# Patient Record
Sex: MALE | Race: WHITE | NOT HISPANIC OR LATINO | Employment: OTHER | URBAN - METROPOLITAN AREA
[De-identification: names, ages, dates, MRNs, and addresses within clinical notes are randomized per-mention and may not be internally consistent; named-entity substitution may affect disease eponyms.]

---

## 2017-01-02 DIAGNOSIS — B02.29 POST HERPETIC NEURALGIA: ICD-10-CM

## 2017-01-02 DIAGNOSIS — M35.3 POLYMYALGIA RHEUMATICA (H): Primary | ICD-10-CM

## 2017-01-03 PROBLEM — B02.29 POST HERPETIC NEURALGIA: Status: ACTIVE | Noted: 2017-01-03

## 2017-01-03 RX ORDER — TRAMADOL HYDROCHLORIDE 50 MG/1
TABLET ORAL
Qty: 50 TABLET | Refills: 0 | Status: SHIPPED | OUTPATIENT
Start: 2017-01-03 | End: 2017-02-21

## 2017-01-03 NOTE — TELEPHONE ENCOUNTER
TRAMADOL 50MG   Last Written Prescription Date:  11/16/16  Last Fill Quantity: 50,   # refills: 0  Last Office Visit with [facility] provider: 12/8/16  Future Office visit:       Routing refill request to provider for review/approval because:  Drug not on the [facility] refill protocol or controlled substance

## 2017-01-03 NOTE — TELEPHONE ENCOUNTER
Controlled Substance Refill Request for traMADol (ULTRAM) 50 MG tablet  Problem List Complete:  No     PROVIDER TO CONSIDER COMPLETION OF PROBLEM LIST AND OVERVIEW/CONTROLLED SUBSTANCE AGREEMENT    Last Written Prescription Date:  11/15/16  Last Fill Quantity: 50,   # refills: 0    [place] [facility] [place]: 12/8/16    Future Office visit:     Controlled substance agreement on file: No.     Processing:  Fax Rx to [facility]  pharmacy   checked in past 6 months?  No, route to RN     [place] reviewed:  reviewed- no concerns    [place]:  [email or web address]

## 2017-01-09 ENCOUNTER — OFFICE VISIT (OUTPATIENT)
Dept: FAMILY MEDICINE | Facility: CLINIC | Age: 82
End: 2017-01-09
Payer: COMMERCIAL

## 2017-01-09 VITALS
RESPIRATION RATE: 14 BRPM | TEMPERATURE: 97.2 F | BODY MASS INDEX: 27.9 KG/M2 | WEIGHT: 189 LBS | OXYGEN SATURATION: 99 % | SYSTOLIC BLOOD PRESSURE: 122 MMHG | HEART RATE: 72 BPM | DIASTOLIC BLOOD PRESSURE: 80 MMHG

## 2017-01-09 DIAGNOSIS — I10 ESSENTIAL HYPERTENSION, BENIGN: ICD-10-CM

## 2017-01-09 DIAGNOSIS — E78.2 MIXED HYPERLIPIDEMIA: ICD-10-CM

## 2017-01-09 DIAGNOSIS — N18.30 TYPE 2 DIABETES MELLITUS WITH STAGE 3 CHRONIC KIDNEY DISEASE, WITHOUT LONG-TERM CURRENT USE OF INSULIN (H): Chronic | ICD-10-CM

## 2017-01-09 DIAGNOSIS — N18.30 CKD (CHRONIC KIDNEY DISEASE) STAGE 3, GFR 30-59 ML/MIN (H): ICD-10-CM

## 2017-01-09 DIAGNOSIS — R60.0 LOCALIZED EDEMA: Primary | ICD-10-CM

## 2017-01-09 DIAGNOSIS — E11.22 TYPE 2 DIABETES MELLITUS WITH STAGE 3 CHRONIC KIDNEY DISEASE, WITHOUT LONG-TERM CURRENT USE OF INSULIN (H): Chronic | ICD-10-CM

## 2017-01-09 DIAGNOSIS — Z13.89 SCREENING FOR DIABETIC PERIPHERAL NEUROPATHY: ICD-10-CM

## 2017-01-09 PROCEDURE — 99214 OFFICE O/P EST MOD 30 MIN: CPT | Performed by: FAMILY MEDICINE

## 2017-01-09 NOTE — NURSING NOTE
"Chief Complaint   Patient presents with     Swelling       Initial /80 mmHg  Pulse 72  Temp(Src) 97.2  F (36.2  C) (Tympanic)  Resp 14  Wt 189 lb (85.73 kg)  SpO2 99% Estimated body mass index is 27.9 kg/(m^2) as calculated from the following:    Height as of 12/19/16: 5' 9\" (1.753 m).    Weight as of this encounter: 189 lb (85.73 kg).  BP completed using cuff size: regular    "

## 2017-01-09 NOTE — MR AVS SNAPSHOT
"              After Visit Summary   1/9/2017    Abbe Lambert    MRN: [id number]           Patient Information     Date Of Birth          2/3/1934        Visit Information        Provider Department      1/9/2017 2:00 PM Eliezer Shah MD [facility]        Today's Diagnoses     Screening for diabetic peripheral neuropathy    -  1        Follow-ups after your visit        Your next 10 appointments already scheduled     Mar 06, 2017  1:00 PM   Remote PPM Check with HARMAN TECH1   [facility] [place] [facility]    [address] [place] [address] [id number] [phone]           This appointment is for a remote check of your pacemaker.  This is not an appointment at the office.              Who to contact     If you have questions or need follow up information about today's clinic visit or your schedule please contact [facility] directly at [phone].  Normal or non-critical lab and imaging results will be communicated to you by Dyynohart, letter or phone within 4 business days after the clinic has received the results. If you do not hear from us within 7 days, please contact the clinic through Dyynohart or phone. If you have a critical or abnormal lab result, we will notify you by phone as soon as possible.  Submit refill requests through Agenda or call your pharmacy and they will forward the refill request to us. Please allow 3 business days for your refill to be completed.          Additional Information About Your Visit        Dyynohart Information     Agenda lets you send messages to your doctor, view your test results, renew your prescriptions, schedule appointments and more. To sign up, go to [email or web address] . Click on \"Log in\" on the left side of the screen, which will take you to the Welcome page. Then click on \"Sign up Now\" on the right side of the page.     You will be asked to " enter the access code listed below, as well as some personal information. Please follow the directions to create your username and password.     Your access code is: [email or web address]  Expires: 2017  2:47 PM     Your access code will  in 90 days. If you need help or a new code, please call your [facility] clinic or [phone].        Care EveryWhere ID     This is your Care EveryWhere ID. This could be used by other organizations to access your [facility] medical records  [id number]        Your Vitals Were     Pulse Temperature Respirations Pulse Oximetry          72 97.2  F (36.2  C) (Tympanic) 14 99%         Blood Pressure from Last 3 Encounters:   17 122/80   16 110/73   16 122/70    Weight from Last 3 Encounters:   17 189 lb (85.73 kg)   16 182 lb (82.555 kg)   16 181 lb (82.101 kg)              Today, you had the following     No orders found for display       Primary Care Provider Office Phone # Fax #    Eliezer Shah [phone]       [facility] [address] [facility] [address]        Thank you!     Thank you for choosing [facility]  for your care. Our goal is always to provide you with excellent care. Hearing back from our patients is one way we can continue to improve our services. Please take a few minutes to complete the written survey that you may receive in the mail after your visit with us. Thank you!             Your Updated Medication List - Protect others around you: Learn how to safely use, store and throw away your medicines at www.disposemymeds.org.          This list is accurate as of: 17  2:47 PM.  Always use your most recent med list.                   Brand Name Dispense Instructions for use    ACE/ARB NOT PRESCRIBED (INTENTIONAL)      ACE & ARB not prescribed due to Disease of aortic or mitral valves       albuterol 108 (90 BASE) MCG/ACT Inhaler    PROAIR HFA/PROVENTIL  HFA/VENTOLIN HFA     Inhale 2 puffs into the lungs as needed for shortness of breath / dyspnea or wheezing       aspirin 81 MG tablet     30 tablet    Take 81 mg by mouth daily       ASTELIN 0.1 % spray   Generic drug:  azelastine      Spray 1 spray into both nostrils 2 times daily.       atorvastatin 40 MG tablet    LIPITOR    90 tablet    TAKE 1 TABLET BY MOUTH DAILY       beclomethasone 80 MCG/ACT Inhaler    QVAR    2 Inhaler    Inhale 2 puffs into the lungs 2 times daily       cetirizine 10 MG tablet    zyrTEC     Take 10 mg by mouth every morning       clopidogrel 75 MG tablet    PLAVIX    90 tablet    Take 1 tablet (75 mg) by mouth daily       gabapentin 100 MG capsule    NEURONTIN    120 capsule    Take 1 capsule (100 mg) by mouth 4 times daily       isosorbide mononitrate 30 MG 24 hr tablet    IMDUR     Take 30 mg by mouth daily       NASONEX 50 MCG/ACT spray   Generic drug:  mometasone      Spray 2 sprays into both nostrils daily as needed.       nitroglycerin 0.4 MG sublingual tablet    NITROSTAT    25 tablet    Place 1 tablet (0.4 mg) under the tongue every 5 minutes as needed for chest pain       order for DME     2 Device    Equipment being ordered: JOB[employer] compression stockings 20-30 mg knee high  Large size pair       polyethylene glycol Packet    MIRALAX/GLYCOLAX     Take 17 g by mouth daily       potassium chloride SA 20 MEQ CR tablet    K-DUR/KLOR-CON M    30 tablet    Take 2 pills today, one pill tomorrow then one pill daily ONLY when you take the Lasix       torsemide 20 MG tablet    DEMADEX    30 tablet    Take 1 tablet (20 mg) by mouth daily       traMADol 50 MG tablet    ULTRAM    50 tablet    TAKE 1 TABLET BY MOUTH EVERY 8 HOURS AS NEEDED FOR MODERATE PAIN

## 2017-01-09 NOTE — PROGRESS NOTES
SUBJECTIVE:                                                    Abbe Lambert is a 82 year old male who presents to clinic today for the following health issues:      Hyperlipidemia Follow-Up      Rate your low fat/cholesterol diet?: good    Taking statin?  Yes, no muscle aches from statin    Other lipid medications/supplements?:  none       Amount of exercise or physical activity: 2-3 days/week for an average of 15-30 minutes    Problems taking medications regularly: No    Medication side effects: none    Diet: regular (no restrictions)    Concern - swelling     Onset: October 2016    Description:   Patient states that he has had swelling in both legs, lower. Painful, redness, swelling    Intensity: moderate    Progression of Symptoms:  worsening    Accompanying Signs & Symptoms:  See above       Previous history of similar problem:   n/a    Precipitating factors:   Worsened by: n/a    Alleviating factors:  Improved by: n/a       Therapies Tried and outcome: elevating his legs, water pill      Problem list and histories reviewed & adjusted, as indicated.  Additional history: The patient has been to [employer] to get compression stockings for his lower legs that are knee-high.  He's not sure what the compression number is in terms of millimeters.  They've been pretty comfortable so I'm guessing they're pretty low in the compression area.  He's also been lying down on his couch with his feet up on the end of the couch for 20-30 minutes every day.  Was switched from furosemide to torsemide by cardiology.  He is not intending nor was he asked to follow-up with cardiology except for 1 year.    He has improved remarkably with his left knee which was done at the beginning of October 2016.  With that, his ability to move around has improved dramatically.    Problem list, Medication list, Allergies, and Medical/Social/Surgical histories reviewed in [facility] and updated as appropriate.    ROS:  Constitutional, HEENT,  cardiovascular, pulmonary, gi and gu systems are negative, except as otherwise noted.    OBJECTIVE:                                                    /80 mmHg  Pulse 72  Temp(Src) 97.2  F (36.2  C) (Tympanic)  Resp 14  Wt 189 lb (85.73 kg)  SpO2 99%  Body mass index is 27.9 kg/(m^2).  GENERAL APPEARANCE: healthy, alert and no distress  RESP: lungs clear to auscultation - no rales, rhonchi or wheezes  CV: regular rates and rhythm, normal S1 S2, no S3 or S4 and no murmur, click or rub  MS: pitting 1+ lower extremity edema bilaterally going up to mid calf.         ASSESSMENT/PLAN:                                                        ICD-10-CM    1. Localized edema R60.0    2. Screening for diabetic peripheral neuropathy Z13.89    3. Type 2 diabetes mellitus with stage 3 chronic kidney disease, without long-term current use of insulin (H) E11.22     N18.3    4. Mixed hyperlipidemia E78.2    5. CKD (chronic kidney disease) stage 3, GFR 30-59 ml/min N18.3    6. Essential hypertension, benign I10        Patient Instructions   The patient will continue with his current regimen as noted above.  He will follow-up with us in March and will need labs at that time.  He will follow-up with cardiology in one year.  He will check back to the store where he got his stockings to see which compression he got.  He will see if he can increase that to help with the swelling in his legs.  We again went through the etiology of the swelling from his venous system.  He clearly does not have any breathing issues and his heart and lung exam today are normal.  We'll continue with salt restriction.  May see back sooner as needed.  Will need lipids, diabetes tests and his kidney function done at next visit.        Eliezer Shah MD  [facility]

## 2017-01-10 NOTE — PATIENT INSTRUCTIONS
The patient will continue with his current regimen as noted above.  He will follow-up with us in March and will need labs at that time.  He will follow-up with cardiology in one year.  He will check back to the store where he got his stockings to see which compression he got.  He will see if he can increase that to help with the swelling in his legs.  We again went through the etiology of the swelling from his venous system.  He clearly does not have any breathing issues and his heart and lung exam today are normal.  We'll continue with salt restriction.  May see back sooner as needed.  Will need lipids, diabetes tests and his kidney function done at next visit.

## 2017-01-29 ENCOUNTER — HOSPITAL ENCOUNTER (EMERGENCY)
Facility: CLINIC | Age: 82
Discharge: HOME | End: 2017-01-29
Attending: EMERGENCY MEDICINE | Admitting: EMERGENCY MEDICINE
Payer: COMMERCIAL

## 2017-01-29 ENCOUNTER — APPOINTMENT (OUTPATIENT)
Dept: GENERAL RADIOLOGY | Facility: CLINIC | Age: 82
End: 2017-01-29
Attending: EMERGENCY MEDICINE
Payer: COMMERCIAL

## 2017-01-29 VITALS
DIASTOLIC BLOOD PRESSURE: 74 MMHG | WEIGHT: 180 LBS | BODY MASS INDEX: 26.66 KG/M2 | TEMPERATURE: 97.4 F | OXYGEN SATURATION: 93 % | HEIGHT: 69 IN | SYSTOLIC BLOOD PRESSURE: 134 MMHG

## 2017-01-29 DIAGNOSIS — M54.42 ACUTE LEFT-SIDED LOW BACK PAIN WITH LEFT-SIDED SCIATICA: ICD-10-CM

## 2017-01-29 PROCEDURE — 25000132 ZZH RX MED GY IP 250 OP 250 PS 637: Performed by: EMERGENCY MEDICINE

## 2017-01-29 PROCEDURE — 99284 EMERGENCY DEPT VISIT MOD MDM: CPT

## 2017-01-29 PROCEDURE — 72100 X-RAY EXAM L-S SPINE 2/3 VWS: CPT

## 2017-01-29 RX ORDER — OXYCODONE AND ACETAMINOPHEN 5; 325 MG/1; MG/1
1 TABLET ORAL ONCE
Status: COMPLETED | OUTPATIENT
Start: 2017-01-29 | End: 2017-01-29

## 2017-01-29 RX ORDER — METHYLPREDNISOLONE 4 MG
TABLET, DOSE PACK ORAL
Qty: 21 TABLET | Refills: 0 | Status: SHIPPED | OUTPATIENT
Start: 2017-01-29 | End: 2017-02-07

## 2017-01-29 RX ORDER — IBUPROFEN 600 MG/1
600 TABLET, FILM COATED ORAL ONCE
Status: COMPLETED | OUTPATIENT
Start: 2017-01-29 | End: 2017-01-29

## 2017-01-29 RX ORDER — OXYCODONE AND ACETAMINOPHEN 5; 325 MG/1; MG/1
1-2 TABLET ORAL EVERY 4 HOURS PRN
Qty: 20 TABLET | Refills: 0 | Status: SHIPPED | OUTPATIENT
Start: 2017-01-29 | End: 2017-02-21

## 2017-01-29 RX ADMIN — IBUPROFEN 600 MG: 600 TABLET ORAL at 12:49

## 2017-01-29 RX ADMIN — OXYCODONE HYDROCHLORIDE AND ACETAMINOPHEN 1 TABLET: 5; 325 TABLET ORAL at 12:49

## 2017-01-29 ASSESSMENT — ENCOUNTER SYMPTOMS: BACK PAIN: 1

## 2017-01-29 NOTE — ED AVS SNAPSHOT
Emergency Department    [address] [place] [address]    Phone:  [phone]    Fax:  [phone]                                       Abbe Lambert   MRN: [id number]    Department:   Emergency Department   Date of Visit:  1/29/2017           After Visit Summary Signature Page     I have received my discharge instructions, and my questions have been answered. I have discussed any challenges I see with this plan with the nurse or doctor.    ..........................................................................................................................................  Patient/Patient Representative Signature      ..........................................................................................................................................  Patient Representative Print Name and Relationship to Patient    ..................................................               ................................................  Date                                            Time    ..........................................................................................................................................  Reviewed by Signature/Title    ...................................................              ..............................................  Date                                                            Time

## 2017-01-29 NOTE — ED PROVIDER NOTES
"  History     Chief Complaint:  Leg Pain    HPI   Abbe Lambert is a 82 year old male who presents with left leg pain. The patient reports that a couple of days ago he experienced sudden onset of pain in the base of his spine, that radiates down his left leg past his knee. It is worse on the outside of his leg. The pain is constant, and worsened by movement and standing. It is alleviated by sitting. He has not had any awkward movements or direct trauma to the painful areas. He has taken ibuprofen for pain with no symptomatic relief.     Allergies:  Advair diskus  Morphine HCl  Vicodin     Medications:    Demadex  Plavix    Imdur  Potassium chloride  Lipitor  Gabapentin  Albuterol  Qvar  Aspirin  Nitrostat  Astelin  Zyrtec  Nasonex  Ultram    Past Medical History:    MI  Hypertension  GERD  Chronic sinusitis  Asthma  Polymyalgia rheumatica  Hyperlipidemia     Coronary artery disease  Atrial fibrillation  Cardiomyopathy    History of angina  Shortness of breath  Tachy-alix syndrome  Chronic kidney disease  AAA  Cardiac arrest  Type II diabetes  Prostate cancer    Past Surgical History:    Prostate surgery  Cholecystectomy  Cardiac surgery  Cataract extraction  Sinus surgery  Knee arthroplasty    Family History:    Cerebrovascular disease  Heart disease  Depression    Social History:  Relationship status:   Tobacco use: Negative  Alcohol use: Negative  The patient presents with his wife.     Review of Systems   Musculoskeletal: Positive for back pain.        Positive for leg pain.   All other systems reviewed and are negative.      Physical Exam   First Vitals:  BP: (!) 133/111 mmHg  Heart Rate: 53  Temp: 97.4  F (36.3  C)  Height: 175.3 cm (5' 9\")  Weight: 81.647 kg (180 lb)  SpO2: 97 %      Physical Exam  Musculoskeletal:  No midline tenderness to the spine.  There is focal tenderness over the left lower lumbar paraspinal musculature into the sciatic notch..    Skin:  Warm and dry without rashes.  Feet are " equally warm with strong DP pulses.    Neuro:  Normal sensation throughout the legs and perineum.  5/5 strength through the hips/knees/ankles.  2+ patellar reflexes.  Normal gait.    Psych:  Normal affect with appropriate interaction with examiner.      Emergency Department Course     Imaging:  Radiographic findings were communicated with the patient who voiced understanding of the findings.    Lumbar Spine XR, per radiology:   Prominent degenerative disc disease at the L2 and L5 levels. No other findings.    Interventions:  1249: Ibuprofen, 600 mg, PO  1249: Percocet, 1 tablet, PO    Emergency Department Course:  Nursing notes and vitals reviewed.  I performed an exam of the patient as documented above.  The above workup was undertaken.  1328: I rechecked the patient and discussed results.    Findings and plan explained to the Patient. Patient discharged home with instructions regarding supportive care, medications, and reasons to return. The importance of close follow-up was reviewed. The patient was prescribed Percocet and Medrol Dosepak.    Impression & Plan      Medical Decision Making:  Abbe Lambert is a 82 year old male who presents with left back pain that radiates down to his left buttock, and down the lateral aspect of his left leg. This had been present the last two days, and is not associated with trauma. His physical exam is completely unremarkable, with appropriate sensation, strength, and reflexes. He has no midline tenderness. However, based on his age, he did undergo an x-ray of his spine. This shows significant degenerative changes, but no evidence of fracture or malalignment. He felt much improved after a dose of percocet, and he will be discharged with the same. I gave him restrictions with taking this potentially dangerous medicine. He was otherwise advised to follow up with PCP to see if our interventions are improving. Return to us with worsening conditions, or other emergent  concerns.    Diagnosis:    ICD-10-CM   1. Acute left-sided low back pain with left-sided sciatica M54.42     Disposition:  Discharge to home with primary care follow up.    Discharge Medications:  methylprednisolone (MEDROL DOSEPAK) 4 MG tablet Follow package instructions, Disp-21 tablet, R-0, Local Print   oxycodone-acetaminophen (PERCOCET) 5-325 MG per tablet Take 1-2 tablets by mouth every 4 hours as needed for moderate to severe pain, Disp-20 tablet, R-0, Local Print     Tre PATINO, am serving as a scribe on 1/29/2017 at 12:37 PM to personally document services performed by Trierweiler, Chad A, MD, based on my observations and the provider's statements to me.     EMERGENCY DEPARTMENT        Trierweiler, Chad A, MD  01/29/17 2154

## 2017-01-29 NOTE — ED AVS SNAPSHOT
Emergency Department    [address] [facility] [address]    Phone:  [phone]    Fax:  [phone]                                       Abbe Lambert   MRN: [id number]    Department:   Emergency Department   Date of Visit:  1/29/2017           Patient Information     Date Of Birth          2/3/1934        Your diagnoses for this visit were:     Acute left-sided low back pain with left-sided sciatica        You were seen by Trierweiler, Chad A, MD.      Follow-up Information     Follow up with Eliezer Shah MD In 1 week.    Specialty:  Family Practice    Contact information:    [facility]  [address]  [facility] [id number] [phone]          Follow up with  Emergency Department.    Specialty:  EMERGENCY MEDICINE    Why:  If symptoms worsen    Contact information:    [address] [facility] [address] [phone]        Discharge Instructions         Discharge Instructions  Back Pain  You were seen today for back pain. Back pain can have many causes, but most will get better without surgery or other specific treatment. Sometimes there is a herniated ( slipped ) disc. We don t usually do MRI scans to look for these right away, since most herniated discs will get better on their own with time.  Today, we did not find any evidence that your back pain was caused by a serious condition, such as an infection, fracture, or tumor. However, sometimes symptoms develop over time and cannot be found during an emergency visit, so it is very important that you follow up with your primary doctor.  Return to the Emergency Department if:    You develop a fever with your back pain.     You have weakness or change in sensation in one or both legs.    You lose control of your bowels or bladder, or can t empty your bladder.    Your pain gets much worse.     Follow-up with your doctor:    Unless your pain has completely gone away, please make an  appointment with your doctor within one week.  You may need further management of your back pain, such as more pain medication, imaging such as an X-ray or MRI, or physical therapy.    What can I do to help myself?    Remain Active -- People are often afraid that they will hurt their back further or delay recovery by remaining active, but this is one of the best things you can do for your back. In fact, prolonged bed rest is not recommended. Studies have shown that people with low back pain recover faster when they remain active. Movement helps to bring blood flow to the muscles and relieve muscle spasms as well as preventing loss of muscle strength.    Heat -- Using a heating pad can help with low back pain during the first few weeks. Do not sleep with a heating pad, as you can be burned.     Pain medications - You may take a pain medication such as Tylenol  (acetaminophen), Advil , Nuprin  (ibuprofen) or Aleve  (naproxen).  If you have been given a narcotic such as Vicodin  (hydrocodone with acetaminophen), Percocet  (oxycodone with acetaminophen), codeine, or a muscle relaxant such as Flexeril  (cyclobenzaprine) or Soma  (carisoprodol), do not drive for four hours after you have taken it. If the narcotic contains Tylenol  (acetaminophen), do not take Tylenol  with it. All narcotics will cause constipation, so eat a high fiber diet.   If you were given a prescription for medicine here today, be sure to read all of the information (including the package insert) that comes with your prescription.  This will include important information about the medicine, its side effects, and any warnings that you need to know about.  The pharmacist who fills the prescription can provide more information and answer questions you may have about the medicine.  If you have questions or concerns that the pharmacist cannot address, please call or return to the Emergency Department.   Opioid Medication Information    Pain medications  are among the most commonly prescribed medicines, so we are including this information for all our patients. If you did not receive pain medication or get a prescription for pain medicine, you can ignore it.     You may have been given a prescription for an opioid (narcotic) pain medicine and/or have received a pain medicine while here in the Emergency Department. These medicines can make you drowsy or impaired. You must not drive, operate dangerous equipment, or engage in any other dangerous activities while taking these medications. If you drive while taking these medications, you could be arrested for DUI, or driving under the influence. Do not drink any alcohol while you are taking these medications.     Opioid pain medications can cause addiction. If you have a history of chemical dependency of any type, you are at a higher risk of becoming addicted to pain medications.  Only take these prescribed medications to treat your pain when all other options have been tried. Take it for as short a time and as few doses as possible. Store your pain pills in a secure place, as they are frequently stolen and provide a dangerous opportunity for children or visitors in your house to start abusing these powerful medications. We will not replace any lost or stolen medicine.  As soon as your pain is better, you should flush all your remaining medication.     Many prescription pain medications contain Tylenol  (acetaminophen), including Vicodin , Tylenol #3 , Norco , Lortab , and Percocet .  You should not take any extra pills of Tylenol  if you are using these prescription medications or you can get very sick.  Do not ever take more than 3000 mg of acetaminophen in any 24 hour period.    All opioids tend to cause constipation. Drink plenty of water and eat foods that have a lot of fiber, such as fruits, vegetables, prune juice, apple juice and high fiber cereal.  Take a laxative if you don t move your bowels at least every  other day. Miralax , Milk of Magnesia, Colace , or Senna  can be used to keep you regular.      Remember that you can always come back to the Emergency Department if you are not able to see your regular doctor in the amount of time listed above, if you get any new symptoms, or if there is anything that worries you.        Future Appointments        Provider Department Dept Phone Center    3/6/2017 1:00 PM [facility] [place] [phone] [facility]    3/13/2017 2:00 PM Eliezer Shah MD [facility] [phone] B[facility]X      24 Hour Appointment Hotline       To make an appointment at any [facility], call [phone] ([phone]). If you don't have a family doctor or clinic, we will help you find one. [facility] are conveniently located to serve the needs of you and your family.             Review of your medicines      START taking        Dose / Directions Last dose taken    methylPREDNISolone 4 MG tablet   Commonly known as:  MEDROL DOSEPAK   Quantity:  21 tablet        Follow package instructions   Refills:  0        oxyCODONE-acetaminophen 5-325 MG per tablet   Commonly known as:  PERCOCET   Dose:  1-2 tablet   Quantity:  20 tablet        Take 1-2 tablets by mouth every 4 hours as needed for moderate to severe pain   Refills:  0          Our records show that you are taking the medicines listed below. If these are incorrect, please call your family doctor or clinic.        Dose / Directions Last dose taken    ACE/ARB NOT PRESCRIBED (INTENTIONAL)        ACE & ARB not prescribed due to Disease of aortic or mitral valves   Refills:  0        albuterol 108 (90 BASE) MCG/ACT Inhaler   Commonly known as:  PROAIR HFA/PROVENTIL HFA/VENTOLIN HFA   Dose:  2 puff        Inhale 2 puffs into the lungs as needed for shortness of breath / dyspnea or wheezing   Refills:  0        aspirin 81 MG tablet   Dose:  81 mg   Quantity:   30 tablet        Take 81 mg by mouth daily   Refills:  0        ASTELIN 0.1 % spray   Dose:  1 spray   Generic drug:  azelastine        Spray 1 spray into both nostrils 2 times daily.   Refills:  0        atorvastatin 40 MG tablet   Commonly known as:  LIPITOR   Quantity:  90 tablet        TAKE 1 TABLET BY MOUTH DAILY   Refills:  3        beclomethasone 80 MCG/ACT Inhaler   Commonly known as:  QVAR   Dose:  2 puff   Quantity:  2 Inhaler        Inhale 2 puffs into the lungs 2 times daily   Refills:  11        cetirizine 10 MG tablet   Commonly known as:  zyrTEC   Dose:  10 mg        Take 10 mg by mouth every morning   Refills:  0        clopidogrel 75 MG tablet   Commonly known as:  PLAVIX   Dose:  75 mg   Quantity:  90 tablet        Take 1 tablet (75 mg) by mouth daily   Refills:  2        gabapentin 100 MG capsule   Commonly known as:  NEURONTIN   Dose:  100 mg   Quantity:  120 capsule        Take 1 capsule (100 mg) by mouth 4 times daily   Refills:  3        isosorbide mononitrate 30 MG 24 hr tablet   Commonly known as:  IMDUR   Dose:  30 mg        Take 30 mg by mouth daily   Refills:  0        NASONEX 50 MCG/ACT spray   Dose:  2 spray   Generic drug:  mometasone        Spray 2 sprays into both nostrils daily as needed.   Refills:  0        nitroglycerin 0.4 MG sublingual tablet   Commonly known as:  NITROSTAT   Dose:  0.4 mg   Quantity:  25 tablet        Place 1 tablet (0.4 mg) under the tongue every 5 minutes as needed for chest pain   Refills:  3        order for DME   Quantity:  2 Device        Equipment being ordered: JOBST compression stockings 20-30 mg knee high  Large size pair   Refills:  1        polyethylene glycol Packet   Commonly known as:  MIRALAX/GLYCOLAX   Dose:  17 g        Take 17 g by mouth daily   Refills:  0        potassium chloride SA 20 MEQ CR tablet   Commonly known as:  K-DUR/KLOR-CON M   Quantity:  30 tablet        Take 2 pills today, one pill tomorrow then one pill daily ONLY when you  take the Lasix   Refills:  3        torsemide 20 MG tablet   Commonly known as:  DEMADEX   Dose:  20 mg   Quantity:  30 tablet        Take 1 tablet (20 mg) by mouth daily   Refills:  1        traMADol 50 MG tablet   Commonly known as:  ULTRAM   Quantity:  50 tablet        TAKE 1 TABLET BY MOUTH EVERY 8 HOURS AS NEEDED FOR MODERATE PAIN   Refills:  0                Prescriptions were sent or printed at these locations (2 Prescriptions)                   Other Prescriptions                Printed at Department/Unit printer (2 of 2)         methylPREDNISolone (MEDROL DOSEPAK) 4 MG tablet               oxyCODONE-acetaminophen (PERCOCET) 5-325 MG per tablet                Procedures and tests performed during your visit     Lumbar spine XR, 2-3 views      Orders Needing Specimen Collection     None      Pending Results     No orders found from 1/28/2017 to 1/30/2017.            Pending Culture Results     No orders found from 1/28/2017 to 1/30/2017.       Test Results from your hospital stay           1/29/2017  1:10 PM - Interface, Radiant Ib      Narrative     XR LUMBAR SPINE 2-3 VIEWS 1/29/2017 1:09 PM    HISTORY: left back pain, radiculopathy        Impression     IMPRESSION: Prominent degenerative disc disease at the L2 and L5  levels. No other findings.    LILLIANA ANTHONY MD                Clinical Quality Measure: Blood Pressure Screening     Your blood pressure was checked while you were in the emergency department today. The last reading we obtained was  BP: 134/74 mmHg . Please read the guidelines below about what these numbers mean and what you should do about them.  If your systolic blood pressure (the top number) is less than 120 and your diastolic blood pressure (the bottom number) is less than 80, then your blood pressure is normal. There is nothing more that you need to do about it.  If your systolic blood pressure (the top number) is 120-139 or your diastolic blood pressure (the bottom number) is 80-89,  "your blood pressure may be higher than it should be. You should have your blood pressure rechecked within a year by a primary care provider.  If your systolic blood pressure (the top number) is 140 or greater or your diastolic blood pressure (the bottom number) is 90 or greater, you may have high blood pressure. High blood pressure is treatable, but if left untreated over time it can put you at risk for heart attack, stroke, or kidney failure. You should have your blood pressure rechecked by a primary care provider within the next 4 weeks.  If your provider in the emergency department today gave you specific instructions to follow-up with your doctor or provider even sooner than that, you should follow that instruction and not wait for up to 4 weeks for your follow-up visit.        Thank you for choosing [facility]       Thank you for choosing [facility] for your care. Our goal is always to provide you with excellent care. Hearing back from our patients is one way we can continue to improve our services. Please take a few minutes to complete the written survey that you may receive in the mail after you visit with us. Thank you!        [employer] Information     [employer] lets you send messages to your doctor, view your test results, renew your prescriptions, schedule appointments and more. To sign up, go to [email or web address] . Click on \"Log in\" on the left side of the screen, which will take you to the Welcome page. Then click on \"Sign up Now\" on the right side of the page.     You will be asked to enter the access code listed below, as well as some personal information. Please follow the directions to create your username and password.     Your access code is: [email or web address]  Expires: 2017  2:47 PM     Your access code will  in 90 days. If you need help or a new code, please call your [facility] clinic or [phone].        Care EveryWhere ID     This is your Care EveryWhere ID. This could be used by other " organizations to access your [facility] medical records  [id number]        After Visit Summary       This is your record. Keep this with you and show to your community pharmacist(s) and doctor(s) at your next visit.

## 2017-02-07 ENCOUNTER — OFFICE VISIT (OUTPATIENT)
Dept: FAMILY MEDICINE | Facility: CLINIC | Age: 82
End: 2017-02-07
Payer: COMMERCIAL

## 2017-02-07 VITALS
OXYGEN SATURATION: 100 % | DIASTOLIC BLOOD PRESSURE: 66 MMHG | WEIGHT: 188 LBS | RESPIRATION RATE: 14 BRPM | HEART RATE: 68 BPM | SYSTOLIC BLOOD PRESSURE: 124 MMHG | TEMPERATURE: 96.2 F | BODY MASS INDEX: 27.75 KG/M2

## 2017-02-07 DIAGNOSIS — J45.31 MILD PERSISTENT ASTHMA WITH ACUTE EXACERBATION: ICD-10-CM

## 2017-02-07 DIAGNOSIS — M54.42 LEFT-SIDED LOW BACK PAIN WITH LEFT-SIDED SCIATICA, UNSPECIFIED CHRONICITY: Primary | ICD-10-CM

## 2017-02-07 DIAGNOSIS — Z13.89 SCREENING FOR DIABETIC PERIPHERAL NEUROPATHY: ICD-10-CM

## 2017-02-07 PROCEDURE — 99214 OFFICE O/P EST MOD 30 MIN: CPT | Performed by: FAMILY MEDICINE

## 2017-02-07 RX ORDER — METHYLPREDNISOLONE 4 MG
TABLET, DOSE PACK ORAL
Qty: 21 TABLET | Refills: 0 | Status: SHIPPED | OUTPATIENT
Start: 2017-02-07 | End: 2017-02-21

## 2017-02-07 NOTE — PROGRESS NOTES
SUBJECTIVE:                                                    Abbe Lambert is a 83 year old male who presents to clinic today for the following health issues:      Asthma Follow-Up    Was ACT completed today?    Yes    ACT Total Scores 2/7/2017   ACT TOTAL SCORE (Goal Greater than or Equal to 20) 25   In the past 12 months, how many times did you visit the emergency room for your asthma without being admitted to the hospital? 0   In the past 12 months, how many times were you hospitalized overnight because of your asthma? 0         Amount of exercise or physical activity: 1 day/week for an average of 15-30 minutes    Problems taking medications regularly: No    Medication side effects: none  Diet: regular (no restrictions)  Back Pain      Duration: 1.5 weeks        Specific cause: none    Description:   Location of pain: low back left  Character of pain: sharp and dull ache  Pain radiation:radiates into the left leg and radiates into the left foot  New numbness or weakness in legs, not attributed to pain:  no     Intensity: moderate    History:   Pain interferes with job: Not applicable  History of back problems: no prior back problems  Any previous MRI or X-rays: None  Sees a specialist for back pain:  No  Therapies tried without relief:     Alleviating factors:   Improved by: pain medication      Precipitating factors:  Worsened by: Standing and Walking    Functional and Psychosocial Screen (Hetal STarT Back):      Not performed today       Accompanying Signs & Symptoms:  Risk of Fracture:  None  Risk of Cauda Equina:  None  Risk of Infection:  None  Risk of Cancer:  None  Risk of Ankylosing Spondylitis:  Onset at age <35, male, AND morning back stiffness. no                          Problem list and histories reviewed & adjusted, as indicated.  Additional history: the patient maybe had a little improvement with medrol  Problem list, Medication list, Allergies, and Medical/Social/Surgical histories reviewed  in EPIC and updated as appropriate.    ROS:  Constitutional, HEENT, cardiovascular, pulmonary, gi and gu systems are negative, except as otherwise noted.    OBJECTIVE:                                                    /66  Pulse 68  Temp 96.2  F (35.7  C) (Tympanic)  Resp 14  Wt 188 lb (85.3 kg)  SpO2 100%  BMI 27.76 kg/m2  Body mass index is 27.76 kg/(m^2).  GENERAL APPEARANCE: healthy, alert and no distress         ASSESSMENT/PLAN:                                                        ICD-10-CM    1. Left-sided low back pain with left-sided sciatica, unspecified chronicity M54.42 DISCONTINUED: methylPREDNISolone (MEDROL DOSEPAK) 4 MG tablet   2. Mild persistent asthma with acute exacerbation J45.31    3. Screening for diabetic peripheral neuropathy Z13.89 FOOT EXAM  NO CHARGE [48060.114]       Patient Instructions   Will place him back on a medrol dosepak. If not improving may need imaging for definitive diagnosis.Will treat symptomatically and see back as needed if not improving. He will let us know how his back responds to the steroids.    Patient's breathing is back to normal on his inhalers. I will not change any medications.      Eliezer Shah MD  [facility]

## 2017-02-07 NOTE — PATIENT INSTRUCTIONS
Will place him back on a medrol dosepak. If not improving may need imaging for definitive diagnosis.Will treat symptomatically and see back as needed if not improving. He will let us know how his back responds to the steroids.    Patient's breathing is back to normal on his inhalers. I will not change any medications.

## 2017-02-07 NOTE — MR AVS SNAPSHOT
After Visit Summary   2/7/2017    Abbe Lambert    MRN: [id number]           Patient Information     Date Of Birth          2/3/1934        Visit Information        Provider Department      2/7/2017 1:15 PM Eliezer Shah MD [facility]        Today's Diagnoses     Left-sided low back pain with left-sided sciatica, unspecified chronicity    -  1     Mild persistent asthma with acute exacerbation         Screening for diabetic peripheral neuropathy           Care Instructions    Will place him back on a medrol dosepak. If not improving may need imaging for definitive diagnosis.Will treat symptomatically and see back as needed if not improving. He will let us know how his back responds to the steroids.    Patient's breathing is back to normal on his inhalers. I will not change any medications.        Follow-ups after your visit        Your next 10 appointments already scheduled     Mar 06, 2017  1:00 PM   Remote PPM Check with HARMAN TECH1   [facility] AT [place] ([facility])    [address] [place] [address] [address]  [facility] [id number] [phone]           This appointment is for a remote check of your pacemaker.  This is not an appointment at the office.            Mar 13, 2017  2:00 PM   Office Visit with Eliezer Shah MD   [facility] ([facility]    [address]  [facility] [id number] [phone]           Bring a current list of meds and any records pertaining to this visit.  For Physicals, please bring immunization records and any forms needing to be filled out.  Please arrive 10 minutes early to complete paperwork.              Who to contact     If you have questions or need follow up information about today's clinic visit or your schedule please contact [facility] directly at  "[phone].  Normal or non-critical lab and imaging results will be communicated to you by MyChart, letter or phone within 4 business days after the clinic has received the results. If you do not hear from us within 7 days, please contact the clinic through [employer] or phone. If you have a critical or abnormal lab result, we will notify you by phone as soon as possible.  Submit refill requests through [employer] or call your pharmacy and they will forward the refill request to us. Please allow 3 business days for your refill to be completed.          Additional Information About Your Visit        [employer] Information     [employer] lets you send messages to your doctor, view your test results, renew your prescriptions, schedule appointments and more. To sign up, go to [email or web address] . Click on \"Log in\" on the left side of the screen, which will take you to the Welcome page. Then click on \"Sign up Now\" on the right side of the page.     You will be asked to enter the access code listed below, as well as some personal information. Please follow the directions to create your username and password.     Your access code is: [email or web address]  Expires: 2017  2:47 PM     Your access code will  in 90 days. If you need help or a new code, please call your [place] clinic or [phone].        Care EveryWhere ID     This is your Care EveryWhere ID. This could be used by other organizations to access your [place] medical records  [id number]        Your Vitals Were     Pulse Temperature Respirations Pulse Oximetry          68 96.2  F (35.7  C) (Tympanic) 14 100%         Blood Pressure from Last 3 Encounters:   17 124/66   17 134/74   17 122/80    Weight from Last 3 Encounters:   17 188 lb (85.276 kg)   17 180 lb (81.647 kg)   17 189 lb (85.73 kg)              We Performed the Following     FOOT EXAM  NO CHARGE [38960.114]          Where to get your medicines      These " medications were sent to [facility] - [place], [place] - [address] AT [facility] [address]  [address], [facility] [address]    Hours:  24-hours Phone:  [phone]    - methylPREDNISolone 4 MG tablet       Primary Care Provider Office Phone # Fax #    Eliezer Shah [phone]       [facility] [address]  [facility] [address]        Thank you!     [facility]. Our goal is always to provide you with excellent care. Hearing back from our patients is one way we can continue to improve our services. Please take a few minutes to complete the written survey that you may receive in the mail after your visit with us. Thank you!             Your Updated Medication List - Protect others around you: Learn how to safely use, store and throw away your medicines at www.disposemymeds.org.          This list is accurate as of: 2/7/17  5:35 PM.  Always use your most recent med list.                   Brand Name Dispense Instructions for use    ACE/ARB NOT PRESCRIBED (INTENTIONAL)      ACE & ARB not prescribed due to Disease of aortic or mitral valves       albuterol 108 (90 BASE) MCG/ACT Inhaler    PROAIR HFA/PROVENTIL HFA/VENTOLIN HFA     Inhale 2 puffs into the lungs as needed for shortness of breath / dyspnea or wheezing       aspirin 81 MG tablet     30 tablet    Take 81 mg by mouth daily       ASTELIN 0.1 % spray   Generic drug:  azelastine      Spray 1 spray into both nostrils 2 times daily.       atorvastatin 40 MG tablet    LIPITOR    90 tablet    TAKE 1 TABLET BY MOUTH DAILY       beclomethasone 80 MCG/ACT Inhaler    QVAR    2 Inhaler    Inhale 2 puffs into the lungs 2 times daily       cetirizine 10 MG tablet    zyrTEC     Take 10 mg by mouth every morning       clopidogrel 75 MG tablet    PLAVIX    90 tablet    Take 1 tablet (75 mg) by mouth daily       gabapentin 100 MG  capsule    NEURONTIN    120 capsule    Take 1 capsule (100 mg) by mouth 4 times daily       isosorbide mononitrate 30 MG 24 hr tablet    IMDUR     Take 30 mg by mouth daily       methylPREDNISolone 4 MG tablet    MEDROL DOSEPAK    21 tablet    Follow package instructions       NASONEX 50 MCG/ACT spray   Generic drug:  mometasone      Spray 2 sprays into both nostrils daily as needed.       nitroglycerin 0.4 MG sublingual tablet    NITROSTAT    25 tablet    Place 1 tablet (0.4 mg) under the tongue every 5 minutes as needed for chest pain       order for [facility]     2 Device    Equipment being ordered: JOBST compression stockings 20-30 mg knee high  Large size pair       oxyCODONE-acetaminophen 5-325 MG per tablet    PERCOCET    20 tablet    Take 1-2 tablets by mouth every 4 hours as needed for moderate to severe pain       polyethylene glycol Packet    MIRALAX/GLYCOLAX     Take 17 g by mouth daily       potassium chloride SA 20 MEQ CR tablet    K-DUR/KLOR-CON M    30 tablet    Take 2 pills today, one pill tomorrow then one pill daily ONLY when you take the Lasix       torsemide 20 MG tablet    DEMADEX    30 tablet    Take 1 tablet (20 mg) by mouth daily       traMADol 50 MG tablet    ULTRAM    50 tablet    TAKE 1 TABLET BY MOUTH EVERY 8 HOURS AS NEEDED FOR MODERATE PAIN

## 2017-02-07 NOTE — NURSING NOTE
"Chief Complaint   Patient presents with     Musculoskeletal Problem       Initial /66 mmHg  Pulse 68  Temp(Src) 96.2  F (35.7  C) (Tympanic)  Resp 14  Wt 188 lb (85.276 kg)  SpO2 100% Estimated body mass index is 27.75 kg/(m^2) as calculated from the following:    Height as of 1/29/17: 5' 9\" (1.753 m).    Weight as of this encounter: 188 lb (85.276 kg).  Medication Reconciliation: complete    "

## 2017-02-08 ASSESSMENT — ASTHMA QUESTIONNAIRES: ACT_TOTALSCORE: 25

## 2017-02-13 ENCOUNTER — HOSPITAL ENCOUNTER (EMERGENCY)
Facility: CLINIC | Age: 82
Discharge: HOME | End: 2017-02-14
Attending: EMERGENCY MEDICINE | Admitting: EMERGENCY MEDICINE
Payer: COMMERCIAL

## 2017-02-13 DIAGNOSIS — R20.2 PARESTHESIA: ICD-10-CM

## 2017-02-13 DIAGNOSIS — R06.02 SOB (SHORTNESS OF BREATH): ICD-10-CM

## 2017-02-13 PROCEDURE — 93005 ELECTROCARDIOGRAM TRACING: CPT

## 2017-02-13 PROCEDURE — 99284 EMERGENCY DEPT VISIT MOD MDM: CPT

## 2017-02-13 NOTE — ED AVS SNAPSHOT
Emergency Department    [address] [facility] [address]    Phone:  [phone]    Fax:  [phone]                                       Abbe Lambert   MRN: [id number]    Department:   Emergency Department   Date of Visit:  2/13/2017           Patient Information     Date Of Birth          2/3/1934        Your diagnoses for this visit were:     SOB (shortness of breath)     Paresthesia        You were seen by Supriya Latham MD.      Follow-up Information     Follow up with  Emergency Department.    Specialty:  EMERGENCY MEDICINE    Why:  immediately , If symptoms worsen    Contact information:    [address] [facility] [address] [phone]        Follow up with Eliezer Shah MD In 2 days.    Specialty:  Family Practice    Why:  for a recheck of your symptoms    Contact information:    [facility] [place] [facility] [address]  [address]  [facility] [id number] [phone]          Discharge Instructions         Shortness of Breath (Dyspnea)  Shortness of breath is the feeling that you can't catch your breath or get enough air. It is also known as dyspnea.  Dyspnea can be caused by many different conditions. They include:    Acute asthma attack.    Worsening of chronic lung diseases such as chronic bronchitis and emphysema.     Heart failure. This is when weak heart muscle allows extra fluid to collect in the lungs.    Panic attacks or anxiety. Fear can cause rapid breathing (hyperventilation).    Pneumonia, or an infection in the lung tissue.    Exposure to toxic substances, fumes, smoke, or certain medicines.    Blood clot in the lung (pulmonary embolism). This is often from a piece of blood clot in a deep vein of the leg (deep vein thrombosis) that breaks off and travels to the lungs.     Heart attack or heart-related chest pain (angina).    Anemia.    Collapsed lung (pneumothorax).    Dehydration.    Pregnancy.  Based on your visit today, the exact cause  of your shortness of breath is not certain. Your tests don t show any of the serious causes of dyspnea. You may need other tests to find out if you have a serious problem. It s important to watch for any new symptoms or symptoms that get worse. Follow up with your healthcare provider as directed.  Home care  Follow these tips to take care of yourself at home:    When your symptoms are better, go back to your usual activities.    If you smoke, you should stop. Join a quit-smoking program or ask your healthcare provider for help.    Eat a healthy diet and get plenty of sleep.    Get regular exercise. Talk with your healthcare provider before starting to exercise, especially if you have other medical problems.    Cut down on the amount of caffeine and stimulants you consume.  Follow-up care  Follow up with your healthcare provider, or as advised.  If tests were done, you will be told if your treatment needs to be changed. You can call as directed for the results.  (Note: If an X-ray was taken, a specialist will review it. You will be notified of any new findings that may affect your care.)  Call 911 or get immediate medical care  Shortness of breath may be a sign of a serious medical problem. For example, it may be a problem with your heart or lungs. Call 911 if you have worsening shortness of breath or trouble breathing, especially with any of the symptoms below:    You are confused or it s difficult to wake you.    You faint or lose consciousness.    You have a fast heartbeat, or your heartbeat is irregular.     You are coughing up blood.    You have pain in your chest, arm, shoulder, neck, or upper back.    You break out in a sweat.  When to seek medical advice  Call your healthcare provider right away if any of these occur:    Slight shortness of breath or wheezing     Redness, pain or swelling in your leg, arm, or other body area    Swelling in both legs or ankles    Fast weight  gain    Dizziness or weakness    Fever of 100.4 F (38 C) or higher, or as directed by your healthcare provider    8334-7726 [employer]. [address] [place] [address], [place] [address]. All rights reserved. This information is not intended as a substitute for professional medical care. Always follow your healthcare professional's instructions.      Stop  Your gabapentin slowly: Take the medicine 3 times daily for the next 3 days, then 2 times daily for the next 3 days, then 1 time daily for the next 3 days, then discontinue the medication entirely.    Future Appointments        Provider Department Dept Phone Center    3/6/2017 1:00 PM [facility] AT [place] [phone] [facility] PSA CLIN    3/13/2017 2:00 PM Eliezer Shah MD [facility] [phone] [facility]      24 Hour Appointment Hotline       To make an appointment at any [facility], call [phone] ([phone]). If you don't have a family doctor or clinic, we will help you find one. [facility] are conveniently located to serve the needs of you and your family.             Review of your medicines      Our records show that you are taking the medicines listed below. If these are incorrect, please call your family doctor or clinic.        Dose / Directions Last dose taken    ACE/ARB NOT PRESCRIBED (INTENTIONAL)        ACE & ARB not prescribed due to Disease of aortic or mitral valves   Refills:  0        albuterol 108 (90 BASE) MCG/ACT Inhaler   Commonly known as:  PROAIR HFA/PROVENTIL HFA/VENTOLIN HFA   Dose:  2 puff        Inhale 2 puffs into the lungs as needed for shortness of breath / dyspnea or wheezing   Refills:  0        aspirin 81 MG tablet   Dose:  81 mg   Quantity:  30 tablet        Take 81 mg by mouth daily   Refills:  0        ASTELIN 0.1 % spray   Dose:  1 spray   Generic drug:  azelastine        Spray 1 spray into both nostrils 2 times daily.    Refills:  0        atorvastatin 40 MG tablet   Commonly known as:  LIPITOR   Quantity:  90 tablet        TAKE 1 TABLET BY MOUTH DAILY   Refills:  3        beclomethasone 80 MCG/ACT Inhaler   Commonly known as:  QVAR   Dose:  2 puff   Quantity:  2 Inhaler        Inhale 2 puffs into the lungs 2 times daily   Refills:  11        cetirizine 10 MG tablet   Commonly known as:  zyrTEC   Dose:  10 mg        Take 10 mg by mouth every morning   Refills:  0        clopidogrel 75 MG tablet   Commonly known as:  PLAVIX   Dose:  75 mg   Quantity:  90 tablet        Take 1 tablet (75 mg) by mouth daily   Refills:  2        gabapentin 100 MG capsule   Commonly known as:  NEURONTIN   Dose:  100 mg   Quantity:  120 capsule        Take 1 capsule (100 mg) by mouth 4 times daily   Refills:  3        isosorbide mononitrate 30 MG 24 hr tablet   Commonly known as:  IMDUR   Dose:  30 mg        Take 30 mg by mouth daily   Refills:  0        methylPREDNISolone 4 MG tablet   Commonly known as:  MEDROL DOSEPAK   Quantity:  21 tablet        Follow package instructions   Refills:  0        NASONEX 50 MCG/ACT spray   Dose:  2 spray   Generic drug:  mometasone        Spray 2 sprays into both nostrils daily as needed.   Refills:  0        nitroglycerin 0.4 MG sublingual tablet   Commonly known as:  NITROSTAT   Dose:  0.4 mg   Quantity:  25 tablet        Place 1 tablet (0.4 mg) under the tongue every 5 minutes as needed for chest pain   Refills:  3        order for DME   Quantity:  2 Device        Equipment being ordered: JOBST compression stockings 20-30 mg knee high  Large size pair   Refills:  1        oxyCODONE-acetaminophen 5-325 MG per tablet   Commonly known as:  PERCOCET   Dose:  1-2 tablet   Quantity:  20 tablet        Take 1-2 tablets by mouth every 4 hours as needed for moderate to severe pain   Refills:  0        polyethylene glycol Packet   Commonly known as:  MIRALAX/GLYCOLAX   Dose:  17 g        Take 17 g by mouth daily   Refills:  0         potassium chloride SA 20 MEQ CR tablet   Commonly known as:  K-DUR/KLOR-CON M   Quantity:  30 tablet        Take 2 pills today, one pill tomorrow then one pill daily ONLY when you take the Lasix   Refills:  3        torsemide 20 MG tablet   Commonly known as:  DEMADEX   Dose:  20 mg   Quantity:  30 tablet        Take 1 tablet (20 mg) by mouth daily   Refills:  1        traMADol 50 MG tablet   Commonly known as:  ULTRAM   Quantity:  50 tablet        TAKE 1 TABLET BY MOUTH EVERY 8 HOURS AS NEEDED FOR MODERATE PAIN   Refills:  0                Procedures and tests performed during your visit     Basic metabolic panel    EKG 12 lead    Nt probnp inpatient      Orders Needing Specimen Collection     None      Pending Results     No orders found for last 3 day(s).            Pending Culture Results     No orders found for last 3 day(s).             Test Results from your hospital stay     2/14/2017  2:27 AM - Interface, Signaturit Results      Component Results     Component Value Ref Range & Units Status    Sodium 142 133 - 144 mmol/L Final    Potassium 4.5 3.4 - 5.3 mmol/L Final    Chloride 106 94 - 109 mmol/L Final    Carbon Dioxide 28 20 - 32 mmol/L Final    Anion Gap 8 3 - 14 mmol/L Final    Glucose 96 70 - 99 mg/dL Final    Urea Nitrogen 35 (H) 7 - 30 mg/dL Final    Creatinine 1.51 (H) 0.66 - 1.25 mg/dL Final    GFR Estimate 44 (L) >60 mL/min/1.7m2 Final    Non  GFR Calc    GFR Estimate If Black 54 (L) >60 mL/min/1.7m2 Final    African American GFR Calc    Calcium 8.2 (L) 8.5 - 10.1 mg/dL Final         2/14/2017  2:31 AM - Interface, Signaturit Results      Component Results     Component Value Ref Range & Units Status    N-Terminal Pro BNP Inpatient 595 0 - 1800 pg/mL Final    Reference range shown and results flagged as abnormal are suggested inpatient   cut points for confirming diagnosis if CHF in an acute setting. Establishing   a   baseline value for each individual patient is useful for  follow-up. An   inpatient or emergency department NT-proPBNP <300 pg/mL effectively rules out   acute CHF, with 99% negative predictive value.  The outpatient non-acute reference range for ruling out CHF is:   0-125 pg/mL (age 18 to less than 75)   0-450 pg/mL (age 75 yrs and older)                  Clinical Quality Measure: Blood Pressure Screening     Your blood pressure was checked while you were in the emergency department today. The last reading we obtained was  BP: 148/83 . Please read the guidelines below about what these numbers mean and what you should do about them.  If your systolic blood pressure (the top number) is less than 120 and your diastolic blood pressure (the bottom number) is less than 80, then your blood pressure is normal. There is nothing more that you need to do about it.  If your systolic blood pressure (the top number) is 120-139 or your diastolic blood pressure (the bottom number) is 80-89, your blood pressure may be higher than it should be. You should have your blood pressure rechecked within a year by a primary care provider.  If your systolic blood pressure (the top number) is 140 or greater or your diastolic blood pressure (the bottom number) is 90 or greater, you may have high blood pressure. High blood pressure is treatable, but if left untreated over time it can put you at risk for heart attack, stroke, or kidney failure. You should have your blood pressure rechecked by a primary care provider within the next 4 weeks.  If your provider in the emergency department today gave you specific instructions to follow-up with your doctor or provider even sooner than that, you should follow that instruction and not wait for up to 4 weeks for your follow-up visit.        Thank you for choosing [facility]       Thank you for choosing [facility] for your care. Our goal is always to provide you with excellent care. Hearing back from our patients is one way we can continue to improve our services.  "Please take a few minutes to complete the written survey that you may receive in the mail after you visit with us. Thank you!        [employer] Information     [employer] lets you send messages to your doctor, view your test results, renew your prescriptions, schedule appointments and more. To sign up, go to [email or web address] . Click on \"Log in\" on the left side of the screen, which will take you to the Welcome page. Then click on \"Sign up Now\" on the right side of the page.     You will be asked to enter the access code listed below, as well as some personal information. Please follow the directions to create your username and password.     Your access code is: [email or web address]  Expires: 2017  2:47 PM     Your access code will  in 90 days. If you need help or a new code, please call your [place] clinic or [phone].        Care EveryWhere ID     This is your Care EveryWhere ID. This could be used by other organizations to access your [place] medical records  [id number]        After Visit Summary       This is your record. Keep this with you and show to your community pharmacist(s) and doctor(s) at your next visit.                  "

## 2017-02-13 NOTE — ED AVS SNAPSHOT
Emergency Department    [address] [place] [address]    Phone:  [phone]    Fax:  [phone]                                       Abbe Lambert   MRN: [id number]    Department:   Emergency Department   Date of Visit:  2/13/2017           After Visit Summary Signature Page     I have received my discharge instructions, and my questions have been answered. I have discussed any challenges I see with this plan with the nurse or doctor.    ..........................................................................................................................................  Patient/Patient Representative Signature      ..........................................................................................................................................  Patient Representative Print Name and Relationship to Patient    ..................................................               ................................................  Date                                            Time    ..........................................................................................................................................  Reviewed by Signature/Title    ...................................................              ..............................................  Date                                                            Time

## 2017-02-14 ENCOUNTER — TRANSFERRED RECORDS (OUTPATIENT)
Dept: HEALTH INFORMATION MANAGEMENT | Facility: CLINIC | Age: 82
End: 2017-02-14

## 2017-02-14 VITALS
DIASTOLIC BLOOD PRESSURE: 83 MMHG | TEMPERATURE: 97.7 F | RESPIRATION RATE: 18 BRPM | HEIGHT: 70 IN | BODY MASS INDEX: 25.77 KG/M2 | OXYGEN SATURATION: 98 % | SYSTOLIC BLOOD PRESSURE: 148 MMHG | WEIGHT: 180 LBS

## 2017-02-14 LAB
ANION GAP SERPL CALCULATED.3IONS-SCNC: 8 MMOL/L (ref 3–14)
BUN SERPL-MCNC: 35 MG/DL (ref 7–30)
CALCIUM SERPL-MCNC: 8.2 MG/DL (ref 8.5–10.1)
CHLORIDE SERPL-SCNC: 106 MMOL/L (ref 94–109)
CO2 SERPL-SCNC: 28 MMOL/L (ref 20–32)
CREAT SERPL-MCNC: 1.51 MG/DL (ref 0.66–1.25)
GFR SERPL CREATININE-BSD FRML MDRD: 44 ML/MIN/1.7M2
GLUCOSE SERPL-MCNC: 96 MG/DL (ref 70–99)
NT-PROBNP SERPL-MCNC: 595 PG/ML (ref 0–1800)
POTASSIUM SERPL-SCNC: 4.5 MMOL/L (ref 3.4–5.3)
SODIUM SERPL-SCNC: 142 MMOL/L (ref 133–144)

## 2017-02-14 PROCEDURE — 80048 BASIC METABOLIC PNL TOTAL CA: CPT | Performed by: EMERGENCY MEDICINE

## 2017-02-14 PROCEDURE — 83880 ASSAY OF NATRIURETIC PEPTIDE: CPT | Performed by: EMERGENCY MEDICINE

## 2017-02-14 ASSESSMENT — ENCOUNTER SYMPTOMS
NUMBNESS: 1
WEAKNESS: 0
COUGH: 0
FEVER: 0
CHILLS: 0
BACK PAIN: 0
SHORTNESS OF BREATH: 1

## 2017-02-14 NOTE — DISCHARGE INSTRUCTIONS
Shortness of Breath (Dyspnea)  Shortness of breath is the feeling that you can't catch your breath or get enough air. It is also known as dyspnea.  Dyspnea can be caused by many different conditions. They include:    Acute asthma attack.    Worsening of chronic lung diseases such as chronic bronchitis and emphysema.     Heart failure. This is when weak heart muscle allows extra fluid to collect in the lungs.    Panic attacks or anxiety. Fear can cause rapid breathing (hyperventilation).    Pneumonia, or an infection in the lung tissue.    Exposure to toxic substances, fumes, smoke, or certain medicines.    Blood clot in the lung (pulmonary embolism). This is often from a piece of blood clot in a deep vein of the leg (deep vein thrombosis) that breaks off and travels to the lungs.     Heart attack or heart-related chest pain (angina).    Anemia.    Collapsed lung (pneumothorax).    Dehydration.    Pregnancy.  Based on your visit today, the exact cause of your shortness of breath is not certain. Your tests don t show any of the serious causes of dyspnea. You may need other tests to find out if you have a serious problem. It s important to watch for any new symptoms or symptoms that get worse. Follow up with your healthcare provider as directed.  Home care  Follow these tips to take care of yourself at home:    When your symptoms are better, go back to your usual activities.    If you smoke, you should stop. Join a quit-smoking program or ask your healthcare provider for help.    Eat a healthy diet and get plenty of sleep.    Get regular exercise. Talk with your healthcare provider before starting to exercise, especially if you have other medical problems.    Cut down on the amount of caffeine and stimulants you consume.  Follow-up care  Follow up with your healthcare provider, or as advised.  If tests were done, you will be told if your treatment needs to be changed. You can call as directed for the  results.  (Note: If an X-ray was taken, a specialist will review it. You will be notified of any new findings that may affect your care.)  Call 911 or get immediate medical care  Shortness of breath may be a sign of a serious medical problem. For example, it may be a problem with your heart or lungs. Call 911 if you have worsening shortness of breath or trouble breathing, especially with any of the symptoms below:    You are confused or it s difficult to wake you.    You faint or lose consciousness.    You have a fast heartbeat, or your heartbeat is irregular.     You are coughing up blood.    You have pain in your chest, arm, shoulder, neck, or upper back.    You break out in a sweat.  When to seek medical advice  Call your healthcare provider right away if any of these occur:    Slight shortness of breath or wheezing     Redness, pain or swelling in your leg, arm, or other body area    Swelling in both legs or ankles    Fast weight gain    Dizziness or weakness    Fever of 100.4 F (38 C) or higher, or as directed by your healthcare provider    0137-4072 [employer]. [address] [place] [address]35. All rights reserved. This information is not intended as a substitute for professional medical care. Always follow your healthcare professional's instructions.      Stop  Your gabapentin slowly: Take the medicine 3 times daily for the next 3 days, then 2 times daily for the next 3 days, then 1 time daily for the next 3 days, then discontinue the medication entirely.

## 2017-02-14 NOTE — ED PROVIDER NOTES
History     Chief Complaint:  Shortness of Breath    HPI:     Abbe Lambert is a 83 year old male with a history of asthma, atrial fibrillation, CAD, cardiac arrest, cardiomyopathy, myocardial infarction, type 2 diabetes mellitus, chronic kidney disease, and GERD who presents with shortness of breath. The patient reports that he has been experiencing shortness of breath for the past few days. Today however, his shortness of breath worsened, primarily upon exertion. Concurrently, he has been complaining of bilateral lower extremity numbness. Given his symptoms, and advanced cardiac history, he was prompted to visit the ED. Of note, he takes two daily doses of Gabapentin who has known side affects of lower extremity numbness in addition to shortness of breath. He denies cough, fever, chills, chest pain, back pain, or weakness.     Allergies:  Advair Diskus  Morphine  Vicodin     Medications:    Gabapentin  Percocet  Ultram  Plavix  Demadex  Imdur  Potassium chloride  Lipitor  Qvar  Aspirin  Nitroglycerin  Zyrtec  Astelin  Nasonex     Past Medical History:    Asthma  Shingles  Anemia  Degenerative arthritis  Type 2 diabetes mellitus  CHF  Hypertension  Hyperlipidemia  Tachy-Haim Syndrome  AAA  Myocardial infarction  Chronic kidney disease  GERD  CAD  Cardiomyopathy  Atrial fibrillation  Chronic sinusitis  Polymyalgia rheumatica  Postherpetic neuralgia    Past Surgical History:    Prostate surgery  Cholecystectomy  Cataract surgery  Pacemaker implant  Prostatectomy  Sinus surgery  Arthroplasty, left knee    Family History:    Cerebrovascular Disease- Father  Heart Disease- Father, Brother  Depression- Daughter    Social History:  Smoking status: Never Smoker  Alcohol use: No   Marital Status:     Present with wife at bedside.     Review of Systems   Constitutional: Negative for chills and fever.   Respiratory: Positive for shortness of breath. Negative for cough.    Cardiovascular: Negative for chest pain.  "  Musculoskeletal: Negative for back pain.   Neurological: Positive for numbness. Negative for weakness.   All other systems reviewed and are negative.      Physical Exam     Patient Vitals for the past 24 hrs:   BP Temp Temp src Heart Rate Resp SpO2 Height Weight   02/14/17 0245 - - - - - 98 % - -   02/14/17 0200 148/83 - - - - 99 % - -   02/14/17 0100 135/84 - - - - 96 % - -   02/14/17 0030 136/75 - - - - 98 % - -   02/13/17 2347 151/85 97.7  F (36.5  C) Oral 73 18 99 % 1.778 m (5' 10\") 81.6 kg (180 lb)      Physical Exam:    Gen: Pleasant, appears stated age.    Eye:   Pupils are equal, round, and reactive.     Sclera non-injected.    ENT:   Moist mucus membranes.     Normal tongue.    Oropharynx without lesions.    Cardiac:     Normal rate and regular rhythm.    No murmurs, gallops, or rubs.    Pulmonary:     Clear to auscultation bilaterally.    No wheezes, rales, or rhonchi.   Speaking in full sentences.   No increased work of breathing.    Abdomen:     Normal active bowel sounds.     Abdomen is soft and non-distended, without focal tenderness.    Musculoskeletal:     Normal movement of all extremities without evidence for deficit.   Calves are non-tender.    Extremities:    No edema.   2 + radial pulses.   2+ femoral, DP and PT pulses.    Skin:   Warm and dry.    Neurologic:    Speech is fluent, cognition is normal.   Long and short term memory intact     CN's II-XII intact; EOM intact, symmetric grimace.    UE strength: 5/5 triceps, biceps, ; symmetric bilaterally   LE strength:   5/5 DF, PF, HF, KE; symmetric bilaterally.     Normal muscle tone.   Numbness to pin prick in L4 distribution below the knee, bilaterally.   Reflexes are 2+ on right patella, unable to elicit on the right secondary to knee replacement.   Finger to Nose and heel to shin testing is intact bilaterally.     Gait is normal w/o ataxia.        Psychiatric:     Normal affect with appropriate interaction with examiner.     Emergency " Department Course     ECG (23:50:48):  Rate 70 bpm. IA interval 240. QRS duration 120. QT/QTc 400/432. P-R-T axes *- -.Atrial-placed rhythm with prolonged AV conduction with premature supraventricular complexes. Left anterior fascicular block. Anterolateral infarct, age undetermined. Abnormal ECG. Interpreted at 0151 by Supriya Latham MD.     No significant change compared to ECG dated 12/19/16.     Laboratory:  Basal Metabolic Panel: BUN 35 (H), Creatinine 1.51 (H), GFR Estimate 44 (L), Calcium 8.2 (L), o/w WNL  BNP: 595     Emergency Department Course:  Past medical records, nursing notes, and vitals reviewed.  0130: I performed an exam of the patient and obtained history, as documented above.    An ECG was obtained.    IV inserted and blood drawn.     0252: I rechecked the patient. Explained findings to the patient and his wife.     0315: I rechecked the patient. Laboratory and ECG findings and plan explained to the Patient and spouse. Patient discharged home with instructions regarding supportive care, medications, and reasons to return. The importance of close follow-up was reviewed.       Impression & Plan      Medical Decision Making:  Abbe Lambert is an 83 year old male with a history of CAD and postherpetic neuralgia who takes Gabapentin. He presents today with bilateral, lower extremity numbness and dyspnea on exertion. On exam, he was well-appearing and had normal oxygen saturations. His lungs were clear to ascultation. He did not have signs of volume overload based on exam or labs. At this point, I do not think his shortness of breath is due to acute coronary syndrome. His ECG was unchanged. He did not have any chest pain, symptoms to suggest pulmonary embolism, or infectious ideology such as pneumonia or pneumonitis. In regards to his bilateral, lower extremity numbness, he did have some demonstrable pin prick loss on exam. Otherwise, he had good strength and good peripheral reflexes. He  did not have any symptoms to suggest cauda equina or other acute compressive processes. His symptoms were also not consistent with DVT. He had good pulses in bilateral lower extremities. The patient was suspicious that this could have been related to Gabapentin. It looks like this does occasionally cause some numbness, and so I have recommended slowly reducing this as he is asymptomatic of his postherpetic neuralgia. Otherwise, I have recommended follow-up with his PCP. There were no electrolyte abnormalities that would explain his symptoms either.    Diagnosis:    ICD-10-CM   1. SOB (shortness of breath) R06.02   2. Paresthesia R20.2     Amy Love  2/13/2017    EMERGENCY DEPARTMENT    I, Amy Love, am serving as a scribe at 1:30 AM on 2/14/2017 to document services personally performed by Supriya Latham MD based on my observations and the provider's statements to me.       Supriya Latham MD  02/14/17 0622

## 2017-02-21 ENCOUNTER — OFFICE VISIT (OUTPATIENT)
Dept: FAMILY MEDICINE | Facility: CLINIC | Age: 82
End: 2017-02-21
Payer: COMMERCIAL

## 2017-02-21 VITALS
HEART RATE: 70 BPM | DIASTOLIC BLOOD PRESSURE: 60 MMHG | HEIGHT: 70 IN | WEIGHT: 189 LBS | RESPIRATION RATE: 14 BRPM | TEMPERATURE: 97 F | OXYGEN SATURATION: 98 % | BODY MASS INDEX: 27.06 KG/M2 | SYSTOLIC BLOOD PRESSURE: 110 MMHG

## 2017-02-21 DIAGNOSIS — I50.42 CHRONIC COMBINED SYSTOLIC AND DIASTOLIC CONGESTIVE HEART FAILURE (H): ICD-10-CM

## 2017-02-21 DIAGNOSIS — I87.2 STASIS DERMATITIS OF BOTH LEGS: Primary | ICD-10-CM

## 2017-02-21 PROCEDURE — 99213 OFFICE O/P EST LOW 20 MIN: CPT | Performed by: FAMILY MEDICINE

## 2017-02-21 RX ORDER — TRIAMCINOLONE ACETONIDE 1 MG/G
CREAM TOPICAL
Qty: 45 G | Refills: 3 | Status: SHIPPED | OUTPATIENT
Start: 2017-02-21 | End: 2017-04-12

## 2017-02-21 RX ORDER — TORSEMIDE 20 MG/1
20 TABLET ORAL DAILY PRN
Qty: 30 TABLET | Refills: 1
Start: 2017-02-21 | End: 2018-05-15

## 2017-02-21 NOTE — PROGRESS NOTES
"  SUBJECTIVE:                                                    Abbe Lambert is a 83 year old male who presents to clinic today for the following health issues:      Musculoskeletal problem/pain      Duration: 5 days    Description  Location: bilateral lower legs    Intensity:  moderate    Accompanying signs and symptoms: swelling and redness with mostly redness    History  Previous similar problem: CHF  Previous evaluation:  none    Precipitating or alleviating factors:  Trauma or overuse: no   Aggravating factors include: none    Therapies tried and outcome: on diuretic for [id number]/60       Medication Followup of medrol for his back problem    Taking Medication as prescribed: yes    Side Effects:  None    Medication Helping Symptoms:  Sciatica is gone       Problem list and histories reviewed & adjusted, as indicated.  Additional history: as documented    Problem list, Medication list, Allergies, and Medical/Social/Surgical histories reviewed in EPIC and updated as appropriate.    ROS:  Constitutional, HEENT, cardiovascular, pulmonary, gi and gu systems are negative, except as otherwise noted.    OBJECTIVE:                                                    /60  Pulse 70  Temp 97  F (36.1  C) (Tympanic)  Resp 14  Ht 5' 10\" (1.778 m)  Wt 189 lb (85.7 kg)  SpO2 98%  BMI 27.12 kg/m2  Body mass index is 27.12 kg/(m^2).  GENERAL APPEARANCE: healthy, alert and no distress  SKIN: erythema - lower legs         ASSESSMENT/PLAN:                                                        ICD-10-CM    1. Stasis dermatitis of both legs I83.11 triamcinolone (KENALOG) 0.1 % cream    I83.12    2. Chronic combined systolic and diastolic congestive heart failure (H) I50.42 torsemide (DEMADEX) 20 MG tablet       Patient Instructions   Will place on triamcinolone cream twice daily as needed.    Patient is using torsemide about once/week. Will see back in one month.      Eliezer Shah MD  [facility] " [facility]

## 2017-02-21 NOTE — MR AVS SNAPSHOT
After Visit Summary   2/21/2017    Abbe Lambert    MRN: [id number]           Patient Information     Date Of Birth          2/3/1934        Visit Information        Provider Department      2/21/2017 2:00 PM Eliezer Shah MD [facility]        Today's Diagnoses     Stasis dermatitis of both legs    -  1    Chronic combined systolic and diastolic congestive heart failure (H)          Care Instructions    Will place on triamcinolone cream twice daily as needed.    Patient is using torsemide about once/week. Will see back in one month.        Follow-ups after your visit        Follow-up notes from your care team     Return in about 4 weeks (around 3/21/2017), or if symptoms worsen or fail to improve.      Your next 10 appointments already scheduled     Mar 06, 2017  1:00 PM CST   Remote PPM Check with HARMAN TECH1   [facility] [place] [facility]    [address]  [facility] [id number] [phone]           This appointment is for a remote check of your pacemaker.  This is not an appointment at the office.            Mar 13, 2017  2:00 PM CDT   [facility] MD   [facility]    [address]  [facility] [address] [phone]           Bring a current list of meds and any records pertaining to this visit.  For Physicals, please bring immunization records and any forms needing to be filled out.  Please arrive 10 minutes early to complete paperwork.              Who to contact     If you have questions or need follow up information about today's clinic visit or your schedule please contact [facility] directly at [phone].  Normal or non-critical lab and imaging results will be communicated to you by MyChart, letter or phone within 4 business days  "after the clinic has received the results. If you do not hear from us within 7 days, please contact the clinic through [email or web address] or phone. If you have a critical or abnormal lab result, we will notify you by phone as soon as possible.  Submit refill requests through [email or web address] or call your pharmacy and they will forward the refill request to us. Please allow 3 business days for your refill to be completed.          Additional Information About Your Visit        [email or web address] Information     [email or web address] lets you send messages to your doctor, view your test results, renew your prescriptions, schedule appointments and more. To sign up, go to [email or web address] . Click on \"Log in\" on the left side of the screen, which will take you to the Welcome page. Then click on \"Sign up Now\" on the right side of the page.     You will be asked to enter the access code listed below, as well as some personal information. Please follow the directions to create your username and password.     Your access code is: [email or web address]  Expires: 2017  2:47 PM     Your access code will  in 90 days. If you need help or a new code, please call your [facility] clinic or [phone].        Care EveryWhere ID     This is your Care EveryWhere ID. This could be used by other organizations to access your [facility] medical records  [id number]        Your Vitals Were     Pulse Temperature Respirations Height Pulse Oximetry BMI (Body Mass Index)    70 97  F (36.1  C) (Tympanic) 14 5' 10\" (1.778 m) 98% 27.12 kg/m2       Blood Pressure from Last 3 Encounters:   17 110/60   17 148/83   17 124/66    Weight from Last 3 Encounters:   17 189 lb (85.7 kg)   17 180 lb (81.6 kg)   17 188 lb (85.3 kg)              Today, you had the following     No orders found for display         Today's Medication Changes          These changes are accurate as of: 17  2:37 PM.  If you have any questions, ask your nurse or doctor.          "      Start taking these medicines.        Dose/Directions    triamcinolone 0.1 % cream   Commonly known as:  KENALOG   Used for:  Stasis dermatitis of both legs   Started by:  Eliezer Shah MD        Apply sparingly to affected area two times daily as needed   Quantity:  45 g   Refills:  3         These medicines have changed or have updated prescriptions.        Dose/Directions    torsemide 20 MG tablet   Commonly known as:  DEMADEX   This may have changed:    - when to take this  - reasons to take this   Used for:  Chronic combined systolic and diastolic congestive heart failure (H)   Changed by:  Eliezer Shah MD        Dose:  20 mg   Take 1 tablet (20 mg) by mouth daily as needed   Quantity:  30 tablet   Refills:  1         Stop taking these medicines if you haven't already. Please contact your care team if you have questions.     gabapentin 100 MG capsule   Commonly known as:  NEURONTIN   Stopped by:  Eliezer Shah MD           order for DME   Stopped by:  Eliezer Shah MD           oxyCODONE-acetaminophen 5-325 MG per tablet   Commonly known as:  PERCOCET   Stopped by:  Eliezer Shah MD           traMADol 50 MG tablet   Commonly known as:  ULTRAM   Stopped by:  Eliezer Shah MD                Where to get your medicines      These medications were sent to [id number] - [facility] [address] [facility] [address]  [address] [facility] [address]    Hours:  24-hours Phone:  [phone]     triamcinolone 0.1 % cream         Some of these will need a paper prescription and others can be bought over the counter.  Ask your nurse if you have questions.     You don't need a prescription for these medications     torsemide 20 MG tablet                Primary Care Provider Office Phone # Fax #    Eliezer Shah [phone]       [facility] [address]  [facility]  [address]        Thank you!     Thank you for choosing [facility]  for your care. Our goal is always to provide you with excellent care. Hearing back from our patients is one way we can continue to improve our services. Please take a few minutes to complete the written survey that you may receive in the mail after your visit with us. Thank you!             Your Updated Medication List - Protect others around you: Learn how to safely use, store and throw away your medicines at www.disposemymeds.org.          This list is accurate as of: 2/21/17  2:37 PM.  Always use your most recent med list.                   Brand Name Dispense Instructions for use    ACE/ARB NOT PRESCRIBED (INTENTIONAL)      ACE & ARB not prescribed due to Disease of aortic or mitral valves       albuterol 108 (90 BASE) MCG/ACT Inhaler    PROAIR HFA/PROVENTIL HFA/VENTOLIN HFA     Inhale 2 puffs into the lungs as needed for shortness of breath / dyspnea or wheezing       aspirin 81 MG tablet     30 tablet    Take 81 mg by mouth daily       ASTELIN 0.1 % spray   Generic drug:  azelastine      Spray 1 spray into both nostrils 2 times daily.       atorvastatin 40 MG tablet    LIPITOR    90 tablet    TAKE 1 TABLET BY MOUTH DAILY       beclomethasone 80 MCG/ACT Inhaler    QVAR    2 Inhaler    Inhale 2 puffs into the lungs 2 times daily       cetirizine 10 MG tablet    zyrTEC     Take 10 mg by mouth every morning       clopidogrel 75 MG tablet    PLAVIX    90 tablet    Take 1 tablet (75 mg) by mouth daily       isosorbide mononitrate 30 MG 24 hr tablet    IMDUR     Take 30 mg by mouth daily       NASONEX 50 MCG/ACT spray   Generic drug:  mometasone      Spray 2 sprays into both nostrils daily as needed.       nitroglycerin 0.4 MG sublingual tablet    NITROSTAT    25 tablet    Place 1 tablet (0.4 mg) under the tongue every 5 minutes as needed for chest pain       polyethylene glycol Packet    MIRALAX/GLYCOLAX     Take 17 g by  mouth daily       potassium chloride SA 20 MEQ CR tablet    K-DUR/KLOR-CON M    30 tablet    Take 2 pills today, one pill tomorrow then one pill daily ONLY when you take the Lasix       torsemide 20 MG tablet    DEMADEX    30 tablet    Take 1 tablet (20 mg) by mouth daily as needed       triamcinolone 0.1 % cream    KENALOG    45 g    Apply sparingly to affected area two times daily as needed

## 2017-02-21 NOTE — PATIENT INSTRUCTIONS
Will place on triamcinolone cream twice daily as needed.    Patient is using torsemide about once/week. Will see back in one month.

## 2017-03-06 ENCOUNTER — ALLIED HEALTH/NURSE VISIT (OUTPATIENT)
Dept: CARDIOLOGY | Facility: CLINIC | Age: 82
End: 2017-03-06
Payer: COMMERCIAL

## 2017-03-06 DIAGNOSIS — Z95.0 CARDIAC PACEMAKER IN SITU: Primary | ICD-10-CM

## 2017-03-06 PROCEDURE — 93296 REM INTERROG EVL PM/IDS: CPT | Performed by: INTERNAL MEDICINE

## 2017-03-06 PROCEDURE — 93294 REM INTERROG EVL PM/LDLS PM: CPT | Performed by: INTERNAL MEDICINE

## 2017-03-06 NOTE — PROGRESS NOTES
[facility] [employer]  2110 (D) Remote PPM Device Check  AP: 74% : 42%  Mode: DDDR        Presenting Rhythm: AP/ and AP/VS  Heart Rate: adequate heart rates per histogram  Sensing: stable    Pacing Threshold: stable    Impedance: stable  Battery Status: 7 - 7.9 years remaining  Atrial Arrhythmia: 71 mode switch episodes comprising <1% of the time. EGMs available show PAF with longest episode lasting 2 min. Taking ASA only  Ventricular Arrhythmia: none     Care Plan: F/U Merlin q 3 months. Gave results and next transmission date over the phone to shana MACARIO

## 2017-03-06 NOTE — MR AVS SNAPSHOT
"              After Visit Summary   3/6/2017    Abbe Lambert    MRN: [id number]           Patient Information     Date Of Birth          2/3/1934        Visit Information        Provider Department      3/6/2017 1:00 PM HARMAN TECH1 [facility] HEART AT [place]        Today's Diagnoses     Cardiac pacemaker in situ    -  1       Follow-ups after your visit        Your next 10 appointments already scheduled     Mar 13, 2017  2:00 PM CDT   Office Visit with Eliezer Shah MD   [facility] ([facility])    [address] [place] [address] [phone]           Bring a current list of meds and any records pertaining to this visit.  For Physicals, please bring immunization records and any forms needing to be filled out.  Please arrive 10 minutes early to complete paperwork.              Who to contact     If you have questions or need follow up information about today's clinic visit or your schedule please contact [facility] HEART [facility] directly at [phone].  Normal or non-critical lab and imaging results will be communicated to you by MyChart, letter or phone within 4 business days after the clinic has received the results. If you do not hear from us within 7 days, please contact the clinic through [employer] or phone. If you have a critical or abnormal lab result, we will notify you by phone as soon as possible.  Submit refill requests through FlowJob or call your pharmacy and they will forward the refill request to us. Please allow 3 business days for your refill to be completed.          Additional Information About Your Visit        [employer] Information     FlowJob lets you send messages to your doctor, view your test results, renew your prescriptions, schedule appointments and more. To sign up, go to [email or web address] . Click on \"Log in\" on the left side of " "the screen, which will take you to the Welcome page. Then click on \"Sign up Now\" on the right side of the page.     You will be asked to enter the access code listed below, as well as some personal information. Please follow the directions to create your username and password.     Your access code is: [email or web address]  Expires: 2017  2:47 PM     Your access code will  in 90 days. If you need help or a new code, please call your [place] clinic or [phone].        Care EveryWhere ID     This is your Care EveryWhere ID. This could be used by other organizations to access your [place] medical records  [id number]         Blood Pressure from Last 3 Encounters:   17 110/60   17 148/83   17 124/66    Weight from Last 3 Encounters:   17 85.7 kg (189 lb)   17 81.6 kg (180 lb)   17 85.3 kg (188 lb)              We Performed the Following     INTERROGATION DEVICE EVAL REMOTE, PACER/ICD (72059)     PM DEVICE INTERROGATE REMOTE (37037)        Primary Care Provider Office Phone # Fax #    Eliezer Shah [phone]       [facility] [address] [facility] [address]        Thank you!     Thank you for choosing [facility] PHYSICIANS HEART AT [place]  for your care. Our goal is always to provide you with excellent care. Hearing back from our patients is one way we can continue to improve our services. Please take a few minutes to complete the written survey that you may receive in the mail after your visit with us. Thank you!             Your Updated Medication List - Protect others around you: Learn how to safely use, store and throw away your medicines at www.disposemymeds.org.          This list is accurate as of: 3/6/17  2:37 PM.  Always use your most recent med list.                   Brand Name Dispense Instructions for use    ACE/ARB NOT PRESCRIBED (INTENTIONAL)      ACE & ARB not prescribed due to Disease of aortic or " mitral valves       albuterol 108 (90 BASE) MCG/ACT Inhaler    PROAIR HFA/PROVENTIL HFA/VENTOLIN HFA     Inhale 2 puffs into the lungs as needed for shortness of breath / dyspnea or wheezing       aspirin 81 MG tablet     30 tablet    Take 81 mg by mouth daily       ASTELIN 0.1 % spray   Generic drug:  azelastine      Spray 1 spray into both nostrils 2 times daily.       atorvastatin 40 MG tablet    LIPITOR    90 tablet    TAKE 1 TABLET BY MOUTH DAILY       beclomethasone 80 MCG/ACT Inhaler    QVAR    2 Inhaler    Inhale 2 puffs into the lungs 2 times daily       cetirizine 10 MG tablet    zyrTEC     Take 10 mg by mouth every morning       clopidogrel 75 MG tablet    PLAVIX    90 tablet    Take 1 tablet (75 mg) by mouth daily       isosorbide mononitrate 30 MG 24 hr tablet    IMDUR     Take 30 mg by mouth daily       NASONEX 50 MCG/ACT spray   Generic drug:  mometasone      Spray 2 sprays into both nostrils daily as needed.       nitroglycerin 0.4 MG sublingual tablet    NITROSTAT    25 tablet    Place 1 tablet (0.4 mg) under the tongue every 5 minutes as needed for chest pain       polyethylene glycol Packet    MIRALAX/GLYCOLAX     Take 17 g by mouth daily       potassium chloride SA 20 MEQ CR tablet    K-DUR/KLOR-CON M    30 tablet    Take 2 pills today, one pill tomorrow then one pill daily ONLY when you take the Lasix       torsemide 20 MG tablet    DEMADEX    30 tablet    Take 1 tablet (20 mg) by mouth daily as needed       triamcinolone 0.1 % cream    KENALOG    45 g    Apply sparingly to affected area two times daily as needed

## 2017-03-13 ENCOUNTER — OFFICE VISIT (OUTPATIENT)
Dept: FAMILY MEDICINE | Facility: CLINIC | Age: 82
End: 2017-03-13
Payer: COMMERCIAL

## 2017-03-13 VITALS
BODY MASS INDEX: 27.49 KG/M2 | HEART RATE: 92 BPM | WEIGHT: 192 LBS | TEMPERATURE: 97 F | SYSTOLIC BLOOD PRESSURE: 122 MMHG | DIASTOLIC BLOOD PRESSURE: 70 MMHG | HEIGHT: 70 IN

## 2017-03-13 DIAGNOSIS — B02.29 POST HERPETIC NEURALGIA: ICD-10-CM

## 2017-03-13 DIAGNOSIS — J45.30 MILD PERSISTENT ASTHMA WITHOUT COMPLICATION: ICD-10-CM

## 2017-03-13 DIAGNOSIS — R60.0 LOCALIZED EDEMA: Primary | ICD-10-CM

## 2017-03-13 LAB
BUN SERPL-MCNC: 26 MG/DL (ref 7–30)
CREAT SERPL-MCNC: 1.83 MG/DL (ref 0.66–1.25)
GFR SERPL CREATININE-BSD FRML MDRD: 36 ML/MIN/1.7M2

## 2017-03-13 PROCEDURE — 99214 OFFICE O/P EST MOD 30 MIN: CPT | Performed by: FAMILY MEDICINE

## 2017-03-13 PROCEDURE — 36415 COLL VENOUS BLD VENIPUNCTURE: CPT | Performed by: FAMILY MEDICINE

## 2017-03-13 PROCEDURE — 84520 ASSAY OF UREA NITROGEN: CPT | Performed by: FAMILY MEDICINE

## 2017-03-13 PROCEDURE — 82565 ASSAY OF CREATININE: CPT | Performed by: FAMILY MEDICINE

## 2017-03-13 RX ORDER — TRAMADOL HYDROCHLORIDE 50 MG/1
TABLET ORAL
Qty: 60 TABLET | Refills: 3 | Status: SHIPPED | OUTPATIENT
Start: 2017-03-13 | End: 2017-04-12

## 2017-03-13 RX ORDER — ALBUTEROL SULFATE 90 UG/1
2 AEROSOL, METERED RESPIRATORY (INHALATION) PRN
Qty: 3 INHALER | Refills: 11 | Status: SHIPPED | OUTPATIENT
Start: 2017-03-13 | End: 2019-09-24

## 2017-03-13 NOTE — MR AVS SNAPSHOT
"              After Visit Summary   3/13/2017    Abbe Lambert    MRN: [id number]           Patient Information     Date Of Birth          2/3/1934        Visit Information        Provider Department      3/13/2017 2:00 PM Eliezer Shah MD [facility]        Today's Diagnoses     Localized edema    -  1    Mild persistent asthma without complication        Post herpetic neuralgia           Follow-ups after your visit        Your next 10 appointments already scheduled     Jun 12, 2017  9:00 AM CDT   Remote PPM Check with HARMAN TECH1   [facility] HEART AT [place] ([facility])    [address] [place], [place] [id number]  [facility] [address] [phone]           This appointment is for a remote check of your pacemaker.  This is not an appointment at the office.              Who to contact     If you have questions or need follow up information about today's clinic visit or your schedule please contact [facility] directly at [phone].  Normal or non-critical lab and imaging results will be communicated to you by VetDChart, letter or phone within 4 business days after the clinic has received the results. If you do not hear from us within 7 days, please contact the clinic through [employer] or phone. If you have a critical or abnormal lab result, we will notify you by phone as soon as possible.  Submit refill requests through [employer] or call your pharmacy and they will forward the refill request to us. Please allow 3 business days for your refill to be completed.          Additional Information About Your Visit        VetDChart Information     [employer] lets you send messages to your doctor, view your test results, renew your prescriptions, schedule appointments and more. To sign up, go to [email or web address] . Click on \"Log in\" on the left side of the screen, which will take you to the Welcome page. Then click on \"Sign up " "Now\" on the right side of the page.     You will be asked to enter the access code listed below, as well as some personal information. Please follow the directions to create your username and password.     Your access code is: [email or web address]  Expires: 2017  3:47 PM     Your access code will  in 90 days. If you need help or a new code, please call your [place] clinic or [phone].        Care EveryWhere ID     This is your Care EveryWhere ID. This could be used by other organizations to access your [place] medical records  [id number]        Your Vitals Were     Pulse Temperature Height BMI (Body Mass Index)          92 97  F (36.1  C) (Tympanic) 5' 10\" (1.778 m) 27.55 kg/m2         Blood Pressure from Last 3 Encounters:   17 122/70   17 110/60   17 148/83    Weight from Last 3 Encounters:   17 192 lb (87.1 kg)   17 189 lb (85.7 kg)   17 180 lb (81.6 kg)              We Performed the Following     Creatinine     Urea nitrogen          Today's Medication Changes          These changes are accurate as of: 3/13/17 11:59 PM.  If you have any questions, ask your nurse or doctor.               Start taking these medicines.        Dose/Directions    traMADol 50 MG tablet   Commonly known as:  ULTRAM   Used for:  Post herpetic neuralgia   Started by:  Eliezer Shah MD        One tablet orally at 4pm and one orally at bedtime   Quantity:  60 tablet   Refills:  3            Where to get your medicines      Some of these will need a paper prescription and others can be bought over the counter.  Ask your nurse if you have questions.     Bring a paper prescription for each of these medications     albuterol 108 (90 BASE) MCG/ACT Inhaler    traMADol 50 MG tablet                Primary Care Provider Office Phone # Fax #    Eliezer Shah [phone]       [facility] [address]  [facility] [address]        Thank you!     Thank " you for choosing [facility]  for your care. Our goal is always to provide you with excellent care. Hearing back from our patients is one way we can continue to improve our services. Please take a few minutes to complete the written survey that you may receive in the mail after your visit with us. Thank you!             Your Updated Medication List - Protect others around you: Learn how to safely use, store and throw away your medicines at www.disposemymeds.org.          This list is accurate as of: 3/13/17 11:59 PM.  Always use your most recent med list.                   Brand Name Dispense Instructions for use    ACE/ARB NOT PRESCRIBED (INTENTIONAL)      ACE & ARB not prescribed due to Disease of aortic or mitral valves       albuterol 108 (90 BASE) MCG/ACT Inhaler    PROAIR HFA/PROVENTIL HFA/VENTOLIN HFA    3 Inhaler    Inhale 2 puffs into the lungs as needed for shortness of breath / dyspnea or wheezing       aspirin 81 MG tablet     30 tablet    Take 81 mg by mouth daily       ASTELIN 0.1 % spray   Generic drug:  azelastine      Spray 1 spray into both nostrils 2 times daily.       atorvastatin 40 MG tablet    LIPITOR    90 tablet    TAKE 1 TABLET BY MOUTH DAILY       beclomethasone 80 MCG/ACT Inhaler    QVAR    2 Inhaler    Inhale 2 puffs into the lungs 2 times daily       cetirizine 10 MG tablet    zyrTEC     Take 10 mg by mouth every morning       clopidogrel 75 MG tablet    PLAVIX    90 tablet    Take 1 tablet (75 mg) by mouth daily       isosorbide mononitrate 30 MG 24 hr tablet    IMDUR     Take 30 mg by mouth daily       NASONEX 50 MCG/ACT spray   Generic drug:  mometasone      Spray 2 sprays into both nostrils daily as needed.       nitroglycerin 0.4 MG sublingual tablet    NITROSTAT    25 tablet    Place 1 tablet (0.4 mg) under the tongue every 5 minutes as needed for chest pain       polyethylene glycol Packet    MIRALAX/GLYCOLAX     Take 17 g by mouth daily        potassium chloride SA 20 MEQ CR tablet    K-DUR/KLOR-CON M    30 tablet    Take 2 pills today, one pill tomorrow then one pill daily ONLY when you take the Lasix       torsemide 20 MG tablet    DEMADEX    30 tablet    Take 1 tablet (20 mg) by mouth daily as needed       traMADol 50 MG tablet    ULTRAM    60 tablet    One tablet orally at 4pm and one orally at bedtime       triamcinolone 0.1 % cream    KENALOG    45 g    Apply sparingly to affected area two times daily as needed

## 2017-03-13 NOTE — NURSING NOTE
"Chief Complaint   Patient presents with     Leg Swelling     recheck dermatitis on legs       Initial /70  Pulse 92  Temp 97  F (36.1  C) (Tympanic)  Ht 5' 10\" (1.778 m)  Wt 192 lb (87.1 kg)  BMI 27.55 kg/m2 Estimated body mass index is 27.55 kg/(m^2) as calculated from the following:    Height as of this encounter: 5' 10\" (1.778 m).    Weight as of this encounter: 192 lb (87.1 kg).  Medication Reconciliation: complete     Ellen Mckeon CMA      "

## 2017-03-15 NOTE — PATIENT INSTRUCTIONS
We will change his furosemide to a when necessary basis for his swelling in his ankles.  He has been taking his furosemide 2 times daily and will change that to daily as needed.  His kidney function was up just a little bit today.  I did discuss that with him over the phone.  We will see him back in approximately 1 month.    With respect to his post herpetic neuralgia will add tramadol at 4 PM and again at bedtime.  That seems to be the time when he's bothered, right around dinner and again after going to bed.  He is not bothered the rest of the day.    His asthma has been good of late.  I did refill his albuterol inhaler.  He will follow-up with us as needed.

## 2017-03-27 DIAGNOSIS — I48.91 ATRIAL FIBRILLATION, UNSPECIFIED TYPE (H): ICD-10-CM

## 2017-03-27 RX ORDER — CLOPIDOGREL BISULFATE 75 MG/1
75 TABLET ORAL DAILY
Qty: 90 TABLET | Refills: 3 | Status: SHIPPED | OUTPATIENT
Start: 2017-03-27 | End: 2017-09-21

## 2017-03-27 NOTE — TELEPHONE ENCOUNTER
REFILL REQUEST    Medication requested: Plavix     Requested by: Jasvir     Last office visit: 12-05-16    Next follow up: September 2017 with Dr. Eli     Prescription sent to: Jasvir, 90 qty, 3 refills.       TGalul RONDON  [facility]

## 2017-04-12 ENCOUNTER — OFFICE VISIT (OUTPATIENT)
Dept: FAMILY MEDICINE | Facility: CLINIC | Age: 82
End: 2017-04-12
Payer: COMMERCIAL

## 2017-04-12 VITALS
SYSTOLIC BLOOD PRESSURE: 100 MMHG | DIASTOLIC BLOOD PRESSURE: 70 MMHG | BODY MASS INDEX: 26.69 KG/M2 | WEIGHT: 186 LBS | TEMPERATURE: 97 F | HEART RATE: 82 BPM | RESPIRATION RATE: 16 BRPM | OXYGEN SATURATION: 98 %

## 2017-04-12 DIAGNOSIS — I87.2 STASIS DERMATITIS OF BOTH LEGS: ICD-10-CM

## 2017-04-12 DIAGNOSIS — N18.30 TYPE 2 DIABETES MELLITUS WITH STAGE 3 CHRONIC KIDNEY DISEASE, WITHOUT LONG-TERM CURRENT USE OF INSULIN (H): Primary | Chronic | ICD-10-CM

## 2017-04-12 DIAGNOSIS — I50.42 CHRONIC COMBINED SYSTOLIC AND DIASTOLIC CONGESTIVE HEART FAILURE (H): ICD-10-CM

## 2017-04-12 DIAGNOSIS — E11.22 TYPE 2 DIABETES MELLITUS WITH STAGE 3 CHRONIC KIDNEY DISEASE, WITHOUT LONG-TERM CURRENT USE OF INSULIN (H): Primary | Chronic | ICD-10-CM

## 2017-04-12 LAB — HBA1C MFR BLD: 6 % (ref 4.3–6)

## 2017-04-12 PROCEDURE — 99213 OFFICE O/P EST LOW 20 MIN: CPT | Performed by: FAMILY MEDICINE

## 2017-04-12 PROCEDURE — 83036 HEMOGLOBIN GLYCOSYLATED A1C: CPT | Performed by: FAMILY MEDICINE

## 2017-04-12 PROCEDURE — 84520 ASSAY OF UREA NITROGEN: CPT | Performed by: FAMILY MEDICINE

## 2017-04-12 PROCEDURE — 36415 COLL VENOUS BLD VENIPUNCTURE: CPT | Performed by: FAMILY MEDICINE

## 2017-04-12 PROCEDURE — 82565 ASSAY OF CREATININE: CPT | Performed by: FAMILY MEDICINE

## 2017-04-12 RX ORDER — POTASSIUM CHLORIDE 1500 MG/1
TABLET, EXTENDED RELEASE ORAL
Qty: 30 TABLET | Refills: 5 | Status: SHIPPED | OUTPATIENT
Start: 2017-04-12 | End: 2018-11-20

## 2017-04-12 RX ORDER — TRIAMCINOLONE ACETONIDE 1 MG/G
CREAM TOPICAL
Qty: 45 G | Refills: 3 | Status: SHIPPED | OUTPATIENT
Start: 2017-04-12 | End: 2017-07-07

## 2017-04-12 NOTE — NURSING NOTE
"Chief Complaint   Patient presents with     Kidney Problem       Initial /70  Pulse 82  Temp 97  F (36.1  C) (Tympanic)  Resp 16  Wt 186 lb (84.4 kg)  SpO2 98%  BMI 26.69 kg/m2 Estimated body mass index is 26.69 kg/(m^2) as calculated from the following:    Height as of 3/13/17: 5' 10\" (1.778 m).    Weight as of this encounter: 186 lb (84.4 kg).  Medication Reconciliation: complete     Ellen Mckeon CMA      "

## 2017-04-12 NOTE — MR AVS SNAPSHOT
After Visit Summary   4/12/2017    Abbe Lambert    MRN: [id number]           Patient Information     Date Of Birth          2/3/1934        Visit Information        Provider Department      4/12/2017 2:15 PM Eliezer Shah MD [facility]        Today's Diagnoses     Type 2 diabetes mellitus with stage 3 chronic kidney disease, without long-term current use of insulin (H)    -  1    Chronic combined systolic and diastolic congestive heart failure (H)        Stasis dermatitis of both legs          Care Instructions    Will see back pending review of tests.        Follow-ups after your visit        Your next 10 appointments already scheduled     Jun 12, 2017  9:00 AM CDT   Remote PPM Check with HARMAN TECH1   [facility] PHYSICIANS HEART AT [place] ([facility])    [facility] [id number]  [facility] [address] [phone]           This appointment is for a remote check of your pacemaker.  This is not an appointment at the office.              Who to contact     If you have questions or need follow up information about today's clinic visit or your schedule please contact [facility] directly at [phone].  Normal or non-critical lab and imaging results will be communicated to you by MyChart, letter or phone within 4 business days after the clinic has received the results. If you do not hear from us within 7 days, please contact the clinic through [employer] or phone. If you have a critical or abnormal lab result, we will notify you by phone as soon as possible.  Submit refill requests through Appear or call your pharmacy and they will forward the refill request to us. Please allow 3 business days for your refill to be completed.          Additional Information About Your Visit        MyChart Information     Appear lets you send messages to your doctor, view your test results, renew your prescriptions,  "schedule appointments and more. To sign up, go to [email or web address] . Click on \"Log in\" on the left side of the screen, which will take you to the Welcome page. Then click on \"Sign up Now\" on the right side of the page.     You will be asked to enter the access code listed below, as well as some personal information. Please follow the directions to create your username and password.     Your access code is: [email or web address]  Expires: 2017  2:48 PM     Your access code will  in 90 days. If you need help or a new code, please call your [place] clinic or [phone].        Care EveryWhere ID     This is your Care EveryWhere ID. This could be used by other organizations to access your [facility] medical records  [id number]        Your Vitals Were     Pulse Temperature Respirations Pulse Oximetry BMI (Body Mass Index)       82 97  F (36.1  C) (Tympanic) 16 98% 26.69 kg/m2        Blood Pressure from Last 3 Encounters:   17 100/70   17 122/70   17 110/60    Weight from Last 3 Encounters:   17 186 lb (84.4 kg)   17 192 lb (87.1 kg)   17 189 lb (85.7 kg)              We Performed the Following     Albumin Random Urine Quantitative     Creatinine     Hemoglobin A1c     Urea nitrogen          Today's Medication Changes          These changes are accurate as of: 17  2:51 PM.  If you have any questions, ask your nurse or doctor.               These medicines have changed or have updated prescriptions.        Dose/Directions    potassium chloride SA 20 MEQ CR tablet   Commonly known as:  K-DUR/KLOR-CON M   This may have changed:  additional instructions   Used for:  Chronic combined systolic and diastolic congestive heart failure (H)   Changed by:  Eliezer Shah MD        Take 2 pills today, one pill tomorrow then one pill daily ONLY when you take the Demadex   Quantity:  30 tablet   Refills:  5         Stop taking these medicines if you haven't already. Please " contact your care team if you have questions.     traMADol 50 MG tablet   Commonly known as:  ULTRAM   Stopped by:  Eliezer Shha MD                Where to get your medicines      These medications were sent to [facility] - [place], [place] - [address] AT [facility] [address], [facility] [address]    Hours:  24-hours Phone:  [phone]     potassium chloride SA 20 MEQ CR tablet    triamcinolone 0.1 % cream                Primary Care Provider Office Phone # Fax #    Eliezer Shah [phone]       [facility] [address]  [facility] [address]        Thank you!     [facility] you [facility]  for your care. Our goal is always to provide you with excellent care. Hearing back from our patients is one way we can continue to improve our services. Please take a few minutes to complete the written survey that you may receive in the mail after your visit with us. Thank you!             Your Updated Medication List - Protect others around you: Learn how to safely use, store and throw away your medicines at www.disposemymeds.org.          This list is accurate as of: 4/12/17  2:51 PM.  Always use your most recent med list.                   Brand Name Dispense Instructions for use    ACE/ARB NOT PRESCRIBED (INTENTIONAL)      ACE & ARB not prescribed due to Disease of aortic or mitral valves       albuterol 108 (90 BASE) MCG/ACT Inhaler    PROAIR HFA/PROVENTIL HFA/VENTOLIN HFA    3 Inhaler    Inhale 2 puffs into the lungs as needed for shortness of breath / dyspnea or wheezing       aspirin 81 MG tablet     30 tablet    Take 81 mg by mouth daily       ASTELIN 0.1 % spray   Generic drug:  azelastine      Spray 1 spray into both nostrils 2 times daily.       atorvastatin 40 MG tablet    LIPITOR    90 tablet    TAKE 1 TABLET BY MOUTH DAILY       beclomethasone 80 MCG/ACT Inhaler    QVAR     2 Inhaler    Inhale 2 puffs into the lungs 2 times daily       cetirizine 10 MG tablet    zyrTEC     Take 10 mg by mouth every morning       clopidogrel 75 MG tablet    PLAVIX    90 tablet    Take 1 tablet (75 mg) by mouth daily       isosorbide mononitrate 30 MG 24 hr tablet    IMDUR     Take 30 mg by mouth daily       NASONEX 50 MCG/ACT spray   Generic drug:  mometasone      Spray 2 sprays into both nostrils daily as needed.       nitroglycerin 0.4 MG sublingual tablet    NITROSTAT    25 tablet    Place 1 tablet (0.4 mg) under the tongue every 5 minutes as needed for chest pain       polyethylene glycol Packet    MIRALAX/GLYCOLAX     Take 17 g by mouth daily       potassium chloride SA 20 MEQ CR tablet    K-DUR/KLOR-CON M    30 tablet    Take 2 pills today, one pill tomorrow then one pill daily ONLY when you take the Demadex       torsemide 20 MG tablet    DEMADEX    30 tablet    Take 1 tablet (20 mg) by mouth daily as needed       triamcinolone 0.1 % cream    KENALOG    45 g    Apply sparingly to affected area two times daily as needed

## 2017-04-12 NOTE — PROGRESS NOTES
SUBJECTIVE:                                                    Abbe Lambert is a 83 year old male who presents to clinic today for the following health issues:      Chronic Kidney Disease Follow-up      Current NSAID use?  Yes:   ibuprofen  Frequency: occasionally once every two weeks      Amount of exercise or physical activity: 2-3 days/week for an average of 15-30 minutes    Problems taking medications regularly: No    Medication side effects: none    Diet: regular (no restrictions)      Diabetes Follow-up      Patient is checking blood sugars: not at all    Diabetic concerns: None     Symptoms of hypoglycemia (low blood sugar): none     Paresthesias (numbness or burning in feet) or sores: No     Date of last diabetic eye exam: 2016       Problem list and histories reviewed & adjusted, as indicated.  Additional history: as documented    Patient Active Problem List   Diagnosis     Health Care Home     Allergic rhinitis     Edema     Polymyalgia rheumatica (H)     Advanced directives, counseling/discussion     Cardiomyopathy (H)     Atrial fibrillation (H)     Coronary artery disease     Intermittent lightheadedness     CKD (chronic kidney disease) stage 3, GFR 30-59 ml/min     GERD (gastroesophageal reflux disease)     Tachy-alix syndrome (H)     AAA (abdominal aortic aneurysm) (H)     Old myocardial infarction     Chronic combined systolic and diastolic congestive heart failure (H)     Essential hypertension, benign     Type 2 diabetes mellitus with stage 3 chronic kidney disease, without long-term current use of insulin (H)                                                                                                                                                                                                                  Moderate persistent asthma without complication     Prostate cancer (H)     Mixed hyperlipidemia     Overweight (BMI 25.0-29.9)     Degenerative arthritis of knee     Aftercare  following knee joint replacement surgery, unspecified laterality     Anemia due to blood loss, acute     Shingles     Acute kidney injury (H)     Physical deconditioning     Post herpetic neuralgia     Mild persistent asthma with acute exacerbation     Past Surgical History:   Procedure Laterality Date     ARTHROPLASTY KNEE Left 10/5/2016    Procedure: ARTHROPLASTY KNEE;  Surgeon: Keshav Zamudio MD;  Location: [facility] OR     CARDIAC SURGERY  12/03/2012    pacemaker ; CABG 1998     CHOLECYSTECTOMY       COLONOSCOPY       ENT SURGERY  5-    sinus     EXTRACAPSULAR CATARACT EXTRATION WITH INTRAOCULAR LENS IMPLANT       GENITOURINARY SURGERY      prostatectomy     IMPLANT PACEMAKER  12-3-12    [facility]     PROSTATE SURGERY         Social History   Substance Use Topics     Smoking status: Never Smoker     Smokeless tobacco: Never Used     Alcohol use No     Family History   Problem Relation Age of Onset     CEREBROVASCULAR DISEASE Father      HEART DISEASE Father      HEART DISEASE Brother      Depression Daughter      raped in high school     Unknown/Adopted Daughter      Unknown/Adopted Daughter      Unknown/Adopted Maternal Grandmother      Unknown/Adopted Maternal Grandfather      Unknown/Adopted Paternal Grandmother      Unknown/Adopted Paternal Grandfather      DIABETES No family hx of      Coronary Artery Disease No family hx of      Hypertension No family hx of      Hyperlipidemia No family hx of      Breast Cancer No family hx of      Colon Cancer No family hx of      Prostate Cancer No family hx of      Other Cancer No family hx of      Anxiety Disorder No family hx of      MENTAL ILLNESS No family hx of      Substance Abuse No family hx of      Anesthesia Reaction No family hx of      Asthma No family hx of      OSTEOPOROSIS No family hx of      Genetic Disorder No family hx of      Thyroid Disease No family hx of      Obesity No family hx of            Reviewed and updated as needed this visit by  clinical staff  Tobacco  Allergies  Meds  Med Hx  Surg Hx  Fam Hx  Soc Hx      Reviewed and updated as needed this visit by Provider         ROS:  Constitutional, HEENT, cardiovascular, pulmonary, gi and gu systems are negative, except as otherwise noted.    OBJECTIVE:                                                    /70  Pulse 82  Temp 97  F (36.1  C) (Tympanic)  Resp 16  Wt 186 lb (84.4 kg)  SpO2 98%  BMI 26.69 kg/m2  Body mass index is 26.69 kg/(m^2).  GENERAL APPEARANCE: healthy, alert and no distress         ASSESSMENT/PLAN:                                                        ICD-10-CM    1. Type 2 diabetes mellitus with stage 3 chronic kidney disease, without long-term current use of insulin (H) E11.22 Hemoglobin A1c    N18.3 Albumin Random Urine Quantitative     Creatinine     Urea nitrogen   2. Chronic combined systolic and diastolic congestive heart failure (H) I50.42 potassium chloride SA (K-DUR/KLOR-CON M) 20 MEQ CR tablet   3. Stasis dermatitis of both legs I83.11 triamcinolone (KENALOG) 0.1 % cream    I83.12        Patient Instructions   Will see back pending review of tests.      Eliezer Shah MD  [facility]

## 2017-04-13 LAB
BUN SERPL-MCNC: 28 MG/DL (ref 7–30)
CREAT SERPL-MCNC: 1.75 MG/DL (ref 0.66–1.25)
GFR SERPL CREATININE-BSD FRML MDRD: 37 ML/MIN/1.7M2

## 2017-04-26 NOTE — PROGRESS NOTES
SUBJECTIVE:                                                    Abbe Lambert is a 83 year old male who presents to clinic today for the following health issues:      Medication Followup of Kenalog Cream    Taking Medication as prescribed: yes    Side Effects:  None    Medication Helping Symptoms:  yes       edema      Duration: months    Description (location/character/radiation): bilateral dependent edema    Intensity:  Mild of late    Accompanying signs and symptoms: none    History (similar episodes/previous evaluation): None    Precipitating or alleviating factors: furosemide has helped    Therapies tried and outcome: see above       Problem list and histories reviewed & adjusted, as indicated.  Additional history: as documented    Patient Active Problem List   Diagnosis     Health Care Home     Allergic rhinitis     Edema     Polymyalgia rheumatica (H)     Advanced directives, counseling/discussion     Cardiomyopathy (H)     Atrial fibrillation (H)     Coronary artery disease     Intermittent lightheadedness     CKD (chronic kidney disease) stage 3, GFR 30-59 ml/min     GERD (gastroesophageal reflux disease)     Tachy-alix syndrome (H)     AAA (abdominal aortic aneurysm) (H)     Old myocardial infarction     Chronic combined systolic and diastolic congestive heart failure (H)     Essential hypertension, benign     Type 2 diabetes mellitus with diabetic chronic kidney disease                                                                                                                                                                                                                  Moderate persistent asthma without complication     Prostate cancer (H)     Mixed hyperlipidemia     Overweight (BMI 25.0-29.9)     Degenerative arthritis of knee     Aftercare following knee joint replacement surgery, unspecified laterality     Anemia due to blood loss, acute     Shingles     Acute kidney injury (H)     Physical  deconditioning     Post herpetic neuralgia     Mild persistent asthma with acute exacerbation     Past Surgical History   Procedure Laterality Date     Prostate surgery       Cholecystectomy       Extracapsular cataract extration with intraocular lens implant       Cardiac surgery  12/03/2012     pacemaker ; CABG 1998     Implant pacemaker  12-3-12     [facility]     Genitourinary surgery       prostatectomy     Ent surgery  5-     sinus     Colonoscopy       Arthroplasty knee Left 10/5/2016     Procedure: ARTHROPLASTY KNEE;  Surgeon: Keshav Zamudio MD;  Location:  OR       Social History   Substance Use Topics     Smoking status: Never Smoker     Smokeless tobacco: Never Used     Alcohol use No     Family History   Problem Relation Age of Onset     CEREBROVASCULAR DISEASE Father      HEART DISEASE Father      HEART DISEASE Brother      Depression Daughter      raped in high school     Unknown/Adopted Daughter      Unknown/Adopted Daughter      Unknown/Adopted Maternal Grandmother      Unknown/Adopted Maternal Grandfather      Unknown/Adopted Paternal Grandmother      Unknown/Adopted Paternal Grandfather      DIABETES No family hx of      Coronary Artery Disease No family hx of      Hypertension No family hx of      Hyperlipidemia No family hx of      Breast Cancer No family hx of      Colon Cancer No family hx of      Prostate Cancer No family hx of      Other Cancer No family hx of      Anxiety Disorder No family hx of      MENTAL ILLNESS No family hx of      Substance Abuse No family hx of      Anesthesia Reaction No family hx of      Asthma No family hx of      OSTEOPOROSIS No family hx of      Genetic Disorder No family hx of      Thyroid Disease No family hx of      Obesity No family hx of            Reviewed and updated as needed this visit by clinical staff  Tobacco  Allergies  Meds  Med Hx  Surg Hx  Fam Hx  Soc Hx      Reviewed and updated as needed this visit by Provider      "    ROS:  Constitutional, HEENT, cardiovascular, pulmonary, gi and gu systems are negative, except as otherwise noted.    OBJECTIVE:                                                    /70  Pulse 92  Temp 97  F (36.1  C) (Tympanic)  Ht 5' 10\" (1.778 m)  Wt 192 lb (87.1 kg)  BMI 27.55 kg/m2  Body mass index is 27.55 kg/(m^2).  GENERAL APPEARANCE: healthy, alert and no distress  RESP: lungs clear to auscultation - no rales, rhonchi or wheezes  MS: extremities normal- no gross deformities noted    Diagnostic test results:  Results for orders placed or performed in visit on 03/13/17   Creatinine   Result Value Ref Range    Creatinine 1.83 (H) 0.66 - 1.25 mg/dL    GFR Estimate 36 (L) >60 mL/min/1.7m2    GFR Estimate If Black 43 (L) >60 mL/min/1.7m2   Urea nitrogen   Result Value Ref Range    Urea Nitrogen 26 7 - 30 mg/dL        ASSESSMENT/PLAN:                                                        ICD-10-CM    1. Localized edema R60.0 Creatinine   2. Mild persistent asthma without complication J45.30 albuterol (PROAIR HFA/PROVENTIL HFA/VENTOLIN HFA) 108 (90 BASE) MCG/ACT Inhaler   3. Post herpetic neuralgia B02.29 traMADol (ULTRAM) 50 MG tablet     Urea nitrogen       Patient Instructions   We will change his furosemide to a when necessary basis for his swelling in his ankles.  He has been taking his furosemide 2 times daily and will change that to daily as needed.  His kidney function was up just a little bit today.  I did discuss that with him over the phone.  We will see him back in approximately 1 month.    With respect to his post herpetic neuralgia will add tramadol at 4 PM and again at bedtime.  That seems to be the time when he's bothered, right around dinner and again after going to bed.  He is not bothered the rest of the day.    His asthma has been good of late.  I did refill his albuterol inhaler.  He will follow-up with us as needed.      Eliezer Shah MD  [facility] " [facility]     Yes

## 2017-05-30 ENCOUNTER — OFFICE VISIT (OUTPATIENT)
Dept: FAMILY MEDICINE | Facility: CLINIC | Age: 82
End: 2017-05-30
Payer: COMMERCIAL

## 2017-05-30 VITALS
WEIGHT: 182 LBS | RESPIRATION RATE: 16 BRPM | TEMPERATURE: 97.4 F | DIASTOLIC BLOOD PRESSURE: 70 MMHG | BODY MASS INDEX: 26.11 KG/M2 | HEART RATE: 67 BPM | OXYGEN SATURATION: 98 % | SYSTOLIC BLOOD PRESSURE: 128 MMHG

## 2017-05-30 DIAGNOSIS — J01.90 ACUTE SINUSITIS WITH SYMPTOMS > 10 DAYS: Primary | ICD-10-CM

## 2017-05-30 PROCEDURE — 99213 OFFICE O/P EST LOW 20 MIN: CPT | Performed by: NURSE PRACTITIONER

## 2017-05-30 NOTE — NURSING NOTE
"Chief Complaint   Patient presents with     URI       Initial /70 (BP Location: Right arm, Patient Position: Chair, Cuff Size: Adult Regular)  Pulse 67  Temp 97.4  F (36.3  C) (Tympanic)  Resp 16  Wt 182 lb (82.6 kg)  SpO2 98%  BMI 26.11 kg/m2 Estimated body mass index is 26.11 kg/(m^2) as calculated from the following:    Height as of 3/13/17: 5' 10\" (1.778 m).    Weight as of this encounter: 182 lb (82.6 kg).  Medication Reconciliation: complete    "

## 2017-05-30 NOTE — PROGRESS NOTES
"  SUBJECTIVE:                                                    Abbe Lambert is a 83 year old male who presents to clinic today for the following health issues:      ENT Symptoms             Symptoms: cc Present Absent Comment   Fever/Chills   x    Fatigue   x    Muscle Aches   x    Eye Irritation   x    Sneezing   x    Nasal Nomi/Drg  x  No headache or sinus pressure. Feels congested   Sinus Pressure/Pain   x    Loss of smell   x    Dental pain   x    Sore Throat  x  Not currently   Swollen Glands   x    Ear Pain/Fullness  x  Decreased hearing    Cough  x  From drainage - post nasal drip   Wheeze   x    Chest Pain   x    Shortness of breath   x    Rash   x    Other   x Upper tooth pain bilaterally and headache     Symptom duration:  1-2 weeks, patient states that he always has sinus infections   Symptom severity:  Moderate   Treatments tried:  OTC sudafed - reduces sinus drainage/post nasal drip   Contacts:  None     Abbe reports the cough keeps him awake at night.  He has a PMH of sinusitis for 11 years and has \"Always had some kind of sinus issue\".      Problem list and histories reviewed & adjusted, as indicated.  Additional history: as documented    Patient Active Problem List   Diagnosis     Health Care Home     Allergic rhinitis     Edema     Polymyalgia rheumatica (H)     Advanced directives, counseling/discussion     Cardiomyopathy (H)     Atrial fibrillation (H)     Coronary artery disease     Intermittent lightheadedness     CKD (chronic kidney disease) stage 3, GFR 30-59 ml/min     GERD (gastroesophageal reflux disease)     Tachy-alix syndrome (H)     AAA (abdominal aortic aneurysm) (H)     Old myocardial infarction     Chronic combined systolic and diastolic congestive heart failure (H)     Essential hypertension, benign     Type 2 diabetes mellitus with stage 3 chronic kidney disease, without long-term current use of insulin (H)                                                                    "                                                                                                                                               Moderate persistent asthma without complication     Prostate cancer (H)     Mixed hyperlipidemia     Overweight (BMI 25.0-29.9)     Degenerative arthritis of knee     Aftercare following knee joint replacement surgery, unspecified laterality     Anemia due to blood loss, acute     Shingles     Acute kidney injury (H)     Physical deconditioning     Post herpetic neuralgia     Mild persistent asthma with acute exacerbation     Past Surgical History:   Procedure Laterality Date     ARTHROPLASTY KNEE Left 10/5/2016    Procedure: ARTHROPLASTY KNEE;  Surgeon: Keshav Zamudio MD;  Location: [facility] OR     CARDIAC SURGERY  12/03/2012    pacemaker ; CABG 1998     CHOLECYSTECTOMY       COLONOSCOPY       ENT SURGERY  5-    sinus     EXTRACAPSULAR CATARACT EXTRATION WITH INTRAOCULAR LENS IMPLANT       GENITOURINARY SURGERY      prostatectomy     IMPLANT PACEMAKER  12-3-12    [facility]     PROSTATE SURGERY         Social History   Substance Use Topics     Smoking status: Never Smoker     Smokeless tobacco: Never Used     Alcohol use No     Family History   Problem Relation Age of Onset     CEREBROVASCULAR DISEASE Father      HEART DISEASE Father      HEART DISEASE Brother      Depression Daughter      raped in high school     Unknown/Adopted Daughter      Unknown/Adopted Daughter      Unknown/Adopted Maternal Grandmother      Unknown/Adopted Maternal Grandfather      Unknown/Adopted Paternal Grandmother      Unknown/Adopted Paternal Grandfather      DIABETES No family hx of      Coronary Artery Disease No family hx of      Hypertension No family hx of      Hyperlipidemia No family hx of      Breast Cancer No family hx of      Colon Cancer No family hx of      Prostate Cancer No family hx of      Other Cancer No family hx of      Anxiety Disorder No family hx of      MENTAL ILLNESS No  family hx of      Substance Abuse No family hx of      Anesthesia Reaction No family hx of      Asthma No family hx of      OSTEOPOROSIS No family hx of      Genetic Disorder No family hx of      Thyroid Disease No family hx of      Obesity No family hx of          Current Outpatient Prescriptions   Medication Sig Dispense Refill     isosorbide mononitrate (IMDUR) 30 MG 24 hr tablet TAKE 1 TABLET BY MOUTH EVERY DAY 90 tablet 3     potassium chloride SA (K-DUR/KLOR-CON M) 20 MEQ CR tablet Take 2 pills today, one pill tomorrow then one pill daily ONLY when you take the Demadex 30 tablet 5     triamcinolone (KENALOG) 0.1 % cream Apply sparingly to affected area two times daily as needed 45 g 3     clopidogrel (PLAVIX) 75 MG tablet Take 1 tablet (75 mg) by mouth daily 90 tablet 3     albuterol (PROAIR HFA/PROVENTIL HFA/VENTOLIN HFA) 108 (90 BASE) MCG/ACT Inhaler Inhale 2 puffs into the lungs as needed for shortness of breath / dyspnea or wheezing 3 Inhaler 11     torsemide (DEMADEX) 20 MG tablet Take 1 tablet (20 mg) by mouth daily as needed 30 tablet 1     isosorbide mononitrate (IMDUR) 30 MG 24 hr tablet Take 30 mg by mouth daily       atorvastatin (LIPITOR) 40 MG tablet TAKE 1 TABLET BY MOUTH DAILY 90 tablet 3     beclomethasone (QVAR) 80 MCG/ACT Inhaler Inhale 2 puffs into the lungs 2 times daily 2 Inhaler 11     ACE/ARB NOT PRESCRIBED, INTENTIONAL, ACE & ARB not prescribed due to Disease of aortic or mitral valves       aspirin 81 MG tablet Take 81 mg by mouth daily  30 tablet      polyethylene glycol (MIRALAX/GLYCOLAX) packet Take 17 g by mouth daily        nitroglycerin (NITROSTAT) 0.4 MG SL tablet Place 1 tablet (0.4 mg) under the tongue every 5 minutes as needed for chest pain 25 tablet 3     azelastine (ASTELIN) 137 MCG/SPRAY nasal spray Adell 1 spray into both nostrils 2 times daily.       cetirizine (ZYRTEC) 10 MG tablet Take 10 mg by mouth every morning        mometasone (NASONEX) 50 MCG/ACT nasal spray  Spray 2 sprays into both nostrils daily as needed.       Allergies   Allergen Reactions     Advair Diskus      DENIED     Morphine Hcl      Decrease  Blood Pressure Lower Than Normal, Per Pt.     Vicodin [Hydrocodone-Acetaminophen] Visual Disturbance       Reviewed and updated as needed this visit by clinical staff  Reviewed and updated as needed this visit by Provider      ROS:  Constitutional, HEENT, cardiovascular, pulmonary, GI and  systems are negative, except as otherwise noted.    OBJECTIVE:                                                    /70 (BP Location: Right arm, Patient Position: Chair, Cuff Size: Adult Regular)  Pulse 67  Temp 97.4  F (36.3  C) (Tympanic)  Resp 16  Wt 182 lb (82.6 kg)  SpO2 98%  BMI 26.11 kg/m2  Body mass index is 26.11 kg/(m^2).     GENERAL: Healthy, alert and no distress  EYES: Eyes grossly normal to inspection, PERRL and conjunctivae and sclerae normal  HENT: Ear canals and TM's normal, nose and mouth without ulcers or lesions. Posterior orophayrnx erythematous without exudate.  NECK: No adenopathy, asymmetry or masses  RESP: Lungs clear to auscultation - no rales, rhonchi or wheezes  CV: Regular rate and rhythm, normal S1 S2, no S3 or S4, no murmur, click or rub  Diagnostic Test Results:  None      ASSESSMENT:                                                      Acute sinusitis with symptoms > 10 days    PLAN:                                                      Augmentin 875-125 mg tablets, take one tablet by mouth twice a day for 10 days.   Follow up as needed.    CAROLYN Renee, CNP  [facility]

## 2017-06-20 ENCOUNTER — DOCUMENTATION ONLY (OUTPATIENT)
Dept: CARDIOLOGY | Facility: CLINIC | Age: 82
End: 2017-06-20

## 2017-06-20 NOTE — PROGRESS NOTES
Patient missed Merlin transmission on 6/12/17. Sent letter 6/13, no response. Sent 1 week letter today

## 2017-07-06 ENCOUNTER — TELEPHONE (OUTPATIENT)
Dept: NURSING | Facility: CLINIC | Age: 82
End: 2017-07-06

## 2017-07-06 NOTE — TELEPHONE ENCOUNTER
Patient calling to say that he fell on a stair over a week ago and now he has a bruise with redness in the area.Denies pain only tenderness to the touch.Denies swelling or warm to touch and no pain with walking.Pt is on plavix and a baby asa daily. Reassured that the bruising is more than likely from that.Advised to wrap leg with ace,sit with leg elevated could apply warm compress to area.If sxs increase,or change prior to appointment tomorrow could go to urgent care.

## 2017-07-07 ENCOUNTER — OFFICE VISIT (OUTPATIENT)
Dept: FAMILY MEDICINE | Facility: CLINIC | Age: 82
End: 2017-07-07
Payer: COMMERCIAL

## 2017-07-07 VITALS
OXYGEN SATURATION: 98 % | HEIGHT: 70 IN | HEART RATE: 75 BPM | BODY MASS INDEX: 26.34 KG/M2 | DIASTOLIC BLOOD PRESSURE: 58 MMHG | WEIGHT: 184 LBS | TEMPERATURE: 97.5 F | SYSTOLIC BLOOD PRESSURE: 100 MMHG | RESPIRATION RATE: 16 BRPM

## 2017-07-07 DIAGNOSIS — E11.22 TYPE 2 DIABETES MELLITUS WITH STAGE 3 CHRONIC KIDNEY DISEASE, WITHOUT LONG-TERM CURRENT USE OF INSULIN (H): ICD-10-CM

## 2017-07-07 DIAGNOSIS — R60.0 LOCALIZED EDEMA: ICD-10-CM

## 2017-07-07 DIAGNOSIS — I87.2 STASIS DERMATITIS OF BOTH LEGS: ICD-10-CM

## 2017-07-07 DIAGNOSIS — N18.30 CKD (CHRONIC KIDNEY DISEASE) STAGE 3, GFR 30-59 ML/MIN (H): ICD-10-CM

## 2017-07-07 DIAGNOSIS — N18.30 TYPE 2 DIABETES MELLITUS WITH STAGE 3 CHRONIC KIDNEY DISEASE, WITHOUT LONG-TERM CURRENT USE OF INSULIN (H): ICD-10-CM

## 2017-07-07 DIAGNOSIS — L03.115 CELLULITIS OF RIGHT LOWER EXTREMITY: Primary | ICD-10-CM

## 2017-07-07 PROCEDURE — 99214 OFFICE O/P EST MOD 30 MIN: CPT | Performed by: PHYSICIAN ASSISTANT

## 2017-07-07 RX ORDER — TRIAMCINOLONE ACETONIDE 1 MG/G
CREAM TOPICAL
Qty: 45 G | Refills: 3 | Status: SHIPPED | OUTPATIENT
Start: 2017-07-07 | End: 2017-09-06

## 2017-07-07 NOTE — MR AVS SNAPSHOT
After Visit Summary   7/7/2017    Abbe Lambert    MRN: [id number]           Patient Information     Date Of Birth          2/3/1934        Visit Information        Provider Department      7/7/2017 10:50 AM Siegler, Nicole Joy, PA-C [facility]        Today's Diagnoses     Cellulitis of right lower extremity    -  1    Localized edema        CKD (chronic kidney disease) stage 3, GFR 30-59 ml/min        Type 2 diabetes mellitus with stage 3 chronic kidney disease, without long-term current use of insulin (H)        Stasis dermatitis of both legs          Care Instructions      Discharge Instructions for Cellulitis  You have been diagnosed with cellulitis. This is an infection in the deepest layer of the skin. In some cases, the infection also affects the muscle. Cellulitis is caused by bacteria. The bacteria can enter the body through broken skin. This can happen with a cut, scratch, animal bite, or an insect bite that has been scratched. You may have been treated in the hospital with antibiotics and fluids. You will likely be given a prescription for antibiotics to take at home. This sheet will help you take care of yourself at home.  Home care  When you are home:    Take the prescribed antibiotic medicine you are given as directed until it is gone. Take it even if you feel better. It treats the infection and stops it from returning. Not taking all the medicine can make future infections hard to treat.    Keep the infected area clean.    When possible, raise the infected area above the level of your heart. This helps keep swelling down.    Talk with your healthcare provider if you are in pain. Ask what kind of over-the-counter medicine you can take for pain.    Apply clean bandages as advised.    Take your temperature once a day for a week.    Wash your hands often to prevent spreading the infection.  In the future, wash your hands before and after you touch cuts,  "scratches, or bandages. This will help prevent infection.   When to call your healthcare provider  Call your healthcare provider immediately if you have any of the following:    Difficulty or pain when moving the joints above or below the infected area    Discharge or pus draining from the area    Fever of 100.4 F (38 C) or higher, or as directed by your healthcare provider    Pain that gets worse in or around the infected     Redness that gets worse in or around the infected area, particularly if the area of redness expands to a wider area    Shaking chills    Swelling of the infected area    Vomiting   Date Last Reviewed: 8/1/2016 2000-2017 [employer]. [address] [place] [address]. All rights reserved. This information is not intended as a substitute for professional medical care. Always follow your healthcare professional's instructions.                Follow-ups after your visit        Who to contact     If you have questions or need follow up information about today's clinic visit or your schedule please contact [facility] directly at [phone].  Normal or non-critical lab and imaging results will be communicated to you by MyChart, letter or phone within 4 business days after the clinic has received the results. If you do not hear from us within 7 days, please contact the clinic through [employer] or phone. If you have a critical or abnormal lab result, we will notify you by phone as soon as possible.  Submit refill requests through [email or web address] or call your pharmacy and they will forward the refill request to us. Please allow 3 business days for your refill to be completed.          Additional Information About Your Visit        [email or web address] Information     [email or web address] lets you send messages to your doctor, view your test results, renew your prescriptions, schedule appointments and more. To sign up, go to [email or web address] . Click on \"Log in\" on the left " "side of the screen, which will take you to the Welcome page. Then click on \"Sign up Now\" on the right side of the page.     You will be asked to enter the access code listed below, as well as some personal information. Please follow the directions to create your username and password.     Your access code is: [email or web address]  Expires: 2017  2:48 PM     Your access code will  in 90 days. If you need help or a new code, please call your [facility] [place] or [phone].        Care EveryWhere ID     This is your Care EveryWhere ID. This could be used by other organizations to access your [facility] medical records  [id number]        Your Vitals Were     Pulse Temperature Respirations Height Pulse Oximetry BMI (Body Mass Index)    75 97.5  F (36.4  C) 16 5' 10\" (1.778 m) 98% 26.4 kg/m2       Blood Pressure from Last 3 Encounters:   17 100/58   17 128/70   17 100/70    Weight from Last 3 Encounters:   17 184 lb (83.5 kg)   17 182 lb (82.6 kg)   17 186 lb (84.4 kg)              Today, you had the following     No orders found for display         Today's Medication Changes          These changes are accurate as of: 17 11:15 AM.  If you have any questions, ask your nurse or doctor.               Start taking these medicines.        Dose/Directions    cephalexin 250 MG capsule   Commonly known as:  KEFLEX   Used for:  Cellulitis of right lower extremity, CKD (chronic kidney disease) stage 3, GFR 30-59 ml/min   Started by:  Siegler, Nicole Joy, PA-C        Dose:  250 mg   Take 1 capsule (250 mg) by mouth 2 times daily for 10 days   Quantity:  20 capsule   Refills:  0            Where to get your medicines      These medications were sent to [facility] [address] [place], [place] - [address] AT Count [facility] & [address], [facility] [address]    Hours:  24-hours Phone:  [phone]     cephalexin 250 MG capsule    triamcinolone 0.1 % cream          "       Primary Care Provider Office Phone # Fax #    Eliezer Shah [phone]       [facility] [address] [facility] [address]        Equal Access to Services     JIM AHN : Hadii aad ku hadasho Soomaali, waaxda luqadaha, qaybta kaalmada adeegyada, waxaman idiin hayrodolfon adesoo [place] lachris goodson. So [facility] [phone].    ATENCIÓN: Si habla español, tiene a monge disposición servicios gratuitos de asistencia lingüística. Llame al [phone].    We comply with applicable federal civil rights laws and [place] laws. We do not discriminate on the basis of race, color, national origin, age, disability sex, sexual orientation or gender identity.            Thank you!     Thank you for choosing [facility]  for your care. Our goal is always to provide you with excellent care. Hearing back from our patients is one way we can continue to improve our services. Please take a few minutes to complete the written survey that you may receive in the mail after your visit with us. Thank you!             Your Updated Medication List - Protect others around you: Learn how to safely use, store and throw away your medicines at www.disposemymeds.org.          This list is accurate as of: 7/7/17 11:15 AM.  Always use your most recent med list.                   Brand Name Dispense Instructions for use Diagnosis    ACE/ARB NOT PRESCRIBED (INTENTIONAL)      ACE & ARB not prescribed due to Disease of aortic or mitral valves    Aortic valve disorders, Cardiomyopathy (H)       albuterol 108 (90 BASE) MCG/ACT Inhaler    PROAIR HFA/PROVENTIL HFA/VENTOLIN HFA    3 Inhaler    Inhale 2 puffs into the lungs as needed for shortness of breath / dyspnea or wheezing    Mild persistent asthma without complication       aspirin 81 MG tablet     30 tablet    Take 81 mg by mouth daily    CAD (coronary artery disease)       ASTELIN 0.1 % spray   Generic drug:  azelastine      Spray 1  spray into both nostrils 2 times daily.        atorvastatin 40 MG tablet    LIPITOR    90 tablet    TAKE 1 TABLET BY MOUTH DAILY    Mixed hyperlipidemia       beclomethasone 80 MCG/ACT Inhaler    QVAR    2 Inhaler    Inhale 2 puffs into the lungs 2 times daily    Moderate persistent asthma without complication       cephalexin 250 MG capsule    KEFLEX    20 capsule    Take 1 capsule (250 mg) by mouth 2 times daily for 10 days    Cellulitis of right lower extremity, CKD (chronic kidney disease) stage 3, GFR 30-59 ml/min       cetirizine 10 MG tablet    zyrTEC     Take 10 mg by mouth every morning        clopidogrel 75 MG tablet    PLAVIX    90 tablet    Take 1 tablet (75 mg) by mouth daily    Atrial fibrillation, unspecified type (H)       isosorbide mononitrate 30 MG 24 hr tablet    IMDUR    90 tablet    TAKE 1 TABLET BY MOUTH EVERY DAY    Coronary artery disease involving native coronary artery of native heart without angina pectoris       NASONEX 50 MCG/ACT spray   Generic drug:  mometasone      Spray 2 sprays into both nostrils daily as needed.        nitroGLYcerin 0.4 MG sublingual tablet    NITROSTAT    25 tablet    Place 1 tablet (0.4 mg) under the tongue every 5 minutes as needed for chest pain    Chest pain on exertion       polyethylene glycol Packet    MIRALAX/GLYCOLAX     Take 17 g by mouth daily        potassium chloride SA 20 MEQ CR tablet    K-DUR/KLOR-CON M    30 tablet    Take 2 pills today, one pill tomorrow then one pill daily ONLY when you take the Demadex    Chronic combined systolic and diastolic congestive heart failure (H)       torsemide 20 MG tablet    DEMADEX    30 tablet    Take 1 tablet (20 mg) by mouth daily as needed    Chronic combined systolic and diastolic congestive heart failure (H)       triamcinolone 0.1 % cream    KENALOG    45 g    Apply sparingly to affected area two times daily as needed    Stasis dermatitis of both legs

## 2017-07-07 NOTE — PROGRESS NOTES
SUBJECTIVE:                                                    Abbe Lambert is a 83 year old male who presents to clinic today for the following health issues:        F/U on edema of lower extremeties      Duration: Ongoing since April    Description (location/character/radiation): Red, edema painful at times legs    Intensity:  moderate    Accompanying signs and symptoms: See above    History (similar episodes/previous evaluation): Yes has been seen several times for same problems    Precipitating or alleviating factors: None    Therapies tried and outcome: Kenalog     Intermittent pitting edema of both legs with right leg anterior lower leg with area of redness and increased swelling that is slowly getting larger over the past few days to week or so. It is painful to touch.      Problem list and histories reviewed & adjusted, as indicated.  Additional history: as documented    Patient Active Problem List   Diagnosis     Health Care Home     Allergic rhinitis     Edema     Polymyalgia rheumatica (H)     Advanced directives, counseling/discussion     Cardiomyopathy (H)     Atrial fibrillation (H)     Coronary artery disease     Intermittent lightheadedness     CKD (chronic kidney disease) stage 3, GFR 30-59 ml/min     GERD (gastroesophageal reflux disease)     Tachy-alix syndrome (H)     AAA (abdominal aortic aneurysm) (H)     Old myocardial infarction     Chronic combined systolic and diastolic congestive heart failure (H)     Essential hypertension, benign     Type 2 diabetes mellitus with stage 3 chronic kidney disease, without long-term current use of insulin (H)                                                                                                                                                                                                                  Moderate persistent asthma without complication     Prostate cancer (H)     Mixed hyperlipidemia     Overweight (BMI 25.0-29.9)      Degenerative arthritis of knee     Aftercare following knee joint replacement surgery, unspecified laterality     Anemia due to blood loss, acute     Shingles     Acute kidney injury (H)     Physical deconditioning     Post herpetic neuralgia     Mild persistent asthma with acute exacerbation     Past Surgical History:   Procedure Laterality Date     ARTHROPLASTY KNEE Left 10/5/2016    Procedure: ARTHROPLASTY KNEE;  Surgeon: Keshav Zamudio MD;  Location: [facility] OR     CARDIAC SURGERY  12/03/2012    pacemaker ; CABG 1998     CHOLECYSTECTOMY       COLONOSCOPY       ENT SURGERY  5-    sinus     EXTRACAPSULAR CATARACT EXTRATION WITH INTRAOCULAR LENS IMPLANT       GENITOURINARY SURGERY      prostatectomy     IMPLANT PACEMAKER  12-3-12    [facility]     PROSTATE SURGERY         Social History   Substance Use Topics     Smoking status: Never Smoker     Smokeless tobacco: Never Used     Alcohol use No     Family History   Problem Relation Age of Onset     CEREBROVASCULAR DISEASE Father      HEART DISEASE Father      HEART DISEASE Brother      Depression Daughter      raped in high school     Unknown/Adopted Daughter      Unknown/Adopted Daughter      Unknown/Adopted Maternal Grandmother      Unknown/Adopted Maternal Grandfather      Unknown/Adopted Paternal Grandmother      Unknown/Adopted Paternal Grandfather      DIABETES No family hx of      Coronary Artery Disease No family hx of      Hypertension No family hx of      Hyperlipidemia No family hx of      Breast Cancer No family hx of      Colon Cancer No family hx of      Prostate Cancer No family hx of      Other Cancer No family hx of      Anxiety Disorder No family hx of      MENTAL ILLNESS No family hx of      Substance Abuse No family hx of      Anesthesia Reaction No family hx of      Asthma No family hx of      OSTEOPOROSIS No family hx of      Genetic Disorder No family hx of      Thyroid Disease No family hx of      Obesity No family hx of          Current  Outpatient Prescriptions   Medication Sig Dispense Refill     cephalexin (KEFLEX) 250 MG capsule Take 1 capsule (250 mg) by mouth 2 times daily for 10 days 20 capsule 0     triamcinolone (KENALOG) 0.1 % cream Apply sparingly to affected area two times daily as needed 45 g 3     isosorbide mononitrate (IMDUR) 30 MG 24 hr tablet TAKE 1 TABLET BY MOUTH EVERY DAY 90 tablet 3     potassium chloride SA (K-DUR/KLOR-CON M) 20 MEQ CR tablet Take 2 pills today, one pill tomorrow then one pill daily ONLY when you take the Demadex 30 tablet 5     clopidogrel (PLAVIX) 75 MG tablet Take 1 tablet (75 mg) by mouth daily 90 tablet 3     albuterol (PROAIR HFA/PROVENTIL HFA/VENTOLIN HFA) 108 (90 BASE) MCG/ACT Inhaler Inhale 2 puffs into the lungs as needed for shortness of breath / dyspnea or wheezing 3 Inhaler 11     torsemide (DEMADEX) 20 MG tablet Take 1 tablet (20 mg) by mouth daily as needed 30 tablet 1     [DISCONTINUED] isosorbide mononitrate (IMDUR) 30 MG 24 hr tablet Take 30 mg by mouth daily       atorvastatin (LIPITOR) 40 MG tablet TAKE 1 TABLET BY MOUTH DAILY 90 tablet 3     beclomethasone (QVAR) 80 MCG/ACT Inhaler Inhale 2 puffs into the lungs 2 times daily 2 Inhaler 11     ACE/ARB NOT PRESCRIBED, INTENTIONAL, ACE & ARB not prescribed due to Disease of aortic or mitral valves       aspirin 81 MG tablet Take 81 mg by mouth daily  30 tablet      polyethylene glycol (MIRALAX/GLYCOLAX) packet Take 17 g by mouth daily        nitroglycerin (NITROSTAT) 0.4 MG SL tablet Place 1 tablet (0.4 mg) under the tongue every 5 minutes as needed for chest pain 25 tablet 3     azelastine (ASTELIN) 137 MCG/SPRAY nasal spray [place] 1 spray into both nostrils 2 times daily.       cetirizine (ZYRTEC) 10 MG tablet Take 10 mg by mouth every morning        mometasone (NASONEX) 50 MCG/ACT nasal spray Spray 2 sprays into both nostrils daily as needed.         Reviewed and updated as needed this visit by clinical staff  Tobacco  Allergies  Meds   "Med Hx  Surg Hx  Fam Hx  Soc Hx      Reviewed and updated as needed this visit by Provider         ROS:  Patient denies fever, chills, nausea, vomiting, diarrhea, abdominal pain, chest pain, shortness of breath, headache, dizziness, lightheadedness.    OBJECTIVE:     /58 (BP Location: Left arm, Patient Position: Sitting, Cuff Size: Adult Regular)  Pulse 75  Temp 97.5  F (36.4  C)  Resp 16  Ht 5' 10\" (1.778 m)  Wt 184 lb (83.5 kg)  SpO2 98%  BMI 26.4 kg/m2  Body mass index is 26.4 kg/(m^2).  GENERAL: healthy, alert and no distress  RESP: non labored breathing  MS: no gross musculoskeletal defects noted, 1+ pitting edema bilateral lower extremities. Bilateral lower legs are grossly equal in size. ttp over the red area as below; no ttp over bilateral calves or popliteal areas.   SKIN: right anterior mid to superior aspect of pretibial area with 4cm diameter slightly raised erythematous area without discharge, open wound or rash. It is warm to touch  NEURO: Sensation grossly intact over bilateral lower extremities.    Diagnostic Test Results:  none     ASSESSMENT/PLAN:         ICD-10-CM    1. Cellulitis of right lower extremity L03.115 cephalexin (KEFLEX) 250 MG capsule   2. Localized edema R60.0    3. CKD (chronic kidney disease) stage 3, GFR 30-59 ml/min N18.3 cephalexin (KEFLEX) 250 MG capsule   4. Type 2 diabetes mellitus with stage 3 chronic kidney disease, without long-term current use of insulin (H) E11.22     N18.3      Discussed with patient the care instructions below and the antibiotic potential side effects as well as anticipated improvement. Return if not improving within 3-4 days; sooner if worsening.   Medication and dose chosen due to kidney disease (requires reduced dosage) and diabetes hx with greater potential for worsening if left untreated.     Patient Instructions       Discharge Instructions for Cellulitis  You have been diagnosed with cellulitis. This is an infection in the " deepest layer of the skin. In some cases, the infection also affects the muscle. Cellulitis is caused by bacteria. The bacteria can enter the body through broken skin. This can happen with a cut, scratch, animal bite, or an insect bite that has been scratched. You may have been treated in the hospital with antibiotics and fluids. You will likely be given a prescription for antibiotics to take at home. This sheet will help you take care of yourself at home.  Home care  When you are home:    Take the prescribed antibiotic medicine you are given as directed until it is gone. Take it even if you feel better. It treats the infection and stops it from returning. Not taking all the medicine can make future infections hard to treat.    Keep the infected area clean.    When possible, raise the infected area above the level of your heart. This helps keep swelling down.    Talk with your healthcare provider if you are in pain. Ask what kind of over-the-counter medicine you can take for pain.    Apply clean bandages as advised.    Take your temperature once a day for a week.    Wash your hands often to prevent spreading the infection.  In the future, wash your hands before and after you touch cuts, scratches, or bandages. This will help prevent infection.   When to call your healthcare provider  Call your healthcare provider immediately if you have any of the following:    Difficulty or pain when moving the joints above or below the infected area    Discharge or pus draining from the area    Fever of 100.4 F (38 C) or higher, or as directed by your healthcare provider    Pain that gets worse in or around the infected     Redness that gets worse in or around the infected area, particularly if the area of redness expands to a wider area    Shaking chills    Swelling of the infected area    Vomiting   Date Last Reviewed: 8/1/2016 2000-2017 [employer]. [address] [place] [address], [place] [address]. All rights  reserved. This information is not intended as a substitute for professional medical care. Always follow your healthcare professional's instructions.            Nicole Joy Siegler, PA-C  [facility]

## 2017-07-07 NOTE — LETTER
My Asthma Action Plan  Name: Abbe Lambert   YOB: 1934  Date: 7/7/2017   My doctor: Nicole Joy Siegler, PA-C   My clinic: [facility]        My Control Medicine: { :718926}  My Rescue Medicine: { :946247}  {AAP include Oral Steroid:218241} My Asthma Severity: { :808601}  Avoid your asthma triggers: { :952780}        {Is patient a child or adult?:722320}       GREEN ZONE   Good Control    I feel good    No cough or wheeze    Can work, sleep and play without asthma symptoms       Take your asthma control medicine every day.     1. If exercise triggers your asthma, take your rescue medication    15 minutes before exercise or sports, and    During exercise if you have asthma symptoms  2. Spacer to use with inhaler: If you have a spacer, make sure to use it with your inhaler             YELLOW ZONE Getting Worse  I have ANY of these:    I do not feel good    Cough or wheeze    Chest feels tight    Wake up at night   1. Keep taking your Green Zone medications  2. Start taking your rescue medicine:    every 20 minutes for up to 1 hour. Then every 4 hours for 24-48 hours.  3. If you stay in the Yellow Zone for more than 12-24 hours, contact your doctor.  4. If you do not return to the Green Zone in 12-24 hours or you get worse, start taking your oral steroid medicine if prescribed by your provider.           RED ZONE Medical Alert - Get Help  I have ANY of these:    I feel awful    Medicine is not helping    Breathing getting harder    Trouble walking or talking    Nose opens wide to breathe       1. Take your rescue medicine NOW  2. If your provider has prescribed an oral steroid medicine, start taking it NOW  3. Call your doctor NOW  4. If you are still in the Red Zone after 20 minutes and you have not reached your doctor:    Take your rescue medicine again and    Call 911 or go to the emergency room right away    See your regular doctor within 2 weeks of an Emergency Room or  Urgent Care visit for follow-up treatment.        Electronically signed by: Francie Contreras, July 7, 2017    Annual Reminders:  Meet with Asthma Educator,  Flu Shot in the Fall, consider Pneumonia Vaccination for patients with asthma (aged 19 and older).    Pharmacy:    [facility] [address] [place] [address] [address] AT [facility] [address]  WRITTEN PRESCRIPTION REQUESTED                    Asthma Triggers  How To Control Things That Make Your Asthma Worse    Triggers are things that make your asthma worse.  Look at the list below to help you find your triggers and what you can do about them.  You can help prevent asthma flare-ups by staying away from your triggers.      Trigger                                                          What you can do   Cigarette Smoke  Tobacco smoke can make asthma worse. Do not allow smoking in your home, car or around you.  Be sure no one smokes at a child s day care or school.  If you smoke, ask your health care provider for ways to help you quit.  Ask family members to quit too.  Ask your health care provider for a referral to Quit Plan to help you quit smoking, or call 3-302-911-PLAN.     Colds, Flu, Bronchitis  These are common triggers of asthma. Wash your hands often.  Don t touch your eyes, nose or mouth.  Get a flu shot every year.     Dust Mites  These are tiny bugs that live in cloth or carpet. They are too small to see. Wash sheets and blankets in hot water every week.   Encase pillows and mattress in dust mite proof covers.  Avoid having carpet if you can. If you have carpet, vacuum weekly.   Use a dust mask and HEPA vacuum.   Pollen and Outdoor Mold  Some people are allergic to trees, grass, or weed pollen, or molds. Try to keep your windows closed.  Limit time out doors when pollen count is high.   Ask you health care provider about taking medicine during allergy season.     Animal Dander  Some people are allergic to skin flakes, urine or saliva from  pets with fur or feathers. Keep pets with fur or feathers out of your home.    If you can t keep the pet outdoors, then keep the pet out of your bedroom.  Keep the bedroom door closed.  Keep pets off cloth furniture and away from stuffed toys.     Mice, Rats, and Cockroaches  Some people are allergic to the waste from these pests.   Cover food and garbage.  Clean up spills and food crumbs.  Store grease in the refrigerator.   Keep food out of the bedroom.   Indoor Mold  This can be a trigger if your home has high moisture. Fix leaking faucets, pipes, or other sources of water.   Clean moldy surfaces.  Dehumidify basement if it is damp and smelly.   Smoke, Strong Odors, and Sprays  These can reduce air quality. Stay away from strong odors and sprays, such as perfume, powder, hair spray, paints, smoke incense, paint, cleaning products, candles and new carpet.   Exercise or Sports  Some people with asthma have this trigger. Be active!  Ask your doctor about taking medicine before sports or exercise to prevent symptoms.    Warm up for 5-10 minutes before and after sports or exercise.     Other Triggers of Asthma  Cold air:  Cover your nose and mouth with a scarf.  Sometimes laughing or crying can be a trigger.  Some medicines and food can trigger asthma.

## 2017-07-07 NOTE — NURSING NOTE
"Chief Complaint   Patient presents with     RECHECK     Bilateral leg edema, redness of lower extremeties and intermittent pain. Symptoms ongoing since April       Initial /58 (BP Location: Left arm, Patient Position: Sitting, Cuff Size: Adult Regular)  Pulse 75  Temp 97.5  F (36.4  C)  Resp 16  Ht 5' 10\" (1.778 m)  Wt 184 lb (83.5 kg)  SpO2 98%  BMI 26.4 kg/m2 Estimated body mass index is 26.4 kg/(m^2) as calculated from the following:    Height as of this encounter: 5' 10\" (1.778 m).    Weight as of this encounter: 184 lb (83.5 kg).  Medication Reconciliation: complete     Francie Gauthier LPN  "

## 2017-07-07 NOTE — PATIENT INSTRUCTIONS
Discharge Instructions for Cellulitis  You have been diagnosed with cellulitis. This is an infection in the deepest layer of the skin. In some cases, the infection also affects the muscle. Cellulitis is caused by bacteria. The bacteria can enter the body through broken skin. This can happen with a cut, scratch, animal bite, or an insect bite that has been scratched. You may have been treated in the hospital with antibiotics and fluids. You will likely be given a prescription for antibiotics to take at home. This sheet will help you take care of yourself at home.  Home care  When you are home:    Take the prescribed antibiotic medicine you are given as directed until it is gone. Take it even if you feel better. It treats the infection and stops it from returning. Not taking all the medicine can make future infections hard to treat.    Keep the infected area clean.    When possible, raise the infected area above the level of your heart. This helps keep swelling down.    Talk with your healthcare provider if you are in pain. Ask what kind of over-the-counter medicine you can take for pain.    Apply clean bandages as advised.    Take your temperature once a day for a week.    Wash your hands often to prevent spreading the infection.  In the future, wash your hands before and after you touch cuts, scratches, or bandages. This will help prevent infection.   When to call your healthcare provider  Call your healthcare provider immediately if you have any of the following:    Difficulty or pain when moving the joints above or below the infected area    Discharge or pus draining from the area    Fever of 100.4 F (38 C) or higher, or as directed by your healthcare provider    Pain that gets worse in or around the infected     Redness that gets worse in or around the infected area, particularly if the area of redness expands to a wider area    Shaking chills    Swelling of the infected area    Vomiting   Date Last Reviewed:  8/1/2016 2000-2017 [employer]. [address] [place] [address], [place] [address]. All rights reserved. This information is not intended as a substitute for professional medical care. Always follow your healthcare professional's instructions.

## 2017-07-08 ASSESSMENT — ASTHMA QUESTIONNAIRES
ACT_TOTALSCORE: 20
ACT_TOTALSCORE: 24

## 2017-07-17 ENCOUNTER — DOCUMENTATION ONLY (OUTPATIENT)
Dept: CARDIOLOGY | Facility: CLINIC | Age: 82
End: 2017-07-17

## 2017-07-17 NOTE — PROGRESS NOTES
7/17/17- Patient missed Merlin transmission on 7/6/17, sent letter 7/7, no response. Sent 1 week letter today

## 2017-07-18 DIAGNOSIS — I42.9 CARDIOMYOPATHY (H): Chronic | ICD-10-CM

## 2017-07-18 DIAGNOSIS — I10 ESSENTIAL HYPERTENSION: Primary | ICD-10-CM

## 2017-07-19 RX ORDER — CARVEDILOL 3.12 MG/1
TABLET ORAL
Qty: 180 TABLET | Refills: 3 | Status: SHIPPED | OUTPATIENT
Start: 2017-07-19 | End: 2018-08-17

## 2017-08-01 ENCOUNTER — ALLIED HEALTH/NURSE VISIT (OUTPATIENT)
Dept: CARDIOLOGY | Facility: CLINIC | Age: 82
End: 2017-08-01
Payer: COMMERCIAL

## 2017-08-01 DIAGNOSIS — Z95.0 CARDIAC PACEMAKER IN SITU: Primary | ICD-10-CM

## 2017-08-01 PROCEDURE — 93294 REM INTERROG EVL PM/LDLS PM: CPT | Performed by: INTERNAL MEDICINE

## 2017-08-01 PROCEDURE — 93296 REM INTERROG EVL PM/IDS: CPT | Performed by: INTERNAL MEDICINE

## 2017-08-01 NOTE — MR AVS SNAPSHOT
"              After Visit Summary   8/1/2017    Abbe Lambert    MRN: [id number]           Patient Information     Date Of Birth          2/3/1934        Visit Information        Provider Department      8/1/2017 10:45 AM HARMAN TECH1 [facility]        Today's Diagnoses     Cardiac pacemaker in situ    -  1       Follow-ups after your visit        Your next 10 appointments already scheduled     Nov 07, 2017  2:30 PM CST   Remote PPM Check with HARMAN TECH1   [facility] ([facility])    [address] [place] [id number] [phone]           This appointment is for a remote check of your pacemaker.  This is not an appointment at the office.              Who to contact     If you have questions or need follow up information about today's clinic visit or your schedule please contact [facility] directly at [phone].  Normal or non-critical lab and imaging results will be communicated to you by QuanDxhart, letter or phone within 4 business days after the clinic has received the results. If you do not hear from us within 7 days, please contact the clinic through QuanDxhart or phone. If you have a critical or abnormal lab result, we will notify you by phone as soon as possible.  Submit refill requests through [employer]erMinds or call your pharmacy and they will forward the refill request to us. Please allow 3 business days for your refill to be completed.          Additional Information About Your Visit        MyChart Information     [employer] lets you send messages to your doctor, view your test results, renew your prescriptions, schedule appointments and more. To sign up, go to [email or web address] . Click on \"Log in\" on the left side of the screen, which will take you to the Welcome page. Then click on \"Sign up Now\" on the right side of the page.     You will be asked to enter the " access code listed below, as well as some personal information. Please follow the directions to create your username and password.     Your access code is: [email or web address]  Expires: 10/30/2017  4:47 PM     Your access code will  in 90 days. If you need help or a new code, please call your [place] clinic or [phone].        Care EveryWhere ID     This is your Care EveryWhere ID. This could be used by other organizations to access your [place] medical records  [id number]         Blood Pressure from Last 3 Encounters:   17 100/58   17 128/70   17 100/70    Weight from Last 3 Encounters:   17 83.5 kg (184 lb)   17 82.6 kg (182 lb)   17 84.4 kg (186 lb)              We Performed the Following     INTERROGATION DEVICE EVAL REMOTE, PACER/ICD (15807)     PM DEVICE INTERROGATE REMOTE (40273)        Primary Care Provider Office Phone # Fax #    Eliezer Clement Shah [phone]       [facility] [address]  [facility] [address]        Equal Access to Services     JIM AHN AH: Hadii aad ku hadasho Soomaali, waaxda luqadaha, qaybta kaalmada adeegyada, waxay idiin hayaan jhonatan goodson. So [facility] [phone].    ATENCIÓN: Si habla español, tiene a monge disposición servicios gratuitos de asistencia lingüística. Llame al [phone].    We comply with applicable federal civil rights laws and [place] laws. We do not discriminate on the basis of race, color, national origin, age, disability sex, sexual orientation or gender identity.            Thank you!     Thank you for choosing [facility] [place]  for your care. Our goal is always to provide you with excellent care. Hearing back from our patients is one way we can continue to improve our services. Please take a few minutes to complete the written survey that you may receive in the mail after your visit with us. Thank you!             Your Updated  Medication List - Protect others around you: Learn how to safely use, store and throw away your medicines at www.disposemymeds.org.          This list is accurate as of: 8/1/17  4:47 PM.  Always use your most recent med list.                   Brand Name Dispense Instructions for use Diagnosis    ACE/ARB NOT PRESCRIBED (INTENTIONAL)      ACE & ARB not prescribed due to Disease of aortic or mitral valves    Aortic valve disorders, Cardiomyopathy (H)       albuterol 108 (90 BASE) MCG/ACT Inhaler    PROAIR HFA/PROVENTIL HFA/VENTOLIN HFA    3 Inhaler    Inhale 2 puffs into the lungs as needed for shortness of breath / dyspnea or wheezing    Mild persistent asthma without complication       aspirin 81 MG tablet     30 tablet    Take 81 mg by mouth daily    CAD (coronary artery disease)       ASTELIN 0.1 % spray   Generic drug:  azelastine      Spray 1 spray into both nostrils 2 times daily.        atorvastatin 40 MG tablet    LIPITOR    90 tablet    TAKE 1 TABLET BY MOUTH DAILY    Mixed hyperlipidemia       beclomethasone 80 MCG/ACT Inhaler    QVAR    2 Inhaler    Inhale 2 puffs into the lungs 2 times daily    Moderate persistent asthma without complication       carvedilol 3.125 MG tablet    COREG    180 tablet    TAKE 1 TABLET(3.125 MG) BY MOUTH TWICE DAILY WITH MEALS    Essential hypertension       cetirizine 10 MG tablet    zyrTEC     Take 10 mg by mouth every morning        clopidogrel 75 MG tablet    PLAVIX    90 tablet    Take 1 tablet (75 mg) by mouth daily    Atrial fibrillation, unspecified type (H)       isosorbide mononitrate 30 MG 24 hr tablet    IMDUR    90 tablet    TAKE 1 TABLET BY MOUTH EVERY DAY    Coronary artery disease involving native coronary artery of native heart without angina pectoris       NASONEX 50 MCG/ACT spray   Generic drug:  mometasone      Spray 2 sprays into both nostrils daily as needed.        nitroGLYcerin 0.4 MG sublingual tablet    NITROSTAT    25 tablet    Place 1 tablet (0.4 mg)  under the tongue every 5 minutes as needed for chest pain    Chest pain on exertion       polyethylene glycol Packet    MIRALAX/GLYCOLAX     Take 17 g by mouth daily        potassium chloride SA 20 MEQ CR tablet    K-DUR/KLOR-CON M    30 tablet    Take 2 pills today, one pill tomorrow then one pill daily ONLY when you take the Demadex    Chronic combined systolic and diastolic congestive heart failure (H)       torsemide 20 MG tablet    DEMADEX    30 tablet    Take 1 tablet (20 mg) by mouth daily as needed    Chronic combined systolic and diastolic congestive heart failure (H)       triamcinolone 0.1 % cream    KENALOG    45 g    Apply sparingly to affected area two times daily as needed    Stasis dermatitis of both legs

## 2017-08-01 NOTE — PROGRESS NOTES
[facility] [address] (D) Remote PPM Device Check  AP: 92% : 87%  Mode: DDDR        Presenting Rhythm: AP/  Heart Rate: adequate heart rates per histogram  Sensing: stable    Pacing Threshold: stable    Impedance: stable  Battery Status: 5.8 - 6.8 years remaining  Atrial Arrhythmia: 30 mode switch episodes comprising <1% of the time. EGMs available show PAF with longest episode lasting 1min 42sec. Taking ASA.   Ventricular Arrhythmia: none     Care Plan: F/U Merlin q 3 months. Gave results and next transmission date over the phone to shana MACARIO

## 2017-08-07 ENCOUNTER — TELEPHONE (OUTPATIENT)
Dept: FAMILY MEDICINE | Facility: CLINIC | Age: 82
End: 2017-08-07

## 2017-08-07 DIAGNOSIS — Z47.1 AFTERCARE FOLLOWING KNEE JOINT REPLACEMENT SURGERY, UNSPECIFIED LATERALITY: ICD-10-CM

## 2017-08-07 DIAGNOSIS — Z96.659 AFTERCARE FOLLOWING KNEE JOINT REPLACEMENT SURGERY, UNSPECIFIED LATERALITY: ICD-10-CM

## 2017-08-07 DIAGNOSIS — Z29.89 NEED FOR SBE (SUBACUTE BACTERIAL ENDOCARDITIS) PROPHYLAXIS: Primary | ICD-10-CM

## 2017-08-07 NOTE — TELEPHONE ENCOUNTER
(Patient needs antibiotics called in to his pharmacy for an upcoming dental appointment.)    Attempted to call patient back, busy ring tone, unable to leave a VM.

## 2017-08-09 RX ORDER — AMOXICILLIN 500 MG/1
2000 CAPSULE ORAL ONCE
Qty: 4 CAPSULE | Refills: 1 | Status: SHIPPED | OUTPATIENT
Start: 2017-08-09 | End: 2017-08-09

## 2017-08-09 NOTE — TELEPHONE ENCOUNTER
Rx for Amoxicillin 500 mg to take 4 tabs as single dose sent to his pharmacy. I did put a refill also

## 2017-08-16 ENCOUNTER — TELEPHONE (OUTPATIENT)
Dept: FAMILY MEDICINE | Facility: CLINIC | Age: 82
End: 2017-08-16

## 2017-08-16 NOTE — TELEPHONE ENCOUNTER
Reason for Call:  Other prescription    Detailed comments:    Drug interaction   Patient is on a cholesterol medication    He has been put on clarithromycin 500 mg 2 times a day for 3 weeks    The pharmacist says there is a drug interaction with these medications and he should stop taking the cholesterol medication   Should he stop it or call his ENT to switch to another antibiotic?   Please look into this and call the patient    Phone Number Patient can be reached at: Home number on file [phone] (home)    Best Time: anytime    Can we leave a detailed message on this number? YES    Call taken on 8/16/2017 at 11:12 AM by KRYSTYNA MCDANIEL

## 2017-08-21 ENCOUNTER — OFFICE VISIT (OUTPATIENT)
Dept: FAMILY MEDICINE | Facility: CLINIC | Age: 82
End: 2017-08-21
Payer: COMMERCIAL

## 2017-08-21 VITALS
DIASTOLIC BLOOD PRESSURE: 70 MMHG | WEIGHT: 189 LBS | RESPIRATION RATE: 16 BRPM | HEIGHT: 70 IN | SYSTOLIC BLOOD PRESSURE: 120 MMHG | BODY MASS INDEX: 27.06 KG/M2 | HEART RATE: 82 BPM | TEMPERATURE: 98 F | OXYGEN SATURATION: 98 %

## 2017-08-21 DIAGNOSIS — I10 ESSENTIAL HYPERTENSION, BENIGN: ICD-10-CM

## 2017-08-21 DIAGNOSIS — N18.30 CKD (CHRONIC KIDNEY DISEASE) STAGE 3, GFR 30-59 ML/MIN (H): ICD-10-CM

## 2017-08-21 DIAGNOSIS — I87.2 STASIS DERMATITIS OF BOTH LEGS: Primary | ICD-10-CM

## 2017-08-21 DIAGNOSIS — R60.0 LOCALIZED EDEMA: ICD-10-CM

## 2017-08-21 PROCEDURE — 99214 OFFICE O/P EST MOD 30 MIN: CPT | Performed by: FAMILY MEDICINE

## 2017-08-21 NOTE — NURSING NOTE
"Chief Complaint   Patient presents with     Cellulitis     both legs       Initial /70  Pulse 82  Temp 98  F (36.7  C) (Tympanic)  Resp 16  Ht 5' 10\" (1.778 m)  Wt 189 lb (85.7 kg)  SpO2 98%  BMI 27.12 kg/m2 Estimated body mass index is 27.12 kg/(m^2) as calculated from the following:    Height as of this encounter: 5' 10\" (1.778 m).    Weight as of this encounter: 189 lb (85.7 kg).  Medication Reconciliation: complete     Ellen Mckeon CMA      "

## 2017-08-21 NOTE — PROGRESS NOTES
SUBJECTIVE:   Abbe Lambert is a 83 year old male who presents to clinic today for the following health issues:      Cellulitis      Duration:     Description (location/character/radiation): both legs    Intensity:  moderate    Accompanying signs and symptoms: improving    History (similar episodes/previous evaluation): None    Precipitating or alleviating factors: None    Therapies tried and outcome: Kenalog         Rash      Duration: months    Description  Location: legs  Itching: mild    Intensity:  moderate    Accompanying signs and symptoms: leg swelling    History (similar episodes/previous evaluation): None    Precipitating or alleviating factors:  New exposures:  None  Recent travel: no      Therapies tried and outcome: topical steroid - has worked well        Problem list and histories reviewed & adjusted, as indicated.  Additional history: as documented    Patient Active Problem List   Diagnosis     Health Care Home     Allergic rhinitis     Edema     Polymyalgia rheumatica (H)     Advanced directives, counseling/discussion     Cardiomyopathy (H)     Atrial fibrillation (H)     Coronary artery disease     Intermittent lightheadedness     CKD (chronic kidney disease) stage 3, GFR 30-59 ml/min     GERD (gastroesophageal reflux disease)     Tachy-alix syndrome (H)     AAA (abdominal aortic aneurysm) (H)     Old myocardial infarction     Chronic combined systolic and diastolic congestive heart failure (H)     Essential hypertension, benign     Type 2 diabetes mellitus with stage 3 chronic kidney disease, without long-term current use of insulin (H)                                                                                                                                                                                                                  Moderate persistent asthma without complication     Prostate cancer (H)     Mixed hyperlipidemia     Overweight (BMI 25.0-29.9)     Degenerative  arthritis of knee     Aftercare following knee joint replacement surgery, unspecified laterality     Anemia due to blood loss, acute     Shingles     Acute kidney injury (H)     Physical deconditioning     Post herpetic neuralgia     Mild persistent asthma with acute exacerbation     Past Surgical History:   Procedure Laterality Date     ARTHROPLASTY KNEE Left 10/5/2016    Procedure: ARTHROPLASTY KNEE;  Surgeon: Keshav Zamudio MD;  Location: [facility] OR     CARDIAC SURGERY  12/03/2012    pacemaker ; CABG 1998     CHOLECYSTECTOMY       COLONOSCOPY       ENT SURGERY  5-    sinus     EXTRACAPSULAR CATARACT EXTRATION WITH INTRAOCULAR LENS IMPLANT       GENITOURINARY SURGERY      prostatectomy     IMPLANT PACEMAKER  12-3-12    [facility]     PROSTATE SURGERY         Social History   Substance Use Topics     Smoking status: Never Smoker     Smokeless tobacco: Never Used     Alcohol use No     Family History   Problem Relation Age of Onset     CEREBROVASCULAR DISEASE Father      HEART DISEASE Father      HEART DISEASE Brother      Depression Daughter      raped in high school     Unknown/Adopted Daughter      Unknown/Adopted Daughter      Unknown/Adopted Maternal Grandmother      Unknown/Adopted Maternal Grandfather      Unknown/Adopted Paternal Grandmother      Unknown/Adopted Paternal Grandfather      DIABETES No family hx of      Coronary Artery Disease No family hx of      Hypertension No family hx of      Hyperlipidemia No family hx of      Breast Cancer No family hx of      Colon Cancer No family hx of      Prostate Cancer No family hx of      Other Cancer No family hx of      Anxiety Disorder No family hx of      MENTAL ILLNESS No family hx of      Substance Abuse No family hx of      Anesthesia Reaction No family hx of      Asthma No family hx of      OSTEOPOROSIS No family hx of      Genetic Disorder No family hx of      Thyroid Disease No family hx of      Obesity No family hx of              Reviewed and  "updated as needed this visit by clinical staffTobacco  Allergies  Meds  Med Hx  Surg Hx  Fam Hx  Soc Hx      Reviewed and updated as needed this visit by Provider         ROS:  Constitutional, HEENT, cardiovascular, pulmonary, gi and gu systems are negative, except as otherwise noted.  CONSTITUTIONAL:NEGATIVE for fever, chills, change in weight  INTEGUMENTARY/SKIN: POSITIVE for rash ankles bilateral and lower legs bilateral  MUSCULOSKELETAL: NEGATIVE for significant arthralgias or myalgia      OBJECTIVE:                                                    /70  Pulse 82  Temp 98  F (36.7  C) (Tympanic)  Resp 16  Ht 5' 10\" (1.778 m)  Wt 189 lb (85.7 kg)  SpO2 98%  BMI 27.12 kg/m2  Body mass index is 27.12 kg/(m^2).  GENERAL APPEARANCE: healthy, alert and no distress  SKIN: erythema - lower legs very mild         ASSESSMENT/PLAN:                                                        ICD-10-CM    1. Stasis dermatitis of both legs I87.2    2. Localized edema R60.0    3. CKD (chronic kidney disease) stage 3, GFR 30-59 ml/min N18.3    4. Essential hypertension, benign I10        Follow up with Provider - for a physical   Patient Instructions   Will check labs and then set up follow up. The patient needs to schedule a full physical at his convenience.      Eliezer Shah MD  [facility]    "

## 2017-08-22 NOTE — PATIENT INSTRUCTIONS
Will check labs and then set up follow up. The patient needs to schedule a full physical at his convenience.

## 2017-08-31 ENCOUNTER — PRE VISIT (OUTPATIENT)
Dept: CARDIOLOGY | Facility: CLINIC | Age: 82
End: 2017-08-31

## 2017-09-06 ENCOUNTER — OFFICE VISIT (OUTPATIENT)
Dept: FAMILY MEDICINE | Facility: CLINIC | Age: 82
End: 2017-09-06
Payer: COMMERCIAL

## 2017-09-06 VITALS
DIASTOLIC BLOOD PRESSURE: 70 MMHG | WEIGHT: 186 LBS | TEMPERATURE: 97.8 F | HEART RATE: 75 BPM | OXYGEN SATURATION: 95 % | SYSTOLIC BLOOD PRESSURE: 110 MMHG | BODY MASS INDEX: 26.69 KG/M2 | RESPIRATION RATE: 16 BRPM

## 2017-09-06 DIAGNOSIS — I87.2 STASIS DERMATITIS OF BOTH LEGS: ICD-10-CM

## 2017-09-06 DIAGNOSIS — R43.2 ABNORMAL TASTE IN MOUTH: Primary | ICD-10-CM

## 2017-09-06 DIAGNOSIS — H91.92 DECREASED HEARING OF LEFT EAR: ICD-10-CM

## 2017-09-06 PROCEDURE — 99214 OFFICE O/P EST MOD 30 MIN: CPT | Performed by: FAMILY MEDICINE

## 2017-09-06 RX ORDER — TRIAMCINOLONE ACETONIDE 1 MG/G
CREAM TOPICAL
Qty: 45 G | Refills: 3 | Status: SHIPPED | OUTPATIENT
Start: 2017-09-06 | End: 2018-05-15

## 2017-09-06 NOTE — MR AVS SNAPSHOT
After Visit Summary   9/6/2017    Abbe Lambert    MRN: [id number]           Patient Information     Date Of Birth          2/3/1934        Visit Information        Provider Department      9/6/2017 7:30 AM Eliezer Shah MD [facility]        Today's Diagnoses     Abnormal taste in mouth    -  1    Stasis dermatitis of both legs        Decreased hearing of left ear           Follow-ups after your visit        Additional Services     OTOLARYNGOLOGY REFERRAL       Your provider has referred you to: N: [facility] [place] [facility] [place] [phone]   [email or web address]    Please be aware that coverage of these services is subject to the terms and limitations of your health insurance plan.  Call member services at your health plan with any benefit or coverage questions.      Please bring the following with you to your appointment:    (1) Any X-Rays, CTs or MRIs which have been performed.  Contact the facility where they were done to arrange for  prior to your scheduled appointment.   (2) List of current medications  (3) This referral request   (4) Any documents/labs given to you for this referral                  Your next 10 appointments already scheduled     Sep 12, 2017  8:30 AM CDT   [facility]   [facility]    [address]  [facility] [address] [phone]            Sep 21, 2017  8:30 AM CDT   LAB with [facility]   [facility] [place] ([facility])    [address]  [facility] [address] [phone]           Patient must bring picture ID. Patient should be prepared to give a urine specimen  Please do not eat 10-12 hours before your appointment if you are coming in fasting for labs on lipids, cholesterol, or  "glucose (sugar). Pregnant women should follow their Care Team instructions. Water with medications is okay. Do not drink coffee or other fluids. If you have concerns about taking  your medications, please ask at office or if scheduling via [employer], send a message by clicking on Secure Messaging, Message Your Care Team.            Sep 21, 2017  9:45 AM CDT   Return Visit with Brandon Eli MD   [facility] [place] ([facility])    [address] [place] [address] [address]  [facility] [address] [phone]            Nov 07, 2017  2:30 PM CST   Remote PPM Check with HARMAN TECH1   [facility] ([facility])    [address] [place] [address] [address]  [facility] [address] [phone]           This appointment is for a remote check of your pacemaker.  This is not an appointment at the office.              Who to contact     If you have questions or need follow up information about today's clinic visit or your schedule please contact [facility] directly at [phone].  Normal or non-critical lab and imaging results will be communicated to you by Chirplyhart, letter or phone within 4 business days after the clinic has received the results. If you do not hear from us within 7 days, please contact the clinic through [employer] or phone. If you have a critical or abnormal lab result, we will notify you by phone as soon as possible.  Submit refill requests through [employer] or call your pharmacy and they will forward the refill request to us. Please allow 3 business days for your refill to be completed.          Additional Information About Your Visit        [employer] Information     [employer] lets you send messages to your doctor, view your test results, renew your prescriptions, schedule appointments and more. To sign up, go to [email or web address] . Click on \"Log in\" on the left side of the screen, which will take you to " "the Welcome page. Then click on \"Sign up Now\" on the right side of the page.     You will be asked to enter the access code listed below, as well as some personal information. Please follow the directions to create your username and password.     Your access code is: [email or web address]  Expires: 10/30/2017  4:47 PM     Your access code will  in 90 days. If you need help or a new code, please call your [place] clinic or [phone].        Care EveryWhere ID     This is your Care EveryWhere ID. This could be used by other organizations to access your [place] medical records  [id number]        Your Vitals Were     Pulse Temperature Respirations Pulse Oximetry BMI (Body Mass Index)       75 97.8  F (36.6  C) (Tympanic) 16 95% 26.69 kg/m2        Blood Pressure from Last 3 Encounters:   17 110/70   17 120/70   17 100/58    Weight from Last 3 Encounters:   17 186 lb (84.4 kg)   17 189 lb (85.7 kg)   17 184 lb (83.5 kg)              We Performed the Following     OTOLARYNGOLOGY REFERRAL          Where to get your medicines      These medications were sent to [facility] [address] [facility] [address] AT [facility] [address]  [address], [facility] [address]    Hours:  24-hours Phone:  [phone]     triamcinolone 0.1 % cream          Primary Care Provider Office Phone # Fax #    Eliezer Shah [phone] [address]  [facility] [address]        Equal Access to Services     JIM AHN AH: Shanika Hernandez, waamarilisda jennifer, qaybta kaalmada najma, skylar goodson. [facility] [phone].    ATENCIÓN: Si habla español, tiene a monge disposición servicios gratuitos de asistencia lingüística. Devin al [phone].    We comply with applicable federal civil rights laws and [place] laws. We do not discriminate on the basis of race, color, national origin, age, disability sex, " sexual orientation or gender identity.            Thank you!     Thank you for choosing [facility]  for your care. Our goal is always to provide you with excellent care. Hearing back from our patients is one way we can continue to improve our services. Please take a few minutes to complete the written survey that you may receive in the mail after your visit with us. Thank you!             Your Updated Medication List - Protect others around you: Learn how to safely use, store and throw away your medicines at www.disposemymeds.org.          This list is accurate as of: 9/6/17  8:03 AM.  Always use your most recent med list.                   Brand Name Dispense Instructions for use Diagnosis    ACE/ARB NOT PRESCRIBED (INTENTIONAL)      ACE & ARB not prescribed due to Disease of aortic or mitral valves    Aortic valve disorders, Cardiomyopathy (H)       albuterol 108 (90 BASE) MCG/ACT Inhaler    PROAIR HFA/PROVENTIL HFA/VENTOLIN HFA    3 Inhaler    Inhale 2 puffs into the lungs as needed for shortness of breath / dyspnea or wheezing    Mild persistent asthma without complication       aspirin 81 MG tablet     30 tablet    Take 81 mg by mouth daily    CAD (coronary artery disease)       ASTELIN 0.1 % spray   Generic drug:  azelastine      Spray 1 spray into both nostrils 2 times daily.        atorvastatin 40 MG tablet    LIPITOR    90 tablet    TAKE 1 TABLET BY MOUTH DAILY    Mixed hyperlipidemia       beclomethasone 80 MCG/ACT Inhaler    QVAR    2 Inhaler    Inhale 2 puffs into the lungs 2 times daily    Moderate persistent asthma without complication       carvedilol 3.125 MG tablet    COREG    180 tablet    TAKE 1 TABLET(3.125 MG) BY MOUTH TWICE DAILY WITH MEALS    Essential hypertension       cetirizine 10 MG tablet    zyrTEC     Take 10 mg by mouth every morning        clopidogrel 75 MG tablet    PLAVIX    90 tablet    Take 1 tablet (75 mg) by mouth daily    Atrial fibrillation,  unspecified type (H)       isosorbide mononitrate 30 MG 24 hr tablet    IMDUR    90 tablet    TAKE 1 TABLET BY MOUTH EVERY DAY    Coronary artery disease involving native coronary artery of native heart without angina pectoris       NASONEX 50 MCG/ACT spray   Generic drug:  mometasone      Spray 2 sprays into both nostrils daily as needed.        nitroGLYcerin 0.4 MG sublingual tablet    NITROSTAT    25 tablet    Place 1 tablet (0.4 mg) under the tongue every 5 minutes as needed for chest pain    Chest pain on exertion       polyethylene glycol Packet    MIRALAX/GLYCOLAX     Take 17 g by mouth daily        potassium chloride SA 20 MEQ CR tablet    K-DUR/KLOR-CON M    30 tablet    Take 2 pills today, one pill tomorrow then one pill daily ONLY when you take the Demadex    Chronic combined systolic and diastolic congestive heart failure (H)       torsemide 20 MG tablet    DEMADEX    30 tablet    Take 1 tablet (20 mg) by mouth daily as needed    Chronic combined systolic and diastolic congestive heart failure (H)       triamcinolone 0.1 % cream    KENALOG    45 g    Apply sparingly to affected area two times daily as needed    Stasis dermatitis of both legs

## 2017-09-06 NOTE — NURSING NOTE
"Chief Complaint   Patient presents with     Medication Problem     from inhalers      Hearing Problem     loss       Initial /70  Pulse 75  Temp 97.8  F (36.6  C) (Tympanic)  Resp 16  Wt 186 lb (84.4 kg)  SpO2 95%  BMI 26.69 kg/m2 Estimated body mass index is 26.69 kg/(m^2) as calculated from the following:    Height as of 8/21/17: 5' 10\" (1.778 m).    Weight as of this encounter: 186 lb (84.4 kg).  Medication Reconciliation: complete     Ellen Mckeon CMA      "

## 2017-09-06 NOTE — PATIENT INSTRUCTIONS
I will refer the patient to ear nose and throat.  He's been a little unhappy with his current ENT so we set our sending [facility] [place].  He's going to get a list of the antibiotics he has been on the last 3 years from the current ENT so he can take it for a second opinion to the next ENT.  I refilled his Kenalog cream which is working very well for his stasis dermatitis in his lower extremities.  He will try to make sure that he stays very well hydrated and also adding sugar-free lemon drops to suck on during the day to see if the abnormal taste in his mouth goes away.  I did not find anything physically today that would be causing that.  He has a complete physical scheduled for next week.  We will follow-up at that time.

## 2017-09-06 NOTE — PROGRESS NOTES
SUBJECTIVE:   Abbe Lambert is a 83 year old male who presents to clinic today for the following health issues:      Hearing Loss/Medication Problem      Duration: bad taste in mouth 3 weeks ago- taste won't go away    Description (location/character/radiation): came on suddenly 3 weeks ago    Intensity:  moderate    Accompanying signs and symptoms: also has chronic sinusitis- has been going to ENT clinic for these problems but hasn't gotten much help    History (similar episodes/previous evaluation): None    Precipitating or alleviating factors: None    Therapies tried and outcome: None         Rash      Duration: months    Description  Location: Bilateral lower extremities  Itching: mild    Intensity:  mild    Accompanying signs and symptoms: None    History (similar episodes/previous evaluation): None    Precipitating or alleviating factors:  New exposures:  None  Recent travel: no      Therapies tried and outcome: topical steroid - is slowly improving the rash.    Decreased hearing on the left coinciding with the start of his bad taste in his mouth.      Problem list and histories reviewed & adjusted, as indicated.  Additional history: as documented    Patient Active Problem List   Diagnosis     Health Care Home     Allergic rhinitis     Edema     Polymyalgia rheumatica (H)     Advanced directives, counseling/discussion     Cardiomyopathy (H)     Atrial fibrillation (H)     Coronary artery disease     Intermittent lightheadedness     CKD (chronic kidney disease) stage 3, GFR 30-59 ml/min     GERD (gastroesophageal reflux disease)     Tachy-alix syndrome (H)     AAA (abdominal aortic aneurysm) (H)     Old myocardial infarction     Chronic combined systolic and diastolic congestive heart failure (H)     Essential hypertension, benign     Type 2 diabetes mellitus with stage 3 chronic kidney disease, without long-term current use of insulin (H)                                                                                                                                                                                                                   Moderate persistent asthma without complication     Prostate cancer (H)     Mixed hyperlipidemia     Overweight (BMI 25.0-29.9)     Degenerative arthritis of knee     Aftercare following knee joint replacement surgery, unspecified laterality     Anemia due to blood loss, acute     Shingles     Acute kidney injury (H)     Physical deconditioning     Post herpetic neuralgia     Mild persistent asthma with acute exacerbation     Past Surgical History:   Procedure Laterality Date     ARTHROPLASTY KNEE Left 10/5/2016    Procedure: ARTHROPLASTY KNEE;  Surgeon: Keshav Zamudio MD;  Location: [facility] OR     CARDIAC SURGERY  12/03/2012    pacemaker ; CABG 1998     CHOLECYSTECTOMY       COLONOSCOPY       ENT SURGERY  5-    sinus     EXTRACAPSULAR CATARACT EXTRATION WITH INTRAOCULAR LENS IMPLANT       GENITOURINARY SURGERY      prostatectomy     IMPLANT PACEMAKER  12-3-12    [facility]     PROSTATE SURGERY         Social History   Substance Use Topics     Smoking status: Never Smoker     Smokeless tobacco: Never Used     Alcohol use No     Family History   Problem Relation Age of Onset     CEREBROVASCULAR DISEASE Father      HEART DISEASE Father      HEART DISEASE Brother      Depression Daughter      raped in high school     Unknown/Adopted Daughter      Unknown/Adopted Daughter      Unknown/Adopted Maternal Grandmother      Unknown/Adopted Maternal Grandfather      Unknown/Adopted Paternal Grandmother      Unknown/Adopted Paternal Grandfather      DIABETES No family hx of      Coronary Artery Disease No family hx of      Hypertension No family hx of      Hyperlipidemia No family hx of      Breast Cancer No family hx of      Colon Cancer No family hx of      Prostate Cancer No family hx of      Other Cancer No family hx of      Anxiety Disorder No family hx of      MENTAL ILLNESS No  family hx of      Substance Abuse No family hx of      Anesthesia Reaction No family hx of      Asthma No family hx of      OSTEOPOROSIS No family hx of      Genetic Disorder No family hx of      Thyroid Disease No family hx of      Obesity No family hx of              Reviewed and updated as needed this visit by clinical staffTobacco  Allergies  Meds  Med Hx  Surg Hx  Fam Hx  Soc Hx      Reviewed and updated as needed this visit by Provider         ROS:  Constitutional, neuro, ENT, endocrine, pulmonary, cardiac, gastrointestinal, genitourinary, musculoskeletal, integument and psychiatric systems are negative, except as otherwise noted.  INTEGUMENTARY/SKIN: NEGATIVE for worrisome rashes, moles or lesions  ENT/MOUTH: POSITIVE for hearing loss, Hx sinus infections and abnormal taste in the mouth  RESP:NEGATIVE for significant cough or SOB  CV: NEGATIVE for chest pain, palpitations or peripheral edema      OBJECTIVE:                                                    /70 (BP Location: Right arm, Cuff Size: Adult Large)  Pulse 75  Temp 97.8  F (36.6  C) (Tympanic)  Resp 16  Wt 186 lb (84.4 kg)  SpO2 95%  BMI 26.69 kg/m2  Body mass index is 26.69 kg/(m^2).  GENERAL APPEARANCE: healthy, alert and no distress  HENT: ear canals and TM's normal, nose and mouth without ulcers or lesions and there is minimal cerumen in both ears which to my reading would not affect his hearing.  TMs EACs are otherwise normal.  SKIN: no suspicious lesions or rashes and there is marked improvement of his stasis dermatitis in his lower extremities.  He does need refill on his Kenalog cream.       ASSESSMENT/PLAN:                                                        ICD-10-CM    1. Abnormal taste in mouth R43.2 OTOLARYNGOLOGY REFERRAL   2. Stasis dermatitis of both legs I87.2 triamcinolone (KENALOG) 0.1 % cream   3. Decreased hearing of left ear H91.92 OTOLARYNGOLOGY REFERRAL       Patient Instructions   I will refer the  patient to ear nose and throat.  He's been a little unhappy with his current ENT so we set our sending him to [facility] [place].  He's going to get a list of the antibiotics is benign from the last 3 years from the current ENT so he can take it for a second opinion to the next ENT.  I refilled his Kenalog cream which is working very well for his stasis dermatitis in his lower extremities.  He will try to make sure that he stays very well hydrated and also adding sugar-free lemon drops to suck on during the day to see if the abnormal taste in his mouth goes away.  I did not find anything physically today that would be causing that.  He has a complete physical scheduled for next week.  We will follow-up at that time.      Eliezer Shah MD  [facility]

## 2017-09-12 ENCOUNTER — OFFICE VISIT (OUTPATIENT)
Dept: FAMILY MEDICINE | Facility: CLINIC | Age: 82
End: 2017-09-12
Payer: COMMERCIAL

## 2017-09-12 VITALS
HEIGHT: 70 IN | RESPIRATION RATE: 16 BRPM | TEMPERATURE: 98 F | SYSTOLIC BLOOD PRESSURE: 130 MMHG | DIASTOLIC BLOOD PRESSURE: 78 MMHG | WEIGHT: 180 LBS | OXYGEN SATURATION: 98 % | BODY MASS INDEX: 25.77 KG/M2 | HEART RATE: 78 BPM

## 2017-09-12 DIAGNOSIS — E78.2 MIXED HYPERLIPIDEMIA: ICD-10-CM

## 2017-09-12 DIAGNOSIS — N18.30 TYPE 2 DIABETES MELLITUS WITH STAGE 3 CHRONIC KIDNEY DISEASE, WITHOUT LONG-TERM CURRENT USE OF INSULIN (H): ICD-10-CM

## 2017-09-12 DIAGNOSIS — J45.40 MODERATE PERSISTENT ASTHMA WITHOUT COMPLICATION: ICD-10-CM

## 2017-09-12 DIAGNOSIS — Z00.00 ROUTINE MEDICAL EXAM: Primary | ICD-10-CM

## 2017-09-12 DIAGNOSIS — I71.40 ABDOMINAL AORTIC ANEURYSM (AAA) WITHOUT RUPTURE (H): ICD-10-CM

## 2017-09-12 DIAGNOSIS — B02.29 POST HERPETIC NEURALGIA: ICD-10-CM

## 2017-09-12 DIAGNOSIS — I10 ESSENTIAL HYPERTENSION, BENIGN: ICD-10-CM

## 2017-09-12 DIAGNOSIS — E11.22 TYPE 2 DIABETES MELLITUS WITH STAGE 3 CHRONIC KIDNEY DISEASE, WITHOUT LONG-TERM CURRENT USE OF INSULIN (H): ICD-10-CM

## 2017-09-12 DIAGNOSIS — K21.9 GASTROESOPHAGEAL REFLUX DISEASE WITHOUT ESOPHAGITIS: ICD-10-CM

## 2017-09-12 DIAGNOSIS — I42.9 CARDIOMYOPATHY, UNSPECIFIED TYPE (H): ICD-10-CM

## 2017-09-12 DIAGNOSIS — H91.13 PRESBYCUSIS OF BOTH EARS: ICD-10-CM

## 2017-09-12 LAB
ALBUMIN SERPL-MCNC: 3.5 G/DL (ref 3.4–5)
ALBUMIN UR-MCNC: NEGATIVE MG/DL
ALP SERPL-CCNC: 115 U/L (ref 40–150)
ALT SERPL W P-5'-P-CCNC: 70 U/L (ref 0–70)
ANION GAP SERPL CALCULATED.3IONS-SCNC: 8 MMOL/L (ref 3–14)
APPEARANCE UR: CLEAR
AST SERPL W P-5'-P-CCNC: 38 U/L (ref 0–45)
BASOPHILS # BLD AUTO: 0 10E9/L (ref 0–0.2)
BASOPHILS NFR BLD AUTO: 0.1 %
BILIRUB SERPL-MCNC: 1 MG/DL (ref 0.2–1.3)
BILIRUB UR QL STRIP: NEGATIVE
BUN SERPL-MCNC: 45 MG/DL (ref 7–30)
CALCIUM SERPL-MCNC: 9 MG/DL (ref 8.5–10.1)
CHLORIDE SERPL-SCNC: 102 MMOL/L (ref 94–109)
CHOLEST SERPL-MCNC: 153 MG/DL
CO2 SERPL-SCNC: 27 MMOL/L (ref 20–32)
COLOR UR AUTO: YELLOW
CREAT SERPL-MCNC: 1.6 MG/DL (ref 0.66–1.25)
DIFFERENTIAL METHOD BLD: ABNORMAL
EOSINOPHIL # BLD AUTO: 0 10E9/L (ref 0–0.7)
EOSINOPHIL NFR BLD AUTO: 0.1 %
ERYTHROCYTE [DISTWIDTH] IN BLOOD BY AUTOMATED COUNT: 14.9 % (ref 10–15)
GFR SERPL CREATININE-BSD FRML MDRD: 41 ML/MIN/1.7M2
GLUCOSE SERPL-MCNC: 92 MG/DL (ref 70–99)
GLUCOSE UR STRIP-MCNC: NEGATIVE MG/DL
HCT VFR BLD AUTO: 47.8 % (ref 40–53)
HDLC SERPL-MCNC: 42 MG/DL
HGB BLD-MCNC: 16.2 G/DL (ref 13.3–17.7)
HGB UR QL STRIP: NEGATIVE
KETONES UR STRIP-MCNC: NEGATIVE MG/DL
LDLC SERPL CALC-MCNC: 84 MG/DL
LEUKOCYTE ESTERASE UR QL STRIP: NEGATIVE
LYMPHOCYTES # BLD AUTO: 1.8 10E9/L (ref 0.8–5.3)
LYMPHOCYTES NFR BLD AUTO: 12.6 %
MCH RBC QN AUTO: 32.1 PG (ref 26.5–33)
MCHC RBC AUTO-ENTMCNC: 33.9 G/DL (ref 31.5–36.5)
MCV RBC AUTO: 95 FL (ref 78–100)
MONOCYTES # BLD AUTO: 1 10E9/L (ref 0–1.3)
MONOCYTES NFR BLD AUTO: 7 %
NEUTROPHILS # BLD AUTO: 11.4 10E9/L (ref 1.6–8.3)
NEUTROPHILS NFR BLD AUTO: 80.2 %
NITRATE UR QL: NEGATIVE
NONHDLC SERPL-MCNC: 111 MG/DL
PH UR STRIP: 5.5 PH (ref 5–7)
PLATELET # BLD AUTO: 220 10E9/L (ref 150–450)
POTASSIUM SERPL-SCNC: 4.3 MMOL/L (ref 3.4–5.3)
PROT SERPL-MCNC: 7.9 G/DL (ref 6.8–8.8)
RBC # BLD AUTO: 5.04 10E12/L (ref 4.4–5.9)
RBC #/AREA URNS AUTO: NORMAL /HPF
SODIUM SERPL-SCNC: 137 MMOL/L (ref 133–144)
SOURCE: NORMAL
SP GR UR STRIP: 1.02 (ref 1–1.03)
TRIGL SERPL-MCNC: 134 MG/DL
UROBILINOGEN UR STRIP-ACNC: 0.2 EU/DL (ref 0.2–1)
WBC # BLD AUTO: 14.2 10E9/L (ref 4–11)
WBC #/AREA URNS AUTO: NORMAL /HPF

## 2017-09-12 PROCEDURE — 36415 COLL VENOUS BLD VENIPUNCTURE: CPT | Performed by: FAMILY MEDICINE

## 2017-09-12 PROCEDURE — 80061 LIPID PANEL: CPT | Performed by: FAMILY MEDICINE

## 2017-09-12 PROCEDURE — 81001 URINALYSIS AUTO W/SCOPE: CPT | Performed by: FAMILY MEDICINE

## 2017-09-12 PROCEDURE — 80053 COMPREHEN METABOLIC PANEL: CPT | Performed by: FAMILY MEDICINE

## 2017-09-12 PROCEDURE — 85025 COMPLETE CBC W/AUTO DIFF WBC: CPT | Performed by: FAMILY MEDICINE

## 2017-09-12 PROCEDURE — 99397 PER PM REEVAL EST PAT 65+ YR: CPT | Performed by: FAMILY MEDICINE

## 2017-09-12 NOTE — NURSING NOTE
"Chief Complaint   Patient presents with     Physical       Initial /78  Pulse 78  Temp 98  F (36.7  C) (Tympanic)  Resp 16  Ht 5' 10\" (1.778 m)  Wt 180 lb (81.6 kg)  SpO2 98%  BMI 25.83 kg/m2 Estimated body mass index is 25.83 kg/(m^2) as calculated from the following:    Height as of this encounter: 5' 10\" (1.778 m).    Weight as of this encounter: 180 lb (81.6 kg).  Medication Reconciliation: complete     Ellen Mckeon CMA      "

## 2017-09-12 NOTE — LETTER
September 16, 2017      Abbe Lambert  [address] [facility] [address]        Dear ,    We are writing to inform you of your test results.    Your test results fall within the expected range(s) or remain unchanged from previous results.  Please continue with current treatment plan.    Resulted Orders   UA with Microscopic   Result Value Ref Range    Color Urine Yellow     Appearance Urine Clear     Glucose Urine Negative NEG^Negative mg/dL    Bilirubin Urine Negative NEG^Negative    Ketones Urine Negative NEG^Negative mg/dL    Specific Gravity Urine 1.020 1.003 - 1.035    pH Urine 5.5 5.0 - 7.0 pH    Protein Albumin Urine Negative NEG^Negative mg/dL    Urobilinogen Urine 0.2 0.2 - 1.0 EU/dL    Nitrite Urine Negative NEG^Negative    Blood Urine Negative NEG^Negative    Leukocyte Esterase Urine Negative NEG^Negative    Source Midstream Urine     WBC Urine O - 2 OTO2^O - 2 /HPF    RBC Urine O - 2 OTO2^O - 2 /HPF   CBC with platelets differential   Result Value Ref Range    WBC 14.2 (H) 4.0 - 11.0 10e9/L    RBC Count 5.04 4.4 - 5.9 10e12/L    Hemoglobin 16.2 13.3 - 17.7 g/dL    Hematocrit 47.8 40.0 - 53.0 %    MCV 95 78 - 100 fl    MCH 32.1 26.5 - 33.0 pg    MCHC 33.9 31.5 - 36.5 g/dL    RDW 14.9 10.0 - 15.0 %    Platelet Count 220 150 - 450 10e9/L    Diff Method Automated Method     % Neutrophils 80.2 %    % Lymphocytes 12.6 %    % Monocytes 7.0 %    % Eosinophils 0.1 %    % Basophils 0.1 %    Absolute Neutrophil 11.4 (H) 1.6 - 8.3 10e9/L    Absolute Lymphocytes 1.8 0.8 - 5.3 10e9/L    Absolute Monocytes 1.0 0.0 - 1.3 10e9/L    Absolute Eosinophils 0.0 0.0 - 0.7 10e9/L    Absolute Basophils 0.0 0.0 - 0.2 10e9/L   Comprehensive metabolic panel   Result Value Ref Range    Sodium 137 133 - 144 mmol/L    Potassium 4.3 3.4 - 5.3 mmol/L    Chloride 102 94 - 109 mmol/L    Carbon Dioxide 27 20 - 32 mmol/L    Anion Gap 8 3 - 14 mmol/L    Glucose 92 70 - 99 mg/dL      Comment:      Fasting specimen     Urea Nitrogen 45 (H) 7 - 30 mg/dL    Creatinine 1.60 (H) 0.66 - 1.25 mg/dL    GFR Estimate 41 (L) >60 mL/min/1.7m2      Comment:      Non  GFR Calc    GFR Estimate If Black 50 (L) >60 mL/min/1.7m2      Comment:       GFR Calc    Calcium 9.0 8.5 - 10.1 mg/dL    Bilirubin Total 1.0 0.2 - 1.3 mg/dL    Albumin 3.5 3.4 - 5.0 g/dL    Protein Total 7.9 6.8 - 8.8 g/dL    Alkaline Phosphatase 115 40 - 150 U/L    ALT 70 0 - 70 U/L    AST 38 0 - 45 U/L   Lipid Profile   Result Value Ref Range    Cholesterol 153 <200 mg/dL    Triglycerides 134 <150 mg/dL      Comment:      Fasting specimen    HDL Cholesterol 42 >39 mg/dL    LDL Cholesterol Calculated 84 <100 mg/dL      Comment:      Desirable:       <100 mg/dl    Non HDL Cholesterol 111 <130 mg/dL       If you have any questions or concerns, please call the clinic at the number listed above.       Sincerely,        Eliezer Shah MD

## 2017-09-12 NOTE — PROGRESS NOTES
SUBJECTIVE:   Abbe Lambert is a 83 year old male who presents for Preventive Visit.  click delete button to remove this line now  click delete button to remove this line now  Are you in the first 12 months of your Medicare coverage?  No    Physical   Annual:     Getting at least 3 servings of Calcium per day::  Yes    Bi-annual eye exam::  Yes    Dental care twice a year::  Yes    Sleep apnea or symptoms of sleep apnea::  None    Diet::  Regular (no restrictions)    Frequency of exercise::  None    Taking medications regularly::  Yes    Medication side effects::  None    Additional concerns today::  YES      COGNITIVE SCREEN  1) Repeat 3 items (Banana, Sunrise, Chair)   2) Clock draw: NORMAL  3) 3 item recall: Recalls 1 object   Results: NORMAL clock, 1-2 items recalled: COGNITIVE IMPAIRMENT LESS LIKELY    Mini-CogTM Copyright S Milton. Licensed by the author for use in [facility]; reprinted with permission ([email or web address]). All rights reserved.          Reviewed and updated as needed this visit by clinical staffTobacco  Allergies  Meds  Med Hx  Surg Hx  Fam Hx  Soc Hx        Reviewed and updated as needed this visit by Provider        Social History   Substance Use Topics     Smoking status: Never Smoker     Smokeless tobacco: Never Used     Alcohol use No       The patient does not drink >3 drinks per day nor >7 drinks per week.        Today's PHQ-2 Score:   PHQ-2 ( 1999 [employer]) 9/12/2017   Q1: Little interest or pleasure in doing things 0   Q2: Feeling down, depressed or hopeless 0   PHQ-2 Score 0   Q1: Little interest or pleasure in doing things Not at all   Q2: Feeling down, depressed or hopeless Not at all   PHQ-2 Score 0       Do you feel safe in your environment - Yes    Do you have a Health Care Directive?: Yes: Advance Directive has been received and scanned.    Current providers sharing in care for this patient include:   Patient Care Team:  Eliezer Shah MD as PCP - General  (Family Practice)  Anne Nova, RN as       Hearing impairment: Yes, working on getting hearing aides    Ability to successfully perform activities of daily living: Yes, no assistance needed     Fall risk:  Fallen 2 or more times in the past year?: No  Any fall with injury in the past year?: No      Home safety:  none identified  click delete button to remove this line now    The following health maintenance items are reviewed in Epic and correct as of today:  Health Maintenance   Topic Date Due     EYE EXAM Q1 YEAR  10/22/2014     ASTHMA ACTION PLAN Q1 YR  07/12/2017     BMP Q6 MOS  08/14/2017     MICROALBUMIN Q1 YEAR  09/08/2017     INFLUENZA VACCINE (SYSTEM ASSIGNED)  09/01/2017     LIPID MONITORING Q1 YEAR  09/20/2017     ASTHMA CONTROL TEST Q3 MOS  10/07/2017     A1C Q6 MO  10/12/2017     ALT Q1 YR  11/03/2017     HEMOGLOBIN Q1 YR  12/19/2017     CBC Q1 YR  12/19/2017     FOOT EXAM Q1 YEAR  02/07/2018     BMP Q1 YR  02/14/2018     CREATININE Q1 YEAR  04/12/2018     FALL RISK ASSESSMENT  08/21/2018     ADVANCE DIRECTIVE PLANNING Q5 YRS  10/18/2018     TSH W/ FREE T4 REFLEX Q2 YEAR  12/05/2018     HF ACTION PLAN Q3 YR  07/12/2019     TETANUS IMMUNIZATION (SYSTEM ASSIGNED)  05/17/2023     PNEUMOCOCCAL  Completed     Patient Active Problem List   Diagnosis     Health Care Home     Allergic rhinitis     Edema     Polymyalgia rheumatica (H)     Advanced directives, counseling/discussion     Cardiomyopathy (H)     Atrial fibrillation (H)     Coronary artery disease     Intermittent lightheadedness     CKD (chronic kidney disease) stage 3, GFR 30-59 ml/min     GERD (gastroesophageal reflux disease)     Tachy-alix syndrome (H)     AAA (abdominal aortic aneurysm) (H)     Old myocardial infarction     Chronic combined systolic and diastolic congestive heart failure (H)     Essential hypertension, benign     Type 2 diabetes mellitus with stage 3 chronic kidney disease, without long-term current use of  insulin (H)                                                                                                                                                                                                                  Moderate persistent asthma without complication     Prostate cancer (H)     Mixed hyperlipidemia     Overweight (BMI 25.0-29.9)     Degenerative arthritis of knee     Aftercare following knee joint replacement surgery, unspecified laterality     Anemia due to blood loss, acute     Shingles     Acute kidney injury (H)     Physical deconditioning     Post herpetic neuralgia     Mild persistent asthma with acute exacerbation     Presbycusis of both ears     Past Surgical History:   Procedure Laterality Date     ARTHROPLASTY KNEE Left 10/5/2016    Procedure: ARTHROPLASTY KNEE;  Surgeon: Keshav Zamudio MD;  Location: [facility] OR     CARDIAC SURGERY  12/03/2012    pacemaker ; CABG 1998     CHOLECYSTECTOMY       COLONOSCOPY       ENT SURGERY  5-    sinus     EXTRACAPSULAR CATARACT EXTRATION WITH INTRAOCULAR LENS IMPLANT       GENITOURINARY SURGERY      prostatectomy     IMPLANT PACEMAKER  12-3-12    [facility]     PROSTATE SURGERY         Social History   Substance Use Topics     Smoking status: Never Smoker     Smokeless tobacco: Never Used     Alcohol use No     Family History   Problem Relation Age of Onset     CEREBROVASCULAR DISEASE Father      HEART DISEASE Father      HEART DISEASE Brother      Coronary Artery Disease Brother      MI age 50     Depression Daughter      raped in high school     Unknown/Adopted Maternal Grandmother      Unknown/Adopted Maternal Grandfather      Unknown/Adopted Paternal Grandmother      Unknown/Adopted Paternal Grandfather      DIABETES No family hx of      Hypertension No family hx of      Hyperlipidemia No family hx of      Breast Cancer No family hx of      Colon Cancer No family hx of      Prostate Cancer No family hx of      Other Cancer No family hx of       "Anxiety Disorder No family hx of      MENTAL ILLNESS No family hx of      Substance Abuse No family hx of      Anesthesia Reaction No family hx of      Asthma No family hx of      OSTEOPOROSIS No family hx of      Genetic Disorder No family hx of      Thyroid Disease No family hx of      Obesity No family hx of                  ROS:  C: NEGATIVE for fever, chills, change in weight  I: NEGATIVE for worrisome rashes, moles or lesions  E: NEGATIVE for vision changes or irritation  E/M: NEGATIVE for ear, mouth and throat problems  R: NEGATIVE for significant cough or SOB  B: NEGATIVE for masses, tenderness or discharge  CV: NEGATIVE for chest pain, palpitations or peripheral edema  GI: NEGATIVE for nausea, abdominal pain, heartburn, or change in bowel habits  : NEGATIVE for frequency, dysuria, or hematuria  M: NEGATIVE for significant arthralgias or myalgia  N: NEGATIVE for weakness, dizziness or paresthesias  E: NEGATIVE for temperature intolerance, skin/hair changes  H: NEGATIVE for bleeding problems  P: NEGATIVE for changes in mood or affect    OBJECTIVE:   /78  Pulse 78  Temp 98  F (36.7  C) (Tympanic)  Resp 16  Ht 5' 10\" (1.778 m)  Wt 180 lb (81.6 kg)  SpO2 98%  BMI 25.83 kg/m2 Estimated body mass index is 25.83 kg/(m^2) as calculated from the following:    Height as of this encounter: 5' 10\" (1.778 m).    Weight as of this encounter: 180 lb (81.6 kg).  EXAM:   GENERAL: healthy, alert and no distress  EYES: Eyes grossly normal to inspection, PERRL and conjunctivae and sclerae normal  HENT: ear canals and TM's normal, nose and mouth without ulcers or lesions  NECK: no adenopathy, no asymmetry, masses, or scars and thyroid normal to palpation  RESP: lungs clear to auscultation - no rales, rhonchi or wheezes  CV: regular rate and rhythm, normal S1 S2, no S3 or S4, no murmur, click or rub, no peripheral edema and peripheral pulses strong  ABDOMEN: soft, nontender, no hepatosplenomegaly, no masses and " "bowel sounds normal   (male): normal male genitalia without lesions or urethral discharge, no hernia  MS: no gross musculoskeletal defects noted, no edema  SKIN: no suspicious lesions or rashes  NEURO: Normal strength and tone, mentation intact and speech normal  PSYCH: mentation appears normal, affect normal/bright  LYMPH: no cervical, supraclavicular, axillary, or inguinal adenopathy    ASSESSMENT / PLAN:       ICD-10-CM    1. Routine medical exam Z00.00 UA with Microscopic     Comprehensive metabolic panel   2. Moderate persistent asthma without complication J45.40    3. Post herpetic neuralgia B02.29    4. Gastroesophageal reflux disease without esophagitis K21.9 CBC with platelets differential   5. Mixed hyperlipidemia E78.2 Lipid Profile   6. Presbycusis of both ears H91.13    7. Type 2 diabetes mellitus with stage 3 chronic kidney disease, without long-term current use of insulin (H) E11.22     N18.3    8. Essential hypertension, benign I10    9. Abdominal aortic aneurysm (AAA) without rupture (H) I71.4    10. Cardiomyopathy, unspecified type (H) I42.9        End of Life Planning:  Patient currently has an advanced directive: Yes.  Practitioner is supportive of decision.    COUNSELING:  Reviewed preventive health counseling, as reflected in patient instructions       Regular exercise        Estimated body mass index is 25.83 kg/(m^2) as calculated from the following:    Height as of this encounter: 5' 10\" (1.778 m).    Weight as of this encounter: 180 lb (81.6 kg).     reports that he has never smoked. He has never used smokeless tobacco.        Appropriate preventive services were discussed with this patient, including applicable screening as appropriate for cardiovascular disease, diabetes, osteopenia/osteoporosis, and glaucoma.  As appropriate for age/gender, discussed screening for colorectal cancer, prostate cancer, breast cancer, and cervical cancer. Checklist reviewing preventive services available " has been given to the patient.    Reviewed patients plan of care and provided an AVS. The Basic Care Plan (routine screening as documented in Health Maintenance) for Abbe meets the Care Plan requirement. This Care Plan has been established and reviewed with the Patient.    Counseling Resources:  ATP IV Guidelines  Pooled Cohorts Equation Calculator  Breast Cancer Risk Calculator  FRAX Risk Assessment  ICSI Preventive Guidelines  Dietary Guidelines for Americans, 2010  [employer]'s MyPlate  ASA Prophylaxis  Lung CA Screening    Eliezer Shah MD  [facility] XERXESAnswers for HPI/ROS submitted by the patient on 9/12/2017   PHQ-2 Score: 0

## 2017-09-12 NOTE — MR AVS SNAPSHOT
After Visit Summary   9/12/2017    Abbe Lambert    MRN: [id number]           Patient Information     Date Of Birth          2/3/1934        Visit Information        Provider Department      9/12/2017 8:30 AM Eliezer Shah MD [facility]        Today's Diagnoses     Routine medical exam    -  1    Moderate persistent asthma without complication        Post herpetic neuralgia        Gastroesophageal reflux disease without esophagitis        Mixed hyperlipidemia        Presbycusis of both ears        Type 2 diabetes mellitus with stage 3 chronic kidney disease, without long-term current use of insulin (H)        Essential hypertension, benign        Abdominal aortic aneurysm (AAA) without rupture (H)        Cardiomyopathy, unspecified type (H)          Care Instructions      Preventive Health Recommendations:   Male Ages 65 and over    Yearly exam:             See your health care provider every year in order to  o   Review health changes.   o   Discuss preventive care.    o   Review your medicines if your doctor has prescribed any.    Talk with your health care provider about whether you should have a test to screen for prostate cancer (PSA).    Every 3 years, have a diabetes test (fasting glucose). If you are at risk for diabetes, you should have this test more often.    Every 5 years, have a cholesterol test. Have this test more often if you are at risk for high cholesterol or heart disease.     Every 10 years, have a colonoscopy. Or, have a yearly FIT test (stool test). These exams will check for colon cancer.    Talk to with your health care provider about screening for Abdominal Aortic Aneurysm if you have a family history of AAA or have a history of smoking.    Shots:     Get a flu shot each year.     Get a tetanus shot every 10 years.     Talk to your doctor about your pneumonia vaccines. There are now two you should receive - Pneumovax (PPSV 23) and  Prevnar (PCV 13).     Talk to your doctor about a shingles vaccine.     Talk to your doctor about the hepatitis B vaccine.  Nutrition:     Eat at least 5 servings of fruits and vegetables each day.     Eat whole-grain bread, whole-wheat pasta and brown rice instead of white grains and rice.     Talk to your provider about Calcium and Vitamin D.   Lifestyle    Exercise for at least 150 minutes a week (30 minutes a day, 5 days a week). This will help you control your weight and prevent disease.     Limit alcohol to one drink per day.     No smoking.     Wear sunscreen to prevent skin cancer.     See your dentist every six months for an exam and cleaning.     See your eye doctor every 1 to 2 years to screen for conditions such as glaucoma, macular degeneration, cataracts, etc           Follow-ups after your visit        Your next 10 appointments already scheduled     Sep 21, 2017  8:30 AM CDT   LAB with [facility]   [facility]ART [facility]    [address] [place] [id number]   [phone]           Patient must bring picture ID. Patient should be prepared to give a urine specimen  Please do not eat 10-12 hours before your appointment if you are coming in fasting for labs on lipids, cholesterol, or glucose (sugar). Pregnant women should follow their Care Team instructions. Water with medications is okay. Do not drink coffee or other fluids. If you have concerns about taking  your medications, please ask at office or if scheduling via [employer], send a message by clicking on Secure Messaging, Message Your Care Team.            Sep 21, 2017  9:45 AM CDT   Return Visit with Brandon Eli MD   [facility] HEART [facility]    [address] [place] [id number]   [phone]            Nov 07, 2017  2:30 PM CST   Remote PPM Check with [facility] TECH1   [facility] HEART   [place] [facility]    [address] [place], [place] [address]  [place] [address] [phone]           This appointment is for a remote check of your pacemaker.  This is not an appointment at the office.              Who to contact     If you have questions or need follow up information about today's clinic visit or your schedule please contact [facility] directly at [phone].  Normal or non-critical lab and imaging results will be communicated to you by [employer], letter or phone within 4 business days after the clinic has received the results. If you do not hear from us within 7 days, please contact the clinic through [employer] or phone. If you have a critical or abnormal lab result, we will notify you by phone as soon as possible.  Submit refill requests through [employer] or call your pharmacy and they will forward the refill request to us. Please allow 3 business days for your refill to be completed.          Additional Information About Your Visit        [employer] Information     [employer] lets you send messages to your doctor, view your test results, renew your prescriptions, schedule appointments and more. To sign up, go to [email or web address] . Click on \"Log in\" on the left side of the screen, which will take you to the Welcome page. Then click on \"Sign up Now\" on the right side of the page.     You will be asked to enter the access code listed below, as well as some personal information. Please follow the directions to create your username and password.     Your access code is: [email or web address]  Expires: 10/30/2017  4:47 PM     Your access code will  in 90 days. If you need help or a new code, please call your [facility] clinic or [phone].        Care EveryWhere ID     This is your Care EveryWhere ID. This could be used by other organizations to access your [facility] medical records  [id number]        Your Vitals Were     Pulse Temperature Respirations Height Pulse " "Oximetry BMI (Body Mass Index)    78 98  F (36.7  C) (Tympanic) 16 5' 10\" (1.778 m) 98% 25.83 kg/m2       Blood Pressure from Last 3 Encounters:   09/12/17 130/78   09/06/17 110/70   08/21/17 120/70    Weight from Last 3 Encounters:   09/12/17 180 lb (81.6 kg)   09/06/17 186 lb (84.4 kg)   08/21/17 189 lb (85.7 kg)              We Performed the Following     CBC with platelets differential     Comprehensive metabolic panel     Lipid Profile     UA with Microscopic        Primary Care Provider Office Phone # Fax #    Eliezer Shah [phone]       [address] [facility] [address]        Equal Access to Services     JIM AHN : Hadii qamar hawkins hadasho Soomaali, waaxda luqadaha, qaybta kaalmada adeegyada, skylar cottonin hayaime franco . So [facility] [phone].    ATENCIÓN: Si habla español, tiene a monge disposición servicios gratuitos de asistencia lingüística. Llame al [phone].    We comply with applicable federal civil rights laws and [place] laws. We do not discriminate on the basis of race, color, national origin, age, disability sex, sexual orientation or gender identity.            Thank you!     Thank you for choosing [facility]  for your care. Our goal is always to provide you with excellent care. Hearing back from our patients is one way we can continue to improve our services. Please take a few minutes to complete the written survey that you may receive in the mail after your visit with us. Thank you!             Your Updated Medication List - Protect others around you: Learn how to safely use, store and throw away your medicines at www.disposemymeds.org.          This list is accurate as of: 9/12/17  4:14 PM.  Always use your most recent med list.                   Brand Name Dispense Instructions for use Diagnosis    ACE/ARB NOT PRESCRIBED (INTENTIONAL)      ACE & ARB not prescribed due to Disease of aortic or mitral valves    " Aortic valve disorders, Cardiomyopathy (H)       albuterol 108 (90 BASE) MCG/ACT Inhaler    PROAIR HFA/PROVENTIL HFA/VENTOLIN HFA    3 Inhaler    Inhale 2 puffs into the lungs as needed for shortness of breath / dyspnea or wheezing    Mild persistent asthma without complication       aspirin 81 MG tablet     30 tablet    Take 81 mg by mouth daily    CAD (coronary artery disease)       ASTELIN 0.1 % spray   Generic drug:  azelastine      Spray 1 spray into both nostrils 2 times daily.        atorvastatin 40 MG tablet    LIPITOR    90 tablet    TAKE 1 TABLET BY MOUTH DAILY    Mixed hyperlipidemia       beclomethasone 80 MCG/ACT Inhaler    QVAR    2 Inhaler    Inhale 2 puffs into the lungs 2 times daily    Moderate persistent asthma without complication       carvedilol 3.125 MG tablet    COREG    180 tablet    TAKE 1 TABLET(3.125 MG) BY MOUTH TWICE DAILY WITH MEALS    Essential hypertension       cetirizine 10 MG tablet    zyrTEC     Take 10 mg by mouth every morning        clopidogrel 75 MG tablet    PLAVIX    90 tablet    Take 1 tablet (75 mg) by mouth daily    Atrial fibrillation, unspecified type (H)       isosorbide mononitrate 30 MG 24 hr tablet    IMDUR    90 tablet    TAKE 1 TABLET BY MOUTH EVERY DAY    Coronary artery disease involving native coronary artery of native heart without angina pectoris       NASONEX 50 MCG/ACT spray   Generic drug:  mometasone      Spray 2 sprays into both nostrils daily as needed.        nitroGLYcerin 0.4 MG sublingual tablet    NITROSTAT    25 tablet    Place 1 tablet (0.4 mg) under the tongue every 5 minutes as needed for chest pain    Chest pain on exertion       polyethylene glycol Packet    MIRALAX/GLYCOLAX     Take 17 g by mouth daily        potassium chloride SA 20 MEQ CR tablet    K-DUR/KLOR-CON M    30 tablet    Take 2 pills today, one pill tomorrow then one pill daily ONLY when you take the Demadex    Chronic combined systolic and diastolic congestive heart failure (H)        torsemide 20 MG tablet    DEMADEX    30 tablet    Take 1 tablet (20 mg) by mouth daily as needed    Chronic combined systolic and diastolic congestive heart failure (H)       triamcinolone 0.1 % cream    KENALOG    45 g    Apply sparingly to affected area two times daily as needed    Stasis dermatitis of both legs

## 2017-09-13 ASSESSMENT — ASTHMA QUESTIONNAIRES: ACT_TOTALSCORE: 21

## 2017-09-19 ENCOUNTER — TRANSFERRED RECORDS (OUTPATIENT)
Dept: HEALTH INFORMATION MANAGEMENT | Facility: CLINIC | Age: 82
End: 2017-09-19

## 2017-09-21 ENCOUNTER — OFFICE VISIT (OUTPATIENT)
Dept: CARDIOLOGY | Facility: CLINIC | Age: 82
End: 2017-09-21
Attending: NURSE PRACTITIONER
Payer: COMMERCIAL

## 2017-09-21 VITALS
SYSTOLIC BLOOD PRESSURE: 134 MMHG | WEIGHT: 181.8 LBS | BODY MASS INDEX: 26.03 KG/M2 | HEIGHT: 70 IN | DIASTOLIC BLOOD PRESSURE: 79 MMHG | HEART RATE: 69 BPM

## 2017-09-21 DIAGNOSIS — I10 ESSENTIAL HYPERTENSION, BENIGN: ICD-10-CM

## 2017-09-21 DIAGNOSIS — I71.40 ABDOMINAL AORTIC ANEURYSM (AAA) WITHOUT RUPTURE (H): Primary | ICD-10-CM

## 2017-09-21 DIAGNOSIS — E66.3 OVERWEIGHT (BMI 25.0-29.9): ICD-10-CM

## 2017-09-21 DIAGNOSIS — I25.10 CORONARY ARTERY DISEASE INVOLVING NATIVE CORONARY ARTERY OF NATIVE HEART WITHOUT ANGINA PECTORIS: ICD-10-CM

## 2017-09-21 DIAGNOSIS — I48.0 PAROXYSMAL ATRIAL FIBRILLATION (H): Chronic | ICD-10-CM

## 2017-09-21 DIAGNOSIS — E78.2 MIXED HYPERLIPIDEMIA: ICD-10-CM

## 2017-09-21 DIAGNOSIS — I50.42 CHRONIC COMBINED SYSTOLIC AND DIASTOLIC CONGESTIVE HEART FAILURE (H): ICD-10-CM

## 2017-09-21 DIAGNOSIS — I49.5 TACHY-BRADY SYNDROME (H): ICD-10-CM

## 2017-09-21 DIAGNOSIS — I25.5 ISCHEMIC CARDIOMYOPATHY: Chronic | ICD-10-CM

## 2017-09-21 DIAGNOSIS — R60.0 PERIPHERAL EDEMA: Chronic | ICD-10-CM

## 2017-09-21 PROCEDURE — 99215 OFFICE O/P EST HI 40 MIN: CPT | Performed by: INTERNAL MEDICINE

## 2017-09-21 RX ORDER — ROSUVASTATIN CALCIUM 40 MG/1
40 TABLET, COATED ORAL DAILY
Qty: 90 TABLET | Refills: 3 | Status: SHIPPED | OUTPATIENT
Start: 2017-09-21 | End: 2018-11-20

## 2017-09-21 NOTE — PROGRESS NOTES
HPI and Plan:   See dictation    Orders Placed This Encounter   Procedures     US Aorta/Ivc/Iliac Duplex Complete     Lipid Profile     ALT     Basic metabolic panel     Lipid Profile     Follow-Up with Cardiac Advanced Practice Provider     Follow-Up with Cardiologist     Echocardiogram       Orders Placed This Encounter   Medications     rosuvastatin (CRESTOR) 40 MG tablet     Sig: Take 1 tablet (40 mg) by mouth daily     Dispense:  90 tablet     Refill:  3       Medications Discontinued During This Encounter   Medication Reason     clopidogrel (PLAVIX) 75 MG tablet      atorvastatin (LIPITOR) 40 MG tablet          Encounter Diagnoses   Name Primary?     Coronary artery disease involving native coronary artery of native heart without angina pectoris      Abdominal aortic aneurysm (AAA) without rupture (H) Yes     Ischemic cardiomyopathy      Paroxysmal atrial fibrillation (H)      Tachy-alix syndrome (H)      Chronic combined systolic and diastolic congestive heart failure (H)      Essential hypertension, benign      Mixed hyperlipidemia      Peripheral edema      Overweight (BMI 25.0-29.9)        CURRENT MEDICATIONS:  Current Outpatient Prescriptions   Medication Sig Dispense Refill     rosuvastatin (CRESTOR) 40 MG tablet Take 1 tablet (40 mg) by mouth daily 90 tablet 3     triamcinolone (KENALOG) 0.1 % cream Apply sparingly to affected area two times daily as needed 45 g 3     carvedilol (COREG) 3.125 MG tablet TAKE 1 TABLET(3.125 MG) BY MOUTH TWICE DAILY WITH MEALS 180 tablet 3     isosorbide mononitrate (IMDUR) 30 MG 24 hr tablet TAKE 1 TABLET BY MOUTH EVERY DAY 90 tablet 3     potassium chloride SA (K-DUR/KLOR-CON M) 20 MEQ CR tablet Take 2 pills today, one pill tomorrow then one pill daily ONLY when you take the Demadex 30 tablet 5     albuterol (PROAIR HFA/PROVENTIL HFA/VENTOLIN HFA) 108 (90 BASE) MCG/ACT Inhaler Inhale 2 puffs into the lungs as needed for shortness of breath / dyspnea or wheezing 3  Inhaler 11     torsemide (DEMADEX) 20 MG tablet Take 1 tablet (20 mg) by mouth daily as needed 30 tablet 1     beclomethasone (QVAR) 80 MCG/ACT Inhaler Inhale 2 puffs into the lungs 2 times daily 2 Inhaler 11     aspirin 81 MG tablet Take 81 mg by mouth daily  30 tablet      polyethylene glycol (MIRALAX/GLYCOLAX) packet Take 17 g by mouth daily        nitroglycerin (NITROSTAT) 0.4 MG SL tablet Place 1 tablet (0.4 mg) under the tongue every 5 minutes as needed for chest pain 25 tablet 3     azelastine (ASTELIN) 137 MCG/SPRAY nasal spray [place] 1 spray into both nostrils 2 times daily.       cetirizine (ZYRTEC) 10 MG tablet Take 10 mg by mouth every morning        mometasone (NASONEX) 50 MCG/ACT nasal spray Spray 2 sprays into both nostrils daily as needed.       ACE/ARB NOT PRESCRIBED, INTENTIONAL, ACE & ARB not prescribed due to Disease of aortic or mitral valves         ALLERGIES     Allergies   Allergen Reactions     Advair Diskus      DENIED     Morphine Hcl      Decrease  Blood Pressure Lower Than Normal, Per Pt.     Vicodin [Hydrocodone-Acetaminophen] Visual Disturbance       PAST MEDICAL HISTORY:  Past Medical History:   Diagnosis Date     AAA (abdominal aortic aneurysm) (H)      Asthma      Atrial fibrillation (H)      Cardiac arrest (H)      Cardiomyopathy (H)      Chronic kidney disease      Chronic sinusitis      Coronary artery disease     cardiac cath 2014: medical management; cath 2013: PTCA to diagonal; cath 2009: medical management; CABG 1998: LIMA to LAD, LISA to RCA, SVG to circumflex     GERD (gastroesophageal reflux disease)      History of angina      Hyperlipidaemia      Hypertension, goal below 140/90      Myocardial infarction (H)     MI with Vfib arrest 1998     Polymyalgia rheumatica (H)      Shortness of breath      Tachy-alix syndrome (H)     s/p dual chamber pacemaker 2012       PAST SURGICAL HISTORY:  Past Surgical History:   Procedure Laterality Date     ARTHROPLASTY KNEE Left 10/5/2016     Procedure: ARTHROPLASTY KNEE;  Surgeon: Keshav Zamudio MD;  Location: [facility] OR     CARDIAC SURGERY  12/03/2012    pacemaker ; CABG 1998     CHOLECYSTECTOMY       COLONOSCOPY       ENT SURGERY  5-    sinus     EXTRACAPSULAR CATARACT EXTRATION WITH INTRAOCULAR LENS IMPLANT       GENITOURINARY SURGERY      prostatectomy     IMPLANT PACEMAKER  12-3-12    [facility]     PROSTATE SURGERY         FAMILY HISTORY:  Family History   Problem Relation Age of Onset     CEREBROVASCULAR DISEASE Father      HEART DISEASE Father      HEART DISEASE Brother      Coronary Artery Disease Brother      MI age 50     Depression Daughter      raped in high school     Unknown/Adopted Maternal Grandmother      Unknown/Adopted Maternal Grandfather      Unknown/Adopted Paternal Grandmother      Unknown/Adopted Paternal Grandfather      DIABETES No family hx of      Hypertension No family hx of      Hyperlipidemia No family hx of      Breast Cancer No family hx of      Colon Cancer No family hx of      Prostate Cancer No family hx of      Other Cancer No family hx of      Anxiety Disorder No family hx of      MENTAL ILLNESS No family hx of      Substance Abuse No family hx of      Anesthesia Reaction No family hx of      Asthma No family hx of      OSTEOPOROSIS No family hx of      Genetic Disorder No family hx of      Thyroid Disease No family hx of      Obesity No family hx of        SOCIAL HISTORY:  Social History     Social History     Marital status:      Spouse name: N/A     Number of children: N/A     Years of education: N/A     Social History Main Topics     Smoking status: Never Smoker     Smokeless tobacco: Never Used     Alcohol use No     Drug use: No     Sexual activity: No     Other Topics Concern     Parent/Sibling W/ Cabg, Mi Or Angioplasty Before 65f 55m? Yes     brother and father had MI @ 40's     Caffeine Concern Yes     one diet [place] daily     Sleep Concern No     Stress Concern No     Special Diet  "No     Exercise Yes     golfing, does the stair at the office a lot     Seat Belt Yes     Social History Narrative       Review of Systems:  Skin:  Positive for bruising     Eyes:  Positive for glasses;cataracts implant in both eyes.  ENT:  Positive for sinus trouble;postnasal drainage pt reports being seen by MD for sinus issues.    Respiratory:  Positive for shortness of breath pt reports symptoms on occasion.   Cardiovascular:    Positive for;edema pt reports occasional swelling in lower legs.    Gastroenterology: Positive for reflux    Genitourinary:  Negative      Musculoskeletal:  Positive for joint pain pt reports occasion knee pain; Seeing MD in October.  Neurologic:  Negative      Psychiatric:  Negative      Heme/Lymph/Imm:  Positive for   easy bruising.  Endocrine:  Negative        Physical Exam:  Vitals: /79  Pulse 69  Ht 1.778 m (5' 10\")  Wt 82.5 kg (181 lb 12.8 oz)  BMI 26.09 kg/m2    Constitutional:  cooperative, alert and oriented, well developed, well nourished, in no acute distress        Skin:  warm and dry to the touch, no apparent skin lesions or masses noted        Head:  normocephalic, no masses or lesions        Eyes:  pupils equal and round, conjunctivae and lids unremarkable, sclera white, no xanthalasma, EOMS intact, no nystagmus        ENT:  no pallor or cyanosis, dentition good        Neck:  carotid pulses are full and equal bilaterally;no carotid bruit        Chest:  normal breath sounds, clear to auscultation, normal A-P diameter, normal symmetry, normal respiratory excursion, no use of accessory muscles          Cardiac: regular rhythm;normal S1 and S2;no murmurs, gallops or rubs detected                  Abdomen:           Vascular: pulses full and equal                                        Extremities and Back:  no spinal abnormalities noted;normal muscle strength and tone   bilateral LE edema;trace          Neurological:  affect appropriate, oriented to time, person and " place              CC  Abida Valencia, APRN CNP  [address]  [place] [address]

## 2017-09-21 NOTE — LETTER
9/21/2017    Eliezer Shah MD  [address]  [facility] [address]    RE: Abbe Lambert       Dear Colleague,    I had the pleasure of seeing Abbe Lambert in the [facility].    Mr. Lambert is a very nice 83-year-old gentleman with past medical history significant for a myocardial infarction and cardiac arrest at [facility] in 1998.  This led to coronary artery bypass grafting x3 with a free LISA pedicle graft to his right coronary artery, a LIMA graft to his left anterior descending artery and a saphenous vein graft tied to a small diffusely diseased ramus intermedius branch.  Angiography in 2009 demonstrated the vein graft to be closed, but the arterial conduits were wide open.  His diagonal filled by collaterals.      In 08/2013, he returned to the Cath Lab for anginal symptoms and angioplasty only was performed of an 85% stenosis in a small first diagonal that was felt to be too small for stenting.  His symptoms did improve.      Mr. Lambert has chronic exertional shortness of breath, paroxysmal atrial fibrillation and sick sinus syndrome for which he ultimately received a pacemaker.  At the time of surgery, he had a left atrial appendage ligation and a Maze procedure.  He has not been on anticoagulation as he has had minimal atrial fibrillation and the appendage ligation.  Mr. Lambert also has a very small abdominal aortic aneurysm.      In 2014, he again returned to the Cath Lab, with what appeared to be fairly classical anginal symptoms.  Catheterization again demonstrated the LIMA and LISA to be widely patent.  There was some restenosis in the first diagonal with an FFR of 0.9.  He had a 50% mid RCA stenosis with an FFR of 0.92.  His ejection fraction was 25%.  It appeared that he had developed a nonischemic cardiomyopathy.  Over time, his ejection fraction has improved to the 45%-50%, although when we checked last year it was estimated to be 41%.       Mr. Lambert returns to clinic stating he thinks he is doing quite well from a cardiac standpoint.  He has no chest, arm, neck, jaw or shoulder discomfort.  He states his dyspnea on exertion is doing well.  He has no orthopnea or PND.  He has no lightheadedness, dizziness, syncope or near-syncope.  He does complain of increased bruisability.  He has other noncardiac complaints, but overall thinks he is doing quite well from a cardiac standpoint.   Outpatient Encounter Prescriptions as of 9/21/2017   Medication Sig Dispense Refill     rosuvastatin (CRESTOR) 40 MG tablet Take 1 tablet (40 mg) by mouth daily 90 tablet 3     triamcinolone (KENALOG) 0.1 % cream Apply sparingly to affected area two times daily as needed 45 g 3     carvedilol (COREG) 3.125 MG tablet TAKE 1 TABLET(3.125 MG) BY MOUTH TWICE DAILY WITH MEALS 180 tablet 3     isosorbide mononitrate (IMDUR) 30 MG 24 hr tablet TAKE 1 TABLET BY MOUTH EVERY DAY 90 tablet 3     potassium chloride SA (K-DUR/KLOR-CON M) 20 MEQ CR tablet Take 2 pills today, one pill tomorrow then one pill daily ONLY when you take the Demadex 30 tablet 5     albuterol (PROAIR HFA/PROVENTIL HFA/VENTOLIN HFA) 108 (90 BASE) MCG/ACT Inhaler Inhale 2 puffs into the lungs as needed for shortness of breath / dyspnea or wheezing 3 Inhaler 11     torsemide (DEMADEX) 20 MG tablet Take 1 tablet (20 mg) by mouth daily as needed 30 tablet 1     beclomethasone (QVAR) 80 MCG/ACT Inhaler Inhale 2 puffs into the lungs 2 times daily 2 Inhaler 11     aspirin 81 MG tablet Take 81 mg by mouth daily  30 tablet      polyethylene glycol (MIRALAX/GLYCOLAX) packet Take 17 g by mouth daily        nitroglycerin (NITROSTAT) 0.4 MG SL tablet Place 1 tablet (0.4 mg) under the tongue every 5 minutes as needed for chest pain 25 tablet 3     azelastine (ASTELIN) 137 MCG/SPRAY nasal spray [place] 1 spray into both nostrils 2 times daily.       cetirizine (ZYRTEC) 10 MG tablet Take 10 mg by mouth every morning         mometasone (NASONEX) 50 MCG/ACT nasal spray Spray 2 sprays into both nostrils daily as needed.       [DISCONTINUED] clopidogrel (PLAVIX) 75 MG tablet Take 1 tablet (75 mg) by mouth daily 90 tablet 3     [DISCONTINUED] atorvastatin (LIPITOR) 40 MG tablet TAKE 1 TABLET BY MOUTH DAILY 90 tablet 3     ACE/ARB NOT PRESCRIBED, INTENTIONAL, ACE & ARB not prescribed due to Disease of aortic or mitral valves       No facility-administered encounter medications on file as of 9/21/2017.       ASSESSMENT AND PLAN:  Sánchez appears to be doing well from a cardiac standpoint without clinical evidence of ischemia.      Heart failure appears to be very well compensated despite his ischemic cardiomyopathy.  I will recheck his echocardiogram when he returns next year.      Regarding his increased bruisability, he is still on Plavix.  I do not think this is necessary has not had any recent stent or recent myocardial infarction.  I will just keep him on his aspirin only.      Given his multivessel coronary artery disease and ischemic cardiomyopathy, I want to get more aggressive with his cholesterol.  Total cholesterol is 153, HDL is 42, LDL is 84, triglycerides are 134.  I will switch him from atorvastatin to rosuvastatin.  He has had several weeks of his atorvastatin to use up first and I will switch over to which rosuvastatin 40 mg daily and we will recheck a fasting lipid profile in 4-6 weeks after he makes the change.      Basic metabolic profile shows he still has his renal insufficiency with a creatinine of 1.6, although this is improved from previous 1.75.  His GFR is 141, potassium is okay at 4.3.      Blood pressure is very well controlled at 134/79 with a pulse of 69.      Interrogation of his pacemaker again shows short bursts of atrial fibrillation.  He paces about 87% of the time in the ventricle.  His total fibrillation time is less than 1%.  We again talked about anticoagulation has no symptoms to suggest a TIA.  Given the  fact that he has a left atrial appendage ligation, we will stay with just aspirin only.      Peripheral edema is doing well.  We again talked about the importance of a low-salt diet, elevating his legs if it were to get worse.  He has only trace today.  He is on torsemide 20 mg once a day and electrolytes as noted above.      Weight is 181 pounds which is down from 189.  He states he is just watching portions I have congratulated him on this and encouraged him to continue to do so.  His body mass index is 26.1.  He is actually approaching the ideal body weight.      He will follow in our [facility] quarterly.  I will see him back in 1 year.  If he should have any problems, I would be glad to see him sooner.      Thank you for allowing me to participate in his care.     Sincerely,    Brandon Eli MD     [facility]

## 2017-09-21 NOTE — MR AVS SNAPSHOT
After Visit Summary   9/21/2017    Abbe Lambert    MRN: [id number]           Patient Information     Date Of Birth          2/3/1934        Visit Information        Provider Department      9/21/2017 9:45 AM Brandon Eil MD [facility]YS[facility] HE[facility] AT [place]        Today's Diagnoses     Abdominal aortic aneurysm (AAA) without rupture (H)    -  1    Coronary artery disease involving native coronary artery of native heart without angina pectoris        Ischemic cardiomyopathy        Paroxysmal atrial fibrillation (H)        Tachy-alix syndrome (H)        Chronic combined systolic and diastolic congestive heart failure (H)        Essential hypertension, benign        Mixed hyperlipidemia        Peripheral edema        Overweight (BMI 25.0-29.9)           Follow-ups after your visit        Additional Services     Follow-Up with Cardiac Advanced Practice Provider           Follow-Up with Cardiologist       With BMP and nonfastingLP                  Your next 10 appointments already scheduled     Oct 09, 2017  8:00 AM CDT   US AORTA/IVC/ILIAC DUPLEX COMPLETE with SHVUS4   [facility] ([facility])    [address]  [place] [address]   [phone]           Please bring a list of your medicines (including vitamins, minerals and over-the-counter drugs). Also, tell your doctor about any allergies you may have. Wear comfortable clothes and leave your valuables at home.  Adults: No eating or drinking for 8 hours before the exam. You may take medicine with a small sip of water.  Children: - Children 6+ years: No food or drink for 6 hours before exam. - Children 1-5 years: No food or drink for 4 hours before exam. - Infants, breast-fed: may have breast milk up to 2 hours before exam. - Infants, formula: may have bottle until 4 hours before exam.  Please call the Imaging Department at your exam site with any  questions.            Nov 07, 2017  2:30 PM CST   Remote PPM Check with HARMAN TECH1   [facility] PHYSICIANS HEART AT [place] ([facility])    [address]  [place] [address] [phone]           This appointment is for a remote check of your pacemaker.  This is not an appointment at the office.              Future tests that were ordered for you today     Open Future Orders        Priority Expected Expires Ordered    Echocardiogram Routine 9/21/2018 9/22/2018 9/21/2017    Basic metabolic panel Routine 9/21/2018 9/22/2018 9/21/2017    Lipid Profile Routine 9/21/2018 9/22/2018 9/21/2017    Follow-Up with Cardiac Advanced Practice Provider Routine 9/21/2018 9/22/2018 9/21/2017    Follow-Up with Cardiologist Routine 9/21/2019 10/11/2019 9/21/2017    Lipid Profile Routine 12/20/2017 9/21/2018 9/21/2017    ALT Routine 12/20/2017 9/21/2018 9/21/2017    US Aorta/Ivc/Iliac Duplex Complete Routine 9/28/2017 9/21/2018 9/21/2017            Who to contact     If you have questions or need follow up information about today's clinic visit or your schedule please contact [facility] PHYSIC[facility] HE[facility] AT [place] directly at [phone].  Normal or non-critical lab and imaging results will be communicated to you by iProc[employer]hart, letter or phone within 4 business days after the clinic has received the results. If you do not hear from us within 7 days, please contact the clinic through MyChart or phone. If you have a critical or abnormal lab result, we will notify you by phone as soon as possible.  Submit refill requests through [employer] or call your pharmacy and they will forward the refill request to us. Please allow 3 business days for your refill to be completed.          Additional Information About Your Visit        [employer] Information     [employer] lets you send messages to your doctor, view your test results, renew your prescriptions, schedule appointments and more. To sign up,  "go to [email or web address] . Click on \"Log in\" on the left side of the screen, which will take you to the Welcome page. Then click on \"Sign up Now\" on the right side of the page.     You will be asked to enter the access code listed below, as well as some personal information. Please follow the directions to create your username and password.     Your access code is: [email or web address]  Expires: 10/30/2017  4:47 PM     Your access code will  in 90 days. If you need help or a new code, please call your [place] clinic or [phone].        Care EveryWhere ID     This is your Care EveryWhere ID. This could be used by other organizations to access your [place] medical records  [id number]        Your Vitals Were     Pulse Height BMI (Body Mass Index)             69 1.778 m (5' 10\") 26.09 kg/m2          Blood Pressure from Last 3 Encounters:   17 134/79   17 130/78   17 110/70    Weight from Last 3 Encounters:   17 82.5 kg (181 lb 12.8 oz)   17 81.6 kg (180 lb)   17 84.4 kg (186 lb)              We Performed the Following     Follow-Up with Cardiologist     Follow-Up with Cardiologist          Today's Medication Changes          These changes are accurate as of: 17 10:40 AM.  If you have any questions, ask your nurse or doctor.               Start taking these medicines.        Dose/Directions    rosuvastatin 40 MG tablet   Commonly known as:  CRESTOR   Used for:  Coronary artery disease involving native coronary artery of native heart without angina pectoris   Started by:  Brandon Eli MD        Dose:  40 mg   Take 1 tablet (40 mg) by mouth daily   Quantity:  90 tablet   Refills:  3         Stop taking these medicines if you haven't already. Please contact your care team if you have questions.     atorvastatin 40 MG tablet   Commonly known as:  LIPITOR   Stopped by:  Brandon Eli MD           clopidogrel 75 MG tablet   Commonly known as:  PLAVIX   Stopped " by:  Brandon Eli MD                Where to get your medicines      Some of these will need a paper prescription and others can be bought over the counter.  Ask your nurse if you have questions.     Bring a paper prescription for each of these medications     rosuvastatin 40 MG tablet                Primary Care Provider Office Phone # Fax #    Eliezer Clement Shah [phone]       [address] [facility] [address]        Equal Access to Services     [facility]: Hadii aad ku hadasho Soomaali, waaxda luqadaha, qaybta kaalmada adeegyada, waxay idiin hayaan adeeg kharash la'aan . So [facility] [phone].    ATENCIÓN: Si habla español, tiene a monge disposición servicios gratuitos de asistencia lingüística. [facility] [phone].    We comply with applicable federal civil rights laws and [place] laws. We do not discriminate on the basis of race, color, national origin, age, disability sex, sexual orientation or gender identity.            Thank you!     Thank you for choosing [facility] PHYSIC[facility] HEART AT [place]  for your care. Our goal is always to provide you with excellent care. Hearing back from our patients is one way we can continue to improve our services. Please take a few minutes to complete the written survey that you may receive in the mail after your visit with us. Thank you!             Your Updated Medication List - Protect others around you: Learn how to safely use, store and throw away your medicines at www.disposemymeds.org.          This list is accurate as of: 9/21/17 10:40 AM.  Always use your most recent med list.                   Brand Name Dispense Instructions for use Diagnosis    ACE/ARB NOT PRESCRIBED (INTENTIONAL)      ACE & ARB not prescribed due to Disease of aortic or mitral valves    Aortic valve disorders, Cardiomyopathy (H)       albuterol 108 (90 BASE) MCG/ACT Inhaler    PROAIR HFA/PROVENTIL HFA/VENTOLIN HFA    3 Inhaler    Inhale 2  puffs into the lungs as needed for shortness of breath / dyspnea or wheezing    Mild persistent asthma without complication       aspirin 81 MG tablet     30 tablet    Take 81 mg by mouth daily    CAD (coronary artery disease)       ASTELIN 0.1 % spray   Generic drug:  azelastine      Spray 1 spray into both nostrils 2 times daily.        beclomethasone 80 MCG/ACT Inhaler    QVAR    2 Inhaler    Inhale 2 puffs into the lungs 2 times daily    Moderate persistent asthma without complication       carvedilol 3.125 MG tablet    COREG    180 tablet    TAKE 1 TABLET(3.125 MG) BY MOUTH TWICE DAILY WITH MEALS    Essential hypertension       cetirizine 10 MG tablet    zyrTEC     Take 10 mg by mouth every morning        isosorbide mononitrate 30 MG 24 hr tablet    IMDUR    90 tablet    TAKE 1 TABLET BY MOUTH EVERY DAY    Coronary artery disease involving native coronary artery of native heart without angina pectoris       NASONEX 50 MCG/ACT spray   Generic drug:  mometasone      Spray 2 sprays into both nostrils daily as needed.        nitroGLYcerin 0.4 MG sublingual tablet    NITROSTAT    25 tablet    Place 1 tablet (0.4 mg) under the tongue every 5 minutes as needed for chest pain    Chest pain on exertion       polyethylene glycol Packet    MIRALAX/GLYCOLAX     Take 17 g by mouth daily        potassium chloride SA 20 MEQ CR tablet    K-DUR/KLOR-CON M    30 tablet    Take 2 pills today, one pill tomorrow then one pill daily ONLY when you take the Demadex    Chronic combined systolic and diastolic congestive heart failure (H)       rosuvastatin 40 MG tablet    CRESTOR    90 tablet    Take 1 tablet (40 mg) by mouth daily    Coronary artery disease involving native coronary artery of native heart without angina pectoris       torsemide 20 MG tablet    DEMADEX    30 tablet    Take 1 tablet (20 mg) by mouth daily as needed    Chronic combined systolic and diastolic congestive heart failure (H)       triamcinolone 0.1 % cream     KENALOG    45 g    Apply sparingly to affected area two times daily as needed    Stasis dermatitis of both legs

## 2017-09-21 NOTE — PROGRESS NOTES
HISTORY OF PRESENT ILLNESS:  Mr. Lambert is a very nice 83-year-old gentleman with past medical history significant for a myocardial infarction and cardiac arrest at [facility] in 1998.  This led to coronary artery bypass grafting x3 with a free LISA pedicle graft to his right coronary artery, a LIMA graft to his left anterior descending artery and a saphenous vein graft tied to a small diffusely diseased ramus intermedius branch.  Angiography in 2009 demonstrated the vein graft to be closed, but the arterial conduits were wide open.  His diagonal filled by collaterals.      In 08/2013, he returned to the Cath Lab for anginal symptoms and angioplasty only was performed of an 85% stenosis in a small first diagonal that was felt to be too small for stenting.  His symptoms did improve.      Mr. Lambert has chronic exertional shortness of breath, paroxysmal atrial fibrillation and sick sinus syndrome for which he ultimately received a pacemaker.  At the time of surgery, he had a left atrial appendage ligation and a Maze procedure.  He has not been on anticoagulation as he has had minimal atrial fibrillation and the appendage ligation.  Mr. Lambert also has a very small abdominal aortic aneurysm.      In 2014, he again returned to the Cath Lab, with what appeared to be fairly classical anginal symptoms.  Catheterization again demonstrated the LIMA and LISA to be widely patent.  There was some restenosis in the first diagonal with an FFR of 0.9.  He had a 50% mid RCA stenosis with an FFR of 0.92.  His ejection fraction was 25%.  It appeared that he had developed a nonischemic cardiomyopathy.  Over time, his ejection fraction has improved to the 45%-50%, although when we checked last year it was estimated to be 41%.      Mr. Lambert returns to clinic stating he thinks he is doing quite well from a cardiac standpoint.  He has no chest, arm, neck, jaw or shoulder discomfort.  He states his dyspnea on exertion is  doing well.  He has no orthopnea or PND.  He has no lightheadedness, dizziness, syncope or near-syncope.  He does complain of increased bruisability.  He has other noncardiac complaints, but overall thinks he is doing quite well from a cardiac standpoint.      ASSESSMENT AND PLAN:  Sánchez appears to be doing well from a cardiac standpoint without clinical evidence of ischemia.      Heart failure appears to be very well compensated despite his ischemic cardiomyopathy.  I will recheck his echocardiogram when he returns next year.      Regarding his increased bruisability, he is still on Plavix.  I do not think this is necessary has not had any recent stent or recent myocardial infarction.  I will just keep him on his aspirin only.      Given his multivessel coronary artery disease and ischemic cardiomyopathy, I want to get more aggressive with his cholesterol.  Total cholesterol is 153, HDL is 42, LDL is 84, triglycerides are 134.  I will switch him from atorvastatin to rosuvastatin.  He has had several weeks of his atorvastatin to use up first and I will switch over to which rosuvastatin 40 mg daily and we will recheck a fasting lipid profile in 4-6 weeks after he makes the change.      Basic metabolic profile shows he still has his renal insufficiency with a creatinine of 1.6, although this is improved from previous 1.75.  His GFR is 141, potassium is okay at 4.3.      Blood pressure is very well controlled at 134/79 with a pulse of 69.      Interrogation of his pacemaker again shows short bursts of atrial fibrillation.  He paces about 87% of the time in the ventricle.  His total fibrillation time is less than 1%.  We again talked about anticoagulation has no symptoms to suggest a TIA.  Given the fact that he has a left atrial appendage ligation, we will stay with just aspirin only.      Peripheral edema is doing well.  We again talked about the importance of a low-salt diet, elevating his legs if it were to get worse.   He has only trace today.  He is on torsemide 20 mg once a day and electrolytes as noted above.      Weight is 181 pounds which is down from 189.  He states he is just watching portions I have congratulated him on this and encouraged him to continue to do so.  His body mass index is 26.1.  He is actually approaching the ideal body weight.      He will follow in our Device Clinic quarterly.  I will see him back in 1 year.  If he should have any problems, I would be glad to see him sooner.      Thank you for allowing me to participate in his care.         EFRA PRINGLE MD, [facility]             D: 2017 10:40   T: 2017 12:17   MT: [id number]      Name:     DARBY SHEETS   MRN:      [id number]        Account:      [id number]   :      1934           Service Date: 2017      Document: [id number]

## 2017-10-03 ENCOUNTER — DOCUMENTATION ONLY (OUTPATIENT)
Dept: CARDIOLOGY | Facility: CLINIC | Age: 82
End: 2017-10-03

## 2017-10-03 NOTE — PROGRESS NOTES
Received form from [employer] requesting update on creatinine levels. Patient was started on crestor 40mg daily at age over 75. Worksheet completed and faxed to [phone]. Copy sent to scan

## 2017-10-09 ENCOUNTER — TELEPHONE (OUTPATIENT)
Dept: FAMILY MEDICINE | Facility: CLINIC | Age: 82
End: 2017-10-09

## 2017-10-09 NOTE — TELEPHONE ENCOUNTER
"Patient called requesting providers advise about what to do for leg feet/ edema.\" Should I go to emergency room?\" Pt complains feet and legs are swolen and has hx of CHF. He took torsemide 2 days ago but this \"has not taken care of it\". States he takes torsemide PRN. Denies breathing problems, chest pain or dizziness. Advised he take torsemide today and follow up with PCP tomorrow unless worsening symptoms. Please advise if any further directives or agree with advice.  "

## 2017-10-09 NOTE — TELEPHONE ENCOUNTER
Patient was called and informed PCP agreed he take torsemide today and keep appt at clinic tomorrow. See ED is symptoms changes- increased breathing problems, chest pain or dizziness. Patient verbalized agreement with plan.

## 2017-10-10 ENCOUNTER — OFFICE VISIT (OUTPATIENT)
Dept: FAMILY MEDICINE | Facility: CLINIC | Age: 82
End: 2017-10-10
Payer: COMMERCIAL

## 2017-10-10 VITALS
HEART RATE: 72 BPM | WEIGHT: 188 LBS | TEMPERATURE: 97 F | SYSTOLIC BLOOD PRESSURE: 126 MMHG | RESPIRATION RATE: 16 BRPM | BODY MASS INDEX: 26.98 KG/M2 | DIASTOLIC BLOOD PRESSURE: 80 MMHG | OXYGEN SATURATION: 97 %

## 2017-10-10 DIAGNOSIS — N18.30 CKD (CHRONIC KIDNEY DISEASE) STAGE 3, GFR 30-59 ML/MIN (H): Primary | ICD-10-CM

## 2017-10-10 DIAGNOSIS — J30.89 CHRONIC NON-SEASONAL ALLERGIC RHINITIS, UNSPECIFIED TRIGGER: ICD-10-CM

## 2017-10-10 DIAGNOSIS — R60.0 PERIPHERAL EDEMA: Chronic | ICD-10-CM

## 2017-10-10 LAB
BUN SERPL-MCNC: 22 MG/DL (ref 7–30)
CREAT SERPL-MCNC: 1.6 MG/DL (ref 0.66–1.25)
GFR SERPL CREATININE-BSD FRML MDRD: 41 ML/MIN/1.7M2

## 2017-10-10 PROCEDURE — 82565 ASSAY OF CREATININE: CPT | Performed by: FAMILY MEDICINE

## 2017-10-10 PROCEDURE — 84520 ASSAY OF UREA NITROGEN: CPT | Performed by: FAMILY MEDICINE

## 2017-10-10 PROCEDURE — 36415 COLL VENOUS BLD VENIPUNCTURE: CPT | Performed by: FAMILY MEDICINE

## 2017-10-10 PROCEDURE — 99214 OFFICE O/P EST MOD 30 MIN: CPT | Performed by: FAMILY MEDICINE

## 2017-10-10 NOTE — PROGRESS NOTES
SUBJECTIVE:   Abbe Lambert is a 83 year old male who presents to clinic today for the following health issues:      Swelling      Duration: ongoing 1 year since lt knee surgery 10/2016    Description (location/character/radiation): lower lt leg and foot    Intensity:  moderate    Accompanying signs and symptoms: swelling    History (similar episodes/previous evaluation): None    Precipitating or alleviating factors: None    Therapies tried and outcome: Torsemide has helped             Problem list and histories reviewed & adjusted, as indicated.  Additional history: as documented    Patient Active Problem List   Diagnosis     Health Care Home     Allergic rhinitis     Peripheral edema     Polymyalgia rheumatica (H)     Advanced directives, counseling/discussion     Ischemic cardiomyopathy     Paroxysmal atrial fibrillation (H)     Coronary artery disease involving native coronary artery of native heart without angina pectoris     Intermittent lightheadedness     CKD (chronic kidney disease) stage 3, GFR 30-59 ml/min     GERD (gastroesophageal reflux disease)     Tachy-alix syndrome (H)     AAA (abdominal aortic aneurysm) (H)     Old myocardial infarction     Chronic combined systolic and diastolic congestive heart failure (H)     Essential hypertension, benign     Type 2 diabetes mellitus with stage 3 chronic kidney disease, without long-term current use of insulin (H)                                                                                                                                                                                                                  Moderate persistent asthma without complication     Prostate cancer (H)     Mixed hyperlipidemia     Overweight (BMI 25.0-29.9)     Degenerative arthritis of knee     Aftercare following knee joint replacement surgery, unspecified laterality     Anemia due to blood loss, acute     Shingles     Acute kidney injury (H)     Physical  deconditioning     Post herpetic neuralgia     Mild persistent asthma with acute exacerbation     Presbycusis of both ears     Chronic non-seasonal allergic rhinitis, unspecified trigger     Past Surgical History:   Procedure Laterality Date     ARTHROPLASTY KNEE Left 10/5/2016    Procedure: ARTHROPLASTY KNEE;  Surgeon: Keshav Zamudio MD;  Location: [facility] OR     CARDIAC SURGERY  12/03/2012    pacemaker ; CABG 1998     CHOLECYSTECTOMY       COLONOSCOPY       ENT SURGERY  5-    sinus     EXTRACAPSULAR CATARACT EXTRATION WITH INTRAOCULAR LENS IMPLANT       GENITOURINARY SURGERY      prostatectomy     IMPLANT PACEMAKER  12-3-12    [facility]     PROSTATE SURGERY         Social History   Substance Use Topics     Smoking status: Never Smoker     Smokeless tobacco: Never Used     Alcohol use No     Family History   Problem Relation Age of Onset     CEREBROVASCULAR DISEASE Father      HEART DISEASE Father      HEART DISEASE Brother      Coronary Artery Disease Brother      MI age 50     Depression Daughter      raped in high school     Unknown/Adopted Maternal Grandmother      Unknown/Adopted Maternal Grandfather      Unknown/Adopted Paternal Grandmother      Unknown/Adopted Paternal Grandfather      DIABETES No family hx of      Hypertension No family hx of      Hyperlipidemia No family hx of      Breast Cancer No family hx of      Colon Cancer No family hx of      Prostate Cancer No family hx of      Other Cancer No family hx of      Anxiety Disorder No family hx of      MENTAL ILLNESS No family hx of      Substance Abuse No family hx of      Anesthesia Reaction No family hx of      Asthma No family hx of      OSTEOPOROSIS No family hx of      Genetic Disorder No family hx of      Thyroid Disease No family hx of      Obesity No family hx of              Reviewed and updated as needed this visit by clinical staffTobacco  Allergies  Meds  Med Hx  Surg Hx  Fam Hx  Soc Hx      Reviewed and updated as needed this  visit by Provider         ROS:  Constitutional, HEENT, cardiovascular, pulmonary, gi and gu systems are negative, except as otherwise noted.  CONSTITUTIONAL:NEGATIVE for fever, chills, change in weight  RESP:NEGATIVE for significant cough or SOB  CV: NEGATIVE for chest pain, palpitations and positive for swelling in the LLE  ENT: nasal inflammation    OBJECTIVE:                                                    /80  Pulse 72  Temp 97  F (36.1  C) (Tympanic)  Resp 16  Wt 188 lb (85.3 kg)  SpO2 97%  BMI 26.98 kg/m2  Body mass index is 26.98 kg/(m^2).  GENERAL APPEARANCE: healthy, alert and no distress  RESP: lungs clear to auscultation - no rales, rhonchi or wheezes  CV: regular rates and rhythm, normal S1 S2, no S3 or S4, no murmur, click or rub and trace edema in the LLE  MS: extremities normal- no gross deformities noted and pitting 1+ lower extremity edema left         ASSESSMENT/PLAN:                                                        ICD-10-CM    1. CKD (chronic kidney disease) stage 3, GFR 30-59 ml/min N18.3 Creatinine     Urea nitrogen   2. Peripheral edema R60.9 Creatinine     Urea nitrogen   3. Chronic non-seasonal allergic rhinitis, unspecified trigger J30.89        Patient Instructions   The patient has not been weighing himself every day.  Consequently he is also not been taking his torsemide very frequently.  He took torsemide yesterday and that has improved the swelling in his left lower extremity.  We had a discussion about weighing himself every day.  He thinks his dry weight is around 179.  We had the discussion that if his weight was ever up more than 2 pounds above that number that he would take his torsemide.  We will check his kidney function today.  That has been relatively stable.  Follow-up will be pending review of his tests.    The patient has been to ENT who has labeled his chronic rhinitis issue and sinus congestion issue, as inflammation and not an infection.  He is on a  regimen of Mucinex, nasal steroids, Astelin nasal spray and saline irrigations.  He has had marked improvement.      Eliezer Shah MD  [facility]

## 2017-10-10 NOTE — MR AVS SNAPSHOT
After Visit Summary   10/10/2017    Abbe Lambert    MRN: [id number]           Patient Information     Date Of Birth          2/3/1934        Visit Information        Provider Department      10/10/2017 8:30 AM Eliezer Shah MD [facility]        Today's Diagnoses     CKD (chronic kidney disease) stage 3, GFR 30-59 ml/min    -  1    Peripheral edema        Chronic non-seasonal allergic rhinitis, unspecified trigger          Care Instructions    The patient has not been weighing himself every day.  Consequently he is also not been taking his torsemide very frequently.  He took torsemide yesterday and that has improved the swelling in his left lower extremity.  We had a discussion about weighing himself every day.  He thinks his dry weight is around 179.  We had the discussion that if his weight was ever up more than 2 pounds above that number that he would take his torsemide.  We will check his kidney function today.  That has been relatively stable.  Follow-up will be pending review of his tests.    The patient has been to ENT who has labeled his chronic rhinitis issue and sinus congestion issue, as inflammation and not an infection.  He is on a regimen of Mucinex, nasal steroids, Astelin nasal spray and saline irrigations.  He has had marked improvement.          Follow-ups after your visit        Your next 10 appointments already scheduled     Nov 07, 2017  2:30 PM CST   Remote PPM Check with HARMAN TECH1   [facility] [place] ([facility])    [address] [place] [address] [id number] [phone]           This appointment is for a remote check of your pacemaker.  This is not an appointment at the office.              Who to contact     If you have questions or need follow up information about today's clinic visit or your schedule please contact [facility] directly at  "[phone].  Normal or non-critical lab and imaging results will be communicated to you by MyChart, letter or phone within 4 business days after the clinic has received the results. If you do not hear from us within 7 days, please contact the clinic through [employer] BI[employer] or phone. If you have a critical or abnormal lab result, we will notify you by phone as soon as possible.  Submit refill requests through [employer] or call your pharmacy and they will forward the refill request to us. Please allow 3 business days for your refill to be completed.          Additional Information About Your Visit        [employer] Information     [employer] lets you send messages to your doctor, view your test results, renew your prescriptions, schedule appointments and more. To sign up, go to [email or web address] . Click on \"Log in\" on the left side of the screen, which will take you to the Welcome page. Then click on \"Sign up Now\" on the right side of the page.     You will be asked to enter the access code listed below, as well as some personal information. Please follow the directions to create your username and password.     Your access code is: [email or web address]  Expires: 10/30/2017  4:47 PM     Your access code will  in 90 days. If you need help or a new code, please call your [place] clinic or [phone].        Care EveryWhere ID     This is your Care EveryWhere ID. This could be used by other organizations to access your [place] medical records  [id number]        Your Vitals Were     Pulse Temperature Respirations Pulse Oximetry BMI (Body Mass Index)       72 97  F (36.1  C) (Tympanic) 16 97% 26.98 kg/m2        Blood Pressure from Last 3 Encounters:   10/10/17 126/80   17 134/79   17 130/78    Weight from Last 3 Encounters:   10/10/17 188 lb (85.3 kg)   17 181 lb 12.8 oz (82.5 kg)   17 180 lb (81.6 kg)              We Performed the Following     Creatinine     Urea nitrogen        Primary Care Provider " Office Phone # Fax #    Eliezer Shah [phone] [address] [facility] [address]        Equal Access to Services     JIM AHN : Hadii aad ku hadpilaro Soomaali, waaxda luqadaha, qaybta kaalmada adeegyada, skylar copeland laEkaterinaaime goodson. So [facility] [phone].    ATENCIÓN: Si habla español, tiene a monge disposición servicios gratuitos de asistencia lingüística. Llame al [phone].    We comply with applicable federal civil rights laws and [place] laws. We do not discriminate on the basis of race, color, national origin, age, disability, sex, sexual orientation, or gender identity.            Thank you!     Thank you for choosing [facility]  for your care. Our goal is always to provide you with excellent care. Hearing back from our patients is one way we can continue to improve our services. Please take a few minutes to complete the written survey that you may receive in the mail after your visit with us. Thank you!             Your Updated Medication List - Protect others around you: Learn how to safely use, store and throw away your medicines at www.disposemymeds.org.          This list is accurate as of: 10/10/17 12:53 PM.  Always use your most recent med list.                   Brand Name Dispense Instructions for use Diagnosis    ACE/ARB NOT PRESCRIBED (INTENTIONAL)      ACE & ARB not prescribed due to Disease of aortic or mitral valves    Aortic valve disorders, Cardiomyopathy (H)       albuterol 108 (90 BASE) MCG/ACT Inhaler    PROAIR HFA/PROVENTIL HFA/VENTOLIN HFA    3 Inhaler    Inhale 2 puffs into the lungs as needed for shortness of breath / dyspnea or wheezing    Mild persistent asthma without complication       aspirin 81 MG tablet     30 tablet    Take 81 mg by mouth daily    CAD (coronary artery disease)       ASTELIN 0.1 % spray   Generic drug:  azelastine      Spray 1 spray into both nostrils 2 times daily.         beclomethasone 80 MCG/ACT Inhaler    QVAR    2 Inhaler    Inhale 2 puffs into the lungs 2 times daily    Moderate persistent asthma without complication       carvedilol 3.125 MG tablet    COREG    180 tablet    TAKE 1 TABLET(3.125 MG) BY MOUTH TWICE DAILY WITH MEALS    Essential hypertension       cetirizine 10 MG tablet    zyrTEC     Take 10 mg by mouth every morning        isosorbide mononitrate 30 MG 24 hr tablet    IMDUR    90 tablet    TAKE 1 TABLET BY MOUTH EVERY DAY    Coronary artery disease involving native coronary artery of native heart without angina pectoris       NASONEX 50 MCG/ACT spray   Generic drug:  mometasone      Spray 2 sprays into both nostrils daily as needed.        nitroGLYcerin 0.4 MG sublingual tablet    NITROSTAT    25 tablet    Place 1 tablet (0.4 mg) under the tongue every 5 minutes as needed for chest pain    Chest pain on exertion       polyethylene glycol Packet    MIRALAX/GLYCOLAX     Take 17 g by mouth daily        potassium chloride SA 20 MEQ CR tablet    K-DUR/KLOR-CON M    30 tablet    Take 2 pills today, one pill tomorrow then one pill daily ONLY when you take the Demadex    Chronic combined systolic and diastolic congestive heart failure (H)       rosuvastatin 40 MG tablet    CRESTOR    90 tablet    Take 1 tablet (40 mg) by mouth daily    Coronary artery disease involving native coronary artery of native heart without angina pectoris       torsemide 20 MG tablet    DEMADEX    30 tablet    Take 1 tablet (20 mg) by mouth daily as needed    Chronic combined systolic and diastolic congestive heart failure (H)       triamcinolone 0.1 % cream    KENALOG    45 g    Apply sparingly to affected area two times daily as needed    Stasis dermatitis of both legs

## 2017-10-10 NOTE — NURSING NOTE
"Chief Complaint   Patient presents with     Swelling       Initial /80  Pulse 72  Temp 97  F (36.1  C) (Tympanic)  Resp 16  Wt 188 lb (85.3 kg)  SpO2 97%  BMI 26.98 kg/m2 Estimated body mass index is 26.98 kg/(m^2) as calculated from the following:    Height as of 9/21/17: 5' 10\" (1.778 m).    Weight as of this encounter: 188 lb (85.3 kg).  Medication Reconciliation: complete     Ellen Mckeon CMA      "

## 2017-10-10 NOTE — PATIENT INSTRUCTIONS
The patient has not been weighing himself every day.  Consequently he is also not been taking his torsemide very frequently.  He took torsemide yesterday and that has improved the swelling in his left lower extremity.  We had a discussion about weighing himself every day.  He thinks his dry weight is around 179.  We had the discussion that if his weight was ever up more than 2 pounds above that number that he would take his torsemide.  We will check his kidney function today.  That has been relatively stable.  Follow-up will be pending review of his tests.    The patient has been to ENT who has labeled his chronic rhinitis issue and sinus congestion issue, as inflammation and not an infection.  He is on a regimen of Mucinex, nasal steroids, Astelin nasal spray and saline irrigations.  He has had marked improvement.

## 2017-10-10 NOTE — LETTER
October 18, 2017      Abbe Lambert  [address] [facility] [address]        Dear ,    We are writing to inform you of your test results.    Your test results fall within the expected range(s) or remain unchanged from previous results.  Please continue with current treatment plan. I should see you in 2 months.    Resulted Orders   Creatinine   Result Value Ref Range    Creatinine 1.60 (H) 0.66 - 1.25 mg/dL    GFR Estimate 41 (L) >60 mL/min/1.7m2      Comment:      Non  GFR Calc    GFR Estimate If Black 50 (L) >60 mL/min/1.7m2      Comment:       GFR Calc   Urea nitrogen   Result Value Ref Range    Urea Nitrogen 22 7 - 30 mg/dL       If you have any questions or concerns, please call the clinic at the number listed above.       Sincerely,        Eliezer Shah MD

## 2017-11-07 ENCOUNTER — ALLIED HEALTH/NURSE VISIT (OUTPATIENT)
Dept: CARDIOLOGY | Facility: CLINIC | Age: 82
End: 2017-11-07
Payer: COMMERCIAL

## 2017-11-07 DIAGNOSIS — Z95.0 CARDIAC PACEMAKER IN SITU: Primary | ICD-10-CM

## 2017-11-07 PROCEDURE — 93294 REM INTERROG EVL PM/LDLS PM: CPT | Performed by: INTERNAL MEDICINE

## 2017-11-07 PROCEDURE — 93296 REM INTERROG EVL PM/IDS: CPT | Performed by: INTERNAL MEDICINE

## 2017-11-07 NOTE — MR AVS SNAPSHOT
After Visit Summary   11/7/2017    Abbe Lambert    MRN: [id number]           Patient Information     Date Of Birth          2/3/1934        Visit Information        Provider Department      11/7/2017 2:30 PM GHAZAL GUADARRAMA [facility]        Today's Diagnoses     Cardiac pacemaker in situ    -  1       Follow-ups after your visit        Additional Services     Follow-Up with Device Clinic                 Your next 10 appointments already scheduled     Nov 07, 2017  2:30 PM CST   Remote PPM Check with GHAZAL GUADARRAMA   [facility]   [place] ([facility])    [facility] [id number]  [facility] [id number] [phone]           This appointment is for a remote check of your pacemaker.  This is not an appointment at the office.              Future tests that were ordered for you today     Open Future Orders        Priority Expected Expires Ordered    Follow-Up with Device Clinic Routine 2/7/2018 11/7/2018 11/7/2017            Who to contact     If you have questions or need follow up information about today's clinic visit or your schedule please contact [facility] directly at [phone].  Normal or non-critical lab and imaging results will be communicated to you by Wakie[employer], letter or phone within 4 business days after the clinic has received the results. If you do not hear from us within 7 days, please contact the clinic through El[employer] or phone. If you have a critical or abnormal lab result, we will notify you by phone as soon as possible.  Submit refill requests through [employer] or call your pharmacy and they will forward the refill request to us. Please allow 3 business days for your refill to be completed.          Additional Information About Your Visit        WakiehariPosi Information     Noosh lets you send messages to your doctor, view your test results, renew your prescriptions, schedule  "appointments and more. To sign up, go to [email or web address] . Click on \"Log in\" on the left side of the screen, which will take you to the Welcome page. Then click on \"Sign up Now\" on the right side of the page.     You will be asked to enter the access code listed below, as well as some personal information. Please follow the directions to create your username and password.     Your access code is: [email or web address]  Expires: 2018  1:30 PM     Your access code will  in 90 days. If you need help or a new code, please call your [place] clinic or [phone].        Care EveryWhere ID     This is your Care EveryWhere ID. This could be used by other organizations to access your [place] medical records  [id number]         Blood Pressure from Last 3 Encounters:   10/10/17 126/80   17 134/79   17 130/78    Weight from Last 3 Encounters:   10/10/17 85.3 kg (188 lb)   17 82.5 kg (181 lb 12.8 oz)   17 81.6 kg (180 lb)              We Performed the Following     INTERROGATION DEVICE EVAL REMOTE, PACER/ICD (27006)     PM DEVICE INTERROGATE REMOTE (10642)        Primary Care Provider Office Phone # Fax #    Eliezer Clement Shah [phone]       [address] [facility] [address]        Equal Access to Services     [facility] JIMY : Hadii aad ku hadasho Soomaali, waaxda luqadaha, qaybta kaalmada adeegyada, waxay idiin hayaiem franco . So [facility] [phone].    ATENCIÓN: Si habla español, tiene a monge disposición servicios gratuitos de asistencia lingüística. Llame al [phone].    We comply with applicable federal civil rights laws and [place] laws. We do not discriminate on the basis of race, color, national origin, age, disability, sex, sexual orientation, or gender identity.            Thank you!     Thank you for choosing [facility]  for your care. Our goal is always to provide you with excellent care. Hearing " back from our patients is one way we can continue to improve our services. Please take a few minutes to complete the written survey that you may receive in the mail after your visit with us. Thank you!             Your Updated Medication List - Protect others around you: Learn how to safely use, store and throw away your medicines at www.disposemymeds.org.          This list is accurate as of: 11/7/17  1:30 PM.  Always use your most recent med list.                   Brand Name Dispense Instructions for use Diagnosis    ACE/ARB/ARNI NOT PRESCRIBED (INTENTIONAL)      ACE & ARB not prescribed due to Disease of aortic or mitral valves    Aortic valve disorders, Cardiomyopathy (H)       albuterol 108 (90 BASE) MCG/ACT Inhaler    PROAIR HFA/PROVENTIL HFA/VENTOLIN HFA    3 Inhaler    Inhale 2 puffs into the lungs as needed for shortness of breath / dyspnea or wheezing    Mild persistent asthma without complication       aspirin 81 MG tablet     30 tablet    Take 81 mg by mouth daily    CAD (coronary artery disease)       ASTELIN 0.1 % spray   Generic drug:  azelastine      Spray 1 spray into both nostrils 2 times daily.        beclomethasone 80 MCG/ACT Inhaler    QVAR    2 Inhaler    Inhale 2 puffs into the lungs 2 times daily    Moderate persistent asthma without complication       carvedilol 3.125 MG tablet    COREG    180 tablet    TAKE 1 TABLET(3.125 MG) BY MOUTH TWICE DAILY WITH MEALS    Essential hypertension       cetirizine 10 MG tablet    zyrTEC     Take 10 mg by mouth every morning        isosorbide mononitrate 30 MG 24 hr tablet    IMDUR    90 tablet    TAKE 1 TABLET BY MOUTH EVERY DAY    Coronary artery disease involving native coronary artery of native heart without angina pectoris       NASONEX 50 MCG/ACT spray   Generic drug:  mometasone      Spray 2 sprays into both nostrils daily as needed.        nitroGLYcerin 0.4 MG sublingual tablet    NITROSTAT    25 tablet    Place 1 tablet (0.4 mg) under the tongue  every 5 minutes as needed for chest pain    Chest pain on exertion       polyethylene glycol Packet    MIRALAX/GLYCOLAX     Take 17 g by mouth daily        potassium chloride SA 20 MEQ CR tablet    K-DUR/KLOR-CON M    30 tablet    Take 2 pills today, one pill tomorrow then one pill daily ONLY when you take the Demadex    Chronic combined systolic and diastolic congestive heart failure (H)       rosuvastatin 40 MG tablet    CRESTOR    90 tablet    Take 1 tablet (40 mg) by mouth daily    Coronary artery disease involving native coronary artery of native heart without angina pectoris       torsemide 20 MG tablet    DEMADEX    30 tablet    Take 1 tablet (20 mg) by mouth daily as needed    Chronic combined systolic and diastolic congestive heart failure (H)       triamcinolone 0.1 % cream    KENALOG    45 g    Apply sparingly to affected area two times daily as needed    Stasis dermatitis of both legs

## 2017-11-07 NOTE — PROGRESS NOTES
[facility] (D) Remote PPM Device Check  AP: 96 % : 59 %  Mode: DDDR        Presenting Rhythm: AP/VS  Heart Rate: Adequate rates per histogram  Sensing: Stable    Pacing Threshold: Stable    Impedance: Stable  Battery Status: 5.5-6.1 years  Atrial Arrhythmia: None  Ventricular Arrhythmia: None     Care Plan: F/u Annual threshold in 3 months. LM with results. Anahi,CVT

## 2017-12-22 ENCOUNTER — DOCUMENTATION ONLY (OUTPATIENT)
Dept: CARDIOLOGY | Facility: CLINIC | Age: 82
End: 2017-12-22

## 2017-12-22 DIAGNOSIS — I25.10 CORONARY ARTERY DISEASE INVOLVING NATIVE CORONARY ARTERY OF NATIVE HEART WITHOUT ANGINA PECTORIS: ICD-10-CM

## 2017-12-22 LAB
ALT SERPL W P-5'-P-CCNC: 23 U/L (ref 0–70)
CHOLEST SERPL-MCNC: 102 MG/DL
HDLC SERPL-MCNC: 40 MG/DL
LDLC SERPL CALC-MCNC: 44 MG/DL
NONHDLC SERPL-MCNC: 62 MG/DL
TRIGL SERPL-MCNC: 91 MG/DL

## 2017-12-22 PROCEDURE — 80061 LIPID PANEL: CPT | Performed by: INTERNAL MEDICINE

## 2017-12-22 PROCEDURE — 84460 ALANINE AMINO (ALT) (SGPT): CPT | Performed by: INTERNAL MEDICINE

## 2017-12-22 PROCEDURE — 36415 COLL VENOUS BLD VENIPUNCTURE: CPT | Performed by: INTERNAL MEDICINE

## 2017-12-22 NOTE — PROGRESS NOTES
FLP and ALT results from 12/22 noted. Done after switching from atorvastatin to rosuvastatin.   Chol HDL LDL bina Chol/HDL TG   12/22/17 0805 102 40 44 -- 91   09/12/17 0956 153 42 84 -- 134      ALT AST   12/22/17 0805 23 --   09/12/17 0956 70 38     Will message Dr. Eli for review.

## 2017-12-26 ENCOUNTER — OFFICE VISIT (OUTPATIENT)
Dept: FAMILY MEDICINE | Facility: CLINIC | Age: 82
End: 2017-12-26
Payer: COMMERCIAL

## 2017-12-26 VITALS
OXYGEN SATURATION: 98 % | BODY MASS INDEX: 27.63 KG/M2 | RESPIRATION RATE: 12 BRPM | TEMPERATURE: 96.5 F | WEIGHT: 193 LBS | HEIGHT: 70 IN | DIASTOLIC BLOOD PRESSURE: 68 MMHG | HEART RATE: 103 BPM | SYSTOLIC BLOOD PRESSURE: 116 MMHG

## 2017-12-26 DIAGNOSIS — I50.42 CHRONIC COMBINED SYSTOLIC AND DIASTOLIC CONGESTIVE HEART FAILURE (H): ICD-10-CM

## 2017-12-26 DIAGNOSIS — N18.30 TYPE 2 DIABETES MELLITUS WITH STAGE 3 CHRONIC KIDNEY DISEASE, WITHOUT LONG-TERM CURRENT USE OF INSULIN (H): Primary | ICD-10-CM

## 2017-12-26 DIAGNOSIS — E11.22 TYPE 2 DIABETES MELLITUS WITH STAGE 3 CHRONIC KIDNEY DISEASE, WITHOUT LONG-TERM CURRENT USE OF INSULIN (H): Primary | ICD-10-CM

## 2017-12-26 DIAGNOSIS — R60.0 PERIPHERAL EDEMA: Chronic | ICD-10-CM

## 2017-12-26 DIAGNOSIS — I10 ESSENTIAL HYPERTENSION, BENIGN: ICD-10-CM

## 2017-12-26 DIAGNOSIS — N18.30 CKD (CHRONIC KIDNEY DISEASE) STAGE 3, GFR 30-59 ML/MIN (H): ICD-10-CM

## 2017-12-26 DIAGNOSIS — J45.40 MODERATE PERSISTENT ASTHMA WITHOUT COMPLICATION: ICD-10-CM

## 2017-12-26 LAB — HBA1C MFR BLD: 5.9 % (ref 4.3–6)

## 2017-12-26 PROCEDURE — 36415 COLL VENOUS BLD VENIPUNCTURE: CPT | Performed by: FAMILY MEDICINE

## 2017-12-26 PROCEDURE — 83036 HEMOGLOBIN GLYCOSYLATED A1C: CPT | Performed by: FAMILY MEDICINE

## 2017-12-26 PROCEDURE — 84520 ASSAY OF UREA NITROGEN: CPT | Performed by: FAMILY MEDICINE

## 2017-12-26 PROCEDURE — 99214 OFFICE O/P EST MOD 30 MIN: CPT | Performed by: FAMILY MEDICINE

## 2017-12-26 PROCEDURE — 82565 ASSAY OF CREATININE: CPT | Performed by: FAMILY MEDICINE

## 2017-12-26 PROCEDURE — 80051 ELECTROLYTE PANEL: CPT | Performed by: FAMILY MEDICINE

## 2017-12-26 PROCEDURE — 82043 UR ALBUMIN QUANTITATIVE: CPT | Performed by: FAMILY MEDICINE

## 2017-12-26 NOTE — PATIENT INSTRUCTIONS
Will see back pending review of tests. The patient has been taking his torsemide QOD and will switch to daily.

## 2017-12-26 NOTE — PROGRESS NOTES
"Per Dr. Eli's review of lipid panel \"much better, continue current dose.  Contacted patient to review lipid results and Dr. Eli's recommendation to continue crestor 40mg daily. Patient verbalized understanding and agreed with plan.  "

## 2017-12-26 NOTE — PROGRESS NOTES
SUBJECTIVE:   Abbe Lambert is a 83 year old male who presents to clinic today for the following health issues:    Diabetes Follow-up      Patient is checking blood sugars: not at all    Diabetic concerns: None     Symptoms of hypoglycemia (low blood sugar): none     Paresthesias (numbness or burning in feet) or sores: No     Date of last diabetic eye exam: 2017    Hyperlipidemia Follow-Up      Rate your low fat/cholesterol diet?: good    Taking statin?  No    Other lipid medications/supplements?:  none    Hypertension Follow-up      Outpatient blood pressures are not being checked.    Low Salt Diet: low salt    BP Readings from Last 2 Encounters:   12/26/17 116/68   10/10/17 126/80     Hemoglobin A1C (%)   Date Value   12/26/2017 5.9   04/12/2017 6.0     LDL Cholesterol Calculated (mg/dL)   Date Value   12/22/2017 44   09/12/2017 84     Heart Failure Follow-up    Symptoms:    Shortness of breath: none    Lower extremity edema: none    Chest pain: No    Using more pillows than normal: No    Cough at night: No    Weight:    Checking weight daily: Yes    Weight change: none    Cardiology visits, ER/UC, or hospital admissions since last visit: None    Medication side effects: swelling    Asthma Follow-Up    Was ACT completed today?    Yes    ACT Total Scores 9/12/2017   ACT TOTAL SCORE (Goal Greater than or Equal to 20) 21   In the past 12 months, how many times did you visit the emergency room for your asthma without being admitted to the hospital? 0   In the past 12 months, how many times were you hospitalized overnight because of your asthma? 0       Recent asthma triggers that patient is dealing with: None      Amount of exercise or physical activity: None    Problems taking medications regularly: No    Medication side effects: none    Diet: regular (no restrictions)        Problem list and histories reviewed & adjusted, as indicated.  Additional history: as documented    Patient Active Problem List    Diagnosis     Health Care Home     Allergic rhinitis     Peripheral edema     Polymyalgia rheumatica (H)     Advanced directives, counseling/discussion     Ischemic cardiomyopathy     Paroxysmal atrial fibrillation (H)     Coronary artery disease involving native coronary artery of native heart without angina pectoris     Intermittent lightheadedness     CKD (chronic kidney disease) stage 3, GFR 30-59 ml/min     GERD (gastroesophageal reflux disease)     Tachy-alix syndrome (H)     AAA (abdominal aortic aneurysm) (H)     Old myocardial infarction     Chronic combined systolic and diastolic congestive heart failure (H)     Essential hypertension, benign     Type 2 diabetes mellitus with stage 3 chronic kidney disease, without long-term current use of insulin (H)                                                                                                                                                                                                                  Moderate persistent asthma without complication     Prostate cancer (H)     Mixed hyperlipidemia     Overweight (BMI 25.0-29.9)     Degenerative arthritis of knee     Aftercare following knee joint replacement surgery, unspecified laterality     Anemia due to blood loss, acute     Shingles     Acute kidney injury (H)     Physical deconditioning     Post herpetic neuralgia     Mild persistent asthma with acute exacerbation     Presbycusis of both ears     Chronic non-seasonal allergic rhinitis, unspecified trigger     Past Surgical History:   Procedure Laterality Date     ARTHROPLASTY KNEE Left 10/5/2016    Procedure: ARTHROPLASTY KNEE;  Surgeon: Keshav Zamudio MD;  Location:  OR     CARDIAC SURGERY  12/03/2012    pacemaker ; CABG 1998     CHOLECYSTECTOMY       COLONOSCOPY       ENT SURGERY  5-    sinus     EXTRACAPSULAR CATARACT EXTRATION WITH INTRAOCULAR LENS IMPLANT       GENITOURINARY SURGERY      prostatectomy     IMPLANT PACEMAKER   "12-3-12    [facility]     PROSTATE SURGERY         Social History   Substance Use Topics     Smoking status: Never Smoker     Smokeless tobacco: Never Used     Alcohol use No     Family History   Problem Relation Age of Onset     CEREBROVASCULAR DISEASE Father      HEART DISEASE Father      HEART DISEASE Brother      Coronary Artery Disease Brother      MI age 50     Depression Daughter      raped in high school     Unknown/Adopted Maternal Grandmother      Unknown/Adopted Maternal Grandfather      Unknown/Adopted Paternal Grandmother      Unknown/Adopted Paternal Grandfather      DIABETES No family hx of      Hypertension No family hx of      Hyperlipidemia No family hx of      Breast Cancer No family hx of      Colon Cancer No family hx of      Prostate Cancer No family hx of      Other Cancer No family hx of      Anxiety Disorder No family hx of      MENTAL ILLNESS No family hx of      Substance Abuse No family hx of      Anesthesia Reaction No family hx of      Asthma No family hx of      OSTEOPOROSIS No family hx of      Genetic Disorder No family hx of      Thyroid Disease No family hx of      Obesity No family hx of              Reviewed and updated as needed this visit by clinical staffTobacco  Allergies  Meds  Problems       Reviewed and updated as needed this visit by Provider         ROS:  Constitutional, HEENT, cardiovascular, pulmonary, gi and gu systems are negative, except as otherwise noted.      OBJECTIVE:                                                    /68  Pulse 103  Temp 96.5  F (35.8  C) (Tympanic)  Resp 12  Ht 5' 10\" (1.778 m)  Wt 193 lb (87.5 kg)  SpO2 98%  BMI 27.69 kg/m2  Body mass index is 27.69 kg/(m^2).  GENERAL APPEARANCE: healthy, alert and no distress  RESP: lungs clear to auscultation - no rales, rhonchi or wheezes  CV: regular rates and rhythm, normal S1 S2, no S3 or S4 and no murmur, click or rub         ASSESSMENT/PLAN:                                               "          ICD-10-CM    1. Type 2 diabetes mellitus with stage 3 chronic kidney disease, without long-term current use of insulin (H) E11.22 HEMOGLOBIN A1C    N18.3 Albumin Random Urine Quantitative with Creat Ratio   2. CKD (chronic kidney disease) stage 3, GFR 30-59 ml/min N18.3 Electrolyte panel (Na, K, Cl, CO2, Anion gap)     Creatinine     Urea nitrogen   3. Peripheral edema R60.9    4. Chronic combined systolic and diastolic congestive heart failure (H) I50.42    5. Essential hypertension, benign I10    6. Moderate persistent asthma without complication J45.40        Patient Instructions   Will see back pending review of tests. The patient has been taking his torsemide QOD and will switch to daily.      Eliezer Shah MD  [facility]

## 2017-12-26 NOTE — NURSING NOTE
"Chief Complaint   Patient presents with     Recheck Medication     /68  Pulse 103  Temp 96.5  F (35.8  C) (Tympanic)  Resp 12  Ht 5' 10\" (1.778 m)  Wt 193 lb (87.5 kg)  SpO2 98%  BMI 27.69 kg/m2 Estimated body mass index is 27.69 kg/(m^2) as calculated from the following:    Height as of this encounter: 5' 10\" (1.778 m).    Weight as of this encounter: 193 lb (87.5 kg).  BP completed using cuff size: regular   Eliza Robertson CMA    Health Maintenance Due   Topic Date Due     EYE EXAM Q1 YEAR  10/22/2014     MICROALBUMIN Q1 YEAR  09/08/2017     A1C Q6 MO  10/12/2017     Health Maintenance reviewed at today's visit patient asked to schedule/complete:   None, Health Maintenance up to date.  Diabetes:  Patient agrees to schedule    "

## 2017-12-26 NOTE — LETTER
December 30, 2017      Abbe Lambert  [address] [facility] [address]        Dear ,    We are writing to inform you of your test results.    Your test results fall within the expected range(s) or remain unchanged from previous results.  Please continue with current treatment plan. We can repeat these in 3 months(March).    Resulted Orders   HEMOGLOBIN A1C   Result Value Ref Range    Hemoglobin A1C 5.9 4.3 - 6.0 %   Albumin Random Urine Quantitative with Creat Ratio   Result Value Ref Range    Creatinine Urine 167 mg/dL    Albumin Urine mg/L 34 mg/L    Albumin Urine mg/g Cr 20.66 (H) 0 - 17 mg/g Cr   Electrolyte panel (Na, K, Cl, CO2, Anion gap)   Result Value Ref Range    Sodium 141 133 - 144 mmol/L    Potassium 4.1 3.4 - 5.3 mmol/L    Chloride 107 94 - 109 mmol/L    Carbon Dioxide 32 20 - 32 mmol/L    Anion Gap 2 (L) 3 - 14 mmol/L   Creatinine   Result Value Ref Range    Creatinine 1.56 (H) 0.66 - 1.25 mg/dL    GFR Estimate 43 (L) >60 mL/min/1.7m2      Comment:      Non  GFR Calc    GFR Estimate If Black 52 (L) >60 mL/min/1.7m2      Comment:       GFR Calc   Urea nitrogen   Result Value Ref Range    Urea Nitrogen 19 7 - 30 mg/dL       If you have any questions or concerns, please call the clinic at the number listed above.       Sincerely,        Eliezer Shah MD

## 2017-12-27 LAB
ANION GAP SERPL CALCULATED.3IONS-SCNC: 2 MMOL/L (ref 3–14)
BUN SERPL-MCNC: 19 MG/DL (ref 7–30)
CHLORIDE SERPL-SCNC: 107 MMOL/L (ref 94–109)
CO2 SERPL-SCNC: 32 MMOL/L (ref 20–32)
CREAT SERPL-MCNC: 1.56 MG/DL (ref 0.66–1.25)
CREAT UR-MCNC: 167 MG/DL
GFR SERPL CREATININE-BSD FRML MDRD: 43 ML/MIN/1.7M2
MICROALBUMIN UR-MCNC: 34 MG/L
MICROALBUMIN/CREAT UR: 20.66 MG/G CR (ref 0–17)
POTASSIUM SERPL-SCNC: 4.1 MMOL/L (ref 3.4–5.3)
SODIUM SERPL-SCNC: 141 MMOL/L (ref 133–144)

## 2018-01-01 ENCOUNTER — TRANSFERRED RECORDS (OUTPATIENT)
Dept: HEALTH INFORMATION MANAGEMENT | Facility: CLINIC | Age: 83
End: 2018-01-01

## 2018-01-17 ENCOUNTER — OFFICE VISIT (OUTPATIENT)
Dept: FAMILY MEDICINE | Facility: CLINIC | Age: 83
End: 2018-01-17
Payer: COMMERCIAL

## 2018-01-17 VITALS
SYSTOLIC BLOOD PRESSURE: 122 MMHG | WEIGHT: 193 LBS | TEMPERATURE: 97.9 F | RESPIRATION RATE: 20 BRPM | HEART RATE: 108 BPM | BODY MASS INDEX: 27.63 KG/M2 | DIASTOLIC BLOOD PRESSURE: 60 MMHG | OXYGEN SATURATION: 98 % | HEIGHT: 70 IN

## 2018-01-17 DIAGNOSIS — L98.9 SKIN LESION: Primary | ICD-10-CM

## 2018-01-17 PROCEDURE — 99212 OFFICE O/P EST SF 10 MIN: CPT | Performed by: INTERNAL MEDICINE

## 2018-01-17 NOTE — PROGRESS NOTES
"  SUBJECTIVE:   Abbe Lambert is a 83 year old male who presents to clinic today for the following health issues:      Check bump\" on scalp-top of, noticed about 9 months ago.    He has a non-healing lesion on his occiput. A scab forms and peels off, bleeds, and a new scab forms.     Problem list and histories reviewed & adjusted, as indicated.      Current Outpatient Prescriptions   Medication Sig Dispense Refill     rosuvastatin (CRESTOR) 40 MG tablet Take 1 tablet (40 mg) by mouth daily 90 tablet 3     triamcinolone (KENALOG) 0.1 % cream Apply sparingly to affected area two times daily as needed 45 g 3     carvedilol (COREG) 3.125 MG tablet TAKE 1 TABLET(3.125 MG) BY MOUTH TWICE DAILY WITH MEALS 180 tablet 3     isosorbide mononitrate (IMDUR) 30 MG 24 hr tablet TAKE 1 TABLET BY MOUTH EVERY DAY 90 tablet 3     potassium chloride SA (K-DUR/KLOR-CON M) 20 MEQ CR tablet Take 2 pills today, one pill tomorrow then one pill daily ONLY when you take the Demadex 30 tablet 5     albuterol (PROAIR HFA/PROVENTIL HFA/VENTOLIN HFA) 108 (90 BASE) MCG/ACT Inhaler Inhale 2 puffs into the lungs as needed for shortness of breath / dyspnea or wheezing 3 Inhaler 11     torsemide (DEMADEX) 20 MG tablet Take 1 tablet (20 mg) by mouth daily as needed 30 tablet 1     beclomethasone (QVAR) 80 MCG/ACT Inhaler Inhale 2 puffs into the lungs 2 times daily 2 Inhaler 11     aspirin 81 MG tablet Take 81 mg by mouth daily  30 tablet      polyethylene glycol (MIRALAX/GLYCOLAX) packet Take 17 g by mouth daily        nitroglycerin (NITROSTAT) 0.4 MG SL tablet Place 1 tablet (0.4 mg) under the tongue every 5 minutes as needed for chest pain 25 tablet 3     azelastine (ASTELIN) 137 MCG/SPRAY nasal spray [place] 1 spray into both nostrils 2 times daily.       cetirizine (ZYRTEC) 10 MG tablet Take 10 mg by mouth every morning        mometasone (NASONEX) 50 MCG/ACT nasal spray Spray 2 sprays into both nostrils daily as needed.       Allergies   Allergen " "Reactions     Advair Diskus      DENIED     Morphine Hcl      Decrease  Blood Pressure Lower Than Normal, Per Pt.     Vicodin [Hydrocodone-Acetaminophen] Visual Disturbance     BP Readings from Last 3 Encounters:   01/17/18 122/60   12/26/17 116/68   10/10/17 126/80    Wt Readings from Last 3 Encounters:   01/17/18 193 lb (87.5 kg)   12/26/17 193 lb (87.5 kg)   10/10/17 188 lb (85.3 kg)                      Reviewed and updated as needed this visit by clinical staff       Reviewed and updated as needed this visit by Provider         ROS:NA      OBJECTIVE:                                                    /60 (BP Location: Left arm, Patient Position: Chair, Cuff Size: Adult Large)  Pulse 108  Temp 97.9  F (36.6  C)  Resp 20  Ht 5' 10\" (1.778 m)  Wt 193 lb (87.5 kg)  SpO2 98%  BMI 27.69 kg/m2  Body mass index is 27.69 kg/(m^2).  GENERAL APPEARANCE: alert and no distress  SKIN: small eschar,dark color,with minimal surrounding erythema,occiput    Diagnostic test results:  none        ASSESSMENT/PLAN:                                                      ICD-10-CM    1. Skin lesion L98.9 DERMATOLOGY REFERRAL    occiput       Non-healing scalp lesion.      Possible skin cancer. D/w pt.       He agrees.   Patient Instructions   Call for a dermatology appointment.       Lucas Nguyen MD  [facility]  "

## 2018-01-17 NOTE — NURSING NOTE
"Chief Complaint   Patient presents with     Derm Problem       Initial /60 (BP Location: Left arm, Patient Position: Chair, Cuff Size: Adult Large)  Pulse 108  Temp 97.9  F (36.6  C)  Resp 20  Ht 5' 10\" (1.778 m)  Wt 193 lb (87.5 kg)  SpO2 98%  BMI 27.69 kg/m2 Estimated body mass index is 27.69 kg/(m^2) as calculated from the following:    Height as of this encounter: 5' 10\" (1.778 m).    Weight as of this encounter: 193 lb (87.5 kg).  Medication Reconciliation: complete   Sara Magallon LPN  "

## 2018-01-17 NOTE — MR AVS SNAPSHOT
After Visit Summary   1/17/2018    Abbe Lambert    MRN: [id number]           Patient Information     Date Of Birth          2/3/1934        Visit Information        Provider Department      1/17/2018 10:45 AM Lucas Nguyen MD [facility]        Today's Diagnoses     Skin lesion    -  1      Care Instructions    Call for a dermatology appointment.           Follow-ups after your visit        Additional Services     DERMATOLOGY REFERRAL       Your provider has referred you to: [facility]: [facility] [phone]   [email or web address]                 HE HAS A NON-HEALING LESION ON HIS SCALP; ;DURATION 9 MONTHS  Please be aware that coverage of these services is subject to the terms and limitations of your health insurance plan.  Call member services at your health plan with any benefit or coverage questions.      Please bring the following with you to your appointment:    (1) Any X-Rays, CTs or MRIs which have been performed.  Contact the facility where they were done to arrange for  prior to your scheduled appointment.    (2) List of current medications  (3) This referral request   (4) Any documents/labs given to you for this referral                  Your next 10 appointments already scheduled     Feb 07, 2018  9:00 AM CST   Pacemaker Check with GHAZAL SQUIRES   [facility]    [address] [place] [address] [phone]              Who to contact     If you have questions or need follow up information about today's clinic visit or your schedule please contact [facility] directly at [phone].  Normal or non-critical lab and imaging results will be communicated to you by MyChart, letter or phone within 4 business days after the clinic has received the results. If you do not hear from us within 7  "days, please contact the clinic through yeppt or phone. If you have a critical or abnormal lab result, we will notify you by phone as soon as possible.  Submit refill requests through yeppt or call your pharmacy and they will forward the refill request to us. Please allow 3 business days for your refill to be completed.          Additional Information About Your Visit        [facility] Information     yeppt lets you send messages to your doctor, view your test results, renew your prescriptions, schedule appointments and more. To sign up, go to [email or web address] . Click on \"Log in\" on the left side of the screen, which will take you to the Welcome page. Then click on \"Sign up Now\" on the right side of the page.     You will be asked to enter the access code listed below, as well as some personal information. Please follow the directions to create your username and password.     Your access code is: [email or web address]  Expires: 2018  1:30 PM     Your access code will  in 90 days. If you need help or a new code, please call your [facility] clinic or [phone].        Care EveryWhere ID     This is your Care EveryWhere ID. This could be used by other organizations to access your [facility] medical records  [id number]        Your Vitals Were     Pulse Temperature Respirations Height Pulse Oximetry BMI (Body Mass Index)    108 97.9  F (36.6  C) 20 5' 10\" (1.778 m) 98% 27.69 kg/m2       Blood Pressure from Last 3 Encounters:   18 122/60   17 116/68   10/10/17 126/80    Weight from Last 3 Encounters:   18 193 lb (87.5 kg)   17 193 lb (87.5 kg)   10/10/17 188 lb (85.3 kg)              We Performed the Following     DERMATOLOGY REFERRAL        Primary Care Provider Office Phone # Fax #    Eliezer Shah [phone] [address] [facility] [address]        Equal Access to Services     JIM AHN AH: Shanika Hernandez, stan rodriguez, qaybta " skylar tiwari rosendonirav jhonatan franco ah. [facility] [phone].    ATENCIÓN: Si prosper jean-baptiste, tiene a monge disposición servicios gratuitos de asistencia lingüística. Devin al [phone].    We comply with applicable federal civil rights laws and [place] laws. We do not discriminate on the basis of race, color, national origin, age, disability, sex, sexual orientation, or gender identity.            Thank you!     Thank you for choosing [facility]  for your care. Our goal is always to provide you with excellent care. Hearing back from our patients is one way we can continue to improve our services. Please take a few minutes to complete the written survey that you may receive in the mail after your visit with us. Thank you!             Your Updated Medication List - Protect others around you: Learn how to safely use, store and throw away your medicines at www.disposemymeds.org.          This list is accurate as of: 1/17/18 11:10 AM.  Always use your most recent med list.                   Brand Name Dispense Instructions for use Diagnosis    albuterol 108 (90 BASE) MCG/ACT Inhaler    PROAIR HFA/PROVENTIL HFA/VENTOLIN HFA    3 Inhaler    Inhale 2 puffs into the lungs as needed for shortness of breath / dyspnea or wheezing    Mild persistent asthma without complication       aspirin 81 MG tablet     30 tablet    Take 81 mg by mouth daily    CAD (coronary artery disease)       ASTELIN 0.1 % spray   Generic drug:  azelastine      Spray 1 spray into both nostrils 2 times daily.        beclomethasone 80 MCG/ACT Inhaler    QVAR    2 Inhaler    Inhale 2 puffs into the lungs 2 times daily    Moderate persistent asthma without complication       carvedilol 3.125 MG tablet    COREG    180 tablet    TAKE 1 TABLET(3.125 MG) BY MOUTH TWICE DAILY WITH MEALS    Essential hypertension       cetirizine 10 MG tablet    zyrTEC     Take 10 mg by mouth every morning        isosorbide  mononitrate 30 MG 24 hr tablet    IMDUR    90 tablet    TAKE 1 TABLET BY MOUTH EVERY DAY    Coronary artery disease involving native coronary artery of native heart without angina pectoris       NASONEX 50 MCG/ACT spray   Generic drug:  mometasone      Spray 2 sprays into both nostrils daily as needed.        nitroGLYcerin 0.4 MG sublingual tablet    NITROSTAT    25 tablet    Place 1 tablet (0.4 mg) under the tongue every 5 minutes as needed for chest pain    Chest pain on exertion       polyethylene glycol Packet    MIRALAX/GLYCOLAX     Take 17 g by mouth daily        potassium chloride SA 20 MEQ CR tablet    K-DUR/KLOR-CON M    30 tablet    Take 2 pills today, one pill tomorrow then one pill daily ONLY when you take the Demadex    Chronic combined systolic and diastolic congestive heart failure (H)       rosuvastatin 40 MG tablet    CRESTOR    90 tablet    Take 1 tablet (40 mg) by mouth daily    Coronary artery disease involving native coronary artery of native heart without angina pectoris       torsemide 20 MG tablet    DEMADEX    30 tablet    Take 1 tablet (20 mg) by mouth daily as needed    Chronic combined systolic and diastolic congestive heart failure (H)       triamcinolone 0.1 % cream    KENALOG    45 g    Apply sparingly to affected area two times daily as needed    Stasis dermatitis of both legs

## 2018-01-30 ENCOUNTER — TRANSFERRED RECORDS (OUTPATIENT)
Dept: HEALTH INFORMATION MANAGEMENT | Facility: CLINIC | Age: 83
End: 2018-01-30

## 2018-02-07 ENCOUNTER — ALLIED HEALTH/NURSE VISIT (OUTPATIENT)
Dept: CARDIOLOGY | Facility: CLINIC | Age: 83
End: 2018-02-07
Payer: COMMERCIAL

## 2018-02-07 DIAGNOSIS — I25.5 ISCHEMIC CARDIOMYOPATHY: Primary | ICD-10-CM

## 2018-02-07 DIAGNOSIS — Z95.0 CARDIAC PACEMAKER IN SITU: ICD-10-CM

## 2018-02-07 PROCEDURE — 93280 PM DEVICE PROGR EVAL DUAL: CPT | Performed by: INTERNAL MEDICINE

## 2018-02-07 NOTE — MR AVS SNAPSHOT
After Visit Summary   2/7/2018    Abbe Lambert    MRN: [id number]           Patient Information     Date Of Birth          2/3/1934        Visit Information        Provider Department      2/7/2018 9:00 AM [facility]        Today's Diagnoses     Ischemic cardiomyopathy    -  1    Cardiac pacemaker in situ           Follow-ups after your visit        Your next 10 appointments already scheduled     Feb 08, 2018 11:00 AM CST   New Visit with Keshav Hinton MD   [facility] ([facility])    [address] [id number] [phone]            May 16, 2018  2:30 PM CDT   Remote PPM Check with HARMAN TECH1   [facility] ([facility])    [id number]  [facility] [address] [phone]           This appointment is for a remote check of your pacemaker.  This is not an appointment at the office.              Who to contact     If you have questions or need follow up information about today's clinic visit or your schedule please contact [facility] directly at [phone].  Normal or non-critical lab and imaging results will be communicated to you by [employer], letter or phone within 4 business days after the clinic has received the results. If you do not hear from us within 7 days, please contact the clinic through [employer] or phone. If you have a critical or abnormal lab result, we will notify you by phone as soon as possible.  Submit refill requests through [facility] or call your pharmacy and they will forward the refill request to us. Please allow 3 business days for your refill to be completed.          Additional Information About Your Visit        MyChart Information     [facility] lets you send messages to your doctor, view your test results, renew your prescriptions, schedule  "appointments and more. To sign up, go to [email or web address] . Click on \"Log in\" on the left side of the screen, which will take you to the Welcome page. Then click on \"Sign up Now\" on the right side of the page.     You will be asked to enter the access code listed below, as well as some personal information. Please follow the directions to create your username and password.     Your access code is: [email or web address]  Expires: 2018  9:11 AM     Your access code will  in 90 days. If you need help or a new code, please call your [place] clinic or [phone].        Care EveryWhere ID     This is your Care EveryWhere ID. This could be used by other organizations to access your [place] medical records  [id number]         Blood Pressure from Last 3 Encounters:   18 122/60   17 116/68   10/10/17 126/80    Weight from Last 3 Encounters:   18 87.5 kg (193 lb)   17 87.5 kg (193 lb)   10/10/17 85.3 kg (188 lb)              We Performed the Following     Follow-Up with Device Clinic     PM DEVICE PROGRAMMING EVAL, DUAL LEAD PACER ([id number])        Primary Care Provider Office Phone # Fax #    Eliezer Shah [phone] [address] [facility] [address]        Equal Access to Services     JIM AHN AH: Hadii aad ku hadasho Soomaali, waaxda luqadaha, qaybta kaalmada adeegyachuck, skylar blanchard [facility] goodson. So [facility] [phone].    ATENCIÓN: Si habla español, tiene a monge disposición servicios gratuitos de asistencia lingüística. Devin al [phone].    We comply with applicable federal civil rights laws and [place] laws. We do not discriminate on the basis of race, color, national origin, age, disability, sex, sexual orientation, or gender identity.            Thank you!     Thank you for choosing [facility]  for your care. Our goal is always to provide you with excellent care. Hearing back from our " patients is one way we can continue to improve our services. Please take a few minutes to complete the written survey that you may receive in the mail after your visit with us. Thank you!             Your Updated Medication List - Protect others around you: Learn how to safely use, store and throw away your medicines at www.disposemymeds.org.          This list is accurate as of 2/7/18  9:11 AM.  Always use your most recent med list.                   Brand Name Dispense Instructions for use Diagnosis    albuterol 108 (90 BASE) MCG/ACT Inhaler    PROAIR HFA/PROVENTIL HFA/VENTOLIN HFA    3 Inhaler    Inhale 2 puffs into the lungs as needed for shortness of breath / dyspnea or wheezing    Mild persistent asthma without complication       aspirin 81 MG tablet     30 tablet    Take 81 mg by mouth daily    CAD (coronary artery disease)       ASTELIN 0.1 % spray   Generic drug:  azelastine      Spray 1 spray into both nostrils 2 times daily.        beclomethasone 80 MCG/ACT Inhaler    QVAR    2 Inhaler    Inhale 2 puffs into the lungs 2 times daily    Moderate persistent asthma without complication       carvedilol 3.125 MG tablet    COREG    180 tablet    TAKE 1 TABLET(3.125 MG) BY MOUTH TWICE DAILY WITH MEALS    Essential hypertension       cetirizine 10 MG tablet    zyrTEC     Take 10 mg by mouth every morning        isosorbide mononitrate 30 MG 24 hr tablet    IMDUR    90 tablet    TAKE 1 TABLET BY MOUTH EVERY DAY    Coronary artery disease involving native coronary artery of native heart without angina pectoris       NASONEX 50 MCG/ACT spray   Generic drug:  mometasone      Spray 2 sprays into both nostrils daily as needed.        nitroGLYcerin 0.4 MG sublingual tablet    NITROSTAT    25 tablet    Place 1 tablet (0.4 mg) under the tongue every 5 minutes as needed for chest pain    Chest pain on exertion       polyethylene glycol Packet    MIRALAX/GLYCOLAX     Take 17 g by mouth daily        potassium chloride SA 20  MEQ CR tablet    K-DUR/KLOR-CON M    30 tablet    Take 2 pills today, one pill tomorrow then one pill daily ONLY when you take the Demadex    Chronic combined systolic and diastolic congestive heart failure (H)       rosuvastatin 40 MG tablet    CRESTOR    90 tablet    Take 1 tablet (40 mg) by mouth daily    Coronary artery disease involving native coronary artery of native heart without angina pectoris       torsemide 20 MG tablet    DEMADEX    30 tablet    Take 1 tablet (20 mg) by mouth daily as needed    Chronic combined systolic and diastolic congestive heart failure (H)       triamcinolone 0.1 % cream    KENALOG    45 g    Apply sparingly to affected area two times daily as needed    Stasis dermatitis of both legs

## 2018-02-07 NOTE — PROGRESS NOTES
[facility] Accent (D) Pacemaker Device Check    AP: 88% : 70%  Mode: DDDR         Underlying Rhythm: SB 50s  Heart Rate: stable with good variability, patient denies exercise intolerance stating that very rarely he'll get short of breath when climbing stairs   Sensing: WNL    Pacing Threshold: WNL   Impedance: WNL  Battery Status: estimated 4.8-5.4 years longevity remaining   Device Site: remains well healed   Atrial Arrhythmia: 6 episodes logged as AMS for total burden <1%. Longest duration 1 minute 38 seconds.   Ventricular Arrhythmia: none  Setting Change: no changes made today.     Care Plan: Return to Q3 month remote checks on 5/16/2018. Patient has orders to see cardiology in 9/2018. [place]

## 2018-02-08 ENCOUNTER — OFFICE VISIT (OUTPATIENT)
Dept: DERMATOLOGY | Facility: CLINIC | Age: 83
End: 2018-02-08
Payer: COMMERCIAL

## 2018-02-08 VITALS — HEART RATE: 76 BPM | OXYGEN SATURATION: 96 % | DIASTOLIC BLOOD PRESSURE: 69 MMHG | SYSTOLIC BLOOD PRESSURE: 112 MMHG

## 2018-02-08 DIAGNOSIS — D48.19 ATYPICAL FIBROXANTHOMA: ICD-10-CM

## 2018-02-08 DIAGNOSIS — L82.1 SK (SEBORRHEIC KERATOSIS): ICD-10-CM

## 2018-02-08 DIAGNOSIS — C44.42 SQUAMOUS CELL CARCINOMA, SCALP/NECK: ICD-10-CM

## 2018-02-08 DIAGNOSIS — L81.4 LENTIGO: Primary | ICD-10-CM

## 2018-02-08 DIAGNOSIS — D18.00 ANGIOMA: ICD-10-CM

## 2018-02-08 PROCEDURE — 99203 OFFICE O/P NEW LOW 30 MIN: CPT | Mod: 25 | Performed by: DERMATOLOGY

## 2018-02-08 PROCEDURE — 11100 ZZHC BIOPSY SKIN/SUBQ/MUC MEM, SINGLE LESION: CPT | Mod: 59 | Performed by: DERMATOLOGY

## 2018-02-08 PROCEDURE — 13121 CMPLX RPR S/A/L 2.6-7.5 CM: CPT | Mod: 51 | Performed by: DERMATOLOGY

## 2018-02-08 PROCEDURE — 17311 MOHS 1 STAGE H/N/HF/G: CPT | Performed by: DERMATOLOGY

## 2018-02-08 PROCEDURE — 88341 IMHCHEM/IMCYTCHM EA ADD ANTB: CPT | Mod: TC | Performed by: DERMATOLOGY

## 2018-02-08 PROCEDURE — 88342 IMHCHEM/IMCYTCHM 1ST ANTB: CPT | Mod: TC | Performed by: DERMATOLOGY

## 2018-02-08 PROCEDURE — 88305 TISSUE EXAM BY PATHOLOGIST: CPT | Mod: TC | Performed by: DERMATOLOGY

## 2018-02-08 NOTE — PROGRESS NOTES
Abbe Lambert , a 84 year old year old male patient, I was asked to see by Dr. Nguyen for spot on scalp.    Patient states this has been present for months.  Patient reports the following symptoms:  Bleeding and growing .  Patient reports the following previous treatments none.  Patient reports the following modifying factors none.  Associated symptoms: none.  Patient has no other skin complaints today.  Remainder of the HPI, Meds, PMH, Allergies, FH, and SH was reviewed in chart.      Past Medical History:   Diagnosis Date     AAA (abdominal aortic aneurysm) (H)      Asthma      Atrial fibrillation (H)      Cardiac arrest (H)      Cardiomyopathy (H)      Chronic kidney disease      Chronic sinusitis      Coronary artery disease     cardiac cath 2014: medical management; cath 2013: PTCA to diagonal; cath 2009: medical management; CABG 1998: LIMA to LAD, LISA to RCA, SVG to circumflex     GERD (gastroesophageal reflux disease)      History of angina      Hyperlipidaemia      Hypertension, goal below 140/90      Myocardial infarction     MI with Vfib arrest 1998     Polymyalgia rheumatica (H)      Shortness of breath      Tachy-alix syndrome (H)     s/p dual chamber pacemaker 2012       Past Surgical History:   Procedure Laterality Date     ARTHROPLASTY KNEE Left 10/5/2016    Procedure: ARTHROPLASTY KNEE;  Surgeon: Keshav Zamudio MD;  Location:  OR     CARDIAC SURGERY  12/03/2012    pacemaker ; CABG 1998     CHOLECYSTECTOMY       COLONOSCOPY       ENT SURGERY  5-    sinus     EXTRACAPSULAR CATARACT EXTRATION WITH INTRAOCULAR LENS IMPLANT       GENITOURINARY SURGERY      prostatectomy     IMPLANT PACEMAKER  12-3-12    [facility]     PROSTATE SURGERY          Family History   Problem Relation Age of Onset     CEREBROVASCULAR DISEASE Father      HEART DISEASE Father      HEART DISEASE Brother      Coronary Artery Disease Brother      MI age 50     Depression Daughter      raped in high school      Unknown/Adopted Maternal Grandmother      Unknown/Adopted Maternal Grandfather      Unknown/Adopted Paternal Grandmother      Unknown/Adopted Paternal Grandfather      DIABETES No family hx of      Hypertension No family hx of      Hyperlipidemia No family hx of      Breast Cancer No family hx of      Colon Cancer No family hx of      Prostate Cancer No family hx of      Other Cancer No family hx of      Anxiety Disorder No family hx of      MENTAL ILLNESS No family hx of      Substance Abuse No family hx of      Anesthesia Reaction No family hx of      Asthma No family hx of      OSTEOPOROSIS No family hx of      Genetic Disorder No family hx of      Thyroid Disease No family hx of      Obesity No family hx of        Social History     Social History     Marital status:      Spouse name: N/A     Number of children: N/A     Years of education: N/A     Occupational History     Not on file.     Social History Main Topics     Smoking status: Never Smoker     Smokeless tobacco: Never Used     Alcohol use No     Drug use: No     Sexual activity: No     Other Topics Concern     Parent/Sibling W/ Cabg, Mi Or Angioplasty Before 65f 55m? Yes     brother and father had MI @ 40's     Caffeine Concern Yes     one diet mountain dew daily     Sleep Concern No     Stress Concern No     Special Diet No     Exercise Yes     golfing, does the stair at the office a lot     Seat Belt Yes     Social History Narrative       Outpatient Encounter Prescriptions as of 2/8/2018   Medication Sig Dispense Refill     rosuvastatin (CRESTOR) 40 MG tablet Take 1 tablet (40 mg) by mouth daily 90 tablet 3     triamcinolone (KENALOG) 0.1 % cream Apply sparingly to affected area two times daily as needed 45 g 3     carvedilol (COREG) 3.125 MG tablet TAKE 1 TABLET(3.125 MG) BY MOUTH TWICE DAILY WITH MEALS 180 tablet 3     isosorbide mononitrate (IMDUR) 30 MG 24 hr tablet TAKE 1 TABLET BY MOUTH EVERY DAY 90 tablet 3     potassium chloride SA  (K-DUR/KLOR-CON M) 20 MEQ CR tablet Take 2 pills today, one pill tomorrow then one pill daily ONLY when you take the Demadex 30 tablet 5     albuterol (PROAIR HFA/PROVENTIL HFA/VENTOLIN HFA) 108 (90 BASE) MCG/ACT Inhaler Inhale 2 puffs into the lungs as needed for shortness of breath / dyspnea or wheezing 3 Inhaler 11     torsemide (DEMADEX) 20 MG tablet Take 1 tablet (20 mg) by mouth daily as needed 30 tablet 1     beclomethasone (QVAR) 80 MCG/ACT Inhaler Inhale 2 puffs into the lungs 2 times daily 2 Inhaler 11     aspirin 81 MG tablet Take 81 mg by mouth daily  30 tablet      polyethylene glycol (MIRALAX/GLYCOLAX) packet Take 17 g by mouth daily        nitroglycerin (NITROSTAT) 0.4 MG SL tablet Place 1 tablet (0.4 mg) under the tongue every 5 minutes as needed for chest pain 25 tablet 3     azelastine (ASTELIN) 137 MCG/SPRAY nasal spray [place] 1 spray into both nostrils 2 times daily.       cetirizine (ZYRTEC) 10 MG tablet Take 10 mg by mouth every morning        mometasone (NASONEX) 50 MCG/ACT nasal spray Spray 2 sprays into both nostrils daily as needed.       No facility-administered encounter medications on file as of 2/8/2018.              Review Of Systems  Skin: As above  Eyes: negative  Ears/Nose/Throat: negative  Respiratory: No shortness of breath, dyspnea on exertion, cough, or hemoptysis  Cardiovascular: negative  Gastrointestinal: negative  Genitourinary: negative  Musculoskeletal: negative  Neurologic: negative  Psychiatric: negative  Hematologic/Lymphatic/Immunologic: negative  Endocrine: negative      O:   NAD, WDWN, Alert & Oriented, Mood & Affect wnl, Vitals stable   Here today alone   /69  Pulse 76  SpO2 96%   General appearance grant ii   Vitals stable   Alert, oriented and in no acute distress      Following lymph nodes palpated: Occipital, Cervical, Supraclavicular no lad   Stuck on papules and brown macules on trunk and ext    Red papules on trunk   Vertex scalp 1.3cm bleeding nodule          The remainder of expanded problem focused exam was unremarkable; the following areas were examined:  scalp/hair, conjunctiva/lids, face, neck, lips, chest, digits/nails, RUE, LUE.      Eyes: Conjunctivae/lids:Normal     ENT: Lips, buccal mucosa, tongue: normal    MSK:Normal    Cardiovascular: peripheral edema none    Pulm: Breathing Normal    Lymph Nodes: No Head and Neck Lymphadenopathy     Neuro/Psych: Orientation:Normal; Mood/Affect:Normal      MICRO:   Vertex scalp:There is a proliferation of irregular nests of abnormal squamous and spindle cells arising from the epidermis and invading the dermis. The dermis shows a variable superficial perivascular inflammatory infiltrate.     Vertex scalp excision:Unremarkable epidermis and dermis and subcutis, No concerning areas for malignancy.     A/P:  1. Vertex scalp r/o basal cell carcinoma   TANGENTIAL BIOPSY IN HOUSE:  After consent, anesthesia with LEC and prep, tangential excision performed and dx above confirmed with frozen section histology.  No complications and routine wound care.  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      2. Seborrheic keratosis, letnigo, angioma    BENIGN LESIONS DISCUSSED WITH PATIENT:  I discussed the specifics of tumor, prognosis, and genetics of benign lesions.  I explained that treatment of these lesions would be purely cosmetic and not medically neccessary.  I discussed with patient different removal options including excision, cautery and /or laser.      Nature and genetics of benign skin lesions dicussed with patient.  Signs and Symptoms of skin cancer discussed with patient.  Patient encouraged to perform monthly skin exams.  UV precautions reviewed with patient.  Skin care regimen reviewed with patient: Eliminate harsh soaps, i.e. Dial, zest, irsih spring; Mild soaps such as Cetaphil or Dove sensitive skin, avoid hot or cold showers, aggressive use of emollients including  vanicream, cetaphil or cerave discussed with patient.    Risks of non-melanoma skin cancer discussed with patient   Return to clinic 6 months      PROCEDURE NOTE  Vertex scalp likely squamous cell carcinoma v AFX      Pathology came back as AFX    MOHS:   Aggressive histology    After PGACAC discussed with patient, decision for Mohs surgery was made. Indication for Mohs was Aggressive histology. Patient confirmed the site with Dr. Hinton.  After anesthesia with LEC, the tumor was excised using standard Mohs technique in 1 stages(s).  CLEAR MARGINS OBTAINED and Final defect size was 2* cm.     REPAIR COMPLEX: Because of the tightness of the surrounding skin and Because of the size and full thickness nature of the defect, a complex closure was planned. After LEC anesthesia and prep, Burow's triangles were excised in the relaxed skin tension lines. The wound edges were widely undermined by dissection in the subcutaneous plane until adequate tissue mobility was obtained. Hemostasis was obtained. The wound edges were closed in a layered fashion using Vicryl and Fast Absorbing Plain Gut sutures. Postoperative length was 4.2 cm.   EBL minimal; complications none; wound care routine.  The patient was discharged in good condition and will return in one week for wound evaluation.

## 2018-02-08 NOTE — LETTER
2/8/2018         RE: Abbe Lambert  [id number] [facility] [id number]        Dear Colleague,    Thank you for referring your patient, Abbe Lambert, to the [facility]. Please see a copy of my visit note below.    Abbe Lambert , a 84 year old year old male patient, I was asked to see by Dr. Nguyen for spot on scalp.    Patient states this has been present for months.  Patient reports the following symptoms:  Bleeding and growing .  Patient reports the following previous treatments none.  Patient reports the following modifying factors none.  Associated symptoms: none.  Patient has no other skin complaints today.  Remainder of the HPI, Meds, PMH, Allergies, FH, and SH was reviewed in chart.      Past Medical History:   Diagnosis Date     AAA (abdominal aortic aneurysm) (H)      Asthma      Atrial fibrillation (H)      Cardiac arrest (H)      Cardiomyopathy (H)      Chronic kidney disease      Chronic sinusitis      Coronary artery disease     cardiac cath 2014: medical management; cath 2013: PTCA to diagonal; cath 2009: medical management; CABG 1998: LIMA to LAD, LISA to RCA, SVG to circumflex     GERD (gastroesophageal reflux disease)      History of angina      Hyperlipidaemia      Hypertension, goal below 140/90      Myocardial infarction     MI with Vfib arrest 1998     Polymyalgia rheumatica (H)      Shortness of breath      Tachy-alix syndrome (H)     s/p dual chamber pacemaker 2012       Past Surgical History:   Procedure Laterality Date     ARTHROPLASTY KNEE Left 10/5/2016    Procedure: ARTHROPLASTY KNEE;  Surgeon: Keshav Zamudio MD;  Location:  OR     CARDIAC SURGERY  12/03/2012    pacemaker ; CABG 1998     CHOLECYSTECTOMY       COLONOSCOPY       ENT SURGERY  5-    sinus     EXTRACAPSULAR CATARACT EXTRATION WITH INTRAOCULAR LENS IMPLANT       GENITOURINARY SURGERY      prostatectomy     IMPLANT PACEMAKER  12-3-12    [facility]     PROSTATE  SURGERY          Family History   Problem Relation Age of Onset     CEREBROVASCULAR DISEASE Father      HEART DISEASE Father      HEART DISEASE Brother      Coronary Artery Disease Brother      MI age 50     Depression Daughter      raped in high school     Unknown/Adopted Maternal Grandmother      Unknown/Adopted Maternal Grandfather      Unknown/Adopted Paternal Grandmother      Unknown/Adopted Paternal Grandfather      DIABETES No family hx of      Hypertension No family hx of      Hyperlipidemia No family hx of      Breast Cancer No family hx of      Colon Cancer No family hx of      Prostate Cancer No family hx of      Other Cancer No family hx of      Anxiety Disorder No family hx of      MENTAL ILLNESS No family hx of      Substance Abuse No family hx of      Anesthesia Reaction No family hx of      Asthma No family hx of      OSTEOPOROSIS No family hx of      Genetic Disorder No family hx of      Thyroid Disease No family hx of      Obesity No family hx of        Social History     Social History     Marital status:      Spouse name: N/A     Number of children: N/A     Years of education: N/A     Occupational History     Not on file.     Social History Main Topics     Smoking status: Never Smoker     Smokeless tobacco: Never Used     Alcohol use No     Drug use: No     Sexual activity: No     Other Topics Concern     Parent/Sibling W/ Cabg, Mi Or Angioplasty Before 65f 55m? Yes     brother and father had MI @ 40's     Caffeine Concern Yes     one diet mountain dew daily     Sleep Concern No     Stress Concern No     Special Diet No     Exercise Yes     golfing, does the stair at the office a lot     Seat Belt Yes     Social History Narrative       Outpatient Encounter Prescriptions as of 2/8/2018   Medication Sig Dispense Refill     rosuvastatin (CRESTOR) 40 MG tablet Take 1 tablet (40 mg) by mouth daily 90 tablet 3     triamcinolone (KENALOG) 0.1 % cream Apply sparingly to affected area two times  daily as needed 45 g 3     carvedilol (COREG) 3.125 MG tablet TAKE 1 TABLET(3.125 MG) BY MOUTH TWICE DAILY WITH MEALS 180 tablet 3     isosorbide mononitrate (IMDUR) 30 MG 24 hr tablet TAKE 1 TABLET BY MOUTH EVERY DAY 90 tablet 3     potassium chloride SA (K-DUR/KLOR-CON M) 20 MEQ CR tablet Take 2 pills today, one pill tomorrow then one pill daily ONLY when you take the Demadex 30 tablet 5     albuterol (PROAIR HFA/PROVENTIL HFA/VENTOLIN HFA) 108 (90 BASE) MCG/ACT Inhaler Inhale 2 puffs into the lungs as needed for shortness of breath / dyspnea or wheezing 3 Inhaler 11     torsemide (DEMADEX) 20 MG tablet Take 1 tablet (20 mg) by mouth daily as needed 30 tablet 1     beclomethasone (QVAR) 80 MCG/ACT Inhaler Inhale 2 puffs into the lungs 2 times daily 2 Inhaler 11     aspirin 81 MG tablet Take 81 mg by mouth daily  30 tablet      polyethylene glycol (MIRALAX/GLYCOLAX) packet Take 17 g by mouth daily        nitroglycerin (NITROSTAT) 0.4 MG SL tablet Place 1 tablet (0.4 mg) under the tongue every 5 minutes as needed for chest pain 25 tablet 3     azelastine (ASTELIN) 137 MCG/SPRAY nasal spray Placida 1 spray into both nostrils 2 times daily.       cetirizine (ZYRTEC) 10 MG tablet Take 10 mg by mouth every morning        mometasone (NASONEX) 50 MCG/ACT nasal spray Spray 2 sprays into both nostrils daily as needed.       No facility-administered encounter medications on file as of 2/8/2018.              Review Of Systems  Skin: As above  Eyes: negative  Ears/Nose/Throat: negative  Respiratory: No shortness of breath, dyspnea on exertion, cough, or hemoptysis  Cardiovascular: negative  Gastrointestinal: negative  Genitourinary: negative  Musculoskeletal: negative  Neurologic: negative  Psychiatric: negative  Hematologic/Lymphatic/Immunologic: negative  Endocrine: negative      O:   NAD, WDWN, Alert & Oriented, Mood & Affect wnl, Vitals stable   Here today alone   /69  Pulse 76  SpO2 96%   General appearance grant  ii   Vitals stable   Alert, oriented and in no acute distress      Following lymph nodes palpated: Occipital, Cervical, Supraclavicular no lad   Stuck on papules and brown macules on trunk and ext    Red papules on trunk   Vertex scalp 1.3cm bleeding nodule         The remainder of expanded problem focused exam was unremarkable; the following areas were examined:  scalp/hair, conjunctiva/lids, face, neck, lips, chest, digits/nails, RUE, LUE.      Eyes: Conjunctivae/lids:Normal     ENT: Lips, buccal mucosa, tongue: normal    MSK:Normal    Cardiovascular: peripheral edema none    Pulm: Breathing Normal    Lymph Nodes: No Head and Neck Lymphadenopathy     Neuro/Psych: Orientation:Normal; Mood/Affect:Normal      MICRO:   Vertex scalp:There is a proliferation of irregular nests of abnormal squamous and spindle cells arising from the epidermis and invading the dermis. The dermis shows a variable superficial perivascular inflammatory infiltrate.     Vertex scalp excision:Unremarkable epidermis and dermis and subcutis, No concerning areas for malignancy.     A/P:  1. Vertex scalp r/o basal cell carcinoma   TANGENTIAL BIOPSY IN HOUSE:  After consent, anesthesia with LEC and prep, tangential excision performed and dx above confirmed with frozen section histology.  No complications and routine wound care.  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      2. Seborrheic keratosis, letnigo, angioma    BENIGN LESIONS DISCUSSED WITH PATIENT:  I discussed the specifics of tumor, prognosis, and genetics of benign lesions.  I explained that treatment of these lesions would be purely cosmetic and not medically neccessary.  I discussed with patient different removal options including excision, cautery and /or laser.      Nature and genetics of benign skin lesions dicussed with patient.  Signs and Symptoms of skin cancer discussed with patient.  Patient encouraged to perform monthly skin  exams.  UV precautions reviewed with patient.  Skin care regimen reviewed with patient: Eliminate harsh soaps, i.e. Dial, zest, irsih spring; Mild soaps such as Cetaphil or Dove sensitive skin, avoid hot or cold showers, aggressive use of emollients including vanicream, cetaphil or cerave discussed with patient.    Risks of non-melanoma skin cancer discussed with patient   Return to clinic 6 months      PROCEDURE NOTE  Vertex scalp likely squamous cell carcinoma   EXCISION OF Squamous cell carcinoma, Margins confirmed with FROZEN SECTIONS AND COMPLEX: After thorough discussion of PGACAC, consent obtained, anesthesia and prep, the margins of the lesion were identified and an elliptical incision was made encompassing the lesion with 4mm margin. The incisions were made through the skin and down to and including the subcutaneous tissue. The lesion was removed en bloc and submitted for frozen section pathologic review. Clear margins obtained (2cm).  The wound edges were widely undermined until adequate tissue mobility was obtained. hemostasis was achieved. The wound edges were then closed in a layered fashion, being careful not to leave any dead space. Postoperative length was 4.2 cm.   EBL minimal; complications none; wound care routine. The patient was from the clinic in good condition and will return in one week for wound check.           Again, thank you for allowing me to participate in the care of your patient.        Sincerely,        Keshav Hinton MD

## 2018-02-08 NOTE — MR AVS SNAPSHOT
After Visit Summary   2/8/2018    Abbe Lambert    MRN: [id number]           Patient Information     Date Of Birth          2/3/1934        Visit Information        Provider Department      2/8/2018 11:00 AM Keshav Hinton MD [facility]        Today's Diagnoses     Lentigo    -  1    SK (seborrheic keratosis)        Angioma        Squamous cell carcinoma, scalp/neck          Care Instructions    [facility]   [phone]    [facility] [phone]      Stapled Wound Care      ? No strenuous activity for 48 hours. Resume moderate activity in 48 hours. No heavy exercising until you are seen for follow up in one week.    ? Take Tylenol as needed for discomfort.                                        ? Do not drink alcoholic beverages for 48 hours.    ? Keep the pressure bandage in place for 24 hours. If the bandage becomes blood tinged or loose, reinforce it with gauze and tape.   ? Remove bandage in 24 hours and begin wound care as follows:     1. Rinse the stapled area with tap water. (shower / bathe / shampoo normally)  2. Dry wound with Q tip or gauze pad  3. Apply Polysporin or Bacitracin Ointment to the staples.    Do NOT use Neosporin Ointment *  4. Cover the wound with a bandaid or nonstick gauze pad and paper tape.  5. Repeat wound care once a day until staples are removed.    In case of emergency call: [phone]                Follow-ups after your visit        Your next 10 appointments already scheduled     May 16, 2018  2:30 PM CDT   Remote PPM Check with HARMAN TECH1   [facility] ([facility])    [address] [place] [id number] [phone]           This appointment is for a remote check of your pacemaker.  This is not an appointment at the office.              Who to contact     If you have questions or need follow up information about today's clinic visit or your schedule  "please contact [facility] directly at [phone].  Normal or non-critical lab and imaging results will be communicated to you by MyChart, letter or phone within 4 business days after the clinic has received the results. If you do not hear from us within 7 days, please contact the clinic through MyChart or phone. If you have a critical or abnormal lab result, we will notify you by phone as soon as possible.  Submit refill requests through [employer] or call your pharmacy and they will forward the refill request to us. Please allow 3 business days for your refill to be completed.          Additional Information About Your Visit        [employer]et[employer] Information     [employer] lets you send messages to your doctor, view your test results, renew your prescriptions, schedule appointments and more. To sign up, go to [email or web address] . Click on \"Log in\" on the left side of the screen, which will take you to the Welcome page. Then click on \"Sign up Now\" on the right side of the page.     You will be asked to enter the access code listed below, as well as some personal information. Please follow the directions to create your username and password.     Your access code is: [email or web address]  Expires: 2018  9:11 AM     Your access code will  in 90 days. If you need help or a new code, please call your [facility] clinic or [phone].        Care EveryWhere ID     This is your Care EveryWhere ID. This could be used by other organizations to access your [facility] medical records  [id number]        Your Vitals Were     Pulse Pulse Oximetry                76 96%           Blood Pressure from Last 3 Encounters:   18 112/69   18 122/60   17 116/68    Weight from Last 3 Encounters:   18 87.5 kg (193 lb)   17 87.5 kg (193 lb)   10/10/17 85.3 kg (188 lb)              We Performed the Following     03057 - EXC MALIG SKIN LESION SCLP/NCK/HNDS/FEET/GEN 1.1-2.0 CM     BIOPSY " SKIN/SUBQ/MUC MEM, SINGLE LESION     REPAIR COMPLEX, WOUND SCALP/ARM/LEG 2.6-7.5 CM     Surgical pathology exam        Primary Care Provider Office Phone # Fax #    Eliezer Shah [phone] [address]  [facility] [address]        Equal Access to Services     JIM AHN : Hadii aad ku hadasho Soomaali, waaxda luqadaha, qaybta kaalmada adeegyada, waxay idiin hayaan adeeg kharash la'aan . So [facility] [phone].    ATENCIÓN: Si habla español, tiene a monge disposición servicios gratuitos de asistencia lingüística. Llame al [phone].    We comply with applicable federal civil rights laws and [place] laws. We do not discriminate on the basis of race, color, national origin, age, disability, sex, sexual orientation, or gender identity.            Thank you!     Thank you for choosing [facility]  for your care. Our goal is always to provide you with excellent care. Hearing back from our patients is one way we can continue to improve our services. Please take a few minutes to complete the written survey that you may receive in the mail after your visit with us. Thank you!             Your Updated Medication List - Protect others around you: Learn how to safely use, store and throw away your medicines at www.disposemymeds.org.          This list is accurate as of 2/8/18 11:57 AM.  Always use your most recent med list.                   Brand Name Dispense Instructions for use Diagnosis    albuterol 108 (90 BASE) MCG/ACT Inhaler    PROAIR HFA/PROVENTIL HFA/VENTOLIN HFA    3 Inhaler    Inhale 2 puffs into the lungs as needed for shortness of breath / dyspnea or wheezing    Mild persistent asthma without complication       aspirin 81 MG tablet     30 tablet    Take 81 mg by mouth daily    CAD (coronary artery disease)       ASTELIN 0.1 % spray   Generic drug:  azelastine      Spray 1 spray into both nostrils 2 times daily.        beclomethasone 80 MCG/ACT  Inhaler    QVAR    2 Inhaler    Inhale 2 puffs into the lungs 2 times daily    Moderate persistent asthma without complication       carvedilol 3.125 MG tablet    COREG    180 tablet    TAKE 1 TABLET(3.125 MG) BY MOUTH TWICE DAILY WITH MEALS    Essential hypertension       cetirizine 10 MG tablet    zyrTEC     Take 10 mg by mouth every morning        isosorbide mononitrate 30 MG 24 hr tablet    IMDUR    90 tablet    TAKE 1 TABLET BY MOUTH EVERY DAY    Coronary artery disease involving native coronary artery of native heart without angina pectoris       NASONEX 50 MCG/ACT spray   Generic drug:  mometasone      Spray 2 sprays into both nostrils daily as needed.        nitroGLYcerin 0.4 MG sublingual tablet    NITROSTAT    25 tablet    Place 1 tablet (0.4 mg) under the tongue every 5 minutes as needed for chest pain    Chest pain on exertion       polyethylene glycol Packet    MIRALAX/GLYCOLAX     Take 17 g by mouth daily        potassium chloride SA 20 MEQ CR tablet    K-DUR/KLOR-CON M    30 tablet    Take 2 pills today, one pill tomorrow then one pill daily ONLY when you take the Demadex    Chronic combined systolic and diastolic congestive heart failure (H)       rosuvastatin 40 MG tablet    CRESTOR    90 tablet    Take 1 tablet (40 mg) by mouth daily    Coronary artery disease involving native coronary artery of native heart without angina pectoris       torsemide 20 MG tablet    DEMADEX    30 tablet    Take 1 tablet (20 mg) by mouth daily as needed    Chronic combined systolic and diastolic congestive heart failure (H)       triamcinolone 0.1 % cream    KENALOG    45 g    Apply sparingly to affected area two times daily as needed    Stasis dermatitis of both legs

## 2018-02-08 NOTE — NURSING NOTE
"Chief Complaint   Patient presents with     Skin Check     scalp       Initial /69  Pulse 76  SpO2 96% Estimated body mass index is 27.69 kg/(m^2) as calculated from the following:    Height as of 1/17/18: 1.778 m (5' 10\").    Weight as of 1/17/18: 87.5 kg (193 lb).  Medication Reconciliation: complete    "

## 2018-02-08 NOTE — PATIENT INSTRUCTIONS
[facility]   [phone]    [facility] [phone]      Stapled Wound Care      ? No strenuous activity for 48 hours. Resume moderate activity in 48 hours. No heavy exercising until you are seen for follow up in one week.    ? Take Tylenol as needed for discomfort.                                        ? Do not drink alcoholic beverages for 48 hours.    ? Keep the pressure bandage in place for 24 hours. If the bandage becomes blood tinged or loose, reinforce it with gauze and tape.   ? Remove bandage in 24 hours and begin wound care as follows:     1. Rinse the stapled area with tap water. (shower / bathe / shampoo normally)  2. Dry wound with Q tip or gauze pad  3. Apply Polysporin or Bacitracin Ointment to the staples.    Do NOT use Neosporin Ointment *  4. Cover the wound with a bandaid or nonstick gauze pad and paper tape.  5. Repeat wound care once a day until staples are removed.    In case of emergency call: [phone]

## 2018-02-13 LAB — COPATH REPORT: NORMAL

## 2018-02-14 ENCOUNTER — TELEPHONE (OUTPATIENT)
Dept: DERMATOLOGY | Facility: CLINIC | Age: 83
End: 2018-02-14

## 2018-02-14 DIAGNOSIS — I50.42 CHRONIC COMBINED SYSTOLIC AND DIASTOLIC CONGESTIVE HEART FAILURE (H): ICD-10-CM

## 2018-02-14 RX ORDER — TORSEMIDE 20 MG/1
TABLET ORAL
Qty: 30 TABLET | Refills: 0 | Status: SHIPPED | OUTPATIENT
Start: 2018-02-14 | End: 2018-07-31

## 2018-02-14 NOTE — TELEPHONE ENCOUNTER
"Requested Prescriptions   Pending Prescriptions Disp Refills     torsemide (DEMADEX) 20 MG tablet [Pharmacy Med Name: TORSEMIDE 20MG TABLETS] 30 tablet 0     Sig: TAKE 1 TABLET(20 MG) BY MOUTH DAILY    Diuretics (Including Combos) Protocol Failed    2/14/2018  9:02 AM       Failed - Normal serum creatinine on file in past 12 months    Recent Labs   Lab Test  12/26/17   1130   CR  1.56*             Passed - Blood pressure under 140/90 in past 12 months    BP Readings from Last 3 Encounters:   02/08/18 112/69   01/17/18 122/60   12/26/17 116/68                Passed - Recent or future visit with authorizing provider's specialty    Patient had office visit in the last year or has a visit in the next 30 days with authorizing provider.  See \"Patient Info\" tab in inbasket, or \"Choose Columns\" in Meds & Orders section of the refill encounter.            Passed - Patient is age 18 or older       Passed - Normal serum potassium on file in past 12 months    Recent Labs   Lab Test  12/26/17   1130   POTASSIUM  4.1                   Passed - Normal serum sodium on file in past 12 months    Recent Labs   Lab Test  12/26/17   1130   NA  141              Routing refill request to provider for review/approval because:  Labs out of range:  creatinine          "

## 2018-02-14 NOTE — TELEPHONE ENCOUNTER
Left message on answering machine for patient to call back.    Eleni BAILEY RN  [facility]  [phone]  [facility]   [phone]      Notes Recorded by Keshav Hinton MD on 2/14/2018 at 1:47 PM  FINAL DIAGNOSIS:   Skin, vertex scalp:   - Atypical fibroxanthoma - (see comment)       This lesion on your scalp is like a squamous cell carcinoma , it was removed the day of your visit.  Please follow up[ in 6 months

## 2018-02-16 ENCOUNTER — ALLIED HEALTH/NURSE VISIT (OUTPATIENT)
Dept: DERMATOLOGY | Facility: CLINIC | Age: 83
End: 2018-02-16
Payer: COMMERCIAL

## 2018-02-16 DIAGNOSIS — Z48.01 ENCOUNTER FOR CHANGE OR REMOVAL OF SURGICAL WOUND DRESSING: Primary | ICD-10-CM

## 2018-02-16 PROCEDURE — 99207 ZZC NO CHARGE NURSE ONLY: CPT

## 2018-02-16 NOTE — TELEPHONE ENCOUNTER
Left message with wife to have patient call back for test results.    Eleni BAILEY RN  [facility]  [phone]  [facility]   [phone]

## 2018-02-16 NOTE — TELEPHONE ENCOUNTER
Patient called back- test results given.    Eleni BAILEY RN  [facility]  [phone]  [facility]   [phone]

## 2018-02-16 NOTE — MR AVS SNAPSHOT
After Visit Summary   2/16/2018    Abbe Lambert    MRN: [id number]           Patient Information     Date Of Birth          2/3/1934        Visit Information        Provider Department      2/16/2018 11:00 AM [facility] NURSE [facility]        Care Instructions    ONE WEEK DRESSING CHANGE  For  STAPLE REMOVAL     The following information has been compiled to offer assistance with the dressing change or wound evaluation. Please feel free to call our office to speak with one of the nurses if you have any questions or concerns about the progress of the wound healing process especially if there are any signs of flap necrosis or infection. We will be happy to answer any questions you might have.                                                 AFTER 24 HOURS YOU SHOULD REMOVE THE BANDAGE AND BEGIN DAILY DRESSING CHANGES AS FOLLOWS:     1) Remove Dressing.     2) Clean and dry the area with tap water using a Q-tip or sterile gauze pad.     3) Apply Vaseline, Polysporin ointment, Aquaphor or Bacitracin ointment over entire wound.  Do NOT use Neosporin ointment.     4) Cover the wound with a band-aid, or a sterile non-stick gauze pad and micropore paper tape      REPEAT THESE INSTRUCTIONS AT LEAST ONCE A DAY UNTIL THE WOUND HAS COMPLETELY HEALED. DO NOT LET THE WOUND SCAB OVER.    It is an old wives tale that a wound heals better when it is exposed to air and allowed to dry out. The wound will heal faster with a better cosmetic result if it is kept moist with ointment and covered with a bandage.     Massaging the wound site hastens the healing process by softening the scar tissue and fading the scar. Begin massaging the area one month after surgery as often as possible. Continue to massage the area for 2-3 months or until they feel the scar tissue has softened. Moisturizers can be used during the massaging but are not necessary. Ultimately it takes six months for the scar to soften  "and blend into the surrounding skin.           Follow-ups after your visit        Your next 10 appointments already scheduled     May 16, 2018  2:30 PM CDT   Remote PPM Check with HARMAN TECH1   [facility] ([facility])    [address] [place], [place] [address]  [place] [id number] [phone]           This appointment is for a remote check of your pacemaker.  This is not an appointment at the office.              Who to contact     If you have questions or need follow up information about today's clinic visit or your schedule please contact [facility] directly at [phone].  Normal or non-critical lab and imaging results will be communicated to you by [employer], letter or phone within 4 business days after the clinic has received the results. If you do not hear from us within 7 days, please contact the clinic through [employer] or phone. If you have a critical or abnormal lab result, we will notify you by phone as soon as possible.  Submit refill requests through [employer] or call your pharmacy and they will forward the refill request to us. Please allow 3 business days for your refill to be completed.          Additional Information About Your Visit        [employer] Information     [employer] lets you send messages to your doctor, view your test results, renew your prescriptions, schedule appointments and more. To sign up, go to [email or web address] . Click on \"Log in\" on the left side of the screen, which will take you to the Welcome page. Then click on \"Sign up Now\" on the right side of the page.     You will be asked to enter the access code listed below, as well as some personal information. Please follow the directions to create your username and password.     Your access code is: [email or web address]  Expires: 2018  9:11 AM     Your access code will  in 90 days. If you need help or a new code, please call your [facility] or [phone].      "   Care EveryWhere ID     This is your Care EveryWhere ID. This could be used by other organizations to access your [place] medical records  [id number]         Blood Pressure from Last 3 Encounters:   02/08/18 112/69   01/17/18 122/60   12/26/17 116/68    Weight from Last 3 Encounters:   01/17/18 87.5 kg (193 lb)   12/26/17 87.5 kg (193 lb)   10/10/17 85.3 kg (188 lb)              Today, you had the following     No orders found for display       Primary Care Provider Office Phone # Fax #    Eliezer Shah [phone]       [address] [facility] [address]        Equal Access to Services     JIM AHN : Hadii aad ku hadasho Soomaali, waaxda luqadaha, qaybta kaalmada adeegyada, waxay idiin hayaime franco . So [facility] [phone].    ATENCIÓN: Si habla español, tiene a monge disposición servicios gratuitos de asistencia lingüística. [facility] [phone].    We comply with applicable federal civil rights laws and [place] laws. We do not discriminate on the basis of race, color, national origin, age, disability, sex, sexual orientation, or gender identity.            Thank you!     Thank you for choosing [facility]  for your care. Our goal is always to provide you with excellent care. Hearing back from our patients is one way we can continue to improve our services. Please take a few minutes to complete the written survey that you may receive in the mail after your visit with us. Thank you!             Your Updated Medication List - Protect others around you: Learn how to safely use, store and throw away your medicines at www.disposemymeds.org.          This list is accurate as of 2/16/18 11:07 AM.  Always use your most recent med list.                   Brand Name Dispense Instructions for use Diagnosis    albuterol 108 (90 BASE) MCG/ACT Inhaler    PROAIR HFA/PROVENTIL HFA/VENTOLIN HFA    3 Inhaler    Inhale 2 puffs into the lungs as needed for  shortness of breath / dyspnea or wheezing    Mild persistent asthma without complication       aspirin 81 MG tablet     30 tablet    Take 81 mg by mouth daily    CAD (coronary artery disease)       ASTELIN 0.1 % spray   Generic drug:  azelastine      Spray 1 spray into both nostrils 2 times daily.        beclomethasone 80 MCG/ACT Inhaler    QVAR    2 Inhaler    Inhale 2 puffs into the lungs 2 times daily    Moderate persistent asthma without complication       carvedilol 3.125 MG tablet    COREG    180 tablet    TAKE 1 TABLET(3.125 MG) BY MOUTH TWICE DAILY WITH MEALS    Essential hypertension       cetirizine 10 MG tablet    zyrTEC     Take 10 mg by mouth every morning        isosorbide mononitrate 30 MG 24 hr tablet    IMDUR    90 tablet    TAKE 1 TABLET BY MOUTH EVERY DAY    Coronary artery disease involving native coronary artery of native heart without angina pectoris       NASONEX 50 MCG/ACT spray   Generic drug:  mometasone      Spray 2 sprays into both nostrils daily as needed.        nitroGLYcerin 0.4 MG sublingual tablet    NITROSTAT    25 tablet    Place 1 tablet (0.4 mg) under the tongue every 5 minutes as needed for chest pain    Chest pain on exertion       polyethylene glycol Packet    MIRALAX/GLYCOLAX     Take 17 g by mouth daily        potassium chloride SA 20 MEQ CR tablet    K-DUR/KLOR-CON M    30 tablet    Take 2 pills today, one pill tomorrow then one pill daily ONLY when you take the Demadex    Chronic combined systolic and diastolic congestive heart failure (H)       rosuvastatin 40 MG tablet    CRESTOR    90 tablet    Take 1 tablet (40 mg) by mouth daily    Coronary artery disease involving native coronary artery of native heart without angina pectoris       * torsemide 20 MG tablet    DEMADEX    30 tablet    Take 1 tablet (20 mg) by mouth daily as needed    Chronic combined systolic and diastolic congestive heart failure (H)       * torsemide 20 MG tablet    DEMADEX    30 tablet    TAKE 1  TABLET(20 MG) BY MOUTH DAILY    Chronic combined systolic and diastolic congestive heart failure (H)       triamcinolone 0.1 % cream    KENALOG    45 g    Apply sparingly to affected area two times daily as needed    Stasis dermatitis of both legs       * Notice:  This list has 2 medication(s) that are the same as other medications prescribed for you. Read the directions carefully, and ask your doctor or other care provider to review them with you.

## 2018-02-16 NOTE — NURSING NOTE
Pt returned to clinic for post surgery 1 week follow up bandage change staple removal. Pt has no complaints, except area is uncomfortable. Skin under staples is generally sensitive and sore to the touch. Bandage removed scalp, area cleansed with normal saline. Site is healing and wound edges approximating well except one small open area in the middle of wound.   Had trouble getting one staple out and asked Emiliana Moraes to examine wound and she was able to remove the staple.     Advised to watch for signs/sx of infection; spreading redness, drainage, odor, fever. Call or report promptly to clinic. Pt given written instructions and informed to rtc as needed. Patient verbalized understanding.   EZE Yee LPN

## 2018-02-22 ENCOUNTER — TRANSFERRED RECORDS (OUTPATIENT)
Dept: HEALTH INFORMATION MANAGEMENT | Facility: CLINIC | Age: 83
End: 2018-02-22

## 2018-03-19 ENCOUNTER — TELEPHONE (OUTPATIENT)
Dept: DERMATOLOGY | Facility: CLINIC | Age: 83
End: 2018-03-19

## 2018-03-19 NOTE — TELEPHONE ENCOUNTER
Pt came in this morning to have a spot looked at on scalp and would like someone in Dermatology to recheck. Reported there is puss coming out of the area where there was staples. Advised pt that I would give him a call as soon as I speak with a provider.     Left message for pt to call back to come in at 3:40pm for an appointment. Pt did not . Please let pt know of this if pt calls back. Thank you.    Debbie Shultz MA

## 2018-05-15 ENCOUNTER — OFFICE VISIT (OUTPATIENT)
Dept: FAMILY MEDICINE | Facility: CLINIC | Age: 83
End: 2018-05-15
Payer: COMMERCIAL

## 2018-05-15 VITALS
BODY MASS INDEX: 28.27 KG/M2 | WEIGHT: 197 LBS | TEMPERATURE: 98 F | RESPIRATION RATE: 16 BRPM | OXYGEN SATURATION: 96 % | SYSTOLIC BLOOD PRESSURE: 128 MMHG | HEART RATE: 73 BPM | DIASTOLIC BLOOD PRESSURE: 78 MMHG

## 2018-05-15 DIAGNOSIS — L29.9 ITCHING: ICD-10-CM

## 2018-05-15 DIAGNOSIS — N18.30 TYPE 2 DIABETES MELLITUS WITH STAGE 3 CHRONIC KIDNEY DISEASE, WITHOUT LONG-TERM CURRENT USE OF INSULIN (H): ICD-10-CM

## 2018-05-15 DIAGNOSIS — L85.3 XEROSIS CUTIS: Primary | ICD-10-CM

## 2018-05-15 DIAGNOSIS — I10 ESSENTIAL HYPERTENSION, BENIGN: ICD-10-CM

## 2018-05-15 DIAGNOSIS — J45.20 MILD INTERMITTENT ASTHMA WITHOUT COMPLICATION: ICD-10-CM

## 2018-05-15 DIAGNOSIS — E11.22 TYPE 2 DIABETES MELLITUS WITH STAGE 3 CHRONIC KIDNEY DISEASE, WITHOUT LONG-TERM CURRENT USE OF INSULIN (H): ICD-10-CM

## 2018-05-15 DIAGNOSIS — M54.50 MIDLINE LOW BACK PAIN WITHOUT SCIATICA, UNSPECIFIED CHRONICITY: ICD-10-CM

## 2018-05-15 PROCEDURE — 99214 OFFICE O/P EST MOD 30 MIN: CPT | Performed by: FAMILY MEDICINE

## 2018-05-15 RX ORDER — TRIAMCINOLONE ACETONIDE 1 MG/G
CREAM TOPICAL
Qty: 30 G | Refills: 5 | Status: SHIPPED | OUTPATIENT
Start: 2018-05-15 | End: 2020-12-29

## 2018-05-15 RX ORDER — GUAIFENESIN 600 MG/1
600 TABLET, EXTENDED RELEASE ORAL
COMMUNITY
End: 2019-09-24

## 2018-05-15 NOTE — MR AVS SNAPSHOT
"              After Visit Summary   5/15/2018    Abbe Lambert    MRN: [id number]           Patient Information     Date Of Birth          2/3/1934        Visit Information        Provider Department      5/15/2018 8:30 AM Eliezer Shah MD [facility]        Today's Diagnoses     Xerosis cutis    -  1    Itching        Essential hypertension, benign        Type 2 diabetes mellitus with stage 3 chronic kidney disease, without long-term current use of insulin (H)           Follow-ups after your visit        Your next 10 appointments already scheduled     May 16, 2018  2:30 PM CDT   Remote PPM Check with HARMAN TECH1   [facility] ([facility])    [address]  [facility] [address] [phone] OPT [address]           This appointment is for a remote check of your pacemaker.  This is not an appointment at the office.              Who to contact     If you have questions or need follow up information about today's clinic visit or your schedule please contact [facility] directly at [phone].  Normal or non-critical lab and imaging results will be communicated to you by [employer], letter or phone within 4 business days after the clinic has received the results. If you do not hear from us within 7 days, please contact the clinic through [employer] or phone. If you have a critical or abnormal lab result, we will notify you by phone as soon as possible.  Submit refill requests through [email or web address] or call your pharmacy and they will forward the refill request to us. Please allow 3 business days for your refill to be completed.          Additional Information About Your Visit        MyChart Information     [email or web address] lets you send messages to your doctor, view your test results, renew your prescriptions, schedule appointments and more. To sign up, go to [email or web address] . Click on \"Log in\" on the left " "side of the screen, which will take you to the Welcome page. Then click on \"Sign up Now\" on the right side of the page.     You will be asked to enter the access code listed below, as well as some personal information. Please follow the directions to create your username and password.     Your access code is: [email or web address]  Expires: 2018  9:01 AM     Your access code will  in 90 days. If you need help or a new code, please call your [place] clinic or [phone].        Care EveryWhere ID     This is your Care EveryWhere ID. This could be used by other organizations to access your [place] medical records  [id number]        Your Vitals Were     Pulse Temperature Respirations Pulse Oximetry BMI (Body Mass Index)       73 98  F (36.7  C) (Tympanic) 16 96% 28.27 kg/m2        Blood Pressure from Last 3 Encounters:   05/15/18 128/78   18 112/69   18 122/60    Weight from Last 3 Encounters:   05/15/18 197 lb (89.4 kg)   18 193 lb (87.5 kg)   17 193 lb (87.5 kg)              Today, you had the following     No orders found for display         Today's Medication Changes          These changes are accurate as of 5/15/18  9:01 AM.  If you have any questions, ask your nurse or doctor.               Start taking these medicines.        Dose/Directions    ACE/ARB/ARNI NOT PRESCRIBED (INTENTIONAL)   Used for:  Essential hypertension, benign, Type 2 diabetes mellitus with stage 3 chronic kidney disease, without long-term current use of insulin (H)   Started by:  Eliezer Shah MD        Please choose reason not prescribed, below   Refills:  0       CERAVE Lotn   Used for:  Xerosis cutis, Itching   Started by:  Eliezer Shah MD        Externally apply topically 2 times daily as needed   Refills:  0         These medicines have changed or have updated prescriptions.        Dose/Directions    * triamcinolone 0.1 % cream   Commonly known as:  KENALOG   This may have changed:  " Another medication with the same name was added. Make sure you understand how and when to take each.   Used for:  Stasis dermatitis of both legs   Changed by:  Eliezer Shah MD        Apply sparingly to affected area two times daily as needed   Quantity:  45 g   Refills:  3       * triamcinolone 0.1 % cream   Commonly known as:  KENALOG   This may have changed:  You were already taking a medication with the same name, and this prescription was added. Make sure you understand how and when to take each.   Used for:  Xerosis cutis, Itching   Changed by:  Eliezer Shah MD        Apply sparingly to affected area twotimes daily as needed   Quantity:  30 g   Refills:  5       * Notice:  This list has 2 medication(s) that are the same as other medications prescribed for you. Read the directions carefully, and ask your doctor or other care provider to review them with you.         Where to get your medicines      These medications were sent to [facility] [address] [place] [address] [address] AT [facility] [address], [facility] [address]     Phone:  [phone]     triamcinolone 0.1 % cream         Some of these will need a paper prescription and others can be bought over the counter.  Ask your nurse if you have questions.     You don't need a prescription for these medications     ACE/ARB/ARNI NOT PRESCRIBED (INTENTIONAL)    LEO Mason                Primary Care Provider Office Phone # Fax #    Eliezer Shah [phone] [address]  [facility] [address]        Equal Access to Services     [facility] HospitalADELINE AH: Hadii aad ku hadasho Soomaali, waaxda luqadaha, qaybta kaalmada adeegyada, skylar goodson. So [facility] [phone].    ATENCIÓN: Si habla español, tiene a monge disposición servicios gratuitos de asistencia lingüística. Llame al [phone].    We comply with applicable federal civil rights laws and  [place] laws. We do not discriminate on the basis of race, color, national origin, age, disability, sex, sexual orientation, or gender identity.            Thank you!     [facility] your [facility]. Our goal is always to provide you with excellent care. Hearing back from our patients is one way we can continue to improve our services. Please take a few minutes to complete the written survey that you may receive in the mail after your visit with us. Thank you!             Your Updated Medication List - Protect others around you: Learn how to safely use, store and throw away your medicines at www.disposemymeds.org.          This list is accurate as of 5/15/18  9:01 AM.  Always use your most recent med list.                   Brand Name Dispense Instructions for use Diagnosis    ACE/ARB/ARNI NOT PRESCRIBED (INTENTIONAL)      Please choose reason not prescribed, below    Essential hypertension, benign, Type 2 diabetes mellitus with stage 3 chronic kidney disease, without long-term current use of insulin (H)       albuterol 108 (90 Base) MCG/ACT Inhaler    PROAIR HFA/PROVENTIL HFA/VENTOLIN HFA    3 Inhaler    Inhale 2 puffs into the lungs as needed for shortness of breath / dyspnea or wheezing    Mild persistent asthma without complication       aspirin 81 MG tablet     30 tablet    Take 81 mg by mouth daily    CAD (coronary artery disease)       ASTELIN 0.1 % spray   Generic drug:  azelastine      Spray 1 spray into both nostrils 2 times daily.        beclomethasone 80 MCG/ACT Inhaler    QVAR    2 Inhaler    Inhale 2 puffs into the lungs 2 times daily    Moderate persistent asthma without complication       carvedilol 3.125 MG tablet    COREG    180 tablet    TAKE 1 TABLET(3.125 MG) BY MOUTH TWICE DAILY WITH MEALS    Essential hypertension       LEO Mason      Externally apply topically 2 times daily as needed    Xerosis cutis, Itching       cetirizine 10 MG tablet     zyrTEC     Take 10 mg by mouth every morning        guaiFENesin 600 MG 12 hr tablet    MUCINEX     Take 600 mg by mouth 3 times daily        isosorbide mononitrate 30 MG 24 hr tablet    IMDUR    90 tablet    TAKE 1 TABLET BY MOUTH EVERY DAY    Coronary artery disease involving native coronary artery of native heart without angina pectoris       MELATONIN PO      Take 5 mg by mouth At Bedtime        NASONEX 50 MCG/ACT spray   Generic drug:  mometasone      Spray 2 sprays into both nostrils daily as needed.        nitroGLYcerin 0.4 MG sublingual tablet    NITROSTAT    25 tablet    Place 1 tablet (0.4 mg) under the tongue every 5 minutes as needed for chest pain    Chest pain on exertion       polyethylene glycol Packet    MIRALAX/GLYCOLAX     Take 17 g by mouth daily        potassium chloride SA 20 MEQ CR tablet    K-DUR/KLOR-CON M    30 tablet    Take 2 pills today, one pill tomorrow then one pill daily ONLY when you take the Demadex    Chronic combined systolic and diastolic congestive heart failure (H)       rosuvastatin 40 MG tablet    CRESTOR    90 tablet    Take 1 tablet (40 mg) by mouth daily    Coronary artery disease involving native coronary artery of native heart without angina pectoris       torsemide 20 MG tablet    DEMADEX    30 tablet    TAKE 1 TABLET(20 MG) BY MOUTH DAILY    Chronic combined systolic and diastolic congestive heart failure (H)       * triamcinolone 0.1 % cream    KENALOG    45 g    Apply sparingly to affected area two times daily as needed    Stasis dermatitis of both legs       * triamcinolone 0.1 % cream    KENALOG    30 g    Apply sparingly to affected area twotimes daily as needed    Xerosis cutis, Itching       * Notice:  This list has 2 medication(s) that are the same as other medications prescribed for you. Read the directions carefully, and ask your doctor or other care provider to review them with you.

## 2018-05-15 NOTE — PROGRESS NOTES
SUBJECTIVE:   Abbe Lambert is a 84 year old male who presents to clinic today for the following health issues:      Rash      Duration: 3 months    Description  Location: back  Itching: severe    Intensity:  moderate    Accompanying signs and symptoms: raised spots    History (similar episodes/previous evaluation): None    Precipitating or alleviating factors:  New exposures:  None  Recent travel: no      Therapies tried and outcome: hydrocortisone cream -  Effective but not for long      Back Pain       Duration: Couple of weeks        Specific cause: none    Description:   Location of pain: low back bilateral  Character of pain: gnawing and intermittent  Pain radiation:none  New numbness or weakness in legs, not attributed to pain:  no     Intensity: Currently  mild    History:   Pain interferes with job: Not applicable,   History of back problems: recurrent self limited episodes of low back pain in the past  Any previous MRI or X-rays: None  Sees a specialist for back pain:  No  Therapies tried without relief: none    Alleviating factors:   Improved by: acetaminophen (Tylenol)      Precipitating factors:  Worsened by: Bending    Functional and Psychosocial Screen (Hetal STarT Back):      Not performed today          Accompanying Signs & Symptoms:  Risk of Fracture:  None  Risk of Cauda Equina:  None  Risk of Infection:  None  Risk of Cancer:  None  Risk of Ankylosing Spondylitis:  Onset at age <35, male, AND morning back stiffness. no                Asthma Follow-Up    Was ACT completed today?  No      Respiratory symptoms:   Cough: No   Wheezing: No   Shortness of breath: No    Use of short- acting(rescue) inhaler: Yes    Taking controlled (daily) meds as prescribed: Yes    ER/UC visits or hospital admissions since last visit: none     Recent asthma triggers that patient is dealing with: None       Problem list and histories reviewed & adjusted, as indicated.  Additional history: as documented    Patient  Active Problem List   Diagnosis     Health Care Home     Allergic rhinitis     Peripheral edema     Polymyalgia rheumatica (H)     Advanced directives, counseling/discussion     Ischemic cardiomyopathy     Paroxysmal atrial fibrillation (H)     Coronary artery disease involving native coronary artery of native heart without angina pectoris     Intermittent lightheadedness     CKD (chronic kidney disease) stage 3, GFR 30-59 ml/min     GERD (gastroesophageal reflux disease)     Tachy-alix syndrome (H)     AAA (abdominal aortic aneurysm) (H)     Old myocardial infarction     Chronic combined systolic and diastolic congestive heart failure (H)     Essential hypertension, benign     Type 2 diabetes mellitus with stage 3 chronic kidney disease, without long-term current use of insulin (H)                                                                                                                                                                                                                  Moderate persistent asthma without complication     Prostate cancer (H)     Mixed hyperlipidemia     Overweight (BMI 25.0-29.9)     Degenerative arthritis of knee     Aftercare following knee joint replacement surgery, unspecified laterality     Anemia due to blood loss, acute     Shingles     Acute kidney injury (H)     Physical deconditioning     Post herpetic neuralgia     Mild persistent asthma with acute exacerbation     Presbycusis of both ears     Chronic non-seasonal allergic rhinitis, unspecified trigger     Skin lesion     Past Surgical History:   Procedure Laterality Date     ARTHROPLASTY KNEE Left 10/5/2016    Procedure: ARTHROPLASTY KNEE;  Surgeon: Keshav Zamudio MD;  Location:  OR     CARDIAC SURGERY  12/03/2012    pacemaker ; CABG 1998     CHOLECYSTECTOMY       COLONOSCOPY       ENT SURGERY  5-    sinus     EXTRACAPSULAR CATARACT EXTRATION WITH INTRAOCULAR LENS IMPLANT       GENITOURINARY SURGERY       prostatectomy     IMPLANT PACEMAKER  12-3-12    [facility]     PROSTATE SURGERY         Social History   Substance Use Topics     Smoking status: Never Smoker     Smokeless tobacco: Never Used     Alcohol use No     Family History   Problem Relation Age of Onset     CEREBROVASCULAR DISEASE Father      HEART DISEASE Father      HEART DISEASE Brother      Coronary Artery Disease Brother      MI age 50     Depression Daughter      raped in high school     Unknown/Adopted Maternal Grandmother      Unknown/Adopted Maternal Grandfather      Unknown/Adopted Paternal Grandmother      Unknown/Adopted Paternal Grandfather            Reviewed and updated as needed this visit by clinical staff  Tobacco  Allergies  Meds       Reviewed and updated as needed this visit by Provider         ROS:  Constitutional, neuro, ENT, endocrine, pulmonary, cardiac, gastrointestinal, genitourinary, musculoskeletal, integument and psychiatric systems are negative, except as otherwise noted.    OBJECTIVE:                                                    /78 (Cuff Size: Adult Large)  Pulse 73  Temp 98  F (36.7  C) (Tympanic)  Resp 16  Wt 197 lb (89.4 kg)  SpO2 96%  BMI 28.27 kg/m2  Body mass index is 28.27 kg/(m^2).  GENERAL APPEARANCE: healthy, alert and no distress  SKIN: xerosis - back         ASSESSMENT/PLAN:                                                        ICD-10-CM    1. Xerosis cutis L85.3 Emollient (CERAVE) LOTN     triamcinolone (KENALOG) 0.1 % cream   2. Itching L29.9 Emollient (CERAVE) LOTN     triamcinolone (KENALOG) 0.1 % cream   3. Essential hypertension, benign I10 ACE/ARB/ARNI NOT PRESCRIBED, INTENTIONAL,   4. Type 2 diabetes mellitus with stage 3 chronic kidney disease, without long-term current use of insulin (H) E11.22 ACE/ARB/ARNI NOT PRESCRIBED, INTENTIONAL,    N18.3    5. Midline low back pain without sciatica, unspecified chronicity M54.5    6. Mild intermittent asthma without complication J45.20         Patient Instructions   I placed the patient on triamcinolone cream twice daily as needed.  I also recommended Cerave lotion for moisturizing.  Follow-up will be as needed.  Tylenol has worked relatively well for his back pain so he will go back to using up to 8 extra strength Tylenol in a day for his back pain.  He is having a little bit more problems with his asthma and will make an appointment with his lung specialist to follow-up on that issue.  He has been to 2 different ENT physicians about his sinus issue.  Once that he had a sinus infection the other said he had sinus inflammation.  He is feeling like this is still a persistent issue.  I suggested that he return and talk to the ENT specialist about his sinus congestion.  He will follow-up with us if he is not improving.      Eliezer Shah MD  [facility]

## 2018-05-15 NOTE — PATIENT INSTRUCTIONS
I placed the patient on triamcinolone cream twice daily as needed.  I also recommended Cerave lotion for moisturizing.  Follow-up will be as needed.  Tylenol has worked relatively well for his back pain so he will go back to using up to 8 extra strength Tylenol in a day for his back pain.  He is having a little bit more problems with his asthma and will make an appointment with his lung specialist to follow-up on that issue.  He has been to 2 different ENT physicians about his sinus issue.  Once that he had a sinus infection the other said he had sinus inflammation.  He is feeling like this is still a persistent issue.  I suggested that he return and talk to the ENT specialist about his sinus congestion.  He will follow-up with us if he is not improving.

## 2018-05-18 ENCOUNTER — OFFICE VISIT (OUTPATIENT)
Dept: FAMILY MEDICINE | Facility: CLINIC | Age: 83
End: 2018-05-18
Payer: COMMERCIAL

## 2018-05-18 VITALS
DIASTOLIC BLOOD PRESSURE: 66 MMHG | HEIGHT: 70 IN | BODY MASS INDEX: 27.92 KG/M2 | RESPIRATION RATE: 12 BRPM | HEART RATE: 72 BPM | WEIGHT: 195 LBS | OXYGEN SATURATION: 98 % | TEMPERATURE: 97.3 F | SYSTOLIC BLOOD PRESSURE: 110 MMHG

## 2018-05-18 DIAGNOSIS — I48.0 PAROXYSMAL ATRIAL FIBRILLATION (H): Chronic | ICD-10-CM

## 2018-05-18 DIAGNOSIS — J45.30 MILD PERSISTENT ASTHMA WITHOUT COMPLICATION: ICD-10-CM

## 2018-05-18 DIAGNOSIS — Z12.5 SCREENING FOR PROSTATE CANCER: ICD-10-CM

## 2018-05-18 DIAGNOSIS — R80.9 MICROALBUMINURIA: ICD-10-CM

## 2018-05-18 DIAGNOSIS — L97.501: ICD-10-CM

## 2018-05-18 DIAGNOSIS — I25.5 ISCHEMIC CARDIOMYOPATHY: Chronic | ICD-10-CM

## 2018-05-18 DIAGNOSIS — R23.0 BLUE TOES: ICD-10-CM

## 2018-05-18 DIAGNOSIS — H61.23 BILATERAL IMPACTED CERUMEN: ICD-10-CM

## 2018-05-18 DIAGNOSIS — E78.00 HYPERCHOLESTEROLEMIA: ICD-10-CM

## 2018-05-18 DIAGNOSIS — R09.89 DECREASED PULSES IN FEET: Primary | ICD-10-CM

## 2018-05-18 DIAGNOSIS — E66.3 OVERWEIGHT (BMI 25.0-29.9): ICD-10-CM

## 2018-05-18 DIAGNOSIS — Z13.89 SCREENING FOR DIABETIC PERIPHERAL NEUROPATHY: ICD-10-CM

## 2018-05-18 DIAGNOSIS — N18.30 TYPE 2 DIABETES MELLITUS WITH STAGE 3 CHRONIC KIDNEY DISEASE, WITHOUT LONG-TERM CURRENT USE OF INSULIN (H): ICD-10-CM

## 2018-05-18 DIAGNOSIS — I25.10 CORONARY ARTERY DISEASE INVOLVING NATIVE CORONARY ARTERY OF NATIVE HEART WITHOUT ANGINA PECTORIS: Chronic | ICD-10-CM

## 2018-05-18 DIAGNOSIS — I50.42 CHRONIC COMBINED SYSTOLIC AND DIASTOLIC CONGESTIVE HEART FAILURE (H): ICD-10-CM

## 2018-05-18 DIAGNOSIS — I10 BENIGN ESSENTIAL HYPERTENSION: ICD-10-CM

## 2018-05-18 DIAGNOSIS — N18.30 CKD (CHRONIC KIDNEY DISEASE) STAGE 3, GFR 30-59 ML/MIN (H): ICD-10-CM

## 2018-05-18 DIAGNOSIS — Z85.46 HISTORY OF PROSTATE CANCER: ICD-10-CM

## 2018-05-18 DIAGNOSIS — E11.22 TYPE 2 DIABETES MELLITUS WITH STAGE 3 CHRONIC KIDNEY DISEASE, WITHOUT LONG-TERM CURRENT USE OF INSULIN (H): ICD-10-CM

## 2018-05-18 DIAGNOSIS — I71.40 ABDOMINAL AORTIC ANEURYSM (AAA) WITHOUT RUPTURE (H): ICD-10-CM

## 2018-05-18 PROBLEM — I73.9 PAD (PERIPHERAL ARTERY DISEASE) (H): Status: ACTIVE | Noted: 2018-05-18

## 2018-05-18 LAB — HBA1C MFR BLD: 6.3 % (ref 0–5.6)

## 2018-05-18 PROCEDURE — 80061 LIPID PANEL: CPT | Performed by: FAMILY MEDICINE

## 2018-05-18 PROCEDURE — 99215 OFFICE O/P EST HI 40 MIN: CPT | Performed by: FAMILY MEDICINE

## 2018-05-18 PROCEDURE — 84153 ASSAY OF PSA TOTAL: CPT | Performed by: FAMILY MEDICINE

## 2018-05-18 PROCEDURE — 80048 BASIC METABOLIC PNL TOTAL CA: CPT | Performed by: FAMILY MEDICINE

## 2018-05-18 PROCEDURE — 99207 C FOOT EXAM  NO CHARGE: CPT | Mod: 25 | Performed by: FAMILY MEDICINE

## 2018-05-18 PROCEDURE — 36415 COLL VENOUS BLD VENIPUNCTURE: CPT | Performed by: FAMILY MEDICINE

## 2018-05-18 PROCEDURE — 84460 ALANINE AMINO (ALT) (SGPT): CPT | Performed by: FAMILY MEDICINE

## 2018-05-18 PROCEDURE — 83036 HEMOGLOBIN GLYCOSYLATED A1C: CPT | Performed by: FAMILY MEDICINE

## 2018-05-18 RX ORDER — LISINOPRIL 5 MG/1
5 TABLET ORAL DAILY
Qty: 90 TABLET | Refills: 3 | Status: SHIPPED | OUTPATIENT
Start: 2018-05-18 | End: 2019-02-13

## 2018-05-18 NOTE — MR AVS SNAPSHOT
After Visit Summary   5/18/2018    Abbe Lambert    MRN: [id number]           Patient Information     Date Of Birth          2/3/1934        Visit Information        Provider Department      5/18/2018 1:20 PM Ana Rice MD [facility]        Today's Diagnoses     Screening for diabetic peripheral neuropathy    -  1    Bilateral impacted cerumen        Type 2 diabetes mellitus with stage 3 chronic kidney disease, without long-term current use of insulin (H)        History of prostate cancer        CKD (chronic kidney disease) stage 3, GFR 30-59 ml/min        PAD (peripheral artery disease) (H)        Hypercholesterolemia        Screening for prostate cancer          Care Instructions    1. Shingrex is a 2 shot series that prevents shingles 97% of the time, as opposed to the old shingles shot that only prevented it at 40-50%  It costs less for medicare at a pharmacy  You should get it starting at 50 yrs old   At  and ? [facility] ?    2.  Weight Loss Tips  1. Do not eat after 6 hrs before your expected bedtime  2. Have your heaviest meal for breakfast, a slightly lighter meal at lunch and a snack 6 hrs before bed  3. No sugar/calorie drinks except milk ie no fruit juice, pop, alcohol.  4. Drink milk 30min before meals to decrease your hunger. Also it is excellent as part of your last meal of the day snack  5. Drink lots of water  6. Increase fiber in diet: all bran cereal, salads, popcorn etc  7. Have only one small serving of fruit a day about 1/2 cup (as this is high in sugar)  8. EXERCISE is the bottom line. Without it, you will gain weight even on a low calorie diet. Best if done 2-3X a day as can    Being overweight contributes to high blood pressure and high cholesterol, both of which cause heart attacks, strokes and kidney failure, prediabetes and diabetes, arthritis, and liver disease     2. Referral numbers for pedicurists who do diabetics     3.  "See Dr Thorpe, vascular surgeon, [address] call [phone] re the poor arterial blood supply to your feet shown by  blue toes and 3 ulcers on the toes     4. Please consider starting 5mgm of lisinopril for the diabetes and to protect your kidneys           Follow-ups after your visit        Follow-up notes from your care team     Return in about 6 months (around 2018) for Routine Visit.      Who to contact     If you have questions or need follow up information about today's clinic visit or your schedule please contact [facility] directly at [phone].  Normal or non-critical lab and imaging results will be communicated to you by MyChart, letter or phone within 4 business days after the clinic has received the results. If you do not hear from us within 7 days, please contact the clinic through [employer] or phone. If you have a critical or abnormal lab result, we will notify you by phone as soon as possible.  Submit refill requests through [employer] or call your pharmacy and they will forward the refill request to us. Please allow 3 business days for your refill to be completed.          Additional Information About Your Visit        [employer] Information     [facility] lets you send messages to your doctor, view your test results, renew your prescriptions, schedule appointments and more. To sign up, go to [email or web address] . Click on \"Log in\" on the left side of the screen, which will take you to the Welcome page. Then click on \"Sign up Now\" on the right side of the page.     You will be asked to enter the access code listed below, as well as some personal information. Please follow the directions to create your username and password.     Your access code is: [email or web address]  Expires: 2018  9:01 AM     Your access code will  in 90 days. If you need help or a new code, please call your [facility] or [phone].        Care EveryWhere ID     This is your Care " "EveryWhere ID. This could be used by other organizations to access your [facility] medical records  [id number]        Your Vitals Were     Pulse Temperature Respirations Height Pulse Oximetry BMI (Body Mass Index)    72 97.3  F (36.3  C) (Tympanic) 12 5' 10\" (1.778 m) 98% 27.98 kg/m2       Blood Pressure from Last 3 Encounters:   05/18/18 110/66   05/15/18 128/78   02/08/18 112/69    Weight from Last 3 Encounters:   05/18/18 195 lb (88.5 kg)   05/15/18 197 lb (89.4 kg)   01/17/18 193 lb (87.5 kg)              Today, you had the following     No orders found for display       Primary Care Provider Office Phone # Fax #    Eliezer Shah [phone] [address] [facility] [address]        Equal Access to Services     JIM AHN : Hadii aad ku hadasho Soomaali, waaxda luqadaha, qaybta kaalmada adeegyada, waxay lulain hayrodolfon jhonatan franco . So [facility] [phone].    ATENCIÓN: Si habla español, tiene a monge disposición servicios gratuitos de asistencia lingüística. Llame al [phone].    We comply with applicable federal civil rights laws and [place] laws. We do not discriminate on the basis of race, color, national origin, age, disability, sex, sexual orientation, or gender identity.            Thank you!     [facility]. Our goal is always to provide you with excellent care. Hearing back from our patients is one way we can continue to improve our services. Please take a few minutes to complete the written survey that you may receive in the mail after your visit with us. Thank you!             Your Updated Medication List - Protect others around you: Learn how to safely use, store and throw away your medicines at www.disposemymeds.org.          This list is accurate as of 5/18/18  2:03 PM.  Always use your most recent med list.                   Brand Name Dispense Instructions for use Diagnosis    " ACE/ARB/ARNI NOT PRESCRIBED (INTENTIONAL)      Please choose reason not prescribed, below    Essential hypertension, benign, Type 2 diabetes mellitus with stage 3 chronic kidney disease, without long-term current use of insulin (H)       albuterol 108 (90 Base) MCG/ACT Inhaler    PROAIR HFA/PROVENTIL HFA/VENTOLIN HFA    3 Inhaler    Inhale 2 puffs into the lungs as needed for shortness of breath / dyspnea or wheezing    Mild persistent asthma without complication       aspirin 81 MG tablet     30 tablet    Take 81 mg by mouth daily    CAD (coronary artery disease)       ASTELIN 0.1 % spray   Generic drug:  azelastine      Spray 1 spray into both nostrils 2 times daily.        beclomethasone 80 MCG/ACT Inhaler    QVAR    2 Inhaler    Inhale 2 puffs into the lungs 2 times daily    Moderate persistent asthma without complication       carvedilol 3.125 MG tablet    COREG    180 tablet    TAKE 1 TABLET(3.125 MG) BY MOUTH TWICE DAILY WITH MEALS    Essential hypertension       CERAVE Lotn      Externally apply topically 2 times daily as needed    Xerosis cutis, Itching       cetirizine 10 MG tablet    zyrTEC     Take 10 mg by mouth every morning        guaiFENesin 600 MG 12 hr tablet    MUCINEX     Take 600 mg by mouth 3 times daily        isosorbide mononitrate 30 MG 24 hr tablet    IMDUR    90 tablet    TAKE 1 TABLET BY MOUTH EVERY DAY    Coronary artery disease involving native coronary artery of native heart without angina pectoris       MELATONIN PO      Take 5 mg by mouth At Bedtime        NASONEX 50 MCG/ACT spray   Generic drug:  mometasone      Spray 2 sprays into both nostrils daily as needed.        nitroGLYcerin 0.4 MG sublingual tablet    NITROSTAT    25 tablet    Place 1 tablet (0.4 mg) under the tongue every 5 minutes as needed for chest pain    Chest pain on exertion       polyethylene glycol Packet    MIRALAX/GLYCOLAX     Take 17 g by mouth daily        potassium chloride SA 20 MEQ CR tablet     K-DUR/KLOR-CON M    30 tablet    Take 2 pills today, one pill tomorrow then one pill daily ONLY when you take the Demadex    Chronic combined systolic and diastolic congestive heart failure (H)       rosuvastatin 40 MG tablet    CRESTOR    90 tablet    Take 1 tablet (40 mg) by mouth daily    Coronary artery disease involving native coronary artery of native heart without angina pectoris       torsemide 20 MG tablet    DEMADEX    30 tablet    TAKE 1 TABLET(20 MG) BY MOUTH DAILY    Chronic combined systolic and diastolic congestive heart failure (H)       triamcinolone 0.1 % cream    KENALOG    30 g    Apply sparingly to affected area twotimes daily as needed    Xerosis cutis, Itching

## 2018-05-18 NOTE — LETTER
My Asthma Action Plan  Name: Abbe Lambert   YOB: 1934  Date: 5/18/2018   My doctor: Ana Rice MD   My clinic: [facility]        My Control Medicine: None  My Rescue Medicine: Albuterol (Proair/Ventolin/Proventil) inhaler prn   My Asthma Severity: mild persistent  Avoid your asthma triggers: Patient is unaware of triggers  None            GREEN ZONE   Good Control    I feel good    No cough or wheeze    Can work, sleep and play without asthma symptoms       Take your asthma control medicine every day.     1. If exercise triggers your asthma, take your rescue medication    15 minutes before exercise or sports, and    During exercise if you have asthma symptoms  2. Spacer to use with inhaler: If you have a spacer, make sure to use it with your inhaler             YELLOW ZONE Getting Worse  I have ANY of these:    I do not feel good    Cough or wheeze    Chest feels tight    Wake up at night   1. Keep taking your Green Zone medications  2. Start taking your rescue medicine:    every 20 minutes for up to 1 hour. Then every 4 hours for 24-48 hours.  3. If you stay in the Yellow Zone for more than 12-24 hours, contact your doctor.  4. If you do not return to the Green Zone in 12-24 hours or you get worse, start taking your oral steroid medicine if prescribed by your provider.           RED ZONE Medical Alert - Get Help  I have ANY of these:    I feel awful    Medicine is not helping    Breathing getting harder    Trouble walking or talking    Nose opens wide to breathe       1. Take your rescue medicine NOW  2. If your provider has prescribed an oral steroid medicine, start taking it NOW  3. Call your doctor NOW  4. If you are still in the Red Zone after 20 minutes and you have not reached your doctor:    Take your rescue medicine again and    Call 911 or go to the emergency room right away    See your regular doctor within 2 weeks of an Emergency Room or Urgent Care  visit for follow-up treatment.          Annual Reminders:  Meet with Asthma Educator,  Flu Shot in the Fall, consider Pneumonia Vaccination for patients with asthma (aged 19 and older).    [facility]:    [facility] [id number] [place] [address] [address] AT [facility] [address]  WRITTEN PRESCRIPTION REQUESTED                      Asthma Triggers  How To Control Things That Make Your Asthma Worse    Triggers are things that make your asthma worse.  Look at the list below to help you find your triggers and what you can do about them.  You can help prevent asthma flare-ups by staying away from your triggers.      Trigger                                                          What you can do   Cigarette Smoke  Tobacco smoke can make asthma worse. Do not allow smoking in your home, car or around you.  Be sure no one smokes at a child s day care or school.  If you smoke, ask your health care provider for ways to help you quit.  Ask family members to quit too.  Ask your health care provider for a referral to Quit Plan to help you quit smoking, or call 3-688-571-PLAN.     Colds, Flu, Bronchitis  These are common triggers of asthma. Wash your hands often.  Don t touch your eyes, nose or mouth.  Get a flu shot every year.     Dust Mites  These are tiny bugs that live in cloth or carpet. They are too small to see. Wash sheets and blankets in hot water every week.   Encase pillows and mattress in dust mite proof covers.  Avoid having carpet if you can. If you have carpet, vacuum weekly.   Use a dust mask and HEPA vacuum.   Pollen and Outdoor Mold  Some people are allergic to trees, grass, or weed pollen, or molds. Try to keep your windows closed.  Limit time out doors when pollen count is high.   Ask you health care provider about taking medicine during allergy season.     Animal Dander  Some people are allergic to skin flakes, urine or saliva from pets with fur or feathers. Keep pets with fur or feathers out of  your home.    If you can t keep the pet outdoors, then keep the pet out of your bedroom.  Keep the bedroom door closed.  Keep pets off cloth furniture and away from stuffed toys.     Mice, Rats, and Cockroaches  Some people are allergic to the waste from these pests.   Cover food and garbage.  Clean up spills and food crumbs.  Store grease in the refrigerator.   Keep food out of the bedroom.   Indoor Mold  This can be a trigger if your home has high moisture. Fix leaking faucets, pipes, or other sources of water.   Clean moldy surfaces.  Dehumidify basement if it is damp and smelly.   Smoke, Strong Odors, and Sprays  These can reduce air quality. Stay away from strong odors and sprays, such as perfume, powder, hair spray, paints, smoke incense, paint, cleaning products, candles and new carpet.   Exercise or Sports  Some people with asthma have this trigger. Be active!  Ask your doctor about taking medicine before sports or exercise to prevent symptoms.    Warm up for 5-10 minutes before and after sports or exercise.     Other Triggers of Asthma  Cold air:  Cover your nose and mouth with a scarf.  Sometimes laughing or crying can be a trigger.  Some medicines and food can trigger asthma.

## 2018-05-18 NOTE — NURSING NOTE
"Chief Complaint   Patient presents with     Ear Problem     /66  Pulse 72  Temp 97.3  F (36.3  C) (Tympanic)  Resp 12  Ht 5' 10\" (1.778 m)  Wt 195 lb (88.5 kg)  SpO2 98%  BMI 27.98 kg/m2 Estimated body mass index is 27.98 kg/(m^2) as calculated from the following:    Height as of this encounter: 5' 10\" (1.778 m).    Weight as of this encounter: 195 lb (88.5 kg).  BP completed using cuff size: stefan Robertson CMA    Health Maintenance Due   Topic Date Due     EYE EXAM Q1 YEAR  10/22/2014     FOOT EXAM Q1 YEAR  02/07/2018     BMP Q6 MOS  03/12/2018     Health Maintenance reviewed at today's visit patient asked to schedule/complete:   None, Health Maintenance up to date.  Diabetes:  Patient agrees to schedule    "

## 2018-05-18 NOTE — PATIENT INSTRUCTIONS
1. Shingrex is a 2 shot series that prevents shingles 97% of the time, as opposed to the old shingles shot that only prevented it at 40-50%  It costs less for medicare at a pharmacy  You should get it starting at 50 yrs old   At  and ? [facility] ?    2.  Weight Loss Tips  1. Do not eat after 6 hrs before your expected bedtime  2. Have your heaviest meal for breakfast, a slightly lighter meal at lunch and a snack 6 hrs before bed  3. No sugar/calorie drinks except milk ie no fruit juice, pop, alcohol.  4. Drink milk 30min before meals to decrease your hunger. Also it is excellent as part of your last meal of the day snack  5. Drink lots of water  6. Increase fiber in diet: all bran cereal, salads, popcorn etc  7. Have only one small serving of fruit a day about 1/2 cup (as this is high in sugar)  8. EXERCISE is the bottom line. Without it, you will gain weight even on a low calorie diet. Best if done 2-3X a day as can    Being overweight contributes to high blood pressure and high cholesterol, both of which cause heart attacks, strokes and kidney failure, prediabetes and diabetes, arthritis, and liver disease     2. Referral numbers for pedicurists who do diabetics     3. See Dr Thorpe, vascular surgeon, [address] call [phone] re the poor arterial blood supply to your feet shown by  blue toes and 3 ulcers on the toes     4. Please consider starting 5mgm of lisinopril for the diabetes and to protect your kidneys --started today

## 2018-05-18 NOTE — LETTER
My Heart Failure Action Plan   Name: Abbe Lambert    YOB: 1934   Date: 5/18/2018    My doctor: Eliezer Shah     [address] [id number] [phone]  My Diagnosis: Combined Heart Failure   My Ejection Fraction: Over 50%    My Exercise Goal: 30 minutes daily  .     My Weight Goal: --  Wt Readings from Last 2 Encounters:   05/18/18 195 lb (88.5 kg)   05/15/18 197 lb (89.4 kg)     Weigh yourself daily using the same scale. If you gain more than 2 pounds in 24 hours or 5 pounds in a week --    My Diet Goal: No added salt    Emergency Room Visits:    Our goal is to improve your quality of life and help you avoid a visit to the emergency room or hospital.  If we work together, we can achieve this goal. But, if you feel you need to call 911 or go to the emergency room, please do so.  If you go to the emergency room, please bring your list of medicines and your daily weight chart with you.       GREEN ZONE     Doing well today    Weight gained is no more than 2 pounds a day or 5 pounds a week.    No swelling in feet, ankles, legs or stomach.    No more swelling than usual.    No more trouble breathing than usual.    No change in my sleep.    No other problems. Actions:    I am doing fine.  I will take my medicine, follow my diet, see my doctor, exercise, and watch for symptoms.           YELLOW ZONE         Having a bad day or flare up    Weight gain of more than 2 pounds in one day or 5 pounds in one week.    New swelling in ankle, leg, knee or thigh.    Bloating in belly, pants feel tighter.    Swelling in hands or face.    Coughing or trouble breathing while walking or talking.    Harder to breathe last night.    Have trouble sleeping, wake up short of breath.    Much more tired than usual.    Not eating.    Pain in my chest or bad leg cramps.    Feel weak or dizzy. Actions:    I need to take action and call  my doctor or nurse today.                 RED ZONE         Need medical care now    Weight gain of 5 pounds overnight.    Chest pain or pressure that does not go away.    Feel less alert.    Wheezing or have trouble breathing when at rest.    Cannot sleep lying down.    Cannot take my water pill.    Pass out or faint. Actions:    I need to call my doctor or nurse now!    Call 911 if I have chest pain or cannot breathe.

## 2018-05-18 NOTE — PROGRESS NOTES
SUBJECTIVE:   Abbe Lambert is a 84 year old male who presents to clinic today for the following health issues:    Bilat Ear Cerumen Impaction       Duration: 05/18/18  Hx of recurrence     Wears hearing aidsd     Decreased hearing with this     Description (location/character/radiation): Both Ears Plugged with Wax    Intensity:  moderate    Accompanying signs and symptoms: Disrupts Hearing Aids    History (similar episodes/previous evaluation): None    Precipitating or alleviating factors: None    Therapies tried and outcome: None       BLUE COLD TOES with DECREASED DP & PT PP, AND ULCERS -on dorsum of 3 toes     -pt has never noted   -has decreased sensation   -has comorbid CHF, AAA , CAD       Hyperlipidemia:LDL Follow-Up      Rate your low fat/cholesterol diet?: poor    Taking statin?  Yes, no muscle aches from 40mgm rosuvastatin    Other lipid medications/supplements?:  None    pers hx CAD    Vascular Disease Follow-up:  Coronary Artery and Peripheral Vascular Disease      Chest pain or pressure, left side neck or arm pain: No    Shortness of breath/increased sweats/nausea with exertion: No    Pain in calves walking 1-2 blocks: No    Worsened or new symptoms since last visit: No    Nitroglycerin use: no    Daily aspirin use: Yes    Hx of PAF & AAA     Heart Failure Follow-up    Symptoms:    Shortness of breath: happens with exertion only - stable    Lower extremity edema: stable     Chest pain: No    Using more pillows than normal: No    Cough at night: No    Weight:    Checking weight daily: No    Weight change: none    Cardiology visits, ER/UC, or hospital admissions since last visit: None and Cardiology Visit -     Medication side effects: none    Asthma Follow-Up    Was ACT completed today?    Yes    ACT Total Scores 9/12/2017   ACT TOTAL SCORE (Goal Greater than or Equal to 20) 21   In the past 12 months, how many times did you visit the emergency room for your asthma without being admitted to the  hospital? 0   In the past 12 months, how many times were you hospitalized overnight because of your asthma? 0     Recent asthma triggers that patient is dealing with: None   Rare albutMDI     Chronic Kidney Disease:Stage 3 Follow-up      Current NSAID use?  No    Comorbid CAD, CHF, DM, micro albuminuria      OVERWEIGHT    -BMI= 28  -sedemtary   -Comorbid CAD, CHF, DM , CKD     Diabetes Follow-up      Patient is checking blood sugars: not at all    Diabetic concerns: None     Symptoms of hypoglycemia (low blood sugar): none     Paresthesias (numbness or burning in feet) or sores: No     Date of last diabetic eye exam: 4-18     BP Readings from Last 2 Encounters:   05/18/18 110/66   05/15/18 128/78     Hemoglobin A1C (%)   Date Value   05/18/2018 6.3 (H)   12/26/2017 5.9     LDL Cholesterol Calculated (mg/dL)   Date Value   12/22/2017 44   09/12/2017 84       AAA     - stable   -followed          Problem list and histories reviewed & adjusted, as indicated.  Additional history: as documented    Labs reviewed in EPIC    Reviewed and updated as needed this visit by clinical staff       Reviewed and updated as needed this visit by Provider         ROS:  CONSTITUTIONAL: NEGATIVE for fever, chills, change in weight  INTEGUMENTARY/SKIN: NEGATIVE for worrisome rashes, moles or lesions  POS for blue toes with ulcers   EYES: NEGATIVE for vision changes or irritation  ENT/MOUTH: NEGATIVE for ear, mouth and throat problems  RESP: NEGATIVE for significant cough or SOB  BREAST: NEGATIVE for masses, tenderness or discharge  CV: NEGATIVE for chest pain, palpitations  POS  peripheral edema  POS  Decreased ft pp   GI: NEGATIVE for nausea, abdominal pain, heartburn, or change in bowel habits  : NEGATIVE for frequency, dysuria, or hematuria  MUSCULOSKELETAL: NEGATIVE for significant arthralgias or myalgia  NEURO: NEGATIVE for weakness, dizziness or paresthesias POS for off balance --uses a cane   ENDOCRINE: NEGATIVE for temperature  "intolerance, skin/hair changes  HEME: NEGATIVE for bleeding problems  PSYCHIATRIC: NEGATIVE for changes in mood or affect    OBJECTIVE:     /66  Pulse 72  Temp 97.3  F (36.3  C) (Tympanic)  Resp 12  Ht 5' 10\" (1.778 m)  Wt 195 lb (88.5 kg)  SpO2 98%  BMI 27.98 kg/m2  Body mass index is 27.98 kg/(m^2).  GENERAL: healthy, alert, no distress, over weight, frail, elderly and fatigued  EYES: Eyes grossly normal to inspection, PERRL and conjunctivae and sclerae normal  HENT: ear canals and TM's normal, nose and mouth without ulcers or lesions  NECK: no adenopathy, no asymmetry, masses, or scars and thyroid normal to palpation  RESP: lungs clear to auscultation - no rales, rhonchi or wheezes  CV: regular rate and rhythm, normal S1 S2, no S3 or S4, no murmur, click or rub, no peripheral edema and peripheral pulses strong  MS: no gross musculoskeletal defects noted, no edema  SKIN: no suspicious lesions or rashes  NEURO: Normal strength and tone, mentation intact and speech normal  PSYCH: mentation appears normal, affect normal/bright  Diabetic foot exam: DP reduced bilateral, PT reduced bilateral, to barely palpable ; reduced sensation at dist ft , venous stasis dermatitis noted, dependent rubor present but also has blue toes , ulceration at Rt 2&4 and Lt 2 toes dorsally  and onychomycosis    Diagnostic Test Results:  Results for orders placed or performed in visit on 05/18/18   Hemoglobin A1c   Result Value Ref Range    Hemoglobin A1C 6.3 (H) 0 - 5.6 %       ASSESSMENT/PLAN:               ICD-10-CM    1. Screening for diabetic peripheral neuropathy Z13.89 FOOT EXAM  NO CHARGE [66048.114]   2. Blue toes R23.0    3. Chronic ulcer of toe, limited to breakdown of skin, unspecified laterality (H) L97.501    4. Decreased pulses in feet R09.89    5. Chronic combined systolic and diastolic congestive heart failure (H) I50.42 HEART FAILURE ACTION PLAN   6. Abdominal aortic aneurysm (AAA) without rupture (H) I71.4    7. " Bilateral impacted cerumen H61.23    8. Ischemic cardiomyopathy I25.5    9. Paroxysmal atrial fibrillation (H) I48.0    10. Coronary artery disease involving native coronary artery of native heart without angina pectoris I25.10    11. History of prostate cancer 2003 w/po resection prostate Z85.46    12. CKD (chronic kidney disease) stage 3, GFR 30-59 ml/min N18.3 BASIC METABOLIC PANEL   13. Hypercholesterolemia E78.00 ALT     Lipid panel reflex to direct LDL Fasting   14. Type 2 diabetes mellitus with stage 3 chronic kidney disease, without long-term current use of insulin (H) E11.22 FOOT EXAM  NO CHARGE [70052.114]    N18.3 BASIC METABOLIC PANEL     Hemoglobin A1c     lisinopril (PRINIVIL/ZESTRIL) 5 MG tablet   15. Benign essential hypertension I10 lisinopril (PRINIVIL/ZESTRIL) 5 MG tablet   16. Mild persistent asthma without complication J45.30 Asthma Action Plan (AAP)   17. Microalbuminuria R80.9    18. Overweight (BMI 25.0-29.9) E66.3    19. Screening for prostate cancer Z12.5 Prostate spec antigen screen       Patient Instructions   1. Shingrex is a 2 shot series that prevents shingles 97% of the time, as opposed to the old shingles shot that only prevented it at 40-50%  It costs less for medicare at a pharmacy  You should get it starting at 50 yrs old   At  and ? [facility] ?    2.  Weight Loss Tips  1. Do not eat after 6 hrs before your expected bedtime  2. Have your heaviest meal for breakfast, a slightly lighter meal at lunch and a snack 6 hrs before bed  3. No sugar/calorie drinks except milk ie no fruit juice, pop, alcohol.  4. Drink milk 30min before meals to decrease your hunger. Also it is excellent as part of your last meal of the day snack  5. Drink lots of water  6. Increase fiber in diet: all bran cereal, salads, popcorn etc  7. Have only one small serving of fruit a day about 1/2 cup (as this is high in sugar)  8. EXERCISE is the bottom line. Without it, you will gain weight even on a low calorie  diet. Best if done 2-3X a day as can    Being overweight contributes to high blood pressure and high cholesterol, both of which cause heart attacks, strokes and kidney failure, prediabetes and diabetes, arthritis, and liver disease     2. Referral numbers for pedicurists who do diabetics     3. See Dr Thorpe, vascular surgeon, [address] call [phone] re the poor arterial blood supply to your feet shown by  blue toes and 3 ulcers on the toes     4. Please consider starting 5mgm of lisinopril for the diabetes and to protect your kidneys --started today       Ana Rice MD  [facility]    Weight management plan: Discussed healthy diet and exercise guidelines and patient will follow up in 6 months in clinic to re-evaluate.  Time spent with the patient 44mins, more than 50% in counseling and coordinating care, Re above medical problems.and see below     1. Blue cold toes with poor cap refill and 3 2-3 mm escharred sores on dorsum of toes with poor DP&PT pulses   Hx of AAA and CHF with CAD and hx of stents   Has the backgroundfor arterial disease  Needs to be evaluated by vascular surgery urgently   Has the hx of other areas of arterial disease and DM     Wanted to have a pedicure , but absolutely needs this done only at a special center eg [facility], staffed by nurses who are aware of the problems with diabetic ft and poor arteriakl supply --warned pt and gave nos of where he can go     His CHF ,AAA , CAD are apparently stable per card     I  Explained the treatment and the reason for it   Al to pt and the urgency     Ana Rice MD

## 2018-05-18 NOTE — LETTER
June 2, 2018      Abbe Lambert  [address] [address] [id number]        Dear ,    We are writing to inform you of your test results.    They are all normal     THE FOLLOWING ARE EXPLANATIONS OF SOME OF OUR LAB TESTS     YOU DID NOT NECESSARILY HAVE ALL OF THESE DONE     Hgb is the blood iron level   WBC means White Blood Cells   Platelets are small blood cells that help with forming the blood clots along with other blood factors.   Electrolytes are Sodium, Potassium, Calcium, Magnesium, Phosphorus.   Liver tests are: AST, ALT, Bilirubin, Alkaline Phosphatase.   Kidney tests are Creatinine, GFR.  ###they are stable for age ###     HDL Cholesterol - is the good cholesterol and it is good to have it high.   LDL cholesterol is the bad cholesterol and it is good to have it low.   It is recommended to have LDL less than 130 for people with hypertension and to have it less than 100 for people with heart disease, diabetes and chronic kidney disease.   Triglycerides are another type of lipid that can cause heart disease, like the cholesterol and should be kept low   Thyroid tests are TSH, T4, T3   Glucose is sugar.###only very slightly high###     A1c is a test that gives us an idea about how well was controlled the diabetes for the last 3 months.   PSA stands for Prostate Specific Antigen and it can be elevated with prostate cancer or prostate inflammation.     Please continue on the same medications    Resulted Orders   BASIC METABOLIC PANEL   Result Value Ref Range    Sodium 141 133 - 144 mmol/L    Potassium 4.2 3.4 - 5.3 mmol/L    Chloride 106 94 - 109 mmol/L    Carbon Dioxide 28 20 - 32 mmol/L    Anion Gap 7 3 - 14 mmol/L    Glucose 105 (H) 70 - 99 mg/dL      Comment:      Non Fasting    Urea Nitrogen 26 7 - 30 mg/dL    Creatinine 1.75 (H) 0.66 - 1.25 mg/dL    GFR Estimate 37 (L) >60 mL/min/1.7m2      Comment:      Non  GFR Calc    GFR Estimate If Black 45 (L) >60 mL/min/1.7m2       Comment:       GFR Calc    Calcium 9.1 8.5 - 10.1 mg/dL   Hemoglobin A1c   Result Value Ref Range    Hemoglobin A1C 6.3 (H) 0 - 5.6 %      Comment:      Normal <5.7% Prediabetes 5.7-6.4%  Diabetes 6.5% or higher - adopted from ADA   consensus guidelines.     ALT   Result Value Ref Range    ALT 39 0 - 70 U/L   Lipid panel reflex to direct LDL Fasting   Result Value Ref Range    Cholesterol 81 <200 mg/dL    Triglycerides 141 <150 mg/dL      Comment:      Non Fasting    HDL Cholesterol 32 (L) >39 mg/dL    LDL Cholesterol Calculated 21 <100 mg/dL      Comment:      Desirable:       <100 mg/dl    Non HDL Cholesterol 49 <130 mg/dL   Prostate spec antigen screen   Result Value Ref Range    PSA <0.01 0 - 4 ug/L      Comment:      Assay Method:  Chemiluminescence using Siemens Vista analyzer       If you have any questions or concerns, please call the clinic at the number listed above.       Sincerely,        Ana Rice MD

## 2018-05-19 LAB
ALT SERPL W P-5'-P-CCNC: 39 U/L (ref 0–70)
ANION GAP SERPL CALCULATED.3IONS-SCNC: 7 MMOL/L (ref 3–14)
BUN SERPL-MCNC: 26 MG/DL (ref 7–30)
CALCIUM SERPL-MCNC: 9.1 MG/DL (ref 8.5–10.1)
CHLORIDE SERPL-SCNC: 106 MMOL/L (ref 94–109)
CHOLEST SERPL-MCNC: 81 MG/DL
CO2 SERPL-SCNC: 28 MMOL/L (ref 20–32)
CREAT SERPL-MCNC: 1.75 MG/DL (ref 0.66–1.25)
GFR SERPL CREATININE-BSD FRML MDRD: 37 ML/MIN/1.7M2
GLUCOSE SERPL-MCNC: 105 MG/DL (ref 70–99)
HDLC SERPL-MCNC: 32 MG/DL
LDLC SERPL CALC-MCNC: 21 MG/DL
NONHDLC SERPL-MCNC: 49 MG/DL
POTASSIUM SERPL-SCNC: 4.2 MMOL/L (ref 3.4–5.3)
PSA SERPL-ACNC: <0.01 UG/L (ref 0–4)
SODIUM SERPL-SCNC: 141 MMOL/L (ref 133–144)
TRIGL SERPL-MCNC: 141 MG/DL

## 2018-05-23 NOTE — TELEPHONE ENCOUNTER
CARVEDILOL 3.125MG TABLETS     Last Written Prescription Date:  Not on current med list  Last Fill Quantity: ,   # refills:   Last Office Visit with [facility], [facility] or [facility] prescribing provider: 07/17/2017  Future Office visit:       Routing refill request to provider for review/approval because:  Drug not active on patient's medication list   The patient is doing well this morning without difficulties. He has minimal discomfort and no problems with breathing or swallowing. His voice is strong and normal.    Vital signs are stable. He is afebrile.    His wound looks excellent with no signs of inflammation or abnormal swelling.    Drain output is D cc greater than 60 cc. This is serosanguineous in character.    Assessment:  Doing well status post thyroid lobectomy    Plan:  Discharge today  Follow-up in my office tomorrow for drain removal  Follow-up in my office in one week for wound recheck

## 2018-05-24 ENCOUNTER — DOCUMENTATION ONLY (OUTPATIENT)
Dept: CARDIOLOGY | Facility: CLINIC | Age: 83
End: 2018-05-24

## 2018-05-24 NOTE — PROGRESS NOTES
Patient missed Merlin transmission on 5/16/18. Sent letter 5/17, no response. Sent 1 week letter today

## 2018-06-01 NOTE — PROGRESS NOTES
.  Please see attached lab results  They are all normal     THE FOLLOWING ARE EXPLANATIONS OF SOME OF OUR LAB TESTS     YOU DID NOT NECESSARILY HAVE ALL OF THESE DONE     Hgb is the blood iron level  WBC means White Blood Cells  Platelets are small blood cells that help with forming the blood clots along with other blood factors.  Electrolytes are Sodium, Potassium, Calcium, Magnesium, Phosphorus.  Liver tests are: AST, ALT, Bilirubin, Alkaline Phosphatase.  Kidney tests are Creatinine, GFR.  ###they are stable for age ###    HDL Cholesterol - is the good cholesterol and it is good to have it high.  LDL cholesterol is the bad cholesterol and it is good to have it low.  It is recommended to have LDL less than 130 for people with hypertension and to have it less than 100 for people with heart disease, diabetes and chronic kidney disease.  Triglycerides are another type of lipid that can cause heart disease, like the cholesterol and should be kept low   Thyroid tests are TSH, T4, T3  Glucose is sugar.###only very slightly high###    A1c is a test that gives us an idea about how well was controlled the diabetes for the last 3 months.   PSA stands for Prostate Specific Antigen and it can be elevated with prostate cancer or prostate inflammation.    Please continue on the same medications

## 2018-06-08 ENCOUNTER — ALLIED HEALTH/NURSE VISIT (OUTPATIENT)
Dept: CARDIOLOGY | Facility: CLINIC | Age: 83
End: 2018-06-08
Payer: COMMERCIAL

## 2018-06-08 DIAGNOSIS — Z95.0 CARDIAC PACEMAKER IN SITU: Primary | ICD-10-CM

## 2018-06-08 PROCEDURE — 93294 REM INTERROG EVL PM/LDLS PM: CPT | Performed by: INTERNAL MEDICINE

## 2018-06-08 PROCEDURE — 93296 REM INTERROG EVL PM/IDS: CPT | Performed by: INTERNAL MEDICINE

## 2018-06-08 NOTE — MR AVS SNAPSHOT
"              After Visit Summary   2018    Abbe Lambert    MRN: [id number]           Patient Information     Date Of Birth          2/3/1934        Visit Information        Provider Department      2018 11:45 AM [facility]        Today's Diagnoses     Cardiac pacemaker in situ    -  1       Follow-ups after your visit        Who to contact     If you have questions or need follow up information about today's clinic visit or your schedule please contact [facility] directly at [phone].  Normal or non-critical lab and imaging results will be communicated to you by Billowbyhart, letter or phone within 4 business days after the clinic has received the results. If you do not hear from us within 7 days, please contact the clinic through Enviviot or phone. If you have a critical or abnormal lab result, we will notify you by phone as soon as possible.  Submit refill requests through [employer]formed[employer] or call your pharmacy and they will forward the refill request to us. Please allow 3 business days for your refill to be completed.          Additional Information About Your Visit        MyChart Information     [employer] lets you send messages to your doctor, view your test results, renew your prescriptions, schedule appointments and more. To sign up, go to [email or web address] . Click on \"Log in\" on the left side of the screen, which will take you to the Welcome page. Then click on \"Sign up Now\" on the right side of the page.     You will be asked to enter the access code listed below, as well as some personal information. Please follow the directions to create your username and password.     Your access code is: [email or web address]  Expires: 2018  9:01 AM     Your access code will  in 90 days. If you need help or a new code, please call your [place] clinic or [phone].        Care EveryWhere ID     This is your Care EveryWhere " ID. This could be used by other organizations to access your [place] medical records  [id number]         Blood Pressure from Last 3 Encounters:   05/18/18 110/66   05/15/18 128/78   02/08/18 112/69    Weight from Last 3 Encounters:   05/18/18 88.5 kg (195 lb)   05/15/18 89.4 kg (197 lb)   01/17/18 87.5 kg (193 lb)              We Performed the Following     INTERROGATION DEVICE EVAL REMOTE, PACER/ICD (33950)     PM DEVICE INTERROGATE REMOTE (89354)        Primary Care Provider Office Phone # Fax #    Eliezer Shah [phone] [address] [facility] [address]        Equal Access to Services     JIM AHN : Hadii aad ku hadasho Soomaali, waaxda luqadaha, qaybta kaalmada adeegyada, waxay idiin hayaan jhonatan franco . So [facility] [phone].    ATENCIÓN: Si habla español, tiene a monge disposición servicios gratuitos de asistencia lingüística. Llame al [phone].    We comply with applicable federal civil rights laws and [place] laws. We do not discriminate on the basis of race, color, national origin, age, disability, sex, sexual orientation, or gender identity.            Thank you!     Thank you for choosing [facility]  for your care. Our goal is always to provide you with excellent care. Hearing back from our patients is one way we can continue to improve our services. Please take a few minutes to complete the written survey that you may receive in the mail after your visit with us. Thank you!             Your Updated Medication List - Protect others around you: Learn how to safely use, store and throw away your medicines at www.disposemymeds.org.          This list is accurate as of 6/8/18  1:18 PM.  Always use your most recent med list.                   Brand Name Dispense Instructions for use Diagnosis    albuterol 108 (90 Base) MCG/ACT Inhaler    PROAIR HFA/PROVENTIL HFA/VENTOLIN HFA    3 Inhaler    Inhale 2 puffs into  the lungs as needed for shortness of breath / dyspnea or wheezing    Mild persistent asthma without complication       aspirin 81 MG tablet     30 tablet    Take 81 mg by mouth daily    CAD (coronary artery disease)       ASTELIN 0.1 % spray   Generic drug:  azelastine      Spray 1 spray into both nostrils 2 times daily.        beclomethasone 80 MCG/ACT Inhaler    QVAR    2 Inhaler    Inhale 2 puffs into the lungs 2 times daily    Moderate persistent asthma without complication       carvedilol 3.125 MG tablet    COREG    180 tablet    TAKE 1 TABLET(3.125 MG) BY MOUTH TWICE DAILY WITH MEALS    Essential hypertension       CERAVE Lotn      Externally apply topically 2 times daily as needed    Xerosis cutis, Itching       cetirizine 10 MG tablet    zyrTEC     Take 10 mg by mouth every morning        guaiFENesin 600 MG 12 hr tablet    MUCINEX     Take 600 mg by mouth 3 times daily        isosorbide mononitrate 30 MG 24 hr tablet    IMDUR    90 tablet    TAKE 1 TABLET BY MOUTH EVERY DAY    Coronary artery disease involving native coronary artery of native heart without angina pectoris       lisinopril 5 MG tablet    PRINIVIL/ZESTRIL    90 tablet    Take 1 tablet (5 mg) by mouth daily    Type 2 diabetes mellitus with stage 3 chronic kidney disease, without long-term current use of insulin (H), Benign essential hypertension       MELATONIN PO      Take 5 mg by mouth At Bedtime        NASONEX 50 MCG/ACT spray   Generic drug:  mometasone      Spray 2 sprays into both nostrils daily as needed.        nitroGLYcerin 0.4 MG sublingual tablet    NITROSTAT    25 tablet    Place 1 tablet (0.4 mg) under the tongue every 5 minutes as needed for chest pain    Chest pain on exertion       polyethylene glycol Packet    MIRALAX/GLYCOLAX     Take 17 g by mouth daily        potassium chloride SA 20 MEQ CR tablet    K-DUR/KLOR-CON M    30 tablet    Take 2 pills today, one pill tomorrow then one pill daily ONLY when you take the Demadex     Chronic combined systolic and diastolic congestive heart failure (H)       rosuvastatin 40 MG tablet    CRESTOR    90 tablet    Take 1 tablet (40 mg) by mouth daily    Coronary artery disease involving native coronary artery of native heart without angina pectoris       torsemide 20 MG tablet    DEMADEX    30 tablet    TAKE 1 TABLET(20 MG) BY MOUTH DAILY    Chronic combined systolic and diastolic congestive heart failure (H)       triamcinolone 0.1 % cream    KENALOG    30 g    Apply sparingly to affected area twotimes daily as needed    Xerosis cutis, Itching

## 2018-06-08 NOTE — PROGRESS NOTES
[facility] (D) Remote PPM Device Check  AP: 93 % : 85 %  Mode: DDDR        Presenting Rhythm: AP/, AP/VS  Heart Rate: Adequate rates per histogram  Sensing: Stable    Pacing Threshold: Stable    Impedance: Stable  Battery Status: 4.8-5.5 years  Atrial Arrhythmia: 57 mode switch episodes comprising <1% of the time. Longest episode lasted 2 minutes. EGMs show As>Vs for PAT/PAF. Taking ASA only.   Ventricular Arrhythmia: None     Care Plan: F/u PPM Merlin q 3 months. LM with results. AnahiCVT

## 2018-06-11 ENCOUNTER — OFFICE VISIT (OUTPATIENT)
Dept: FAMILY MEDICINE | Facility: CLINIC | Age: 83
End: 2018-06-11
Payer: COMMERCIAL

## 2018-06-11 VITALS
TEMPERATURE: 97.5 F | OXYGEN SATURATION: 94 % | SYSTOLIC BLOOD PRESSURE: 120 MMHG | BODY MASS INDEX: 27.69 KG/M2 | WEIGHT: 193 LBS | HEART RATE: 68 BPM | DIASTOLIC BLOOD PRESSURE: 70 MMHG | RESPIRATION RATE: 16 BRPM

## 2018-06-11 DIAGNOSIS — R29.898 WEAKNESS OF BOTH LOWER EXTREMITIES: Primary | ICD-10-CM

## 2018-06-11 PROCEDURE — 99213 OFFICE O/P EST LOW 20 MIN: CPT | Performed by: FAMILY MEDICINE

## 2018-06-11 NOTE — PROGRESS NOTES
SUBJECTIVE:   Abbe Lambert is a 84 year old male who presents to clinic today for the following health issues:      Musculoskeletal problem/pain      Duration: occurred 5 days ago    Description  Location: weakness in legs after walking 4 blocks    Intensity:  moderate    Accompanying signs and symptoms: weakness in legs    History  Previous similar problem: no   Previous evaluation:  none    Precipitating or alleviating factors:  Trauma or overuse: no   Aggravating factors include: walking longer distances    Therapies tried and outcome: walking shorter distances          Problem list and histories reviewed & adjusted, as indicated.  Additional history: The patient only had the one episode.  He is walked shorter distances since that time and has not had any issues.  He does have a diagnosis of peripheral arterial disease but I do not see any tests that have been done to prove that.    Patient Active Problem List   Diagnosis     Health Care Home     Allergic rhinitis     Peripheral edema     Polymyalgia rheumatica (H)     Advanced directives, counseling/discussion     Ischemic cardiomyopathy     Paroxysmal atrial fibrillation (H)     Coronary artery disease involving native coronary artery of native heart without angina pectoris     Intermittent lightheadedness     CKD (chronic kidney disease) stage 3, GFR 30-59 ml/min     GERD (gastroesophageal reflux disease)     Tachy-alix syndrome (H)     AAA (abdominal aortic aneurysm) (H)     Old myocardial infarction     Chronic combined systolic and diastolic congestive heart failure (H)     Essential hypertension, benign     Type 2 diabetes mellitus with stage 3 chronic kidney disease, without long-term current use of insulin (H)                                                                                                                                                                                                                  Moderate persistent asthma  without complication     Prostate cancer (H)     Mixed hyperlipidemia     Overweight (BMI 25.0-29.9)     Degenerative arthritis of knee     Aftercare following knee joint replacement surgery, unspecified laterality     Anemia due to blood loss, acute     Shingles     Acute kidney injury (H)     Physical deconditioning     Post herpetic neuralgia     Mild persistent asthma with acute exacerbation     Presbycusis of both ears     Chronic non-seasonal allergic rhinitis, unspecified trigger     Skin lesion     History of prostate cancer 2003 w/po resection prostate     PAD (peripheral artery disease) (H)     Decreased pulses in feet     Hypercholesterolemia     Microalbuminuria     Mild persistent asthma without complication     Past Surgical History:   Procedure Laterality Date     ARTHROPLASTY KNEE Left 10/5/2016    Procedure: ARTHROPLASTY KNEE;  Surgeon: Keshav Zamudio MD;  Location: [facility] OR     CARDIAC SURGERY  12/03/2012    pacemaker ; CABG 1998     CHOLECYSTECTOMY       COLONOSCOPY       ENT SURGERY  5-    sinus     EXTRACAPSULAR CATARACT EXTRATION WITH INTRAOCULAR LENS IMPLANT       GENITOURINARY SURGERY      prostatectomy     IMPLANT PACEMAKER  12-3-12    [facility]     PROSTATE SURGERY         Social History   Substance Use Topics     Smoking status: Never Smoker     Smokeless tobacco: Never Used     Alcohol use No     Family History   Problem Relation Age of Onset     CEREBROVASCULAR DISEASE Father      HEART DISEASE Father      HEART DISEASE Brother      Coronary Artery Disease Brother      MI age 50     Depression Daughter      raped in high school     Unknown/Adopted Maternal Grandmother      Unknown/Adopted Maternal Grandfather      Unknown/Adopted Paternal Grandmother      Unknown/Adopted Paternal Grandfather            Reviewed and updated as needed this visit by clinical staff  Tobacco  Allergies  Med Hx  Surg Hx  Fam Hx  Soc Hx      Reviewed and updated as needed this visit by Provider          ROS:  Constitutional, HEENT, cardiovascular, pulmonary, gi and gu systems are negative, except as otherwise noted.    OBJECTIVE:                                                    /70 (Cuff Size: Adult Large)  Pulse 68  Temp 97.5  F (36.4  C) (Tympanic)  Resp 16  Wt 193 lb (87.5 kg)  SpO2 94%  BMI 27.69 kg/m2  Body mass index is 27.69 kg/(m^2).  GENERAL APPEARANCE: healthy, alert and no distress  CV: regular rates and rhythm, normal S1 S2, no S3 or S4, no murmur, click or rub and peripheral pulses strong  MS: extremities normal- no gross deformities noted and no edema is present  SKIN: no suspicious lesions or rashes         ASSESSMENT/PLAN:                                                        ICD-10-CM    1. Weakness of both lower extremities R29.898 US REBECCA Doppler with Exercise Bilateral       Patient Instructions   We will get Doppler ultrasound with exercise done on both lower extremities.  Further plan will be pending review of that test.  I did suggest that given that this could be some form of claudication that the treatment is actually a walking program.  He will attempt walking in place with support of a chair inside his house.      Eliezer Shah MD  [facility]

## 2018-06-11 NOTE — MR AVS SNAPSHOT
After Visit Summary   6/11/2018    Abbe Lambert    MRN: [id number]           Patient Information     Date Of Birth          2/3/1934        Visit Information        Provider Department      6/11/2018 4:00 PM Eliezer Shah MD [facility]        Today's Diagnoses     Weakness of both lower extremities    -  1      Care Instructions    We will get Doppler ultrasound with exercise done on both lower extremities.  Further plan will be pending review of that test.  I did suggest that given that this could be some form of claudication that the treatment is actually a walking program.  He will attempt walking in place with support of a chair inside his house.          Follow-ups after your visit        Your next 10 appointments already scheduled     Sep 21, 2018  4:15 PM CDT   Remote PPM Check with HARMAN TECH1   [facility] ([facility]    [address] [place] [address] [id number] [phone] OPT 2           This appointment is for a remote check of your pacemaker.  This is not an appointment at the office.              Future tests that were ordered for you today     Open Future Orders        Priority Expected Expires Ordered    US REBECCA Doppler with Exercise Bilateral Routine  6/11/2019 6/11/2018            Who to contact     If you have questions or need follow up information about today's clinic visit or your schedule please contact [facility] directly at [phone].  Normal or non-critical lab and imaging results will be communicated to you by MyChart, letter or phone within 4 business days after the clinic has received the results. If you do not hear from us within 7 days, please contact the clinic through MyChart or phone. If you have a critical or abnormal lab result, we will notify you by phone as soon as possible.  Submit refill requests through [employer] or call your  "pharmacy and they will forward the refill request to us. Please allow 3 business days for your refill to be completed.          Additional Information About Your Visit        MyChart Information     "[employer]" lets you send messages to your doctor, view your test results, renew your prescriptions, schedule appointments and more. To sign up, go to [email or web address]" . Click on \"Log in\" on the left side of the screen, which will take you to the Welcome page. Then click on \"Sign up Now\" on the right side of the page.     You will be asked to enter the access code listed below, as well as some personal information. Please follow the directions to create your username and password.     Your access code is: [email or web address]  Expires: 2018  9:01 AM     Your access code will  in 90 days. If you need help or a new code, please call your [facility] clinic or [phone].        Care EveryWhere ID     This is your Care EveryWhere ID. This could be used by other organizations to access your [facility] medical records  [id number]        Your Vitals Were     Pulse Temperature Respirations Pulse Oximetry BMI (Body Mass Index)       68 97.5  F (36.4  C) (Tympanic) 16 94% 27.69 kg/m2        Blood Pressure from Last 3 Encounters:   18 120/70   18 110/66   05/15/18 128/78    Weight from Last 3 Encounters:   18 193 lb (87.5 kg)   18 195 lb (88.5 kg)   05/15/18 197 lb (89.4 kg)               Primary Care Provider Office Phone # Fax #    Eliezer Shah [phone] [address] [facility] [address]        Equal Access to Services     JIM AHN : Shanika Hernandez, stan rodriguez, jackie kaalmachuck yao, skylar goodson. So [facility] [phone].    ATENCIÓN: Si habla español, tiene a monge disposición servicios gratuitos de asistencia lingüística. Llame al [phone].    We comply with applicable federal civil rights laws and " [place] laws. We do not discriminate on the basis of race, color, national origin, age, disability, sex, sexual orientation, or gender identity.            Thank you!     [facility]. Our goal is always to provide you with excellent care. Hearing back from our patients is one way we can continue to improve our services. Please take a few minutes to complete the written survey that you may receive in the mail after your visit with us. Thank you!             Your Updated Medication List - Protect others around you: Learn how to safely use, store and throw away your medicines at www.disposemymeds.org.          This list is accurate as of 6/11/18 11:59 PM.  Always use your most recent med list.                   Brand Name Dispense Instructions for use Diagnosis    albuterol 108 (90 Base) MCG/ACT Inhaler    PROAIR HFA/PROVENTIL HFA/VENTOLIN HFA    3 Inhaler    Inhale 2 puffs into the lungs as needed for shortness of breath / dyspnea or wheezing    Mild persistent asthma without complication       aspirin 81 MG tablet     30 tablet    Take 81 mg by mouth daily    CAD (coronary artery disease)       ASTELIN 0.1 % spray   Generic drug:  azelastine      Spray 1 spray into both nostrils 2 times daily.        beclomethasone 80 MCG/ACT Inhaler    QVAR    2 Inhaler    Inhale 2 puffs into the lungs 2 times daily    Moderate persistent asthma without complication       carvedilol 3.125 MG tablet    COREG    180 tablet    TAKE 1 TABLET(3.125 MG) BY MOUTH TWICE DAILY WITH MEALS    Essential hypertension       CERAVE Lotn      Externally apply topically 2 times daily as needed    Xerosis cutis, Itching       cetirizine 10 MG tablet    zyrTEC     Take 10 mg by mouth every morning        guaiFENesin 600 MG 12 hr tablet    MUCINEX     Take 600 mg by mouth 3 times daily        isosorbide mononitrate 30 MG 24 hr tablet    IMDUR    90 tablet    TAKE 1 TABLET BY MOUTH  EVERY DAY    Coronary artery disease involving native coronary artery of native heart without angina pectoris       lisinopril 5 MG tablet    PRINIVIL/ZESTRIL    90 tablet    Take 1 tablet (5 mg) by mouth daily    Type 2 diabetes mellitus with stage 3 chronic kidney disease, without long-term current use of insulin (H), Benign essential hypertension       MELATONIN PO      Take 5 mg by mouth At Bedtime        NASONEX 50 MCG/ACT spray   Generic drug:  mometasone      Spray 2 sprays into both nostrils daily as needed.        nitroGLYcerin 0.4 MG sublingual tablet    NITROSTAT    25 tablet    Place 1 tablet (0.4 mg) under the tongue every 5 minutes as needed for chest pain    Chest pain on exertion       polyethylene glycol Packet    MIRALAX/GLYCOLAX     Take 17 g by mouth daily        potassium chloride SA 20 MEQ CR tablet    K-DUR/KLOR-CON M    30 tablet    Take 2 pills today, one pill tomorrow then one pill daily ONLY when you take the Demadex    Chronic combined systolic and diastolic congestive heart failure (H)       rosuvastatin 40 MG tablet    CRESTOR    90 tablet    Take 1 tablet (40 mg) by mouth daily    Coronary artery disease involving native coronary artery of native heart without angina pectoris       torsemide 20 MG tablet    DEMADEX    30 tablet    TAKE 1 TABLET(20 MG) BY MOUTH DAILY    Chronic combined systolic and diastolic congestive heart failure (H)       triamcinolone 0.1 % cream    KENALOG    30 g    Apply sparingly to affected area twotimes daily as needed    Xerosis cutis, Itching

## 2018-06-12 NOTE — PATIENT INSTRUCTIONS
We will get Doppler ultrasound with exercise done on both lower extremities.  Further plan will be pending review of that test.  I did suggest that given that this could be some form of claudication that the treatment is actually a walking program.  He will attempt walking in place with support of a chair inside his house.

## 2018-06-23 ENCOUNTER — RADIANT APPOINTMENT (OUTPATIENT)
Dept: GENERAL RADIOLOGY | Facility: CLINIC | Age: 83
End: 2018-06-23
Attending: NURSE PRACTITIONER
Payer: COMMERCIAL

## 2018-06-23 ENCOUNTER — OFFICE VISIT (OUTPATIENT)
Dept: URGENT CARE | Facility: URGENT CARE | Age: 83
End: 2018-06-23
Payer: COMMERCIAL

## 2018-06-23 VITALS
RESPIRATION RATE: 12 BRPM | WEIGHT: 193.4 LBS | HEART RATE: 67 BPM | DIASTOLIC BLOOD PRESSURE: 60 MMHG | BODY MASS INDEX: 27.75 KG/M2 | SYSTOLIC BLOOD PRESSURE: 100 MMHG | TEMPERATURE: 97.8 F

## 2018-06-23 DIAGNOSIS — S80.02XA CONTUSION OF LEFT KNEE, INITIAL ENCOUNTER: Primary | ICD-10-CM

## 2018-06-23 DIAGNOSIS — S80.02XA CONTUSION OF LEFT KNEE, INITIAL ENCOUNTER: ICD-10-CM

## 2018-06-23 DIAGNOSIS — S89.92XA KNEE INJURY, LEFT, INITIAL ENCOUNTER: ICD-10-CM

## 2018-06-23 PROCEDURE — 73590 X-RAY EXAM OF LOWER LEG: CPT | Mod: LT

## 2018-06-23 PROCEDURE — 99214 OFFICE O/P EST MOD 30 MIN: CPT | Performed by: NURSE PRACTITIONER

## 2018-06-23 NOTE — PROGRESS NOTES
SUBJECTIVE:   Abbe Lambert is a 84 year old male presenting with a chief complaint of   Chief Complaint   Patient presents with     Trauma     Pt states left leg calf bruise x 6 days        He is an established patient of [facility].    MS Injury/Pain    Onset of symptoms was 6 day(s) ago.  Location: left leg  Context:       The injury happened while at home      Mechanism: fall       Patient experienced immediate pain  Course of symptoms is improving.    Severity moderate  Current and Associated symptoms: Pain, Swelling and Bruising  Denies  Warmth, Redness, Tenderness, Decreased range of motion and Stiffness  Aggravating Factors: none  Therapies to improve symptoms include: ice  This is the first time this type of problem has occurred for this patient.       Review of Systems   All other systems reviewed and are negative.      Past Medical History:   Diagnosis Date     AAA (abdominal aortic aneurysm) (H)      Asthma      Atrial fibrillation (H)      Cardiac arrest (H)      Cardiomyopathy (H)      Chronic kidney disease      Chronic sinusitis      Coronary artery disease     cardiac cath 2014: medical management; cath 2013: PTCA to diagonal; cath 2009: medical management; CABG 1998: LIMA to LAD, LISA to RCA, SVG to circumflex     GERD (gastroesophageal reflux disease)      History of angina      Hyperlipidaemia      Hypertension, goal below 140/90      Myocardial infarction     MI with Vfib arrest 1998     Polymyalgia rheumatica (H)      Shortness of breath      Tachy-alix syndrome (H)     s/p dual chamber pacemaker 2012     Family History   Problem Relation Age of Onset     Cerebrovascular Disease Father      HEART DISEASE Father      HEART DISEASE Brother      Coronary Artery Disease Brother      MI age 50     Depression Daughter      raped in high school     Unknown/Adopted Maternal Grandmother      Unknown/Adopted Maternal Grandfather      Unknown/Adopted Paternal Grandmother      Unknown/Adopted Paternal  Grandfather      Current Outpatient Prescriptions   Medication Sig Dispense Refill     albuterol (PROAIR HFA/PROVENTIL HFA/VENTOLIN HFA) 108 (90 BASE) MCG/ACT Inhaler Inhale 2 puffs into the lungs as needed for shortness of breath / dyspnea or wheezing 3 Inhaler 11     aspirin 81 MG tablet Take 81 mg by mouth daily  30 tablet      azelastine (ASTELIN) 137 MCG/SPRAY nasal spray [place] 1 spray into both nostrils 2 times daily.       beclomethasone (QVAR) 80 MCG/ACT Inhaler Inhale 2 puffs into the lungs 2 times daily 2 Inhaler 11     carvedilol (COREG) 3.125 MG tablet TAKE 1 TABLET(3.125 MG) BY MOUTH TWICE DAILY WITH MEALS 180 tablet 3     cetirizine (ZYRTEC) 10 MG tablet Take 10 mg by mouth every morning        Emollient (CERAVE) LOTN Externally apply topically 2 times daily as needed       guaiFENesin (MUCINEX) 600 MG 12 hr tablet Take 600 mg by mouth 3 times daily        isosorbide mononitrate (IMDUR) 30 MG 24 hr tablet TAKE 1 TABLET BY MOUTH EVERY DAY 90 tablet 3     lisinopril (PRINIVIL/ZESTRIL) 5 MG tablet Take 1 tablet (5 mg) by mouth daily 90 tablet 3     MELATONIN PO Take 5 mg by mouth At Bedtime       mometasone (NASONEX) 50 MCG/ACT nasal spray Spray 2 sprays into both nostrils daily as needed.       nitroglycerin (NITROSTAT) 0.4 MG SL tablet Place 1 tablet (0.4 mg) under the tongue every 5 minutes as needed for chest pain 25 tablet 3     polyethylene glycol (MIRALAX/GLYCOLAX) packet Take 17 g by mouth daily        potassium chloride SA (K-DUR/KLOR-CON M) 20 MEQ CR tablet Take 2 pills today, one pill tomorrow then one pill daily ONLY when you take the Demadex 30 tablet 5     rosuvastatin (CRESTOR) 40 MG tablet Take 1 tablet (40 mg) by mouth daily 90 tablet 3     torsemide (DEMADEX) 20 MG tablet TAKE 1 TABLET(20 MG) BY MOUTH DAILY 30 tablet 0     triamcinolone (KENALOG) 0.1 % cream Apply sparingly to affected area twotimes daily as needed 30 g 5     Social History   Substance Use Topics     Smoking status:  Never Smoker     Smokeless tobacco: Never Used     Alcohol use No       OBJECTIVE  /60  Pulse 67  Temp 97.8  F (36.6  C) (Oral)  Resp 12  Wt 193 lb 6.4 oz (87.7 kg)  BMI 27.75 kg/m2    Physical Exam   Constitutional: He is oriented to person, place, and time. He appears well-developed and well-nourished.   HENT:   Head: Normocephalic and atraumatic.   Eyes: EOM are normal. Pupils are equal, round, and reactive to light.   Cardiovascular: Normal rate, regular rhythm and normal heart sounds.    Pulmonary/Chest: Effort normal and breath sounds normal. No respiratory distress.   Musculoskeletal: Normal range of motion.   Left shin with large raised ecchymotic area just below knee. Tender to palpation, no warmth, redness, no pain behind knee, calf. No edema elsewhere. Able to bear weight    Neurological: He is alert and oriented to person, place, and time. He displays normal reflexes. No cranial nerve deficit. He exhibits normal muscle tone. Coordination normal.   Skin: Skin is warm and dry.   Psychiatric: He has a normal mood and affect. His behavior is normal. Judgment and thought content normal.       Labs:  No results found for this or any previous visit (from the past 24 hour(s)).    X-Ray was done, my findings are: negative    ASSESSMENT:      ICD-10-CM    1. Contusion of left knee, initial encounter S80.02XA XR Tibia & Fibula Left 2 Views   2. Knee injury, left, initial encounter S89.92XA XR Tibia & Fibula Left 2 Views        PLAN:    Monitor for increased pain, redness, edema. Follow up with PCP for mild tyson, ED for worsening      I spent 25 min spent in direct face to face time with Abbe Lambert, greater than 50% in counseling and coordination of care for:  Above diagnoses    Followup:    If not improving or if conditions worsens over the next 12-24 hours, go to the Emergency Department    There are no Patient Instructions on file for this visit.

## 2018-06-23 NOTE — MR AVS SNAPSHOT
"              After Visit Summary   6/23/2018    Abbe Lambert    MRN: [id number]           Patient Information     Date Of Birth          2/3/1934        Visit Information        Provider Department      6/23/2018 9:10 AM Clara Tripp APRN CNP [facility]        Today's Diagnoses     Contusion of left knee, initial encounter    -  1    Knee injury, left, initial encounter           Follow-ups after your visit        Your next 10 appointments already scheduled     Sep 21, 2018  4:15 PM CDT   Remote PPM Check with HARMAN TECH1   [facility] ([facility])    [address]  [facility] [address] [phone] OPT 2           This appointment is for a remote check of your pacemaker.  This is not an appointment at the office.              Who to contact     If you have questions or need follow up information about today's clinic visit or your schedule please contact [facility] directly at [phone].  Normal or non-critical lab and imaging results will be communicated to you by Ruangguruhart, letter or phone within 4 business days after the clinic has received the results. If you do not hear from us within 7 days, please contact the clinic through Signifydt or phone. If you have a critical or abnormal lab result, we will notify you by phone as soon as possible.  Submit refill requests through Targovax or call your pharmacy and they will forward the refill request to us. Please allow 3 business days for your refill to be completed.          Additional Information About Your Visit        [employer] Information     Targovax lets you send messages to your doctor, view your test results, renew your prescriptions, schedule appointments and more. To sign up, go to [email or web address] . Click on \"Log in\" on the left side of the screen, which will take you to the Welcome page. Then click on \"Sign up Now\" on " the right side of the page.     You will be asked to enter the access code listed below, as well as some personal information. Please follow the directions to create your username and password.     Your access code is: [email or web address]  Expires: 2018  9:01 AM     Your access code will  in 90 days. If you need help or a new code, please call your [place] clinic or [phone].        Care EveryWhere ID     This is your Care EveryWhere ID. This could be used by other organizations to access your [place] medical records  [id number]        Your Vitals Were     Pulse Temperature Respirations BMI (Body Mass Index)          67 97.8  F (36.6  C) (Oral) 12 27.75 kg/m2         Blood Pressure from Last 3 Encounters:   18 100/60   18 120/70   18 110/66    Weight from Last 3 Encounters:   18 193 lb 6.4 oz (87.7 kg)   18 193 lb (87.5 kg)   18 195 lb (88.5 kg)               Primary Care Provider Office Phone # Fax #    Eliezer Shah [phone]       [address] [facility] [address]        Equal Access to Services     JIM AHN AH: Hadii qamar ku hadasho Soomaali, waaxda luqadaha, qaybta kaalmada adeegyada, waxay idiin hayrodolfon jhonatan goodson. So [facility] [phone].    ATENCIÓN: Si habla español, tiene a monge disposición servicios gratuitos de asistencia lingüística. Llame al [phone].    We comply with applicable federal civil rights laws and [place] laws. We do not discriminate on the basis of race, color, national origin, age, disability, sex, sexual orientation, or gender identity.            Thank you!     Thank you for choosing [facility]  for your care. Our goal is always to provide you with excellent care. Hearing back from our patients is one way we can continue to improve our services. Please take a few minutes to complete the written survey that you may receive in the mail after your visit with us. Thank  you!             Your Updated Medication List - Protect others around you: Learn how to safely use, store and throw away your medicines at www.disposemymeds.org.          This list is accurate as of 6/23/18 10:29 AM.  Always use your most recent med list.                   Brand Name Dispense Instructions for use Diagnosis    albuterol 108 (90 Base) MCG/ACT Inhaler    PROAIR HFA/PROVENTIL HFA/VENTOLIN HFA    3 Inhaler    Inhale 2 puffs into the lungs as needed for shortness of breath / dyspnea or wheezing    Mild persistent asthma without complication       aspirin 81 MG tablet     30 tablet    Take 81 mg by mouth daily    CAD (coronary artery disease)       ASTELIN 0.1 % spray   Generic drug:  azelastine      Spray 1 spray into both nostrils 2 times daily.        beclomethasone 80 MCG/ACT Inhaler    QVAR    2 Inhaler    Inhale 2 puffs into the lungs 2 times daily    Moderate persistent asthma without complication       carvedilol 3.125 MG tablet    COREG    180 tablet    TAKE 1 TABLET(3.125 MG) BY MOUTH TWICE DAILY WITH MEALS    Essential hypertension       CERAVE Lotn      Externally apply topically 2 times daily as needed    Xerosis cutis, Itching       cetirizine 10 MG tablet    zyrTEC     Take 10 mg by mouth every morning        guaiFENesin 600 MG 12 hr tablet    MUCINEX     Take 600 mg by mouth 3 times daily        isosorbide mononitrate 30 MG 24 hr tablet    IMDUR    90 tablet    TAKE 1 TABLET BY MOUTH EVERY DAY    Coronary artery disease involving native coronary artery of native heart without angina pectoris       lisinopril 5 MG tablet    PRINIVIL/ZESTRIL    90 tablet    Take 1 tablet (5 mg) by mouth daily    Type 2 diabetes mellitus with stage 3 chronic kidney disease, without long-term current use of insulin (H), Benign essential hypertension       MELATONIN PO      Take 5 mg by mouth At Bedtime        NASONEX 50 MCG/ACT spray   Generic drug:  mometasone      Spray 2 sprays into both nostrils daily as  needed.        nitroGLYcerin 0.4 MG sublingual tablet    NITROSTAT    25 tablet    Place 1 tablet (0.4 mg) under the tongue every 5 minutes as needed for chest pain    Chest pain on exertion       polyethylene glycol Packet    MIRALAX/GLYCOLAX     Take 17 g by mouth daily        potassium chloride SA 20 MEQ CR tablet    K-DUR/KLOR-CON M    30 tablet    Take 2 pills today, one pill tomorrow then one pill daily ONLY when you take the Demadex    Chronic combined systolic and diastolic congestive heart failure (H)       rosuvastatin 40 MG tablet    CRESTOR    90 tablet    Take 1 tablet (40 mg) by mouth daily    Coronary artery disease involving native coronary artery of native heart without angina pectoris       torsemide 20 MG tablet    DEMADEX    30 tablet    TAKE 1 TABLET(20 MG) BY MOUTH DAILY    Chronic combined systolic and diastolic congestive heart failure (H)       triamcinolone 0.1 % cream    KENALOG    30 g    Apply sparingly to affected area twotimes daily as needed    Xerosis cutis, Itching

## 2018-06-29 DIAGNOSIS — I25.10 CORONARY ARTERY DISEASE INVOLVING NATIVE CORONARY ARTERY OF NATIVE HEART WITHOUT ANGINA PECTORIS: ICD-10-CM

## 2018-06-29 NOTE — TELEPHONE ENCOUNTER
"Requested Prescriptions   Pending Prescriptions Disp Refills     isosorbide mononitrate (IMDUR) 30 MG 24 hr tablet [Pharmacy Med Name: ISOSORBIDE MONONITRATE 30MG ER TABS] 90 tablet 0      Last Written Prescription Date: 5/3/17  Last Fill Quantity: 90 ,  # refills: 3   Last office visit: 6/11/2018 with prescribing provider:     Future Office Visit:     Sig: TAKE 1 TABLET BY MOUTH EVERY DAY    Nitrates Passed    6/29/2018 11:45 AM       Passed - Blood pressure under 140/90 in past 12 months    BP Readings from Last 3 Encounters:   06/23/18 100/60   06/11/18 120/70   05/18/18 110/66                Passed - Pt is not on erectile dysfunction medications       Passed - Recent (12 mo) or future (30 days) visit within the authorizing provider's specialty    Patient had office visit in the last 12 months or has a visit in the next 30 days with authorizing provider or within the authorizing provider's specialty.  See \"Patient Info\" tab in inbasket, or \"Choose Columns\" in Meds & Orders section of the refill encounter.           Passed - Patient is age 18 or older        /  "

## 2018-07-02 ENCOUNTER — OFFICE VISIT (OUTPATIENT)
Dept: FAMILY MEDICINE | Facility: CLINIC | Age: 83
End: 2018-07-02
Payer: COMMERCIAL

## 2018-07-02 VITALS
OXYGEN SATURATION: 96 % | SYSTOLIC BLOOD PRESSURE: 128 MMHG | HEART RATE: 86 BPM | DIASTOLIC BLOOD PRESSURE: 66 MMHG | BODY MASS INDEX: 27.69 KG/M2 | TEMPERATURE: 98.9 F | WEIGHT: 193 LBS | RESPIRATION RATE: 16 BRPM

## 2018-07-02 DIAGNOSIS — L03.031 PARONYCHIA OF TOE, RIGHT: Primary | ICD-10-CM

## 2018-07-02 PROCEDURE — 99213 OFFICE O/P EST LOW 20 MIN: CPT | Performed by: PHYSICIAN ASSISTANT

## 2018-07-02 RX ORDER — CICLOPIROX 80 MG/ML
SOLUTION TOPICAL DAILY
Qty: 1 BOTTLE | Refills: 11 | Status: CANCELLED | OUTPATIENT
Start: 2018-07-02

## 2018-07-02 NOTE — PATIENT INSTRUCTIONS
Paronychia of the Finger or Toe  Paronychia is an infection near a fingernail or toenail. It usually occurs when an opening in the cuticle or an ingrown toenail lets bacteria under the skin.  The infection will need to be drained if pus is present. If the infection has been caught early, you may need only antibiotic treatment. Healing will take about 1 to 2 weeks.  Home care  Follow these guidelines when caring for yourself at home:    Clean and soak the toe or finger. Do this 2 times a day for the first 3 days. To do so:  ? Soak your foot or hand in a tub of warm water for 5 minutes. Or hold your toe or finger under a faucet of warm running water for 5 minutes.  ? Clean any crust away with soap and water using a cotton swab.  ? Put antibiotic ointment on the infected area.    Change the dressing daily or any time it gets dirty.    If you were given antibiotics, take them as directed until they are all gone.    If your infection is on a toe, wear comfortable shoes with a lot of toe room. You can also wear open-toed sandals while your toe heals.    You may use over-the-counter medicine (acetaminophen or ibuprofen to help with pain, unless another medicine was prescribed. If you have chronic liver or kidney disease, talk with your healthcare provider before using these medicines. Also talk with your provider if you've had a stomach ulcer or GI (gastrointestinal) bleeding.  Prevention  The following can prevent paronychia:    Avoid cutting or playing with your cuticles at home.    Don't bite your nails.    Don't suck on your thumbs or fingers.  Follow-up care  Follow up with your healthcare provider, or as advised.  When to seek medical advice  Call your healthcare provider right away if any of these occur:    Redness, pain, or swelling of the finger or toe gets worse    Red streaks in the skin leading away from the wound    Pus or fluid draining from the nail area    Fever of 100.4 F (38 C) or higher, or as  directed by your provider  Date Last Reviewed: 8/1/2016 2000-2017 [employer]. [address] [place] [address], [place] [address]. All rights reserved. This information is not intended as a substitute for professional medical care. Always follow your healthcare professional's instructions.

## 2018-07-02 NOTE — MR AVS SNAPSHOT
After Visit Summary   7/2/2018    Abbe Lambert    MRN: [id number]           Patient Information     Date Of Birth          2/3/1934        Visit Information        Provider Department      7/2/2018 2:10 PM Erica Patel PA-C [facility]        Today's Diagnoses     Paronychia of toe, right    -  1      Care Instructions            Paronychia of the Finger or Toe  Paronychia is an infection near a fingernail or toenail. It usually occurs when an opening in the cuticle or an ingrown toenail lets bacteria under the skin.  The infection will need to be drained if pus is present. If the infection has been caught early, you may need only antibiotic treatment. Healing will take about 1 to 2 weeks.  Home care  Follow these guidelines when caring for yourself at home:    Clean and soak the toe or finger. Do this 2 times a day for the first 3 days. To do so:  ? Soak your foot or hand in a tub of warm water for 5 minutes. Or hold your toe or finger under a faucet of warm running water for 5 minutes.  ? Clean any crust away with soap and water using a cotton swab.  ? Put antibiotic ointment on the infected area.    Change the dressing daily or any time it gets dirty.    If you were given antibiotics, take them as directed until they are all gone.    If your infection is on a toe, wear comfortable shoes with a lot of toe room. You can also wear open-toed sandals while your toe heals.    You may use over-the-counter medicine (acetaminophen or ibuprofen to help with pain, unless another medicine was prescribed. If you have chronic liver or kidney disease, talk with your healthcare provider before using these medicines. Also talk with your provider if you've had a stomach ulcer or GI (gastrointestinal) bleeding.  Prevention  The following can prevent paronychia:    Avoid cutting or playing with your cuticles at home.    Don't bite your nails.    Don't suck on your  thumbs or fingers.  Follow-up care  Follow up with your healthcare provider, or as advised.  When to seek medical advice  Call your healthcare provider right away if any of these occur:    Redness, pain, or swelling of the finger or toe gets worse    Red streaks in the skin leading away from the wound    Pus or fluid draining from the nail area    Fever of 100.4 F (38 C) or higher, or as directed by your provider  Date Last Reviewed: 8/1/2016 2000-2017 [employer]. [address] [place] [address]. All rights reserved. This information is not intended as a substitute for professional medical care. Always follow your healthcare professional's instructions.                Follow-ups after your visit        Additional Services     PODIATRY/FOOT & ANKLE SURGERY REFERRAL       Your provider has referred you to: FMG: [facility] [phone]   [email or web address]  FMG: [facility] [phone]   [email or web address]  FMG: [facility] [phone]   [email or web address]    Please be aware that coverage of these services is subject to the terms and limitations of your health insurance plan.  Call member services at your health plan with any benefit or coverage questions.      Please bring the following to your appointment:  >>   Any x-rays, CTs or MRIs which have been performed.  Contact the facility where they were done to arrange for  prior to your scheduled appointment.    >>   List of current medications   >>   This referral request   >>   Any documents/labs given to you for this referral                  Your next 10 appointments already scheduled     Sep 21, 2018  4:15 PM CDT   Remote PPM Check with HARMAN TECH1   [facility]   [place] [facility])    [address] [place], [place] [id number]  [facility] [address]    [phone] OPT 2           This appointment is for a remote check of your pacemaker.  This is not an appointment at the office.              Who to contact     If you have questions or need follow up information about today's clinic visit or your schedule please contact [facility] directly at [phone].  Normal or non-critical lab and imaging results will be communicated to you by MyChart, letter or phone within 4 business days after the clinic has received the results. If you do not hear from us within 7 days, please contact the clinic through MyChart or phone. If you have a critical or abnormal lab result, we will notify you by phone as soon as possible.  Submit refill requests through Sonico or call your pharmacy and they will forward the refill request to us. Please allow 3 business days for your refill to be completed.          Additional Information About Your Visit        Care EveryWhere ID     This is your Care EveryWhere ID. This could be used by other organizations to access your [place] medical records  [id number]        Your Vitals Were     Pulse Temperature Respirations Pulse Oximetry BMI (Body Mass Index)       86 98.9  F (37.2  C) (Tympanic) 16 96% 27.69 kg/m2        Blood Pressure from Last 3 Encounters:   07/02/18 128/66   06/23/18 100/60   06/11/18 120/70    Weight from Last 3 Encounters:   07/02/18 193 lb (87.5 kg)   06/23/18 193 lb 6.4 oz (87.7 kg)   06/11/18 193 lb (87.5 kg)              We Performed the Following     PODIATRY/FOOT & ANKLE SURGERY REFERRAL          Today's Medication Changes          These changes are accurate as of 7/2/18  2:30 PM.  If you have any questions, ask your nurse or doctor.               Start taking these medicines.        Dose/Directions    cephalexin 250 MG capsule   Commonly known as:  KEFLEX   Used for:  Paronychia of toe, right   Started by:  Erica Patel PA-C        Dose:  250 mg   Take 1 capsule (250  mg) by mouth 3 times daily for 10 days   Quantity:  30 capsule   Refills:  0            Where to get your medicines      These medications were sent to [id number] - [facility] [address] AT [facility] [address], [facility] [address]     Phone:  [phone]     cephalexin 250 MG capsule                Primary Care Provider Office Phone # Fax #    Eliezer Shah [phone] [address]  [facility] [address]        Equal Access to Services     ROSAS AHN : Hadii aad ku hadasho Soomaali, waaxda luqadaha, qaybta kaalmada adeegyada, waxay idiin hayaan adeeg kharabing lachris . So [facility] [phone].    ATENCIÓN: Si habla español, tiene a monge disposición servicios gratuitos de asistencia lingüística. [facility] [phone].    We comply with applicable federal civil rights laws and [place] laws. We do not discriminate on the basis of race, color, national origin, age, disability, sex, sexual orientation, or gender identity.            Thank you!     Thank you for choosing [facility]  for your care. Our goal is always to provide you with excellent care. Hearing back from our patients is one way we can continue to improve our services. Please take a few minutes to complete the written survey that you may receive in the mail after your visit with us. Thank you!             Your Updated Medication List - Protect others around you: Learn how to safely use, store and throw away your medicines at www.disposemymeds.org.          This list is accurate as of 7/2/18  2:30 PM.  Always use your most recent med list.                   Brand Name Dispense Instructions for use Diagnosis    albuterol 108 (90 Base) MCG/ACT Inhaler    PROAIR HFA/PROVENTIL HFA/VENTOLIN HFA    3 Inhaler    Inhale 2 puffs into the lungs as needed for shortness of breath / dyspnea or wheezing    Mild persistent asthma without complication        aspirin 81 MG tablet     30 tablet    Take 81 mg by mouth daily    CAD (coronary artery disease)       ASTELIN 0.1 % spray   Generic drug:  azelastine      Spray 1 spray into both nostrils 2 times daily.        beclomethasone 80 MCG/ACT Inhaler    QVAR    2 Inhaler    Inhale 2 puffs into the lungs 2 times daily    Moderate persistent asthma without complication       carvedilol 3.125 MG tablet    COREG    180 tablet    TAKE 1 TABLET(3.125 MG) BY MOUTH TWICE DAILY WITH MEALS    Essential hypertension       cephalexin 250 MG capsule    KEFLEX    30 capsule    Take 1 capsule (250 mg) by mouth 3 times daily for 10 days    Paronychia of toe, right       CERAVE Lotn      Externally apply topically 2 times daily as needed    Xerosis cutis, Itching       cetirizine 10 MG tablet    zyrTEC     Take 10 mg by mouth every morning        guaiFENesin 600 MG 12 hr tablet    MUCINEX     Take 600 mg by mouth 3 times daily        isosorbide mononitrate 30 MG 24 hr tablet    IMDUR    90 tablet    TAKE 1 TABLET BY MOUTH EVERY DAY    Coronary artery disease involving native coronary artery of native heart without angina pectoris       lisinopril 5 MG tablet    PRINIVIL/ZESTRIL    90 tablet    Take 1 tablet (5 mg) by mouth daily    Type 2 diabetes mellitus with stage 3 chronic kidney disease, without long-term current use of insulin (H), Benign essential hypertension       MELATONIN PO      Take 5 mg by mouth At Bedtime        NASONEX 50 MCG/ACT spray   Generic drug:  mometasone      Spray 2 sprays into both nostrils daily as needed.        nitroGLYcerin 0.4 MG sublingual tablet    NITROSTAT    25 tablet    Place 1 tablet (0.4 mg) under the tongue every 5 minutes as needed for chest pain    Chest pain on exertion       polyethylene glycol Packet    MIRALAX/GLYCOLAX     Take 17 g by mouth daily        potassium chloride SA 20 MEQ CR tablet    K-DUR/KLOR-CON M    30 tablet    Take 2 pills today, one pill tomorrow then one pill daily ONLY  when you take the Demadex    Chronic combined systolic and diastolic congestive heart failure (H)       rosuvastatin 40 MG tablet    CRESTOR    90 tablet    Take 1 tablet (40 mg) by mouth daily    Coronary artery disease involving native coronary artery of native heart without angina pectoris       torsemide 20 MG tablet    DEMADEX    30 tablet    TAKE 1 TABLET(20 MG) BY MOUTH DAILY    Chronic combined systolic and diastolic congestive heart failure (H)       triamcinolone 0.1 % cream    KENALOG    30 g    Apply sparingly to affected area twotimes daily as needed    Xerosis cutis, Itching

## 2018-07-02 NOTE — PROGRESS NOTES
SUBJECTIVE:   Abbe Lambert is a 84 year old male who presents to clinic today for the following health issues:    Toenail issue      Duration: 4 days    Description (location/character/radiation): patient states that he has a green toenail, right foot great toe, some what painful, no drainage. Possibly be from wearing the wrong shoes    Intensity:  mild, moderate    Accompanying signs and symptoms: see above    History (similar episodes/previous evaluation): None    Precipitating or alleviating factors: None    Therapies tried and outcome: None     Reviewed and updated as needed this visit by clinical staff  Tobacco  Allergies  Meds  Problems  Med Hx  Surg Hx  Fam Hx  Soc Hx        Reviewed and updated as needed this visit by Provider  Tobacco  Allergies  Meds  Problems  Med Hx  Surg Hx  Fam Hx  Soc Hx        Additional complaints: None    HPI additional notes: Abbe presents today with   Chief Complaint   Patient presents with     Toenail     Lab Results   Component Value Date    CR 1.75 05/18/2018            ROS:  C: Negative for fever, chills, recent change in weight  Skin: POSITIVE for warmth, swelling and redness of the right large toe nail bed with green colored toe.. Negative for discharge  ENT: Negative for ear, mouth and throat problems  MS: Negative for significant arthralgias or myalgias  P: Negative for changes in mood or affect    Chart Review:  History   Smoking Status     Never Smoker   Smokeless Tobacco     Never Used     Recent Labs   Lab Test  05/18/18   1409  12/26/17   1130  12/22/17   0805   09/12/17   0956  04/12/17   1444   12/05/16   1505   03/07/16   1507   A1C  6.3*  5.9   --    --    --   6.0   --    --    < >  6.6*   LDL  21   --   44   --   84   --    --    --    < >   --    HDL  32*   --   40   --   42   --    --    --    < >   --    TRIG  141   --   91   --   134   --    --    --    < >   --    ALT  39   --   23   --   70   --    --    --    < >  45   CR  1.75*   1.56*   --    < >  1.60*  1.75*   < >  1.63*   < >  1.60*   GFRESTIMATED  37*  43*   --    < >  41*  37*   < >  41*   < >  42*   GFRESTBLACK  45*  52*   --    < >  50*  45*   < >  49*   < >  50*   POTASSIUM  4.2  4.1   --    --   4.3   --    < >  3.0*   < >  3.8   TSH   --    --    --    --    --    --    --   2.74   --   2.38    < > = values in this interval not displayed.      BP Readings from Last 3 Encounters:   07/02/18 128/66   06/23/18 100/60   06/11/18 120/70    Wt Readings from Last 3 Encounters:   07/02/18 193 lb (87.5 kg)   06/23/18 193 lb 6.4 oz (87.7 kg)   06/11/18 193 lb (87.5 kg)                  Patient Active Problem List   Diagnosis     Health Care Home     Allergic rhinitis     Peripheral edema     Polymyalgia rheumatica (H)     Advanced directives, counseling/discussion     Ischemic cardiomyopathy     Paroxysmal atrial fibrillation (H)     Coronary artery disease involving native coronary artery of native heart without angina pectoris     Intermittent lightheadedness     CKD (chronic kidney disease) stage 3, GFR 30-59 ml/min     GERD (gastroesophageal reflux disease)     Tachy-alix syndrome (H)     AAA (abdominal aortic aneurysm) (H)     Old myocardial infarction     Chronic combined systolic and diastolic congestive heart failure (H)     Essential hypertension, benign     Type 2 diabetes mellitus with stage 3 chronic kidney disease, without long-term current use of insulin (H)     Moderate persistent asthma without complication     Prostate cancer (H)     Mixed hyperlipidemia     Aftercare following knee joint replacement surgery, unspecified laterality     Acute kidney injury (H)     Physical deconditioning     Post herpetic neuralgia     Presbycusis of both ears     Chronic non-seasonal allergic rhinitis, unspecified trigger     Skin lesion     History of prostate cancer 2003 w/po resection prostate     PAD (peripheral artery disease) (H)     Decreased pulses in feet     Microalbuminuria      Mild persistent asthma without complication     Past Surgical History:   Procedure Laterality Date     ARTHROPLASTY KNEE Left 10/5/2016    Procedure: ARTHROPLASTY KNEE;  Surgeon: Keshav Zamudio MD;  Location: [facility] OR     CARDIAC SURGERY  12/03/2012    pacemaker ; CABG 1998     CHOLECYSTECTOMY       COLONOSCOPY       ENT SURGERY  5-    sinus     EXTRACAPSULAR CATARACT EXTRATION WITH INTRAOCULAR LENS IMPLANT       GENITOURINARY SURGERY      prostatectomy     IMPLANT PACEMAKER  12-3-12    [facility]     PROSTATE SURGERY       Problem list, Medication list, Allergies, Medical/Social/Surg hx reviewed in [facility], updated as appropriate.   OBJECTIVE:                                                    /66  Pulse 86  Temp 98.9  F (37.2  C) (Tympanic)  Resp 16  Wt 193 lb (87.5 kg)  SpO2 96%  BMI 27.69 kg/m2  Body mass index is 27.69 kg/(m^2).  GENERAL: healthy, alert, in no acute distress  SKIN: Warm and dry.  Right great toe with raised red borders of base of nailbed, with fluctuance, without discharge, nail green/blue in color, other nails thickened with onychomycosis.   PSYCH: Alert and oriented times 3;  Able to articulate logical thoughts. Affect is normal.    Diagnostic test results: none      ASSESSMENT/PLAN:                                                            ICD-10-CM    1. Paronychia of toe, right L03.031 cephALEXin (KEFLEX) 250 MG capsule     PODIATRY/FOOT & ANKLE SURGERY REFERRAL       If not improving with antibiotic and warm compresses in next two days, will follow up with podiatry as referred.    Please see patient instructions for treatment details.    Follow up in 7-10 days if not improving as anticipated, sooner PRN.    Erica Patel PA-C  [facility]

## 2018-07-03 RX ORDER — ISOSORBIDE MONONITRATE 30 MG/1
TABLET, EXTENDED RELEASE ORAL
Qty: 90 TABLET | Refills: 1 | Status: SHIPPED | OUTPATIENT
Start: 2018-07-03 | End: 2018-11-20

## 2018-07-16 ENCOUNTER — OFFICE VISIT (OUTPATIENT)
Dept: PODIATRY | Facility: CLINIC | Age: 83
End: 2018-07-16
Payer: COMMERCIAL

## 2018-07-16 VITALS
SYSTOLIC BLOOD PRESSURE: 132 MMHG | WEIGHT: 192.5 LBS | DIASTOLIC BLOOD PRESSURE: 68 MMHG | BODY MASS INDEX: 27.62 KG/M2 | RESPIRATION RATE: 12 BRPM | HEART RATE: 74 BPM

## 2018-07-16 DIAGNOSIS — S91.114A LACERATION OF LESSER TOE OF RIGHT FOOT WITHOUT FOREIGN BODY PRESENT OR DAMAGE TO NAIL, INITIAL ENCOUNTER: ICD-10-CM

## 2018-07-16 DIAGNOSIS — S90.221A SUBUNGUAL HEMATOMA OF FIFTH TOE OF RIGHT FOOT, INITIAL ENCOUNTER: ICD-10-CM

## 2018-07-16 DIAGNOSIS — M21.42 FLAT FEET: ICD-10-CM

## 2018-07-16 DIAGNOSIS — E11.22 TYPE 2 DIABETES MELLITUS WITH STAGE 3 CHRONIC KIDNEY DISEASE, WITHOUT LONG-TERM CURRENT USE OF INSULIN (H): Primary | ICD-10-CM

## 2018-07-16 DIAGNOSIS — M21.41 FLAT FEET: ICD-10-CM

## 2018-07-16 DIAGNOSIS — R20.8 DECREASED SENSATION OF FOOT: ICD-10-CM

## 2018-07-16 DIAGNOSIS — N18.30 TYPE 2 DIABETES MELLITUS WITH STAGE 3 CHRONIC KIDNEY DISEASE, WITHOUT LONG-TERM CURRENT USE OF INSULIN (H): Primary | ICD-10-CM

## 2018-07-16 DIAGNOSIS — L84 CALLUS OF FOOT: ICD-10-CM

## 2018-07-16 DIAGNOSIS — M79.674 PAIN OF TOE OF RIGHT FOOT: ICD-10-CM

## 2018-07-16 PROCEDURE — 99203 OFFICE O/P NEW LOW 30 MIN: CPT | Mod: 25 | Performed by: PODIATRIST

## 2018-07-16 PROCEDURE — 11055 PARING/CUTG B9 HYPRKER LES 1: CPT | Performed by: PODIATRIST

## 2018-07-16 NOTE — MR AVS SNAPSHOT
After Visit Summary   7/16/2018    Abbe Lambert    MRN: [id number]           Patient Information     Date Of Birth          2/3/1934        Visit Information        Provider Department      7/16/2018 7:00 AM Keshav Hatfield DPM [facility]        Today's Diagnoses     Type 2 diabetes mellitus with stage 3 chronic kidney disease, without long-term current use of insulin (H)    -  1    Pain of toe of right foot        Subungual hematoma of fifth toe of right foot, initial encounter        Laceration of lesser toe of right foot without foreign body present or damage to nail, initial encounter        painful Callus of right foot foot        Flat feet          Care Instructions    [facility]  [facility]  [address]  [place] [address]  Phone: [phone]  Fax: [phone] [facility]  [address]  [place] [address]  Phone: [phone]   Fax: [phone]   [facility]  [address]  [place] [address]  Phone: [phone]  Fax: [phone] [facility]  [address]  [place] [address]  Phone: [phone]   Fax: [phone]   [facility]   [address]  [place] [address]  Phone: [phone]   Fax: [phone] * Please call any location listed to make an appointment for a casting/fitting. Your referral was sent to their central office and they will all have the order on file.       DIABETES AND YOUR FEET  Diabetes can result in several problems in the feet including ulcers (open sores) and amputations. Two of the most important reasons why people develop foot problems when they have diabetes is : 1. Neuropathy (loss of feeling)  2. Vascular disease (loss or decrease of blood flow).    Neuropathy is a term used to describe a loss of nerve function.  Patients with diabetes are at  "risk of developing neuropathy if their sugars continue to run high and are above the normal value. One theory for neuropathy is that the \"extra\" sugar in the body enters the nerves and is broken down. These by-products build up in the nerve causing it to swell and impairing nerve function. Often times, this can be prevented by controlling your sugars, dieting and exercise.    When a person develops neuropathy, they usually begin to feel numbness or tingling in their feet and sometime in their legs.  Other symptoms may include painful burning or hot feet, tingling or feeling like insects or ants are crawling on your feet or legs.  If the diabetes is sever and the sugars run high for long periods of time, neuropathy can also occur in the hands.    Vascular disease  is a term used to describe a loss or decrease in circulation (blood flow). There is a problem in getting blood and oxygen to areas that need it. Similar to neuropathy, sugars can build up in the walls of the arteries (blood vessels) and cause them to become swollen, thickened and hardened. This decreases the amount of blood that can go to an area that needs it. Though this is common in the legs of diabetic patients, it can also affect other arteries (blood vessels) in the body such as in the heart and eyes.    In the legs, vascular disease usually results in cramping. Patients who develop leg cramps after walking the same distance every time (i.e. One block, half a mile, ect.) need to let their doctors know so that their circulation may be checked. Cramps causing severe pain in the feet and/or legs while sleeping and the cramps go away when you stand or hang your legs off the side of the bed, may also be a sign of poor blood circulation.  Occasional cramping in cold weather or on rare occasions with activity may not be due to poor circulation, but you should inform your doctor.    PREVENTION OF THESE DISEASES  The key to prevention is good blood sugar " control. Poor blood sugar control is a big reason many of these problems start. Physical activity (exercise) is a very good way to help decrease your blood sugars. Exercise can lower your blood sugar, blood pressure, and cholesterol. It also reduces your risk for heart disease and stroke, relieves stress, and strengthens your heart, muscles and bones.  In addition, regular activity helps insulin work better, improves your blood circulation, and keeps your joints flexible. If you're trying to lose weight, a combination of exercise and wise food choices can help you reach your target weight and maintain it.      PAIN MANAGEMENT  1.Blood Sugar Control - Most important  2. Medications such as:  Amytriptylline, duloxetine, gabapentin, lyrica, tramadol  3. Nutritional therapy:  Vitamin B6 (100mg daily), Vitamin B12 (75mcg daily), Vitamin D 2000 IU daily), Alpha-Lipoic Acid (600-1800mg daily), Acetyl-L-Carnitine (500-1000mg TID, L-methyl folate (1500mcg daily)    ** Metformin can block Vitamin B6 and B12 so it is important to supplement**    FOOT CARE RECOMMENDATIONS   1. Wash your feet with lukewarm water and a mild soap and then dry them thoroughly, especially between the toes.     2. Examine your feet daily looking for cuts, corns, blisters, cracks, ect, especially after wearing new shoes. Make sure to look between your toes. If you cannot see the bottom of your feet, set a mirror on the floor and hold your foot over it, or ask a spouse, friend or family member to examine your feet for you. Contact your doctor immediately if new problems are noted or if sores are not healing.     3. Immediately apply moisturizer to the tops and bottoms of your feet, avoiding areas between the toes. Hand lotion (Intesive Care, Ceci, Eucerin, Neutrogena, Curel, ect) is sufficient unless your doctor prescribes a medicated lotion. Apply sunscreen to your feet when going swimming outside.     4. Use clean comfortable shoes, wear white socks  (if you have any bleeding or drainage, you will see it on white socks). Socks should not have thick seams or cut off the circulation around the leg. Break in new shoes slowly and rotate with older shoes until broken in. Check the inside of your shoes with your hand to look for areas of irritation or objects that may have fallen into your shoes.       5. Keep slippers by the side of your bed for use during the night.     6.  Shoes should be fitted by a professional and should not cause areas of irritation.  Check your feet regularly when wearing a new pair of shoes and replace them as needed.     7.  Talk to your doctor about proper exercise. Exercise and stretching stimulate blood flow to your feet and maintain proper glucose levels.     8.  Monitor your blood glucose level as instructed by your doctor. Notify your doctor immediately if your blood sugar is abnormally high or low.    9. Cut your nails straight across, but then gently round any sharp edges with a cardboard nail file. If you have neuropathy, peripheral vascular disease or cannot see that well to trim your own toenails contact [facility] ([phone]) or [facility] ([phone]).      THINGS TO AVOID DOING   1.  Do not soak your feet if you have an open sore. Use only lukewarm water and always check the temperature with your hand as hot water can easily burn your feet.       2.  Never use a hot water bottle or heating pad on your feet. Also do not apply cold compresses to your feet. With decreased sensation, you could burn or freeze your feet.       3.  Do not apply any of these to your feet:    -  Over the counter medicine for corns or warts    -  Harsh chemicals like boric acid    -  Do not self-treat corns, cuts, blisters or infections. Always consult your doctor.       4.  Do not wear sandals, slippers or walk barefoot, especially on hot sand or concrete or other harsh surfaces.     5.  If you smoke, stop!!!        SIGNS OF INFECTION  expanding  redness around the wound   yellow or greenish-colored pus or cloudy wound drainage   red streaking spreading from the wound   increased swelling, tenderness, or pain around the wound   fever  *If you notice any of these signs of infection, call us right away!            Follow-ups after your visit        Additional Services     ORTHOTICS REFERRAL       **This referral order prints off in the [facility]  (Orthotics & Prosthetics) Central Scheduling Office**    The [facility] Central Scheduling Staff will contact the patient to schedule appointments.     Central Scheduling Contact Information: [phone] ([place])    1) Orthotics: Foot Orthotics  2) Extra-depth shoes    Please be aware that coverage of these services is subject to the terms and limitations of your health insurance plan.  Call member services at your health plan with any benefit or coverage questions.      Please bring the following to your appointment:    >>   Any x-rays, CTs or MRIs which have been performed.  Contact the facility where they were done to arrange for  prior to your scheduled appointment.    >>   List of current medications   >>   This referral request   >>   Any documents/labs given to you for this referral                  Your next 10 appointments already scheduled     Sep 21, 2018  4:15 PM CDT   Remote PPM Check with HARMAN TECH1   [facility]    [address] [place], [place] [id number]  [facility] [address] [phone] OPT 2           This appointment is for a remote check of your pacemaker.  This is not an appointment at the office.              Who to contact     If you have questions or need follow up information about today's clinic visit or your schedule please contact [facility] directly at [phone].  Normal or non-critical lab and imaging results will be communicated to you by MyChart, letter or phone within 4  business days after the clinic has received the results. If you do not hear from us within 7 days, please contact the clinic through [facility] or phone. If you have a critical or abnormal lab result, we will notify you by phone as soon as possible.  Submit refill requests through [employer] [facility] [facility] or call your pharmacy and they will forward the refill request to us. Please allow 3 business days for your refill to be completed.          Additional Information About Your Visit        Care EveryWhere ID     This is your Care EveryWhere ID. This could be used by other organizations to access your [place] medical records  [id number]        Your Vitals Were     Pulse Respirations BMI (Body Mass Index)             74 12 27.62 kg/m2          Blood Pressure from Last 3 Encounters:   07/16/18 132/68   07/02/18 128/66   06/23/18 100/60    Weight from Last 3 Encounters:   07/16/18 192 lb 8 oz (87.3 kg)   07/02/18 193 lb (87.5 kg)   06/23/18 193 lb 6.4 oz (87.7 kg)              We Performed the Following     ORTHOTICS REFERRAL        Primary Care Provider Office Phone # Fax #    Eliezer Shah [phone] [address] [facility] [address]        Equal Access to Services     JIM AHN AH: Hadii aad ku hadasho Soomaali, waaxda luqadaha, qaybta kaalmada adeegyada, waxay idiin hayrodolfon jhonatan goodson. So [facility] [phone].    ATENCIÓN: Si habla español, tiene a monge disposición servicios gratuitos de asistencia lingüística. Llame al [phone].    We comply with applicable federal civil rights laws and [place] laws. We do not discriminate on the basis of race, color, national origin, age, disability, sex, sexual orientation, or gender identity.            Thank you!     Thank you for choosing [facility]  for your care. Our goal is always to provide you with excellent care. Hearing back from our patients is one way we can continue to improve our services. Please take a few  minutes to complete the written survey that you may receive in the mail after your visit with us. Thank you!             Your Updated Medication List - Protect others around you: Learn how to safely use, store and throw away your medicines at www.disposemymeds.org.          This list is accurate as of 7/16/18  7:34 AM.  Always use your most recent med list.                   Brand Name Dispense Instructions for use Diagnosis    albuterol 108 (90 Base) MCG/ACT Inhaler    PROAIR HFA/PROVENTIL HFA/VENTOLIN HFA    3 Inhaler    Inhale 2 puffs into the lungs as needed for shortness of breath / dyspnea or wheezing    Mild persistent asthma without complication       aspirin 81 MG tablet     30 tablet    Take 81 mg by mouth daily    CAD (coronary artery disease)       ASTELIN 0.1 % spray   Generic drug:  azelastine      Spray 1 spray into both nostrils 2 times daily.        beclomethasone 80 MCG/ACT Inhaler    QVAR    2 Inhaler    Inhale 2 puffs into the lungs 2 times daily    Moderate persistent asthma without complication       carvedilol 3.125 MG tablet    COREG    180 tablet    TAKE 1 TABLET(3.125 MG) BY MOUTH TWICE DAILY WITH MEALS    Essential hypertension       CERAVE Lotn      Externally apply topically 2 times daily as needed    Xerosis cutis, Itching       cetirizine 10 MG tablet    zyrTEC     Take 10 mg by mouth every morning        guaiFENesin 600 MG 12 hr tablet    MUCINEX     Take 600 mg by mouth 3 times daily        isosorbide mononitrate 30 MG 24 hr tablet    IMDUR    90 tablet    TAKE 1 TABLET BY MOUTH EVERY DAY    Coronary artery disease involving native coronary artery of native heart without angina pectoris       lisinopril 5 MG tablet    PRINIVIL/ZESTRIL    90 tablet    Take 1 tablet (5 mg) by mouth daily    Type 2 diabetes mellitus with stage 3 chronic kidney disease, without long-term current use of insulin (H), Benign essential hypertension       MELATONIN PO      Take 5 mg by mouth At Bedtime         NASONEX 50 MCG/ACT spray   Generic drug:  mometasone      Spray 2 sprays into both nostrils daily as needed.        nitroGLYcerin 0.4 MG sublingual tablet    NITROSTAT    25 tablet    Place 1 tablet (0.4 mg) under the tongue every 5 minutes as needed for chest pain    Chest pain on exertion       polyethylene glycol Packet    MIRALAX/GLYCOLAX     Take 17 g by mouth daily        potassium chloride SA 20 MEQ CR tablet    K-DUR/KLOR-CON M    30 tablet    Take 2 pills today, one pill tomorrow then one pill daily ONLY when you take the Demadex    Chronic combined systolic and diastolic congestive heart failure (H)       rosuvastatin 40 MG tablet    CRESTOR    90 tablet    Take 1 tablet (40 mg) by mouth daily    Coronary artery disease involving native coronary artery of native heart without angina pectoris       torsemide 20 MG tablet    DEMADEX    30 tablet    TAKE 1 TABLET(20 MG) BY MOUTH DAILY    Chronic combined systolic and diastolic congestive heart failure (H)       triamcinolone 0.1 % cream    KENALOG    30 g    Apply sparingly to affected area twotimes daily as needed    Xerosis cutis, Itching

## 2018-07-16 NOTE — PATIENT INSTRUCTIONS
"[facility]  [facility]  [id number]  [place] [address]  Phone: [phone]  Fax: [phone] [facility]  [address]  [place] [address]  Phone: [phone]   Fax: [phone]   [facility]  [address]  [place] [address]  Phone: [phone]  Fax: [phone] [facility]  [address]  [place] [address]  Phone: [phone]   Fax: [phone]   [facility]   [address]  [place] [address]  Phone: [phone]   Fax: [phone] * Please call any location listed to make an appointment for a casting/fitting. Your referral was sent to their central office and they will all have the order on file.       DIABETES AND YOUR FEET  Diabetes can result in several problems in the feet including ulcers (open sores) and amputations. Two of the most important reasons why people develop foot problems when they have diabetes is : 1. Neuropathy (loss of feeling)  2. Vascular disease (loss or decrease of blood flow).    Neuropathy is a term used to describe a loss of nerve function.  Patients with diabetes are at risk of developing neuropathy if their sugars continue to run high and are above the normal value. One theory for neuropathy is that the \"extra\" sugar in the body enters the nerves and is broken down. These by-products build up in the nerve causing it to swell and impairing nerve function. Often times, this can be prevented by controlling your sugars, dieting and exercise.    When a person develops neuropathy, they usually begin to feel numbness or tingling in their feet and sometime in their legs.  Other symptoms may include painful burning or hot feet, tingling or feeling like insects or ants are crawling on your feet or legs.  If the diabetes is sever and the sugars run high for long periods of time, neuropathy can also " occur in the hands.    Vascular disease  is a term used to describe a loss or decrease in circulation (blood flow). There is a problem in getting blood and oxygen to areas that need it. Similar to neuropathy, sugars can build up in the walls of the arteries (blood vessels) and cause them to become swollen, thickened and hardened. This decreases the amount of blood that can go to an area that needs it. Though this is common in the legs of diabetic patients, it can also affect other arteries (blood vessels) in the body such as in the heart and eyes.    In the legs, vascular disease usually results in cramping. Patients who develop leg cramps after walking the same distance every time (i.e. One block, half a mile, ect.) need to let their doctors know so that their circulation may be checked. Cramps causing severe pain in the feet and/or legs while sleeping and the cramps go away when you stand or hang your legs off the side of the bed, may also be a sign of poor blood circulation.  Occasional cramping in cold weather or on rare occasions with activity may not be due to poor circulation, but you should inform your doctor.    PREVENTION OF THESE DISEASES  The key to prevention is good blood sugar control. Poor blood sugar control is a big reason many of these problems start. Physical activity (exercise) is a very good way to help decrease your blood sugars. Exercise can lower your blood sugar, blood pressure, and cholesterol. It also reduces your risk for heart disease and stroke, relieves stress, and strengthens your heart, muscles and bones.  In addition, regular activity helps insulin work better, improves your blood circulation, and keeps your joints flexible. If you're trying to lose weight, a combination of exercise and wise food choices can help you reach your target weight and maintain it.      PAIN MANAGEMENT  1.Blood Sugar Control - Most important  2. Medications such as:  Amytriptylline, duloxetine,  gabapentin, lyrica, tramadol  3. Nutritional therapy:  Vitamin B6 (100mg daily), Vitamin B12 (75mcg daily), Vitamin D 2000 IU daily), Alpha-Lipoic Acid (600-1800mg daily), Acetyl-L-Carnitine (500-1000mg TID, L-methyl folate (1500mcg daily)    ** Metformin can block Vitamin B6 and B12 so it is important to supplement**    FOOT CARE RECOMMENDATIONS   1. Wash your feet with lukewarm water and a mild soap and then dry them thoroughly, especially between the toes.     2. Examine your feet daily looking for cuts, corns, blisters, cracks, ect, especially after wearing new shoes. Make sure to look between your toes. If you cannot see the bottom of your feet, set a mirror on the floor and hold your foot over it, or ask a spouse, friend or family member to examine your feet for you. Contact your doctor immediately if new problems are noted or if sores are not healing.     3. Immediately apply moisturizer to the tops and bottoms of your feet, avoiding areas between the toes. Hand lotion (Intesive Care, Ceci, Eucerin, Neutrogena, Curel, ect) is sufficient unless your doctor prescribes a medicated lotion. Apply sunscreen to your feet when going swimming outside.     4. Use clean comfortable shoes, wear white socks (if you have any bleeding or drainage, you will see it on white socks). Socks should not have thick seams or cut off the circulation around the leg. Break in new shoes slowly and rotate with older shoes until broken in. Check the inside of your shoes with your hand to look for areas of irritation or objects that may have fallen into your shoes.       5. Keep slippers by the side of your bed for use during the night.     6.  Shoes should be fitted by a professional and should not cause areas of irritation.  Check your feet regularly when wearing a new pair of shoes and replace them as needed.     7.  Talk to your doctor about proper exercise. Exercise and stretching stimulate blood flow to your feet and maintain proper  glucose levels.     8.  Monitor your blood glucose level as instructed by your doctor. Notify your doctor immediately if your blood sugar is abnormally high or low.    9. Cut your nails straight across, but then gently round any sharp edges with a cardboard nail file. If you have neuropathy, peripheral vascular disease or cannot see that well to trim your own toenails contact [facility] ([phone]) or [facility] ([phone]).      THINGS TO AVOID DOING   1.  Do not soak your feet if you have an open sore. Use only lukewarm water and always check the temperature with your hand as hot water can easily burn your feet.       2.  Never use a hot water bottle or heating pad on your feet. Also do not apply cold compresses to your feet. With decreased sensation, you could burn or freeze your feet.       3.  Do not apply any of these to your feet:    -  Over the counter medicine for corns or warts    -  Harsh chemicals like boric acid    -  Do not self-treat corns, cuts, blisters or infections. Always consult your doctor.       4.  Do not wear sandals, slippers or walk barefoot, especially on hot sand or concrete or other harsh surfaces.     5.  If you smoke, stop!!!        SIGNS OF INFECTION  expanding redness around the wound   yellow or greenish-colored pus or cloudy wound drainage   red streaking spreading from the wound   increased swelling, tenderness, or pain around the wound   fever  *If you notice any of these signs of infection, call us right away!

## 2018-07-16 NOTE — PROGRESS NOTES
"  ASSESSMENT/PLAN:    Encounter Diagnoses   Name Primary?     Type 2 diabetes mellitus with stage 3 chronic kidney disease, without long-term current use of insulin (H) Yes     Pain of toe of right foot      Subungual hematoma of fifth toe of right foot, initial encounter      Laceration of lesser toe of right foot without foreign body present or damage to nail, initial encounter      painful Callus of right foot foot      Flat feet      The right hallux nail shows subungual hematoma, consistent with repetitive trauma, foot wear irritation.  No clinical signs of infection.  He is to monitor.  If pain continues to resolve and no redness or edema once he completes the cephalexin, no procedure is needed.  If the nail loosens, if ongoing pain, if redness and/or edema, then a nail avulsion might be needed.    I cleansed his right 3rd toe and applied bacitracin and band-aid.    Pt is instructed to inspect feet daily, wear a supportive shoe at all times, and avoid walking barefoot.  He is instructed to call if any sores develop.   Importance of good blood sugar control emphasized.   Pt verbalized understanding.    Pt is referred to the [facility] for  prescription orthoses and extra-depth shoes.    Using a #15 scalpel, I trimmed down the right foot hyperkeratotic lesion.         Body mass index is 27.62 kg/(m^2).          Keshav Hatfield DPM, FACFAS, MS    [facility]      ____________________________________________________________________    HPI:       I was asked by Dr. Patel to evaluate this patient regarding a paronychia of his right great toe.     Chief Complaint: \"green toe\"  He said that he accidentally wore his shoes on the wrong foot and this caused rubbing on his toe.   Onset of problem: 2 weedks  Pain/ discomfort is described as:  throbbing  Ratin/10   Frequency:  daily    The pain is made worse with walking  Previous treatment: " antibiotic, soaking in warm water    Type 2 DM; Last Hgb A1C = 6.3; denies numbness in feet.  He said that a lesion on the bottom of his right foot is causing the most pain.    *  Patient Active Problem List   Diagnosis     Health Care Home     Allergic rhinitis     Peripheral edema     Polymyalgia rheumatica (H)     Advanced directives, counseling/discussion     Ischemic cardiomyopathy     Paroxysmal atrial fibrillation (H)     Coronary artery disease involving native coronary artery of native heart without angina pectoris     Intermittent lightheadedness     CKD (chronic kidney disease) stage 3, GFR 30-59 ml/min     GERD (gastroesophageal reflux disease)     Tachy-alix syndrome (H)     AAA (abdominal aortic aneurysm) (H)     Old myocardial infarction     Chronic combined systolic and diastolic congestive heart failure (H)     Essential hypertension, benign     Type 2 diabetes mellitus with stage 3 chronic kidney disease, without long-term current use of insulin (H)     Moderate persistent asthma without complication     Prostate cancer (H)     Mixed hyperlipidemia     Aftercare following knee joint replacement surgery, unspecified laterality     Acute kidney injury (H)     Physical deconditioning     Post herpetic neuralgia     Presbycusis of both ears     Chronic non-seasonal allergic rhinitis, unspecified trigger     Skin lesion     History of prostate cancer 2003 w/po resection prostate     PAD (peripheral artery disease) (H)     Decreased pulses in feet     Microalbuminuria     Mild persistent asthma without complication   *  *  Past Surgical History:   Procedure Laterality Date     ARTHROPLASTY KNEE Left 10/5/2016    Procedure: ARTHROPLASTY KNEE;  Surgeon: Keshav Zamudio MD;  Location:  OR     CARDIAC SURGERY  12/03/2012    pacemaker ; CABG 1998     CHOLECYSTECTOMY       COLONOSCOPY       ENT SURGERY  5-    sinus     EXTRACAPSULAR CATARACT EXTRATION WITH INTRAOCULAR LENS IMPLANT        GENITOURINARY SURGERY      prostatectomy     IMPLANT PACEMAKER  12-3-12    [facility]     PROSTATE SURGERY     *  *  Current Outpatient Prescriptions   Medication Sig Dispense Refill     albuterol (PROAIR HFA/PROVENTIL HFA/VENTOLIN HFA) 108 (90 BASE) MCG/ACT Inhaler Inhale 2 puffs into the lungs as needed for shortness of breath / dyspnea or wheezing 3 Inhaler 11     aspirin 81 MG tablet Take 81 mg by mouth daily  30 tablet      azelastine (ASTELIN) 137 MCG/SPRAY nasal spray [place] 1 spray into both nostrils 2 times daily.       beclomethasone (QVAR) 80 MCG/ACT Inhaler Inhale 2 puffs into the lungs 2 times daily 2 Inhaler 11     carvedilol (COREG) 3.125 MG tablet TAKE 1 TABLET(3.125 MG) BY MOUTH TWICE DAILY WITH MEALS 180 tablet 3     cetirizine (ZYRTEC) 10 MG tablet Take 10 mg by mouth every morning        Emollient (CERAVE) LOTN Externally apply topically 2 times daily as needed       guaiFENesin (MUCINEX) 600 MG 12 hr tablet Take 600 mg by mouth 3 times daily        isosorbide mononitrate (IMDUR) 30 MG 24 hr tablet TAKE 1 TABLET BY MOUTH EVERY DAY 90 tablet 1     lisinopril (PRINIVIL/ZESTRIL) 5 MG tablet Take 1 tablet (5 mg) by mouth daily 90 tablet 3     MELATONIN PO Take 5 mg by mouth At Bedtime       mometasone (NASONEX) 50 MCG/ACT nasal spray Spray 2 sprays into both nostrils daily as needed.       nitroglycerin (NITROSTAT) 0.4 MG SL tablet Place 1 tablet (0.4 mg) under the tongue every 5 minutes as needed for chest pain 25 tablet 3     polyethylene glycol (MIRALAX/GLYCOLAX) packet Take 17 g by mouth daily        potassium chloride SA (K-DUR/KLOR-CON M) 20 MEQ CR tablet Take 2 pills today, one pill tomorrow then one pill daily ONLY when you take the Demadex 30 tablet 5     rosuvastatin (CRESTOR) 40 MG tablet Take 1 tablet (40 mg) by mouth daily 90 tablet 3     torsemide (DEMADEX) 20 MG tablet TAKE 1 TABLET(20 MG) BY MOUTH DAILY 30 tablet 0     triamcinolone (KENALOG) 0.1 % cream Apply sparingly to affected area  twotimes daily as needed 30 g 5       ROS:     A 10-point review of systems was performed and is positive for that noted in the HPI and as seen below.  All other areas are negative.     Numbness in feet?  no   Calf pain with walking? no  Recent foot/ankle injury? no  Weight change over past 12 months? 10#  Self perception as overweight? yes  Recent flu-like symptoms? no  Joint pain other than feet ? Left knee    Social History: Employment:  yes;  Exercise/Physical activity:  no;  Tobacco use:  no  Social History     Social History     Marital status:      Spouse name: N/A     Number of children: N/A     Years of education: N/A     Occupational History     Not on file.     Social History Main Topics     Smoking status: Never Smoker     Smokeless tobacco: Never Used     Alcohol use No     Drug use: No     Sexual activity: No     Other Topics Concern     Parent/Sibling W/ Cabg, Mi Or Angioplasty Before 65f 55m? Yes     brother and father had MI @ 40's     Caffeine Concern Yes     one diet mountain dew daily     Sleep Concern No     Stress Concern No     Special Diet No     Exercise Yes     golfing, does the stair at the office a lot     Seat Belt Yes     Social History Narrative       Family history:  Family History   Problem Relation Age of Onset     Cerebrovascular Disease Father      HEART DISEASE Father      HEART DISEASE Brother      Coronary Artery Disease Brother      MI age 50     Depression Daughter      raped in high school     Unknown/Adopted Maternal Grandmother      Unknown/Adopted Maternal Grandfather      Unknown/Adopted Paternal Grandmother      Unknown/Adopted Paternal Grandfather        Rheumatoid arthritis:  no  Foot Problems: no  Diabetes: parent      EXAM:    Vitals: /68  Pulse 74  Resp 12  Wt 192 lb 8 oz (87.3 kg)  BMI 27.62 kg/m2  BMI: Body mass index is 27.62 kg/(m^2).  Height: Data Unavailable    Constitutional/ general:  Pt is in no apparent distress, appears well-nourished.   Cooperative with history and physical exam.     Vascular:  Pedal pulses are palpable bilaterally for both the DP and PT arteries.  CFT < 3 sec.  No edema.  Pedal hair growth noted.     Neuro:  Alert and oriented x 3. Coordinated gait.  Light touch sensation is intact to the L4, L5, S1 distributions. No obvious deficits.  No evidence of neurological-based weakness, spasticity, or contracture in the lower extremities.     Protective sensation is diminished bilaterally per [place] Monofilament testing.    Derm: Normal texture and turgor.  No erythema, ecchymosis, or cyanosis.  No open lesions. Nucleated hyperkeratotic lesion sub right 1st metatarsal head.  Superficial, bleeding laceration dorsal right 3rd toe.  Right hallux nail is discolored. It remains well-adhered. No erythema or edema.     Musculoskeletal:    Lower extremity muscle strength is normal.  Patient is ambulatory without an assistive device or brace .  With WB there is a decreased medial longitudinal arch, forefoot abduction, and rearfoot eversion.  Ankle dorsiflexion is limited with knee extended and slightly increased with knee flexed.        Keshav Hatfield DPM, FACFAS, MS    [facility]

## 2018-07-16 NOTE — LETTER
"    7/16/2018         RE: Abbe Lambert  [id number] [facility] [id number]        Dear Colleague,    Thank you for referring your patient, Abbe Lambert, to the [facility]. Please see a copy of my visit note below.      ASSESSMENT/PLAN:    Encounter Diagnoses   Name Primary?     Type 2 diabetes mellitus with stage 3 chronic kidney disease, without long-term current use of insulin (H) Yes     Pain of toe of right foot      Subungual hematoma of fifth toe of right foot, initial encounter      Laceration of lesser toe of right foot without foreign body present or damage to nail, initial encounter      painful Callus of right foot foot      Flat feet      The right hallux nail shows subungual hematoma, consistent with repetitive trauma, foot wear irritation.  No clinical signs of infection.  He is to monitor.  If pain continues to resolve and no redness or edema once he completes the cephalexin, no procedure is needed.  If the nail loosens, if ongoing pain, if redness and/or edema, then a nail avulsion might be needed.    I cleansed his right 3rd toe and applied bacitracin and band-aid.    Pt is instructed to inspect feet daily, wear a supportive shoe at all times, and avoid walking barefoot.  He is instructed to call if any sores develop.   Importance of good blood sugar control emphasized.   Pt verbalized understanding.    Pt is referred to the [facility] for  prescription orthoses and extra-depth shoes.    Using a #15 scalpel, I trimmed down the right foot hyperkeratotic lesion.         Body mass index is 27.62 kg/(m^2).          Keshav Hatfield DPM, FACFAS, MS    [facility]      ____________________________________________________________________    HPI:       I was asked by Dr. Patel to evaluate this patient regarding a paronychia of his right great toe.     Chief Complaint: \"green toe\"  He said that he " accidentally wore his shoes on the wrong foot and this caused rubbing on his toe.   Onset of problem: 2 weedks  Pain/ discomfort is described as:  throbbing  Ratin/10   Frequency:  daily    The pain is made worse with walking  Previous treatment: antibiotic, soaking in warm water    Type 2 DM; Last Hgb A1C = 6.3; denies numbness in feet.  He said that a lesion on the bottom of his right foot is causing the most pain.    *  Patient Active Problem List   Diagnosis     Health Care Home     Allergic rhinitis     Peripheral edema     Polymyalgia rheumatica (H)     Advanced directives, counseling/discussion     Ischemic cardiomyopathy     Paroxysmal atrial fibrillation (H)     Coronary artery disease involving native coronary artery of native heart without angina pectoris     Intermittent lightheadedness     CKD (chronic kidney disease) stage 3, GFR 30-59 ml/min     GERD (gastroesophageal reflux disease)     Tachy-alix syndrome (H)     AAA (abdominal aortic aneurysm) (H)     Old myocardial infarction     Chronic combined systolic and diastolic congestive heart failure (H)     Essential hypertension, benign     Type 2 diabetes mellitus with stage 3 chronic kidney disease, without long-term current use of insulin (H)     Moderate persistent asthma without complication     Prostate cancer (H)     Mixed hyperlipidemia     Aftercare following knee joint replacement surgery, unspecified laterality     Acute kidney injury (H)     Physical deconditioning     Post herpetic neuralgia     Presbycusis of both ears     Chronic non-seasonal allergic rhinitis, unspecified trigger     Skin lesion     History of prostate cancer  w/po resection prostate     PAD (peripheral artery disease) (H)     Decreased pulses in feet     Microalbuminuria     Mild persistent asthma without complication   *  *  Past Surgical History:   Procedure Laterality Date     ARTHROPLASTY KNEE Left 10/5/2016    Procedure: ARTHROPLASTY KNEE;  Surgeon:  Keshav Zamudio MD;  Location: [facility] OR     CARDIAC SURGERY  12/03/2012    pacemaker ; CABG 1998     CHOLECYSTECTOMY       COLONOSCOPY       ENT SURGERY  5-    sinus     EXTRACAPSULAR CATARACT EXTRATION WITH INTRAOCULAR LENS IMPLANT       GENITOURINARY SURGERY      prostatectomy     IMPLANT PACEMAKER  12-3-12    [facility]     PROSTATE SURGERY     *  *  Current Outpatient Prescriptions   Medication Sig Dispense Refill     albuterol (PROAIR HFA/PROVENTIL HFA/VENTOLIN HFA) 108 (90 BASE) MCG/ACT Inhaler Inhale 2 puffs into the lungs as needed for shortness of breath / dyspnea or wheezing 3 Inhaler 11     aspirin 81 MG tablet Take 81 mg by mouth daily  30 tablet      azelastine (ASTELIN) 137 MCG/SPRAY nasal spray [place] 1 spray into both nostrils 2 times daily.       beclomethasone (QVAR) 80 MCG/ACT Inhaler Inhale 2 puffs into the lungs 2 times daily 2 Inhaler 11     carvedilol (COREG) 3.125 MG tablet TAKE 1 TABLET(3.125 MG) BY MOUTH TWICE DAILY WITH MEALS 180 tablet 3     cetirizine (ZYRTEC) 10 MG tablet Take 10 mg by mouth every morning        Emollient (CERAVE) LOTN Externally apply topically 2 times daily as needed       guaiFENesin (MUCINEX) 600 MG 12 hr tablet Take 600 mg by mouth 3 times daily        isosorbide mononitrate (IMDUR) 30 MG 24 hr tablet TAKE 1 TABLET BY MOUTH EVERY DAY 90 tablet 1     lisinopril (PRINIVIL/ZESTRIL) 5 MG tablet Take 1 tablet (5 mg) by mouth daily 90 tablet 3     MELATONIN PO Take 5 mg by mouth At Bedtime       mometasone (NASONEX) 50 MCG/ACT nasal spray Spray 2 sprays into both nostrils daily as needed.       nitroglycerin (NITROSTAT) 0.4 MG SL tablet Place 1 tablet (0.4 mg) under the tongue every 5 minutes as needed for chest pain 25 tablet 3     polyethylene glycol (MIRALAX/GLYCOLAX) packet Take 17 g by mouth daily        potassium chloride SA (K-DUR/KLOR-CON M) 20 MEQ CR tablet Take 2 pills today, one pill tomorrow then one pill daily ONLY when you take the Demadex 30 tablet 5      rosuvastatin (CRESTOR) 40 MG tablet Take 1 tablet (40 mg) by mouth daily 90 tablet 3     torsemide (DEMADEX) 20 MG tablet TAKE 1 TABLET(20 MG) BY MOUTH DAILY 30 tablet 0     triamcinolone (KENALOG) 0.1 % cream Apply sparingly to affected area twotimes daily as needed 30 g 5       ROS:     A 10-point review of systems was performed and is positive for that noted in the HPI and as seen below.  All other areas are negative.     Numbness in feet?  no   Calf pain with walking? no  Recent foot/ankle injury? no  Weight change over past 12 months? 10#  Self perception as overweight? yes  Recent flu-like symptoms? no  Joint pain other than feet ? Left knee    Social History: Employment:  yes;  Exercise/Physical activity:  no;  Tobacco use:  no  Social History     Social History     Marital status:      Spouse name: N/A     Number of children: N/A     Years of education: N/A     Occupational History     Not on file.     Social History Main Topics     Smoking status: Never Smoker     Smokeless tobacco: Never Used     Alcohol use No     Drug use: No     Sexual activity: No     Other Topics Concern     Parent/Sibling W/ Cabg, Mi Or Angioplasty Before 65f 55m? Yes     brother and father had MI @ 40's     Caffeine Concern Yes     one diet mountain dew daily     Sleep Concern No     Stress Concern No     Special Diet No     Exercise Yes     golfing, does the stair at the office a lot     Seat Belt Yes     Social History Narrative       Family history:  Family History   Problem Relation Age of Onset     Cerebrovascular Disease Father      HEART DISEASE Father      HEART DISEASE Brother      Coronary Artery Disease Brother      MI age 50     Depression Daughter      raped in high school     Unknown/Adopted Maternal Grandmother      Unknown/Adopted Maternal Grandfather      Unknown/Adopted Paternal Grandmother      Unknown/Adopted Paternal Grandfather        Rheumatoid arthritis:  no  Foot Problems: no  Diabetes:  parent      EXAM:    Vitals: /68  Pulse 74  Resp 12  Wt 192 lb 8 oz (87.3 kg)  BMI 27.62 kg/m2  BMI: Body mass index is 27.62 kg/(m^2).  Height: Data Unavailable    Constitutional/ general:  Pt is in no apparent distress, appears well-nourished.  Cooperative with history and physical exam.     Vascular:  Pedal pulses are palpable bilaterally for both the DP and PT arteries.  CFT < 3 sec.  No edema.  Pedal hair growth noted.     Neuro:  Alert and oriented x 3. Coordinated gait.  Light touch sensation is intact to the L4, L5, S1 distributions. No obvious deficits.  No evidence of neurological-based weakness, spasticity, or contracture in the lower extremities.     Protective sensation is diminished bilaterally per [employer] Monofilament testing.    Derm: Normal texture and turgor.  No erythema, ecchymosis, or cyanosis.  No open lesions. Nucleated hyperkeratotic lesion sub right 1st metatarsal head.  Superficial, bleeding laceration dorsal right 3rd toe.  Right hallux nail is discolored. It remains well-adhered. No erythema or edema.     Musculoskeletal:    Lower extremity muscle strength is normal.  Patient is ambulatory without an assistive device or brace .  With WB there is a decreased medial longitudinal arch, forefoot abduction, and rearfoot eversion.  Ankle dorsiflexion is limited with knee extended and slightly increased with knee flexed.        Keshav Hatfield DPM, FACFAS, MS    [place] [facility] of Podiatry/Foot & Ankle Surgery                Again, thank you for allowing me to participate in the care of your patient.        Sincerely,        Keshav Hatfield DPM

## 2018-07-26 ENCOUNTER — TRANSFERRED RECORDS (OUTPATIENT)
Dept: HEALTH INFORMATION MANAGEMENT | Facility: CLINIC | Age: 83
End: 2018-07-26

## 2018-07-31 DIAGNOSIS — I50.42 CHRONIC COMBINED SYSTOLIC AND DIASTOLIC CONGESTIVE HEART FAILURE (H): ICD-10-CM

## 2018-08-06 RX ORDER — TORSEMIDE 20 MG/1
TABLET ORAL
Qty: 30 TABLET | Refills: 0 | Status: SHIPPED | OUTPATIENT
Start: 2018-08-06 | End: 2018-08-09

## 2018-08-09 ENCOUNTER — OFFICE VISIT (OUTPATIENT)
Dept: FAMILY MEDICINE | Facility: CLINIC | Age: 83
End: 2018-08-09
Payer: COMMERCIAL

## 2018-08-09 VITALS
BODY MASS INDEX: 27.56 KG/M2 | TEMPERATURE: 97.2 F | HEIGHT: 70 IN | WEIGHT: 192.5 LBS | HEART RATE: 67 BPM | DIASTOLIC BLOOD PRESSURE: 62 MMHG | SYSTOLIC BLOOD PRESSURE: 112 MMHG | RESPIRATION RATE: 14 BRPM | OXYGEN SATURATION: 97 %

## 2018-08-09 DIAGNOSIS — I10 ESSENTIAL HYPERTENSION, BENIGN: ICD-10-CM

## 2018-08-09 DIAGNOSIS — Z85.46 HISTORY OF PROSTATE CANCER: ICD-10-CM

## 2018-08-09 DIAGNOSIS — E78.2 MIXED HYPERLIPIDEMIA: ICD-10-CM

## 2018-08-09 DIAGNOSIS — R73.03 PRE-DIABETES: ICD-10-CM

## 2018-08-09 DIAGNOSIS — N18.30 CKD (CHRONIC KIDNEY DISEASE) STAGE 3, GFR 30-59 ML/MIN (H): ICD-10-CM

## 2018-08-09 DIAGNOSIS — J45.30 MILD PERSISTENT ASTHMA WITHOUT COMPLICATION: ICD-10-CM

## 2018-08-09 DIAGNOSIS — I25.10 CORONARY ARTERY DISEASE INVOLVING NATIVE CORONARY ARTERY OF NATIVE HEART WITHOUT ANGINA PECTORIS: Chronic | ICD-10-CM

## 2018-08-09 DIAGNOSIS — I48.0 PAROXYSMAL ATRIAL FIBRILLATION (H): Primary | Chronic | ICD-10-CM

## 2018-08-09 DIAGNOSIS — R68.2 DRY MOUTH: ICD-10-CM

## 2018-08-09 DIAGNOSIS — I50.42 CHRONIC COMBINED SYSTOLIC AND DIASTOLIC CONGESTIVE HEART FAILURE (H): ICD-10-CM

## 2018-08-09 PROBLEM — R09.89 DECREASED PULSES IN FEET: Status: RESOLVED | Noted: 2018-05-18 | Resolved: 2018-08-09

## 2018-08-09 PROCEDURE — 99207 C PAF COMPLETED  NO CHARGE: CPT | Performed by: FAMILY MEDICINE

## 2018-08-09 PROCEDURE — 99214 OFFICE O/P EST MOD 30 MIN: CPT | Performed by: FAMILY MEDICINE

## 2018-08-09 RX ORDER — TORSEMIDE 20 MG/1
TABLET ORAL
Qty: 30 TABLET | Refills: 0 | COMMUNITY
Start: 2018-08-09 | End: 2018-11-20

## 2018-08-09 NOTE — MR AVS SNAPSHOT
After Visit Summary   8/9/2018    Abbe Lambert    MRN: [id number]           Patient Information     Date Of Birth          2/3/1934        Visit Information        Provider Department      8/9/2018 9:50 AM Fifi Palomo DO [facility]        Today's Diagnoses     Paroxysmal atrial fibrillation (H)    -  1      Care Instructions      Dehydration (Adult)  Dehydration occurs when your body loses too much fluid. This may be the result of prolonged vomiting or diarrhea, excessive sweating, or a high fever. It may also happen if you don t drink enough fluid when you re sick or out in the heat. Misuse of diuretics (water pills) can also be a cause.  Symptoms include thirst and decreased urine output. You may also feel dizzy, weak, fatigued, or very drowsy. The diet described below is usually enough to treat dehydration. In some cases, you may need medicine.  Home care    Drink at least 12 8-ounce glasses of fluid every day to resolve the dehydration. Fluid may include water; orange juice; lemonade; apple, grape, or cranberry juice; clear fruit drinks; electrolyte replacement and sports drinks; and teas and coffee without caffeine. Don't drink alcohol. If you have been diagnosed with a kidney disease, ask your doctor how much and what types of fluids you should drink to prevent dehydration. If you have kidney disease, fluid can build up in the body. This can be dangerous to your health.    If you have a fever, muscle aches, or a headache as a result of a cold or flu, you may take acetaminophen or ibuprofen, unless another medicine was prescribed. If you have chronic liver or kidney disease, or have ever had a stomach ulcer or gastrointestinal bleeding, talk with your healthcare provider before using these medicines. Don't take aspirin if you are younger than 18 and have a fever. Aspirin raises the chance for severe liver injury.  Follow-up care  Follow up with your  healthcare provider, or as advised.  When to seek medical advice  Call your healthcare provider right away if any of these occur:    Continued vomiting    Frequent diarrhea (more than 5 times a day); blood (red or black color) or mucus in diarrhea    Blood in vomit or stool    Swollen abdomen or increasing abdominal pain    Weakness, dizziness, or fainting    Unusual drowsiness or confusion    Reduced urine output or extreme thirst    Fever of 100.4 F (38 C) or higher  Date Last Reviewed: 5/1/2017 2000-2017 [employer]. [address] [place] [address]. All rights reserved. This information is not intended as a substitute for professional medical care. Always follow your healthcare professional's instructions.                Follow-ups after your visit        Follow-up notes from your care team     Return if symptoms worsen or fail to improve.      Your next 10 appointments already scheduled     Sep 21, 2018  4:15 PM CDT   Remote PPM Check with HARMAN TECH1   [facility]    [address] [place] [id number] [phone] OPT 2           This appointment is for a remote check of your pacemaker.  This is not an appointment at the office.              Who to contact     If you have questions or need follow up information about today's clinic visit or your schedule please contact [facility] directly at [phone].  Normal or non-critical lab and imaging results will be communicated to you by MyChart, letter or phone within 4 business days after the clinic has received the results. If you do not hear from us within 7 days, please contact the clinic through MyChart or phone. If you have a critical or abnormal lab result, we will notify you by phone as soon as possible.  Submit refill requests through [employer] [facility] or call your pharmacy and they will forward the refill request to us. Please allow  "3 business days for your refill to be completed.          Additional Information About Your Visit        Care EveryWhere ID     This is your Care EveryWhere ID. This could be used by other organizations to access your [facility] medical records  [id number]        Your Vitals Were     Pulse Temperature Respirations Height Pulse Oximetry BMI (Body Mass Index)    67 97.2  F (36.2  C) 14 5' 10\" (1.778 m) 97% 27.62 kg/m2       Blood Pressure from Last 3 Encounters:   08/09/18 112/62   07/16/18 132/68   07/02/18 128/66    Weight from Last 3 Encounters:   08/09/18 192 lb 8 oz (87.3 kg)   07/16/18 192 lb 8 oz (87.3 kg)   07/02/18 193 lb (87.5 kg)              We Performed the Following     PAF COMPLETED        Primary Care Provider Office Phone # Fax #    Eliezer Shah [phone]       [address] [facility] [address]        Equal Access to Services     [facility] Center[facility]INE : Hadii aad ku hadasho Soomaali, waaxda luqadaha, qaybta kaalmada adeegyada, waxay idiin hayrodolfon jhonatan khjaime franco . So [facility] [phone].    ATENCIÓN: Si habla español, tiene a monge disposición servicios gratuitos de asistencia lingüística. Llame al [phone].    We comply with applicable federal civil rights laws and [place] laws. We do not discriminate on the basis of race, color, national origin, age, disability, sex, sexual orientation, or gender identity.            Thank you!     Thank you for choosing [facility]  for your care. Our goal is always to provide you with excellent care. Hearing back from our patients is one way we can continue to improve our services. Please take a few minutes to complete the written survey that you may receive in the mail after your visit with us. Thank you!             Your Updated Medication List - Protect others around you: Learn how to safely use, store and throw away your medicines at www.disposemymeds.org.          This list is accurate as of 8/9/18 " 10:25 AM.  Always use your most recent med list.                   Brand Name Dispense Instructions for use Diagnosis    albuterol 108 (90 Base) MCG/ACT Inhaler    PROAIR HFA/PROVENTIL HFA/VENTOLIN HFA    3 Inhaler    Inhale 2 puffs into the lungs as needed for shortness of breath / dyspnea or wheezing    Mild persistent asthma without complication       aspirin 81 MG tablet     30 tablet    Take 81 mg by mouth daily    CAD (coronary artery disease)       ASTELIN 0.1 % spray   Generic drug:  azelastine      Spray 1 spray into both nostrils 2 times daily.        beclomethasone 80 MCG/ACT Inhaler    QVAR    2 Inhaler    Inhale 2 puffs into the lungs 2 times daily    Moderate persistent asthma without complication       carvedilol 3.125 MG tablet    COREG    180 tablet    TAKE 1 TABLET(3.125 MG) BY MOUTH TWICE DAILY WITH MEALS    Essential hypertension       CERAVE Lotn      Externally apply topically 2 times daily as needed    Xerosis cutis, Itching       cetirizine 10 MG tablet    zyrTEC     Take 10 mg by mouth every morning        guaiFENesin 600 MG 12 hr tablet    MUCINEX     Take 600 mg by mouth 3 times daily        isosorbide mononitrate 30 MG 24 hr tablet    IMDUR    90 tablet    TAKE 1 TABLET BY MOUTH EVERY DAY    Coronary artery disease involving native coronary artery of native heart without angina pectoris       lisinopril 5 MG tablet    PRINIVIL/ZESTRIL    90 tablet    Take 1 tablet (5 mg) by mouth daily    Type 2 diabetes mellitus with stage 3 chronic kidney disease, without long-term current use of insulin (H), Benign essential hypertension       MELATONIN PO      Take 5 mg by mouth At Bedtime        NASONEX 50 MCG/ACT spray   Generic drug:  mometasone      Spray 2 sprays into both nostrils daily as needed.        nitroGLYcerin 0.4 MG sublingual tablet    NITROSTAT    25 tablet    Place 1 tablet (0.4 mg) under the tongue every 5 minutes as needed for chest pain    Chest pain on exertion       polyethylene  glycol Packet    MIRALAX/GLYCOLAX     Take 17 g by mouth daily        potassium chloride SA 20 MEQ CR tablet    K-DUR/KLOR-CON M    30 tablet    Take 2 pills today, one pill tomorrow then one pill daily ONLY when you take the Demadex    Chronic combined systolic and diastolic congestive heart failure (H)       rosuvastatin 40 MG tablet    CRESTOR    90 tablet    Take 1 tablet (40 mg) by mouth daily    Coronary artery disease involving native coronary artery of native heart without angina pectoris       torsemide 20 MG tablet    DEMADEX    30 tablet    TAKE 1 TABLET(20 MG) BY MOUTH DAILY    Chronic combined systolic and diastolic congestive heart failure (H)       triamcinolone 0.1 % cream    KENALOG    30 g    Apply sparingly to affected area twotimes daily as needed    Xerosis cutis, Itching

## 2018-08-09 NOTE — PROGRESS NOTES
"  SUBJECTIVE:   Abbe Lambert is a 84 year old male who presents to clinic today for the following health issues:        Concerned about light color of urin      Duration: X3 weeks    Description (location/character/radiation): Oral intake good, putting out clear urine which her feels has no yellow to the urin    Intensity:  mild    Accompanying signs and symptoms: None    History (similar episodes/previous evaluation): None    Precipitating or alleviating factors: None    Therapies tried and outcome: On diuretic         Problem list and histories reviewed & adjusted, as indicated.  Additional history: as documented    Labs reviewed in EPIC    Reviewed and updated as needed this visit by clinical staff  Tobacco  Allergies  Meds  Problems  Med Hx  Surg Hx  Fam Hx  Soc Hx        Reviewed and updated as needed this visit by Provider  Allergies  Meds  Problems         ROS:  CONSTITUTIONAL: NEGATIVE for fever, chills, change in weight  INTEGUMENTARY/SKIN: NEGATIVE for worrisome rashes, moles or lesions  EYES: NEGATIVE for vision changes or irritation  ENT/MOUTH: NEGATIVE for ear, mouth and throat problems  RESP: NEGATIVE for significant cough or SOB  CV: NEGATIVE for chest pain, palpitations or peripheral edema  GI: NEGATIVE for nausea, abdominal pain, heartburn, or change in bowel habits  : NEGATIVE for frequency, dysuria, or hematuria  MUSCULOSKELETAL: NEGATIVE for significant arthralgias or myalgia  NEURO: NEGATIVE for weakness, dizziness or paresthesias  HEME: NEGATIVE for bleeding problems  PSYCHIATRIC: NEGATIVE for changes in mood or affect    OBJECTIVE:     /62 (BP Location: Right arm, Patient Position: Sitting, Cuff Size: Adult Regular)  Pulse 67  Temp 97.2  F (36.2  C)  Resp 14  Ht 5' 10\" (1.778 m)  Wt 192 lb 8 oz (87.3 kg)  SpO2 97%  BMI 27.62 kg/m2  Body mass index is 27.62 kg/(m^2).   GENERAL: healthy, alert and no distress  EYES: Eyes grossly normal to inspection, PERRL and " conjunctivae and sclerae normal  HENT: ear canals and TM's normal, nose and mouth without ulcers or lesions  NECK: no adenopathy, no asymmetry, masses, or scars and thyroid normal to palpation  RESP: lungs clear to auscultation - no rales, rhonchi or wheezes  CV: regular rate and rhythm, normal S1 S2, no S3 or S4, no murmur, click or rub, no peripheral edema and peripheral pulses strong  ABDOMEN: soft, nontender, no hepatosplenomegaly, no masses and bowel sounds normal  MS: no gross musculoskeletal defects noted, no edema  SKIN: no suspicious lesions or rashes  NEURO: Normal strength and tone, mentation intact and speech normal  PSYCH: mentation appears normal, affect normal/bright    Diagnostic Test Results:  none     ASSESSMENT/PLAN:     Problem List Items Addressed This Visit     Paroxysmal atrial fibrillation (H) - Primary (Chronic)    Relevant Orders    PAF COMPLETED (Completed)    Coronary artery disease involving native coronary artery of native heart without angina pectoris (Chronic)    Relevant Orders    PAF COMPLETED (Completed)    CKD (chronic kidney disease) stage 3, GFR 30-59 ml/min    Relevant Orders    PAF COMPLETED (Completed)    Chronic combined systolic and diastolic congestive heart failure (H)    Relevant Medications    torsemide (DEMADEX) 20 MG tablet    Other Relevant Orders    PAF COMPLETED (Completed)    Essential hypertension, benign    Relevant Orders    PAF COMPLETED (Completed)    Mixed hyperlipidemia    Relevant Orders    PAF COMPLETED (Completed)    History of prostate cancer 2003 w/po resection prostate    Relevant Orders    PAF COMPLETED (Completed)    Mild persistent asthma without complication    Relevant Orders    PAF COMPLETED (Completed)    Pre-diabetes    Relevant Orders    PAF COMPLETED (Completed)      Other Visit Diagnoses     Dry mouth             PAF completed; reviewed chronic medical history with the patient today as well:  --Continue Albuterol PRN and daily QVAR.  ACT  completed today, score 20 (COPD/asthma very well controlled)  --Continue Co-Reg and torsemide for CHF and HTN.  Takes potassium supplement as well.  --Takes Lisinopril for renal protection.  Had CKD with microalbuminuria  --Takes Nitrostat SL PRN angina; continue  --Continue Crestor for HLD  --Recommended to RTC to get wellness exam within the next 1-2 months with PCP    Clear urine is a sign of good health.  I provided reassurance.  He should continue to keep up with fluids/hydration.  Use Biotene for dry mouth PRN.  Has CHF but weight is stable and on torsemide which he takes every other day (med list updated).        Fifi Palomo, DO  [facility]

## 2018-08-09 NOTE — PATIENT INSTRUCTIONS
Dehydration (Adult)  Dehydration occurs when your body loses too much fluid. This may be the result of prolonged vomiting or diarrhea, excessive sweating, or a high fever. It may also happen if you don t drink enough fluid when you re sick or out in the heat. Misuse of diuretics (water pills) can also be a cause.  Symptoms include thirst and decreased urine output. You may also feel dizzy, weak, fatigued, or very drowsy. The diet described below is usually enough to treat dehydration. In some cases, you may need medicine.  Home care    Drink at least 12 8-ounce glasses of fluid every day to resolve the dehydration. Fluid may include water; orange juice; lemonade; apple, grape, or cranberry juice; clear fruit drinks; electrolyte replacement and sports drinks; and teas and coffee without caffeine. Don't drink alcohol. If you have been diagnosed with a kidney disease, ask your doctor how much and what types of fluids you should drink to prevent dehydration. If you have kidney disease, fluid can build up in the body. This can be dangerous to your health.    If you have a fever, muscle aches, or a headache as a result of a cold or flu, you may take acetaminophen or ibuprofen, unless another medicine was prescribed. If you have chronic liver or kidney disease, or have ever had a stomach ulcer or gastrointestinal bleeding, talk with your healthcare provider before using these medicines. Don't take aspirin if you are younger than 18 and have a fever. Aspirin raises the chance for severe liver injury.  Follow-up care  Follow up with your healthcare provider, or as advised.  When to seek medical advice  Call your healthcare provider right away if any of these occur:    Continued vomiting    Frequent diarrhea (more than 5 times a day); blood (red or black color) or mucus in diarrhea    Blood in vomit or stool    Swollen abdomen or increasing abdominal pain    Weakness, dizziness, or fainting    Unusual drowsiness or  confusion    Reduced urine output or extreme thirst    Fever of 100.4 F (38 C) or higher  Date Last Reviewed: 5/1/2017 2000-2017 [employer]. [address] [place] [address], [place] [address]. All rights reserved. This information is not intended as a substitute for professional medical care. Always follow your healthcare professional's instructions.

## 2018-08-10 ASSESSMENT — ASTHMA QUESTIONNAIRES: ACT_TOTALSCORE: 20

## 2018-08-17 DIAGNOSIS — I10 ESSENTIAL HYPERTENSION: ICD-10-CM

## 2018-08-17 RX ORDER — CARVEDILOL 3.12 MG/1
TABLET ORAL
Qty: 180 TABLET | Refills: 1 | Status: SHIPPED | OUTPATIENT
Start: 2018-08-17 | End: 2018-10-24

## 2018-08-17 NOTE — TELEPHONE ENCOUNTER
"Requested Prescriptions   Pending Prescriptions Disp Refills     carvedilol (COREG) 3.125 MG tablet [Pharmacy Med Name: CARVEDILOL 3.125MG TABLETS] 180 tablet 0      Last Written Prescription Date:  7/19/17  Last Fill Quantity: 180,  # refills: 3   Last office visit: 8/9/2018 with prescribing provider:     Future Office Visit:     Sig: TAKE 1 TABLET(3.125 MG) BY MOUTH TWICE DAILY WITH MEALS    Beta-Blockers Protocol Passed    8/17/2018  9:30 AM       Passed - Blood pressure under 140/90 in past 12 months    BP Readings from Last 3 Encounters:   08/09/18 112/62   07/16/18 132/68   07/02/18 128/66                Passed - Patient is age 6 or older       Passed - Recent (12 mo) or future (30 days) visit within the authorizing provider's specialty    Patient had office visit in the last 12 months or has a visit in the next 30 days with authorizing provider or within the authorizing provider's specialty.  See \"Patient Info\" tab in inbasket, or \"Choose Columns\" in Meds & Orders section of the refill encounter.              "

## 2018-08-29 ENCOUNTER — TRANSFERRED RECORDS (OUTPATIENT)
Dept: HEALTH INFORMATION MANAGEMENT | Facility: CLINIC | Age: 83
End: 2018-08-29

## 2018-09-10 ENCOUNTER — TELEPHONE (OUTPATIENT)
Dept: FAMILY MEDICINE | Facility: CLINIC | Age: 83
End: 2018-09-10

## 2018-09-10 NOTE — TELEPHONE ENCOUNTER
Our goal is to have forms completed within 72 hours, however some forms may require a visit or additional information.    What clinic location was the form placed at [facility] or [place]     Who is the form from?   Where did the form come from? Faxed to clinic   The form was placed in the inbox of Eliezer Shah MD      Please fax to [phone]  Phone number: [phone]    Additional comments: [facility]   [employer] for medicare     Call take on 9/10/2018 at 10:16 AM by Tiffanie Cisse

## 2018-09-21 ENCOUNTER — ALLIED HEALTH/NURSE VISIT (OUTPATIENT)
Dept: CARDIOLOGY | Facility: CLINIC | Age: 83
End: 2018-09-21
Payer: COMMERCIAL

## 2018-09-21 DIAGNOSIS — Z95.0 CARDIAC PACEMAKER IN SITU: Primary | ICD-10-CM

## 2018-09-21 PROCEDURE — 93294 REM INTERROG EVL PM/LDLS PM: CPT | Performed by: INTERNAL MEDICINE

## 2018-09-21 PROCEDURE — 93296 REM INTERROG EVL PM/IDS: CPT | Performed by: INTERNAL MEDICINE

## 2018-09-21 NOTE — MR AVS SNAPSHOT
After Visit Summary   9/21/2018    Abbe Lambert    MRN: [id number]           Patient Information     Date Of Birth          2/3/1934        Visit Information        Provider Department      9/21/2018 4:15 PM [facility] TECH1 [facility]        Today's Diagnoses     Cardiac pacemaker in situ    -  1       Follow-ups after your visit        Who to contact     If you have questions or need follow up information about today's clinic visit or your schedule please contact [facility] directly at [phone].  Normal or non-critical lab and imaging results will be communicated to you by MyChart, letter or phone within 4 business days after the clinic has received the results. If you do not hear from us within 7 days, please contact the clinic through MyChart or phone. If you have a critical or abnormal lab result, we will notify you by phone as soon as possible.  Submit refill requests through [employer] or call your pharmacy and they will forward the refill request to us. Please allow 3 business days for your refill to be completed.          Additional Information About Your Visit        Care EveryWhere ID     This is your Care EveryWhere ID. This could be used by other organizations to access your [place] medical records  [id number]         Blood Pressure from Last 3 Encounters:   08/09/18 112/62   07/16/18 132/68   07/02/18 128/66    Weight from Last 3 Encounters:   08/09/18 87.3 kg (192 lb 8 oz)   07/16/18 87.3 kg (192 lb 8 oz)   07/02/18 87.5 kg (193 lb)              We Performed the Following     INTERROGATION DEVICE EVAL REMOTE, PACER/ICD (01643)     PM DEVICE INTERROGATE REMOTE (05624)        Primary Care Provider Office Phone # Fax #    Eliezer Shah [phone] [address] [facility] [address]        Equal Access to Services     JIM AHN : stan Horvath  jennifer cherellearden curtisskylar chang. So [facility] [phone].    ATENCIÓN: Si prosper jean-baptiste, tiene a monge disposición servicios gratuitos de asistencia lingüística. Devin al [phone].    We comply with applicable federal civil rights laws and [place] laws. We do not discriminate on the basis of race, color, national origin, age, disability, sex, sexual orientation, or gender identity.            Thank you!     Thank you for choosing [facility]  for your care. Our goal is always to provide you with excellent care. Hearing back from our patients is one way we can continue to improve our services. Please take a few minutes to complete the written survey that you may receive in the mail after your visit with us. Thank you!             Your Updated Medication List - Protect others around you: Learn how to safely use, store and throw away your medicines at www.disposemymeds.org.          This list is accurate as of 9/21/18  4:36 PM.  Always use your most recent med list.                   Brand Name Dispense Instructions for use Diagnosis    albuterol 108 (90 Base) MCG/ACT inhaler    PROAIR HFA/PROVENTIL HFA/VENTOLIN HFA    3 Inhaler    Inhale 2 puffs into the lungs as needed for shortness of breath / dyspnea or wheezing    Mild persistent asthma without complication       aspirin 81 MG tablet     30 tablet    Take 81 mg by mouth daily    CAD (coronary artery disease)       ASTELIN 0.1 % nasal spray   Generic drug:  azelastine      Spray 1 spray into both nostrils 2 times daily.        beclomethasone 80 MCG/ACT Inhaler    QVAR    2 Inhaler    Inhale 2 puffs into the lungs 2 times daily    Moderate persistent asthma without complication       carvedilol 3.125 MG tablet    COREG    180 tablet    TAKE 1 TABLET(3.125 MG) BY MOUTH TWICE DAILY WITH MEALS    Essential hypertension       LEO Mason      Externally apply topically 2 times daily as  needed    Xerosis cutis, Itching       cetirizine 10 MG tablet    zyrTEC     Take 10 mg by mouth every morning        guaiFENesin 600 MG 12 hr tablet    MUCINEX     Take 600 mg by mouth 3 times daily        isosorbide mononitrate 30 MG 24 hr tablet    IMDUR    90 tablet    TAKE 1 TABLET BY MOUTH EVERY DAY    Coronary artery disease involving native coronary artery of native heart without angina pectoris       lisinopril 5 MG tablet    PRINIVIL/ZESTRIL    90 tablet    Take 1 tablet (5 mg) by mouth daily    Type 2 diabetes mellitus with stage 3 chronic kidney disease, without long-term current use of insulin (H), Benign essential hypertension       MELATONIN PO      Take 5 mg by mouth At Bedtime        NASONEX 50 MCG/ACT spray   Generic drug:  mometasone      Spray 2 sprays into both nostrils daily as needed.        nitroGLYcerin 0.4 MG sublingual tablet    NITROSTAT    25 tablet    Place 1 tablet (0.4 mg) under the tongue every 5 minutes as needed for chest pain    Chest pain on exertion       polyethylene glycol Packet    MIRALAX/GLYCOLAX     Take 17 g by mouth daily        potassium chloride SA 20 MEQ CR tablet    K-DUR/KLOR-CON M    30 tablet    Take 2 pills today, one pill tomorrow then one pill daily ONLY when you take the Demadex    Chronic combined systolic and diastolic congestive heart failure (H)       rosuvastatin 40 MG tablet    CRESTOR    90 tablet    Take 1 tablet (40 mg) by mouth daily    Coronary artery disease involving native coronary artery of native heart without angina pectoris       torsemide 20 MG tablet    DEMADEX    30 tablet    Take 1 tab (20 mg) every other day    Chronic combined systolic and diastolic congestive heart failure (H)       triamcinolone 0.1 % cream    KENALOG    30 g    Apply sparingly to affected area twotimes daily as needed    Xerosis cutis, Itching

## 2018-09-21 NOTE — PROGRESS NOTES
[address] (D) Remote PPM Device Check  AP: 98% : 98%  Mode: DDDR        Presenting Rhythm: AP/  Heart Rate: adequate heart rates per histogram  Sensing: stable    Pacing Threshold: stable    Impedance: stable  Battery Status: 4.2 - 4.9 years remaining  Atrial Arrhythmia: none   Ventricular Arrhythmia: none     Care Plan: F/U Merlin q 3 months. Pt said he would call back to arrange OV. Gave results and next transmission date over the phone to pt. Diana MACARIO

## 2018-10-10 ENCOUNTER — TELEPHONE (OUTPATIENT)
Dept: FAMILY MEDICINE | Facility: CLINIC | Age: 83
End: 2018-10-10

## 2018-10-10 ENCOUNTER — OFFICE VISIT (OUTPATIENT)
Dept: FAMILY MEDICINE | Facility: CLINIC | Age: 83
End: 2018-10-10
Payer: COMMERCIAL

## 2018-10-10 VITALS
TEMPERATURE: 97.2 F | OXYGEN SATURATION: 94 % | RESPIRATION RATE: 16 BRPM | DIASTOLIC BLOOD PRESSURE: 70 MMHG | SYSTOLIC BLOOD PRESSURE: 120 MMHG | BODY MASS INDEX: 27.41 KG/M2 | HEART RATE: 74 BPM | WEIGHT: 191 LBS

## 2018-10-10 DIAGNOSIS — R60.9 EDEMA, UNSPECIFIED TYPE: Primary | ICD-10-CM

## 2018-10-10 DIAGNOSIS — I73.9 PAD (PERIPHERAL ARTERY DISEASE) (H): ICD-10-CM

## 2018-10-10 PROCEDURE — 99213 OFFICE O/P EST LOW 20 MIN: CPT | Performed by: PHYSICIAN ASSISTANT

## 2018-10-10 NOTE — TELEPHONE ENCOUNTER
Patient called the clinic stating this morning his legs felt more stiff all the way to the knee cap. He took his regular torsemide (DEMADEX) 20 MG tablet this morning, but he needs more relief. Denies SOB.     NURSING PLAN: Nursing advice to patient given    RECOMMENDED DISPOSITION:  To office now, another person to drive - OV scheduled within 30 minutes.   Will comply with recommendation: Yes  If further questions/concerns or if symptoms do not improve, worsen or new symptoms develop, call your PCP or [facility] as soon as possible.      Guideline used:  Telephone Triage Protocols for Nurses, Fifth Edition, Viktoriya Lopez RN

## 2018-10-10 NOTE — MR AVS SNAPSHOT
After Visit Summary   10/10/2018    Abbe Lambert    MRN: [id number]           Patient Information     Date Of Birth          2/3/1934        Visit Information        Provider Department      10/10/2018 3:30 PM Clara Gray PA-C [facility]        Today's Diagnoses     Edema, unspecified type    -  1      Care Instructions    Take torsemide daily and recheck on Monday               Follow-ups after your visit        Follow-up notes from your care team     Return in about 5 days (around 10/15/2018) for leg recheck.      Your next 10 appointments already scheduled     Oct 24, 2018  8:45 AM CDT   Ech Complete with [id number]   [facility] CV Echocardiography (Cardiovascular Imaging at [facility])    [address]  [address]  [place] [id number] [phone]           1.  Please bring or wear a comfortable two-piece outfit. 2.  You may eat, drink and take your normal medicines. 3.  For any questions that cannot be answered, please contact the ordering physician 4.  Please do not wear perfumes or scented lotions on the day of your exam.            Oct 24, 2018 10:10 AM CDT   LAB with [facility]   [facility] HEART AT [place] ([facility])    [address]  [place] [address] [phone]           Please do not eat 10-12 hours before your appointment if you are coming in fasting for labs on lipids, cholesterol, or glucose (sugar). This does not apply to pregnant women. Water, hot tea and black coffee (with nothing added) are okay. Do not drink other fluids, diet soda or chew gum.            Oct 24, 2018  1:10 PM CDT   Return Visit with Cherie Queen PA-C   [facility]   [place] ([facility])    [address]  [place] [id number] [phone] OPT 2            Dec 21, 2018 11:00 AM CST   Remote PPM Check with HARMAN TECH1  "  [facility] ([facility])    [address]  [place] [address] [phone] OPT 2           This appointment is for a remote check of your pacemaker.  This is not an appointment at the office.              Who to contact     If you have questions or need follow up information about today's clinic visit or your schedule please contact [facility] directly at [phone].  Normal or non-critical lab and imaging results will be communicated to you by MyChart, letter or phone within 4 business days after the clinic has received the results. If you do not hear from us within 7 days, please contact the clinic through MyChart or phone. If you have a critical or abnormal lab result, we will notify you by phone as soon as possible.  Submit refill requests through [employer] or call your pharmacy and they will forward the refill request to us. Please allow 3 business days for your refill to be completed.          Additional Information About Your Visit        [facility] Information     [employer] lets you send messages to your doctor, view your test results, renew your prescriptions, schedule appointments and more. To sign up, go to [email or web address] . Click on \"Log in\" on the left side of the screen, which will take you to the Welcome page. Then click on \"Sign up Now\" on the right side of the page.     You will be asked to enter the access code listed below, as well as some personal information. Please follow the directions to create your username and password.     Your access code is: [email or web address]  Expires: 2019  3:54 PM     Your access code will  in 90 days. If you need help or a new code, please call your [facility] or [phone].        Care EveryWhere ID     This is your Care EveryWhere ID. This could be used by other organizations to access your [place] medical records  [id number]        Your Vitals Were  "    Pulse Temperature Respirations Pulse Oximetry BMI (Body Mass Index)       74 97.2  F (36.2  C) (Tympanic) 16 94% 27.41 kg/m2        Blood Pressure from Last 3 Encounters:   10/10/18 120/70   08/09/18 112/62   07/16/18 132/68    Weight from Last 3 Encounters:   10/10/18 191 lb (86.6 kg)   08/09/18 192 lb 8 oz (87.3 kg)   07/16/18 192 lb 8 oz (87.3 kg)              Today, you had the following     No orders found for display       Primary Care Provider Office Phone # Fax #    Eliezer Shah [phone]       [address] [address]        Equal Access to Services     JIM AHN : Hadii qamar hawkins hadasho Soomaali, waaxda luqadaha, qaybta kaalmada adeegyada, waxaman idiin hayaime franco . So [facility] [phone].    ATENCIÓN: Si habla español, tiene a monge disposición servicios gratuitos de asistencia lingüística. Llame al [phone].    We comply with applicable federal civil rights laws and [place] laws. We do not discriminate on the basis of race, color, national origin, age, disability, sex, sexual orientation, or gender identity.            Thank you!     Thank you for choosing [facility]  for your care. Our goal is always to provide you with excellent care. Hearing back from our patients is one way we can continue to improve our services. Please take a few minutes to complete the written survey that you may receive in the mail after your visit with us. Thank you!             Your Updated Medication List - Protect others around you: Learn how to safely use, store and throw away your medicines at www.disposemymeds.org.          This list is accurate as of 10/10/18  3:54 PM.  Always use your most recent med list.                   Brand Name Dispense Instructions for use Diagnosis    albuterol 108 (90 Base) MCG/ACT inhaler    PROAIR HFA/PROVENTIL HFA/VENTOLIN HFA    3 Inhaler    Inhale 2 puffs into the lungs as needed for shortness of  breath / dyspnea or wheezing    Mild persistent asthma without complication       aspirin 81 MG tablet     30 tablet    Take 81 mg by mouth daily    CAD (coronary artery disease)       ASTELIN 0.1 % nasal spray   Generic drug:  azelastine      Spray 1 spray into both nostrils 2 times daily.        beclomethasone 80 MCG/ACT Inhaler    QVAR    2 Inhaler    Inhale 2 puffs into the lungs 2 times daily    Moderate persistent asthma without complication       carvedilol 3.125 MG tablet    COREG    180 tablet    TAKE 1 TABLET(3.125 MG) BY MOUTH TWICE DAILY WITH MEALS    Essential hypertension       CERAVE Lotn      Externally apply topically 2 times daily as needed    Xerosis cutis, Itching       cetirizine 10 MG tablet    zyrTEC     Take 10 mg by mouth every morning        guaiFENesin 600 MG 12 hr tablet    MUCINEX     Take 600 mg by mouth 3 times daily        isosorbide mononitrate 30 MG 24 hr tablet    IMDUR    90 tablet    TAKE 1 TABLET BY MOUTH EVERY DAY    Coronary artery disease involving native coronary artery of native heart without angina pectoris       lisinopril 5 MG tablet    PRINIVIL/ZESTRIL    90 tablet    Take 1 tablet (5 mg) by mouth daily    Type 2 diabetes mellitus with stage 3 chronic kidney disease, without long-term current use of insulin (H), Benign essential hypertension       MELATONIN PO      Take 5 mg by mouth At Bedtime        NASONEX 50 MCG/ACT spray   Generic drug:  mometasone      Spray 2 sprays into both nostrils daily as needed.        nitroGLYcerin 0.4 MG sublingual tablet    NITROSTAT    25 tablet    Place 1 tablet (0.4 mg) under the tongue every 5 minutes as needed for chest pain    Chest pain on exertion       polyethylene glycol Packet    MIRALAX/GLYCOLAX     Take 17 g by mouth daily        potassium chloride SA 20 MEQ CR tablet    K-DUR/KLOR-CON M    30 tablet    Take 2 pills today, one pill tomorrow then one pill daily ONLY when you take the Demadex    Chronic combined systolic and  diastolic congestive heart failure (H)       rosuvastatin 40 MG tablet    CRESTOR    90 tablet    Take 1 tablet (40 mg) by mouth daily    Coronary artery disease involving native coronary artery of native heart without angina pectoris       torsemide 20 MG tablet    DEMADEX    30 tablet    Take 1 tab (20 mg) every other day    Chronic combined systolic and diastolic congestive heart failure (H)       triamcinolone 0.1 % cream    KENALOG    30 g    Apply sparingly to affected area twotimes daily as needed    Xerosis cutis, Itching

## 2018-10-10 NOTE — PROGRESS NOTES
SUBJECTIVE:   Abbe Lambert is a 84 year old male who presents to clinic today for the following health issues:      Leg Swelling      Duration: upon awakening    Description (location/character/radiation):  Bilateral lower legs    Intensity:  moderate    Accompanying signs and symptoms: swelling    History (similar episodes/previous evaluation): None    Precipitating or alleviating factors: None    Therapies tried and outcome: Torsemide      He took torsemide twice today  Legs look red  Mild leg pain  The first time her noticed it was this morning  Legs are not normally red color, like they are today    Problem list and histories reviewed & adjusted, as indicated.  Additional history: as documented    Patient Active Problem List   Diagnosis     Health Care Home     Allergic rhinitis     Peripheral edema     Polymyalgia rheumatica (H)     Advanced directives, counseling/discussion     Ischemic cardiomyopathy     Paroxysmal atrial fibrillation (H)     Coronary artery disease involving native coronary artery of native heart without angina pectoris     CKD (chronic kidney disease) stage 3, GFR 30-59 ml/min (H)     GERD (gastroesophageal reflux disease)     Tachy-alix syndrome (H)     AAA (abdominal aortic aneurysm) (H)     Old myocardial infarction     Chronic combined systolic and diastolic congestive heart failure (H)     Essential hypertension, benign     Mixed hyperlipidemia     Aftercare following knee joint replacement surgery, unspecified laterality     Physical deconditioning     Post herpetic neuralgia     Presbycusis of both ears     Chronic non-seasonal allergic rhinitis, unspecified trigger     Skin lesion     History of prostate cancer 2003 w/po resection prostate     PAD (peripheral artery disease) (H)     Microalbuminuria     Mild persistent asthma without complication     Pre-diabetes     Past Surgical History:   Procedure Laterality Date     ARTHROPLASTY KNEE Left 10/5/2016    Procedure:  ARTHROPLASTY KNEE;  Surgeon: Keshav Zamudio MD;  Location: [facility] OR     CARDIAC SURGERY  12/03/2012    pacemaker ; CABG 1998     CHOLECYSTECTOMY       COLONOSCOPY       ENT SURGERY  5-    sinus     EXTRACAPSULAR CATARACT EXTRATION WITH INTRAOCULAR LENS IMPLANT       GENITOURINARY SURGERY      prostatectomy     IMPLANT PACEMAKER  12-3-12    [facility]     PROSTATE SURGERY         Social History   Substance Use Topics     Smoking status: Never Smoker     Smokeless tobacco: Never Used     Alcohol use No     Family History   Problem Relation Age of Onset     Cerebrovascular Disease Father      HEART DISEASE Father      HEART DISEASE Brother      Coronary Artery Disease Brother      MI age 50     Depression Daughter      raped in high school     Unknown/Adopted Maternal Grandmother      Unknown/Adopted Maternal Grandfather      Unknown/Adopted Paternal Grandmother      Unknown/Adopted Paternal Grandfather          Current Outpatient Prescriptions   Medication Sig Dispense Refill     albuterol (PROAIR HFA/PROVENTIL HFA/VENTOLIN HFA) 108 (90 BASE) MCG/ACT Inhaler Inhale 2 puffs into the lungs as needed for shortness of breath / dyspnea or wheezing 3 Inhaler 11     aspirin 81 MG tablet Take 81 mg by mouth daily  30 tablet      azelastine (ASTELIN) 137 MCG/SPRAY nasal spray [place] 1 spray into both nostrils 2 times daily.       beclomethasone (QVAR) 80 MCG/ACT Inhaler Inhale 2 puffs into the lungs 2 times daily 2 Inhaler 11     carvedilol (COREG) 3.125 MG tablet TAKE 1 TABLET(3.125 MG) BY MOUTH TWICE DAILY WITH MEALS 180 tablet 1     cetirizine (ZYRTEC) 10 MG tablet Take 10 mg by mouth every morning        Emollient (CERAVE) LOTN Externally apply topically 2 times daily as needed       guaiFENesin (MUCINEX) 600 MG 12 hr tablet Take 600 mg by mouth 3 times daily        isosorbide mononitrate (IMDUR) 30 MG 24 hr tablet TAKE 1 TABLET BY MOUTH EVERY DAY 90 tablet 1     lisinopril (PRINIVIL/ZESTRIL) 5 MG tablet Take 1  tablet (5 mg) by mouth daily 90 tablet 3     MELATONIN PO Take 5 mg by mouth At Bedtime       mometasone (NASONEX) 50 MCG/ACT nasal spray Spray 2 sprays into both nostrils daily as needed.       nitroglycerin (NITROSTAT) 0.4 MG SL tablet Place 1 tablet (0.4 mg) under the tongue every 5 minutes as needed for chest pain 25 tablet 3     polyethylene glycol (MIRALAX/GLYCOLAX) packet Take 17 g by mouth daily        potassium chloride SA (K-DUR/KLOR-CON M) 20 MEQ CR tablet Take 2 pills today, one pill tomorrow then one pill daily ONLY when you take the Demadex 30 tablet 5     rosuvastatin (CRESTOR) 40 MG tablet Take 1 tablet (40 mg) by mouth daily 90 tablet 3     torsemide (DEMADEX) 20 MG tablet Take 1 tab (20 mg) every other day 30 tablet 0     triamcinolone (KENALOG) 0.1 % cream Apply sparingly to affected area twotimes daily as needed 30 g 5     Allergies   Allergen Reactions     Advair Diskus      DENIED     Morphine Hcl      Decrease  Blood Pressure Lower Than Normal, Per Pt.     Vicodin [Hydrocodone-Acetaminophen] Visual Disturbance       Reviewed and updated as needed this visit by clinical staff       Reviewed and updated as needed this visit by Provider         Review of Systems   Constitutional: Negative.    HENT: Negative.    Eyes: Negative.    Respiratory: Negative.    Cardiovascular: Negative.    Gastrointestinal: Negative.    Genitourinary: Negative.    Musculoskeletal:        As in HPI   Skin: Negative.    Neurological: Negative.    Psychiatric/Behavioral: Negative.        OBJECTIVE:     /70 (Cuff Size: Adult Large)  Pulse 74  Temp 97.2  F (36.2  C) (Tympanic)  Resp 16  Wt 191 lb (86.6 kg)  SpO2 94%  BMI 27.41 kg/m2  Body mass index is 27.41 kg/(m^2).    Physical Exam   Constitutional: He is oriented to person, place, and time. He appears well-developed and well-nourished. No distress.   HENT:   Head: Normocephalic.   Right Ear: External ear normal.   Left Ear: External ear normal.   Nose: Nose  normal.   Eyes: Conjunctivae and EOM are normal.   Neck: Normal range of motion.   Cardiovascular:   Pulses:       Dorsalis pedis pulses are 2+ on the right side, and 2+ on the left side.        Posterior tibial pulses are 1+ on the right side, and 1+ on the left side.   Pulmonary/Chest: Effort normal.   Musculoskeletal:        Right lower leg: He exhibits edema. He exhibits no tenderness.        Left lower leg: He exhibits edema and laceration (anterior lower leg - no erythema, no purulent drainage). He exhibits no tenderness.        Left foot: There is no tenderness, no swelling, normal capillary refill and no laceration.   Both feet and lower legs are symmetrical.  Foot are a deep purple color - however cap refill is normal.  Sensation decreased in toes (equal bilaterally).    Feet:   Right Foot:   Skin Integrity: Negative for skin breakdown.   Left Foot:   Skin Integrity: Negative for skin breakdown.   Neurological: He is alert and oriented to person, place, and time.   Skin: He is not diaphoretic.   Psychiatric: He has a normal mood and affect. Judgment normal.       No results found for this or any previous visit (from the past 24 hour(s)).    ASSESSMENT/PLAN:       ICD-10-CM    1. Edema, unspecified type R60.9         Patient Instructions   Take torsemide daily and recheck on Monday           Chas Gray PA-C  [facility]

## 2018-10-11 ENCOUNTER — ALLIED HEALTH/NURSE VISIT (OUTPATIENT)
Dept: NURSING | Facility: CLINIC | Age: 83
End: 2018-10-11
Payer: COMMERCIAL

## 2018-10-11 DIAGNOSIS — Z23 NEED FOR PROPHYLACTIC VACCINATION AND INOCULATION AGAINST INFLUENZA: Primary | ICD-10-CM

## 2018-10-11 PROCEDURE — G0008 ADMIN INFLUENZA VIRUS VAC: HCPCS

## 2018-10-11 PROCEDURE — 90662 IIV NO PRSV INCREASED AG IM: CPT

## 2018-10-11 ASSESSMENT — ENCOUNTER SYMPTOMS
CONSTITUTIONAL NEGATIVE: 1
RESPIRATORY NEGATIVE: 1
NEUROLOGICAL NEGATIVE: 1
GASTROINTESTINAL NEGATIVE: 1
EYES NEGATIVE: 1
PSYCHIATRIC NEGATIVE: 1
CARDIOVASCULAR NEGATIVE: 1

## 2018-10-11 NOTE — MR AVS SNAPSHOT
After Visit Summary   10/11/2018    Abbe Lambert    MRN: [id number]           Patient Information     Date Of Birth          2/3/1934        Visit Information        Provider Department      10/11/2018 3:30 PM BX NURSE [facility]        Today's Diagnoses     Need for prophylactic vaccination and inoculation against influenza    -  1       Follow-ups after your visit        Your next 10 appointments already scheduled     Oct 11, 2018  3:30 PM CDT   Nurse Only with BX NURSE   [facility] ([facility])    [address] [address]   [phone]            Oct 15, 2018 10:45 AM CDT   SHORT with Eliezer Shah MD   [facility] ([facility])    [address] [address]   [phone]            Oct 24, 2018  8:45 AM CDT   Ech Complete with [id number]   [facility] CV Echocardiography (Cardiovascular Imaging at [facility])    [address] [place], [place] [id number]  [facility] [address] [phone]           1.  Please bring or wear a comfortable two-piece outfit. 2.  You may eat, drink and take your normal medicines. 3.  For any questions that cannot be answered, please contact the ordering physician 4.  Please do not wear perfumes or scented lotions on the day of your exam.            Oct 24, 2018 10:10 AM CDT   LAB with [facility]   [facility] [place] ([facility])    [address] [place] [address] [address]  [facility] [address] [phone]           Please do not eat 10-12 hours before your appointment if you are coming in fasting for labs on lipids, cholesterol, or glucose (sugar). This does not apply to pregnant women. Water, hot tea and black coffee (with nothing added) are okay. Do not drink other fluids, diet soda or chew  "gum.            Oct 24, 2018  1:10 PM CDT   Return Visit with Cherie Queen PA-C   [facility] ([facility])    [address] [id number]  [facility] [address] [phone] OPT 2            Dec 21, 2018 11:00 AM CST   Remote PPM Check with HARMAN TECH1   [facility] ([facility])    [address] [place] [address] [phone] OPT 2           This appointment is for a remote check of your pacemaker.  This is not an appointment at the office.              Who to contact     If you have questions or need follow up information about today's clinic visit or your schedule please contact [facility] directly at [phone].  Normal or non-critical lab and imaging results will be communicated to you by [employer], letter or phone within 4 business days after the clinic has received the results. If you do not hear from us within 7 days, please contact the clinic through [employer] or phone. If you have a critical or abnormal lab result, we will notify you by phone as soon as possible.  Submit refill requests through [employer] or call your pharmacy and they will forward the refill request to us. Please allow 3 business days for your refill to be completed.          Additional Information About Your Visit        [employer] Information     [employer] lets you send messages to your doctor, view your test results, renew your prescriptions, schedule appointments and more. To sign up, go to [email or web address] . Click on \"Log in\" on the left side of the screen, which will take you to the Welcome page. Then click on \"Sign up Now\" on the right side of the page.     You will be asked to enter the access code listed below, as well as some personal information. Please follow the directions to create your username and password.     Your access code is: [email or web address]  Expires: 1/8/2019  3:54 PM   "   Your access code will  in 90 days. If you need help or a new code, please call your [place] clinic or [phone].        Care EveryWhere ID     This is your Care EveryWhere ID. This could be used by other organizations to access your [place] medical records  [id number]         Blood Pressure from Last 3 Encounters:   10/10/18 120/70   18 112/62   18 132/68    Weight from Last 3 Encounters:   10/10/18 191 lb (86.6 kg)   18 192 lb 8 oz (87.3 kg)   18 192 lb 8 oz (87.3 kg)              We Performed the Following     FLU VACCINE, INCREASED ANTIGEN, PRESV FREE, AGE 65+ [51024]        Primary Care Provider Office Phone # Fax #    Eliezer Shah [phone] [address] [facility] [address]        Equal Access to Services     JIM AHN : Hadii aad ku hadasho Soomaali, waaxda luqadaha, qaybta kaalmada adeegyada, waxay idiin hayaan jhonatan khjaime franco . So [facility] [place] [facility] [phone].    ATENCIÓN: Si habla español, tiene a monge disposición servicios gratuitos de asistencia lingüística. Llame al [phone].    We comply with applicable federal civil rights laws and [place] laws. We do not discriminate on the basis of race, color, national origin, age, disability, sex, sexual orientation, or gender identity.            Thank you!     Thank you for choosing [facility]  for your care. Our goal is always to provide you with excellent care. Hearing back from our patients is one way we can continue to improve our services. Please take a few minutes to complete the written survey that you may receive in the mail after your visit with us. Thank you!             Your Updated Medication List - Protect others around you: Learn how to safely use, store and throw away your medicines at www.disposemymeds.org.          This list is accurate as of 10/11/18  3:24 PM.  Always use your most recent med list.                   Brand Name Dispense  Instructions for use Diagnosis    albuterol 108 (90 Base) MCG/ACT inhaler    PROAIR HFA/PROVENTIL HFA/VENTOLIN HFA    3 Inhaler    Inhale 2 puffs into the lungs as needed for shortness of breath / dyspnea or wheezing    Mild persistent asthma without complication       aspirin 81 MG tablet     30 tablet    Take 81 mg by mouth daily    CAD (coronary artery disease)       ASTELIN 0.1 % nasal spray   Generic drug:  azelastine      Spray 1 spray into both nostrils 2 times daily.        beclomethasone 80 MCG/ACT Inhaler    QVAR    2 Inhaler    Inhale 2 puffs into the lungs 2 times daily    Moderate persistent asthma without complication       carvedilol 3.125 MG tablet    COREG    180 tablet    TAKE 1 TABLET(3.125 MG) BY MOUTH TWICE DAILY WITH MEALS    Essential hypertension       CERAVE Lotn      Externally apply topically 2 times daily as needed    Xerosis cutis, Itching       cetirizine 10 MG tablet    zyrTEC     Take 10 mg by mouth every morning        guaiFENesin 600 MG 12 hr tablet    MUCINEX     Take 600 mg by mouth 3 times daily        isosorbide mononitrate 30 MG 24 hr tablet    IMDUR    90 tablet    TAKE 1 TABLET BY MOUTH EVERY DAY    Coronary artery disease involving native coronary artery of native heart without angina pectoris       lisinopril 5 MG tablet    PRINIVIL/ZESTRIL    90 tablet    Take 1 tablet (5 mg) by mouth daily    Type 2 diabetes mellitus with stage 3 chronic kidney disease, without long-term current use of insulin (H), Benign essential hypertension       MELATONIN PO      Take 5 mg by mouth At Bedtime        NASONEX 50 MCG/ACT spray   Generic drug:  mometasone      Spray 2 sprays into both nostrils daily as needed.        nitroGLYcerin 0.4 MG sublingual tablet    NITROSTAT    25 tablet    Place 1 tablet (0.4 mg) under the tongue every 5 minutes as needed for chest pain    Chest pain on exertion       polyethylene glycol Packet    MIRALAX/GLYCOLAX     Take 17 g by mouth daily        potassium  chloride SA 20 MEQ CR tablet    K-DUR/KLOR-CON M    30 tablet    Take 2 pills today, one pill tomorrow then one pill daily ONLY when you take the Demadex    Chronic combined systolic and diastolic congestive heart failure (H)       rosuvastatin 40 MG tablet    CRESTOR    90 tablet    Take 1 tablet (40 mg) by mouth daily    Coronary artery disease involving native coronary artery of native heart without angina pectoris       torsemide 20 MG tablet    DEMADEX    30 tablet    Take 1 tab (20 mg) every other day    Chronic combined systolic and diastolic congestive heart failure (H)       triamcinolone 0.1 % cream    KENALOG    30 g    Apply sparingly to affected area twotimes daily as needed    Xerosis cutis, Itching

## 2018-10-11 NOTE — NURSING NOTE
Injectable Influenza Immunization Documentation    1.  Are you sick today? (Fever of 100.5 or higher on the day of the clinic)   No    2.  Have you ever had Guillain-[place] Syndrome within 6 weeks of an influenza vaccionation?  No    3. Do you have a life-threatening allergy to eggs?  No    4. Do you have a life-threatening allergy to a component of the vaccine? May include antibiotics, gelatin or latex.  No     5. Have you ever had a reaction to a dose of flu vaccine that needed immediate medical attention?  No     Form completed by Princess NELA Gamino CMA

## 2018-10-23 ENCOUNTER — DOCUMENTATION ONLY (OUTPATIENT)
Dept: CARDIOLOGY | Facility: CLINIC | Age: 83
End: 2018-10-23

## 2018-10-23 DIAGNOSIS — I25.118 CORONARY ARTERY DISEASE OF NATIVE HEART WITH STABLE ANGINA PECTORIS, UNSPECIFIED VESSEL OR LESION TYPE (H): ICD-10-CM

## 2018-10-23 DIAGNOSIS — I25.5 ISCHEMIC CARDIOMYOPATHY: Primary | ICD-10-CM

## 2018-10-24 ENCOUNTER — OFFICE VISIT (OUTPATIENT)
Dept: CARDIOLOGY | Facility: CLINIC | Age: 83
End: 2018-10-24
Payer: COMMERCIAL

## 2018-10-24 ENCOUNTER — HOSPITAL ENCOUNTER (OUTPATIENT)
Dept: CARDIOLOGY | Facility: CLINIC | Age: 83
Discharge: HOME | End: 2018-10-24
Admitting: INTERNAL MEDICINE
Payer: COMMERCIAL

## 2018-10-24 VITALS
HEART RATE: 72 BPM | SYSTOLIC BLOOD PRESSURE: 120 MMHG | DIASTOLIC BLOOD PRESSURE: 66 MMHG | HEIGHT: 70 IN | BODY MASS INDEX: 27.77 KG/M2 | WEIGHT: 194 LBS

## 2018-10-24 DIAGNOSIS — I25.5 ISCHEMIC CARDIOMYOPATHY: ICD-10-CM

## 2018-10-24 DIAGNOSIS — I25.5 ISCHEMIC CARDIOMYOPATHY: Primary | Chronic | ICD-10-CM

## 2018-10-24 DIAGNOSIS — I73.9 PAD (PERIPHERAL ARTERY DISEASE) (H): ICD-10-CM

## 2018-10-24 DIAGNOSIS — I10 ESSENTIAL HYPERTENSION: ICD-10-CM

## 2018-10-24 DIAGNOSIS — I48.0 PAROXYSMAL ATRIAL FIBRILLATION (H): Chronic | ICD-10-CM

## 2018-10-24 DIAGNOSIS — I25.118 CORONARY ARTERY DISEASE OF NATIVE HEART WITH STABLE ANGINA PECTORIS, UNSPECIFIED VESSEL OR LESION TYPE (H): ICD-10-CM

## 2018-10-24 DIAGNOSIS — I25.10 CORONARY ARTERY DISEASE INVOLVING NATIVE CORONARY ARTERY OF NATIVE HEART WITHOUT ANGINA PECTORIS: Chronic | ICD-10-CM

## 2018-10-24 LAB
ALT SERPL W P-5'-P-CCNC: 12 U/L (ref 5–30)
ANION GAP SERPL CALCULATED.3IONS-SCNC: 9.7 MMOL/L (ref 6–17)
BUN SERPL-MCNC: 27 MG/DL (ref 7–30)
CALCIUM SERPL-MCNC: 8.6 MG/DL (ref 8.5–10.5)
CHLORIDE SERPL-SCNC: 107 MMOL/L (ref 98–107)
CHOLEST SERPL-MCNC: 93 MG/DL
CO2 SERPL-SCNC: 27 MMOL/L (ref 23–29)
CREAT SERPL-MCNC: 1.9 MG/DL (ref 0.7–1.3)
GFR SERPL CREATININE-BSD FRML MDRD: 34 ML/MIN/1.7M2
GLUCOSE SERPL-MCNC: 124 MG/DL (ref 70–105)
HDLC SERPL-MCNC: 28 MG/DL
LDLC SERPL CALC-MCNC: 39 MG/DL
NONHDLC SERPL-MCNC: 65 MG/DL
POTASSIUM SERPL-SCNC: 3.7 MMOL/L (ref 3.5–5.1)
SODIUM SERPL-SCNC: 140 MMOL/L (ref 136–145)
TRIGL SERPL-MCNC: 129 MG/DL

## 2018-10-24 PROCEDURE — 25500064 ZZH RX 255 OP 636: Performed by: INTERNAL MEDICINE

## 2018-10-24 PROCEDURE — 93306 TTE W/DOPPLER COMPLETE: CPT | Mod: 26 | Performed by: INTERNAL MEDICINE

## 2018-10-24 PROCEDURE — 80061 LIPID PANEL: CPT | Performed by: INTERNAL MEDICINE

## 2018-10-24 PROCEDURE — 40000264 ECHO COMPLETE WITH OPTISON

## 2018-10-24 PROCEDURE — 36415 COLL VENOUS BLD VENIPUNCTURE: CPT | Performed by: INTERNAL MEDICINE

## 2018-10-24 PROCEDURE — 84460 ALANINE AMINO (ALT) (SGPT): CPT | Performed by: INTERNAL MEDICINE

## 2018-10-24 PROCEDURE — 99214 OFFICE O/P EST MOD 30 MIN: CPT | Performed by: PHYSICIAN ASSISTANT

## 2018-10-24 PROCEDURE — 80048 BASIC METABOLIC PNL TOTAL CA: CPT | Performed by: INTERNAL MEDICINE

## 2018-10-24 RX ORDER — CARVEDILOL 6.25 MG/1
6.25 TABLET ORAL 2 TIMES DAILY WITH MEALS
Qty: 180 TABLET | Refills: 1 | Status: SHIPPED | OUTPATIENT
Start: 2018-10-24 | End: 2019-05-16

## 2018-10-24 RX ADMIN — HUMAN ALBUMIN MICROSPHERES AND PERFLUTREN 9 ML: 10; .22 INJECTION, SOLUTION INTRAVENOUS at 09:45

## 2018-10-24 NOTE — MR AVS SNAPSHOT
After Visit Summary   10/24/2018    Abbe Lambert    MRN: [id number]           Patient Information     Date Of Birth          2/3/1934        Visit Information        Provider Department      10/24/2018 1:10 PM Cherie Queen PA-C [facility]        Today's Diagnoses     Ischemic cardiomyopathy    -  1    Paroxysmal atrial fibrillation (H)        Coronary artery disease involving native coronary artery of native heart without angina pectoris        PAD (peripheral artery disease) (H)        Essential hypertension          Care Instructions    Today's Plan:   - Increase your carvedilol (coreg) from 3.125 mg twice daily to 6.25 mg twice daily. You can double up on your current bottle until it is gone for the time being.   - Please return to see me in 2 - 4 weeks so we can hopefully go up higher on the carvedilol.   - Please have the ultrasound of your legs and your abdomen within the next six months, before you come back to see Dr. Eli.     If you have questions or concerns please call my nurse team at [phone].    Scheduling phone number: [phone]  Reminder: Please bring in all current medications, over the counter supplements and vitamin bottles to your next appointment.    It was a pleasure seeing you today!     Cherie Queen PA-C              Follow-ups after your visit        Additional Services     Follow-Up with Cardiac Advanced Practice Provider           Follow-Up with Cardiologist                 Your next 10 appointments already scheduled     Dec 21, 2018 11:00 AM CST   Remote PPM Check with HARMAN TECH1   [facility] ([facility])    [address] [place] [address] [id number] [phone] OPT 2           This appointment is for a remote check of your pacemaker.  This is not an appointment at the office.              Future tests that were ordered for you today     Open Future  "Orders        Priority Expected Expires Ordered    Follow-Up with Cardiac Advanced Practice Provider Routine 2018 10/24/2019 10/24/2018    Follow-Up with Cardiologist Routine 2019 10/24/2019 10/24/2018    ECHO COMPLETE WITH OPTISON Routine 10/24/2018 2018 10/23/2018            Who to contact     If you have questions or need follow up information about today's clinic visit or your schedule please contact [facility] directly at [phone].  Normal or non-critical lab and imaging results will be communicated to you by wrenchguys mobilehart, letter or phone within 4 business days after the clinic has received the results. If you do not hear from us within 7 days, please contact the clinic through V [employer] Ot or phone. If you have a critical or abnormal lab result, we will notify you by phone as soon as possible.  Submit refill requests through "[employer] or call your pharmacy and they will forward the refill request to us. Please allow 3 business days for your refill to be completed.          Additional Information About Your Visit        "[employer] Information     "[employer] lets you send messages to your doctor, view your test results, renew your prescriptions, schedule appointments and more. To sign up, go to [email or web address] . Click on \"Log in\" on the left side of the screen, which will take you to the Welcome page. Then click on \"Sign up Now\" on the right side of the page.     You will be asked to enter the access code listed below, as well as some personal information. Please follow the directions to create your username and password.     Your access code is: [email or web address]  Expires: 2019  3:54 PM     Your access code will  in 90 days. If you need help or a new code, please call your [facility] clinic or [phone].        Care EveryWhere ID     This is your Care EveryWhere ID. This could be used by other organizations to access your [facility] medical " "records  [id number]        Your Vitals Were     Pulse Height BMI (Body Mass Index)             72 1.778 m (5' 10\") 27.84 kg/m2          Blood Pressure from Last 3 Encounters:   10/24/18 120/66   10/10/18 120/70   08/09/18 112/62    Weight from Last 3 Encounters:   10/24/18 88 kg (194 lb)   10/10/18 86.6 kg (191 lb)   08/09/18 87.3 kg (192 lb 8 oz)                 Today's Medication Changes          These changes are accurate as of 10/24/18  2:03 PM.  If you have any questions, ask your nurse or doctor.               These medicines have changed or have updated prescriptions.        Dose/Directions    carvedilol 6.25 MG tablet   Commonly known as:  COREG   This may have changed:    - medication strength  - See the new instructions.   Used for:  Essential hypertension   Changed by:  Cherie Queen PA-C        Dose:  6.25 mg   Take 1 tablet (6.25 mg) by mouth 2 times daily (with meals)   Quantity:  180 tablet   Refills:  1            Where to get your medicines      These medications were sent to [facility] [address] [place] [address] [address] AT [facility] [place] [place] [address]  [address], [facility] [address]     Phone:  [phone]     carvedilol 6.25 MG tablet                Primary Care Provider Office Phone # Fax #    Eliezer Shah [phone] [address]  [facility] [address]        Equal Access to Services     JIM AHN AH: Hadii qamar hawkins hadasho Soomaali, waaxda luqadaha, qaybta kaalmada adeegyada, waxay idisal goodson. So [facility] [phone].    ATENCIÓN: Si habla español, tiene a monge disposición servicios gratuitos de asistencia lingüística. Llshani vazquez [phone].    We comply with applicable federal civil rights laws and [place] laws. We do not discriminate on the basis of race, color, national origin, age, disability, sex, sexual orientation, or gender identity.            Thank you!     Thank you for choosing [facility]  for your care. Our goal is always to provide you with excellent care. Hearing back from our patients is one way we can continue to improve our services. Please take a few minutes to complete the written survey that you may receive in the mail after your visit with us. Thank you!             Your Updated Medication List - Protect others around you: Learn how to safely use, store and throw away your medicines at www.disposemymeds.org.          This list is accurate as of 10/24/18  2:03 PM.  Always use your most recent med list.                   Brand Name Dispense Instructions for use Diagnosis    albuterol 108 (90 Base) MCG/ACT inhaler    PROAIR HFA/PROVENTIL HFA/VENTOLIN HFA    3 Inhaler    Inhale 2 puffs into the lungs as needed for shortness of breath / dyspnea or wheezing    Mild persistent asthma without complication       aspirin 81 MG tablet     30 tablet    Take 81 mg by mouth daily    CAD (coronary artery disease)       ASTELIN 0.1 % nasal spray   Generic drug:  azelastine      Spray 1 spray into both nostrils 2 times daily.        beclomethasone 80 MCG/ACT Aers IS A DISCONTINUED MEDICATION    QVAR    2 Inhaler    Inhale 2 puffs into the lungs 2 times daily    Moderate persistent asthma without complication       carvedilol 6.25 MG tablet    COREG    180 tablet    Take 1 tablet (6.25 mg) by mouth 2 times daily (with meals)    Essential hypertension       CERAVE Lotn      Externally apply topically 2 times daily as needed    Xerosis cutis, Itching       cetirizine 10 MG tablet    zyrTEC     Take 10 mg by mouth every morning        guaiFENesin 600 MG 12 hr tablet    MUCINEX     Take 600 mg by mouth 2-3 x day        isosorbide mononitrate 30 MG 24 hr tablet    IMDUR    90 tablet    TAKE 1 TABLET BY MOUTH EVERY DAY    Coronary artery disease involving native coronary artery of native heart without angina pectoris       lisinopril 5 MG tablet    PRINIVIL/ZESTRIL    90  tablet    Take 1 tablet (5 mg) by mouth daily    Type 2 diabetes mellitus with stage 3 chronic kidney disease, without long-term current use of insulin (H), Benign essential hypertension       MELATONIN PO      Take 5 mg by mouth At Bedtime        NASONEX 50 MCG/ACT spray   Generic drug:  mometasone      Spray 2 sprays into both nostrils daily as needed.        nitroGLYcerin 0.4 MG sublingual tablet    NITROSTAT    25 tablet    Place 1 tablet (0.4 mg) under the tongue every 5 minutes as needed for chest pain    Chest pain on exertion       polyethylene glycol Packet    MIRALAX/GLYCOLAX     Take 17 g by mouth daily        potassium chloride SA 20 MEQ CR tablet    K-DUR/KLOR-CON M    30 tablet    Take 2 pills today, one pill tomorrow then one pill daily ONLY when you take the Demadex    Chronic combined systolic and diastolic congestive heart failure (H)       rosuvastatin 40 MG tablet    CRESTOR    90 tablet    Take 1 tablet (40 mg) by mouth daily    Coronary artery disease involving native coronary artery of native heart without angina pectoris       torsemide 20 MG tablet    DEMADEX    30 tablet    Take 1 tab (20 mg) every other day    Chronic combined systolic and diastolic congestive heart failure (H)       triamcinolone 0.1 % cream    KENALOG    30 g    Apply sparingly to affected area twotimes daily as needed    Xerosis cutis, Itching

## 2018-10-24 NOTE — LETTER
10/24/2018    Eliezer Shah MD  [address]  [facility] [address]    RE: Abbe Lambert       Dear Colleague,    I had the pleasure of seeing Abbe Lambert in the [facility].    Cardiology Clinic Progress Note    Abbe Lambert MRN# [id number]   YOB: 1934 Age: 84 year old     Reason for visit: Routine annual f/u visit           Assessment and Plan:     In summary, the patient presents today for annual f/u. He's feeling very well and has no complaints reflective of CHF or angina. TTE and labs today are reflective of persistent LV dysfunction and renal failure, essentially stable.     1. 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 Asymptomatic - FC I-II. Appears euvolemic with very mild LE edema on minimal diuretic. On Coreg 3.125 BID and lisinopril 5 daily. BP adequate.     2. CAD - Asymptomatic. LDL < 70. On aspirin, high-intensity statin.     3. PAF, SSS S/p PPM - Last PPM check 9/2018 stable. He's currently 98% paced in the atrium and ventricle, with 4-5 years left on his battery. PPM check 6/2018 shows very brief runs of possible AF/AT (less than 2 min duration). Not anticoagulated due to overall very low burden and s/p SHARI ligation.     4. AAA - Small and stable per last ultrasound in 2015.     5. HTN - controlled.     6. CKD - creat 1.9. Of note, he was fasting for these labs. Appears he's ranged from 1.5 - 1.75 over the past one year.     7. Hx of CVA - noted on prior CT.      Plan:  - As he's already 98% Vpaced, will up-titrate his Coreg from 3.125 BID to 6.25 BID for further med optimization of his cardiomyopathy. Will defer up-titration of lisinopril given a creat of 1.9. I'll see him back in 2 - 3 weeks for reassessment and continued med up-titration.   - Follow up in 6 months with Dr. Eli. Repeat AAA assessment with ultrasound prior.   - Continue routine PPM checks.   - Continued low-salt diet and increased activity level encouraged.   "        History of Presenting Illness:      Abbe Lambert is a pleasant 84 year old patient of  Dr. Eli who presents today for an annual assessment. He has a PMH including a mixed cardiomyopathy, CAD, PAF/SSS s/p PPM, AAA, HTN, and CKD-III.     He had an MI and cardiac arrest at [facility] in 1998.  This led to coronary artery bypass grafting x3 with a free LISA pedicle graft to his right coronary artery, a LIMA graft to his left anterior descending artery and a saphenous vein graft tied to a small diffusely diseased ramus intermedius branch.  Angiography in 2009 demonstrated the vein graft to be closed, but the arterial conduits were wide open.  His diagonal filled by collaterals. In 08/2013, he returned to the Cath Lab for anginal symptoms and angioplasty only was performed of an 85% stenosis in a small first diagonal that was felt to be too small for stenting.  His symptoms did improve.   In 2014, he again returned to the Cath Lab, with what appeared to be fairly classical anginal symptoms.  Catheterization again demonstrated the LIMA and LISA to be widely patent.  There was some restenosis in the first diagonal with an FFR of 0.9.  He had a 50% mid RCA stenosis with an FFR of 0.92.  His ejection fraction was 25%.  It appeared that he had developed a nonischemic cardiomyopathy.  Over time, his ejection fraction has improved to the 45%-50%, although when we checked last year it was estimated to be 41%.     Today, his EF is relatively stable at 35-40%. Labs reveal a creat which is mildly elevated above his baseline at 1.9, although he was fasting for this. Lipids are satisfactory.  He's feeling very well today. He experiences VAZQUEZ when he \"walks really fast\" which is chronic an stable. He also complains of some instability that has limited his ability to golf and hunt. He had one minor fall recently which resulted in an abrasion of his LLE, this has healed nicely although with some minimal ongoing " redness and swelling. He has mild chronic LE edema which isn't bothersome to him. He otherwise denies chest pain, shortness of breath, PND, orthopnea, palpitations, near syncope or syncope. He sleeps well at night. He watches the sodium in his diet.  He's active in his [employer] and with the [employer] study there.           Review of Systems:     12-pt ROS is negative except for as noted in the HPI.           Physical Exam:     Vitals: There were no vitals taken for this visit.  Wt Readings from Last 3 Encounters:   10/10/18 86.6 kg (191 lb)   08/09/18 87.3 kg (192 lb 8 oz)   07/16/18 87.3 kg (192 lb 8 oz)       Constitutional:  Patient is pleasant, alert, cooperative, and in NAD.  HEENT:  NCAT. PERRLA. EOM's intact. No masses or thyromegaly. Teeth in normal repair.   Neck:  CVP appears normal. No carotid bruits.   Chest:  Non-tender, normal A/P diameter.   Pulmonary: Normal respiratory effort. CTAB.   Cardiac: RRR, normal S1/S2, no S3/S4, no murmur or rub.   Abdomen:  Non-tender abdomen with normoactive bowel sounds, no hepatosplenomegaly appreciated.   Vascular: Pulses in the upper and lower extremities are 2+ and equal bilaterally.  Extremities: trace RLE edema, trace-1+ LLE edema ankle - pretibial region bilaterally  Skin:  Small skin abrasion on the L pretibial region with some erythema surrounding it  Neurological:  No gross motor or sensory deficits.   Psych: Appropriate affect.        Data:     Cardiac Diagnostics reviewed:  Type Date Result   TTE 10/24/18 There is mild to moderate concentric left ventricular hypertrophy.  The visual ejection fraction is estimated at 35-40%.  There is severe apical wall hypokinesis.  There is moderate to severe inferior and inferolateral wall hypokinesis.  Grade I or early diastolic dysfunction.  Pacer wire in right atrium  There is trace to mild aortic regurgitation.  The study was technically difficult. Contrast was used without apparent  complications. Compared to prior  study, there is no significant change.    9/20/16 Left ventricular systolic function is moderately reduced.The left visual  ejection fraction is estimated at 40%.Apical akinesia, moderate inferior,  lateral, inferolateral and inferior hypokinesia.  Mildly decreased right ventricular systolic function  There is mild (1+) aortic regurgitation.  Mild aortic root dilatation.The ascending aorta is mildly dilated.     On direct comparision to echo images dated 08/01/2014 no significant changes.   Cath 4/25/14 1.  Nonischemic cardiomyopathy, ejection fraction of 25%, which is   new from an echocardiogram last summer.  Previous ejection fraction   was estimated to be 55-60%.  His left ventricular end-diastolic   pressure is 26.   2.  50% distal RCA stenosis well revascularized by a widely patent   and free pedicle LISA graft to his posterior descending artery.   3.  Occluded mid LAD well revascularized by a widely patent LIMA   graft.   4.  Previously intervened on small diagonal has a restenosis of   30-50% with an FFR of 0.9.   5.  50% mid RCA stenosis with an FFR of 0.92.    Stress Katherine, 12/10/13 A moderate-sized partially reversible inferior and inferolateral  defect consistent with transmural and nontransmural infarction with  mild residual ischemia within the distal inferolateral wall.  Gating demonstrated normal left ventricular chamber size with  inferior and inferolateral hypokinesis.  The ejection fraction was 53%.  Compared to the previous study of 11/2/2012, there is  probably no significant change. There is some mild ischemia identified  in the current study involving the distal inferolateral wall which was  not previously reported but this is not likely clinically significant  change.).   Device PPM check, 9/21/18 [employer] DR 2110 (D) Remote PPM Device Check  AP: 98%                      : 98%  Mode: DDDR        Presenting Rhythm: AP/  Heart Rate: adequate heart rates per histogram  Sensing: stable     Pacing Threshold: stable    Impedance: stable  Battery Status: 4.2 - 4.9 years remaining  Atrial Arrhythmia: none   Ventricular Arrhythmia: none    Abdominal U/S 10/22/15 Stable infrarenal AAA measuring 3.8 cm x 3.7 cm, similar in size to  previous 3.7 x 3.7 cm aneurysm 11/20/2014. Difference in size is  within measurement error. No iliac artery aneurysms or obstruction  visualized. Heterogenous, atherosclerotic plaque again visualized in  the aortoiliac system.   Head CT 11/2016 Mild to moderate cerebral atrophy is present. There is  minimal patchy white matter changes in both hemispheres without mass effect. There is a small old right cerebellar infarct. There is no  evidence for intracranial hemorrhage, mass effect, acute infarct, or  skull fracture.     Recent Labs   Lab Test  10/24/18   0939  05/18/18   1409  12/22/17   0805   12/05/16   1505   09/08/16   1359  07/07/16   1546   03/07/16   1507   09/28/15   1457   LDL  39  21  44   < >   --    < >   --    --    < >   --    < >   --    HDL  28*  32*  40   < >   --    < >   --    --    < >   --    < >   --    NHDL  65  49  62   < >   --    < >   --    --    < >   --    --    --    CHOL  93  81  102   < >   --    < >   --    --    < >   --    < >   --    TRIG  129  141  91   < >   --    < >   --    --    < >   --    < >   --    TSH   --    --    --    --   2.74   --    --    --    --   2.38   --   3.53   NTBNP   --    --    --    --   696*   --   836*  557*   --   885*   --    --     < > = values in this interval not displayed.     Lab Results   Component Value Date    CHOL 93 10/24/2018    HDL 28 (L) 10/24/2018    LDL 39 10/24/2018    TRIG 129 10/24/2018    CHOLHDLRATIO 3.3 10/08/2015       Lab Results   Component Value Date    WBC 14.2 (H) 09/12/2017    RBC 5.04 09/12/2017    HGB 16.2 09/12/2017    HCT 47.8 09/12/2017    MCV 95 09/12/2017    MCH 32.1 09/12/2017    MCHC 33.9 09/12/2017    RDW 14.9 09/12/2017     09/12/2017       Lab Results   Component  Value Date     10/24/2018    POTASSIUM 3.7 10/24/2018    CHLORIDE 107 10/24/2018    CO2 27 10/24/2018    ANIONGAP 9.7 10/24/2018     (H) 10/24/2018    BUN 27 10/24/2018    CR 1.90 (H) 10/24/2018    GFRESTIMATED 34 (L) 10/24/2018    GFRESTBLACK 41 (L) 10/24/2018    JANAY 8.6 10/24/2018      Lab Results   Component Value Date    AST 38 09/12/2017    ALT 12 10/24/2018       Lab Results   Component Value Date    A1C 6.3 (H) 05/18/2018       Lab Results   Component Value Date    INR 0.94 04/25/2014    INR 1.0 04/24/2014          Problem List:     Patient Active Problem List   Diagnosis     Health Care Home     Allergic rhinitis     Peripheral edema     Polymyalgia rheumatica (H)     Advanced directives, counseling/discussion     Ischemic cardiomyopathy     Paroxysmal atrial fibrillation (H)     Coronary artery disease involving native coronary artery of native heart without angina pectoris     CKD (chronic kidney disease) stage 3, GFR 30-59 ml/min (H)     GERD (gastroesophageal reflux disease)     Tachy-alix syndrome (H)     AAA (abdominal aortic aneurysm) (H)     Old myocardial infarction     Chronic combined systolic and diastolic congestive heart failure (H)     Essential hypertension, benign     Mixed hyperlipidemia     Aftercare following knee joint replacement surgery, unspecified laterality     Physical deconditioning     Post herpetic neuralgia     Presbycusis of both ears     Chronic non-seasonal allergic rhinitis, unspecified trigger     Skin lesion     History of prostate cancer 2003 w/po resection prostate     PAD (peripheral artery disease) (H)     Microalbuminuria     Mild persistent asthma without complication     Pre-diabetes            Medications:     Current Outpatient Prescriptions   Medication Sig Dispense Refill     albuterol (PROAIR HFA/PROVENTIL HFA/VENTOLIN HFA) 108 (90 BASE) MCG/ACT Inhaler Inhale 2 puffs into the lungs as needed for shortness of breath / dyspnea or wheezing 3  Inhaler 11     aspirin 81 MG tablet Take 81 mg by mouth daily  30 tablet      azelastine (ASTELIN) 137 MCG/SPRAY nasal spray [place] 1 spray into both nostrils 2 times daily.       beclomethasone (QVAR) 80 MCG/ACT Inhaler Inhale 2 puffs into the lungs 2 times daily 2 Inhaler 11     carvedilol (COREG) 3.125 MG tablet TAKE 1 TABLET(3.125 MG) BY MOUTH TWICE DAILY WITH MEALS 180 tablet 1     cetirizine (ZYRTEC) 10 MG tablet Take 10 mg by mouth every morning        Emollient (CERAVE) LOTN Externally apply topically 2 times daily as needed       guaiFENesin (MUCINEX) 600 MG 12 hr tablet Take 600 mg by mouth 3 times daily        isosorbide mononitrate (IMDUR) 30 MG 24 hr tablet TAKE 1 TABLET BY MOUTH EVERY DAY 90 tablet 1     lisinopril (PRINIVIL/ZESTRIL) 5 MG tablet Take 1 tablet (5 mg) by mouth daily 90 tablet 3     MELATONIN PO Take 5 mg by mouth At Bedtime       mometasone (NASONEX) 50 MCG/ACT nasal spray Spray 2 sprays into both nostrils daily as needed.       nitroglycerin (NITROSTAT) 0.4 MG SL tablet Place 1 tablet (0.4 mg) under the tongue every 5 minutes as needed for chest pain 25 tablet 3     polyethylene glycol (MIRALAX/GLYCOLAX) packet Take 17 g by mouth daily        potassium chloride SA (K-DUR/KLOR-CON M) 20 MEQ CR tablet Take 2 pills today, one pill tomorrow then one pill daily ONLY when you take the Demadex 30 tablet 5     rosuvastatin (CRESTOR) 40 MG tablet Take 1 tablet (40 mg) by mouth daily 90 tablet 3     torsemide (DEMADEX) 20 MG tablet Take 1 tab (20 mg) every other day 30 tablet 0     triamcinolone (KENALOG) 0.1 % cream Apply sparingly to affected area twotimes daily as needed 30 g 5          Past Medical History:     Past Medical History:   Diagnosis Date     AAA (abdominal aortic aneurysm) (H)      Anemia due to blood loss, acute 10/10/2016     Asthma      Atrial fibrillation (H)      Cardiac arrest (H)      Cardiomyopathy (H)      Chronic kidney disease      Chronic sinusitis      Coronary artery  disease     cardiac cath 2014: medical management; cath 2013: PTCA to diagonal; cath 2009: medical management; CABG 1998: LIMA to LAD, LISA to RCA, SVG to circumflex     GERD (gastroesophageal reflux disease)      History of angina      Hyperlipidaemia      Hypertension, goal below 140/90      Myocardial infarction (H)     MI with Vfib arrest 1998     Polymyalgia rheumatica (H)      Shingles 10/28/2016     Shortness of breath      Tachy-alix syndrome (H)     s/p dual chamber pacemaker 2012     Past Surgical History:   Procedure Laterality Date     ARTHROPLASTY KNEE Left 10/5/2016    Procedure: ARTHROPLASTY KNEE;  Surgeon: Keshav Zamudio MD;  Location: [facility] OR     CARDIAC SURGERY  12/03/2012    pacemaker ; CABG 1998     CHOLECYSTECTOMY       COLONOSCOPY       ENT SURGERY  5-    sinus     EXTRACAPSULAR CATARACT EXTRATION WITH INTRAOCULAR LENS IMPLANT       GENITOURINARY SURGERY      prostatectomy     IMPLANT PACEMAKER  12-3-12    [facility]     PROSTATE SURGERY       Family History   Problem Relation Age of Onset     Cerebrovascular Disease Father      HEART DISEASE Father      HEART DISEASE Brother      Coronary Artery Disease Brother      MI age 50     Depression Daughter      raped in high school     Unknown/Adopted Maternal Grandmother      Unknown/Adopted Maternal Grandfather      Unknown/Adopted Paternal Grandmother      Unknown/Adopted Paternal Grandfather      Social History     Social History     Marital status:      Spouse name: N/A     Number of children: N/A     Years of education: N/A     Occupational History     Not on file.     Social History Main Topics     Smoking status: Never Smoker     Smokeless tobacco: Never Used     Alcohol use No     Drug use: No     Sexual activity: No     Other Topics Concern     Parent/Sibling W/ Cabg, Mi Or Angioplasty Before 65f 55m? Yes     brother and father had MI @ 40's     Caffeine Concern Yes     one diet mountain dew daily     Sleep Concern No      Stress Concern No     Special Diet No     Exercise Yes     golfing, does the stair at the office a lot     Seat Belt Yes     Social History Narrative            Allergies:   Advair diskus; Morphine hcl; and Vicodin [hydrocodone-acetaminophen]      Cherie Queen PA-C  [facility]  Pager: [phone]      Thank you for allowing me to participate in the care of your patient.      Sincerely,     Cherie Queen PA-C     [facility]    cc:   Eliezer Shah MD  [address] [address]

## 2018-10-24 NOTE — PROGRESS NOTES
Cardiology Clinic Progress Note    Abbe Lambert MRN# [id number]   YOB: 1934 Age: 84 year old     Reason for visit: Routine annual f/u visit           Assessment and Plan:     In summary, the patient presents today for annual f/u. He's feeling very well and has no complaints reflective of CHF or angina. TTE and labs today are reflective of persistent LV dysfunction and renal failure, essentially stable.     1. 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 Asymptomatic - FC I-II. Appears euvolemic with very mild LE edema on minimal diuretic. On Coreg 3.125 BID and lisinopril 5 daily. BP adequate.     2. CAD - Asymptomatic. LDL < 70. On aspirin, high-intensity statin.     3. PAF, SSS S/p PPM - Last PPM check 9/2018 stable. He's currently 98% paced in the atrium and ventricle, with 4-5 years left on his battery. PPM check 6/2018 shows very brief runs of possible AF/AT (less than 2 min duration). Not anticoagulated due to overall very low burden and s/p SHARI ligation.     4. AAA - Small and stable per last ultrasound in 2015.     5. HTN - controlled.     6. CKD - creat 1.9. Of note, he was fasting for these labs. Appears he's ranged from 1.5 - 1.75 over the past one year.     7. Hx of CVA - noted on prior CT.      Plan:  - As he's already 98% Vpaced, will up-titrate his Coreg from 3.125 BID to 6.25 BID for further med optimization of his cardiomyopathy. Will defer up-titration of lisinopril given a creat of 1.9. I'll see him back in 2 - 3 weeks for reassessment and continued med up-titration.   - Follow up in 6 months with Dr. Eli. Repeat AAA assessment with ultrasound prior.   - Continue routine PPM checks.   - Continued low-salt diet and increased activity level encouraged.          History of Presenting Illness:      Abbe Lambert is a pleasant 84 year old patient of  Dr. Eli who presents today for an annual assessment. He has a PMH including a mixed cardiomyopathy, CAD, PAF/SSS s/p PPM,  "AAA, HTN, and CKD-III.     He had an MI and cardiac arrest at [facility] in 1998.  This led to coronary artery bypass grafting x3 with a free LISA pedicle graft to his right coronary artery, a LIMA graft to his left anterior descending artery and a saphenous vein graft tied to a small diffusely diseased ramus intermedius branch.  Angiography in 2009 demonstrated the vein graft to be closed, but the arterial conduits were wide open.  His diagonal filled by collaterals. In 08/2013, he returned to the Cath Lab for anginal symptoms and angioplasty only was performed of an 85% stenosis in a small first diagonal that was felt to be too small for stenting.  His symptoms did improve.   In 2014, he again returned to the Cath Lab, with what appeared to be fairly classical anginal symptoms.  Catheterization again demonstrated the LIMA and LISA to be widely patent.  There was some restenosis in the first diagonal with an FFR of 0.9.  He had a 50% mid RCA stenosis with an FFR of 0.92.  His ejection fraction was 25%.  It appeared that he had developed a nonischemic cardiomyopathy.  Over time, his ejection fraction has improved to the 45%-50%, although when we checked last year it was estimated to be 41%.     Today, his EF is relatively stable at 35-40%. Labs reveal a creat which is mildly elevated above his baseline at 1.9, although he was fasting for this. Lipids are satisfactory.  He's feeling very well today. He experiences VAZQUEZ when he \"walks really fast\" which is chronic an stable. He also complains of some instability that has limited his ability to golf and hunt. He had one minor fall recently which resulted in an abrasion of his LLE, this has healed nicely although with some minimal ongoing redness and swelling. He has mild chronic LE edema which isn't bothersome to him. He otherwise denies chest pain, shortness of breath, PND, orthopnea, palpitations, near syncope or syncope. He sleeps well at night. He watches " the sodium in his diet.  He's active in his [employer] and with the [employer] study there.           Review of Systems:     12-pt ROS is negative except for as noted in the HPI.           Physical Exam:     Vitals: There were no vitals taken for this visit.  Wt Readings from Last 3 Encounters:   10/10/18 86.6 kg (191 lb)   08/09/18 87.3 kg (192 lb 8 oz)   07/16/18 87.3 kg (192 lb 8 oz)       Constitutional:  Patient is pleasant, alert, cooperative, and in NAD.  HEENT:  NCAT. PERRLA. EOM's intact. No masses or thyromegaly. Teeth in normal repair.   Neck:  CVP appears normal. No carotid bruits.   Chest:  Non-tender, normal A/P diameter.   Pulmonary: Normal respiratory effort. CTAB.   Cardiac: RRR, normal S1/S2, no S3/S4, no murmur or rub.   Abdomen:  Non-tender abdomen with normoactive bowel sounds, no hepatosplenomegaly appreciated.   Vascular: Pulses in the upper and lower extremities are 2+ and equal bilaterally.  Extremities: trace RLE edema, trace-1+ LLE edema ankle - pretibial region bilaterally  Skin:  Small skin abrasion on the L pretibial region with some erythema surrounding it  Neurological:  No gross motor or sensory deficits.   Psych: Appropriate affect.        Data:     Cardiac Diagnostics reviewed:  Type Date Result   TTE 10/24/18 There is mild to moderate concentric left ventricular hypertrophy.  The visual ejection fraction is estimated at 35-40%.  There is severe apical wall hypokinesis.  There is moderate to severe inferior and inferolateral wall hypokinesis.  Grade I or early diastolic dysfunction.  Pacer wire in right atrium  There is trace to mild aortic regurgitation.  The study was technically difficult. Contrast was used without apparent  complications. Compared to prior study, there is no significant change.    9/20/16 Left ventricular systolic function is moderately reduced.The left visual  ejection fraction is estimated at 40%.Apical akinesia, moderate inferior,  lateral, inferolateral and  inferior hypokinesia.  Mildly decreased right ventricular systolic function  There is mild (1+) aortic regurgitation.  Mild aortic root dilatation.The ascending aorta is mildly dilated.     On direct comparision to echo images dated 08/01/2014 no significant changes.   Cath 4/25/14 1.  Nonischemic cardiomyopathy, ejection fraction of 25%, which is   new from an echocardiogram last summer.  Previous ejection fraction   was estimated to be 55-60%.  His left ventricular end-diastolic   pressure is 26.   2.  50% distal RCA stenosis well revascularized by a widely patent   and free pedicle LISA graft to his posterior descending artery.   3.  Occluded mid LAD well revascularized by a widely patent LIMA   graft.   4.  Previously intervened on small diagonal has a restenosis of   30-50% with an FFR of 0.9.   5.  50% mid RCA stenosis with an FFR of 0.92.    Stress Katherine, 12/10/13 A moderate-sized partially reversible inferior and inferolateral  defect consistent with transmural and nontransmural infarction with  mild residual ischemia within the distal inferolateral wall.  Gating demonstrated normal left ventricular chamber size with  inferior and inferolateral hypokinesis.  The ejection fraction was 53%.  Compared to the previous study of 11/2/2012, there is  probably no significant change. There is some mild ischemia identified  in the current study involving the distal inferolateral wall which was  not previously reported but this is not likely clinically significant  change.).   Device PPM check, 9/21/18 Abbott Josh WILLSON 2110 (D) Remote PPM Device Check  AP: 98%                      : 98%  Mode: DDDR        Presenting Rhythm: AP/  Heart Rate: adequate heart rates per histogram  Sensing: stable    Pacing Threshold: stable    Impedance: stable  Battery Status: 4.2 - 4.9 years remaining  Atrial Arrhythmia: none   Ventricular Arrhythmia: none    Abdominal U/S 10/22/15 Stable infrarenal AAA measuring 3.8 cm x 3.7 cm,  similar in size to  previous 3.7 x 3.7 cm aneurysm 11/20/2014. Difference in size is  within measurement error. No iliac artery aneurysms or obstruction  visualized. Heterogenous, atherosclerotic plaque again visualized in  the aortoiliac system.   Head CT 11/2016 Mild to moderate cerebral atrophy is present. There is  minimal patchy white matter changes in both hemispheres without mass effect. There is a small old right cerebellar infarct. There is no  evidence for intracranial hemorrhage, mass effect, acute infarct, or  skull fracture.     Recent Labs   Lab Test  10/24/18   0939  05/18/18   1409  12/22/17   0805   12/05/16   1505   09/08/16   1359  07/07/16   1546   03/07/16   1507   09/28/15   1457   LDL  39  21  44   < >   --    < >   --    --    < >   --    < >   --    HDL  28*  32*  40   < >   --    < >   --    --    < >   --    < >   --    NHDL  65  49  62   < >   --    < >   --    --    < >   --    --    --    CHOL  93  81  102   < >   --    < >   --    --    < >   --    < >   --    TRIG  129  141  91   < >   --    < >   --    --    < >   --    < >   --    TSH   --    --    --    --   2.74   --    --    --    --   2.38   --   3.53   NTBNP   --    --    --    --   696*   --   836*  557*   --   885*   --    --     < > = values in this interval not displayed.     Lab Results   Component Value Date    CHOL 93 10/24/2018    HDL 28 (L) 10/24/2018    LDL 39 10/24/2018    TRIG 129 10/24/2018    CHOLHDLRATIO 3.3 10/08/2015       Lab Results   Component Value Date    WBC 14.2 (H) 09/12/2017    RBC 5.04 09/12/2017    HGB 16.2 09/12/2017    HCT 47.8 09/12/2017    MCV 95 09/12/2017    MCH 32.1 09/12/2017    MCHC 33.9 09/12/2017    RDW 14.9 09/12/2017     09/12/2017       Lab Results   Component Value Date     10/24/2018    POTASSIUM 3.7 10/24/2018    CHLORIDE 107 10/24/2018    CO2 27 10/24/2018    ANIONGAP 9.7 10/24/2018     (H) 10/24/2018    BUN 27 10/24/2018    CR 1.90 (H) 10/24/2018    GFRESTIMATED  34 (L) 10/24/2018    GFRESTBLACK 41 (L) 10/24/2018    JANAY 8.6 10/24/2018      Lab Results   Component Value Date    AST 38 09/12/2017    ALT 12 10/24/2018       Lab Results   Component Value Date    A1C 6.3 (H) 05/18/2018       Lab Results   Component Value Date    INR 0.94 04/25/2014    INR 1.0 04/24/2014          Problem List:     Patient Active Problem List   Diagnosis     Health Care Home     Allergic rhinitis     Peripheral edema     Polymyalgia rheumatica (H)     Advanced directives, counseling/discussion     Ischemic cardiomyopathy     Paroxysmal atrial fibrillation (H)     Coronary artery disease involving native coronary artery of native heart without angina pectoris     CKD (chronic kidney disease) stage 3, GFR 30-59 ml/min (H)     GERD (gastroesophageal reflux disease)     Tachy-alix syndrome (H)     AAA (abdominal aortic aneurysm) (H)     Old myocardial infarction     Chronic combined systolic and diastolic congestive heart failure (H)     Essential hypertension, benign     Mixed hyperlipidemia     Aftercare following knee joint replacement surgery, unspecified laterality     Physical deconditioning     Post herpetic neuralgia     Presbycusis of both ears     Chronic non-seasonal allergic rhinitis, unspecified trigger     Skin lesion     History of prostate cancer 2003 w/po resection prostate     PAD (peripheral artery disease) (H)     Microalbuminuria     Mild persistent asthma without complication     Pre-diabetes            Medications:     Current Outpatient Prescriptions   Medication Sig Dispense Refill     albuterol (PROAIR HFA/PROVENTIL HFA/VENTOLIN HFA) 108 (90 BASE) MCG/ACT Inhaler Inhale 2 puffs into the lungs as needed for shortness of breath / dyspnea or wheezing 3 Inhaler 11     aspirin 81 MG tablet Take 81 mg by mouth daily  30 tablet      azelastine (ASTELIN) 137 MCG/SPRAY nasal spray [place] 1 spray into both nostrils 2 times daily.       beclomethasone (QVAR) 80 MCG/ACT Inhaler Inhale 2  puffs into the lungs 2 times daily 2 Inhaler 11     carvedilol (COREG) 3.125 MG tablet TAKE 1 TABLET(3.125 MG) BY MOUTH TWICE DAILY WITH MEALS 180 tablet 1     cetirizine (ZYRTEC) 10 MG tablet Take 10 mg by mouth every morning        Emollient (CERAVE) LOTN Externally apply topically 2 times daily as needed       guaiFENesin (MUCINEX) 600 MG 12 hr tablet Take 600 mg by mouth 3 times daily        isosorbide mononitrate (IMDUR) 30 MG 24 hr tablet TAKE 1 TABLET BY MOUTH EVERY DAY 90 tablet 1     lisinopril (PRINIVIL/ZESTRIL) 5 MG tablet Take 1 tablet (5 mg) by mouth daily 90 tablet 3     MELATONIN PO Take 5 mg by mouth At Bedtime       mometasone (NASONEX) 50 MCG/ACT nasal spray Spray 2 sprays into both nostrils daily as needed.       nitroglycerin (NITROSTAT) 0.4 MG SL tablet Place 1 tablet (0.4 mg) under the tongue every 5 minutes as needed for chest pain 25 tablet 3     polyethylene glycol (MIRALAX/GLYCOLAX) packet Take 17 g by mouth daily        potassium chloride SA (K-DUR/KLOR-CON M) 20 MEQ CR tablet Take 2 pills today, one pill tomorrow then one pill daily ONLY when you take the Demadex 30 tablet 5     rosuvastatin (CRESTOR) 40 MG tablet Take 1 tablet (40 mg) by mouth daily 90 tablet 3     torsemide (DEMADEX) 20 MG tablet Take 1 tab (20 mg) every other day 30 tablet 0     triamcinolone (KENALOG) 0.1 % cream Apply sparingly to affected area twotimes daily as needed 30 g 5          Past Medical History:     Past Medical History:   Diagnosis Date     AAA (abdominal aortic aneurysm) (H)      Anemia due to blood loss, acute 10/10/2016     Asthma      Atrial fibrillation (H)      Cardiac arrest (H)      Cardiomyopathy (H)      Chronic kidney disease      Chronic sinusitis      Coronary artery disease     cardiac cath 2014: medical management; cath 2013: PTCA to diagonal; cath 2009: medical management; CABG 1998: LIMA to LAD, LISA to RCA, SVG to circumflex     GERD (gastroesophageal reflux disease)      History of  angina      Hyperlipidaemia      Hypertension, goal below 140/90      Myocardial infarction (H)     MI with Vfib arrest 1998     Polymyalgia rheumatica (H)      Shingles 10/28/2016     Shortness of breath      Tachy-alix syndrome (H)     s/p dual chamber pacemaker 2012     Past Surgical History:   Procedure Laterality Date     ARTHROPLASTY KNEE Left 10/5/2016    Procedure: ARTHROPLASTY KNEE;  Surgeon: Keshav Zamudio MD;  Location: [facility] OR     CARDIAC SURGERY  12/03/2012    pacemaker ; CABG 1998     CHOLECYSTECTOMY       COLONOSCOPY       ENT SURGERY  5-    sinus     EXTRACAPSULAR CATARACT EXTRATION WITH INTRAOCULAR LENS IMPLANT       GENITOURINARY SURGERY      prostatectomy     IMPLANT PACEMAKER  12-3-12    [facility]     PROSTATE SURGERY       Family History   Problem Relation Age of Onset     Cerebrovascular Disease Father      HEART DISEASE Father      HEART DISEASE Brother      Coronary Artery Disease Brother      MI age 50     Depression Daughter      raped in high school     Unknown/Adopted Maternal Grandmother      Unknown/Adopted Maternal Grandfather      Unknown/Adopted Paternal Grandmother      Unknown/Adopted Paternal Grandfather      Social History     Social History     Marital status:      Spouse name: N/A     Number of children: N/A     Years of education: N/A     Occupational History     Not on file.     Social History Main Topics     Smoking status: Never Smoker     Smokeless tobacco: Never Used     Alcohol use No     Drug use: No     Sexual activity: No     Other Topics Concern     Parent/Sibling W/ Cabg, Mi Or Angioplasty Before 65f 55m? Yes     brother and father had MI @ 40's     Caffeine Concern Yes     one diet [place] dew daily     Sleep Concern No     Stress Concern No     Special Diet No     Exercise Yes     golfing, does the stair at the office a lot     Seat Belt Yes     Social History Narrative            Allergies:   Advair diskus; Morphine hcl; and Vicodin  [hydrocodone-acetaminophen]      Cherie Queen PA-C  [facility]  Pager: [phone]

## 2018-10-24 NOTE — PATIENT INSTRUCTIONS
Today's Plan:   - Increase your carvedilol (coreg) from 3.125 mg twice daily to 6.25 mg twice daily. You can double up on your current bottle until it is gone for the time being.   - Please return to see me in 2 - 4 weeks so we can hopefully go up higher on the carvedilol.   - Please have the ultrasound of your legs and your abdomen within the next six months, before you come back to see Dr. Eli.     If you have questions or concerns please call my nurse team at [phone].    Scheduling phone number: [phone]  Reminder: Please bring in all current medications, over the counter supplements and vitamin bottles to your next appointment.    It was a pleasure seeing you today!     Cherie Queen PA-C

## 2018-10-24 NOTE — LETTER
10/24/2018    Eliezer Shah MD  [address]  [facility] [address]    RE: Abbe Lambert       Dear Colleague,    I had the pleasure of seeing Abbe Lambert in the [facility].    Cardiology Clinic Progress Note    Abbe Lambert MRN# [id number]   YOB: 1934 Age: 84 year old     Reason for visit: Routine annual f/u visit           Assessment and Plan:     In summary, the patient presents today for annual f/u. He's feeling very well and has no complaints reflective of CHF or angina. TTE and labs today are reflective of persistent LV dysfunction and renal failure, essentially stable.     1. 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 Asymptomatic - FC I-II. Appears euvolemic with very mild LE edema on minimal diuretic. On Coreg 3.125 BID and lisinopril 5 daily. BP adequate.     2. CAD - Asymptomatic. LDL < 70. On aspirin, high-intensity statin.     3. PAF, SSS S/p PPM - Last PPM check 9/2018 stable. He's currently 98% paced in the atrium and ventricle, with 4-5 years left on his battery. PPM check 6/2018 shows very brief runs of possible AF/AT (less than 2 min duration). Not anticoagulated due to overall very low burden and s/p SHARI ligation.     4. AAA - Small and stable per last ultrasound in 2015.     5. HTN - controlled.     6. CKD - creat 1.9. Of note, he was fasting for these labs. Appears he's ranged from 1.5 - 1.75 over the past one year.     7. Hx of CVA - noted on prior CT.      Plan:  - As he's already 98% Vpaced, will up-titrate his Coreg from 3.125 BID to 6.25 BID for further med optimization of his cardiomyopathy. Will defer up-titration of lisinopril given a creat of 1.9. I'll see him back in 2 - 3 weeks for reassessment and continued med up-titration.   - Follow up in 6 months with Dr. Eli. Repeat AAA assessment with ultrasound prior.   - Continue routine PPM checks.   - Continued low-salt diet and increased activity level encouraged.   "        History of Presenting Illness:      Abbe Lambert is a pleasant 84 year old patient of  Dr. Eli who presents today for an annual assessment. He has a PMH including a mixed cardiomyopathy, CAD, PAF/SSS s/p PPM, AAA, HTN, and CKD-III.     He had an MI and cardiac arrest at [facility] in 1998.  This led to coronary artery bypass grafting x3 with a free LISA pedicle graft to his right coronary artery, a LIMA graft to his left anterior descending artery and a saphenous vein graft tied to a small diffusely diseased ramus intermedius branch.  Angiography in 2009 demonstrated the vein graft to be closed, but the arterial conduits were wide open.  His diagonal filled by collaterals. In 08/2013, he returned to the Cath Lab for anginal symptoms and angioplasty only was performed of an 85% stenosis in a small first diagonal that was felt to be too small for stenting.  His symptoms did improve.   In 2014, he again returned to the Cath Lab, with what appeared to be fairly classical anginal symptoms.  Catheterization again demonstrated the LIMA and LISA to be widely patent.  There was some restenosis in the first diagonal with an FFR of 0.9.  He had a 50% mid RCA stenosis with an FFR of 0.92.  His ejection fraction was 25%.  It appeared that he had developed a nonischemic cardiomyopathy.  Over time, his ejection fraction has improved to the 45%-50%, although when we checked last year it was estimated to be 41%.     Today, his EF is relatively stable at 35-40%. Labs reveal a creat which is mildly elevated above his baseline at 1.9, although he was fasting for this. Lipids are satisfactory.  He's feeling very well today. He experiences VAZQUEZ when he \"walks really fast\" which is chronic an stable. He also complains of some instability that has limited his ability to golf and hunt. He had one minor fall recently which resulted in an abrasion of his LLE, this has healed nicely although with some minimal ongoing " redness and swelling. He has mild chronic LE edema which isn't bothersome to him. He otherwise denies chest pain, shortness of breath, PND, orthopnea, palpitations, near syncope or syncope. He sleeps well at night. He watches the sodium in his diet.  He's active in his [employer] and with the [employer] study there.           Review of Systems:     12-pt ROS is negative except for as noted in the HPI.           Physical Exam:     Vitals: There were no vitals taken for this visit.  Wt Readings from Last 3 Encounters:   10/10/18 86.6 kg (191 lb)   08/09/18 87.3 kg (192 lb 8 oz)   07/16/18 87.3 kg (192 lb 8 oz)       Constitutional:  Patient is pleasant, alert, cooperative, and in NAD.  HEENT:  NCAT. PERRLA. EOM's intact. No masses or thyromegaly. Teeth in normal repair.   Neck:  CVP appears normal. No carotid bruits.   Chest:  Non-tender, normal A/P diameter.   Pulmonary: Normal respiratory effort. CTAB.   Cardiac: RRR, normal S1/S2, no S3/S4, no murmur or rub.   Abdomen:  Non-tender abdomen with normoactive bowel sounds, no hepatosplenomegaly appreciated.   Vascular: Pulses in the upper and lower extremities are 2+ and equal bilaterally.  Extremities: trace RLE edema, trace-1+ LLE edema ankle - pretibial region bilaterally  Skin:  Small skin abrasion on the L pretibial region with some erythema surrounding it  Neurological:  No gross motor or sensory deficits.   Psych: Appropriate affect.        Data:     Cardiac Diagnostics reviewed:  Type Date Result   TTE 10/24/18 There is mild to moderate concentric left ventricular hypertrophy.  The visual ejection fraction is estimated at 35-40%.  There is severe apical wall hypokinesis.  There is moderate to severe inferior and inferolateral wall hypokinesis.  Grade I or early diastolic dysfunction.  Pacer wire in right atrium  There is trace to mild aortic regurgitation.  The study was technically difficult. Contrast was used without apparent  complications. Compared to prior  study, there is no significant change.    9/20/16 Left ventricular systolic function is moderately reduced.The left visual  ejection fraction is estimated at 40%.Apical akinesia, moderate inferior,  lateral, inferolateral and inferior hypokinesia.  Mildly decreased right ventricular systolic function  There is mild (1+) aortic regurgitation.  Mild aortic root dilatation.The ascending aorta is mildly dilated.     On direct comparision to echo images dated 08/01/2014 no significant changes.   Cath 4/25/14 1.  Nonischemic cardiomyopathy, ejection fraction of 25%, which is   new from an echocardiogram last summer.  Previous ejection fraction   was estimated to be 55-60%.  His left ventricular end-diastolic   pressure is 26.   2.  50% distal RCA stenosis well revascularized by a widely patent   and free pedicle LISA graft to his posterior descending artery.   3.  Occluded mid LAD well revascularized by a widely patent LIMA   graft.   4.  Previously intervened on small diagonal has a restenosis of   30-50% with an FFR of 0.9.   5.  50% mid RCA stenosis with an FFR of 0.92.    Stress Katherine, 12/10/13 A moderate-sized partially reversible inferior and inferolateral  defect consistent with transmural and nontransmural infarction with  mild residual ischemia within the distal inferolateral wall.  Gating demonstrated normal left ventricular chamber size with  inferior and inferolateral hypokinesis.  The ejection fraction was 53%.  Compared to the previous study of 11/2/2012, there is  probably no significant change. There is some mild ischemia identified  in the current study involving the distal inferolateral wall which was  not previously reported but this is not likely clinically significant  change.).   Device PPM check, 9/21/18 [employer] DR 2110 (D) Remote PPM Device Check  AP: 98%                      : 98%  Mode: DDDR        Presenting Rhythm: AP/  Heart Rate: adequate heart rates per histogram  Sensing: stable     Pacing Threshold: stable    Impedance: stable  Battery Status: 4.2 - 4.9 years remaining  Atrial Arrhythmia: none   Ventricular Arrhythmia: none    Abdominal U/S 10/22/15 Stable infrarenal AAA measuring 3.8 cm x 3.7 cm, similar in size to  previous 3.7 x 3.7 cm aneurysm 11/20/2014. Difference in size is  within measurement error. No iliac artery aneurysms or obstruction  visualized. Heterogenous, atherosclerotic plaque again visualized in  the aortoiliac system.   Head CT 11/2016 Mild to moderate cerebral atrophy is present. There is  minimal patchy white matter changes in both hemispheres without mass effect. There is a small old right cerebellar infarct. There is no  evidence for intracranial hemorrhage, mass effect, acute infarct, or  skull fracture.     Recent Labs   Lab Test  10/24/18   0939  05/18/18   1409  12/22/17   0805   12/05/16   1505   09/08/16   1359  07/07/16   1546   03/07/16   1507   09/28/15   1457   LDL  39  21  44   < >   --    < >   --    --    < >   --    < >   --    HDL  28*  32*  40   < >   --    < >   --    --    < >   --    < >   --    NHDL  65  49  62   < >   --    < >   --    --    < >   --    --    --    CHOL  93  81  102   < >   --    < >   --    --    < >   --    < >   --    TRIG  129  141  91   < >   --    < >   --    --    < >   --    < >   --    TSH   --    --    --    --   2.74   --    --    --    --   2.38   --   3.53   NTBNP   --    --    --    --   696*   --   836*  557*   --   885*   --    --     < > = values in this interval not displayed.     Lab Results   Component Value Date    CHOL 93 10/24/2018    HDL 28 (L) 10/24/2018    LDL 39 10/24/2018    TRIG 129 10/24/2018    CHOLHDLRATIO 3.3 10/08/2015       Lab Results   Component Value Date    WBC 14.2 (H) 09/12/2017    RBC 5.04 09/12/2017    HGB 16.2 09/12/2017    HCT 47.8 09/12/2017    MCV 95 09/12/2017    MCH 32.1 09/12/2017    MCHC 33.9 09/12/2017    RDW 14.9 09/12/2017     09/12/2017       Lab Results   Component  Value Date     10/24/2018    POTASSIUM 3.7 10/24/2018    CHLORIDE 107 10/24/2018    CO2 27 10/24/2018    ANIONGAP 9.7 10/24/2018     (H) 10/24/2018    BUN 27 10/24/2018    CR 1.90 (H) 10/24/2018    GFRESTIMATED 34 (L) 10/24/2018    GFRESTBLACK 41 (L) 10/24/2018    JANAY 8.6 10/24/2018      Lab Results   Component Value Date    AST 38 09/12/2017    ALT 12 10/24/2018       Lab Results   Component Value Date    A1C 6.3 (H) 05/18/2018       Lab Results   Component Value Date    INR 0.94 04/25/2014    INR 1.0 04/24/2014          Problem List:     Patient Active Problem List   Diagnosis     Health Care Home     Allergic rhinitis     Peripheral edema     Polymyalgia rheumatica (H)     Advanced directives, counseling/discussion     Ischemic cardiomyopathy     Paroxysmal atrial fibrillation (H)     Coronary artery disease involving native coronary artery of native heart without angina pectoris     CKD (chronic kidney disease) stage 3, GFR 30-59 ml/min (H)     GERD (gastroesophageal reflux disease)     Tachy-alix syndrome (H)     AAA (abdominal aortic aneurysm) (H)     Old myocardial infarction     Chronic combined systolic and diastolic congestive heart failure (H)     Essential hypertension, benign     Mixed hyperlipidemia     Aftercare following knee joint replacement surgery, unspecified laterality     Physical deconditioning     Post herpetic neuralgia     Presbycusis of both ears     Chronic non-seasonal allergic rhinitis, unspecified trigger     Skin lesion     History of prostate cancer 2003 w/po resection prostate     PAD (peripheral artery disease) (H)     Microalbuminuria     Mild persistent asthma without complication     Pre-diabetes            Medications:     Current Outpatient Prescriptions   Medication Sig Dispense Refill     albuterol (PROAIR HFA/PROVENTIL HFA/VENTOLIN HFA) 108 (90 BASE) MCG/ACT Inhaler Inhale 2 puffs into the lungs as needed for shortness of breath / dyspnea or wheezing 3  Inhaler 11     aspirin 81 MG tablet Take 81 mg by mouth daily  30 tablet      azelastine (ASTELIN) 137 MCG/SPRAY nasal spray [place] 1 spray into both nostrils 2 times daily.       beclomethasone (QVAR) 80 MCG/ACT Inhaler Inhale 2 puffs into the lungs 2 times daily 2 Inhaler 11     carvedilol (COREG) 3.125 MG tablet TAKE 1 TABLET(3.125 MG) BY MOUTH TWICE DAILY WITH MEALS 180 tablet 1     cetirizine (ZYRTEC) 10 MG tablet Take 10 mg by mouth every morning        Emollient (CERAVE) LOTN Externally apply topically 2 times daily as needed       guaiFENesin (MUCINEX) 600 MG 12 hr tablet Take 600 mg by mouth 3 times daily        isosorbide mononitrate (IMDUR) 30 MG 24 hr tablet TAKE 1 TABLET BY MOUTH EVERY DAY 90 tablet 1     lisinopril (PRINIVIL/ZESTRIL) 5 MG tablet Take 1 tablet (5 mg) by mouth daily 90 tablet 3     MELATONIN PO Take 5 mg by mouth At Bedtime       mometasone (NASONEX) 50 MCG/ACT nasal spray Spray 2 sprays into both nostrils daily as needed.       nitroglycerin (NITROSTAT) 0.4 MG SL tablet Place 1 tablet (0.4 mg) under the tongue every 5 minutes as needed for chest pain 25 tablet 3     polyethylene glycol (MIRALAX/GLYCOLAX) packet Take 17 g by mouth daily        potassium chloride SA (K-DUR/KLOR-CON M) 20 MEQ CR tablet Take 2 pills today, one pill tomorrow then one pill daily ONLY when you take the Demadex 30 tablet 5     rosuvastatin (CRESTOR) 40 MG tablet Take 1 tablet (40 mg) by mouth daily 90 tablet 3     torsemide (DEMADEX) 20 MG tablet Take 1 tab (20 mg) every other day 30 tablet 0     triamcinolone (KENALOG) 0.1 % cream Apply sparingly to affected area twotimes daily as needed 30 g 5          Past Medical History:     Past Medical History:   Diagnosis Date     AAA (abdominal aortic aneurysm) (H)      Anemia due to blood loss, acute 10/10/2016     Asthma      Atrial fibrillation (H)      Cardiac arrest (H)      Cardiomyopathy (H)      Chronic kidney disease      Chronic sinusitis      Coronary artery  disease     cardiac cath 2014: medical management; cath 2013: PTCA to diagonal; cath 2009: medical management; CABG 1998: LIMA to LAD, LISA to RCA, SVG to circumflex     GERD (gastroesophageal reflux disease)      History of angina      Hyperlipidaemia      Hypertension, goal below 140/90      Myocardial infarction (H)     MI with Vfib arrest 1998     Polymyalgia rheumatica (H)      Shingles 10/28/2016     Shortness of breath      Tachy-alix syndrome (H)     s/p dual chamber pacemaker 2012     Past Surgical History:   Procedure Laterality Date     ARTHROPLASTY KNEE Left 10/5/2016    Procedure: ARTHROPLASTY KNEE;  Surgeon: Keshav Zamudio MD;  Location: [facility] OR     CARDIAC SURGERY  12/03/2012    pacemaker ; CABG 1998     CHOLECYSTECTOMY       COLONOSCOPY       ENT SURGERY  5-    sinus     EXTRACAPSULAR CATARACT EXTRATION WITH INTRAOCULAR LENS IMPLANT       GENITOURINARY SURGERY      prostatectomy     IMPLANT PACEMAKER  12-3-12    [facility]     PROSTATE SURGERY       Family History   Problem Relation Age of Onset     Cerebrovascular Disease Father      HEART DISEASE Father      HEART DISEASE Brother      Coronary Artery Disease Brother      MI age 50     Depression Daughter      raped in high school     Unknown/Adopted Maternal Grandmother      Unknown/Adopted Maternal Grandfather      Unknown/Adopted Paternal Grandmother      Unknown/Adopted Paternal Grandfather      Social History     Social History     Marital status:      Spouse name: N/A     Number of children: N/A     Years of education: N/A     Occupational History     Not on file.     Social History Main Topics     Smoking status: Never Smoker     Smokeless tobacco: Never Used     Alcohol use No     Drug use: No     Sexual activity: No     Other Topics Concern     Parent/Sibling W/ Cabg, Mi Or Angioplasty Before 65f 55m? Yes     brother and father had MI @ 40's     Caffeine Concern Yes     one diet [place] dew daily     Sleep Concern No      Stress Concern No     Special Diet No     Exercise Yes     golfing, does the stair at the office a lot     Seat Belt Yes     Social History Narrative            Allergies:   Advair diskus; Morphine hcl; and Vicodin [hydrocodone-acetaminophen]      Cherie Queen PA-C  [facility]  Pager: [phone]      Thank you for allowing me to participate in the care of your patient.    Sincerely,     Cherie Queen PA-C     [facility]

## 2018-10-25 PROBLEM — Z86.73 HISTORY OF CVA (CEREBROVASCULAR ACCIDENT): Status: ACTIVE | Noted: 2018-10-25

## 2018-11-19 ENCOUNTER — OFFICE VISIT (OUTPATIENT)
Dept: CARDIOLOGY | Facility: CLINIC | Age: 83
End: 2018-11-19
Attending: PHYSICIAN ASSISTANT
Payer: COMMERCIAL

## 2018-11-19 VITALS
HEART RATE: 71 BPM | WEIGHT: 195 LBS | SYSTOLIC BLOOD PRESSURE: 90 MMHG | DIASTOLIC BLOOD PRESSURE: 52 MMHG | BODY MASS INDEX: 27.92 KG/M2 | OXYGEN SATURATION: 97 % | HEIGHT: 70 IN

## 2018-11-19 DIAGNOSIS — I25.5 ISCHEMIC CARDIOMYOPATHY: Chronic | ICD-10-CM

## 2018-11-19 DIAGNOSIS — I25.5 ISCHEMIC CARDIOMYOPATHY: Primary | Chronic | ICD-10-CM

## 2018-11-19 DIAGNOSIS — I10 ESSENTIAL HYPERTENSION, BENIGN: Primary | ICD-10-CM

## 2018-11-19 LAB
ANION GAP SERPL CALCULATED.3IONS-SCNC: 7 MMOL/L (ref 3–14)
BUN SERPL-MCNC: 34 MG/DL (ref 7–30)
CALCIUM SERPL-MCNC: 9.3 MG/DL (ref 8.5–10.1)
CHLORIDE SERPL-SCNC: 109 MMOL/L (ref 94–109)
CO2 SERPL-SCNC: 26 MMOL/L (ref 20–32)
CREAT SERPL-MCNC: 2.19 MG/DL (ref 0.66–1.25)
GFR SERPL CREATININE-BSD FRML MDRD: 29 ML/MIN/1.7M2
GLUCOSE SERPL-MCNC: 98 MG/DL (ref 70–99)
POTASSIUM SERPL-SCNC: 4.6 MMOL/L (ref 3.4–5.3)
SODIUM SERPL-SCNC: 142 MMOL/L (ref 133–144)

## 2018-11-19 PROCEDURE — 99214 OFFICE O/P EST MOD 30 MIN: CPT | Performed by: PHYSICIAN ASSISTANT

## 2018-11-19 PROCEDURE — 36415 COLL VENOUS BLD VENIPUNCTURE: CPT | Performed by: PHYSICIAN ASSISTANT

## 2018-11-19 PROCEDURE — 80048 BASIC METABOLIC PNL TOTAL CA: CPT | Performed by: PHYSICIAN ASSISTANT

## 2018-11-19 NOTE — LETTER
11/19/2018    Eliezer Shah MD  [address]  [facility] [address]    RE: Abbe Lambert       Dear Colleague,    I had the pleasure of seeing Abbe Lambert in the [facility].    Cardiology Clinic Progress Note    Abbe Lambert MRN# [id number]   YOB: 1934 Age: 84 year old     Reason for visit: Reassessment of cardiomyopathy           Assessment and Plan:     In summary, the patient presents today for assessment after recent up-titration of Coreg. His SBP has dropped from the 120's to 90's following increase from 3.125 BID to 6.25 BID three weeks ago, which seems out of proportion to his med change. He's feeling very well and has no complaints reflective of CHF, angina or near-syncope. He feels his intake is adequate and denies any signs/symptoms of bleeds.     1. 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 Asymptomatic - FC I-II. Appears euvolemic with very mild LE edema on minimal diuretic. On Coreg 6.25 BID and lisinopril 5 daily.     2. CAD - Asymptomatic. LDL < 70. On aspirin, high-intensity statin.     3. PAF, SSS S/p PPM - Last PPM check 9/2018 stable. He's currently 98% paced in the atrium and ventricle, with 4-5 years left on his battery. PPM check 6/2018 shows very brief runs of possible AF/AT (less than 2 min duration). Not anticoagulated due to overall very low burden and s/p SHARI ligation.     4. AAA - Small and stable per last ultrasound in 2015.     5. HTN - SBP historically in the 120's, now down to the 90's after up-titration of Coreg. Patient is asymptomatic with this.     6. CKD - Recent creat 1.9 (fasting). Appears he's ranged from 1.5 - 1.75 over the past one year.     7. Hx of CVA - noted on prior CT.      Plan:  - Will repeat BMP now and as long as his creatinine remains stable will plan to stop his Imdur 30 mg daily and leave other meds as is. Will have my RN's call him with the plan of care once his results return.   - Continue  routine PPM checks (next 12/2018).   - Follow up in 6 months with Dr. Eli. Repeat AAA assessment with ultrasound prior.   - Asked to call with any symptoms of fatigue, near-syncope, or angina in the meantime.          History of Presenting Illness:      Abbe Lambert is a pleasant 84 year old patient of  Dr. Eli who presents today for an annual assessment. He has a PMH including a mixed cardiomyopathy, CAD, PAF/SSS s/p PPM, AAA, HTN, and CKD-III.     He had an MI and cardiac arrest at [facility] in 1998.  This led to coronary artery bypass grafting x3 with a free LISA pedicle graft to his right coronary artery, a LIMA graft to his left anterior descending artery and a saphenous vein graft tied to a small diffusely diseased ramus intermedius branch.  Angiography in 2009 demonstrated the vein graft to be closed, but the arterial conduits were wide open.  His diagonal filled by collaterals. In 08/2013, he returned to the Cath Lab for anginal symptoms and angioplasty only was performed of an 85% stenosis in a small first diagonal that was felt to be too small for stenting.  His symptoms did improve.   In 2014, he again returned to the Cath Lab, with what appeared to be fairly classical anginal symptoms.  Catheterization again demonstrated the LIMA and LISA to be widely patent.  There was some restenosis in the first diagonal with an FFR of 0.9.  He had a 50% mid RCA stenosis with an FFR of 0.92.  His ejection fraction was 25%.  It appeared that he had developed a nonischemic cardiomyopathy.  Over time, his ejection fraction has improved to the 45%-50%, although when we checked last year it was estimated to be 41%.     He was last seen in clinic three weeks ago for a routine annual f/u visit. He was feeling well. An ECHO revealed his EF to be relatively stable at 35-40%. Labs revealed a creat which was mildly elevated above his baseline at 1.9, although he was fasting for this. Lipids were  "satisfactory. He was being paced at 98% in the atrium and ventricle on his prior device interrogation. Coreg was increased from 3.125 to 6.25 BID.     Today, his BP is low in the 90's/50's. Despite this, he continues to feel well and denies symptoms associated with hypotension. He reports chronic VAZQUEZ when he \"walks really fast\" and also notes some ongoing instability that has limited his ability to golf and hunt - these symptoms are stable. He had one minor fall recently which resulted in an abrasion of his LLE, this had healed nicely although with some minimal ongoing redness and swelling. He has mild chronic LE edema which isn't bothersome to him. He otherwise denies chest pain, shortness of breath, PND, orthopnea, palpitations, near syncope or syncope. He sleeps well at night. He watches the sodium in his diet.  He intentionally makes slow positional changes. He's active in his [employer] and with the [employer] study there.           Review of Systems:     12-pt ROS is negative except for as noted in the HPI.           Physical Exam:     Vitals: BP 90/52 (BP Location: Right arm, Patient Position: Chair, Cuff Size: Adult Regular)  Pulse 71  Ht 1.778 m (5' 10\")  Wt 88.5 kg (195 lb)  SpO2 97%  BMI 27.98 kg/m2  Wt Readings from Last 3 Encounters:   11/19/18 88.5 kg (195 lb)   10/24/18 88 kg (194 lb)   10/10/18 86.6 kg (191 lb)       Constitutional:  Patient is pleasant, alert, cooperative, and in NAD.  HEENT:  NCAT. PERRLA. EOM's intact. No masses or thyromegaly. Teeth in normal repair.   Neck:  CVP appears normal. No carotid bruits.   Chest:  Non-tender, normal A/P diameter.   Pulmonary: Normal respiratory effort. CTAB.   Cardiac: RRR, normal S1/S2, no S3/S4, no murmur or rub.   Abdomen:  Non-tender abdomen with normoactive bowel sounds, no hepatosplenomegaly appreciated.   Vascular: Pulses in the upper and lower extremities are 2+ and equal bilaterally.  Extremities: trace RLE edema, trace-1+ LLE edema ankle - " pretibial region bilaterally  Skin:  Small skin abrasion on the L pretibial region with some erythema surrounding it  Neurological:  No gross motor or sensory deficits.   Psych: Appropriate affect.        Data:     Cardiac Diagnostics reviewed:  Type Date Result   TTE 10/24/18 There is mild to moderate concentric left ventricular hypertrophy.  The visual ejection fraction is estimated at 35-40%.  There is severe apical wall hypokinesis.  There is moderate to severe inferior and inferolateral wall hypokinesis.  Grade I or early diastolic dysfunction.  Pacer wire in right atrium  There is trace to mild aortic regurgitation.  The study was technically difficult. Contrast was used without apparent  complications. Compared to prior study, there is no significant change.    9/20/16 Left ventricular systolic function is moderately reduced.The left visual  ejection fraction is estimated at 40%.Apical akinesia, moderate inferior,  lateral, inferolateral and inferior hypokinesia.  Mildly decreased right ventricular systolic function  There is mild (1+) aortic regurgitation.  Mild aortic root dilatation.The ascending aorta is mildly dilated.     On direct comparision to echo images dated 08/01/2014 no significant changes.   Cath 4/25/14 1.  Nonischemic cardiomyopathy, ejection fraction of 25%, which is   new from an echocardiogram last summer.  Previous ejection fraction   was estimated to be 55-60%.  His left ventricular end-diastolic   pressure is 26.   2.  50% distal RCA stenosis well revascularized by a widely patent   and free pedicle LISA graft to his posterior descending artery.   3.  Occluded mid LAD well revascularized by a widely patent LIMA   graft.   4.  Previously intervened on small diagonal has a restenosis of   30-50% with an FFR of 0.9.   5.  50% mid RCA stenosis with an FFR of 0.92.    Stress Katherine, 12/10/13 A moderate-sized partially reversible inferior and inferolateral  defect consistent with transmural and  nontransmural infarction with  mild residual ischemia within the distal inferolateral wall.  Gating demonstrated normal left ventricular chamber size with  inferior and inferolateral hypokinesis.  The ejection fraction was 53%.  Compared to the previous study of 11/2/2012, there is  probably no significant change. There is some mild ischemia identified  in the current study involving the distal inferolateral wall which was  not previously reported but this is not likely clinically significant  change.).   Device PPM check, 9/21/18 [employer] DR 2110 (D) Remote PPM Device Check  AP: 98%                      : 98%  Mode: DDDR        Presenting Rhythm: AP/  Heart Rate: adequate heart rates per histogram  Sensing: stable    Pacing Threshold: stable    Impedance: stable  Battery Status: 4.2 - 4.9 years remaining  Atrial Arrhythmia: none   Ventricular Arrhythmia: none    Abdominal U/S 10/22/15 Stable infrarenal AAA measuring 3.8 cm x 3.7 cm, similar in size to  previous 3.7 x 3.7 cm aneurysm 11/20/2014. Difference in size is  within measurement error. No iliac artery aneurysms or obstruction  visualized. Heterogenous, atherosclerotic plaque again visualized in  the aortoiliac system.   Head CT 11/2016 Mild to moderate cerebral atrophy is present. There is  minimal patchy white matter changes in both hemispheres without mass effect. There is a small old right cerebellar infarct. There is no  evidence for intracranial hemorrhage, mass effect, acute infarct, or  skull fracture.     Recent Labs   Lab Test  10/24/18   0939  05/18/18   1409  12/22/17   0805   12/05/16   1505   09/08/16   1359  07/07/16   1546   03/07/16   1507   09/28/15   1457   LDL  39  21  44   < >   --    < >   --    --    < >   --    < >   --    HDL  28*  32*  40   < >   --    < >   --    --    < >   --    < >   --    NHDL  65  49  62   < >   --    < >   --    --    < >   --    --    --    CHOL  93  81  102   < >   --    < >   --    --    < >   --     < >   --    TRIG  129  141  91   < >   --    < >   --    --    < >   --    < >   --    TSH   --    --    --    --   2.74   --    --    --    --   2.38   --   3.53   NTBNP   --    --    --    --   696*   --   836*  557*   --   885*   --    --     < > = values in this interval not displayed.     Lab Results   Component Value Date    CHOL 93 10/24/2018    HDL 28 (L) 10/24/2018    LDL 39 10/24/2018    TRIG 129 10/24/2018    CHOLHDLRATIO 3.3 10/08/2015       Lab Results   Component Value Date    WBC 14.2 (H) 09/12/2017    RBC 5.04 09/12/2017    HGB 16.2 09/12/2017    HCT 47.8 09/12/2017    MCV 95 09/12/2017    MCH 32.1 09/12/2017    MCHC 33.9 09/12/2017    RDW 14.9 09/12/2017     09/12/2017       Lab Results   Component Value Date     10/24/2018    POTASSIUM 3.7 10/24/2018    CHLORIDE 107 10/24/2018    CO2 27 10/24/2018    ANIONGAP 9.7 10/24/2018     (H) 10/24/2018    BUN 27 10/24/2018    CR 1.90 (H) 10/24/2018    GFRESTIMATED 34 (L) 10/24/2018    GFRESTBLACK 41 (L) 10/24/2018    JANAY 8.6 10/24/2018      Lab Results   Component Value Date    AST 38 09/12/2017    ALT 12 10/24/2018       Lab Results   Component Value Date    A1C 6.3 (H) 05/18/2018       Lab Results   Component Value Date    INR 0.94 04/25/2014    INR 1.0 04/24/2014          Problem List:     Patient Active Problem List   Diagnosis     Health Care Home     Allergic rhinitis     Peripheral edema     Polymyalgia rheumatica (H)     Advanced directives, counseling/discussion     Ischemic cardiomyopathy     Paroxysmal atrial fibrillation (H)     Coronary artery disease involving native coronary artery of native heart without angina pectoris     CKD (chronic kidney disease) stage 3, GFR 30-59 ml/min (H)     GERD (gastroesophageal reflux disease)     Tachy-alix syndrome (H)     AAA (abdominal aortic aneurysm) (H)     Old myocardial infarction     Chronic combined systolic and diastolic congestive heart failure (H)     Essential hypertension,  benign     Mixed hyperlipidemia     Aftercare following knee joint replacement surgery, unspecified laterality     Physical deconditioning     Post herpetic neuralgia     Presbycusis of both ears     Chronic non-seasonal allergic rhinitis, unspecified trigger     Skin lesion     History of prostate cancer 2003 w/po resection prostate     PAD (peripheral artery disease) (H)     Microalbuminuria     Mild persistent asthma without complication     Pre-diabetes     History of CVA (cerebrovascular accident)            Medications:     Current Outpatient Prescriptions   Medication Sig Dispense Refill     albuterol (PROAIR HFA/PROVENTIL HFA/VENTOLIN HFA) 108 (90 BASE) MCG/ACT Inhaler Inhale 2 puffs into the lungs as needed for shortness of breath / dyspnea or wheezing 3 Inhaler 11     aspirin 81 MG tablet Take 81 mg by mouth daily  30 tablet      azelastine (ASTELIN) 137 MCG/SPRAY nasal spray Clark 1 spray into both nostrils 2 times daily.       beclomethasone (QVAR) 80 MCG/ACT Inhaler Inhale 2 puffs into the lungs 2 times daily 2 Inhaler 11     carvedilol (COREG) 6.25 MG tablet Take 1 tablet (6.25 mg) by mouth 2 times daily (with meals) 180 tablet 1     cetirizine (ZYRTEC) 10 MG tablet Take 10 mg by mouth every morning        Emollient (CERAVE) LOTN Externally apply topically 2 times daily as needed       guaiFENesin (MUCINEX) 600 MG 12 hr tablet Take 600 mg by mouth 2-3 x day       isosorbide mononitrate (IMDUR) 30 MG 24 hr tablet TAKE 1 TABLET BY MOUTH EVERY DAY 90 tablet 1     lisinopril (PRINIVIL/ZESTRIL) 5 MG tablet Take 1 tablet (5 mg) by mouth daily 90 tablet 3     MELATONIN PO Take 5 mg by mouth At Bedtime       mometasone (NASONEX) 50 MCG/ACT nasal spray Spray 2 sprays into both nostrils daily as needed.       nitroglycerin (NITROSTAT) 0.4 MG SL tablet Place 1 tablet (0.4 mg) under the tongue every 5 minutes as needed for chest pain 25 tablet 3     polyethylene glycol (MIRALAX/GLYCOLAX) packet Take 17 g by mouth  daily        potassium chloride SA (K-DUR/KLOR-CON M) 20 MEQ CR tablet Take 2 pills today, one pill tomorrow then one pill daily ONLY when you take the Demadex 30 tablet 5     rosuvastatin (CRESTOR) 40 MG tablet Take 1 tablet (40 mg) by mouth daily 90 tablet 3     torsemide (DEMADEX) 20 MG tablet Take 1 tab (20 mg) every other day 30 tablet 0     triamcinolone (KENALOG) 0.1 % cream Apply sparingly to affected area twotimes daily as needed 30 g 5          Past Medical History:     Past Medical History:   Diagnosis Date     AAA (abdominal aortic aneurysm) (H)      Anemia due to blood loss, acute 10/10/2016     Asthma      Atrial fibrillation (H)      Cardiac arrest (H)      Cardiomyopathy (H)      Chronic kidney disease      Chronic sinusitis      Coronary artery disease     cardiac cath 2014: medical management; cath 2013: PTCA to diagonal; cath 2009: medical management; CABG 1998: LIMA to LAD, LISA to RCA, SVG to circumflex     GERD (gastroesophageal reflux disease)      History of angina      Hyperlipidaemia      Hypertension, goal below 140/90      Myocardial infarction (H)     MI with Vfib arrest 1998     Polymyalgia rheumatica (H)      Shingles 10/28/2016     Shortness of breath      Tachy-alix syndrome (H)     s/p dual chamber pacemaker 2012     Past Surgical History:   Procedure Laterality Date     ARTHROPLASTY KNEE Left 10/5/2016    Procedure: ARTHROPLASTY KNEE;  Surgeon: Keshav Zamudio MD;  Location:  OR     CARDIAC SURGERY  12/03/2012    pacemaker ; CABG 1998     CHOLECYSTECTOMY       COLONOSCOPY       ENT SURGERY  5-    sinus     EXTRACAPSULAR CATARACT EXTRATION WITH INTRAOCULAR LENS IMPLANT       GENITOURINARY SURGERY      prostatectomy     IMPLANT PACEMAKER  12-3-12    [facility]     PROSTATE SURGERY       Family History   Problem Relation Age of Onset     Cerebrovascular Disease Father      HEART DISEASE Father      HEART DISEASE Brother      Coronary Artery Disease Brother      MI age 50      Depression Daughter      raped in high school     Unknown/Adopted Maternal Grandmother      Unknown/Adopted Maternal Grandfather      Unknown/Adopted Paternal Grandmother      Unknown/Adopted Paternal Grandfather      Social History     Social History     Marital status:      Spouse name: N/A     Number of children: N/A     Years of education: N/A     Occupational History     Not on file.     Social History Main Topics     Smoking status: Never Smoker     Smokeless tobacco: Never Used     Alcohol use No     Drug use: No     Sexual activity: No     Other Topics Concern     Parent/Sibling W/ Cabg, Mi Or Angioplasty Before 65f 55m? Yes     brother and father had MI @ 40's     Caffeine Concern Yes     one diet mountain dew daily     Sleep Concern No     Stress Concern No     Special Diet No     Exercise Yes     golfing, does the stair at the office a lot     Seat Belt Yes     Social History Narrative            Allergies:   Advair diskus; Morphine hcl; and Vicodin [hydrocodone-acetaminophen]      Cherie Queen PA-C  [facility]  Pager: [phone]      Thank you for allowing me to participate in the care of your patient.    Sincerely,     Cherie Queen PA-C     [facility]

## 2018-11-19 NOTE — LETTER
11/19/2018    Eliezer Shah MD  [address]  [facility] [address]    RE: Abbe Lambert       Dear Colleague,    I had the pleasure of seeing Abbe Lambert in the [facility].    Cardiology Clinic Progress Note    Abbe Lambert MRN# [id number]   YOB: 1934 Age: 84 year old     Reason for visit: Reassessment of cardiomyopathy           Assessment and Plan:     In summary, the patient presents today for assessment after recent up-titration of Coreg. His SBP has dropped from the 120's to 90's following increase from 3.125 BID to 6.25 BID three weeks ago, which seems out of proportion to his med change. He's feeling very well and has no complaints reflective of CHF, angina or near-syncope. He feels his intake is adequate and denies any signs/symptoms of bleeds.     1. 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 Asymptomatic - FC I-II. Appears euvolemic with very mild LE edema on minimal diuretic. On Coreg 6.25 BID and lisinopril 5 daily.     2. CAD - Asymptomatic. LDL < 70. On aspirin, high-intensity statin.     3. PAF, SSS S/p PPM - Last PPM check 9/2018 stable. He's currently 98% paced in the atrium and ventricle, with 4-5 years left on his battery. PPM check 6/2018 shows very brief runs of possible AF/AT (less than 2 min duration). Not anticoagulated due to overall very low burden and s/p SHARI ligation.     4. AAA - Small and stable per last ultrasound in 2015.     5. HTN - SBP historically in the 120's, now down to the 90's after up-titration of Coreg. Patient is asymptomatic with this.     6. CKD - Recent creat 1.9 (fasting). Appears he's ranged from 1.5 - 1.75 over the past one year.     7. Hx of CVA - noted on prior CT.      Plan:  - Will repeat BMP now and as long as his creatinine remains stable will plan to stop his Imdur 30 mg daily and leave other meds as is. Will have my RN's call him with the plan of care once his results return.   - Continue  routine PPM checks (next 12/2018).   - Follow up in 6 months with Dr. Eli. Repeat AAA assessment with ultrasound prior.   - Asked to call with any symptoms of fatigue, near-syncope, or angina in the meantime.          History of Presenting Illness:      Abbe Lambert is a pleasant 84 year old patient of  Dr. Eli who presents today for an annual assessment. He has a PMH including a mixed cardiomyopathy, CAD, PAF/SSS s/p PPM, AAA, HTN, and CKD-III.     He had an MI and cardiac arrest at [facility] in 1998.  This led to coronary artery bypass grafting x3 with a free LISA pedicle graft to his right coronary artery, a LIMA graft to his left anterior descending artery and a saphenous vein graft tied to a small diffusely diseased ramus intermedius branch.  Angiography in 2009 demonstrated the vein graft to be closed, but the arterial conduits were wide open.  His diagonal filled by collaterals. In 08/2013, he returned to the Cath Lab for anginal symptoms and angioplasty only was performed of an 85% stenosis in a small first diagonal that was felt to be too small for stenting.  His symptoms did improve.   In 2014, he again returned to the Cath Lab, with what appeared to be fairly classical anginal symptoms.  Catheterization again demonstrated the LIMA and LISA to be widely patent.  There was some restenosis in the first diagonal with an FFR of 0.9.  He had a 50% mid RCA stenosis with an FFR of 0.92.  His ejection fraction was 25%.  It appeared that he had developed a nonischemic cardiomyopathy.  Over time, his ejection fraction has improved to the 45%-50%, although when we checked last year it was estimated to be 41%.     He was last seen in clinic three weeks ago for a routine annual f/u visit. He was feeling well. An ECHO revealed his EF to be relatively stable at 35-40%. Labs revealed a creat which was mildly elevated above his baseline at 1.9, although he was fasting for this. Lipids were  "satisfactory. He was being paced at 98% in the atrium and ventricle on his prior device interrogation. Coreg was increased from 3.125 to 6.25 BID.     Today, his BP is low in the 90's/50's. Despite this, he continues to feel well and denies symptoms associated with hypotension. He reports chronic VAZQUEZ when he \"walks really fast\" and also notes some ongoing instability that has limited his ability to golf and hunt - these symptoms are stable. He had one minor fall recently which resulted in an abrasion of his LLE, this had healed nicely although with some minimal ongoing redness and swelling. He has mild chronic LE edema which isn't bothersome to him. He otherwise denies chest pain, shortness of breath, PND, orthopnea, palpitations, near syncope or syncope. He sleeps well at night. He watches the sodium in his diet.  He intentionally makes slow positional changes. 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           Review of Systems:     12-pt ROS is negative except for as noted in the HPI.           Physical Exam:     Vitals: BP 90/52 (BP Location: Right arm, Patient Position: Chair, Cuff Size: Adult Regular)  Pulse 71  Ht 1.778 m (5' 10\")  Wt 88.5 kg (195 lb)  SpO2 97%  BMI 27.98 kg/m2  Wt Readings from Last 3 Encounters:   11/19/18 88.5 kg (195 lb)   10/24/18 88 kg (194 lb)   10/10/18 86.6 kg (191 lb)       Constitutional:  Patient is pleasant, alert, cooperative, and in NAD.  HEENT:  NCAT. PERRLA. EOM's intact. No masses or thyromegaly. Teeth in normal repair.   Neck:  CVP appears normal. No carotid bruits.   Chest:  Non-tender, normal A/P diameter.   Pulmonary: Normal respiratory effort. CTAB.   Cardiac: RRR, normal S1/S2, no S3/S4, no murmur or rub.   Abdomen:  Non-tender abdomen with normoactive bowel sounds, no hepatosplenomegaly appreciated.   Vascular: Pulses in the upper and lower extremities are 2+ and equal bilaterally.  Extremities: trace RLE edema, trace-1+ LLE edema ankle - " pretibial region bilaterally  Skin:  Small skin abrasion on the L pretibial region with some erythema surrounding it  Neurological:  No gross motor or sensory deficits.   Psych: Appropriate affect.        Data:     Cardiac Diagnostics reviewed:  Type Date Result   TTE 10/24/18 There is mild to moderate concentric left ventricular hypertrophy.  The visual ejection fraction is estimated at 35-40%.  There is severe apical wall hypokinesis.  There is moderate to severe inferior and inferolateral wall hypokinesis.  Grade I or early diastolic dysfunction.  Pacer wire in right atrium  There is trace to mild aortic regurgitation.  The study was technically difficult. Contrast was used without apparent  complications. Compared to prior study, there is no significant change.    9/20/16 Left ventricular systolic function is moderately reduced.The left visual  ejection fraction is estimated at 40%.Apical akinesia, moderate inferior,  lateral, inferolateral and inferior hypokinesia.  Mildly decreased right ventricular systolic function  There is mild (1+) aortic regurgitation.  Mild aortic root dilatation.The ascending aorta is mildly dilated.     On direct comparision to echo images dated 08/01/2014 no significant changes.   Cath 4/25/14 1.  Nonischemic cardiomyopathy, ejection fraction of 25%, which is   new from an echocardiogram last summer.  Previous ejection fraction   was estimated to be 55-60%.  His left ventricular end-diastolic   pressure is 26.   2.  50% distal RCA stenosis well revascularized by a widely patent   and free pedicle LISA graft to his posterior descending artery.   3.  Occluded mid LAD well revascularized by a widely patent LIMA   graft.   4.  Previously intervened on small diagonal has a restenosis of   30-50% with an FFR of 0.9.   5.  50% mid RCA stenosis with an FFR of 0.92.    Stress Katherine, 12/10/13 A moderate-sized partially reversible inferior and inferolateral  defect consistent with transmural and  nontransmural infarction with  mild residual ischemia within the distal inferolateral wall.  Gating demonstrated normal left ventricular chamber size with  inferior and inferolateral hypokinesis.  The ejection fraction was 53%.  Compared to the previous study of 11/2/2012, there is  probably no significant change. There is some mild ischemia identified  in the current study involving the distal inferolateral wall which was  not previously reported but this is not likely clinically significant  change.).   Device PPM check, 9/21/18 [employer] DR 2110 (D) Remote PPM Device Check  AP: 98%                      : 98%  Mode: DDDR        Presenting Rhythm: AP/  Heart Rate: adequate heart rates per histogram  Sensing: stable    Pacing Threshold: stable    Impedance: stable  Battery Status: 4.2 - 4.9 years remaining  Atrial Arrhythmia: none   Ventricular Arrhythmia: none    Abdominal U/S 10/22/15 Stable infrarenal AAA measuring 3.8 cm x 3.7 cm, similar in size to  previous 3.7 x 3.7 cm aneurysm 11/20/2014. Difference in size is  within measurement error. No iliac artery aneurysms or obstruction  visualized. Heterogenous, atherosclerotic plaque again visualized in  the aortoiliac system.   Head CT 11/2016 Mild to moderate cerebral atrophy is present. There is  minimal patchy white matter changes in both hemispheres without mass effect. There is a small old right cerebellar infarct. There is no  evidence for intracranial hemorrhage, mass effect, acute infarct, or  skull fracture.     Recent Labs   Lab Test  10/24/18   0939  05/18/18   1409  12/22/17   0805   12/05/16   1505   09/08/16   1359  07/07/16   1546   03/07/16   1507   09/28/15   1457   LDL  39  21  44   < >   --    < >   --    --    < >   --    < >   --    HDL  28*  32*  40   < >   --    < >   --    --    < >   --    < >   --    NHDL  65  49  62   < >   --    < >   --    --    < >   --    --    --    CHOL  93  81  102   < >   --    < >   --    --    < >   --     < >   --    TRIG  129  141  91   < >   --    < >   --    --    < >   --    < >   --    TSH   --    --    --    --   2.74   --    --    --    --   2.38   --   3.53   NTBNP   --    --    --    --   696*   --   836*  557*   --   885*   --    --     < > = values in this interval not displayed.     Lab Results   Component Value Date    CHOL 93 10/24/2018    HDL 28 (L) 10/24/2018    LDL 39 10/24/2018    TRIG 129 10/24/2018    CHOLHDLRATIO 3.3 10/08/2015       Lab Results   Component Value Date    WBC 14.2 (H) 09/12/2017    RBC 5.04 09/12/2017    HGB 16.2 09/12/2017    HCT 47.8 09/12/2017    MCV 95 09/12/2017    MCH 32.1 09/12/2017    MCHC 33.9 09/12/2017    RDW 14.9 09/12/2017     09/12/2017       Lab Results   Component Value Date     10/24/2018    POTASSIUM 3.7 10/24/2018    CHLORIDE 107 10/24/2018    CO2 27 10/24/2018    ANIONGAP 9.7 10/24/2018     (H) 10/24/2018    BUN 27 10/24/2018    CR 1.90 (H) 10/24/2018    GFRESTIMATED 34 (L) 10/24/2018    GFRESTBLACK 41 (L) 10/24/2018    JANAY 8.6 10/24/2018      Lab Results   Component Value Date    AST 38 09/12/2017    ALT 12 10/24/2018       Lab Results   Component Value Date    A1C 6.3 (H) 05/18/2018       Lab Results   Component Value Date    INR 0.94 04/25/2014    INR 1.0 04/24/2014          Problem List:     Patient Active Problem List   Diagnosis     Health Care Home     Allergic rhinitis     Peripheral edema     Polymyalgia rheumatica (H)     Advanced directives, counseling/discussion     Ischemic cardiomyopathy     Paroxysmal atrial fibrillation (H)     Coronary artery disease involving native coronary artery of native heart without angina pectoris     CKD (chronic kidney disease) stage 3, GFR 30-59 ml/min (H)     GERD (gastroesophageal reflux disease)     Tachy-alix syndrome (H)     AAA (abdominal aortic aneurysm) (H)     Old myocardial infarction     Chronic combined systolic and diastolic congestive heart failure (H)     Essential hypertension,  benign     Mixed hyperlipidemia     Aftercare following knee joint replacement surgery, unspecified laterality     Physical deconditioning     Post herpetic neuralgia     Presbycusis of both ears     Chronic non-seasonal allergic rhinitis, unspecified trigger     Skin lesion     History of prostate cancer 2003 w/po resection prostate     PAD (peripheral artery disease) (H)     Microalbuminuria     Mild persistent asthma without complication     Pre-diabetes     History of CVA (cerebrovascular accident)            Medications:     Current Outpatient Prescriptions   Medication Sig Dispense Refill     albuterol (PROAIR HFA/PROVENTIL HFA/VENTOLIN HFA) 108 (90 BASE) MCG/ACT Inhaler Inhale 2 puffs into the lungs as needed for shortness of breath / dyspnea or wheezing 3 Inhaler 11     aspirin 81 MG tablet Take 81 mg by mouth daily  30 tablet      azelastine (ASTELIN) 137 MCG/SPRAY nasal spray [place] 1 spray into both nostrils 2 times daily.       beclomethasone (QVAR) 80 MCG/ACT Inhaler Inhale 2 puffs into the lungs 2 times daily 2 Inhaler 11     carvedilol (COREG) 6.25 MG tablet Take 1 tablet (6.25 mg) by mouth 2 times daily (with meals) 180 tablet 1     cetirizine (ZYRTEC) 10 MG tablet Take 10 mg by mouth every morning        Emollient (CERAVE) LOTN Externally apply topically 2 times daily as needed       guaiFENesin (MUCINEX) 600 MG 12 hr tablet Take 600 mg by mouth 2-3 x day       isosorbide mononitrate (IMDUR) 30 MG 24 hr tablet TAKE 1 TABLET BY MOUTH EVERY DAY 90 tablet 1     lisinopril (PRINIVIL/ZESTRIL) 5 MG tablet Take 1 tablet (5 mg) by mouth daily 90 tablet 3     MELATONIN PO Take 5 mg by mouth At Bedtime       mometasone (NASONEX) 50 MCG/ACT nasal spray Spray 2 sprays into both nostrils daily as needed.       nitroglycerin (NITROSTAT) 0.4 MG SL tablet Place 1 tablet (0.4 mg) under the tongue every 5 minutes as needed for chest pain 25 tablet 3     polyethylene glycol (MIRALAX/GLYCOLAX) packet Take 17 g by mouth  daily        potassium chloride SA (K-DUR/KLOR-CON M) 20 MEQ CR tablet Take 2 pills today, one pill tomorrow then one pill daily ONLY when you take the Demadex 30 tablet 5     rosuvastatin (CRESTOR) 40 MG tablet Take 1 tablet (40 mg) by mouth daily 90 tablet 3     torsemide (DEMADEX) 20 MG tablet Take 1 tab (20 mg) every other day 30 tablet 0     triamcinolone (KENALOG) 0.1 % cream Apply sparingly to affected area twotimes daily as needed 30 g 5          Past Medical History:     Past Medical History:   Diagnosis Date     AAA (abdominal aortic aneurysm) (H)      Anemia due to blood loss, acute 10/10/2016     Asthma      Atrial fibrillation (H)      Cardiac arrest (H)      Cardiomyopathy (H)      Chronic kidney disease      Chronic sinusitis      Coronary artery disease     cardiac cath 2014: medical management; cath 2013: PTCA to diagonal; cath 2009: medical management; CABG 1998: LIMA to LAD, LISA to RCA, SVG to circumflex     GERD (gastroesophageal reflux disease)      History of angina      Hyperlipidaemia      Hypertension, goal below 140/90      Myocardial infarction (H)     MI with Vfib arrest 1998     Polymyalgia rheumatica (H)      Shingles 10/28/2016     Shortness of breath      Tachy-alix syndrome (H)     s/p dual chamber pacemaker 2012     Past Surgical History:   Procedure Laterality Date     ARTHROPLASTY KNEE Left 10/5/2016    Procedure: ARTHROPLASTY KNEE;  Surgeon: Keshav Zamudio MD;  Location:  OR     CARDIAC SURGERY  12/03/2012    pacemaker ; CABG 1998     CHOLECYSTECTOMY       COLONOSCOPY       ENT SURGERY  5-    sinus     EXTRACAPSULAR CATARACT EXTRATION WITH INTRAOCULAR LENS IMPLANT       GENITOURINARY SURGERY      prostatectomy     IMPLANT PACEMAKER  12-3-12    [facility]     PROSTATE SURGERY       Family History   Problem Relation Age of Onset     Cerebrovascular Disease Father      HEART DISEASE Father      HEART DISEASE Brother      Coronary Artery Disease Brother      MI age 50      Depression Daughter      raped in high school     Unknown/Adopted Maternal Grandmother      Unknown/Adopted Maternal Grandfather      Unknown/Adopted Paternal Grandmother      Unknown/Adopted Paternal Grandfather      Social History     Social History     Marital status:      Spouse name: N/A     Number of children: N/A     Years of education: N/A     Occupational History     Not on file.     Social History Main Topics     Smoking status: Never Smoker     Smokeless tobacco: Never Used     Alcohol use No     Drug use: No     Sexual activity: No     Other Topics Concern     Parent/Sibling W/ Cabg, Mi Or Angioplasty Before 65f 55m? Yes     brother and father had MI @ 40's     Caffeine Concern Yes     one diet mountain dew daily     Sleep Concern No     Stress Concern No     Special Diet No     Exercise Yes     golfing, does the stair at the office a lot     Seat Belt Yes     Social History Narrative            Allergies:   Advair diskus; Morphine hcl; and Vicodin [hydrocodone-acetaminophen]      Cherie Queen PA-C  [facility]  Pager: [phone]      Thank you for allowing me to participate in the care of your patient.      Sincerely,     Cherie Queen PA-C     [facility]    cc:   Cherie Queen PA-C  [address]  [place] [address]

## 2018-11-19 NOTE — PROGRESS NOTES
Cardiology Clinic Progress Note    Abbe Lambert MRN# [id number]   YOB: 1934 Age: 84 year old     Reason for visit: Reassessment of cardiomyopathy           Assessment and Plan:     In summary, the patient presents today for assessment after recent up-titration of Coreg. His SBP has dropped from the 120's to 90's following increase from 3.125 BID to 6.25 BID three weeks ago, which seems out of proportion to his med change. He's feeling very well and has no complaints reflective of CHF, angina or near-syncope. He feels his intake is adequate and denies any signs/symptoms of bleeds.     1. 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 Asymptomatic - FC I-II. Appears euvolemic with very mild LE edema on minimal diuretic. On Coreg 6.25 BID and lisinopril 5 daily.     2. CAD - Asymptomatic. LDL < 70. On aspirin, high-intensity statin.     3. PAF, SSS S/p PPM - Last PPM check 9/2018 stable. He's currently 98% paced in the atrium and ventricle, with 4-5 years left on his battery. PPM check 6/2018 shows very brief runs of possible AF/AT (less than 2 min duration). Not anticoagulated due to overall very low burden and s/p SHARI ligation.     4. AAA - Small and stable per last ultrasound in 2015.     5. HTN - SBP historically in the 120's, now down to the 90's after up-titration of Coreg. Patient is asymptomatic with this.     6. CKD - Recent creat 1.9 (fasting). Appears he's ranged from 1.5 - 1.75 over the past one year.     7. Hx of CVA - noted on prior CT.      Plan:  - Will repeat BMP now and as long as his creatinine remains stable will plan to stop his Imdur 30 mg daily and leave other meds as is. Will have my RN's call him with the plan of care once his results return.   - Continue routine PPM checks (next 12/2018).   - Follow up in 6 months with Dr. Eli. Repeat AAA assessment with ultrasound prior.   - Asked to call with any symptoms of fatigue, near-syncope, or angina in the meantime.           History of Presenting Illness:      Abbe Lambert is a pleasant 84 year old patient of  Dr. Eli who presents today for an annual assessment. He has a PMH including a mixed cardiomyopathy, CAD, PAF/SSS s/p PPM, AAA, HTN, and CKD-III.     He had an MI and cardiac arrest at [facility] in 1998.  This led to coronary artery bypass grafting x3 with a free LISA pedicle graft to his right coronary artery, a LIMA graft to his left anterior descending artery and a saphenous vein graft tied to a small diffusely diseased ramus intermedius branch.  Angiography in 2009 demonstrated the vein graft to be closed, but the arterial conduits were wide open.  His diagonal filled by collaterals. In 08/2013, he returned to the Cath Lab for anginal symptoms and angioplasty only was performed of an 85% stenosis in a small first diagonal that was felt to be too small for stenting.  His symptoms did improve.   In 2014, he again returned to the Cath Lab, with what appeared to be fairly classical anginal symptoms.  Catheterization again demonstrated the LIMA and LISA to be widely patent.  There was some restenosis in the first diagonal with an FFR of 0.9.  He had a 50% mid RCA stenosis with an FFR of 0.92.  His ejection fraction was 25%.  It appeared that he had developed a nonischemic cardiomyopathy.  Over time, his ejection fraction has improved to the 45%-50%, although when we checked last year it was estimated to be 41%.     He was last seen in clinic three weeks ago for a routine annual f/u visit. He was feeling well. An ECHO revealed his EF to be relatively stable at 35-40%. Labs revealed a creat which was mildly elevated above his baseline at 1.9, although he was fasting for this. Lipids were satisfactory. He was being paced at 98% in the atrium and ventricle on his prior device interrogation. Coreg was increased from 3.125 to 6.25 BID.     Today, his BP is low in the 90's/50's. Despite this, he continues to feel well and  "denies symptoms associated with hypotension. He reports chronic VAZQUEZ when he \"walks really fast\" and also notes some ongoing instability that has limited his ability to golf and hunt - these symptoms are stable. He had one minor fall recently which resulted in an abrasion of his LLE, this had healed nicely although with some minimal ongoing redness and swelling. He has mild chronic LE edema which isn't bothersome to him. He otherwise denies chest pain, shortness of breath, PND, orthopnea, palpitations, near syncope or syncope. He sleeps well at night. He watches the sodium in his diet.  He intentionally makes slow positional changes. He's active in his [employer] and with the [facility].           Review of Systems:     12-pt ROS is negative except for as noted in the HPI.           Physical Exam:     Vitals: BP 90/52 (BP Location: Right arm, Patient Position: Chair, Cuff Size: Adult Regular)  Pulse 71  Ht 1.778 m (5' 10\")  Wt 88.5 kg (195 lb)  SpO2 97%  BMI 27.98 kg/m2  Wt Readings from Last 3 Encounters:   11/19/18 88.5 kg (195 lb)   10/24/18 88 kg (194 lb)   10/10/18 86.6 kg (191 lb)       Constitutional:  Patient is pleasant, alert, cooperative, and in NAD.  HEENT:  NCAT. PERRLA. EOM's intact. No masses or thyromegaly. Teeth in normal repair.   Neck:  CVP appears normal. No carotid bruits.   Chest:  Non-tender, normal A/P diameter.   Pulmonary: Normal respiratory effort. CTAB.   Cardiac: RRR, normal S1/S2, no S3/S4, no murmur or rub.   Abdomen:  Non-tender abdomen with normoactive bowel sounds, no hepatosplenomegaly appreciated.   Vascular: Pulses in the upper and lower extremities are 2+ and equal bilaterally.  Extremities: trace RLE edema, trace-1+ LLE edema ankle - pretibial region bilaterally  Skin:  Small skin abrasion on the L pretibial region with some erythema surrounding it  Neurological:  No gross motor or sensory deficits.   Psych: Appropriate affect.        Data:     Cardiac Diagnostics " reviewed:  Type Date Result   TTE 10/24/18 There is mild to moderate concentric left ventricular hypertrophy.  The visual ejection fraction is estimated at 35-40%.  There is severe apical wall hypokinesis.  There is moderate to severe inferior and inferolateral wall hypokinesis.  Grade I or early diastolic dysfunction.  Pacer wire in right atrium  There is trace to mild aortic regurgitation.  The study was technically difficult. Contrast was used without apparent  complications. Compared to prior study, there is no significant change.    9/20/16 Left ventricular systolic function is moderately reduced.The left visual  ejection fraction is estimated at 40%.Apical akinesia, moderate inferior,  lateral, inferolateral and inferior hypokinesia.  Mildly decreased right ventricular systolic function  There is mild (1+) aortic regurgitation.  Mild aortic root dilatation.The ascending aorta is mildly dilated.     On direct comparision to echo images dated 08/01/2014 no significant changes.   Cath 4/25/14 1.  Nonischemic cardiomyopathy, ejection fraction of 25%, which is   new from an echocardiogram last summer.  Previous ejection fraction   was estimated to be 55-60%.  His left ventricular end-diastolic   pressure is 26.   2.  50% distal RCA stenosis well revascularized by a widely patent   and free pedicle LISA graft to his posterior descending artery.   3.  Occluded mid LAD well revascularized by a widely patent LIMA   graft.   4.  Previously intervened on small diagonal has a restenosis of   30-50% with an FFR of 0.9.   5.  50% mid RCA stenosis with an FFR of 0.92.    Stress Katherine, 12/10/13 A moderate-sized partially reversible inferior and inferolateral  defect consistent with transmural and nontransmural infarction with  mild residual ischemia within the distal inferolateral wall.  Gating demonstrated normal left ventricular chamber size with  inferior and inferolateral hypokinesis.  The ejection fraction was  53%.  Compared to the previous study of 11/2/2012, there is  probably no significant change. There is some mild ischemia identified  in the current study involving the distal inferolateral wall which was  not previously reported but this is not likely clinically significant  change.).   Device PPM check, 9/21/18 [employer] DR 2110 (D) Remote PPM Device Check  AP: 98%                      : 98%  Mode: DDDR        Presenting Rhythm: AP/  Heart Rate: adequate heart rates per histogram  Sensing: stable    Pacing Threshold: stable    Impedance: stable  Battery Status: 4.2 - 4.9 years remaining  Atrial Arrhythmia: none   Ventricular Arrhythmia: none    Abdominal U/S 10/22/15 Stable infrarenal AAA measuring 3.8 cm x 3.7 cm, similar in size to  previous 3.7 x 3.7 cm aneurysm 11/20/2014. Difference in size is  within measurement error. No iliac artery aneurysms or obstruction  visualized. Heterogenous, atherosclerotic plaque again visualized in  the aortoiliac system.   Head CT 11/2016 Mild to moderate cerebral atrophy is present. There is  minimal patchy white matter changes in both hemispheres without mass effect. There is a small old right cerebellar infarct. There is no  evidence for intracranial hemorrhage, mass effect, acute infarct, or  skull fracture.     Recent Labs   Lab Test  10/24/18   0939  05/18/18   1409  12/22/17   0805   12/05/16   1505   09/08/16   1359  07/07/16   1546   03/07/16   1507   09/28/15   1457   LDL  39  21  44   < >   --    < >   --    --    < >   --    < >   --    HDL  28*  32*  40   < >   --    < >   --    --    < >   --    < >   --    NHDL  65  49  62   < >   --    < >   --    --    < >   --    --    --    CHOL  93  81  102   < >   --    < >   --    --    < >   --    < >   --    TRIG  129  141  91   < >   --    < >   --    --    < >   --    < >   --    TSH   --    --    --    --   2.74   --    --    --    --   2.38   --   3.53   NTBNP   --    --    --    --   696*   --   836*  557*    --   885*   --    --     < > = values in this interval not displayed.     Lab Results   Component Value Date    CHOL 93 10/24/2018    HDL 28 (L) 10/24/2018    LDL 39 10/24/2018    TRIG 129 10/24/2018    CHOLHDLRATIO 3.3 10/08/2015       Lab Results   Component Value Date    WBC 14.2 (H) 09/12/2017    RBC 5.04 09/12/2017    HGB 16.2 09/12/2017    HCT 47.8 09/12/2017    MCV 95 09/12/2017    MCH 32.1 09/12/2017    MCHC 33.9 09/12/2017    RDW 14.9 09/12/2017     09/12/2017       Lab Results   Component Value Date     10/24/2018    POTASSIUM 3.7 10/24/2018    CHLORIDE 107 10/24/2018    CO2 27 10/24/2018    ANIONGAP 9.7 10/24/2018     (H) 10/24/2018    BUN 27 10/24/2018    CR 1.90 (H) 10/24/2018    GFRESTIMATED 34 (L) 10/24/2018    GFRESTBLACK 41 (L) 10/24/2018    JANAY 8.6 10/24/2018      Lab Results   Component Value Date    AST 38 09/12/2017    ALT 12 10/24/2018       Lab Results   Component Value Date    A1C 6.3 (H) 05/18/2018       Lab Results   Component Value Date    INR 0.94 04/25/2014    INR 1.0 04/24/2014          Problem List:     Patient Active Problem List   Diagnosis     Health Care Home     Allergic rhinitis     Peripheral edema     Polymyalgia rheumatica (H)     Advanced directives, counseling/discussion     Ischemic cardiomyopathy     Paroxysmal atrial fibrillation (H)     Coronary artery disease involving native coronary artery of native heart without angina pectoris     CKD (chronic kidney disease) stage 3, GFR 30-59 ml/min (H)     GERD (gastroesophageal reflux disease)     Tachy-alix syndrome (H)     AAA (abdominal aortic aneurysm) (H)     Old myocardial infarction     Chronic combined systolic and diastolic congestive heart failure (H)     Essential hypertension, benign     Mixed hyperlipidemia     Aftercare following knee joint replacement surgery, unspecified laterality     Physical deconditioning     Post herpetic neuralgia     Presbycusis of both ears     Chronic non-seasonal  allergic rhinitis, unspecified trigger     Skin lesion     History of prostate cancer 2003 w/po resection prostate     PAD (peripheral artery disease) (H)     Microalbuminuria     Mild persistent asthma without complication     Pre-diabetes     History of CVA (cerebrovascular accident)            Medications:     Current Outpatient Prescriptions   Medication Sig Dispense Refill     albuterol (PROAIR HFA/PROVENTIL HFA/VENTOLIN HFA) 108 (90 BASE) MCG/ACT Inhaler Inhale 2 puffs into the lungs as needed for shortness of breath / dyspnea or wheezing 3 Inhaler 11     aspirin 81 MG tablet Take 81 mg by mouth daily  30 tablet      azelastine (ASTELIN) 137 MCG/SPRAY nasal spray [place] 1 spray into both nostrils 2 times daily.       beclomethasone (QVAR) 80 MCG/ACT Inhaler Inhale 2 puffs into the lungs 2 times daily 2 Inhaler 11     carvedilol (COREG) 6.25 MG tablet Take 1 tablet (6.25 mg) by mouth 2 times daily (with meals) 180 tablet 1     cetirizine (ZYRTEC) 10 MG tablet Take 10 mg by mouth every morning        Emollient (CERAVE) LOTN Externally apply topically 2 times daily as needed       guaiFENesin (MUCINEX) 600 MG 12 hr tablet Take 600 mg by mouth 2-3 x day       isosorbide mononitrate (IMDUR) 30 MG 24 hr tablet TAKE 1 TABLET BY MOUTH EVERY DAY 90 tablet 1     lisinopril (PRINIVIL/ZESTRIL) 5 MG tablet Take 1 tablet (5 mg) by mouth daily 90 tablet 3     MELATONIN PO Take 5 mg by mouth At Bedtime       mometasone (NASONEX) 50 MCG/ACT nasal spray Spray 2 sprays into both nostrils daily as needed.       nitroglycerin (NITROSTAT) 0.4 MG SL tablet Place 1 tablet (0.4 mg) under the tongue every 5 minutes as needed for chest pain 25 tablet 3     polyethylene glycol (MIRALAX/GLYCOLAX) packet Take 17 g by mouth daily        potassium chloride SA (K-DUR/KLOR-CON M) 20 MEQ CR tablet Take 2 pills today, one pill tomorrow then one pill daily ONLY when you take the Demadex 30 tablet 5     rosuvastatin (CRESTOR) 40 MG tablet Take 1  tablet (40 mg) by mouth daily 90 tablet 3     torsemide (DEMADEX) 20 MG tablet Take 1 tab (20 mg) every other day 30 tablet 0     triamcinolone (KENALOG) 0.1 % cream Apply sparingly to affected area twotimes daily as needed 30 g 5          Past Medical History:     Past Medical History:   Diagnosis Date     AAA (abdominal aortic aneurysm) (H)      Anemia due to blood loss, acute 10/10/2016     Asthma      Atrial fibrillation (H)      Cardiac arrest (H)      Cardiomyopathy (H)      Chronic kidney disease      Chronic sinusitis      Coronary artery disease     cardiac cath 2014: medical management; cath 2013: PTCA to diagonal; cath 2009: medical management; CABG 1998: LIMA to LAD, LISA to RCA, SVG to circumflex     GERD (gastroesophageal reflux disease)      History of angina      Hyperlipidaemia      Hypertension, goal below 140/90      Myocardial infarction (H)     MI with Vfib arrest 1998     Polymyalgia rheumatica (H)      Shingles 10/28/2016     Shortness of breath      Tachy-alix syndrome (H)     s/p dual chamber pacemaker 2012     Past Surgical History:   Procedure Laterality Date     ARTHROPLASTY KNEE Left 10/5/2016    Procedure: ARTHROPLASTY KNEE;  Surgeon: Keshav Zamudio MD;  Location: [facility] OR     CARDIAC SURGERY  12/03/2012    pacemaker ; CABG 1998     CHOLECYSTECTOMY       COLONOSCOPY       ENT SURGERY  5-    sinus     EXTRACAPSULAR CATARACT EXTRATION WITH INTRAOCULAR LENS IMPLANT       GENITOURINARY SURGERY      prostatectomy     IMPLANT PACEMAKER  12-3-12    [facility]     PROSTATE SURGERY       Family History   Problem Relation Age of Onset     Cerebrovascular Disease Father      HEART DISEASE Father      HEART DISEASE Brother      Coronary Artery Disease Brother      MI age 50     Depression Daughter      raped in high school     Unknown/Adopted Maternal Grandmother      Unknown/Adopted Maternal Grandfather      Unknown/Adopted Paternal Grandmother      Unknown/Adopted Paternal Grandfather       Social History     Social History     Marital status:      Spouse name: N/A     Number of children: N/A     Years of education: N/A     Occupational History     Not on file.     Social History Main Topics     Smoking status: Never Smoker     Smokeless tobacco: Never Used     Alcohol use No     Drug use: No     Sexual activity: No     Other Topics Concern     Parent/Sibling W/ Cabg, Mi Or Angioplasty Before 65f 55m? Yes     brother and father had MI @ 40's     Caffeine Concern Yes     one diet [place] dew daily     Sleep Concern No     Stress Concern No     Special Diet No     Exercise Yes     golfing, does the stair at the office a lot     Seat Belt Yes     Social History Narrative            Allergies:   Advair diskus; Morphine hcl; and Vicodin [hydrocodone-acetaminophen]      Cherie Queen PA-C  [facility]  Pager: [phone]

## 2018-11-19 NOTE — MR AVS SNAPSHOT
"              After Visit Summary   11/19/2018    Abbe Lambert    MRN: [id number]           Patient Information     Date Of Birth          2/3/1934        Visit Information        Provider Department      11/19/2018 2:30 PM Cherie Queen PA-C [facility]        Today's Diagnoses     Essential hypertension, benign    -  1    Ischemic cardiomyopathy           Follow-ups after your visit        Your next 10 appointments already scheduled     Dec 21, 2018 11:00 AM CST   Remote PPM Check with HARMAN TECH1   [facility] ([facility])    [address] [place] [address] [address]  [facility] [id number] [phone] OPT 2           This appointment is for a remote check of your pacemaker.  This is not an appointment at the office.              Who to contact     If you have questions or need follow up information about today's clinic visit or your schedule please contact [facility] directly at [phone].  Normal or non-critical lab and imaging results will be communicated to you by MyChart, letter or phone within 4 business days after the clinic has received the results. If you do not hear from us within 7 days, please contact the clinic through MyChart or phone. If you have a critical or abnormal lab result, we will notify you by phone as soon as possible.  Submit refill requests through [employer] or call your pharmacy and they will forward the refill request to us. Please allow 3 business days for your refill to be completed.          Additional Information About Your Visit        Care EveryWhere ID     This is your Care EveryWhere ID. This could be used by other organizations to access your [place] medical records  [id number]        Your Vitals Were     Pulse Height Pulse Oximetry BMI (Body Mass Index)          71 1.778 m (5' 10\") 97% 27.98 kg/m2         Blood Pressure from Last 3 Encounters: "   11/19/18 90/52   10/24/18 120/66   10/10/18 120/70    Weight from Last 3 Encounters:   11/19/18 88.5 kg (195 lb)   10/24/18 88 kg (194 lb)   10/10/18 86.6 kg (191 lb)              We Performed the Following     Follow-Up with Cardiac Advanced Practice Provider        Primary Care Provider Office Phone # Fax #    Eliezer Shah [phone]       [address] [facility] [address]        Equal Access to Services     [facility]: Hadii aad ku hadasho Soomaali, waaxda luqadaha, qaybta kaalmada adeegyada, waxay idiin hayaan adeeg min franco . So [facility] [phone].    ATENCIÓN: Si habla español, tiene a monge disposición servicios gratuitos de asistencia lingüística. [facility] [phone].    We comply with applicable federal civil rights laws and [place] laws. We do not discriminate on the basis of race, color, national origin, age, disability, sex, sexual orientation, or gender identity.            Thank you!     Thank you for choosing [facility]  for your care. Our goal is always to provide you with excellent care. Hearing back from our patients is one way we can continue to improve our services. Please take a few minutes to complete the written survey that you may receive in the mail after your visit with us. Thank you!             Your Updated Medication List - Protect others around you: Learn how to safely use, store and throw away your medicines at www.disposemymeds.org.          This list is accurate as of 11/19/18  3:37 PM.  Always use your most recent med list.                   Brand Name Dispense Instructions for use Diagnosis    albuterol 108 (90 Base) MCG/ACT inhaler    PROAIR HFA/PROVENTIL HFA/VENTOLIN HFA    3 Inhaler    Inhale 2 puffs into the lungs as needed for shortness of breath / dyspnea or wheezing    Mild persistent asthma without complication       aspirin 81 MG tablet     30 tablet    Take 81 mg by mouth daily    CAD  (coronary artery disease)       ASTELIN 0.1 % nasal spray   Generic drug:  azelastine      Spray 1 spray into both nostrils 2 times daily.        beclomethasone 80 MCG/ACT Aers IS A DISCONTINUED MEDICATION    QVAR    2 Inhaler    Inhale 2 puffs into the lungs 2 times daily    Moderate persistent asthma without complication       carvedilol 6.25 MG tablet    COREG    180 tablet    Take 1 tablet (6.25 mg) by mouth 2 times daily (with meals)    Essential hypertension       CERAVE Lotn      Externally apply topically 2 times daily as needed    Xerosis cutis, Itching       cetirizine 10 MG tablet    zyrTEC     Take 10 mg by mouth every morning        guaiFENesin 600 MG 12 hr tablet    MUCINEX     Take 600 mg by mouth 2-3 x day        isosorbide mononitrate 30 MG 24 hr tablet    IMDUR    90 tablet    TAKE 1 TABLET BY MOUTH EVERY DAY    Coronary artery disease involving native coronary artery of native heart without angina pectoris       lisinopril 5 MG tablet    PRINIVIL/ZESTRIL    90 tablet    Take 1 tablet (5 mg) by mouth daily    Type 2 diabetes mellitus with stage 3 chronic kidney disease, without long-term current use of insulin (H), Benign essential hypertension       MELATONIN PO      Take 5 mg by mouth At Bedtime        NASONEX 50 MCG/ACT spray   Generic drug:  mometasone      Spray 2 sprays into both nostrils daily as needed.        nitroGLYcerin 0.4 MG sublingual tablet    NITROSTAT    25 tablet    Place 1 tablet (0.4 mg) under the tongue every 5 minutes as needed for chest pain    Chest pain on exertion       polyethylene glycol Packet    MIRALAX/GLYCOLAX     Take 17 g by mouth daily        potassium chloride SA 20 MEQ CR tablet    K-DUR/KLOR-CON M    30 tablet    Take 2 pills today, one pill tomorrow then one pill daily ONLY when you take the Demadex    Chronic combined systolic and diastolic congestive heart failure (H)       rosuvastatin 40 MG tablet    CRESTOR    90 tablet    Take 1 tablet (40 mg) by mouth  daily    Coronary artery disease involving native coronary artery of native heart without angina pectoris       torsemide 20 MG tablet    DEMADEX    30 tablet    Take 1 tab (20 mg) every other day    Chronic combined systolic and diastolic congestive heart failure (H)       triamcinolone 0.1 % cream    KENALOG    30 g    Apply sparingly to affected area twotimes daily as needed    Xerosis cutis, Itching

## 2018-11-20 ENCOUNTER — CARE COORDINATION (OUTPATIENT)
Dept: CARDIOLOGY | Facility: CLINIC | Age: 83
End: 2018-11-20

## 2018-11-20 ENCOUNTER — ALLIED HEALTH/NURSE VISIT (OUTPATIENT)
Dept: NURSING | Facility: CLINIC | Age: 83
End: 2018-11-20
Payer: COMMERCIAL

## 2018-11-20 VITALS — DIASTOLIC BLOOD PRESSURE: 56 MMHG | BODY MASS INDEX: 27.41 KG/M2 | SYSTOLIC BLOOD PRESSURE: 108 MMHG | WEIGHT: 191 LBS

## 2018-11-20 DIAGNOSIS — I10 ESSENTIAL HYPERTENSION, BENIGN: ICD-10-CM

## 2018-11-20 DIAGNOSIS — I25.5 ISCHEMIC CARDIOMYOPATHY: Primary | Chronic | ICD-10-CM

## 2018-11-20 DIAGNOSIS — I50.42 CHRONIC COMBINED SYSTOLIC AND DIASTOLIC CONGESTIVE HEART FAILURE (H): ICD-10-CM

## 2018-11-20 DIAGNOSIS — I10 ESSENTIAL HYPERTENSION, BENIGN: Primary | ICD-10-CM

## 2018-11-20 DIAGNOSIS — I25.10 CORONARY ARTERY DISEASE INVOLVING NATIVE CORONARY ARTERY OF NATIVE HEART WITHOUT ANGINA PECTORIS: ICD-10-CM

## 2018-11-20 PROCEDURE — 99207 ZZC NO CHARGE LOS: CPT

## 2018-11-20 RX ORDER — ROSUVASTATIN CALCIUM 40 MG/1
40 TABLET, COATED ORAL DAILY
Qty: 90 TABLET | Refills: 3 | Status: SHIPPED | OUTPATIENT
Start: 2018-11-20 | End: 2019-02-06

## 2018-11-20 RX ORDER — TORSEMIDE 20 MG/1
TABLET ORAL
Qty: 30 TABLET | Refills: 0
Start: 2018-11-20 | End: 2018-11-29

## 2018-11-20 NOTE — PROGRESS NOTES
"    Spoke w/ Abbe and reviewed 11/19/18 BMP results and Cherie MYERS's instructions. Abbe noted that he already held his torsemide 11/18 and 11/19 bc he thought his urine was too clear and amt too much. He doesn't regularly weigh himself, agrees to start daily wts. Minimal edema tolerable. Will route to Cherie to ask if she wants torsemide held for addnl days. KCL instructions are to take 20meq only on days on which he takes torsemide-reviewed w/ pt.     Instructed Abbe to stop imdur. Med list updated.     Placed orders for nurse BP check and BMP and arranged both to be done on 11/27.    Current follow-up orders are for follow-up imaging and OV w/ Dr. Eli ~4/2019. Per Cherie's note 10/24/18, \"I'll see him back in 2 - 3 weeks for reassessment and continued med up-titration.\" Cherie-do you want follow-up OV w/ you, or wait for repeat BMP/BP check next wk?    Kayla Garrett RN BSN   9:59 AM 11/20/18        "

## 2018-11-20 NOTE — PROGRESS NOTES
Called pt. Spoke w/ his wife who will have him call [facility] RN when he returns home shortly.     Will need to reschedule BMP/BP to another day next week as I overbooked current lab appt.    Kayla Garrett RN BSN   1:06 PM 11/20/18

## 2018-11-20 NOTE — PROGRESS NOTES
Spoke w/ pt. He agreed to stop imdur as previously discussed, hold torsemide until further instruction/follow up BMP/BP check next wk, which we rescheduled to 11/26 d/t overbooking in lab. Reminder sent to RN board to watch for results. I asked him to watch for rapid wt gain, new swelling, dyspnea.     I asked him about his KCL dosing. He stated the pills are difficult to swallow so he hasn't been taking that med for some time. Med removed from Epic list.     He reported he went to PCP office today to have home BP cuff checked for accuracy and stated cuff deemed accurate and /56. We discussed that in absence of sx like dizziness/lightheadness this reading is acceptable.     He reported that he had started taking a 200mg caffeine supplement daily to help increase BP/energy, but stopped d/t his concerns that it may be contributing to dehydration. He didn't have any improvement in sx while on supplement. I agreed w/ his plan. Reviewed this w/ Cherie MYERS.      He asked how much water to consume daily. I told him 5-6 glasses daily.     Kayla Garrett RN BSN   1:44 PM 11/20/18

## 2018-11-20 NOTE — PROGRESS NOTES
Sure, he can hold it until we see his f/u labs and BP - will call with instruction following that.   I saw the patient in clinic yesterday so f/u with Dr. Eli as scheduled is fine for now.   Thanks.

## 2018-11-26 NOTE — PROGRESS NOTES
Was informed by lab staff that lab is overbooked tomorrow when pt is scheduled at 9:00am and requested that pt be rescheduled to another timeslot.      Called and spoke with pt's wife, Madison Bolton.  Reviewed that pt is scheduled for lab appt and nurse BP check tomorrow, 11/27, and requested to know if pt's lab appt time could be adjusted from 9:00am to 9:50pm after nurse BP check at 9:30am.  Nikki verbalized understanding, wrote down updated appt times and states she will update pt with this information.    ALCON Jasso 1:25 PM 11/26/2018

## 2018-11-27 ENCOUNTER — CARE COORDINATION (OUTPATIENT)
Dept: CARDIOLOGY | Facility: CLINIC | Age: 83
End: 2018-11-27

## 2018-11-27 ENCOUNTER — ALLIED HEALTH/NURSE VISIT (OUTPATIENT)
Dept: CARDIOLOGY | Facility: CLINIC | Age: 83
End: 2018-11-27
Payer: MEDICARE

## 2018-11-27 DIAGNOSIS — I50.42 CHRONIC COMBINED SYSTOLIC AND DIASTOLIC CONGESTIVE HEART FAILURE (H): ICD-10-CM

## 2018-11-27 DIAGNOSIS — I10 ESSENTIAL HYPERTENSION, BENIGN: ICD-10-CM

## 2018-11-27 DIAGNOSIS — I25.5 ISCHEMIC CARDIOMYOPATHY: Chronic | ICD-10-CM

## 2018-11-27 LAB
ANION GAP SERPL CALCULATED.3IONS-SCNC: 12.2 MMOL/L (ref 6–17)
BUN SERPL-MCNC: 23 MG/DL (ref 7–30)
CALCIUM SERPL-MCNC: 9.8 MG/DL (ref 8.5–10.5)
CHLORIDE SERPL-SCNC: 108 MMOL/L (ref 98–107)
CO2 SERPL-SCNC: 23 MMOL/L (ref 23–29)
CREAT SERPL-MCNC: 1.68 MG/DL (ref 0.7–1.3)
GFR SERPL CREATININE-BSD FRML MDRD: 39 ML/MIN/1.7M2
GLUCOSE SERPL-MCNC: 121 MG/DL (ref 70–105)
POTASSIUM SERPL-SCNC: 4.2 MMOL/L (ref 3.5–5.1)
SODIUM SERPL-SCNC: 139 MMOL/L (ref 136–145)

## 2018-11-27 PROCEDURE — 36415 COLL VENOUS BLD VENIPUNCTURE: CPT | Performed by: PHYSICIAN ASSISTANT

## 2018-11-27 PROCEDURE — 99207 ZZC NO CHARGE NURSE ONLY: CPT | Performed by: PHYSICIAN ASSISTANT

## 2018-11-27 PROCEDURE — 80048 BASIC METABOLIC PNL TOTAL CA: CPT | Performed by: PHYSICIAN ASSISTANT

## 2018-11-27 NOTE — Clinical Note
Nurse BP check done today, per your request, after he was advised to stop Imdur and hold torsemide on 11/20/18. BMP done today- still in process at this time. ALCON Jasso 11:08 AM 11/27/2018

## 2018-11-27 NOTE — PROGRESS NOTES
Last office visit:  18    Previous blood pressure:  90/52 Mm Hg     Previous heart rate: 71     bpm    Time of readin:45  am    Morning medications were taken at: 8:45 am    Today's blood pressure:   112/70    mm Hg    Today's heart rate:  73     bpm     Ordering Provider: Cherie Queen    Results sent to team # : Team 2    Additional comments:

## 2018-11-27 NOTE — PROGRESS NOTES
Pt had nurse BP check today per HELEN Huerta's request after he was advised to stop Imdur and hold torsemide on 11/20/18. BMP done today- still in process at this time.         Routed to HELEN Jones for review.  ALCON Jasso 11:08 AM 11/27/2018

## 2018-11-27 NOTE — MR AVS SNAPSHOT
After Visit Summary   11/27/2018    Abbe Lambert    MRN: [id number]           Patient Information     Date Of Birth          2/3/1934        Visit Information        Provider Department      11/27/2018 9:30 AM [facility]        Today's Diagnoses     Essential hypertension, benign           Follow-ups after your visit        Your next 10 appointments already scheduled     Dec 21, 2018 11:00 AM CST   Remote PPM Check with [facility] TECH1   [facility] ([facility])    [facility]  [facility] [address] [phone] OPT 2           This appointment is for a remote check of your pacemaker.  This is not an appointment at the office.              Who to contact     If you have questions or need follow up information about today's clinic visit or your schedule please contact [facility] directly at [phone].  Normal or non-critical lab and imaging results will be communicated to you by MyChart, letter or phone within 4 business days after the clinic has received the results. If you do not hear from us within 7 days, please contact the clinic through MyChart or phone. If you have a critical or abnormal lab result, we will notify you by phone as soon as possible.  Submit refill requests through [employer] (f. [employer]) or call your pharmacy and they will forward the refill request to us. Please allow 3 business days for your refill to be completed.          Additional Information About Your Visit        Care EveryWhere ID     This is your Care EveryWhere ID. This could be used by other organizations to access your [facility] medical records  [id number]         Blood Pressure from Last 3 Encounters:   11/20/18 108/56   11/19/18 90/52   10/24/18 120/66    Weight from Last 3 Encounters:   11/20/18 86.6 kg (191 lb)   11/19/18 88.5 kg (195 lb)   10/24/18 88 kg (194  milton)              We Performed the Following     Follow-Up with Nurse        Primary Care Provider Office Phone # Fax #    Eliezer Shah [phone]       [address]  [facility] [address]        Equal Access to Services     CALISTAROSAS JIMY : Hadii aad ku hadpilaro Soomaali, waaxda luqadaha, qaybta kaalmada adeegyada, waxay idiin hayaan adeeg khjaime lachris goodson. So [facility] [phone].    ATENCIÓN: Si habla español, tiene a monge disposición servicios gratuitos de asistencia lingüística. Llame al [phone].    We comply with applicable federal civil rights laws and [place] laws. We do not discriminate on the basis of race, color, national origin, age, disability, sex, sexual orientation, or gender identity.            Thank you!     Thank you for choosing [facility]  for your care. Our goal is always to provide you with excellent care. Hearing back from our patients is one way we can continue to improve our services. Please take a few minutes to complete the written survey that you may receive in the mail after your visit with us. Thank you!             Your Updated Medication List - Protect others around you: Learn how to safely use, store and throw away your medicines at www.disposemymeds.org.          This list is accurate as of 11/27/18 11:59 PM.  Always use your most recent med list.                   Brand Name Dispense Instructions for use Diagnosis    albuterol 108 (90 Base) MCG/ACT inhaler    PROAIR HFA/PROVENTIL HFA/VENTOLIN HFA    3 Inhaler    Inhale 2 puffs into the lungs as needed for shortness of breath / dyspnea or wheezing    Mild persistent asthma without complication       aspirin 81 MG tablet    ASA    30 tablet    Take 81 mg by mouth daily    CAD (coronary artery disease)       ASTELIN 0.1 % nasal spray   Generic drug:  azelastine      Spray 1 spray into both nostrils 2 times daily.        beclomethasone 80 MCG/ACT Aers IS A  DISCONTINUED MEDICATION    QVAR    2 Inhaler    Inhale 2 puffs into the lungs 2 times daily    Moderate persistent asthma without complication       carvedilol 6.25 MG tablet    COREG    180 tablet    Take 1 tablet (6.25 mg) by mouth 2 times daily (with meals)    Essential hypertension       CERAVE Lotn      Externally apply topically 2 times daily as needed    Xerosis cutis, Itching       cetirizine 10 MG tablet    zyrTEC     Take 10 mg by mouth every morning        guaiFENesin 600 MG 12 hr tablet    MUCINEX     Take 600 mg by mouth 2-3 x day        lisinopril 5 MG tablet    PRINIVIL/ZESTRIL    90 tablet    Take 1 tablet (5 mg) by mouth daily    Type 2 diabetes mellitus with stage 3 chronic kidney disease, without long-term current use of insulin (H), Benign essential hypertension       MELATONIN PO      Take 5 mg by mouth At Bedtime        NASONEX 50 MCG/ACT nasal spray   Generic drug:  mometasone      Spray 2 sprays into both nostrils daily as needed.        nitroGLYcerin 0.4 MG sublingual tablet    NITROSTAT    25 tablet    Place 1 tablet (0.4 mg) under the tongue every 5 minutes as needed for chest pain    Chest pain on exertion       polyethylene glycol packet    MIRALAX/GLYCOLAX     Take 17 g by mouth daily        rosuvastatin 40 MG tablet    CRESTOR    90 tablet    Take 1 tablet (40 mg) by mouth daily    Coronary artery disease involving native coronary artery of native heart without angina pectoris       torsemide 20 MG tablet    DEMADEX    30 tablet    ON HOLD as of 11/18/18    Chronic combined systolic and diastolic congestive heart failure (H)       triamcinolone 0.1 % external cream    KENALOG    30 g    Apply sparingly to affected area twotimes daily as needed    Xerosis cutis, Itching

## 2018-11-28 NOTE — PROGRESS NOTES
Better, as are labs. Let's have him go back to just 10 of torsemide every other day, with instruction to call us with weight gain or worsening leg swelling. Maintain other meds as is (stay off Imdur). Please have him return to see me in 3 months with labs (BMP) prior. Thank you.

## 2018-11-29 RX ORDER — TORSEMIDE 10 MG/1
TABLET ORAL
Qty: 45 TABLET | Refills: 3 | Status: SHIPPED | OUTPATIENT
Start: 2018-11-29 | End: 2019-02-13

## 2018-11-29 NOTE — PROGRESS NOTES
Called and spoke with pt.  Discussed that HELEN Huerta reviewed BMP done 11/27 and nurse BP check done 11/27 and both look better.  HELEN Huerta recommends he go back to just 10mg of torsemide every other day and advised that he call us if weight gain or >2# overnight or 5# in a week or increased swelling in LE.  And also continue to stay off Imdur.  Pt verbalized understanding and agrees with this plan.  Also reviewed with pt that Bradley would like to see him back in 3 months with BMP prior- orders placed and scheduling will reach out closer to that time.  He agrees with this plan.  Med list updated in Epic.   ALCON Jasso 9:28 AM 11/29/2018

## 2019-01-03 ENCOUNTER — DOCUMENTATION ONLY (OUTPATIENT)
Dept: CARDIOLOGY | Facility: CLINIC | Age: 84
End: 2019-01-03

## 2019-01-15 ENCOUNTER — ANCILLARY PROCEDURE (OUTPATIENT)
Dept: CARDIOLOGY | Facility: CLINIC | Age: 84
End: 2019-01-15
Payer: MEDICARE

## 2019-01-15 DIAGNOSIS — I49.5 SICK SINUS SYNDROME (H): ICD-10-CM

## 2019-01-15 DIAGNOSIS — Z95.0 CARDIAC PACEMAKER IN SITU: Primary | ICD-10-CM

## 2019-01-15 PROCEDURE — 93294 REM INTERROG EVL PM/LDLS PM: CPT | Performed by: INTERNAL MEDICINE

## 2019-01-17 LAB
MDC_IDC_EPISODE_DTM: NORMAL
MDC_IDC_EPISODE_DURATION: 10 S
MDC_IDC_EPISODE_DURATION: 12 S
MDC_IDC_EPISODE_DURATION: 12 S
MDC_IDC_EPISODE_DURATION: 4 S
MDC_IDC_EPISODE_DURATION: 6 S
MDC_IDC_EPISODE_DURATION: 8 S
MDC_IDC_EPISODE_ID: NORMAL
MDC_IDC_EPISODE_TYPE: NORMAL
MDC_IDC_LEAD_IMPLANT_DT: NORMAL
MDC_IDC_LEAD_IMPLANT_DT: NORMAL
MDC_IDC_LEAD_LOCATION: NORMAL
MDC_IDC_LEAD_LOCATION: NORMAL
MDC_IDC_LEAD_MFG: NORMAL
MDC_IDC_LEAD_MFG: NORMAL
MDC_IDC_LEAD_MODEL: NORMAL
MDC_IDC_LEAD_MODEL: NORMAL
MDC_IDC_LEAD_POLARITY_TYPE: NORMAL
MDC_IDC_LEAD_POLARITY_TYPE: NORMAL
MDC_IDC_LEAD_SERIAL: NORMAL
MDC_IDC_LEAD_SERIAL: NORMAL
MDC_IDC_MSMT_BATTERY_DTM: NORMAL
MDC_IDC_MSMT_BATTERY_REMAINING_LONGEVITY: 41 MO
MDC_IDC_MSMT_BATTERY_REMAINING_PERCENTAGE: 35 %
MDC_IDC_MSMT_BATTERY_RRT_TRIGGER: NORMAL
MDC_IDC_MSMT_BATTERY_STATUS: NORMAL
MDC_IDC_MSMT_BATTERY_VOLTAGE: 2.83 V
MDC_IDC_MSMT_LEADCHNL_RA_IMPEDANCE_VALUE: 360 OHM
MDC_IDC_MSMT_LEADCHNL_RA_LEAD_CHANNEL_STATUS: NORMAL
MDC_IDC_MSMT_LEADCHNL_RA_PACING_THRESHOLD_AMPLITUDE: 0.62 V
MDC_IDC_MSMT_LEADCHNL_RA_PACING_THRESHOLD_PULSEWIDTH: 0.5 MS
MDC_IDC_MSMT_LEADCHNL_RA_SENSING_INTR_AMPL: 4.7 MV
MDC_IDC_MSMT_LEADCHNL_RV_IMPEDANCE_VALUE: 540 OHM
MDC_IDC_MSMT_LEADCHNL_RV_LEAD_CHANNEL_STATUS: NORMAL
MDC_IDC_MSMT_LEADCHNL_RV_PACING_THRESHOLD_AMPLITUDE: 0.75 V
MDC_IDC_MSMT_LEADCHNL_RV_PACING_THRESHOLD_PULSEWIDTH: 0.5 MS
MDC_IDC_MSMT_LEADCHNL_RV_SENSING_INTR_AMPL: 12 MV
MDC_IDC_PG_IMPLANT_DTM: NORMAL
MDC_IDC_PG_MFG: NORMAL
MDC_IDC_PG_MODEL: NORMAL
MDC_IDC_PG_SERIAL: NORMAL
MDC_IDC_PG_TYPE: NORMAL
MDC_IDC_SESS_CLINIC_NAME: NORMAL
MDC_IDC_SESS_DTM: NORMAL
MDC_IDC_SESS_REPROGRAMMED: NO
MDC_IDC_SESS_TYPE: NORMAL
MDC_IDC_SET_BRADY_AT_MODE_SWITCH_MODE: NORMAL
MDC_IDC_SET_BRADY_AT_MODE_SWITCH_RATE: 180 {BEATS}/MIN
MDC_IDC_SET_BRADY_LOWRATE: 60 {BEATS}/MIN
MDC_IDC_SET_BRADY_MAX_SENSOR_RATE: 120 {BEATS}/MIN
MDC_IDC_SET_BRADY_MAX_TRACKING_RATE: 120 {BEATS}/MIN
MDC_IDC_SET_BRADY_MODE: NORMAL
MDC_IDC_SET_BRADY_PAV_DELAY_HIGH: 100 MS
MDC_IDC_SET_BRADY_PAV_DELAY_LOW: 200 MS
MDC_IDC_SET_BRADY_SAV_DELAY_HIGH: 100 MS
MDC_IDC_SET_BRADY_SAV_DELAY_LOW: 170 MS
MDC_IDC_SET_LEADCHNL_RA_PACING_AMPLITUDE: 1.62
MDC_IDC_SET_LEADCHNL_RA_PACING_ANODE_ELECTRODE_1: NORMAL
MDC_IDC_SET_LEADCHNL_RA_PACING_ANODE_LOCATION_1: NORMAL
MDC_IDC_SET_LEADCHNL_RA_PACING_CAPTURE_MODE: NORMAL
MDC_IDC_SET_LEADCHNL_RA_PACING_CATHODE_ELECTRODE_1: NORMAL
MDC_IDC_SET_LEADCHNL_RA_PACING_CATHODE_LOCATION_1: NORMAL
MDC_IDC_SET_LEADCHNL_RA_PACING_POLARITY: NORMAL
MDC_IDC_SET_LEADCHNL_RA_PACING_PULSEWIDTH: 0.5 MS
MDC_IDC_SET_LEADCHNL_RA_SENSING_ADAPTATION_MODE: NORMAL
MDC_IDC_SET_LEADCHNL_RA_SENSING_ANODE_ELECTRODE_1: NORMAL
MDC_IDC_SET_LEADCHNL_RA_SENSING_ANODE_LOCATION_1: NORMAL
MDC_IDC_SET_LEADCHNL_RA_SENSING_CATHODE_ELECTRODE_1: NORMAL
MDC_IDC_SET_LEADCHNL_RA_SENSING_CATHODE_LOCATION_1: NORMAL
MDC_IDC_SET_LEADCHNL_RA_SENSING_POLARITY: NORMAL
MDC_IDC_SET_LEADCHNL_RA_SENSING_SENSITIVITY: 0.2 MV
MDC_IDC_SET_LEADCHNL_RV_PACING_AMPLITUDE: 1 V
MDC_IDC_SET_LEADCHNL_RV_PACING_ANODE_ELECTRODE_1: NORMAL
MDC_IDC_SET_LEADCHNL_RV_PACING_ANODE_LOCATION_1: NORMAL
MDC_IDC_SET_LEADCHNL_RV_PACING_CAPTURE_MODE: NORMAL
MDC_IDC_SET_LEADCHNL_RV_PACING_CATHODE_ELECTRODE_1: NORMAL
MDC_IDC_SET_LEADCHNL_RV_PACING_CATHODE_LOCATION_1: NORMAL
MDC_IDC_SET_LEADCHNL_RV_PACING_POLARITY: NORMAL
MDC_IDC_SET_LEADCHNL_RV_PACING_PULSEWIDTH: 0.5 MS
MDC_IDC_SET_LEADCHNL_RV_SENSING_ADAPTATION_MODE: NORMAL
MDC_IDC_SET_LEADCHNL_RV_SENSING_ANODE_ELECTRODE_1: NORMAL
MDC_IDC_SET_LEADCHNL_RV_SENSING_ANODE_LOCATION_1: NORMAL
MDC_IDC_SET_LEADCHNL_RV_SENSING_CATHODE_ELECTRODE_1: NORMAL
MDC_IDC_SET_LEADCHNL_RV_SENSING_CATHODE_LOCATION_1: NORMAL
MDC_IDC_SET_LEADCHNL_RV_SENSING_POLARITY: NORMAL
MDC_IDC_SET_LEADCHNL_RV_SENSING_SENSITIVITY: 0.5 MV
MDC_IDC_STAT_AT_BURDEN_PERCENT: 1 %
MDC_IDC_STAT_AT_DTM_END: NORMAL
MDC_IDC_STAT_AT_DTM_START: NORMAL
MDC_IDC_STAT_AT_MODE_SW_COUNT: 29
MDC_IDC_STAT_AT_MODE_SW_COUNT_PER_DAY: 0
MDC_IDC_STAT_AT_MODE_SW_MAX_DURATION: 14 S
MDC_IDC_STAT_AT_MODE_SW_PERCENT_TIME: 1 %
MDC_IDC_STAT_BRADY_AP_VP_PERCENT: 99 %
MDC_IDC_STAT_BRADY_AP_VS_PERCENT: 1 %
MDC_IDC_STAT_BRADY_AS_VP_PERCENT: 1 %
MDC_IDC_STAT_BRADY_AS_VS_PERCENT: 1 %
MDC_IDC_STAT_BRADY_DTM_END: NORMAL
MDC_IDC_STAT_BRADY_DTM_START: NORMAL
MDC_IDC_STAT_BRADY_RA_PERCENT_PACED: 99 %
MDC_IDC_STAT_BRADY_RV_PERCENT_PACED: 99 %
MDC_IDC_STAT_CRT_DTM_END: NORMAL
MDC_IDC_STAT_CRT_DTM_START: NORMAL
MDC_IDC_STAT_HEART_RATE_ATRIAL_MAX: 330 {BEATS}/MIN
MDC_IDC_STAT_HEART_RATE_ATRIAL_MEAN: 75 {BEATS}/MIN
MDC_IDC_STAT_HEART_RATE_ATRIAL_MIN: 50 {BEATS}/MIN
MDC_IDC_STAT_HEART_RATE_DTM_END: NORMAL
MDC_IDC_STAT_HEART_RATE_DTM_START: NORMAL
MDC_IDC_STAT_HEART_RATE_VENTRICULAR_MAX: 330 {BEATS}/MIN
MDC_IDC_STAT_HEART_RATE_VENTRICULAR_MEAN: 75 {BEATS}/MIN
MDC_IDC_STAT_HEART_RATE_VENTRICULAR_MIN: 40 {BEATS}/MIN

## 2019-01-23 ENCOUNTER — OFFICE VISIT (OUTPATIENT)
Dept: FAMILY MEDICINE | Facility: CLINIC | Age: 84
End: 2019-01-23
Payer: MEDICARE

## 2019-01-23 VITALS
SYSTOLIC BLOOD PRESSURE: 102 MMHG | WEIGHT: 181.5 LBS | RESPIRATION RATE: 16 BRPM | OXYGEN SATURATION: 97 % | HEART RATE: 73 BPM | BODY MASS INDEX: 25.98 KG/M2 | DIASTOLIC BLOOD PRESSURE: 60 MMHG | TEMPERATURE: 97.8 F | HEIGHT: 70 IN

## 2019-01-23 DIAGNOSIS — L84 CALLUS OF FOOT: ICD-10-CM

## 2019-01-23 DIAGNOSIS — J32.9 CHRONIC SINUSITIS, UNSPECIFIED LOCATION: Primary | ICD-10-CM

## 2019-01-23 PROCEDURE — 99213 OFFICE O/P EST LOW 20 MIN: CPT | Performed by: FAMILY MEDICINE

## 2019-01-23 ASSESSMENT — MIFFLIN-ST. JEOR: SCORE: 1519.53

## 2019-01-23 NOTE — PATIENT INSTRUCTIONS
We will obtain a CT scan of the sinuses to further evaluate the chronic sinus condition that he has.  We can will set up follow-up after reviewing that test.  The patient will follow up on the callus on his foot at his convenience.  Can continue to use Mucinex.

## 2019-01-23 NOTE — PROGRESS NOTES
SUBJECTIVE:   Abbe Lambert is a 84 year old male who presents to clinic today for the following health issues:      RESPIRATORY SYMPTOMS  Sinus Infection      Duration: Increased symptoms over the last 2 weeks     Description  nasal congestion and post nasal drip, eye crusting, post nasal drip    Severity: mild    Accompanying signs and symptoms: See above    History (predisposing factors):  Has seen ENT for sinus infection/inflammation    Precipitating or alleviating factors: None    Therapies tried and outcome: Mucinex has helped some.      Left foot pain      Duration: intermittent and ongoing    Description (location/character/radiation): Left foot    Intensity:  mild    Accompanying signs and symptoms: Callus that is painful    History (similar episodes/previous evaluation): None    Precipitating or alleviating factors: None    Therapies tried and outcome: None       Problem list and histories reviewed & adjusted, as indicated.  Additional history: as documented    Patient Active Problem List   Diagnosis     Health Care Home     Allergic rhinitis     Peripheral edema     Polymyalgia rheumatica (H)     Advanced directives, counseling/discussion     Ischemic cardiomyopathy     Paroxysmal atrial fibrillation (H)     Coronary artery disease involving native coronary artery of native heart without angina pectoris     CKD (chronic kidney disease) stage 3, GFR 30-59 ml/min (H)     GERD (gastroesophageal reflux disease)     Tachy-alix syndrome (H)     AAA (abdominal aortic aneurysm) (H)     Old myocardial infarction     Chronic combined systolic and diastolic congestive heart failure (H)     Essential hypertension, benign     Mixed hyperlipidemia     Aftercare following knee joint replacement surgery, unspecified laterality     Physical deconditioning     Post herpetic neuralgia     Presbycusis of both ears     Chronic non-seasonal allergic rhinitis, unspecified trigger     Skin lesion     History of prostate  "cancer 2003 w/po resection prostate     PAD (peripheral artery disease) (H)     Microalbuminuria     Mild persistent asthma without complication     Pre-diabetes     History of CVA (cerebrovascular accident)     Past Surgical History:   Procedure Laterality Date     ARTHROPLASTY KNEE Left 10/5/2016    Procedure: ARTHROPLASTY KNEE;  Surgeon: Keshav Zamudio MD;  Location: [facility] OR     CARDIAC SURGERY  12/03/2012    pacemaker ; CABG 1998     CHOLECYSTECTOMY       COLONOSCOPY       ENT SURGERY  5-    sinus     EXTRACAPSULAR CATARACT EXTRATION WITH INTRAOCULAR LENS IMPLANT       GENITOURINARY SURGERY      prostatectomy     IMPLANT PACEMAKER  12-3-12    [facility]     PROSTATE SURGERY         Social History     Tobacco Use     Smoking status: Never Smoker     Smokeless tobacco: Never Used   Substance Use Topics     Alcohol use: No     Alcohol/week: 0.0 oz     Family History   Problem Relation Age of Onset     Cerebrovascular Disease Father      Heart Disease Father      Heart Disease Brother      Coronary Artery Disease Brother         MI age 50     Depression Daughter         raped in high school     Unknown/Adopted Maternal Grandmother      Unknown/Adopted Maternal Grandfather      Unknown/Adopted Paternal Grandmother      Unknown/Adopted Paternal Grandfather            Reviewed and updated as needed this visit by clinical staff  Tobacco  Allergies  Meds  Med Hx  Surg Hx  Fam Hx  Soc Hx      Reviewed and updated as needed this visit by Provider         ROS:  Constitutional, HEENT, cardiovascular, pulmonary, gi and gu systems are negative, except as otherwise noted.    OBJECTIVE:                                                    /60 (Cuff Size: Adult Regular)   Pulse 73   Temp 97.8  F (36.6  C) (Tympanic)   Resp 16   Ht 1.778 m (5' 10\")   Wt 82.3 kg (181 lb 8 oz)   SpO2 97%   BMI 26.04 kg/m    Body mass index is 26.04 kg/m .  GENERAL APPEARANCE: healthy, alert and no distress     "     ASSESSMENT/PLAN:                                                        ICD-10-CM    1. Chronic sinusitis, unspecified location J32.9 CT Facial Bones without Contrast   2. Callus of foot L84        Patient Instructions   We will obtain a CT scan of the sinuses to further evaluate the chronic sinus condition that he has.  We can will set up follow-up after reviewing that test.  The patient will follow up on the callus on his foot at his convenience.  Can continue to use Mucinex.      Eliezer Shah MD  [facility]

## 2019-01-25 ENCOUNTER — TRANSFERRED RECORDS (OUTPATIENT)
Dept: HEALTH INFORMATION MANAGEMENT | Facility: CLINIC | Age: 84
End: 2019-01-25

## 2019-02-06 DIAGNOSIS — I25.10 CORONARY ARTERY DISEASE INVOLVING NATIVE CORONARY ARTERY OF NATIVE HEART WITHOUT ANGINA PECTORIS: ICD-10-CM

## 2019-02-06 RX ORDER — ROSUVASTATIN CALCIUM 40 MG/1
40 TABLET, COATED ORAL DAILY
Qty: 90 TABLET | Refills: 2 | Status: SHIPPED | OUTPATIENT
Start: 2019-02-06 | End: 2019-11-22

## 2019-02-06 NOTE — TELEPHONE ENCOUNTER
Received refill request for:  Rosuvastatin  Last OV was: 11/19/18 with HELEN Huerta  Labs/EKG: last lipid 10/24/2018  F/U scheduled: orders in [facility] for 4/2019  New script sent to: [facility]

## 2019-02-11 ENCOUNTER — OFFICE VISIT (OUTPATIENT)
Dept: PODIATRY | Facility: CLINIC | Age: 84
End: 2019-02-11
Payer: MEDICARE

## 2019-02-11 VITALS
HEIGHT: 70 IN | SYSTOLIC BLOOD PRESSURE: 98 MMHG | WEIGHT: 178 LBS | DIASTOLIC BLOOD PRESSURE: 56 MMHG | BODY MASS INDEX: 25.48 KG/M2

## 2019-02-11 DIAGNOSIS — L97.521 ULCER OF LEFT FOOT, LIMITED TO BREAKDOWN OF SKIN (H): Primary | ICD-10-CM

## 2019-02-11 DIAGNOSIS — M79.672 LEFT FOOT PAIN: ICD-10-CM

## 2019-02-11 DIAGNOSIS — R73.03 PRE-DIABETES: ICD-10-CM

## 2019-02-11 PROCEDURE — 99213 OFFICE O/P EST LOW 20 MIN: CPT | Mod: 25 | Performed by: PODIATRIST

## 2019-02-11 PROCEDURE — 97597 DBRDMT OPN WND 1ST 20 CM/<: CPT | Performed by: PODIATRIST

## 2019-02-11 ASSESSMENT — MIFFLIN-ST. JEOR: SCORE: 1498.65

## 2019-02-11 NOTE — LETTER
"    2/11/2019         RE: Abbe Lambert  [address] [address] [address]        Dear Colleague,    Thank you for referring your patient, Abbe Lambert, to the [facility]. Please see a copy of my visit note below.    ASSESSMENT/PLAN:    Encounter Diagnoses   Name Primary?     Ulcer of left foot, limited to breakdown of skin (H) Yes     Left foot pain      Pre-diabetes      Wound Care Recommendations:    1)  Keep the wound covered by a bandage when bathing.    2)  Gently clean the wound with soap water, separate from bath/shower water.      3)  Each day, apply a topical antibiotic ointment to the wound (Neosporin, Triple antibiotic, Bacitracin).   Cover with large band-aid or gauze.      5)  Please seek immediate medical attention if any increasing redness, drainage, smell, or pain related to the wound.     6)  Please return to clinic in the period of time requested by Dr. Hatfield.      The clinical signs of infection were reviewed and included in his AVS.    Once the ulcer heals, he can try to keep the lesion filed down with a pumice stone.      I explained that I am happy to pair the callus down, should he have insurance coverage for this.      Excisional Debridement    The procedure discussed.  This included the goals of removing non-viable tissue, evaluating the full extent of wound, and promoting wound healing.  Pt provided verbal and written consent.  A \"Time Out\" was called.     Using a sterile #15 blade and tissue nippers, excisional debridment of the left foot ulcer was performed, to the level of skin.   The hyperkeratotic eschar surrounding the wound was removed, by excising skin edges back to healthy, bleeding tissue .  Other non-viable tissue was excised. The ulcer base was scraped to remove bioburden and promote healing.  The area debrided was less than 20 square cm.   There was moderate bleeding with the procedure. No anesthesia needed due to peripheral " neuropathy.   A sterile dressing applied.        Body mass index is 25.54 kg/m .          Keshav Hatfield DPM, FACFAS, MS    [facility] Department of Podiatry/Foot & Ankle Surgery      ____________________________________________________________________    HPI:         Chief Complaint: callus on left foot  Onset of problem: long term  Pain/ discomfort is described as:  throbbing  Ratin/10   Frequency:  daily    The pain is made worse with walking  I evaluated him last on 18 and trimmed the callus.  He was referred for diabetic shoes and custom orthoses.  He did not like the orthoses, they felt too soft and he didn't feel balanced.   Type 2 DM;  Last Hgb A1C = 6.3%  No other concerns.    Patient Active Problem List   Diagnosis     Health Care Home     Allergic rhinitis     Peripheral edema     Polymyalgia rheumatica (H)     Advanced directives, counseling/discussion     Ischemic cardiomyopathy     Paroxysmal atrial fibrillation (H)     Coronary artery disease involving native coronary artery of native heart without angina pectoris     CKD (chronic kidney disease) stage 3, GFR 30-59 ml/min (H)     GERD (gastroesophageal reflux disease)     Tachy-alix syndrome (H)     AAA (abdominal aortic aneurysm) (H)     Old myocardial infarction     Chronic combined systolic and diastolic congestive heart failure (H)     Essential hypertension, benign     Mixed hyperlipidemia     Aftercare following knee joint replacement surgery, unspecified laterality     Physical deconditioning     Post herpetic neuralgia     Presbycusis of both ears     Chronic non-seasonal allergic rhinitis, unspecified trigger     Skin lesion     History of prostate cancer  w/po resection prostate     PAD (peripheral artery disease) (H)     Microalbuminuria     Mild persistent asthma without complication     Pre-diabetes     History of CVA (cerebrovascular accident)     Past Surgical History:   Procedure Laterality Date     ARTHROPLASTY KNEE Left  10/5/2016    Procedure: ARTHROPLASTY KNEE;  Surgeon: Keshav Zamudio MD;  Location: [facility] OR     CARDIAC SURGERY  12/03/2012    pacemaker ; CABG 1998     CHOLECYSTECTOMY       COLONOSCOPY       ENT SURGERY  5-    sinus     EXTRACAPSULAR CATARACT EXTRATION WITH INTRAOCULAR LENS IMPLANT       GENITOURINARY SURGERY      prostatectomy     IMPLANT PACEMAKER  12-3-12    [facility]     PROSTATE SURGERY       Current Outpatient Medications   Medication Sig Dispense Refill     aspirin 81 MG tablet Take 81 mg by mouth daily  30 tablet      azelastine (ASTELIN) 137 MCG/SPRAY nasal spray [place] 1 spray into both nostrils 2 times daily.       beclomethasone (QVAR) 80 MCG/ACT Inhaler Inhale 2 puffs into the lungs 2 times daily 2 Inhaler 11     carvedilol (COREG) 6.25 MG tablet Take 1 tablet (6.25 mg) by mouth 2 times daily (with meals) 180 tablet 1     cetirizine (ZYRTEC) 10 MG tablet Take 10 mg by mouth every morning        guaiFENesin (MUCINEX) 600 MG 12 hr tablet Take 600 mg by mouth 2-3 x day       lisinopril (PRINIVIL/ZESTRIL) 5 MG tablet Take 1 tablet (5 mg) by mouth daily 90 tablet 3     MELATONIN PO Take 5 mg by mouth At Bedtime       mometasone (NASONEX) 50 MCG/ACT nasal spray Spray 2 sprays into both nostrils daily as needed.       nitroglycerin (NITROSTAT) 0.4 MG SL tablet Place 1 tablet (0.4 mg) under the tongue every 5 minutes as needed for chest pain 25 tablet 3     polyethylene glycol (MIRALAX/GLYCOLAX) packet Take 17 g by mouth daily        rosuvastatin (CRESTOR) 40 MG tablet Take 1 tablet (40 mg) by mouth daily 90 tablet 2     torsemide (DEMADEX) 10 MG tablet 10mg (1 tab) by mouth every other day (Patient taking differently: every other day 10mg (1 tab) by mouth every other day) 45 tablet 3     albuterol (PROAIR HFA/PROVENTIL HFA/VENTOLIN HFA) 108 (90 BASE) MCG/ACT Inhaler Inhale 2 puffs into the lungs as needed for shortness of breath / dyspnea or wheezing (Patient not taking: Reported on 1/23/2019) 3  "Inhaler 11     Emollient (CERAVE) LOTN Externally apply topically 2 times daily as needed (Patient not taking: Reported on 1/23/2019)       triamcinolone (KENALOG) 0.1 % cream Apply sparingly to affected area twotimes daily as needed (Patient not taking: Reported on 1/23/2019) 30 g 5     EXAM:    Vitals: BP 98/56 (BP Location: Right arm, Patient Position: Chair, Cuff Size: Adult Regular)   Ht 1.778 m (5' 10\")   Wt 80.7 kg (178 lb)   BMI 25.54 kg/m     BMI: Body mass index is 25.54 kg/m .  Height: 5' 10\"    Constitutional/ general:  Pt is in no apparent distress, appears well-nourished.  Cooperative with history and physical exam.     Vascular:  Pedal pulses are palpable bilaterally for both the DP and PT arteries.  CFT < 3 sec.  No edema.      Neuro:  Alert and oriented x 3. Coordinated gait.  Light touch sensation is intact to the L4, L5, S1 distributions. No obvious deficits.  No evidence of neurological-based weakness, spasticity, or contracture in the lower extremities.     Protective sensation is diminished bilateral foot per [place]-[facility] Monofilament testing.    Derm:  Thick area of hyperkeratotic skin sub left first metatarsophalangeal joint.  There is evidence of intra-epidermal bleeding in several areas, bloodblister like. The area involved is approximately 1.5cm diameter, with the thickest hyperkeratotic skin centrally. Deep to this is a 0.5cm L x 0.2cm W x 0.1cm deep ulceration, limited to the layer of skin.    Musculoskeletal:  Lower extremity muscle strength is normal. Patient is ambulatory without an assistive device or brace . With WB there is a decreased medial longitudinal arch, forefoot abduction, and rearfoot eversion.  Ankle dorsiflexion is limited with knee extended and slightly increased with knee flexed.        Keshav Hatfield DPM, FACFAS, MS    [facility] [place] Department of Podiatry/Foot & Ankle Surgery              Again, thank you for allowing me to participate in the care of your patient.  "       Sincerely,        Keshav Hatfield, LIANM

## 2019-02-11 NOTE — PATIENT INSTRUCTIONS
Thank you for choosing [facility]!    DR. MEDRANO'S CLINIC LOCATIONS     MONDAY - [place] WEDNESDAY - [place]   [address] [place] [address] [address] [address]   [phone] / [phone] / [phone]       THURSDAY - [place] SCHEDULE SURGERY: [phone]   [address] APPOINTMENTS: [phone]   [place] [address] BILLING QUESTIONS: [phone] / [phone]       Wound Care Recommendations:    1)  Keep the wound covered by a bandage when bathing.    2)  Gently clean the wound with soap water, separate from bath/shower water.      3)  Each day, apply a topical antibiotic ointment to the wound (Neosporin, Triple antibiotic, Bacitracin).   Cover with large band-aid or gauze.      5)  Please seek immediate medical attention if any increasing redness, drainage, smell, or pain related to the wound.     6)  Please return to clinic in the period of time requested by Dr. Medrano.            SIGNS OF INFECTION  expanding redness around the wound   yellow or greenish-colored pus or cloudy wound drainage   red streaking spreading from the wound   increased swelling, tenderness, or pain around the wound   fever  *If you notice any of these signs of infection, call us right away!          FYI: BODY MASS INDEX (BMI)  Many things can cause foot and ankle problems. Foot structure, activity level, foot mechanics and injuries are common causes of pain. One very important issue that often goes unmentioned, is body weight. Extra weight can cause increased stress on muscles, ligaments, bones and tendons. Sometimes just a few extra pounds is all it takes to put one over her/his threshold. Without reducing that stress, it can be difficult to alleviate pain. Some people are uncomfortable addressing this issue, but we feel it is important for you to think about it. As Foot & Ankle specialists, our job is addressing the lower  extremity problem and possible causes. Regarding extra body weight, we encourage patients to discuss diet and weight management plans with their primary care doctors. It is this team approach that gives you the best opportunity for pain relief and getting you back on your feet.

## 2019-02-11 NOTE — PROGRESS NOTES
"ASSESSMENT/PLAN:    Encounter Diagnoses   Name Primary?     Ulcer of left foot, limited to breakdown of skin (H) Yes     Left foot pain      Pre-diabetes      Wound Care Recommendations:    1)  Keep the wound covered by a bandage when bathing.    2)  Gently clean the wound with soap water, separate from bath/shower water.      3)  Each day, apply a topical antibiotic ointment to the wound (Neosporin, Triple antibiotic, Bacitracin).   Cover with large band-aid or gauze.      5)  Please seek immediate medical attention if any increasing redness, drainage, smell, or pain related to the wound.     6)  Please return to clinic in the period of time requested by Dr. Hatfield.      The clinical signs of infection were reviewed and included in his AVS.    Once the ulcer heals, he can try to keep the lesion filed down with a pumice stone.      I explained that I am happy to pair the callus down, should he have insurance coverage for this.      Excisional Debridement    The procedure discussed.  This included the goals of removing non-viable tissue, evaluating the full extent of wound, and promoting wound healing.  Pt provided verbal and written consent.  A \"Time Out\" was called.     Using a sterile #15 blade and tissue nippers, excisional debridment of the left foot ulcer was performed, to the level of skin.   The hyperkeratotic eschar surrounding the wound was removed, by excising skin edges back to healthy, bleeding tissue .  Other non-viable tissue was excised. The ulcer base was scraped to remove bioburden and promote healing.  The area debrided was less than 20 square cm.   There was moderate bleeding with the procedure. No anesthesia needed due to peripheral neuropathy.   A sterile dressing applied.        Body mass index is 25.54 kg/m .          Keshav Hatfield DPM, FACFAS, [facility] Department of Podiatry/Foot & Ankle Surgery      ____________________________________________________________________    HPI:     "     Chief Complaint: callus on left foot  Onset of problem: long term  Pain/ discomfort is described as:  throbbing  Ratin/10   Frequency:  daily    The pain is made worse with walking  I evaluated him last on 18 and trimmed the callus.  He was referred for diabetic shoes and custom orthoses.  He did not like the orthoses, they felt too soft and he didn't feel balanced.   Type 2 DM;  Last Hgb A1C = 6.3%  No other concerns.    Patient Active Problem List   Diagnosis     Health Care Home     Allergic rhinitis     Peripheral edema     Polymyalgia rheumatica (H)     Advanced directives, counseling/discussion     Ischemic cardiomyopathy     Paroxysmal atrial fibrillation (H)     Coronary artery disease involving native coronary artery of native heart without angina pectoris     CKD (chronic kidney disease) stage 3, GFR 30-59 ml/min (H)     GERD (gastroesophageal reflux disease)     Tachy-alix syndrome (H)     AAA (abdominal aortic aneurysm) (H)     Old myocardial infarction     Chronic combined systolic and diastolic congestive heart failure (H)     Essential hypertension, benign     Mixed hyperlipidemia     Aftercare following knee joint replacement surgery, unspecified laterality     Physical deconditioning     Post herpetic neuralgia     Presbycusis of both ears     Chronic non-seasonal allergic rhinitis, unspecified trigger     Skin lesion     History of prostate cancer  w/po resection prostate     PAD (peripheral artery disease) (H)     Microalbuminuria     Mild persistent asthma without complication     Pre-diabetes     History of CVA (cerebrovascular accident)     Past Surgical History:   Procedure Laterality Date     ARTHROPLASTY KNEE Left 10/5/2016    Procedure: ARTHROPLASTY KNEE;  Surgeon: Keshav Zamudio MD;  Location: [facility] OR     CARDIAC SURGERY  2012    pacemaker ; CABG      CHOLECYSTECTOMY       COLONOSCOPY       ENT SURGERY  2007    sinus     EXTRACAPSULAR CATARACT  EXTRATION WITH INTRAOCULAR LENS IMPLANT       GENITOURINARY SURGERY      prostatectomy     IMPLANT PACEMAKER  12-3-12    [facility]     PROSTATE SURGERY       Current Outpatient Medications   Medication Sig Dispense Refill     aspirin 81 MG tablet Take 81 mg by mouth daily  30 tablet      azelastine (ASTELIN) 137 MCG/SPRAY nasal spray [place] 1 spray into both nostrils 2 times daily.       beclomethasone (QVAR) 80 MCG/ACT Inhaler Inhale 2 puffs into the lungs 2 times daily 2 Inhaler 11     carvedilol (COREG) 6.25 MG tablet Take 1 tablet (6.25 mg) by mouth 2 times daily (with meals) 180 tablet 1     cetirizine (ZYRTEC) 10 MG tablet Take 10 mg by mouth every morning        guaiFENesin (MUCINEX) 600 MG 12 hr tablet Take 600 mg by mouth 2-3 x day       lisinopril (PRINIVIL/ZESTRIL) 5 MG tablet Take 1 tablet (5 mg) by mouth daily 90 tablet 3     MELATONIN PO Take 5 mg by mouth At Bedtime       mometasone (NASONEX) 50 MCG/ACT nasal spray Spray 2 sprays into both nostrils daily as needed.       nitroglycerin (NITROSTAT) 0.4 MG SL tablet Place 1 tablet (0.4 mg) under the tongue every 5 minutes as needed for chest pain 25 tablet 3     polyethylene glycol (MIRALAX/GLYCOLAX) packet Take 17 g by mouth daily        rosuvastatin (CRESTOR) 40 MG tablet Take 1 tablet (40 mg) by mouth daily 90 tablet 2     torsemide (DEMADEX) 10 MG tablet 10mg (1 tab) by mouth every other day (Patient taking differently: every other day 10mg (1 tab) by mouth every other day) 45 tablet 3     albuterol (PROAIR HFA/PROVENTIL HFA/VENTOLIN HFA) 108 (90 BASE) MCG/ACT Inhaler Inhale 2 puffs into the lungs as needed for shortness of breath / dyspnea or wheezing (Patient not taking: Reported on 1/23/2019) 3 Inhaler 11     Emollient (CERAVE) LOTN Externally apply topically 2 times daily as needed (Patient not taking: Reported on 1/23/2019)       triamcinolone (KENALOG) 0.1 % cream Apply sparingly to affected area twotimes daily as needed (Patient not taking:  "Reported on 1/23/2019) 30 g 5     EXAM:    Vitals: BP 98/56 (BP Location: Right arm, Patient Position: Chair, Cuff Size: Adult Regular)   Ht 1.778 m (5' 10\")   Wt 80.7 kg (178 lb)   BMI 25.54 kg/m    BMI: Body mass index is 25.54 kg/m .  Height: 5' 10\"    Constitutional/ general:  Pt is in no apparent distress, appears well-nourished.  Cooperative with history and physical exam.     Vascular:  Pedal pulses are palpable bilaterally for both the DP and PT arteries.  CFT < 3 sec.  No edema.      Neuro:  Alert and oriented x 3. Coordinated gait.  Light touch sensation is intact to the L4, L5, S1 distributions. No obvious deficits.  No evidence of neurological-based weakness, spasticity, or contracture in the lower extremities.     Protective sensation is diminished bilateral foot per [place] Monofilament testing.    Derm:  Thick area of hyperkeratotic skin sub left first metatarsophalangeal joint.  There is evidence of intra-epidermal bleeding in several areas, bloodblister like. The area involved is approximately 1.5cm diameter, with the thickest hyperkeratotic skin centrally. Deep to this is a 0.5cm L x 0.2cm W x 0.1cm deep ulceration, limited to the layer of skin.    Musculoskeletal:  Lower extremity muscle strength is normal. Patient is ambulatory without an assistive device or brace . With WB there is a decreased medial longitudinal arch, forefoot abduction, and rearfoot eversion.  Ankle dorsiflexion is limited with knee extended and slightly increased with knee flexed.        Keshav Hatfield DPM, FACFAS, MS    [facility] Department of Podiatry/Foot & Ankle Surgery            "

## 2019-02-13 ENCOUNTER — OFFICE VISIT (OUTPATIENT)
Dept: FAMILY MEDICINE | Facility: CLINIC | Age: 84
End: 2019-02-13
Payer: MEDICARE

## 2019-02-13 VITALS
TEMPERATURE: 97.1 F | HEART RATE: 80 BPM | BODY MASS INDEX: 25.34 KG/M2 | OXYGEN SATURATION: 92 % | HEIGHT: 70 IN | RESPIRATION RATE: 12 BRPM | SYSTOLIC BLOOD PRESSURE: 100 MMHG | DIASTOLIC BLOOD PRESSURE: 50 MMHG | WEIGHT: 177 LBS

## 2019-02-13 DIAGNOSIS — R73.03 PRE-DIABETES: ICD-10-CM

## 2019-02-13 DIAGNOSIS — I50.42 CHRONIC COMBINED SYSTOLIC AND DIASTOLIC CONGESTIVE HEART FAILURE (H): ICD-10-CM

## 2019-02-13 DIAGNOSIS — R13.10 DYSPHAGIA, UNSPECIFIED TYPE: Primary | ICD-10-CM

## 2019-02-13 DIAGNOSIS — E11.22 TYPE 2 DIABETES MELLITUS WITH STAGE 3 CHRONIC KIDNEY DISEASE, WITHOUT LONG-TERM CURRENT USE OF INSULIN (H): ICD-10-CM

## 2019-02-13 DIAGNOSIS — K21.9 GASTROESOPHAGEAL REFLUX DISEASE WITHOUT ESOPHAGITIS: ICD-10-CM

## 2019-02-13 DIAGNOSIS — N18.30 TYPE 2 DIABETES MELLITUS WITH STAGE 3 CHRONIC KIDNEY DISEASE, WITHOUT LONG-TERM CURRENT USE OF INSULIN (H): ICD-10-CM

## 2019-02-13 DIAGNOSIS — I10 BENIGN ESSENTIAL HYPERTENSION: ICD-10-CM

## 2019-02-13 PROCEDURE — 99214 OFFICE O/P EST MOD 30 MIN: CPT | Performed by: FAMILY MEDICINE

## 2019-02-13 RX ORDER — TORSEMIDE 10 MG/1
TABLET ORAL
Qty: 90 TABLET | Refills: 3
Start: 2019-02-13 | End: 2019-09-24

## 2019-02-13 RX ORDER — LISINOPRIL 5 MG/1
2.5 TABLET ORAL DAILY
Qty: 90 TABLET | Refills: 3
Start: 2019-02-13 | End: 2019-05-30

## 2019-02-13 ASSESSMENT — MIFFLIN-ST. JEOR: SCORE: 1494.12

## 2019-02-13 NOTE — NURSING NOTE
"Chief Complaint   Patient presents with     Pharyngitis     /50   Pulse 80   Temp 97.1  F (36.2  C) (Oral)   Resp 12   Ht 1.778 m (5' 10\")   Wt 80.3 kg (177 lb)   SpO2 92%   BMI 25.40 kg/m   Estimated body mass index is 25.4 kg/m  as calculated from the following:    Height as of this encounter: 1.778 m (5' 10\").    Weight as of this encounter: 80.3 kg (177 lb).  BP completed using cuff size: ivan Robertson CMA    Health Maintenance Due   Topic Date Due     ZOSTER IMMUNIZATION (2 of 3) 02/07/2008     HEMOGLOBIN Q1 YR  09/12/2018     CBC Q1 YR  09/12/2018     ADVANCE DIRECTIVE PLANNING Q5 YRS  10/18/2018     A1C Q6 MO  11/18/2018     TSH W/ FREE T4 REFLEX Q2 YEAR  12/05/2018     MICROALBUMIN Q1 YEAR  12/26/2018     EYE EXAM Q1 YEAR  01/01/2019     ASTHMA CONTROL TEST Q6 MOS  02/09/2019     Health Maintenance reviewed at today's visit patient asked to schedule/complete:   Diabetes:  Patient agrees to schedule  Immunizations:  Patient agrees to schedule    "

## 2019-02-13 NOTE — PROGRESS NOTES
SUBJECTIVE:   Abbe Lambert is a 85 year old male who presents to clinic today for the following health issues:    Trouble Swallowing      Duration: x 3 weeks    Description (location/character/radiation): Hard to get food down    Intensity:  mild    Accompanying signs and symptoms: None    History (similar episodes/previous evaluation): Yes about 20 years ago and had an upper endoscopy done at that time.  He is a little unclear as to what they did.  It sounds like they may have tried doing a dilation.    Precipitating or alleviating factors: None    Therapies tried and outcome: None         Problem list and histories reviewed & adjusted, as indicated.  Additional history: as documented    Patient Active Problem List   Diagnosis     Health Care Home     Allergic rhinitis     Peripheral edema     Polymyalgia rheumatica (H)     Advanced directives, counseling/discussion     Ischemic cardiomyopathy     Paroxysmal atrial fibrillation (H)     Coronary artery disease involving native coronary artery of native heart without angina pectoris     CKD (chronic kidney disease) stage 3, GFR 30-59 ml/min (H)     GERD (gastroesophageal reflux disease)     Tachy-alix syndrome (H)     AAA (abdominal aortic aneurysm) (H)     Old myocardial infarction     Chronic combined systolic and diastolic congestive heart failure (H)     Essential hypertension, benign     Mixed hyperlipidemia     Aftercare following knee joint replacement surgery, unspecified laterality     Physical deconditioning     Post herpetic neuralgia     Presbycusis of both ears     Chronic non-seasonal allergic rhinitis, unspecified trigger     Skin lesion     History of prostate cancer 2003 w/po resection prostate     PAD (peripheral artery disease) (H)     Microalbuminuria     Mild persistent asthma without complication     Pre-diabetes     History of CVA (cerebrovascular accident)     Past Surgical History:   Procedure Laterality Date     ARTHROPLASTY KNEE Left  "10/5/2016    Procedure: ARTHROPLASTY KNEE;  Surgeon: Keshav Zamudio MD;  Location: [facility] OR     CARDIAC SURGERY  12/03/2012    pacemaker ; CABG 1998     CHOLECYSTECTOMY       COLONOSCOPY       ENT SURGERY  5-    sinus     EXTRACAPSULAR CATARACT EXTRATION WITH INTRAOCULAR LENS IMPLANT       GENITOURINARY SURGERY      prostatectomy     IMPLANT PACEMAKER  12-3-12    [facility]     PROSTATE SURGERY         Social History     Tobacco Use     Smoking status: Never Smoker     Smokeless tobacco: Never Used   Substance Use Topics     Alcohol use: No     Alcohol/week: 0.0 oz     Family History   Problem Relation Age of Onset     Cerebrovascular Disease Father      Heart Disease Father      Heart Disease Brother      Coronary Artery Disease Brother         MI age 50     Depression Daughter         raped in high school     Unknown/Adopted Maternal Grandmother      Unknown/Adopted Maternal Grandfather      Unknown/Adopted Paternal Grandmother      Unknown/Adopted Paternal Grandfather            Reviewed and updated as needed this visit by clinical staff  Tobacco  Allergies  Meds  Problems  Med Hx  Surg Hx  Fam Hx  Soc Hx        Reviewed and updated as needed this visit by Provider         ROS:  Constitutional, HEENT, cardiovascular, pulmonary, gi and gu systems are negative, except as otherwise noted.    OBJECTIVE:                                                    /50   Pulse 80   Temp 97.1  F (36.2  C) (Oral)   Resp 12   Ht 1.778 m (5' 10\")   Wt 80.3 kg (177 lb)   SpO2 92%   BMI 25.40 kg/m    Body mass index is 25.4 kg/m .  GENERAL APPEARANCE: healthy, alert and no distress         ASSESSMENT/PLAN:                                                        ICD-10-CM    1. Dysphagia, unspecified type R13.10 GASTROENTEROLOGY ADULT REF PROCEDURE ONLY Other; [facility] [phone] ([place])   2. Gastroesophageal reflux disease without esophagitis K21.9 GASTROENTEROLOGY ADULT REF PROCEDURE ONLY Other; [facility] " [phone] ([place])   3. Pre-diabetes R73.03 HEMOGLOBIN A1C     Albumin Random Urine Quantitative with Creat Ratio     TSH WITH FREE T4 REFLEX   4. Type 2 diabetes mellitus with stage 3 chronic kidney disease, without long-term current use of insulin (H) E11.22 HEMOGLOBIN A1C    N18.3 Albumin Random Urine Quantitative with Creat Ratio     TSH WITH FREE T4 REFLEX     lisinopril (PRINIVIL/ZESTRIL) 5 MG tablet   5. Benign essential hypertension I10 Hemoglobin     lisinopril (PRINIVIL/ZESTRIL) 5 MG tablet     torsemide (DEMADEX) 10 MG tablet   6. Chronic combined systolic and diastolic congestive heart failure (H) I50.42        Patient Instructions   I will send the patient for an upper endoscopy.  We will check labs as noted to follow-up on his diabetes.  I refilled his medications.  We will decrease his lisinopril to a half a tablet daily.  Follow-up will be in 1 month to repeat blood pressure check.  He will follow-up sooner if his labs dictate.      Eliezer Shah MD  [facility]

## 2019-02-14 NOTE — PATIENT INSTRUCTIONS
I will send the patient for an upper endoscopy.  We will check labs as noted to follow-up on his diabetes.  I refilled his medications.  We will decrease his lisinopril to a half a tablet daily.  Follow-up will be in 1 month to repeat blood pressure check.  He will follow-up sooner if his labs dictate.

## 2019-03-08 ENCOUNTER — ANCILLARY PROCEDURE (OUTPATIENT)
Dept: CARDIOLOGY | Facility: CLINIC | Age: 84
End: 2019-03-08
Attending: INTERNAL MEDICINE
Payer: MEDICARE

## 2019-03-08 ENCOUNTER — OFFICE VISIT (OUTPATIENT)
Dept: CARDIOLOGY | Facility: CLINIC | Age: 84
End: 2019-03-08
Payer: MEDICARE

## 2019-03-08 VITALS
DIASTOLIC BLOOD PRESSURE: 60 MMHG | HEART RATE: 69 BPM | SYSTOLIC BLOOD PRESSURE: 108 MMHG | BODY MASS INDEX: 25.62 KG/M2 | WEIGHT: 179 LBS | HEIGHT: 70 IN

## 2019-03-08 DIAGNOSIS — I49.5 SICK SINUS SYNDROME (H): ICD-10-CM

## 2019-03-08 DIAGNOSIS — T82.110A PACEMAKER LEAD MALFUNCTION, INITIAL ENCOUNTER: Primary | ICD-10-CM

## 2019-03-08 DIAGNOSIS — Z95.0 CARDIAC PACEMAKER IN SITU: Primary | ICD-10-CM

## 2019-03-08 DIAGNOSIS — I25.10 CORONARY ARTERY DISEASE INVOLVING NATIVE CORONARY ARTERY OF NATIVE HEART WITHOUT ANGINA PECTORIS: ICD-10-CM

## 2019-03-08 DIAGNOSIS — I48.0 PAROXYSMAL ATRIAL FIBRILLATION (H): ICD-10-CM

## 2019-03-08 PROCEDURE — 93280 PM DEVICE PROGR EVAL DUAL: CPT | Performed by: INTERNAL MEDICINE

## 2019-03-08 PROCEDURE — 99213 OFFICE O/P EST LOW 20 MIN: CPT | Mod: 25 | Performed by: INTERNAL MEDICINE

## 2019-03-08 ASSESSMENT — MIFFLIN-ST. JEOR: SCORE: 1503.19

## 2019-03-08 NOTE — PROGRESS NOTES
"Service Date: 03/08/2019      HISTORY OF PRESENT ILLNESS:    I had the pleasure of seeing Mr. Abbe Lambert, a delightful 85-year-old gentleman, because of \"noise\" in his pacemaker atrial lead.  He has been followed by Dr. Eli and LOUIS Garrison.      He has the following cardiac/medical issues:   A.  Cardiomyopathy, EF 35%-40%.  Mixed ischemic and nonischemic disease.   B.  Sick sinus syndrome with paroxysmal atrial fibrillation.  Pacemaker implanted in 2012.  Atrial lead noise.  Not on anticoagulation as he had prior SHARI ligation.   C.  AAA, small, stable per ultrasound in 2015.   D.  Hypertension under control.   E.  Chronic kidney disease.   F.  History of CVA.      We have noted intermittent atrial lead noise for several months.  This noise leads to inappropriate automatic mode switching.  The lead is a [employer] Tendril model 2088.  There has been a growing number of these leads showing signs of malfunction.      Mr. Lambert seems to be doing well at the moment.  He has not had chest pain and maintains a reasonable level of physical activity without issues.  No orthopnea or PND.  He has mild chronic lower extremity edema that bothers him somewhat.      PHYSICAL EXAMINATION:   VITAL SIGNS:  Blood pressure 108/60, pulse 69 and regular, weight 81 kilos, height 178 cm.   GENERAL:  He is a pleasant elderly male in no distress.   HEENT:  Head normocephalic.  He wears glasses.   NECK:  Supple, 1+ carotid pulses, no bruits.   LUNGS:  Clear.   CARDIOVASCULAR:  Normal JVP.  Regular rhythm.  No gallop, murmur or rub.   ABDOMEN:  Soft, nontender.   EXTREMITIES:  Mild edema.   SKIN:  Nicely healed left pectoral incision.   NEUROLOGICAL:  Alert and oriented x3.      DIAGNOSTIC STUDIES:    Sodium 139, potassium 4.2, creatinine 1.68.   I have reviewed several device interrogation reports.      IMPRESSION:   1.  Atrial lead malfunction.  This is a [employer] model 2088.  There is clear but intermittent noise on the lead " "that leads to inappropriate AMS.  Thus, I would point out that not all of his \"AF episodes\" are real AF.        There is really not much that needs to be done about the atrial lead at this time.  Sensing, pacing threshold and impedance remain stable.  The pacemaker generator longevity is estimated to be between 3 and 3.5 years.  If the lead deteriorates further, we may consider revising at time of generator replacement.      For the remainder of his care, he sees Dr. Eli.      It was my pleasure seeing this delightful gentleman.  Please feel free to contact me with any questions.        DAPHNE ALMENDAREZ MD, FACC         cc:   [place] [address]             D: 2019   T: 2019   MT: HANH      Name:     DARBY SHEETS   MRN:      [id number]        Account:      [id number]   :      1934           Service Date: 2019      Document: [id number]      "

## 2019-03-08 NOTE — LETTER
"3/8/2019      Eliezer Shah MD  [address]  [facility] [address]      RE: Abbe Lambert       Dear Colleague,    I had the pleasure of seeing Abbe Lambert in the [facility].    Service Date: 03/08/2019      HISTORY OF PRESENT ILLNESS:    I had the pleasure of seeing Mr. Abbe Lambert, a delightful 85-year-old gentleman, because of \"noise\" in his pacemaker atrial lead.  He has been followed by Dr. Eli and LOUIS Garrison.      He has the following cardiac/medical issues:   A.  Cardiomyopathy, EF 35%-40%.  Mixed ischemic and nonischemic disease.   B.  Sick sinus syndrome with paroxysmal atrial fibrillation.  Pacemaker implanted in 2012.  Atrial lead noise.  Not on anticoagulation as he had prior SHARI ligation.   C.  AAA, small, stable per ultrasound in 2015.   D.  Hypertension under control.   E.  Chronic kidney disease.   F.  History of CVA.      We have noted intermittent atrial lead noise for several months.  This noise leads to inappropriate automatic mode switching.  The lead is a [employer] Tendril model 2088.  There has been a growing number of these leads showing signs of malfunction.      Mr. Lambert seems to be doing well at the moment.  He has not had chest pain and maintains a reasonable level of physical activity without issues.  No orthopnea or PND.  He has mild chronic lower extremity edema that bothers him somewhat.      PHYSICAL EXAMINATION:   VITAL SIGNS:  Blood pressure 108/60, pulse 69 and regular, weight 81 kilos, height 178 cm.   GENERAL:  He is a pleasant elderly male in no distress.   HEENT:  Head normocephalic.  He wears glasses.   NECK:  Supple, 1+ carotid pulses, no bruits.   LUNGS:  Clear.   CARDIOVASCULAR:  Normal JVP.  Regular rhythm.  No gallop, murmur or rub.   ABDOMEN:  Soft, nontender.   EXTREMITIES:  Mild edema.   SKIN:  Nicely healed left pectoral incision.   NEUROLOGICAL:  Alert and oriented x3.      DIAGNOSTIC STUDIES:  " "  Sodium 139, potassium 4.2, creatinine 1.68.   I have reviewed several device interrogation reports.      IMPRESSION:   1.  Atrial lead malfunction.  This is a [employer] model .  There is clear but intermittent noise on the lead that leads to inappropriate AMS.  Thus, I would point out that not all of his \"AF episodes\" are real AF.        There is really not much that needs to be done about the atrial lead at this time.  Sensing, pacing threshold and impedance remain stable.  The pacemaker generator longevity is estimated to be between 3 and 3.5 years.  If the lead deteriorates further, we may consider revising at time of generator replacement.      For the remainder of his care, he sees Dr. Eli.      It was my pleasure seeing this delightful gentleman.  Please feel free to contact me with any questions.        DAPHNE ALMENDAREZ MD, [facility]         cc:   [place] [address]             D: 2019   T: 2019   MT: HANH      Name:     DARBY SHEETS   MRN:      [id number]        Account:      [id number]   :      1934           Service Date: 2019      Document: [id number]           Outpatient Encounter Medications as of 3/8/2019   Medication Sig Dispense Refill     albuterol (PROAIR HFA/PROVENTIL HFA/VENTOLIN HFA) 108 (90 BASE) MCG/ACT Inhaler Inhale 2 puffs into the lungs as needed for shortness of breath / dyspnea or wheezing 3 Inhaler 11     aspirin 81 MG tablet Take 81 mg by mouth daily  30 tablet      azelastine (ASTELIN) 137 MCG/SPRAY nasal spray [place] 1 spray into both nostrils 2 times daily.       beclomethasone (QVAR) 80 MCG/ACT Inhaler Inhale 2 puffs into the lungs 2 times daily 2 Inhaler 11     carvedilol (COREG) 6.25 MG tablet Take 1 tablet (6.25 mg) by mouth 2 times daily (with meals) 180 tablet 1     cetirizine (ZYRTEC) 10 MG tablet Take 10 mg by mouth every morning        " lisinopril (PRINIVIL/ZESTRIL) 5 MG tablet Take 0.5 tablets (2.5 mg) by mouth daily 90 tablet 3     MELATONIN PO Take 5 mg by mouth At Bedtime       mometasone (NASONEX) 50 MCG/ACT nasal spray Spray 2 sprays into both nostrils daily as needed.       nitroglycerin (NITROSTAT) 0.4 MG SL tablet Place 1 tablet (0.4 mg) under the tongue every 5 minutes as needed for chest pain 25 tablet 3     polyethylene glycol (MIRALAX/GLYCOLAX) packet Take 17 g by mouth daily        rosuvastatin (CRESTOR) 40 MG tablet Take 1 tablet (40 mg) by mouth daily 90 tablet 2     torsemide (DEMADEX) 10 MG tablet Take one half tablet every other day 90 tablet 3     triamcinolone (KENALOG) 0.1 % cream Apply sparingly to affected area twotimes daily as needed 30 g 5     Emollient (CERAVE) LOTN Externally apply topically 2 times daily as needed       guaiFENesin (MUCINEX) 600 MG 12 hr tablet Take 600 mg by mouth 2-3 x day       No facility-administered encounter medications on file as of 3/8/2019.        Again, thank you for allowing me to participate in the care of your patient.      Sincerely,    Caron Guerin MD     [facility]

## 2019-03-08 NOTE — PROGRESS NOTES
[employer] Accent (D) Pacemaker Device Check  AP: *** %   : *** %  Mode: ***          Underlying Rhythm: ***  Heart Rate: ***  Sensing: ***      Pacing Threshold: ***     Impedance: ***  Battery Status: 3.0-3.7 yrs estimated longevity   Device Site: [facility]  Atrial Arrhythmia: ***  Ventricular Arrhythmia: ***  Setting Change: ***  Care Plan: ***     I have reviewed and interpreted the device interrogation, settings, programming and nurse's summary. The device is functioning within normal device parameters. I agree with the current findings, assessment and plan.

## 2019-03-08 NOTE — PROGRESS NOTES
HPI and Plan:   See dictation    No orders of the defined types were placed in this encounter.      No orders of the defined types were placed in this encounter.      There are no discontinued medications.      No diagnosis found.    CURRENT MEDICATIONS:  Current Outpatient Medications   Medication Sig Dispense Refill     albuterol (PROAIR HFA/PROVENTIL HFA/VENTOLIN HFA) 108 (90 BASE) MCG/ACT Inhaler Inhale 2 puffs into the lungs as needed for shortness of breath / dyspnea or wheezing 3 Inhaler 11     aspirin 81 MG tablet Take 81 mg by mouth daily  30 tablet      azelastine (ASTELIN) 137 MCG/SPRAY nasal spray [place] 1 spray into both nostrils 2 times daily.       beclomethasone (QVAR) 80 MCG/ACT Inhaler Inhale 2 puffs into the lungs 2 times daily 2 Inhaler 11     carvedilol (COREG) 6.25 MG tablet Take 1 tablet (6.25 mg) by mouth 2 times daily (with meals) 180 tablet 1     cetirizine (ZYRTEC) 10 MG tablet Take 10 mg by mouth every morning        lisinopril (PRINIVIL/ZESTRIL) 5 MG tablet Take 0.5 tablets (2.5 mg) by mouth daily 90 tablet 3     MELATONIN PO Take 5 mg by mouth At Bedtime       mometasone (NASONEX) 50 MCG/ACT nasal spray Spray 2 sprays into both nostrils daily as needed.       nitroglycerin (NITROSTAT) 0.4 MG SL tablet Place 1 tablet (0.4 mg) under the tongue every 5 minutes as needed for chest pain 25 tablet 3     polyethylene glycol (MIRALAX/GLYCOLAX) packet Take 17 g by mouth daily        rosuvastatin (CRESTOR) 40 MG tablet Take 1 tablet (40 mg) by mouth daily 90 tablet 2     torsemide (DEMADEX) 10 MG tablet Take one half tablet every other day 90 tablet 3     triamcinolone (KENALOG) 0.1 % cream Apply sparingly to affected area twotimes daily as needed 30 g 5     Emollient (CERAVE) LOTN Externally apply topically 2 times daily as needed       guaiFENesin (MUCINEX) 600 MG 12 hr tablet Take 600 mg by mouth 2-3 x day         ALLERGIES     Allergies   Allergen Reactions     Advair Diskus      DENIED      Morphine Hcl      Decrease  Blood Pressure Lower Than Normal, Per Pt.     Vicodin [Hydrocodone-Acetaminophen] Visual Disturbance       PAST MEDICAL HISTORY:  Past Medical History:   Diagnosis Date     AAA (abdominal aortic aneurysm) (H)      Anemia due to blood loss, acute 10/10/2016     Asthma      Atrial fibrillation (H)      Cardiac arrest (H)      Cardiomyopathy (H)      Chronic kidney disease      Chronic sinusitis      Coronary artery disease     cardiac cath 2014: medical management; cath 2013: PTCA to diagonal; cath 2009: medical management; CABG 1998: LIMA to LAD, LISA to RCA, SVG to circumflex     GERD (gastroesophageal reflux disease)      History of angina      Hyperlipidaemia      Hypertension, goal below 140/90      Myocardial infarction (H)     MI with Vfib arrest 1998     Polymyalgia rheumatica (H)      Shingles 10/28/2016     Shortness of breath      Tachy-alix syndrome (H)     s/p dual chamber pacemaker 2012       PAST SURGICAL HISTORY:  Past Surgical History:   Procedure Laterality Date     ARTHROPLASTY KNEE Left 10/5/2016    Procedure: ARTHROPLASTY KNEE;  Surgeon: Keshav Zamudio MD;  Location: [facility] OR     CARDIAC SURGERY  12/03/2012    pacemaker ; CABG 1998     CHOLECYSTECTOMY       COLONOSCOPY       ENT SURGERY  5-    sinus     EXTRACAPSULAR CATARACT EXTRATION WITH INTRAOCULAR LENS IMPLANT       GENITOURINARY SURGERY      prostatectomy     IMPLANT PACEMAKER  12-3-12    [facility]     PROSTATE SURGERY         FAMILY HISTORY:  Family History   Problem Relation Age of Onset     Cerebrovascular Disease Father      Heart Disease Father      Heart Disease Brother      Coronary Artery Disease Brother         MI age 50     Depression Daughter         raped in high school     Unknown/Adopted Maternal Grandmother      Unknown/Adopted Maternal Grandfather      Unknown/Adopted Paternal Grandmother      Unknown/Adopted Paternal Grandfather        SOCIAL HISTORY:  Social History     Socioeconomic  History     Marital status:      Spouse name: None     Number of children: None     Years of education: None     Highest education level: None   Occupational History     None   Social Needs     Financial resource strain: None     Food insecurity:     Worry: None     Inability: None     Transportation needs:     Medical: None     Non-medical: None   Tobacco Use     Smoking status: Never Smoker     Smokeless tobacco: Never Used   Substance and Sexual Activity     Alcohol use: No     Alcohol/week: 0.0 oz     Drug use: No     Sexual activity: No   Lifestyle     Physical activity:     Days per week: None     Minutes per session: None     Stress: None   Relationships     Social connections:     Talks on phone: None     Gets together: None     Attends Jewish service: None     Active member of club or organization: None     Attends meetings of clubs or organizations: None     Relationship status: None     Intimate partner violence:     Fear of current or ex partner: None     Emotionally abused: None     Physically abused: None     Forced sexual activity: None   Other Topics Concern     Parent/sibling w/ CABG, MI or angioplasty before 65F 55M? Yes     Comment: brother and father had MI @ 40's      Service Not Asked     Blood Transfusions Not Asked     Caffeine Concern Yes     Comment: one diet mountain dew daily     Occupational Exposure Not Asked     Hobby Hazards Not Asked     Sleep Concern No     Stress Concern No     Weight Concern Not Asked     Special Diet No     Back Care Not Asked     Exercise Yes     Comment: golfing, does the stair at the office a lot     Bike Helmet Not Asked     Seat Belt Yes     Self-Exams Not Asked   Social History Narrative     None       Review of Systems:  Skin:  Positive for bruising     Eyes:  Positive for glasses implant in both eyes.  ENT:  Negative sinus trouble;postnasal drainage pt reports being seen by MD for sinus issues.    Respiratory:  Positive for dyspnea on  exertion(if walking too fast) pt reports symptoms on occasion.   Cardiovascular:  Negative Positive for;edema;lightheadedness;fatigue(stable per patient) pt reports occasional swelling in lower legs.  chest pressure on exertion  Gastroenterology: Negative reflux    Genitourinary:  not assessed      Musculoskeletal:  Positive for joint pain pt reports occasion knee pain; Seeing MD in October.  Neurologic:  Negative      Psychiatric:  Negative      Heme/Lymph/Imm:  Positive for easy bruising;allergies easy bruising.  Endocrine:  Negative diabetes borderline diabetes    [id number]

## 2019-03-08 NOTE — LETTER
3/8/2019    Eliezer Shah MD  [address]  [facility] [address]    RE: Abbe Lambert       Dear Colleague,    I had the pleasure of seeing Abbe Lambert in the [facility].    HPI and Plan:   See dictation    No orders of the defined types were placed in this encounter.      No orders of the defined types were placed in this encounter.      There are no discontinued medications.      No diagnosis found.    CURRENT MEDICATIONS:  Current Outpatient Medications   Medication Sig Dispense Refill     albuterol (PROAIR HFA/PROVENTIL HFA/VENTOLIN HFA) 108 (90 BASE) MCG/ACT Inhaler Inhale 2 puffs into the lungs as needed for shortness of breath / dyspnea or wheezing 3 Inhaler 11     aspirin 81 MG tablet Take 81 mg by mouth daily  30 tablet      azelastine (ASTELIN) 137 MCG/SPRAY nasal spray [place] 1 spray into both nostrils 2 times daily.       beclomethasone (QVAR) 80 MCG/ACT Inhaler Inhale 2 puffs into the lungs 2 times daily 2 Inhaler 11     carvedilol (COREG) 6.25 MG tablet Take 1 tablet (6.25 mg) by mouth 2 times daily (with meals) 180 tablet 1     cetirizine (ZYRTEC) 10 MG tablet Take 10 mg by mouth every morning        lisinopril (PRINIVIL/ZESTRIL) 5 MG tablet Take 0.5 tablets (2.5 mg) by mouth daily 90 tablet 3     MELATONIN PO Take 5 mg by mouth At Bedtime       mometasone (NASONEX) 50 MCG/ACT nasal spray Spray 2 sprays into both nostrils daily as needed.       nitroglycerin (NITROSTAT) 0.4 MG SL tablet Place 1 tablet (0.4 mg) under the tongue every 5 minutes as needed for chest pain 25 tablet 3     polyethylene glycol (MIRALAX/GLYCOLAX) packet Take 17 g by mouth daily        rosuvastatin (CRESTOR) 40 MG tablet Take 1 tablet (40 mg) by mouth daily 90 tablet 2     torsemide (DEMADEX) 10 MG tablet Take one half tablet every other day 90 tablet 3     triamcinolone (KENALOG) 0.1 % cream Apply sparingly to affected area twotimes daily as needed 30 g 5     Emollient  (CERAVE) LOTN Externally apply topically 2 times daily as needed       guaiFENesin (MUCINEX) 600 MG 12 hr tablet Take 600 mg by mouth 2-3 x day         ALLERGIES     Allergies   Allergen Reactions     Advair Diskus      DENIED     Morphine Hcl      Decrease  Blood Pressure Lower Than Normal, Per Pt.     Vicodin [Hydrocodone-Acetaminophen] Visual Disturbance       PAST MEDICAL HISTORY:  Past Medical History:   Diagnosis Date     AAA (abdominal aortic aneurysm) (H)      Anemia due to blood loss, acute 10/10/2016     Asthma      Atrial fibrillation (H)      Cardiac arrest (H)      Cardiomyopathy (H)      Chronic kidney disease      Chronic sinusitis      Coronary artery disease     cardiac cath 2014: medical management; cath 2013: PTCA to diagonal; cath 2009: medical management; CABG 1998: LIMA to LAD, LISA to RCA, SVG to circumflex     GERD (gastroesophageal reflux disease)      History of angina      Hyperlipidaemia      Hypertension, goal below 140/90      Myocardial infarction (H)     MI with Vfib arrest 1998     Polymyalgia rheumatica (H)      Shingles 10/28/2016     Shortness of breath      Tachy-alix syndrome (H)     s/p dual chamber pacemaker 2012       PAST SURGICAL HISTORY:  Past Surgical History:   Procedure Laterality Date     ARTHROPLASTY KNEE Left 10/5/2016    Procedure: ARTHROPLASTY KNEE;  Surgeon: Keshav Zamudio MD;  Location:  OR     CARDIAC SURGERY  12/03/2012    pacemaker ; CABG 1998     CHOLECYSTECTOMY       COLONOSCOPY       ENT SURGERY  5-    sinus     EXTRACAPSULAR CATARACT EXTRATION WITH INTRAOCULAR LENS IMPLANT       GENITOURINARY SURGERY      prostatectomy     IMPLANT PACEMAKER  12-3-12    [facility]     PROSTATE SURGERY         FAMILY HISTORY:  Family History   Problem Relation Age of Onset     Cerebrovascular Disease Father      Heart Disease Father      Heart Disease Brother      Coronary Artery Disease Brother         MI age 50     Depression Daughter         raped in high  school     Unknown/Adopted Maternal Grandmother      Unknown/Adopted Maternal Grandfather      Unknown/Adopted Paternal Grandmother      Unknown/Adopted Paternal Grandfather        SOCIAL HISTORY:  Social History     Socioeconomic History     Marital status:      Spouse name: None     Number of children: None     Years of education: None     Highest education level: None   Occupational History     None   Social Needs     Financial resource strain: None     Food insecurity:     Worry: None     Inability: None     Transportation needs:     Medical: None     Non-medical: None   Tobacco Use     Smoking status: Never Smoker     Smokeless tobacco: Never Used   Substance and Sexual Activity     Alcohol use: No     Alcohol/week: 0.0 oz     Drug use: No     Sexual activity: No   Lifestyle     Physical activity:     Days per week: None     Minutes per session: None     Stress: None   Relationships     Social connections:     Talks on phone: None     Gets together: None     Attends Islam service: None     Active member of club or organization: None     Attends meetings of clubs or organizations: None     Relationship status: None     Intimate partner violence:     Fear of current or ex partner: None     Emotionally abused: None     Physically abused: None     Forced sexual activity: None   Other Topics Concern     Parent/sibling w/ CABG, MI or angioplasty before 65F 55M? Yes     Comment: brother and father had MI @ 40's      Service Not Asked     Blood Transfusions Not Asked     Caffeine Concern Yes     Comment: one diet mountain dew daily     Occupational Exposure Not Asked     Hobby Hazards Not Asked     Sleep Concern No     Stress Concern No     Weight Concern Not Asked     Special Diet No     Back Care Not Asked     Exercise Yes     Comment: golfing, does the stair at the office a lot     Bike Helmet Not Asked     Seat Belt Yes     Self-Exams Not Asked   Social History Narrative     None       Review of  Systems:  Skin:  Positive for bruising     Eyes:  Positive for glasses implant in both eyes.  ENT:  Negative sinus trouble;postnasal drainage pt reports being seen by MD for sinus issues.    Respiratory:  Positive for dyspnea on exertion(if walking too fast) pt reports symptoms on occasion.   Cardiovascular:  Negative Positive for;edema;lightheadedness;fatigue(stable per patient) pt reports occasional swelling in lower legs.  chest pressure on exertion  Gastroenterology: Negative reflux    Genitourinary:  not assessed      Musculoskeletal:  Positive for joint pain pt reports occasion knee pain; Seeing MD in October.  Neurologic:  Negative      Psychiatric:  Negative      Heme/Lymph/Imm:  Positive for easy bruising;allergies easy bruising.  Endocrine:  Negative diabetes borderline diabetes    [id number]          Thank you for allowing me to participate in the care of your patient.      Sincerely,     Caron Guerin MD     [facility]    cc:   Eliezer Shah MD  [address] [place] [address]

## 2019-03-13 LAB
MDC_IDC_LEAD_IMPLANT_DT: NORMAL
MDC_IDC_LEAD_IMPLANT_DT: NORMAL
MDC_IDC_LEAD_LOCATION: NORMAL
MDC_IDC_LEAD_LOCATION: NORMAL
MDC_IDC_LEAD_MFG: NORMAL
MDC_IDC_LEAD_MFG: NORMAL
MDC_IDC_LEAD_MODEL: NORMAL
MDC_IDC_LEAD_MODEL: NORMAL
MDC_IDC_LEAD_POLARITY_TYPE: NORMAL
MDC_IDC_LEAD_POLARITY_TYPE: NORMAL
MDC_IDC_LEAD_SERIAL: NORMAL
MDC_IDC_LEAD_SERIAL: NORMAL
MDC_IDC_MSMT_BATTERY_REMAINING_LONGEVITY: 37 MO
MDC_IDC_MSMT_BATTERY_STATUS: NORMAL
MDC_IDC_MSMT_BATTERY_VOLTAGE: 2.83 V
MDC_IDC_MSMT_LEADCHNL_RA_IMPEDANCE_VALUE: 375 OHM
MDC_IDC_MSMT_LEADCHNL_RA_PACING_THRESHOLD_AMPLITUDE: 0.75 V
MDC_IDC_MSMT_LEADCHNL_RA_PACING_THRESHOLD_AMPLITUDE: 0.75 V
MDC_IDC_MSMT_LEADCHNL_RA_PACING_THRESHOLD_PULSEWIDTH: 0.5 MS
MDC_IDC_MSMT_LEADCHNL_RA_PACING_THRESHOLD_PULSEWIDTH: 0.5 MS
MDC_IDC_MSMT_LEADCHNL_RA_SENSING_INTR_AMPL: 4.9 MV
MDC_IDC_MSMT_LEADCHNL_RV_IMPEDANCE_VALUE: 550 OHM
MDC_IDC_MSMT_LEADCHNL_RV_PACING_THRESHOLD_AMPLITUDE: 0.88 V
MDC_IDC_MSMT_LEADCHNL_RV_PACING_THRESHOLD_PULSEWIDTH: 0.5 MS
MDC_IDC_MSMT_LEADCHNL_RV_SENSING_INTR_AMPL: 12 MV
MDC_IDC_PG_IMPLANT_DTM: NORMAL
MDC_IDC_PG_MFG: NORMAL
MDC_IDC_PG_MODEL: NORMAL
MDC_IDC_PG_SERIAL: NORMAL
MDC_IDC_PG_TYPE: NORMAL
MDC_IDC_SESS_CLINIC_NAME: NORMAL
MDC_IDC_SESS_DTM: NORMAL
MDC_IDC_SESS_TYPE: NORMAL
MDC_IDC_SET_BRADY_AT_MODE_SWITCH_MODE: NORMAL
MDC_IDC_SET_BRADY_AT_MODE_SWITCH_RATE: 180 {BEATS}/MIN
MDC_IDC_SET_BRADY_HYSTRATE: NORMAL
MDC_IDC_SET_BRADY_LOWRATE: 60 {BEATS}/MIN
MDC_IDC_SET_BRADY_MAX_SENSOR_RATE: 120 {BEATS}/MIN
MDC_IDC_SET_BRADY_MAX_TRACKING_RATE: 120 {BEATS}/MIN
MDC_IDC_SET_BRADY_MODE: NORMAL
MDC_IDC_SET_BRADY_NIGHT_RATE: NORMAL
MDC_IDC_SET_BRADY_PAV_DELAY_HIGH: 100 MS
MDC_IDC_SET_BRADY_PAV_DELAY_LOW: 200 MS
MDC_IDC_SET_BRADY_SAV_DELAY_HIGH: 100 MS
MDC_IDC_SET_BRADY_SAV_DELAY_LOW: 170 MS
MDC_IDC_SET_LEADCHNL_RA_PACING_AMPLITUDE: 1.62
MDC_IDC_SET_LEADCHNL_RA_PACING_ANODE_ELECTRODE_1: NORMAL
MDC_IDC_SET_LEADCHNL_RA_PACING_ANODE_LOCATION_1: NORMAL
MDC_IDC_SET_LEADCHNL_RA_PACING_CAPTURE_MODE: NORMAL
MDC_IDC_SET_LEADCHNL_RA_PACING_CATHODE_ELECTRODE_1: NORMAL
MDC_IDC_SET_LEADCHNL_RA_PACING_CATHODE_LOCATION_1: NORMAL
MDC_IDC_SET_LEADCHNL_RA_PACING_POLARITY: NORMAL
MDC_IDC_SET_LEADCHNL_RA_PACING_PULSEWIDTH: 0.5 MS
MDC_IDC_SET_LEADCHNL_RA_SENSING_ADAPTATION_MODE: NORMAL
MDC_IDC_SET_LEADCHNL_RA_SENSING_ANODE_ELECTRODE_1: NORMAL
MDC_IDC_SET_LEADCHNL_RA_SENSING_ANODE_LOCATION_1: NORMAL
MDC_IDC_SET_LEADCHNL_RA_SENSING_CATHODE_ELECTRODE_1: NORMAL
MDC_IDC_SET_LEADCHNL_RA_SENSING_CATHODE_LOCATION_1: NORMAL
MDC_IDC_SET_LEADCHNL_RA_SENSING_POLARITY: NORMAL
MDC_IDC_SET_LEADCHNL_RV_PACING_AMPLITUDE: 1.12
MDC_IDC_SET_LEADCHNL_RV_PACING_ANODE_ELECTRODE_1: NORMAL
MDC_IDC_SET_LEADCHNL_RV_PACING_ANODE_LOCATION_1: NORMAL
MDC_IDC_SET_LEADCHNL_RV_PACING_CAPTURE_MODE: NORMAL
MDC_IDC_SET_LEADCHNL_RV_PACING_CATHODE_ELECTRODE_1: NORMAL
MDC_IDC_SET_LEADCHNL_RV_PACING_CATHODE_LOCATION_1: NORMAL
MDC_IDC_SET_LEADCHNL_RV_PACING_POLARITY: NORMAL
MDC_IDC_SET_LEADCHNL_RV_PACING_PULSEWIDTH: 0.5 MS
MDC_IDC_SET_LEADCHNL_RV_SENSING_ANODE_ELECTRODE_1: NORMAL
MDC_IDC_SET_LEADCHNL_RV_SENSING_ANODE_LOCATION_1: NORMAL
MDC_IDC_SET_LEADCHNL_RV_SENSING_CATHODE_ELECTRODE_1: NORMAL
MDC_IDC_SET_LEADCHNL_RV_SENSING_CATHODE_LOCATION_1: NORMAL
MDC_IDC_SET_LEADCHNL_RV_SENSING_POLARITY: NORMAL
MDC_IDC_SET_LEADCHNL_RV_SENSING_SENSITIVITY: 0.5 MV
MDC_IDC_STAT_AT_MODE_SW_COUNT: 57
MDC_IDC_STAT_BRADY_RA_PERCENT_PACED: 99 %
MDC_IDC_STAT_BRADY_RV_PERCENT_PACED: 99 %

## 2019-03-18 ENCOUNTER — OFFICE VISIT (OUTPATIENT)
Dept: FAMILY MEDICINE | Facility: CLINIC | Age: 84
End: 2019-03-18
Payer: MEDICARE

## 2019-03-18 VITALS
OXYGEN SATURATION: 97 % | WEIGHT: 181 LBS | HEIGHT: 70 IN | HEART RATE: 96 BPM | TEMPERATURE: 97.5 F | RESPIRATION RATE: 20 BRPM | BODY MASS INDEX: 25.91 KG/M2 | SYSTOLIC BLOOD PRESSURE: 118 MMHG | DIASTOLIC BLOOD PRESSURE: 62 MMHG

## 2019-03-18 DIAGNOSIS — Z01.818 PREOP GENERAL PHYSICAL EXAM: Primary | ICD-10-CM

## 2019-03-18 DIAGNOSIS — J30.9 ALLERGIC RHINITIS, UNSPECIFIED SEASONALITY, UNSPECIFIED TRIGGER: ICD-10-CM

## 2019-03-18 DIAGNOSIS — E78.2 MIXED HYPERLIPIDEMIA: ICD-10-CM

## 2019-03-18 DIAGNOSIS — K21.9 GASTROESOPHAGEAL REFLUX DISEASE WITHOUT ESOPHAGITIS: ICD-10-CM

## 2019-03-18 DIAGNOSIS — N18.30 CKD (CHRONIC KIDNEY DISEASE) STAGE 3, GFR 30-59 ML/MIN (H): ICD-10-CM

## 2019-03-18 DIAGNOSIS — I10 ESSENTIAL HYPERTENSION, BENIGN: ICD-10-CM

## 2019-03-18 DIAGNOSIS — I25.2 OLD MYOCARDIAL INFARCTION: ICD-10-CM

## 2019-03-18 DIAGNOSIS — I48.0 PAROXYSMAL ATRIAL FIBRILLATION (H): ICD-10-CM

## 2019-03-18 DIAGNOSIS — R13.10 DYSPHAGIA, UNSPECIFIED TYPE: ICD-10-CM

## 2019-03-18 LAB
ALBUMIN UR-MCNC: 100 MG/DL
APPEARANCE UR: CLEAR
BASOPHILS # BLD AUTO: 0 10E9/L (ref 0–0.2)
BASOPHILS NFR BLD AUTO: 0.4 %
BILIRUB UR QL STRIP: NEGATIVE
COLOR UR AUTO: YELLOW
DIFFERENTIAL METHOD BLD: ABNORMAL
EOSINOPHIL # BLD AUTO: 0.2 10E9/L (ref 0–0.7)
EOSINOPHIL NFR BLD AUTO: 2 %
ERYTHROCYTE [DISTWIDTH] IN BLOOD BY AUTOMATED COUNT: 14 % (ref 10–15)
GLUCOSE UR STRIP-MCNC: NEGATIVE MG/DL
HCT VFR BLD AUTO: 35.9 % (ref 40–53)
HGB BLD-MCNC: 11.7 G/DL (ref 13.3–17.7)
HGB UR QL STRIP: ABNORMAL
KETONES UR STRIP-MCNC: NEGATIVE MG/DL
LEUKOCYTE ESTERASE UR QL STRIP: NEGATIVE
LYMPHOCYTES # BLD AUTO: 1.8 10E9/L (ref 0.8–5.3)
LYMPHOCYTES NFR BLD AUTO: 21.8 %
MCH RBC QN AUTO: 32 PG (ref 26.5–33)
MCHC RBC AUTO-ENTMCNC: 32.6 G/DL (ref 31.5–36.5)
MCV RBC AUTO: 98 FL (ref 78–100)
MONOCYTES # BLD AUTO: 1 10E9/L (ref 0–1.3)
MONOCYTES NFR BLD AUTO: 12.9 %
NEUTROPHILS # BLD AUTO: 5.1 10E9/L (ref 1.6–8.3)
NEUTROPHILS NFR BLD AUTO: 62.9 %
NITRATE UR QL: NEGATIVE
PH UR STRIP: 6.5 PH (ref 5–7)
PLATELET # BLD AUTO: 184 10E9/L (ref 150–450)
RBC # BLD AUTO: 3.66 10E12/L (ref 4.4–5.9)
RBC #/AREA URNS AUTO: ABNORMAL /HPF
SOURCE: ABNORMAL
SP GR UR STRIP: 1.02 (ref 1–1.03)
UROBILINOGEN UR STRIP-ACNC: 0.2 EU/DL (ref 0.2–1)
WBC # BLD AUTO: 8 10E9/L (ref 4–11)
WBC #/AREA URNS AUTO: ABNORMAL /HPF

## 2019-03-18 PROCEDURE — 99214 OFFICE O/P EST MOD 30 MIN: CPT | Performed by: FAMILY MEDICINE

## 2019-03-18 PROCEDURE — 82565 ASSAY OF CREATININE: CPT | Performed by: FAMILY MEDICINE

## 2019-03-18 PROCEDURE — 85025 COMPLETE CBC W/AUTO DIFF WBC: CPT | Performed by: FAMILY MEDICINE

## 2019-03-18 PROCEDURE — 36415 COLL VENOUS BLD VENIPUNCTURE: CPT | Performed by: FAMILY MEDICINE

## 2019-03-18 PROCEDURE — 80051 ELECTROLYTE PANEL: CPT | Performed by: FAMILY MEDICINE

## 2019-03-18 PROCEDURE — 81001 URINALYSIS AUTO W/SCOPE: CPT | Performed by: FAMILY MEDICINE

## 2019-03-18 PROCEDURE — 84520 ASSAY OF UREA NITROGEN: CPT | Performed by: FAMILY MEDICINE

## 2019-03-18 RX ORDER — IPRATROPIUM BROMIDE 21 UG/1
1 SPRAY, METERED NASAL EVERY 12 HOURS PRN
Qty: 30 ML | Refills: 5 | Status: SHIPPED | OUTPATIENT
Start: 2019-03-18 | End: 2019-09-24

## 2019-03-18 ASSESSMENT — MIFFLIN-ST. JEOR: SCORE: 1512.26

## 2019-03-18 NOTE — LETTER
March 21, 2019      Abbe Lambert  [address] [facility] [address]        Dear ,    We are writing to inform you of your test results.    This is all very stable and can be repeated in 3 months.  If you have questions feel free to contact me.    Resulted Orders   UA with Microscopic   Result Value Ref Range    Color Urine Yellow     Appearance Urine Clear     Glucose Urine Negative NEG^Negative mg/dL    Bilirubin Urine Negative NEG^Negative    Ketones Urine Negative NEG^Negative mg/dL    Specific Gravity Urine 1.025 1.003 - 1.035    pH Urine 6.5 5.0 - 7.0 pH    Protein Albumin Urine 100 (A) NEG^Negative mg/dL    Urobilinogen Urine 0.2 0.2 - 1.0 EU/dL    Nitrite Urine Negative NEG^Negative    Blood Urine Small (A) NEG^Negative    Leukocyte Esterase Urine Negative NEG^Negative    Source Midstream Urine     WBC Urine 0 - 5 OTO5^0 - 5 /HPF    RBC Urine O - 2 OTO2^O - 2 /HPF   CBC with platelets differential   Result Value Ref Range    WBC 8.0 4.0 - 11.0 10e9/L    RBC Count 3.66 (L) 4.4 - 5.9 10e12/L    Hemoglobin 11.7 (L) 13.3 - 17.7 g/dL    Hematocrit 35.9 (L) 40.0 - 53.0 %    MCV 98 78 - 100 fl    MCH 32.0 26.5 - 33.0 pg    MCHC 32.6 31.5 - 36.5 g/dL    RDW 14.0 10.0 - 15.0 %    Platelet Count 184 150 - 450 10e9/L    Diff Method Automated Method     % Neutrophils 62.9 %    % Lymphocytes 21.8 %    % Monocytes 12.9 %    % Eosinophils 2.0 %    % Basophils 0.4 %    Absolute Neutrophil 5.1 1.6 - 8.3 10e9/L    Absolute Lymphocytes 1.8 0.8 - 5.3 10e9/L    Absolute Monocytes 1.0 0.0 - 1.3 10e9/L    Absolute Eosinophils 0.2 0.0 - 0.7 10e9/L    Absolute Basophils 0.0 0.0 - 0.2 10e9/L   Electrolyte panel (Na, K, Cl, CO2, Anion gap)   Result Value Ref Range    Sodium 141 133 - 144 mmol/L    Potassium 4.6 3.4 - 5.3 mmol/L    Chloride 109 94 - 109 mmol/L    Carbon Dioxide 25 20 - 32 mmol/L    Anion Gap 7 3 - 14 mmol/L   Creatinine   Result Value Ref Range    Creatinine 1.97 (H) 0.66 - 1.25 mg/dL    GFR  Estimate 30 (L) >60 mL/min/[1.73_m2]      Comment:      Non  GFR Calc  Starting 12/18/2018, serum creatinine based estimated GFR (eGFR) will be   calculated using the Chronic Kidney Disease Epidemiology Collaboration   (CKD-EPI) equation.      GFR Estimate If Black 35 (L) >60 mL/min/[1.73_m2]      Comment:       GFR Calc  Starting 12/18/2018, serum creatinine based estimated GFR (eGFR) will be   calculated using the Chronic Kidney Disease Epidemiology Collaboration   (CKD-EPI) equation.     Urea nitrogen   Result Value Ref Range    Urea Nitrogen 27 7 - 30 mg/dL       If you have any questions or concerns, please call the clinic at the number listed above.       Sincerely,        Eliezer Shah MD

## 2019-03-18 NOTE — PROGRESS NOTES
[facility]  [address]  [place] [address]  [phone]  Dept: [phone]    PRE-OP EVALUATION:  Today's date: 3/18/2019    Abbe Lambert (: 2/3/1934) presents for pre-operative evaluation assessment as requested by Dr. burns.  He requires evaluation and anesthesia risk assessment prior to undergoing surgery/procedure for treatment of Dysphagia .    Proposed Surgery/ Procedure: COMBINED ENDOSCOPIC ULTRASOUND, ESOPHAGOSCOPY, GASTROSCOPY, DUODENOSCOPY (EGD)  Date of Surgery/ Procedure: 3/30/2019  Time of Surgery/ Procedure: unknown  Hospital/Surgical Facility: [facility]  Fax number for surgical facility: N/A  Primary Physician: Eliezer Shah  Type of Anesthesia Anticipated: to be determined    Patient has a Health Care Directive or Living Will:  YES -see cht.    1. YES - DO YOU HAVE A HISTORY OF HEART ATTACK, STROKE, STENT, BYPASS OR SURGERY ON AN ARTERY IN THE HEAD, NECK, HEART OR LEG? CAB  1. NO - Do you have a history of heart attack, stroke, stent, bypass or surgery on an artery in the head, neck, heart or legs?  2. NO - Do you ever have any pain or discomfort in your chest?  3. NO - Do you have a history of  Heart Failure?  4. yes - Are you troubled by shortness of breath when: walking on the level, up a slight hill or at night?with walking fast  5. NO - Do you currently have a cold, bronchitis or other respiratory infection?  6. yes - Do you have a cough, shortness of breath or wheezing?SOB  7. NO - Do you sometimes get pains in the calves of your legs when you walk?  8. NO - Do you or anyone in your family have previous history of blood clots?  9. NO - Do you or does anyone in your family have a serious bleeding problem such as prolonged bleeding following surgeries or cuts?  10. NO - Have you ever had problems with anemia or been told to take iron pills?  11. NO - Have you had any abnormal blood loss such as black, tarry or bloody  stools, or abnormal vaginal bleeding?  12. NO - Have you ever had a blood transfusion?  13. NO - Have you or any of your relatives ever had problems with anesthesia?  14. NO - Do you have sleep apnea, excessive snoring or daytime drowsiness?  15. NO - Do you have any prosthetic heart valves?  16. yes - Do you have prosthetic joints?left knee  17. NO - Is there any chance that you may be pregnant?      HPI:     HPI related to upcoming procedure: dysphagia for the past 2 months      See problem list for active medical problems.  Problems all longstanding and stable, except as noted/documented.  See ROS for pertinent symptoms related to these conditions.                                                                                                                                                          .    MEDICAL HISTORY:     Patient Active Problem List    Diagnosis Date Noted     History of CVA (cerebrovascular accident) 10/25/2018     Priority: Medium     Pre-diabetes 08/09/2018     Priority: Medium     History of prostate cancer 2003 w/po resection prostate 05/18/2018     Priority: Medium     PAD (peripheral artery disease) (H) 05/18/2018     Priority: Medium     Microalbuminuria 05/18/2018     Priority: Medium     Mild persistent asthma without complication 05/18/2018     Priority: Medium     Skin lesion 01/17/2018     Priority: Medium     occiput       Chronic non-seasonal allergic rhinitis, unspecified trigger 10/10/2017     Priority: Medium     Presbycusis of both ears 09/12/2017     Priority: Medium     Post herpetic neuralgia 01/03/2017     Priority: Medium     Gabapentin    Last  check - 1/3/17       Physical deconditioning 11/07/2016     Priority: Medium     Aftercare following knee joint replacement surgery, unspecified laterality 10/10/2016     Priority: Medium     Mixed hyperlipidemia 06/22/2016     Priority: Medium     Chronic combined systolic and diastolic congestive heart failure (H) 10/08/2015      Priority: Medium     Essential hypertension, benign 10/08/2015     Priority: Medium     Tachy-alix syndrome (H)      Priority: Medium     s/p dual chamber pacemaker 2012       AAA (abdominal aortic aneurysm) (H)      Priority: Medium     Old myocardial infarction      Priority: Medium     MI with Vfib arrest 1998       GERD (gastroesophageal reflux disease) 12/15/2014     Priority: Medium     CKD (chronic kidney disease) stage 3, GFR 30-59 ml/min (H) 06/26/2014     Priority: Medium     Ischemic cardiomyopathy      Priority: Medium     Paroxysmal atrial fibrillation (H)      Priority: Medium     Coronary artery disease involving native coronary artery of native heart without angina pectoris      Priority: Medium     cardiac cath 2014: medical management; cath 2013: PTCA to diagonal; cath 2009: medical management; CABG 1998: LIMA to LAD, LISA to RCA, SVG to circumflex       Advanced directives, counseling/discussion 10/18/2013     Priority: Medium     Advance Care Planning:   Receipt of ACP document:  Received: Health Care Directive which was witnessed or notarized on 9-.  Document not previously scanned.  Validation form completed and sent with document to be scanned.  Code Status reflects choices in most recent ACP document.  Confirmed/documented designated decision maker(s). See permanent comments section of demographics in clinical tab. View document(s) and details by clicking on code status.   Added by Ayana Grace on 10/18/2013.             Polymyalgia rheumatica (H) 05/16/2013     Priority: Medium     traMADol (ULTRAM) 50 MG tablet    No CSA on file  Last  check - 1/3/17       Allergic rhinitis 10/30/2012     Priority: Medium     Problem list name updated by automated process. Provider to review       Peripheral edema 10/30/2012     Priority: Medium     Health Care Home 10/29/2012     Priority: Medium          DX V65.8 REPLACED WITH [facility] (04/08/2013)          Past Medical  History:   Diagnosis Date     AAA (abdominal aortic aneurysm) (H)      Anemia due to blood loss, acute 10/10/2016     Asthma      Atrial fibrillation (H)      Cardiac arrest (H)      Cardiomyopathy (H)      Chronic kidney disease      Chronic sinusitis      Coronary artery disease     cardiac cath 2014: medical management; cath 2013: PTCA to diagonal; cath 2009: medical management; CABG 1998: LIMA to LAD, LISA to RCA, SVG to circumflex     GERD (gastroesophageal reflux disease)      History of angina      Hyperlipidaemia      Hypertension, goal below 140/90      Myocardial infarction (H)     MI with Vfib arrest 1998     Polymyalgia rheumatica (H)      Shingles 10/28/2016     Shortness of breath      Tachy-alix syndrome (H)     s/p dual chamber pacemaker 2012     Past Surgical History:   Procedure Laterality Date     ARTHROPLASTY KNEE Left 10/5/2016    Procedure: ARTHROPLASTY KNEE;  Surgeon: Keshav Zamudio MD;  Location: [facility] OR     CARDIAC SURGERY  12/03/2012    pacemaker ; CABG 1998     CHOLECYSTECTOMY       COLONOSCOPY       ENT SURGERY  5-    sinus     EXTRACAPSULAR CATARACT EXTRATION WITH INTRAOCULAR LENS IMPLANT       GENITOURINARY SURGERY      prostatectomy     IMPLANT PACEMAKER  12-3-12    [facility]     PROSTATE SURGERY       Current Outpatient Medications   Medication Sig Dispense Refill     albuterol (PROAIR HFA/PROVENTIL HFA/VENTOLIN HFA) 108 (90 BASE) MCG/ACT Inhaler Inhale 2 puffs into the lungs as needed for shortness of breath / dyspnea or wheezing 3 Inhaler 11     aspirin 81 MG tablet Take 81 mg by mouth daily  30 tablet      azelastine (ASTELIN) 137 MCG/SPRAY nasal spray [place] 1 spray into both nostrils 2 times daily.       beclomethasone (QVAR) 80 MCG/ACT Inhaler Inhale 2 puffs into the lungs 2 times daily 2 Inhaler 11     carvedilol (COREG) 6.25 MG tablet Take 1 tablet (6.25 mg) by mouth 2 times daily (with meals) 180 tablet 1     cetirizine (ZYRTEC) 10 MG tablet Take 10 mg by mouth every  morning        Emollient (CERAVE) LOTN Externally apply topically 2 times daily as needed       guaiFENesin (MUCINEX) 600 MG 12 hr tablet Take 600 mg by mouth 2-3 x day       ipratropium (ATROVENT) 0.03 % nasal spray Spray 1 spray into both nostrils every 12 hours as needed for rhinitis 30 mL 5     lisinopril (PRINIVIL/ZESTRIL) 5 MG tablet Take 0.5 tablets (2.5 mg) by mouth daily 90 tablet 3     MELATONIN PO Take 5 mg by mouth At Bedtime       mometasone (NASONEX) 50 MCG/ACT nasal spray Spray 2 sprays into both nostrils daily as needed.       nitroglycerin (NITROSTAT) 0.4 MG SL tablet Place 1 tablet (0.4 mg) under the tongue every 5 minutes as needed for chest pain 25 tablet 3     polyethylene glycol (MIRALAX/GLYCOLAX) packet Take 17 g by mouth daily        rosuvastatin (CRESTOR) 40 MG tablet Take 1 tablet (40 mg) by mouth daily 90 tablet 2     torsemide (DEMADEX) 10 MG tablet Take one half tablet every other day 90 tablet 3     triamcinolone (KENALOG) 0.1 % cream Apply sparingly to affected area twotimes daily as needed 30 g 5     OTC products: none    Allergies   Allergen Reactions     Advair Diskus      DENIED     Morphine Hcl      Decrease  Blood Pressure Lower Than Normal, Per Pt.     Vicodin [Hydrocodone-Acetaminophen] Visual Disturbance      Latex Allergy: NO    Social History     Tobacco Use     Smoking status: Never Smoker     Smokeless tobacco: Never Used   Substance Use Topics     Alcohol use: No     Alcohol/week: 0.0 oz     History   Drug Use No       REVIEW OF SYSTEMS:   CONSTITUTIONAL: NEGATIVE for fever, chills, change in weight  INTEGUMENTARY/SKIN: NEGATIVE for worrisome rashes, moles or lesions  EYES: NEGATIVE for vision changes or irritation  ENT/MOUTH: NEGATIVE for ear, mouth and throat problems  RESP: NEGATIVE for significant cough or SOB  BREAST: NEGATIVE for masses, tenderness or discharge  CV: NEGATIVE for chest pain, palpitations or peripheral edema  GI: POSITIVE for dysphagia  : NEGATIVE  "for frequency, dysuria, or hematuria  MUSCULOSKELETAL: NEGATIVE for significant arthralgias or myalgia  NEURO: NEGATIVE for weakness, dizziness or paresthesias  ENDOCRINE: NEGATIVE for temperature intolerance, skin/hair changes  HEME: NEGATIVE for bleeding problems  PSYCHIATRIC: NEGATIVE for changes in mood or affect    EXAM:   /62 (BP Location: Right arm, Patient Position: Chair, Cuff Size: Adult Large)   Pulse 96   Temp 97.5  F (36.4  C)   Resp 20   Ht 1.778 m (5' 10\")   Wt 82.1 kg (181 lb)   SpO2 97%   BMI 25.97 kg/m      GENERAL APPEARANCE: healthy, alert and no distress     EYES: EOMI,  PERRL     HENT: ear canals and TM's normal and nose and mouth without ulcers or lesions     NECK: no adenopathy, no asymmetry, masses, or scars and thyroid normal to palpation     RESP: lungs clear to auscultation - no rales, rhonchi or wheezes     CV: regular rates and rhythm, normal S1 S2, no S3 or S4 and no murmur, click or rub     ABDOMEN:  soft, nontender, no HSM or masses and bowel sounds normal     MS: extremities normal- no gross deformities noted, no evidence of inflammation in joints, FROM in all extremities.     SKIN: no suspicious lesions or rashes     NEURO: Normal strength and tone, sensory exam grossly normal, mentation intact and speech normal     PSYCH: mentation appears normal. and affect normal/bright     LYMPHATICS: No cervical adenopathy    DIAGNOSTICS:   Labs were ordered as noted.  I did not order an EKG as the patient had just seen cardiology and had an echocardiogram and had his pacemaker interrogated.    Recent Labs   Lab Test 11/27/18  1014 11/19/18  1533  05/18/18  1409 12/26/17  1130  09/12/17  0956  12/19/16  1441  04/25/14  0739 04/24/14  0000   HGB  --   --   --   --   --   --  16.2  --  11.4*   < > 13.3 13.0*   PLT  --   --   --   --   --   --  220  --  394   < > 256  --    INR  --   --   --   --   --   --   --   --   --   --  0.94 1.0    142   < > 141 141  --  137   < > 142   " < >  --  143   POTASSIUM 4.2 4.6   < > 4.2 4.1  --  4.3   < > 3.5   < >  --  4.0   CR 1.68* 2.19*   < > 1.75* 1.56*   < > 1.60*   < > 1.60*   < >  --  1.6*   A1C  --   --   --  6.3* 5.9  --   --    < >  --    < >  --   --     < > = values in this interval not displayed.        IMPRESSION:   Reason for surgery/procedure: Dysphagia over the past 2 months needing upper endoscopy done.    The proposed surgical procedure is considered LOW risk.    REVISED CARDIAC RISK INDEX  The patient has the following serious cardiovascular risks for perioperative complications such as (MI, PE, VFib and 3  AV Block):  Coronary Artery Disease (MI, positive stress test, angina, Qs on EKG)  INTERPRETATION: 1 risks: Class II (low risk - 0.9% complication rate)    The patient has the following additional risks for perioperative complications:  No identified additional risks      ICD-10-CM    1. Preop general physical exam Z01.818 UA with Microscopic     CBC with platelets differential     Electrolyte panel (Na, K, Cl, CO2, Anion gap)     Creatinine     Urea nitrogen   2. Dysphagia, unspecified type R13.10    3. Allergic rhinitis, unspecified seasonality, unspecified trigger J30.9 ipratropium (ATROVENT) 0.03 % nasal spray   4. Mixed hyperlipidemia E78.2    5. Essential hypertension, benign I10    6. Old myocardial infarction I25.2    7. Gastroesophageal reflux disease without esophagitis K21.9    8. CKD (chronic kidney disease) stage 3, GFR 30-59 ml/min (H) N18.3    9. Paroxysmal atrial fibrillation (H) I48.0        RECOMMENDATIONS:             APPROVAL GIVEN to proceed with proposed procedure, without further diagnostic evaluation       Signed Electronically by: Eliezer Shah MD    Copy of this evaluation report is provided to requesting physician.    [place] Preop Guidelines    Revised Cardiac Risk Index

## 2019-03-18 NOTE — H&P (VIEW-ONLY)
[facility]  [address]  [place] [address]  [phone]  Dept: [phone]    PRE-OP EVALUATION:  Today's date: 3/18/2019    Abbe Lambert (: 2/3/1934) presents for pre-operative evaluation assessment as requested by Dr. burns.  He requires evaluation and anesthesia risk assessment prior to undergoing surgery/procedure for treatment of Dysphagia .    Proposed Surgery/ Procedure: COMBINED ENDOSCOPIC ULTRASOUND, ESOPHAGOSCOPY, GASTROSCOPY, DUODENOSCOPY (EGD)  Date of Surgery/ Procedure: 3/30/2019  Time of Surgery/ Procedure: unknown  Hospital/Surgical Facility: [facility]  Fax number for surgical facility: N/A  Primary Physician: Eliezer Shah  Type of Anesthesia Anticipated: to be determined    Patient has a Health Care Directive or Living Will:  YES -see cht.    1. YES - DO YOU HAVE A HISTORY OF HEART ATTACK, STROKE, STENT, BYPASS OR SURGERY ON AN ARTERY IN THE HEAD, NECK, HEART OR LEG? CAB  1. NO - Do you have a history of heart attack, stroke, stent, bypass or surgery on an artery in the head, neck, heart or legs?  2. NO - Do you ever have any pain or discomfort in your chest?  3. NO - Do you have a history of  Heart Failure?  4. yes - Are you troubled by shortness of breath when: walking on the level, up a slight hill or at night?with walking fast  5. NO - Do you currently have a cold, bronchitis or other respiratory infection?  6. yes - Do you have a cough, shortness of breath or wheezing?SOB  7. NO - Do you sometimes get pains in the calves of your legs when you walk?  8. NO - Do you or anyone in your family have previous history of blood clots?  9. NO - Do you or does anyone in your family have a serious bleeding problem such as prolonged bleeding following surgeries or cuts?  10. NO - Have you ever had problems with anemia or been told to take iron pills?  11. NO - Have you had any abnormal blood loss such as black, tarry or bloody  stools, or abnormal vaginal bleeding?  12. NO - Have you ever had a blood transfusion?  13. NO - Have you or any of your relatives ever had problems with anesthesia?  14. NO - Do you have sleep apnea, excessive snoring or daytime drowsiness?  15. NO - Do you have any prosthetic heart valves?  16. yes - Do you have prosthetic joints?left knee  17. NO - Is there any chance that you may be pregnant?      HPI:     HPI related to upcoming procedure: dysphagia for the past 2 months      See problem list for active medical problems.  Problems all longstanding and stable, except as noted/documented.  See ROS for pertinent symptoms related to these conditions.                                                                                                                                                          .    MEDICAL HISTORY:     Patient Active Problem List    Diagnosis Date Noted     History of CVA (cerebrovascular accident) 10/25/2018     Priority: Medium     Pre-diabetes 08/09/2018     Priority: Medium     History of prostate cancer 2003 w/po resection prostate 05/18/2018     Priority: Medium     PAD (peripheral artery disease) (H) 05/18/2018     Priority: Medium     Microalbuminuria 05/18/2018     Priority: Medium     Mild persistent asthma without complication 05/18/2018     Priority: Medium     Skin lesion 01/17/2018     Priority: Medium     occiput       Chronic non-seasonal allergic rhinitis, unspecified trigger 10/10/2017     Priority: Medium     Presbycusis of both ears 09/12/2017     Priority: Medium     Post herpetic neuralgia 01/03/2017     Priority: Medium     Gabapentin    Last  check - 1/3/17       Physical deconditioning 11/07/2016     Priority: Medium     Aftercare following knee joint replacement surgery, unspecified laterality 10/10/2016     Priority: Medium     Mixed hyperlipidemia 06/22/2016     Priority: Medium     Chronic combined systolic and diastolic congestive heart failure (H) 10/08/2015      Priority: Medium     Essential hypertension, benign 10/08/2015     Priority: Medium     Tachy-alix syndrome (H)      Priority: Medium     s/p dual chamber pacemaker 2012       AAA (abdominal aortic aneurysm) (H)      Priority: Medium     Old myocardial infarction      Priority: Medium     MI with Vfib arrest 1998       GERD (gastroesophageal reflux disease) 12/15/2014     Priority: Medium     CKD (chronic kidney disease) stage 3, GFR 30-59 ml/min (H) 06/26/2014     Priority: Medium     Ischemic cardiomyopathy      Priority: Medium     Paroxysmal atrial fibrillation (H)      Priority: Medium     Coronary artery disease involving native coronary artery of native heart without angina pectoris      Priority: Medium     cardiac cath 2014: medical management; cath 2013: PTCA to diagonal; cath 2009: medical management; CABG 1998: LIMA to LAD, LISA to RCA, SVG to circumflex       Advanced directives, counseling/discussion 10/18/2013     Priority: Medium     Advance Care Planning:   Receipt of ACP document:  Received: Health Care Directive which was witnessed or notarized on 9-.  Document not previously scanned.  Validation form completed and sent with document to be scanned.  Code Status reflects choices in most recent ACP document.  Confirmed/documented designated decision maker(s). See permanent comments section of demographics in clinical tab. View document(s) and details by clicking on code status.   Added by Ayana Grace on 10/18/2013.             Polymyalgia rheumatica (H) 05/16/2013     Priority: Medium     traMADol (ULTRAM) 50 MG tablet    No CSA on file  Last  check - 1/3/17       Allergic rhinitis 10/30/2012     Priority: Medium     Problem list name updated by automated process. Provider to review       Peripheral edema 10/30/2012     Priority: Medium     Health Care Home 10/29/2012     Priority: Medium          DX V65.8 REPLACED WITH [facility] (04/08/2013)          Past Medical  History:   Diagnosis Date     AAA (abdominal aortic aneurysm) (H)      Anemia due to blood loss, acute 10/10/2016     Asthma      Atrial fibrillation (H)      Cardiac arrest (H)      Cardiomyopathy (H)      Chronic kidney disease      Chronic sinusitis      Coronary artery disease     cardiac cath 2014: medical management; cath 2013: PTCA to diagonal; cath 2009: medical management; CABG 1998: LIMA to LAD, LISA to RCA, SVG to circumflex     GERD (gastroesophageal reflux disease)      History of angina      Hyperlipidaemia      Hypertension, goal below 140/90      Myocardial infarction (H)     MI with Vfib arrest 1998     Polymyalgia rheumatica (H)      Shingles 10/28/2016     Shortness of breath      Tachy-alix syndrome (H)     s/p dual chamber pacemaker 2012     Past Surgical History:   Procedure Laterality Date     ARTHROPLASTY KNEE Left 10/5/2016    Procedure: ARTHROPLASTY KNEE;  Surgeon: Keshav Zamudio MD;  Location: [facility] OR     CARDIAC SURGERY  12/03/2012    pacemaker ; CABG 1998     CHOLECYSTECTOMY       COLONOSCOPY       ENT SURGERY  5-    sinus     EXTRACAPSULAR CATARACT EXTRATION WITH INTRAOCULAR LENS IMPLANT       GENITOURINARY SURGERY      prostatectomy     IMPLANT PACEMAKER  12-3-12    [facility]     PROSTATE SURGERY       Current Outpatient Medications   Medication Sig Dispense Refill     albuterol (PROAIR HFA/PROVENTIL HFA/VENTOLIN HFA) 108 (90 BASE) MCG/ACT Inhaler Inhale 2 puffs into the lungs as needed for shortness of breath / dyspnea or wheezing 3 Inhaler 11     aspirin 81 MG tablet Take 81 mg by mouth daily  30 tablet      azelastine (ASTELIN) 137 MCG/SPRAY nasal spray [place] 1 spray into both nostrils 2 times daily.       beclomethasone (QVAR) 80 MCG/ACT Inhaler Inhale 2 puffs into the lungs 2 times daily 2 Inhaler 11     carvedilol (COREG) 6.25 MG tablet Take 1 tablet (6.25 mg) by mouth 2 times daily (with meals) 180 tablet 1     cetirizine (ZYRTEC) 10 MG tablet Take 10 mg by mouth every  morning        Emollient (CERAVE) LOTN Externally apply topically 2 times daily as needed       guaiFENesin (MUCINEX) 600 MG 12 hr tablet Take 600 mg by mouth 2-3 x day       ipratropium (ATROVENT) 0.03 % nasal spray Spray 1 spray into both nostrils every 12 hours as needed for rhinitis 30 mL 5     lisinopril (PRINIVIL/ZESTRIL) 5 MG tablet Take 0.5 tablets (2.5 mg) by mouth daily 90 tablet 3     MELATONIN PO Take 5 mg by mouth At Bedtime       mometasone (NASONEX) 50 MCG/ACT nasal spray Spray 2 sprays into both nostrils daily as needed.       nitroglycerin (NITROSTAT) 0.4 MG SL tablet Place 1 tablet (0.4 mg) under the tongue every 5 minutes as needed for chest pain 25 tablet 3     polyethylene glycol (MIRALAX/GLYCOLAX) packet Take 17 g by mouth daily        rosuvastatin (CRESTOR) 40 MG tablet Take 1 tablet (40 mg) by mouth daily 90 tablet 2     torsemide (DEMADEX) 10 MG tablet Take one half tablet every other day 90 tablet 3     triamcinolone (KENALOG) 0.1 % cream Apply sparingly to affected area twotimes daily as needed 30 g 5     OTC products: none    Allergies   Allergen Reactions     Advair Diskus      DENIED     Morphine Hcl      Decrease  Blood Pressure Lower Than Normal, Per Pt.     Vicodin [Hydrocodone-Acetaminophen] Visual Disturbance      Latex Allergy: NO    Social History     Tobacco Use     Smoking status: Never Smoker     Smokeless tobacco: Never Used   Substance Use Topics     Alcohol use: No     Alcohol/week: 0.0 oz     History   Drug Use No       REVIEW OF SYSTEMS:   CONSTITUTIONAL: NEGATIVE for fever, chills, change in weight  INTEGUMENTARY/SKIN: NEGATIVE for worrisome rashes, moles or lesions  EYES: NEGATIVE for vision changes or irritation  ENT/MOUTH: NEGATIVE for ear, mouth and throat problems  RESP: NEGATIVE for significant cough or SOB  BREAST: NEGATIVE for masses, tenderness or discharge  CV: NEGATIVE for chest pain, palpitations or peripheral edema  GI: POSITIVE for dysphagia  : NEGATIVE  "for frequency, dysuria, or hematuria  MUSCULOSKELETAL: NEGATIVE for significant arthralgias or myalgia  NEURO: NEGATIVE for weakness, dizziness or paresthesias  ENDOCRINE: NEGATIVE for temperature intolerance, skin/hair changes  HEME: NEGATIVE for bleeding problems  PSYCHIATRIC: NEGATIVE for changes in mood or affect    EXAM:   /62 (BP Location: Right arm, Patient Position: Chair, Cuff Size: Adult Large)   Pulse 96   Temp 97.5  F (36.4  C)   Resp 20   Ht 1.778 m (5' 10\")   Wt 82.1 kg (181 lb)   SpO2 97%   BMI 25.97 kg/m      GENERAL APPEARANCE: healthy, alert and no distress     EYES: EOMI,  PERRL     HENT: ear canals and TM's normal and nose and mouth without ulcers or lesions     NECK: no adenopathy, no asymmetry, masses, or scars and thyroid normal to palpation     RESP: lungs clear to auscultation - no rales, rhonchi or wheezes     CV: regular rates and rhythm, normal S1 S2, no S3 or S4 and no murmur, click or rub     ABDOMEN:  soft, nontender, no HSM or masses and bowel sounds normal     MS: extremities normal- no gross deformities noted, no evidence of inflammation in joints, FROM in all extremities.     SKIN: no suspicious lesions or rashes     NEURO: Normal strength and tone, sensory exam grossly normal, mentation intact and speech normal     PSYCH: mentation appears normal. and affect normal/bright     LYMPHATICS: No cervical adenopathy    DIAGNOSTICS:   Labs were ordered as noted.  I did not order an EKG as the patient had just seen cardiology and had an echocardiogram and had his pacemaker interrogated.    Recent Labs   Lab Test 11/27/18  1014 11/19/18  1533  05/18/18  1409 12/26/17  1130  09/12/17  0956  12/19/16  1441  04/25/14  0739 04/24/14  0000   HGB  --   --   --   --   --   --  16.2  --  11.4*   < > 13.3 13.0*   PLT  --   --   --   --   --   --  220  --  394   < > 256  --    INR  --   --   --   --   --   --   --   --   --   --  0.94 1.0    142   < > 141 141  --  137   < > 142   " < >  --  143   POTASSIUM 4.2 4.6   < > 4.2 4.1  --  4.3   < > 3.5   < >  --  4.0   CR 1.68* 2.19*   < > 1.75* 1.56*   < > 1.60*   < > 1.60*   < >  --  1.6*   A1C  --   --   --  6.3* 5.9  --   --    < >  --    < >  --   --     < > = values in this interval not displayed.        IMPRESSION:   Reason for surgery/procedure: Dysphagia over the past 2 months needing upper endoscopy done.    The proposed surgical procedure is considered LOW risk.    REVISED CARDIAC RISK INDEX  The patient has the following serious cardiovascular risks for perioperative complications such as (MI, PE, VFib and 3  AV Block):  Coronary Artery Disease (MI, positive stress test, angina, Qs on EKG)  INTERPRETATION: 1 risks: Class II (low risk - 0.9% complication rate)    The patient has the following additional risks for perioperative complications:  No identified additional risks      ICD-10-CM    1. Preop general physical exam Z01.818 UA with Microscopic     CBC with platelets differential     Electrolyte panel (Na, K, Cl, CO2, Anion gap)     Creatinine     Urea nitrogen   2. Dysphagia, unspecified type R13.10    3. Allergic rhinitis, unspecified seasonality, unspecified trigger J30.9 ipratropium (ATROVENT) 0.03 % nasal spray   4. Mixed hyperlipidemia E78.2    5. Essential hypertension, benign I10    6. Old myocardial infarction I25.2    7. Gastroesophageal reflux disease without esophagitis K21.9    8. CKD (chronic kidney disease) stage 3, GFR 30-59 ml/min (H) N18.3    9. Paroxysmal atrial fibrillation (H) I48.0        RECOMMENDATIONS:             APPROVAL GIVEN to proceed with proposed procedure, without further diagnostic evaluation       Signed Electronically by: Eliezer Shah MD    Copy of this evaluation report is provided to requesting physician.    [facility] Preop Guidelines    Revised Cardiac Risk Index

## 2019-03-19 LAB
ANION GAP SERPL CALCULATED.3IONS-SCNC: 7 MMOL/L (ref 3–14)
BUN SERPL-MCNC: 27 MG/DL (ref 7–30)
CHLORIDE SERPL-SCNC: 109 MMOL/L (ref 94–109)
CO2 SERPL-SCNC: 25 MMOL/L (ref 20–32)
CREAT SERPL-MCNC: 1.97 MG/DL (ref 0.66–1.25)
GFR SERPL CREATININE-BSD FRML MDRD: 30 ML/MIN/{1.73_M2}
POTASSIUM SERPL-SCNC: 4.6 MMOL/L (ref 3.4–5.3)
SODIUM SERPL-SCNC: 141 MMOL/L (ref 133–144)

## 2019-04-12 ENCOUNTER — ANESTHESIA (OUTPATIENT)
Dept: GASTROENTEROLOGY | Facility: CLINIC | Age: 84
End: 2019-04-12
Payer: MEDICARE

## 2019-04-12 ENCOUNTER — HOSPITAL ENCOUNTER (OUTPATIENT)
Facility: CLINIC | Age: 84
Discharge: HOME | End: 2019-04-12
Attending: INTERNAL MEDICINE | Admitting: INTERNAL MEDICINE
Payer: MEDICARE

## 2019-04-12 ENCOUNTER — ANESTHESIA EVENT (OUTPATIENT)
Dept: GASTROENTEROLOGY | Facility: CLINIC | Age: 84
End: 2019-04-12
Payer: MEDICARE

## 2019-04-12 VITALS
RESPIRATION RATE: 11 BRPM | SYSTOLIC BLOOD PRESSURE: 101 MMHG | OXYGEN SATURATION: 90 % | DIASTOLIC BLOOD PRESSURE: 51 MMHG | HEART RATE: 68 BPM

## 2019-04-12 LAB — UPPER GI ENDOSCOPY: NORMAL

## 2019-04-12 PROCEDURE — 25800030 ZZH RX IP 258 OP 636: Performed by: NURSE ANESTHETIST, CERTIFIED REGISTERED

## 2019-04-12 PROCEDURE — 25000128 H RX IP 250 OP 636: Performed by: NURSE ANESTHETIST, CERTIFIED REGISTERED

## 2019-04-12 PROCEDURE — 40000010 ZZH STATISTIC ANES STAT CODE-CRNA PER MINUTE: Performed by: INTERNAL MEDICINE

## 2019-04-12 PROCEDURE — 25000125 ZZHC RX 250: Performed by: NURSE ANESTHETIST, CERTIFIED REGISTERED

## 2019-04-12 PROCEDURE — 37000008 ZZH ANESTHESIA TECHNICAL FEE, 1ST 30 MIN: Performed by: INTERNAL MEDICINE

## 2019-04-12 PROCEDURE — 43248 EGD GUIDE WIRE INSERTION: CPT | Performed by: INTERNAL MEDICINE

## 2019-04-12 RX ORDER — LIDOCAINE HYDROCHLORIDE 20 MG/ML
INJECTION, SOLUTION INFILTRATION; PERINEURAL PRN
Status: DISCONTINUED | OUTPATIENT
Start: 2019-04-12 | End: 2019-04-12

## 2019-04-12 RX ORDER — FLUMAZENIL 0.1 MG/ML
0.2 INJECTION, SOLUTION INTRAVENOUS
Status: DISCONTINUED | OUTPATIENT
Start: 2019-04-12 | End: 2019-04-12 | Stop reason: HOSPADM

## 2019-04-12 RX ORDER — NALOXONE HYDROCHLORIDE 0.4 MG/ML
.1-.4 INJECTION, SOLUTION INTRAMUSCULAR; INTRAVENOUS; SUBCUTANEOUS
Status: DISCONTINUED | OUTPATIENT
Start: 2019-04-12 | End: 2019-04-12 | Stop reason: HOSPADM

## 2019-04-12 RX ORDER — LIDOCAINE 40 MG/G
CREAM TOPICAL
Status: DISCONTINUED | OUTPATIENT
Start: 2019-04-12 | End: 2019-04-12 | Stop reason: HOSPADM

## 2019-04-12 RX ORDER — PROPOFOL 10 MG/ML
INJECTION, EMULSION INTRAVENOUS CONTINUOUS PRN
Status: DISCONTINUED | OUTPATIENT
Start: 2019-04-12 | End: 2019-04-12

## 2019-04-12 RX ORDER — FENTANYL CITRATE 50 UG/ML
INJECTION, SOLUTION INTRAMUSCULAR; INTRAVENOUS PRN
Status: DISCONTINUED | OUTPATIENT
Start: 2019-04-12 | End: 2019-04-12

## 2019-04-12 RX ORDER — SODIUM CHLORIDE, SODIUM LACTATE, POTASSIUM CHLORIDE, CALCIUM CHLORIDE 600; 310; 30; 20 MG/100ML; MG/100ML; MG/100ML; MG/100ML
INJECTION, SOLUTION INTRAVENOUS CONTINUOUS PRN
Status: DISCONTINUED | OUTPATIENT
Start: 2019-04-12 | End: 2019-04-12

## 2019-04-12 RX ORDER — ONDANSETRON 2 MG/ML
INJECTION INTRAMUSCULAR; INTRAVENOUS PRN
Status: DISCONTINUED | OUTPATIENT
Start: 2019-04-12 | End: 2019-04-12

## 2019-04-12 RX ADMIN — FENTANYL CITRATE 25 MCG: 50 INJECTION, SOLUTION INTRAMUSCULAR; INTRAVENOUS at 08:58

## 2019-04-12 RX ADMIN — FENTANYL CITRATE 12.5 MCG: 50 INJECTION, SOLUTION INTRAMUSCULAR; INTRAVENOUS at 08:59

## 2019-04-12 RX ADMIN — FENTANYL CITRATE 25 MCG: 50 INJECTION, SOLUTION INTRAMUSCULAR; INTRAVENOUS at 08:57

## 2019-04-12 RX ADMIN — LIDOCAINE HYDROCHLORIDE 40 MG: 20 INJECTION, SOLUTION INFILTRATION; PERINEURAL at 08:54

## 2019-04-12 RX ADMIN — TOPICAL ANESTHETIC 1 SPRAY: 200 SPRAY DENTAL; PERIODONTAL at 08:46

## 2019-04-12 RX ADMIN — PROPOFOL 50 MCG/KG/MIN: 10 INJECTION, EMULSION INTRAVENOUS at 08:49

## 2019-04-12 RX ADMIN — LIDOCAINE HYDROCHLORIDE 40 MG: 20 INJECTION, SOLUTION INFILTRATION; PERINEURAL at 08:49

## 2019-04-12 RX ADMIN — DEXMEDETOMIDINE HYDROCHLORIDE 8 MCG: 100 INJECTION, SOLUTION INTRAVENOUS at 08:46

## 2019-04-12 RX ADMIN — ONDANSETRON 4 MG: 2 INJECTION INTRAMUSCULAR; INTRAVENOUS at 08:56

## 2019-04-12 RX ADMIN — SODIUM CHLORIDE, POTASSIUM CHLORIDE, SODIUM LACTATE AND CALCIUM CHLORIDE: 600; 310; 30; 20 INJECTION, SOLUTION INTRAVENOUS at 08:46

## 2019-04-12 RX ADMIN — DEXMEDETOMIDINE HYDROCHLORIDE 4 MCG: 100 INJECTION, SOLUTION INTRAVENOUS at 08:54

## 2019-04-12 ASSESSMENT — ENCOUNTER SYMPTOMS: DYSRHYTHMIAS: 1

## 2019-04-12 NOTE — ANESTHESIA PREPROCEDURE EVALUATION
Anesthesia Pre-Procedure Evaluation    Patient: Abbe Lambert   MRN: [id number] : 2/3/1934          Preoperative Diagnosis: DYSPHASIA, GERD    Procedure(s):  COMBINED ESOPHAGOSCOPY, GASTROSCOPY, DUODENOSCOPY (EGD)    Past Medical History:   Diagnosis Date     AAA (abdominal aortic aneurysm) (H)      Anemia due to blood loss, acute 10/10/2016     Asthma      Atrial fibrillation (H)      Cardiac arrest (H)      Cardiomyopathy (H)      Chronic kidney disease      Chronic sinusitis      Coronary artery disease     cardiac cath : medical management; cath : PTCA to diagonal; cath : medical management; CABG : LIMA to LAD, LISA to RCA, SVG to circumflex     GERD (gastroesophageal reflux disease)      History of angina      Hyperlipidaemia      Hypertension, goal below 140/90      Myocardial infarction (H)     MI with Vfib arrest      Polymyalgia rheumatica (H)      Shingles 10/28/2016     Shortness of breath      Tachy-alix syndrome (H)     s/p dual chamber pacemaker      Past Surgical History:   Procedure Laterality Date     ARTHROPLASTY KNEE Left 10/5/2016    Procedure: ARTHROPLASTY KNEE;  Surgeon: Keshav Zamudio MD;  Location: [facility] OR     CARDIAC SURGERY  2012    pacemaker ; CABG      CHOLECYSTECTOMY       COLONOSCOPY       ENT SURGERY  2007    sinus     EXTRACAPSULAR CATARACT EXTRATION WITH INTRAOCULAR LENS IMPLANT       GENITOURINARY SURGERY      prostatectomy     IMPLANT PACEMAKER  12-3-12    [facility]     PROSTATE SURGERY         Anesthesia Evaluation     .             ROS/MED HX    ENT/Pulmonary: Comment: dysphagia    (+)allergic rhinitis, asthma , . .    Neurologic:     (+)CVA     Cardiovascular: Comment: History of v.fib arrest    Interpretation Summary     There is mild to moderate concentric left ventricular hypertrophy.  The visual ejection fraction is estimated at 35-40%.  There is severe apical wall hypokinesis.  There is moderate to severe inferior and  inferolateral wall hypokinesis.  Grade I or early diastolic dysfunction.  Pacer wire in right atrium  There is trace to mild aortic regurgitation.    (+) Dyslipidemia, hypertension-Peripheral Vascular Disease-CAD, angina-CABG-. : . CHF . . pacemaker :type: dual chamber . dysrhythmias a-fib, .       METS/Exercise Tolerance:     Hematologic:     (+) Anemia, -      Musculoskeletal: Comment: Polymyalgia rhematica  (+) arthritis,  -       GI/Hepatic:     (+) GERD       Renal/Genitourinary:     (+) chronic renal disease, type: CRI,       Endo:         Psychiatric:         Infectious Disease:         Malignancy:         Other:                          Physical Exam      Airway   Mallampati: II  TM distance: >3 FB  Neck ROM: full    Dental   (+) upper dentures and lower dentures    Cardiovascular       Pulmonary             Lab Results   Component Value Date    WBC 8.0 03/18/2019    HGB 11.7 (L) 03/18/2019    HCT 35.9 (L) 03/18/2019     03/18/2019    CRP 8.1 (H) 11/15/2016    SED 39 (H) 11/15/2016     03/18/2019    POTASSIUM 4.6 03/18/2019    CHLORIDE 109 03/18/2019    CO2 25 03/18/2019    BUN 27 03/18/2019    CR 1.97 (H) 03/18/2019     (H) 11/27/2018    JANAY 9.8 11/27/2018    PHOS 2.4 (L) 11/03/2016    MAG 1.9 11/03/2016    ALBUMIN 3.5 09/12/2017    PROTTOTAL 7.9 09/12/2017    ALT 12 10/24/2018    AST 38 09/12/2017    ALKPHOS 115 09/12/2017    BILITOTAL 1.0 09/12/2017    BILIDIRECT 0.1 04/30/2014    LIPASE 329 (H) 12/10/2013    PTT 25 04/25/2014    INR 0.94 04/25/2014    TSH 2.74 12/05/2016    T4 0.80 11/08/2011       Preop Vitals  BP Readings from Last 3 Encounters:   04/12/19 95/62   03/18/19 118/62   03/08/19 108/60    Pulse Readings from Last 3 Encounters:   03/18/19 96   03/08/19 69   02/13/19 80      Resp Readings from Last 3 Encounters:   03/18/19 20   02/13/19 12   01/23/19 16    SpO2 Readings from Last 3 Encounters:   03/18/19 97%   02/13/19 92%   01/23/19 97%      Temp Readings from Last 1  "Encounters:   03/18/19 36.4  C (97.5  F)    Ht Readings from Last 1 Encounters:   03/18/19 1.778 m (5' 10\")      Wt Readings from Last 1 Encounters:   03/18/19 82.1 kg (181 lb)    Estimated body mass index is 25.97 kg/m  as calculated from the following:    Height as of 3/18/19: 1.778 m (5' 10\").    Weight as of 3/18/19: 82.1 kg (181 lb).       Anesthesia Plan      History & Physical Review  History and physical reviewed and following examination; no interval change.    ASA Status:  3 .        Plan for MAC     Minimal sedation      Postoperative Care      Consents  Anesthetic plan, risks, benefits and alternatives discussed with:  Patient..                 Jayna Welch  "

## 2019-04-12 NOTE — ANESTHESIA CARE TRANSFER NOTE
Patient: Abbe Lambert    Procedure(s):  COMBINED ESOPHAGOSCOPY, GASTROSCOPY, DUODENOSCOPY (EGD)    Diagnosis: DYSPHASIA, GERD  Diagnosis Additional Information: No value filed.    Anesthesia Type:   MAC     Note:  Airway :Nasal Cannula  Patient transferred to:Phase II  Comments: At end of procedure, spontaneous respirations, patient alert to voice, able to follow commands. Oxygen via nasal cannula at 3 liters per minute to PACU. Oxygen tubing connected to wall O2 in PACU, SpO2, NiBP, and EKG monitors and alarms on and functioning, report on patient's clinical status given to PACU RN, RN questions answered.Handoff Report: Identifed the Patient, Identified the Reponsible Provider, Reviewed the pertinent medical history, Discussed the surgical course, Reviewed Intra-OP anesthesia mangement and issues during anesthesia, Set expectations for post-procedure period and Allowed opportunity for questions and acknowledgement of understanding      Vitals: (Last set prior to Anesthesia Care Transfer)    CRNA VITALS  4/12/2019 0832 - 4/12/2019 0907      4/12/2019             Pulse:  67    Ht Rate:  64    SpO2:  99 %    Resp Rate (set):  10                Electronically Signed By: CAROLYN Parker CRNA  April 12, 2019  9:07 AM   Patient came in to ED c/o throat pain and difficulty swallowing x 3 days. Patient denies fever, no chills, no difficulty breathing.

## 2019-04-12 NOTE — ANESTHESIA POSTPROCEDURE EVALUATION
Patient: Abbe Lambert    Procedure(s):  COMBINED ESOPHAGOSCOPY, GASTROSCOPY, DUODENOSCOPY (EGD), ESOPHAGEAL DILATION GUIDEWIRE/SAVARY    Diagnosis:DYSPHASIA, GERD  Diagnosis Additional Information: No value filed.    Anesthesia Type:  MAC    Note:  Anesthesia Post Evaluation    Patient location during evaluation: Phase 2  Patient participation: Able to fully participate in evaluation  Level of consciousness: awake and alert  Pain management: adequate  Airway patency: patent  Cardiovascular status: acceptable  Respiratory status: acceptable and unassisted  Hydration status: acceptable  PONV: none             Last vitals:  Vitals:    04/12/19 0945 04/12/19 0950 04/12/19 0953   BP: 100/76 106/63 101/51   Pulse: 66 69 68   Resp: 13 11    SpO2: 95% 90%          Electronically Signed By: Rehan Son MD  April 12, 2019  10:40 AM

## 2019-05-16 DIAGNOSIS — I10 ESSENTIAL HYPERTENSION: ICD-10-CM

## 2019-05-16 RX ORDER — CARVEDILOL 6.25 MG/1
6.25 TABLET ORAL 2 TIMES DAILY WITH MEALS
Qty: 180 TABLET | Refills: 0 | Status: SHIPPED | OUTPATIENT
Start: 2019-05-16 | End: 2019-05-29

## 2019-05-28 DIAGNOSIS — I10 ESSENTIAL HYPERTENSION: ICD-10-CM

## 2019-05-28 DIAGNOSIS — E11.22 TYPE 2 DIABETES MELLITUS WITH STAGE 3 CHRONIC KIDNEY DISEASE, WITHOUT LONG-TERM CURRENT USE OF INSULIN (H): ICD-10-CM

## 2019-05-28 DIAGNOSIS — N18.30 TYPE 2 DIABETES MELLITUS WITH STAGE 3 CHRONIC KIDNEY DISEASE, WITHOUT LONG-TERM CURRENT USE OF INSULIN (H): ICD-10-CM

## 2019-05-28 DIAGNOSIS — I10 BENIGN ESSENTIAL HYPERTENSION: ICD-10-CM

## 2019-05-28 NOTE — TELEPHONE ENCOUNTER
Reason for Call:  Medication or medication refill:    Do you use a [facility]?  Name of the pharmacy and phone number for the current request:  [facility] [address] [place] - [phone]    Name of the medication requested:  carvedilol (COREG) 6.25 MG tablet  lisinopril (PRINIVIL/ZESTRIL)    Other request:he no longer wants medications to go to [facility] as he does not want to deal with this pharmacy any more.  Now [facility] to [facility]    Can we leave a detailed message on this number? YES    Phone number patient can be reached at: Home number on file [phone] (home)    Best Time: any    Call taken on 5/28/2019 at 1:41 PM by Tiff Hodge

## 2019-05-28 NOTE — TELEPHONE ENCOUNTER
"Requested Prescriptions   Pending Prescriptions Disp Refills     carvedilol (COREG) 6.25 MG tablet  Last Written Prescription Date:  5/16/2019  Last Fill Quantity: 180 tablet,  # refills: 0   Last office visit: 3/18/2019 with prescribing provider:  Alyssa   Future Office Visit:     180 tablet 0     Sig: Take 1 tablet (6.25 mg) by mouth 2 times daily (with meals)       Beta-Blockers Protocol Passed - 5/28/2019  1:43 PM        Passed - Blood pressure under 140/90 in past 12 months     BP Readings from Last 3 Encounters:   04/12/19 101/51   03/18/19 118/62   03/08/19 108/60                 Passed - Patient is age 6 or older        Passed - Recent (12 mo) or future (30 days) visit within the authorizing provider's specialty     Patient had office visit in the last 12 months or has a visit in the next 30 days with authorizing provider or within the authorizing provider's specialty.  See \"Patient Info\" tab in inbasket, or \"Choose Columns\" in Meds & Orders section of the refill encounter.              Passed - Medication is active on med list        lisinopril (PRINIVIL/ZESTRIL) 5 MG tablet  Last Written Prescription Date:  2/13/2019  Last Fill Quantity: 90 tablet,  # refills: 3   Last office visit: 3/18/2019 with prescribing provider:  Alyssa   Future Office Visit:     90 tablet 3     Sig: Take 0.5 tablets (2.5 mg) by mouth daily       ACE Inhibitors (Including Combos) Protocol Failed - 5/28/2019  1:43 PM        Failed - Normal serum creatinine on file in past 12 months     Recent Labs   Lab Test 03/18/19  1544   CR 1.97*             Passed - Blood pressure under 140/90 in past 12 months     BP Readings from Last 3 Encounters:   04/12/19 101/51   03/18/19 118/62   03/08/19 108/60                 Passed - Recent (12 mo) or future (30 days) visit within the authorizing provider's specialty     Patient had office visit in the last 12 months or has a visit in the next 30 days with authorizing provider or within the " "authorizing provider's specialty.  See \"Patient Info\" tab in inbasket, or \"Choose Columns\" in Meds & Orders section of the refill encounter.              Passed - Medication is active on med list        Passed - Patient is age 18 or older        Passed - Normal serum potassium on file in past 12 months     Recent Labs   Lab Test 03/18/19  1544   POTASSIUM 4.6                "

## 2019-05-29 RX ORDER — CARVEDILOL 6.25 MG/1
6.25 TABLET ORAL 2 TIMES DAILY WITH MEALS
Qty: 180 TABLET | Refills: 2 | Status: SHIPPED | OUTPATIENT
Start: 2019-05-29 | End: 2020-06-24

## 2019-05-30 RX ORDER — LISINOPRIL 5 MG/1
2.5 TABLET ORAL DAILY
Qty: 90 TABLET | Refills: 3 | Status: ON HOLD | OUTPATIENT
Start: 2019-05-30 | End: 2020-09-11

## 2019-06-17 PROBLEM — J30.89 CHRONIC NON-SEASONAL ALLERGIC RHINITIS: Status: ACTIVE | Noted: 2017-10-10

## 2019-06-28 ENCOUNTER — DOCUMENTATION ONLY (OUTPATIENT)
Dept: CARDIOLOGY | Facility: CLINIC | Age: 84
End: 2019-06-28

## 2019-06-28 NOTE — TELEPHONE ENCOUNTER
06/28/19-Patient missed Merlin transmission on 6/17 Sent letter 6/18, no response. Sent 1 week letter today. jtr

## 2019-07-03 ENCOUNTER — ANCILLARY PROCEDURE (OUTPATIENT)
Dept: CARDIOLOGY | Facility: CLINIC | Age: 84
End: 2019-07-03
Attending: INTERNAL MEDICINE
Payer: MEDICARE

## 2019-07-03 DIAGNOSIS — Z95.0 CARDIAC PACEMAKER IN SITU: ICD-10-CM

## 2019-07-03 PROCEDURE — 93294 REM INTERROG EVL PM/LDLS PM: CPT | Performed by: INTERNAL MEDICINE

## 2019-07-03 PROCEDURE — 93296 REM INTERROG EVL PM/IDS: CPT | Performed by: INTERNAL MEDICINE

## 2019-07-03 NOTE — ED NOTES
Bed: ED15  Expected date:   Expected time:   Means of arrival:   Comments:  triage  
HIV (human immunodeficiency virus infection)

## 2019-07-08 ENCOUNTER — TELEPHONE (OUTPATIENT)
Dept: CARDIOLOGY | Facility: CLINIC | Age: 84
End: 2019-07-08

## 2019-07-08 NOTE — TELEPHONE ENCOUNTER
07/08/19 Called pt to cancel appt w Dr Guerin, based on last OV note by him. Also checked w Device RN who noted no events on recent device checks. Dr. Vázquez/ Cherie Queen PA-C both noted pt to f/u in 7/19 but no appt made. Pt transferred to scheduling to facilitate. Pt voiced understanding. Aditi 11:47 am

## 2019-07-10 LAB
MDC_IDC_LEAD_IMPLANT_DT: NORMAL
MDC_IDC_LEAD_IMPLANT_DT: NORMAL
MDC_IDC_LEAD_LOCATION: NORMAL
MDC_IDC_LEAD_LOCATION: NORMAL
MDC_IDC_LEAD_MFG: NORMAL
MDC_IDC_LEAD_MFG: NORMAL
MDC_IDC_LEAD_MODEL: NORMAL
MDC_IDC_LEAD_MODEL: NORMAL
MDC_IDC_LEAD_POLARITY_TYPE: NORMAL
MDC_IDC_LEAD_POLARITY_TYPE: NORMAL
MDC_IDC_LEAD_SERIAL: NORMAL
MDC_IDC_LEAD_SERIAL: NORMAL
MDC_IDC_MSMT_BATTERY_DTM: NORMAL
MDC_IDC_MSMT_BATTERY_REMAINING_LONGEVITY: 26 MO
MDC_IDC_MSMT_BATTERY_REMAINING_PERCENTAGE: 22 %
MDC_IDC_MSMT_BATTERY_RRT_TRIGGER: NORMAL
MDC_IDC_MSMT_BATTERY_STATUS: NORMAL
MDC_IDC_MSMT_BATTERY_VOLTAGE: 2.78 V
MDC_IDC_MSMT_LEADCHNL_RA_IMPEDANCE_VALUE: 380 OHM
MDC_IDC_MSMT_LEADCHNL_RA_LEAD_CHANNEL_STATUS: NORMAL
MDC_IDC_MSMT_LEADCHNL_RA_PACING_THRESHOLD_AMPLITUDE: 0.62 V
MDC_IDC_MSMT_LEADCHNL_RA_PACING_THRESHOLD_PULSEWIDTH: 0.5 MS
MDC_IDC_MSMT_LEADCHNL_RA_SENSING_INTR_AMPL: 4.7 MV
MDC_IDC_MSMT_LEADCHNL_RV_IMPEDANCE_VALUE: 540 OHM
MDC_IDC_MSMT_LEADCHNL_RV_LEAD_CHANNEL_STATUS: NORMAL
MDC_IDC_MSMT_LEADCHNL_RV_PACING_THRESHOLD_AMPLITUDE: 0.75 V
MDC_IDC_MSMT_LEADCHNL_RV_PACING_THRESHOLD_PULSEWIDTH: 0.5 MS
MDC_IDC_MSMT_LEADCHNL_RV_SENSING_INTR_AMPL: 12 MV
MDC_IDC_PG_IMPLANT_DTM: NORMAL
MDC_IDC_PG_MFG: NORMAL
MDC_IDC_PG_MODEL: NORMAL
MDC_IDC_PG_SERIAL: NORMAL
MDC_IDC_PG_TYPE: NORMAL
MDC_IDC_SESS_CLINIC_NAME: NORMAL
MDC_IDC_SESS_DTM: NORMAL
MDC_IDC_SESS_REPROGRAMMED: NO
MDC_IDC_SESS_TYPE: NORMAL
MDC_IDC_SET_BRADY_AT_MODE_SWITCH_MODE: NORMAL
MDC_IDC_SET_BRADY_AT_MODE_SWITCH_RATE: 180 {BEATS}/MIN
MDC_IDC_SET_BRADY_LOWRATE: 60 {BEATS}/MIN
MDC_IDC_SET_BRADY_MAX_SENSOR_RATE: 120 {BEATS}/MIN
MDC_IDC_SET_BRADY_MAX_TRACKING_RATE: 120 {BEATS}/MIN
MDC_IDC_SET_BRADY_MODE: NORMAL
MDC_IDC_SET_BRADY_PAV_DELAY_HIGH: 100 MS
MDC_IDC_SET_BRADY_PAV_DELAY_LOW: 200 MS
MDC_IDC_SET_BRADY_SAV_DELAY_HIGH: 100 MS
MDC_IDC_SET_BRADY_SAV_DELAY_LOW: 170 MS
MDC_IDC_SET_LEADCHNL_RA_PACING_AMPLITUDE: 1.62
MDC_IDC_SET_LEADCHNL_RA_PACING_ANODE_ELECTRODE_1: NORMAL
MDC_IDC_SET_LEADCHNL_RA_PACING_ANODE_LOCATION_1: NORMAL
MDC_IDC_SET_LEADCHNL_RA_PACING_CAPTURE_MODE: NORMAL
MDC_IDC_SET_LEADCHNL_RA_PACING_CATHODE_ELECTRODE_1: NORMAL
MDC_IDC_SET_LEADCHNL_RA_PACING_CATHODE_LOCATION_1: NORMAL
MDC_IDC_SET_LEADCHNL_RA_PACING_POLARITY: NORMAL
MDC_IDC_SET_LEADCHNL_RA_PACING_PULSEWIDTH: 0.5 MS
MDC_IDC_SET_LEADCHNL_RA_SENSING_ADAPTATION_MODE: NORMAL
MDC_IDC_SET_LEADCHNL_RA_SENSING_ANODE_ELECTRODE_1: NORMAL
MDC_IDC_SET_LEADCHNL_RA_SENSING_ANODE_LOCATION_1: NORMAL
MDC_IDC_SET_LEADCHNL_RA_SENSING_CATHODE_ELECTRODE_1: NORMAL
MDC_IDC_SET_LEADCHNL_RA_SENSING_CATHODE_LOCATION_1: NORMAL
MDC_IDC_SET_LEADCHNL_RA_SENSING_POLARITY: NORMAL
MDC_IDC_SET_LEADCHNL_RA_SENSING_SENSITIVITY: 1 MV
MDC_IDC_SET_LEADCHNL_RV_PACING_AMPLITUDE: 1 V
MDC_IDC_SET_LEADCHNL_RV_PACING_ANODE_ELECTRODE_1: NORMAL
MDC_IDC_SET_LEADCHNL_RV_PACING_ANODE_LOCATION_1: NORMAL
MDC_IDC_SET_LEADCHNL_RV_PACING_CAPTURE_MODE: NORMAL
MDC_IDC_SET_LEADCHNL_RV_PACING_CATHODE_ELECTRODE_1: NORMAL
MDC_IDC_SET_LEADCHNL_RV_PACING_CATHODE_LOCATION_1: NORMAL
MDC_IDC_SET_LEADCHNL_RV_PACING_POLARITY: NORMAL
MDC_IDC_SET_LEADCHNL_RV_PACING_PULSEWIDTH: 0.5 MS
MDC_IDC_SET_LEADCHNL_RV_SENSING_ADAPTATION_MODE: NORMAL
MDC_IDC_SET_LEADCHNL_RV_SENSING_ANODE_ELECTRODE_1: NORMAL
MDC_IDC_SET_LEADCHNL_RV_SENSING_ANODE_LOCATION_1: NORMAL
MDC_IDC_SET_LEADCHNL_RV_SENSING_CATHODE_ELECTRODE_1: NORMAL
MDC_IDC_SET_LEADCHNL_RV_SENSING_CATHODE_LOCATION_1: NORMAL
MDC_IDC_SET_LEADCHNL_RV_SENSING_POLARITY: NORMAL
MDC_IDC_SET_LEADCHNL_RV_SENSING_SENSITIVITY: 0.5 MV
MDC_IDC_STAT_AT_BURDEN_PERCENT: 0 %
MDC_IDC_STAT_AT_DTM_END: NORMAL
MDC_IDC_STAT_AT_DTM_START: NORMAL
MDC_IDC_STAT_AT_MODE_SW_COUNT: 0
MDC_IDC_STAT_AT_MODE_SW_COUNT_PER_DAY: 0
MDC_IDC_STAT_AT_MODE_SW_PERCENT_TIME: 0 %
MDC_IDC_STAT_BRADY_AP_VP_PERCENT: 55 %
MDC_IDC_STAT_BRADY_AP_VS_PERCENT: 45 %
MDC_IDC_STAT_BRADY_AS_VP_PERCENT: 1 %
MDC_IDC_STAT_BRADY_AS_VS_PERCENT: 1 %
MDC_IDC_STAT_BRADY_DTM_END: NORMAL
MDC_IDC_STAT_BRADY_DTM_START: NORMAL
MDC_IDC_STAT_BRADY_RA_PERCENT_PACED: 99 %
MDC_IDC_STAT_BRADY_RV_PERCENT_PACED: 55 %
MDC_IDC_STAT_CRT_DTM_END: NORMAL
MDC_IDC_STAT_CRT_DTM_START: NORMAL
MDC_IDC_STAT_HEART_RATE_ATRIAL_MAX: 290 {BEATS}/MIN
MDC_IDC_STAT_HEART_RATE_ATRIAL_MEAN: 74 {BEATS}/MIN
MDC_IDC_STAT_HEART_RATE_ATRIAL_MIN: 50 {BEATS}/MIN
MDC_IDC_STAT_HEART_RATE_DTM_END: NORMAL
MDC_IDC_STAT_HEART_RATE_DTM_START: NORMAL
MDC_IDC_STAT_HEART_RATE_VENTRICULAR_MAX: 330 {BEATS}/MIN
MDC_IDC_STAT_HEART_RATE_VENTRICULAR_MEAN: 74 {BEATS}/MIN
MDC_IDC_STAT_HEART_RATE_VENTRICULAR_MIN: 40 {BEATS}/MIN

## 2019-07-12 DIAGNOSIS — I50.42 CHRONIC COMBINED SYSTOLIC AND DIASTOLIC CONGESTIVE HEART FAILURE (H): ICD-10-CM

## 2019-07-12 NOTE — TELEPHONE ENCOUNTER
"TORSEMIDE 20MG TABLETS  Last Written Prescription Date:  02/13/2019  Last Fill Quantity: 90,  # refills: 3   Last office visit: 3/18/2019 with prescribing provider:  03/18/2019   Future Office Visit:    Requested Prescriptions   Pending Prescriptions Disp Refills     torsemide (DEMADEX) 20 MG tablet [Pharmacy Med Name: TORSEMIDE 20MG TABLETS] 30 tablet 0     Sig: TAKE 1 TABLET(20 MG) BY MOUTH DAILY       Diuretics (Including Combos) Protocol Failed - 7/12/2019  3:47 PM        Failed - Normal serum creatinine on file in past 12 months     Recent Labs   Lab Test 03/18/19  1544   CR 1.97*              Passed - Blood pressure under 140/90 in past 12 months     BP Readings from Last 3 Encounters:   04/12/19 101/51   03/18/19 118/62   03/08/19 108/60                 Passed - Recent (12 mo) or future (30 days) visit within the authorizing provider's specialty     Patient had office visit in the last 12 months or has a visit in the next 30 days with authorizing provider or within the authorizing provider's specialty.  See \"Patient Info\" tab in inbasket, or \"Choose Columns\" in Meds & Orders section of the refill encounter.              Passed - Medication is active on med list        Passed - Patient is age 18 or older        Passed - Normal serum potassium on file in past 12 months     Recent Labs   Lab Test 03/18/19  1544   POTASSIUM 4.6                    Passed - Normal serum sodium on file in past 12 months     Recent Labs   Lab Test 03/18/19  1544                   "

## 2019-07-15 RX ORDER — TORSEMIDE 20 MG/1
TABLET ORAL
Qty: 30 TABLET | Refills: 0 | Status: SHIPPED | OUTPATIENT
Start: 2019-07-15 | End: 2019-08-16

## 2019-08-06 ENCOUNTER — APPOINTMENT (OUTPATIENT)
Dept: CT IMAGING | Facility: CLINIC | Age: 84
End: 2019-08-06
Attending: NURSE PRACTITIONER
Payer: MEDICARE

## 2019-08-06 ENCOUNTER — HOSPITAL ENCOUNTER (EMERGENCY)
Facility: CLINIC | Age: 84
Discharge: HOME | End: 2019-08-06
Attending: NURSE PRACTITIONER | Admitting: NURSE PRACTITIONER
Payer: MEDICARE

## 2019-08-06 ENCOUNTER — NURSE TRIAGE (OUTPATIENT)
Dept: FAMILY MEDICINE | Facility: CLINIC | Age: 84
End: 2019-08-06

## 2019-08-06 VITALS
OXYGEN SATURATION: 99 % | HEIGHT: 70 IN | DIASTOLIC BLOOD PRESSURE: 78 MMHG | HEART RATE: 71 BPM | WEIGHT: 175 LBS | SYSTOLIC BLOOD PRESSURE: 148 MMHG | BODY MASS INDEX: 25.05 KG/M2 | TEMPERATURE: 97.6 F | RESPIRATION RATE: 16 BRPM

## 2019-08-06 DIAGNOSIS — Z86.73 HISTORY OF CVA (CEREBROVASCULAR ACCIDENT): ICD-10-CM

## 2019-08-06 DIAGNOSIS — G45.4 TRANSIENT GLOBAL AMNESIA: ICD-10-CM

## 2019-08-06 LAB
ANION GAP SERPL CALCULATED.3IONS-SCNC: 4 MMOL/L (ref 3–14)
BUN SERPL-MCNC: 45 MG/DL (ref 7–30)
CALCIUM SERPL-MCNC: 9 MG/DL (ref 8.5–10.1)
CHLORIDE SERPL-SCNC: 110 MMOL/L (ref 94–109)
CO2 SERPL-SCNC: 26 MMOL/L (ref 20–32)
CREAT SERPL-MCNC: 1.93 MG/DL (ref 0.66–1.25)
DIFFERENTIAL METHOD BLD: ABNORMAL
EOSINOPHIL # BLD AUTO: 0.1 10E9/L (ref 0–0.7)
EOSINOPHIL NFR BLD AUTO: 1 %
ERYTHROCYTE [DISTWIDTH] IN BLOOD BY AUTOMATED COUNT: 14.8 % (ref 10–15)
GFR SERPL CREATININE-BSD FRML MDRD: 31 ML/MIN/{1.73_M2}
GLUCOSE SERPL-MCNC: 86 MG/DL (ref 70–99)
HCT VFR BLD AUTO: 40 % (ref 40–53)
HGB BLD-MCNC: 12.8 G/DL (ref 13.3–17.7)
IMM PLASMA CELLS NFR BLD: 1 %
LYMPHOCYTES # BLD AUTO: 1.4 10E9/L (ref 0.8–5.3)
LYMPHOCYTES NFR BLD AUTO: 14 %
MCH RBC QN AUTO: 30.5 PG (ref 26.5–33)
MCHC RBC AUTO-ENTMCNC: 32 G/DL (ref 31.5–36.5)
MCV RBC AUTO: 95 FL (ref 78–100)
METAMYELOCYTES # BLD: 0.1 10E9/L
METAMYELOCYTES NFR BLD MANUAL: 1 %
MONOCYTES # BLD AUTO: 0.9 10E9/L (ref 0–1.3)
MONOCYTES NFR BLD AUTO: 9 %
NEUTROPHILS # BLD AUTO: 7.1 10E9/L (ref 1.6–8.3)
NEUTROPHILS NFR BLD AUTO: 74 %
PLASMA CELLS # BLD MANUAL: 0.1 10E9/L
PLATELET # BLD AUTO: 174 10E9/L (ref 150–450)
PLATELET # BLD EST: ABNORMAL 10*3/UL
POTASSIUM SERPL-SCNC: 5.1 MMOL/L (ref 3.4–5.3)
RBC # BLD AUTO: 4.2 10E12/L (ref 4.4–5.9)
RBC MORPH BLD: ABNORMAL
SODIUM SERPL-SCNC: 140 MMOL/L (ref 133–144)
WBC # BLD AUTO: 9.7 10E9/L (ref 4–11)

## 2019-08-06 PROCEDURE — 70450 CT HEAD/BRAIN W/O DYE: CPT

## 2019-08-06 PROCEDURE — 80048 BASIC METABOLIC PNL TOTAL CA: CPT | Performed by: NURSE PRACTITIONER

## 2019-08-06 PROCEDURE — 99284 EMERGENCY DEPT VISIT MOD MDM: CPT | Mod: 25

## 2019-08-06 PROCEDURE — 85025 COMPLETE CBC W/AUTO DIFF WBC: CPT | Performed by: EMERGENCY MEDICINE

## 2019-08-06 RX ORDER — IOPAMIDOL 755 MG/ML
70 INJECTION, SOLUTION INTRAVASCULAR ONCE
Status: DISCONTINUED | OUTPATIENT
Start: 2019-08-06 | End: 2019-08-06 | Stop reason: HOSPADM

## 2019-08-06 ASSESSMENT — ENCOUNTER SYMPTOMS
WEAKNESS: 0
SHORTNESS OF BREATH: 0
SPEECH DIFFICULTY: 0
CONFUSION: 1
FEVER: 0
HEADACHES: 0
NUMBNESS: 0

## 2019-08-06 ASSESSMENT — MIFFLIN-ST. JEOR: SCORE: 1485.04

## 2019-08-06 NOTE — ED PROVIDER NOTES
History     Chief Complaint:  Altered Mental Status    The history is provided by the patient.      Abbe Lambert is a 85 year old male who presents with altered mental status. The patient was driving to the ear doctor today when he couldn't remember how to get there although he had been there many times in the past. He states this lasted for 10-15 minutes and now he states that he is not having any more cognitive problems. He did not have any slurred speech or trouble speaking during this time. He denies any weakness, numbness, headache, and vision problems. He is not having any gait difficulties. He denies any chest pain or shortness of breath.     Allergies:  Advair diskus  Morphine HCL  Vicodin     Medications:    Albuterol (proair hfa/proventil hfa/ventolin hfa) 108 (90 base) mcg/act inhaler  Aspirin 81 mg tablet  Azelastine (astelin) 137 mcg/spray nasal spray  Beclomethasone (qvar) 80 mcg/act inhaler  Carvedilol (coreg) 6.25 mg tablet  Cetirizine (zyrtec) 10 mg tablet  Guaifenesin (mucinex) 600 mg 12 hr tablet  Lisinopril (prinivil/zestril) 5 mg tablet  Mometasone (nasonex) 50 mcg/act nasal spray  Nitroglycerin (nitrostat) 0.4 mg sl tablet  Rosuvastatin (crestor) 40 mg tablet  Torsemide (demadex) 20 mg tablet    Past Medical History:    AAA (abdominal aortic aneurysm)    Anemia due to blood loss, acute   Asthma   Atrial fibrillation    Cardiac arrest    Cardiomyopathy   Chronic kidney disease   Chronic sinusitis   Coronary artery disease   GERD (gastroesophageal reflux disease)   Angina   Hyperlipidaemia   Hypertension, goal below 140/90   Myocardial infarction   Polymyalgia rheumatica   Shingles   Shortness of breath   Tachy-alix syndrome   Allergic rhinitis  Peripheral edema  Ischemic cardiomyopathy  Chronic combined systolic and diastolic congestive heart failure   Post herpetic neuralgia  Presbycusis of both ears  Chronic non-seasonal allergic rhinitis, unspecified trigger  Skin lesion  Prostate  "cancer   PAD (peripheral artery disease)   Microalbuminuria  Cerebrovascular accident    Past Surgical History:    Arthroplasty knee  Pacemaker  Cholecystectomy  Sinus surgery  Intraocular lens implant  Prostate surgery    Family History:    Cerebrovascular disease  Heart disease  Depression  Coronary artery disease    Social History:  Patient is   Tobacco Use: No  Alcohol Use: No  PCP: Eliezer Shah     Review of Systems   Constitutional: Negative for fever.   Eyes: Negative for visual disturbance.   Respiratory: Negative for shortness of breath.    Cardiovascular: Negative for chest pain.   Musculoskeletal: Negative for gait problem.   Neurological: Negative for speech difficulty, weakness, numbness and headaches.   Psychiatric/Behavioral: Positive for confusion.   All other systems reviewed and are negative.    Physical Exam   First Vitals:  Patient Vitals for the past 24 hrs:   BP Temp Temp src Pulse Heart Rate Resp SpO2 Height Weight   08/06/19 1500 (!) 148/78 -- -- 71 -- -- 99 % -- --   08/06/19 1400 122/63 -- -- 60 -- -- 97 % -- --   08/06/19 1330 122/66 -- -- 67 -- -- 99 % -- --   08/06/19 1257 116/66 97.6  F (36.4  C) Oral -- 95 16 100 % 1.778 m (5' 10\") 79.4 kg (175 lb)     Physical Exam  Physical Exam   Constitutional:  Laying in bed comfortably, non toxic appearing.  Head: Head moves freely with normal range of motion.   ENT: Oropharynx is clear and moist.   Eyes: Conjunctivae pink. EOMs intact. No horizontal or vertical nystagmus. Hx of right eye surgery, lens removed and right pupil defect with poor vision of the right eye. Left eye with PERRL, hx lens replacement.  Neck: Normal range of motion. No nuchal rigidity.   Cardiovascular: Regular rate and rhythm. Normal heart sounds. No concerning murmur. Intact distal pulses: radial pulses 2+ on the right, 2+ on the left.   Pulmonary/Chest: No respiratory distress. No decreased breath sounds. Lungs clear throughout.   Abdominal: Soft. " Non-tender. No rebound, no guarding.   Musculoskeletal: No peripheral edema. Distal capillary refill and sensation intact.   Neurological: Oriented to person, place, and time. No focal deficits. CN II-XII intact. No facial asymmetry. No dysarthria. No trunchal instability. Upper and lower extremity strength is 5/5 and equal bilaterally. Normal ambulation.   Skin: Skin is warm and normal in color. No diaphoresis. No rash noted.      Emergency Department Course     Imaging:  Radiographic findings were communicated with the patient and family who voiced understanding of the findings.  CT head w/o contrast:  1. Cerebral atrophy with chronic white matter changes and old right  cerebellar infarct.  2. No evidence for intracranial hemorrhage or any acute brain  pathology.  3. Secretions in both sphenoid sinuses which could be due to an acute  sinusitis. Clinical correlation suggested. Per radiology read.     Laboratory:  CBC:  WBC 9.7, HGB 12.8 low,   BMP: Chloride 110 high, BUN 45 high, Creatinine 1.93 high, GFR 31 low, o/w WNL.     Emergency Department Course:  1:32 PM Nursing notes and vitals reviewed.  I performed an exam of the patient as documented above.     4:36 PM I rechecked on and updated the patient.    4:45 PM Findings and plan explained to the patient. Patient discharged home with instructions regarding supportive care, medications, and reasons to return. The importance of close follow-up was reviewed.     Impression & Plan      Medical Decision Making:  Abbe Lambert is a 85 year old male with a 10-15 minute period of not realizing where is was or where he was going. No weakness, vision or speech changes during this time. I suspect this is a transient global amnesia versus a TIA. He does have a histroy of CVA which he and his wife were completely unaware of. He has a completely normal neurologic exam. Neuro-imaging is difficult as his pacemaker is not compatible with MRI and his CKD and GFR of 31  contrast is not recommended especially given that I have a low suspicion for CVA. CT head reveals old right cerebellar infarct, no acute findings. Labwork with no metabolic derangement and renal function at baseline. I suggest that he follow up with Neurology as an outpatient and we discussed reasons to return here. He is amenable to plan.       Diagnosis:    ICD-10-CM    1. 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 History of CVA (cerebrovascular accident) Z86.73        Disposition:  discharged to home    CAREY, Bradley Aasen, am serving as a scribe on 8/6/2019 at 1:32 PM to personally document services performed by Veronica Duron NP based on my observations and the provider's statements to me.          Veronica Duron APRN CNP  08/06/19 2832

## 2019-08-06 NOTE — TELEPHONE ENCOUNTER
Patient and wife walk in to clinic this AM.    States that patient has an incident of confusion this AM that lasted 10-15 minutes. This has never happened before. Was trying to drive to local [facility] and could not remember how to get there. Wife had to should him how to get there.     Concerned of a possible TIA.     Pupils: Reaction time equal for both however right eye pupil shape is not round. Seems irregular. Different color eyes. However patient states that he has had many surgeries in the past on that eye so may be regular for him.     Both sides of the body seem to be equal in strength.     No slurred speech or trouble speaking. Walking with no concerns.     Oxygen on RA was 99%  P:69  T:97.8  BP:122/78  R:16    Huddle with provider: Please go to the ED to be evaluated.     Reason for Disposition    [1] Acting confused (e.g., disoriented, slurred speech) AND [2] brief (now gone)    Additional Information    Negative: [1] Difficult to awaken or acting confused (e.g., disoriented, slurred speech) AND [2] present now AND [3] has diabetes (diabetes mellitus)    Negative: [1] Difficult to awaken or acting confused (e.g., disoriented, slurred speech) AND [2] present now AND [3] new onset    Negative: [1] Weakness of the face, arm, or leg on one side of the body AND [2] new onset    Negative: [1] Numbness of the face, arm, or leg on one side of the body AND [2] new onset    Negative: [1] Loss of speech or garbled speech AND [2] new onset    Negative: Difficulty breathing or bluish lips    Negative: Shock suspected (e.g., cold/pale/clammy skin, too weak to stand, low BP, rapid pulse)    Negative: Seeing, hearing, or feeling things that are not there (i.e., visual, auditory, or tactile hallucinations)    Negative: Followed a head injury    Negative: Drug overdose suspected    Negative: Sounds like a life-threatening emergency to the triager    Negative: Drug abuse or dependence: question or problem related to     "Negative: Alcohol use, abuse or dependence: question or problem related to    Negative: Headache or vomiting    Negative: Stiff neck (can't touch chin to chest)    Negative: Very strange or paranoid behavior    Negative: Fever > 100.5 F (38.1 C)    Negative: Patient sounds very sick or weak to the triager    Answer Assessment - Initial Assessment Questions  1. LEVEL OF CONSCIOUSNESS: \"How is he (she, the patient) acting right now?\" (e.g., alert-oriented, confused, lethargic, stuporous, comatose)      Patient is acting correctly.   2. ONSET: \"When did the confusion start?\"  (minutes, hours, days)      today  3. PATTERN \"Does this come and go, or has it been constant since it started?\"  \"Is it present now?\"      Happened once   4. ALCOHOL or DRUGS: \"Has he been drinking alcohol or taking any drugs?\"       none  5. NARCOTIC MEDICATIONS: \"Has he been receiving any narcotic medications?\" (e.g., morphine, Vicodin)      none  6. CAUSE: \"What do you think is causing the confusion?\"       Cannot think of any reason  7. OTHER SYMPTOMS: \"Are there any other symptoms?\" (e.g., difficulty breathing, headache, fever, weakness)      No other symptoms    Protocols used: CONFUSION - DELIRIUM-A-AH    "

## 2019-08-06 NOTE — ED NOTES
Bed: ED26  Expected date:   Expected time:   Means of arrival:   Comments:  Hold triage confusion

## 2019-08-06 NOTE — ED AVS SNAPSHOT
Emergency Department  [address] [place] [id number]  Phone:  [phone]  Fax:  [phone]                                    Abbe Lambert   MRN: [id number]    Department:   Emergency Department   Date of Visit:  8/6/2019           After Visit Summary Signature Page    I have received my discharge instructions, and my questions have been answered. I have discussed any challenges I see with this plan with the nurse or doctor.    ..........................................................................................................................................  Patient/Patient Representative Signature      ..........................................................................................................................................  Patient Representative Print Name and Relationship to Patient    ..................................................               ................................................  Date                                   Time    ..........................................................................................................................................  Reviewed by Signature/Title    ...................................................              ..............................................  Date                                               Time          22EPIC Rev 08/18

## 2019-08-06 NOTE — ED TRIAGE NOTES
Was driving to Ear  today at 10am and couldn't remember how to get there. Pt states confusion lasted for about 10 min

## 2019-08-16 ENCOUNTER — OFFICE VISIT (OUTPATIENT)
Dept: FAMILY MEDICINE | Facility: CLINIC | Age: 84
End: 2019-08-16
Payer: MEDICARE

## 2019-08-16 VITALS
HEART RATE: 82 BPM | WEIGHT: 176 LBS | BODY MASS INDEX: 25.25 KG/M2 | DIASTOLIC BLOOD PRESSURE: 68 MMHG | RESPIRATION RATE: 16 BRPM | SYSTOLIC BLOOD PRESSURE: 124 MMHG | OXYGEN SATURATION: 98 %

## 2019-08-16 DIAGNOSIS — R60.0 PERIPHERAL EDEMA: ICD-10-CM

## 2019-08-16 DIAGNOSIS — G45.4 TRANSIENT GLOBAL AMNESIA: Primary | ICD-10-CM

## 2019-08-16 DIAGNOSIS — I50.42 CHRONIC COMBINED SYSTOLIC AND DIASTOLIC CONGESTIVE HEART FAILURE (H): ICD-10-CM

## 2019-08-16 PROCEDURE — 99214 OFFICE O/P EST MOD 30 MIN: CPT | Performed by: FAMILY MEDICINE

## 2019-08-16 RX ORDER — TORSEMIDE 20 MG/1
20 TABLET ORAL DAILY
Qty: 30 TABLET | Refills: 1 | Status: SHIPPED | OUTPATIENT
Start: 2019-08-16 | End: 2019-09-24

## 2019-08-16 ASSESSMENT — ASTHMA QUESTIONNAIRES
QUESTION_4 LAST FOUR WEEKS HOW OFTEN HAVE YOU USED YOUR RESCUE INHALER OR NEBULIZER MEDICATION (SUCH AS ALBUTEROL): NOT AT ALL
ACT_TOTALSCORE: 25
QUESTION_5 LAST FOUR WEEKS HOW WOULD YOU RATE YOUR ASTHMA CONTROL: COMPLETELY CONTROLLED
QUESTION_3 LAST FOUR WEEKS HOW OFTEN DID YOUR ASTHMA SYMPTOMS (WHEEZING, COUGHING, SHORTNESS OF BREATH, CHEST TIGHTNESS OR PAIN) WAKE YOU UP AT NIGHT OR EARLIER THAN USUAL IN THE MORNING: NOT AT ALL
QUESTION_1 LAST FOUR WEEKS HOW MUCH OF THE TIME DID YOUR ASTHMA KEEP YOU FROM GETTING AS MUCH DONE AT WORK, SCHOOL OR AT HOME: NONE OF THE TIME
QUESTION_2 LAST FOUR WEEKS HOW OFTEN HAVE YOU HAD SHORTNESS OF BREATH: NOT AT ALL

## 2019-08-16 NOTE — TELEPHONE ENCOUNTER
Reason for Call:  Medication or medication refill:    Do you use a [facility]?  Name of the pharmacy and phone number for the current request:  [facility] [address] [place] - [phone]    Name of the medication requested: torsemide (DEMADEX) 20 MG tablet       Other request: The patient called and stated that he only has a few pills left and will need this refilled as soon as possible.     Can we leave a detailed message on this number? YES    Phone number patient can be reached at: Home number on file [phone] (home)    Best Time: Any    Call taken on 8/16/2019 at 1:06 PM by Mai Mireles

## 2019-08-16 NOTE — PROGRESS NOTES
Subjective     Abbe Lambert is a 85 year old male who presents to clinic today for the following health issues:    HPI   Memory Problems      Duration: episode on 8/6/19    Description (location/character/radiation): requesting a referral     Intensity:  moderate    Accompanying signs and symptoms: pt was driving to a friends house and forgot where he was going- lasted for 20 minutes.  See emergency room note.  Patient would like to see a neurologist.    History (similar episodes/previous evaluation): went to ER and had a xray of head- dx with sinus infection    Precipitating or alleviating factors: None    Therapies tried and outcome: None     Edema in both legs      Duration: Many months    Description (location/character/radiation): Both legs swell left greater than right    Intensity:  mild    Accompanying signs and symptoms: None    History (similar episodes/previous evaluation): None    Precipitating or alleviating factors: He has been taking torsemide but both doses of 10 mg and 20 mg are noted in the medication list.    Therapies tried and outcome: Torsemide and he is not sure if it helped much.       Patient Active Problem List   Diagnosis     Health Care Home     Allergic rhinitis     Peripheral edema     Polymyalgia rheumatica (H)     Advanced directives, counseling/discussion     Ischemic cardiomyopathy     Paroxysmal atrial fibrillation (H)     Coronary artery disease involving native coronary artery of native heart without angina pectoris     CKD (chronic kidney disease) stage 3, GFR 30-59 ml/min (H)     GERD (gastroesophageal reflux disease)     Tachy-alix syndrome (H)     AAA (abdominal aortic aneurysm) (H)     Old myocardial infarction     Chronic combined systolic and diastolic congestive heart failure (H)     Essential hypertension, benign     Mixed hyperlipidemia     Aftercare following knee joint replacement surgery, unspecified laterality     Physical deconditioning     Post herpetic  neuralgia     Presbycusis of both ears     Chronic non-seasonal allergic rhinitis, unspecified trigger     Skin lesion     History of prostate cancer 2003 w/po resection prostate     PAD (peripheral artery disease) (H)     Microalbuminuria     Mild persistent asthma without complication     Pre-diabetes     History of CVA (cerebrovascular accident)     Past Surgical History:   Procedure Laterality Date     ARTHROPLASTY KNEE Left 10/5/2016    Procedure: ARTHROPLASTY KNEE;  Surgeon: Keshav Zamudio MD;  Location: [facility] OR     CARDIAC SURGERY  12/03/2012    pacemaker ; CABG 1998     CHOLECYSTECTOMY       COLONOSCOPY       ENT SURGERY  5-    sinus     EXTRACAPSULAR CATARACT EXTRATION WITH INTRAOCULAR LENS IMPLANT       GENITOURINARY SURGERY      prostatectomy     IMPLANT PACEMAKER  12-3-12    [facility]     PROSTATE SURGERY         Social History     Tobacco Use     Smoking status: Never Smoker     Smokeless tobacco: Never Used   Substance Use Topics     Alcohol use: No     Alcohol/week: 0.0 oz     Family History   Problem Relation Age of Onset     Cerebrovascular Disease Father      Heart Disease Father      Heart Disease Brother      Coronary Artery Disease Brother         MI age 50     Depression Daughter         raped in high school     Unknown/Adopted Maternal Grandmother      Unknown/Adopted Maternal Grandfather      Unknown/Adopted Paternal Grandmother      Unknown/Adopted Paternal Grandfather            Reviewed and updated as needed this visit by Provider         Review of Systems   ROS COMP: Constitutional, HEENT, cardiovascular, pulmonary, gi and gu systems are negative, except as otherwise noted.      Objective    /68 (Cuff Size: Adult Regular)   Pulse 82   Resp 16   Wt 79.8 kg (176 lb)   SpO2 98%   BMI 25.25 kg/m    Body mass index is 25.25 kg/m .  Physical Exam   GENERAL APPEARANCE: healthy, alert and no distress  CV: There is trace edema present in both lower extremities left side greater  "than right.  NEURO: Normal strength and tone, mentation intact, speech normal, DTR symmetrically normal in lower extremities, cranial nerves 2-12 intact and Romberg negative            Assessment & Plan       ICD-10-CM    1. Transient global amnesia G45.4 NEUROLOGY ADULT REFERRAL   2. Peripheral edema R60.9         BMI:   Estimated body mass index is 25.25 kg/m  as calculated from the following:    Height as of 8/6/19: 1.778 m (5' 10\").    Weight as of this encounter: 79.8 kg (176 lb).           Patient Instructions   The patient will return to his home and review his medications and get back to us with his dose of torsemide.  We will then make a determination on what to do with his medications.  I did refer him to neurology for his transient global amnesia attack.      No follow-ups on file.    Eliezer Shah MD  [facility]    "

## 2019-08-16 NOTE — PATIENT INSTRUCTIONS
The patient will return to his home and review his medications and get back to us with his dose of torsemide.  We will then make a determination on what to do with his medications.  I did refer him to neurology for his transient global amnesia attack.

## 2019-08-17 ASSESSMENT — ASTHMA QUESTIONNAIRES: ACT_TOTALSCORE: 25

## 2019-08-28 ENCOUNTER — TELEPHONE (OUTPATIENT)
Dept: FAMILY MEDICINE | Facility: CLINIC | Age: 84
End: 2019-08-28

## 2019-08-28 NOTE — TELEPHONE ENCOUNTER
Patient walked into the clinic requesting another order for Upper GI Endoscopy. He feels like he needs it done for more dilation. Last one was done on 4/12/19 with notes to repeat prn for retreatment.

## 2019-08-29 DIAGNOSIS — K21.9 GASTROESOPHAGEAL REFLUX DISEASE WITHOUT ESOPHAGITIS: Primary | ICD-10-CM

## 2019-08-29 NOTE — TELEPHONE ENCOUNTER
Patient was called and updated with provider message.     Referral was faxed to [facility].     No further follow up at this time.

## 2019-09-23 RX ORDER — IPRATROPIUM BROMIDE 42 UG/1
SPRAY, METERED NASAL
Refills: 2 | COMMUNITY
Start: 2019-06-12 | End: 2019-09-24

## 2019-09-24 ENCOUNTER — OFFICE VISIT (OUTPATIENT)
Dept: FAMILY MEDICINE | Facility: CLINIC | Age: 84
End: 2019-09-24
Payer: MEDICARE

## 2019-09-24 VITALS
DIASTOLIC BLOOD PRESSURE: 68 MMHG | HEART RATE: 74 BPM | BODY MASS INDEX: 29.82 KG/M2 | HEIGHT: 65 IN | TEMPERATURE: 97.1 F | RESPIRATION RATE: 16 BRPM | OXYGEN SATURATION: 97 % | WEIGHT: 179 LBS | SYSTOLIC BLOOD PRESSURE: 116 MMHG

## 2019-09-24 DIAGNOSIS — Z01.818 PREOP GENERAL PHYSICAL EXAM: Primary | ICD-10-CM

## 2019-09-24 DIAGNOSIS — I50.42 CHRONIC COMBINED SYSTOLIC AND DIASTOLIC CONGESTIVE HEART FAILURE (H): ICD-10-CM

## 2019-09-24 DIAGNOSIS — I25.10 CORONARY ARTERY DISEASE INVOLVING NATIVE CORONARY ARTERY OF NATIVE HEART WITHOUT ANGINA PECTORIS: ICD-10-CM

## 2019-09-24 DIAGNOSIS — N18.30 CKD (CHRONIC KIDNEY DISEASE) STAGE 3, GFR 30-59 ML/MIN (H): ICD-10-CM

## 2019-09-24 DIAGNOSIS — K22.2 ESOPHAGEAL STRICTURE: ICD-10-CM

## 2019-09-24 DIAGNOSIS — R13.10 DYSPHAGIA, UNSPECIFIED TYPE: ICD-10-CM

## 2019-09-24 DIAGNOSIS — I10 ESSENTIAL HYPERTENSION, BENIGN: ICD-10-CM

## 2019-09-24 PROCEDURE — 99214 OFFICE O/P EST MOD 30 MIN: CPT | Performed by: FAMILY MEDICINE

## 2019-09-24 RX ORDER — TORSEMIDE 20 MG/1
20 TABLET ORAL DAILY PRN
Qty: 30 TABLET | Refills: 1
Start: 2019-09-24 | End: 2020-11-03

## 2019-09-24 ASSESSMENT — MIFFLIN-ST. JEOR: SCORE: 1423.82

## 2019-09-24 NOTE — PROGRESS NOTES
"SUBJECTIVE:   Abbe Lambert is a 85 year old male who presents for Preventive Visit.  {PVP to remind patient that this is not necessarily a physical exam; physical exam may or may not be done:473858::\"click delete button to remove this line now\"}  {PVP to inform patient that additional E&M charge may apply, if additional problems addressed:064603::\"click delete button to remove this line now\"}  Are you in the first 12 months of your Medicare Part B coverage?  { :208324::\"No\"}    Physical Health:    In general, how would you rate your overall physical health? { :329148}    Outside of work, how many days during the week do you exercise? { :486938}    Outside of work, approximately how many minutes a day do you exercise?{ :604421}    If you drink alcohol do you typically have >3 drinks per day or >7 drinks per week? { :073681}    Do you usually eat at least 4 servings of fruit and vegetables a day, include whole grains & fiber and avoid regularly eating high fat or \"junk\" foods? { :929093::\"Yes\"}    Do you have any problems taking medications regularly?  { :951622::\"No\"}    Do you have any side effects from medications? { :560813}    Needs assistance for the following daily activities: { :348747}    Which of the following safety concerns are present in your home?  { :883359::\"none identified\"}     Hearing impairment: { :926558}    In the past 6 months, have you been bothered by leaking of urine? { :357959}    Mental Health:    In general, how would you rate your overall mental or emotional health? { :320685}  PHQ-2 Score:      Do you feel safe in your environment? { :485840}    Do you have a Health Care Directive? { :221685}    Additional concerns to address?  {If YES, notify patient they may be responsible for a copay, coinsurance or deductible if the visit today includes services such as checking on a sore throat, having an x-ray or lab test, or treating and evaluating a new or existing condition " ":162253::\"No\"}    Fall risk:  { :517625}  {If any of the above assessments are answered yes, consider ordering appropriate referrals (Optional):551302::\"click delete button to remove this line now\"}  Cognitive Screening: { :085398}    {Do you have sleep apnea, excessive snoring or daytime drowsiness? (Optional):518573}    {Outside tests to abstract? :439810}    {additional problems to add (Optional):064437}    Reviewed and updated as needed this visit by clinical staff         Reviewed and updated as needed this visit by Provider        Social History     Tobacco Use     Smoking status: Never Smoker     Smokeless tobacco: Never Used   Substance Use Topics     Alcohol use: No     Alcohol/week: 0.0 standard drinks                           Current providers sharing in care for this patient include: {Rooming staff:  Please update Care Team in Rooming Activity, refresh this note and then delete this statement}  Patient Care Team:  Eliezer Shah MD as PCP - General (Family Practice)  Anne Nova RN as   Eliezer Shah MD as Assigned PCP    The following health maintenance items are reviewed in Epic and correct as of today:  Health Maintenance   Topic Date Due     ZOSTER IMMUNIZATION (2 of 3) 02/07/2008     MEDICARE ANNUAL WELLNESS VISIT  09/12/2018     ADVANCE CARE PLANNING  10/18/2018     A1C  11/18/2018     TSH W/FREE T4 REFLEX  12/05/2018     MICROALBUMIN  12/26/2018     DIABETIC FOOT EXAM  05/18/2019     ASTHMA ACTION PLAN  05/18/2019     INFLUENZA VACCINE (1) 09/01/2019     ALT  10/24/2019     LIPID  10/24/2019     BMP  02/06/2020     FALL RISK ASSESSMENT  02/13/2020     ASTHMA CONTROL TEST  02/16/2020     EYE EXAM  08/01/2020     CBC  08/06/2020     HF ACTION PLAN  05/18/2021     DTAP/TDAP/TD IMMUNIZATION (2 - Td) 05/17/2023     PHQ-2  Completed     PNEUMOCOCCAL IMMUNIZATION 65+ HIGH/HIGHEST RISK  Completed     IPV IMMUNIZATION  Aged Out     MENINGITIS IMMUNIZATION  Aged Out " "    {Chronicprobdata (Optional):706184}  {Decision Support (Optional):475362}    ROS:  {ROS COMP:039325}    OBJECTIVE:   There were no vitals taken for this visit. Estimated body mass index is 25.25 kg/m  as calculated from the following:    Height as of 19: 1.778 m (5' 10\").    Weight as of 19: 79.8 kg (176 lb).  EXAM:   {Exam :101541}    {Diagnostic Test Results (Optional):818806::\"Diagnostic Test Results:\",\"Labs reviewed in Epic\"}    ASSESSMENT / PLAN:   {Diag Picklist:053509}    End of Life Planning:  Patient currently has an advanced directive: { :240440}    COUNSELING:  {Medicare Counselin}    Estimated body mass index is 25.25 kg/m  as calculated from the following:    Height as of 19: 1.778 m (5' 10\").    Weight as of 19: 79.8 kg (176 lb).    {Weight Management Plan (ACO) Complete if BMI is abnormal-  Ages 18-64  BMI >24.9.  Age 65+ with BMI <23 or >30 (Optional):607662}     reports that he has never smoked. He has never used smokeless tobacco.  {Tobacco Cessation -- Complete if patient is a smoker (Optional):530313}    Appropriate preventive services were discussed with this patient, including applicable screening as appropriate for cardiovascular disease, diabetes, osteopenia/osteoporosis, and glaucoma.  As appropriate for age/gender, discussed screening for colorectal cancer, prostate cancer, breast cancer, and cervical cancer. Checklist reviewing preventive services available has been given to the patient.    Reviewed patients plan of care and provided an AVS. The {CarePlan:403294} for Abbe meets the Care Plan requirement. This Care Plan has been established and reviewed with the {PATIENT, FAMILY MEMBER, CAREGIVER:509123}.    Counseling Resources:  ATP IV Guidelines  Pooled Cohorts Equation Calculator  Breast Cancer Risk Calculator  FRAX Risk Assessment  ICSI Preventive Guidelines  Dietary Guidelines for Americans, 2010  USDA's MyPlate  ASA Prophylaxis  Lung CA " Screening    Eliezer Shah MD  [facility]

## 2019-09-24 NOTE — PROGRESS NOTES
[facility]  [address]  [place] [address]  [phone]  Dept: [phone]    PRE-OP EVALUATION:  Today's date: 2019    Abbe Lambert (: 2/3/1934) presents for pre-operative evaluation assessment as requested by Dr. Shah.  He requires evaluation and anesthesia risk assessment prior to undergoing surgery/procedure for treatment of esophagus stretch .    Proposed Surgery/ Procedure: esophageal stretch  Date of Surgery/ Procedure:   Time of Surgery/ Procedure:   Hospital/Surgical Facility: [facility]  Fax number for surgical facility:   Primary Physician: Eliezer Shah  Type of Anesthesia Anticipated: to be determined    Patient has a Health Care Directive or Living Will:  YES     1. YES - DO YOU HAVE A HISTORY OF HEART ATTACK, STROKE, STENT, BYPASS OR SURGERY ON AN ARTERY IN THE HEAD, NECK, HEART OR LEG? bypass  2. NO - Do you ever have any pain or discomfort in your chest?  3. NO - Do you have a history of  Heart Failure?  4. YES - ARE YOUR TROUBLED BY SHORTNESS OF BREATH WHEN WALKING ON THE LEVEL, UP A SLIGHT HILL OR AT NIGHT? occasionally  5. NO - Do you currently have a cold, bronchitis or other respiratory infection?  6. NO - Do you have a cough, shortness of breath or wheezing?  7. NO - Do you sometimes get pains in the calves of your legs when you walk?  8. NO - Do you or anyone in your family have previous history of blood clots?  9. NO - Do you or does anyone in your family have a serious bleeding problem such as prolonged bleeding following surgeries or cuts?  10. NO - Have you ever had problems with anemia or been told to take iron pills?  11. NO - Have you had any abnormal blood loss such as black, tarry or bloody stools, or abnormal vaginal bleeding?  12. NO - Have you ever had a blood transfusion?  13. NO - Have you or any of your relatives ever had problems with anesthesia?  14. NO - Do you have sleep apnea, excessive  snoring or daytime drowsiness?  15. NO - Do you have any prosthetic heart valves?  16. NO - Do you have prosthetic joints?  17. NO - Is there any chance that you may be pregnant?      HPI:     HPI related to upcoming procedure: patient has recurrent problems with esophageal stricture requiring repeat dilations.      See problem list for active medical problems.  Problems all longstanding and stable, except as noted/documented.  See ROS for pertinent symptoms related to these conditions.      MEDICAL HISTORY:     Patient Active Problem List    Diagnosis Date Noted     Dysphagia, unspecified type 09/24/2019     Priority: Medium     Esophageal stricture 09/24/2019     Priority: Medium     History of CVA (cerebrovascular accident) 10/25/2018     Priority: Medium     Pre-diabetes 08/09/2018     Priority: Medium     History of prostate cancer 2003 w/po resection prostate 05/18/2018     Priority: Medium     PAD (peripheral artery disease) (H) 05/18/2018     Priority: Medium     Microalbuminuria 05/18/2018     Priority: Medium     Mild persistent asthma without complication 05/18/2018     Priority: Medium     Skin lesion 01/17/2018     Priority: Medium     occiput       Chronic non-seasonal allergic rhinitis, unspecified trigger 10/10/2017     Priority: Medium     Presbycusis of both ears 09/12/2017     Priority: Medium     Post herpetic neuralgia 01/03/2017     Priority: Medium     Gabapentin    Last  check - 1/3/17       Physical deconditioning 11/07/2016     Priority: Medium     Aftercare following knee joint replacement surgery, unspecified laterality 10/10/2016     Priority: Medium     Mixed hyperlipidemia 06/22/2016     Priority: Medium     Chronic combined systolic and diastolic congestive heart failure (H) 10/08/2015     Priority: Medium     Essential hypertension, benign 10/08/2015     Priority: Medium     Tachy-alix syndrome (H)      Priority: Medium     s/p dual chamber pacemaker 2012       AAA (abdominal  aortic aneurysm) (H)      Priority: Medium     Old myocardial infarction      Priority: Medium     MI with Vfib arrest 1998       GERD (gastroesophageal reflux disease) 12/15/2014     Priority: Medium     CKD (chronic kidney disease) stage 3, GFR 30-59 ml/min (H) 06/26/2014     Priority: Medium     Ischemic cardiomyopathy      Priority: Medium     Paroxysmal atrial fibrillation (H)      Priority: Medium     Coronary artery disease involving native coronary artery of native heart without angina pectoris      Priority: Medium     cardiac cath 2014: medical management; cath 2013: PTCA to diagonal; cath 2009: medical management; CABG 1998: LIMA to LAD, LISA to RCA, SVG to circumflex       Advanced directives, counseling/discussion 10/18/2013     Priority: Medium     Advance Care Planning:   Receipt of ACP document:  Received: Health Care Directive which was witnessed or notarized on 9-.  Document not previously scanned.  Validation form completed and sent with document to be scanned.  Code Status reflects choices in most recent ACP document.  Confirmed/documented designated decision maker(s). See permanent comments section of demographics in clinical tab. View document(s) and details by clicking on code status.   Added by Ayana Grace on 10/18/2013.             Polymyalgia rheumatica (H) 05/16/2013     Priority: Medium     traMADol (ULTRAM) 50 MG tablet    No CSA on file  Last  check - 1/3/17       Allergic rhinitis 10/30/2012     Priority: Medium     Problem list name updated by automated process. Provider to review       Peripheral edema 10/30/2012     Priority: Medium     Health Care Home 10/29/2012     Priority: Medium          DX V65.8 REPLACED WITH [facility] (04/08/2013)          Past Medical History:   Diagnosis Date     AAA (abdominal aortic aneurysm) (H)      Anemia due to blood loss, acute 10/10/2016     Asthma      Atrial fibrillation (H)      Cardiac arrest (H)      Cardiomyopathy  (H)      Chronic kidney disease      Chronic sinusitis      Coronary artery disease     cardiac cath 2014: medical management; cath 2013: PTCA to diagonal; cath 2009: medical management; CABG 1998: LIMA to LAD, LISA to RCA, SVG to circumflex     GERD (gastroesophageal reflux disease)      History of angina      Hyperlipidaemia      Hypertension, goal below 140/90      Myocardial infarction (H)     MI with Vfib arrest 1998     Polymyalgia rheumatica (H)      Shingles 10/28/2016     Shortness of breath      Tachy-alix syndrome (H)     s/p dual chamber pacemaker 2012     Past Surgical History:   Procedure Laterality Date     ARTHROPLASTY KNEE Left 10/5/2016    Procedure: ARTHROPLASTY KNEE;  Surgeon: Keshav Zamudio MD;  Location: [facility] OR     CARDIAC SURGERY  12/03/2012    pacemaker ; CABG 1998     CHOLECYSTECTOMY       COLONOSCOPY       ENT SURGERY  5-    sinus     EXTRACAPSULAR CATARACT EXTRATION WITH INTRAOCULAR LENS IMPLANT       GENITOURINARY SURGERY      prostatectomy     IMPLANT PACEMAKER  12-3-12    [facility]     PROSTATE SURGERY       Current Outpatient Medications   Medication Sig Dispense Refill     aspirin 81 MG tablet Take 81 mg by mouth daily  30 tablet      beclomethasone (QVAR) 80 MCG/ACT Inhaler Inhale 2 puffs into the lungs 2 times daily 2 Inhaler 11     carvedilol (COREG) 6.25 MG tablet Take 1 tablet (6.25 mg) by mouth 2 times daily (with meals) 180 tablet 2     lisinopril (PRINIVIL/ZESTRIL) 5 MG tablet Take 0.5 tablets (2.5 mg) by mouth daily 90 tablet 3     MELATONIN PO Take 5 mg by mouth At Bedtime       mometasone (NASONEX) 50 MCG/ACT nasal spray Spray 2 sprays into both nostrils daily as needed.       nitroglycerin (NITROSTAT) 0.4 MG SL tablet Place 1 tablet (0.4 mg) under the tongue every 5 minutes as needed for chest pain 25 tablet 3     polyethylene glycol (MIRALAX/GLYCOLAX) packet Take 17 g by mouth daily        rosuvastatin (CRESTOR) 40 MG tablet Take 1 tablet (40 mg) by mouth daily  "90 tablet 2     torsemide (DEMADEX) 10 MG tablet Take one half tablet every other day 90 tablet 3     torsemide (DEMADEX) 20 MG tablet Take 1 tablet (20 mg) by mouth daily as needed (swollen ankles) 30 tablet 1     triamcinolone (KENALOG) 0.1 % cream Apply sparingly to affected area twotimes daily as needed 30 g 5     OTC products: None, except as noted above    Allergies   Allergen Reactions     Advair Diskus      DENIED     Morphine Hcl      Decrease  Blood Pressure Lower Than Normal, Per Pt.     Vicodin [Hydrocodone-Acetaminophen] Visual Disturbance      Latex Allergy: NO    Social History     Tobacco Use     Smoking status: Never Smoker     Smokeless tobacco: Never Used   Substance Use Topics     Alcohol use: No     Alcohol/week: 0.0 standard drinks     History   Drug Use No       REVIEW OF SYSTEMS:   CONSTITUTIONAL: NEGATIVE for fever, chills, change in weight  INTEGUMENTARY/SKIN: NEGATIVE for worrisome rashes, moles or lesions  EYES: NEGATIVE for vision changes or irritation  ENT/MOUTH: NEGATIVE for ear, mouth and throat problems  RESP: NEGATIVE for significant cough or SOB  BREAST: NEGATIVE for masses, tenderness or discharge  CV: NEGATIVE for chest pain, palpitations or peripheral edema  GI: NEGATIVE for nausea, abdominal pain, heartburn, or change in bowel habits and POSITIVE for dysphagia  : NEGATIVE for frequency, dysuria, or hematuria  MUSCULOSKELETAL: NEGATIVE for significant arthralgias or myalgia  NEURO: NEGATIVE for weakness, dizziness or paresthesias  ENDOCRINE: NEGATIVE for temperature intolerance, skin/hair changes  HEME: NEGATIVE for bleeding problems  PSYCHIATRIC: NEGATIVE for changes in mood or affect    EXAM:   /68   Pulse 74   Temp 97.1  F (36.2  C) (Tympanic)   Resp 16   Ht 1.651 m (5' 5\")   Wt 81.2 kg (179 lb)   SpO2 97%   BMI 29.79 kg/m      GENERAL APPEARANCE: healthy, alert and no distress     EYES: EOMI,  PERRL     HENT: ear canals and TM's normal and nose and mouth " without ulcers or lesions     NECK: no adenopathy, no asymmetry, masses, or scars and thyroid normal to palpation     RESP: lungs clear to auscultation - no rales, rhonchi or wheezes     CV: regular rates and rhythm, normal S1 S2, no S3 or S4 and no murmur, click or rub     ABDOMEN:  soft, nontender, no HSM or masses and bowel sounds normal     MS: extremities normal- no gross deformities noted, no evidence of inflammation in joints, FROM in all extremities.     SKIN: no suspicious lesions or rashes     NEURO: Normal strength and tone, sensory exam grossly normal, mentation intact and speech normal     PSYCH: mentation appears normal. and affect normal/bright     LYMPHATICS: No cervical adenopathy    DIAGNOSTICS:   No labs or EKG required for low risk surgery (cataract, skin procedure, breast biopsy, etc)    Recent Labs   Lab Test 08/06/19  1521 08/06/19  1331 03/18/19  1544  05/18/18  1409 12/26/17  1130  04/25/14  0739 04/24/14  0000   HGB  --  12.8* 11.7*  --   --   --    < > 13.3 13.0*   PLT  --  174 184  --   --   --    < > 256  --    INR  --   --   --   --   --   --   --  0.94 1.0     --  141   < > 141 141   < >  --  143   POTASSIUM 5.1  --  4.6   < > 4.2 4.1   < >  --  4.0   CR 1.93*  --  1.97*   < > 1.75* 1.56*   < >  --  1.6*   A1C  --   --   --   --  6.3* 5.9   < >  --   --     < > = values in this interval not displayed.        IMPRESSION:   Reason for surgery/procedure: recurrent problems with dysphagia due to esophageal stricture    The proposed surgical procedure is considered LOW risk.    REVISED CARDIAC RISK INDEX  The patient has the following serious cardiovascular risks for perioperative complications such as (MI, PE, VFib and 3  AV Block):  Coronary Artery Disease (MI, positive stress test, angina, Qs on EKG)  INTERPRETATION: 1 risks: Class II (low risk - 0.9% complication rate)    The patient has the following additional risks for perioperative complications:  No identified additional  risks      ICD-10-CM    1. Preop general physical exam Z01.818    2. Dysphagia, unspecified type R13.10    3. Esophageal stricture K22.2    4. Chronic combined systolic and diastolic congestive heart failure (H) I50.42 torsemide (DEMADEX) 20 MG tablet   5. Essential hypertension, benign I10    6. Coronary artery disease involving native coronary artery of native heart without angina pectoris I25.10    7. CKD (chronic kidney disease) stage 3, GFR 30-59 ml/min (H) N18.3        RECOMMENDATIONS:         --Patient is to take all scheduled medications on the day of surgery for blood pressure with a sip of water.    APPROVAL GIVEN to proceed with proposed procedure, without further diagnostic evaluation       Signed Electronically by: Eliezer Shah MD    Copy of this evaluation report is provided to requesting physician.    [place] Preop Guidelines    Revised Cardiac Risk Index

## 2019-09-24 NOTE — PATIENT INSTRUCTIONS
Patient Education   Personalized Prevention Plan  You are due for the preventive services outlined below.  Your care team is available to assist you in scheduling these services.  If you have already completed any of these items, please share that information with your care team to update in your medical record.  Health Maintenance Due   Topic Date Due     Zoster (Shingles) Vaccine (2 of 3) 02/07/2008     Annual Wellness Visit  09/12/2018     Discuss Advance Care Planning  10/18/2018     A1C Lab  11/18/2018     Thyroid Function Lab  12/05/2018     Kidney Microalbumin Urine Test  12/26/2018     Diabetic Foot Exam  05/18/2019     Asthma Action Plan - yearly  05/18/2019     Flu Vaccine (1) 09/01/2019     Liver Monitoring Lab  10/24/2019     Cholesterol Lab  10/24/2019        Before Your Surgery      Call your surgeon if there is any change in your health. This includes signs of a cold or flu (such as a sore throat, runny nose, cough, rash or fever).    Do not smoke, drink alcohol or take over the counter medicine (unless your surgeon or primary care doctor tells you to) for the 24 hours before and after surgery.    If you take prescribed drugs: Follow your doctor s orders about which medicines to take and which to stop until after surgery.    Eating and drinking prior to surgery: follow the instructions from your surgeon    Take a shower or bath the night before surgery. Use the soap your surgeon gave you to gently clean your skin. If you do not have soap from your surgeon, use your regular soap. Do not shave or scrub the surgery site.  Wear clean pajamas and have clean sheets on your bed.

## 2019-10-21 ENCOUNTER — TELEPHONE (OUTPATIENT)
Dept: CARDIOLOGY | Facility: CLINIC | Age: 84
End: 2019-10-21

## 2019-10-21 NOTE — TELEPHONE ENCOUNTER
Patient missed [facility] transmission on 10/11/19. Sent letter 10/14, no response. Sent 1 week letter today

## 2019-10-30 ENCOUNTER — TRANSFERRED RECORDS (OUTPATIENT)
Dept: HEALTH INFORMATION MANAGEMENT | Facility: CLINIC | Age: 84
End: 2019-10-30

## 2019-10-31 ENCOUNTER — PRE VISIT (OUTPATIENT)
Dept: CARDIOLOGY | Facility: CLINIC | Age: 84
End: 2019-10-31

## 2019-10-31 DIAGNOSIS — I71.40 AAA (ABDOMINAL AORTIC ANEURYSM) (H): Primary | ICD-10-CM

## 2019-10-31 DIAGNOSIS — I25.10 CORONARY ARTERY DISEASE INVOLVING NATIVE CORONARY ARTERY OF NATIVE HEART WITHOUT ANGINA PECTORIS: Chronic | ICD-10-CM

## 2019-10-31 NOTE — TELEPHONE ENCOUNTER
Spoke with patient, he is willing to set up flp and US aorto-iliac before he sees Dr. Eli. Message to scheduling to contact patient.    Patient states he saw a neurologist yesterday at [facility]. Faxed request for records    11/4/19 received office notes for 10/30/19 from [facility]. Copy sent to scan

## 2019-11-08 ENCOUNTER — HOSPITAL ENCOUNTER (OUTPATIENT)
Dept: ULTRASOUND IMAGING | Facility: CLINIC | Age: 84
Discharge: HOME | End: 2019-11-08
Attending: PHYSICIAN ASSISTANT | Admitting: PHYSICIAN ASSISTANT
Payer: MEDICARE

## 2019-11-08 DIAGNOSIS — I25.10 CORONARY ARTERY DISEASE INVOLVING NATIVE CORONARY ARTERY OF NATIVE HEART WITHOUT ANGINA PECTORIS: Chronic | ICD-10-CM

## 2019-11-08 DIAGNOSIS — I71.40 AAA (ABDOMINAL AORTIC ANEURYSM) (H): ICD-10-CM

## 2019-11-08 LAB
CHOLEST SERPL-MCNC: 191 MG/DL
HDLC SERPL-MCNC: 41 MG/DL
LDLC SERPL CALC-MCNC: 119 MG/DL
NONHDLC SERPL-MCNC: 150 MG/DL
TRIGL SERPL-MCNC: 154 MG/DL

## 2019-11-08 PROCEDURE — 36415 COLL VENOUS BLD VENIPUNCTURE: CPT | Performed by: INTERNAL MEDICINE

## 2019-11-08 PROCEDURE — 80061 LIPID PANEL: CPT | Performed by: INTERNAL MEDICINE

## 2019-11-08 PROCEDURE — 93978 VASCULAR STUDY: CPT | Mod: 26 | Performed by: INTERNAL MEDICINE

## 2019-11-08 PROCEDURE — 93978 VASCULAR STUDY: CPT

## 2019-11-22 ENCOUNTER — OFFICE VISIT (OUTPATIENT)
Dept: CARDIOLOGY | Facility: CLINIC | Age: 84
End: 2019-11-22
Payer: MEDICARE

## 2019-11-22 VITALS
SYSTOLIC BLOOD PRESSURE: 125 MMHG | DIASTOLIC BLOOD PRESSURE: 74 MMHG | HEIGHT: 65 IN | HEART RATE: 80 BPM | WEIGHT: 176 LBS | BODY MASS INDEX: 29.32 KG/M2

## 2019-11-22 DIAGNOSIS — I25.10 CORONARY ARTERY DISEASE INVOLVING NATIVE CORONARY ARTERY OF NATIVE HEART WITHOUT ANGINA PECTORIS: ICD-10-CM

## 2019-11-22 DIAGNOSIS — I50.42 CHRONIC COMBINED SYSTOLIC AND DIASTOLIC CONGESTIVE HEART FAILURE (H): ICD-10-CM

## 2019-11-22 DIAGNOSIS — I48.0 PAROXYSMAL ATRIAL FIBRILLATION (H): Chronic | ICD-10-CM

## 2019-11-22 DIAGNOSIS — I10 ESSENTIAL HYPERTENSION, BENIGN: Primary | ICD-10-CM

## 2019-11-22 DIAGNOSIS — R60.0 PERIPHERAL EDEMA: Chronic | ICD-10-CM

## 2019-11-22 DIAGNOSIS — E78.2 MIXED HYPERLIPIDEMIA: ICD-10-CM

## 2019-11-22 DIAGNOSIS — I25.5 ISCHEMIC CARDIOMYOPATHY: Chronic | ICD-10-CM

## 2019-11-22 PROCEDURE — 99214 OFFICE O/P EST MOD 30 MIN: CPT | Performed by: INTERNAL MEDICINE

## 2019-11-22 RX ORDER — ROSUVASTATIN CALCIUM 40 MG/1
40 TABLET, COATED ORAL DAILY
Qty: 90 TABLET | Refills: 3 | Status: SHIPPED | OUTPATIENT
Start: 2019-11-22 | End: 2020-12-08

## 2019-11-22 ASSESSMENT — MIFFLIN-ST. JEOR: SCORE: 1410.21

## 2019-11-22 NOTE — PROGRESS NOTES
"Service Date: 11/22/2019      HISTORY OF PRESENT ILLNESS:  Mr. Lambert is a very nice 85-year-old gentleman with past medical history significant for myocardial infarction and a cardiac arrest at [facility] in 1998.  This led to coronary artery bypass grafting x3 with a free LISA pedicle graft to his right coronary artery, a LIMA graft to his left anterior descending artery, and a saphenous vein graft to a small diffusely diseased ramus intermedius branch.  Angiography in 2009 demonstrated the vein graft to be closed but arterial conduits wide open and diagonal filled by collaterals.  Most recent angiogram was in 2014 because of a drop in ejection fraction to 25%.  This again demonstrated both arterial grafts to still be widely patent.  His small diagonal which previously had been intervened on had a restenosis in the 30%-50% range with an FFR of 0.9.      Mr. Lambert also has paroxysmal atrial fibrillation for which he has not been anticoagulated as he had a left atrial appendage ligation and a maze procedure at the time of his surgery.  He also has a small abdominal aortic aneurysm.      He has \"noise\" in his pacemaker atrial lead for which he has seen Dr. Guerin.      Mr. Lambert returns to clinic stating he thinks he is doing fine.  He is not exercising as much as he has in the past and he states just orthopedic issues with being 85.  He is not having any chest, arm, neck, jaw or shoulder discomfort.  No dyspnea on exertion, orthopnea or PND.  No palpitations, lightheadedness, dizziness, syncope, near-syncope.  Peripheral edema is doing well.      Of note, his cholesterol profile is terrible.  In discussing this with him, he states he stopped taking his rosuvastatin because his wife had read that this may contribute to dementia and he was concerned.      ASSESSMENT AND PLAN:  Abbe appears to be doing well from a cardiac standpoint without clinical evidence of ischemia.  We will continue his medical " "regimen as is.      Heart failure appears to be very well compensated.  Last evaluation of his left ventricular function was an echocardiogram in 10/2018 where ejection fraction was 35%-40% with severe apical wall hypokinesia.  He also had moderate to severe inferior and inferolateral hypokinesia and no significant valvular pathology.  I will repeat his echocardiogram when he comes back next year to make sure his left ventricle has not deteriorated more.  It is also possible as he is pacing 99% in the ventricle that he could develop a pacemaker-mediated cardiomyopathy.      He does not appear to be having any significant arrhythmias, and this is supported by interrogation of his pacemaker.      Blood pressure is very well controlled at 125/74 with a pulse of 80.  If his ejection fraction continues to deteriorate and he can tolerate it, I may consider switching to Entresto.  At this time, he is only on very low doses of carvedilol 6.25 and lisinopril 2.5.  I am not sure he could tolerate Entresto from a blood pressure standpoint.  Nonetheless, clinically he is doing fine.      Weight is 176 pounds, which is down about 5 pounds from last spring, giving a body mass index of 29.3.      I have encouraged him to exercise as much as possible.  We talked about the importance of some sort of aerobic activity and maybe a little resistance activity, also at 85 flexibility and balance.      We talked about longevity and I encouraged him to read \"The [employer]\" as he states he is the longest living [place] dating back to the 1400s.      As stated, fasting lipid profile is terrible.  Total cholesterol is 191, up from 93, HDL is 41, up from 28, but LDL is 119, up from 39, and triglycerides are 154, up from 129.  We talked about the recent study that showed statin withdrawal in elderly patients resulted in increased incidence of heart attacks.  I have also pointed out the literature that shows there is actually less dementia in " patients who are on statins versus not on statins, and he states he is going to start back on his rosuvastatin.      He does have renal insufficiency, creatinine of 1.93, potassium is a bit high at 5.  BUN is 45.  We will have to keep an eye on this.  If he develops worsening renal failure, we may have to decrease his lisinopril, which as stated is only 2.5 mg daily.  He may be a bit dry.  We may want to decrease his torsemide, but right now everything appears to be very well compensated and I will leave things as is.  I will have him follow up with my JUAN A in 1 year.  As stated, we will repeat the echo at that time.  If he should have any problems, I would be glad to see him sooner.         EFRA PRINGLE MD, [facility]             D: 2019   T: 2019   MT: HANH      Name:     DARBY SHEETS   MRN:      [id number]        Account:      [id number]   :      1934           Service Date: 2019      Document: [id number]

## 2019-11-22 NOTE — LETTER
11/22/2019    Eliezer Shah MD  [address]  [facility] [address]    RE: Abbe Lambert       Dear Colleague,    I had the pleasure of seeing Abbe Lambert in the [facility].    HPI and Plan:   See dictation    Orders Placed This Encounter   Procedures     Basic metabolic panel     Lipid Profile     Follow-Up with Cardiologist     Follow-Up with Cardiologist     Echocardiogram Complete       Orders Placed This Encounter   Medications     rosuvastatin (CRESTOR) 40 MG tablet     Sig: Take 1 tablet (40 mg) by mouth daily     Dispense:  90 tablet     Refill:  3       Medications Discontinued During This Encounter   Medication Reason     rosuvastatin (CRESTOR) 40 MG tablet Reorder         Encounter Diagnoses   Name Primary?     Coronary artery disease involving native coronary artery of native heart without angina pectoris      Essential hypertension, benign Yes     Mixed hyperlipidemia      Ischemic cardiomyopathy      Paroxysmal atrial fibrillation (H)      Peripheral edema      Chronic combined systolic and diastolic congestive heart failure (H)        CURRENT MEDICATIONS:  Current Outpatient Medications   Medication Sig Dispense Refill     aspirin 81 MG tablet Take 81 mg by mouth daily  30 tablet      beclomethasone (QVAR) 80 MCG/ACT Inhaler Inhale 2 puffs into the lungs 2 times daily 2 Inhaler 11     carvedilol (COREG) 6.25 MG tablet Take 1 tablet (6.25 mg) by mouth 2 times daily (with meals) 180 tablet 2     lisinopril (PRINIVIL/ZESTRIL) 5 MG tablet Take 0.5 tablets (2.5 mg) by mouth daily 90 tablet 3     MELATONIN PO Take 5 mg by mouth At Bedtime       mometasone (NASONEX) 50 MCG/ACT nasal spray Spray 2 sprays into both nostrils daily as needed.       nitroglycerin (NITROSTAT) 0.4 MG SL tablet Place 1 tablet (0.4 mg) under the tongue every 5 minutes as needed for chest pain 25 tablet 3     polyethylene glycol (MIRALAX/GLYCOLAX) packet Take 17 g by mouth daily         rosuvastatin (CRESTOR) 40 MG tablet Take 1 tablet (40 mg) by mouth daily 90 tablet 3     torsemide (DEMADEX) 20 MG tablet Take 1 tablet (20 mg) by mouth daily as needed (swollen ankles) 30 tablet 1     triamcinolone (KENALOG) 0.1 % cream Apply sparingly to affected area twotimes daily as needed 30 g 5       ALLERGIES     Allergies   Allergen Reactions     Advair Diskus      DENIED     Morphine Hcl      Decrease  Blood Pressure Lower Than Normal, Per Pt.     Vicodin [Hydrocodone-Acetaminophen] Visual Disturbance       PAST MEDICAL HISTORY:  Past Medical History:   Diagnosis Date     AAA (abdominal aortic aneurysm) (H)      Anemia due to blood loss, acute 10/10/2016     Asthma      Atrial fibrillation (H)     s/p left atrial appendage ligation     Cardiac arrest (H)      Cardiomyopathy (H)      Chronic kidney disease      Chronic sinusitis      Coronary artery disease     cardiac cath 2014: medical management; cath 2013: PTCA to diagonal; cath 2009: medical management; CABG 1998: LIMA to LAD, LISA to RCA, SVG to circumflex     GERD (gastroesophageal reflux disease)      History of angina      Hyperlipidaemia      Hypertension, goal below 140/90      Myocardial infarction (H)     MI with Vfib arrest 1998     Polymyalgia rheumatica (H)      Shingles 10/28/2016     Shortness of breath      Tachy-alix syndrome (H)     s/p dual chamber pacemaker 2012       PAST SURGICAL HISTORY:  Past Surgical History:   Procedure Laterality Date     ARTHROPLASTY KNEE Left 10/5/2016    Procedure: ARTHROPLASTY KNEE;  Surgeon: Keshav Zamudio MD;  Location:  OR     CARDIAC SURGERY  12/03/2012    pacemaker ; CABG 1998     CHOLECYSTECTOMY       COLONOSCOPY       ENT SURGERY  5-    sinus     EXTRACAPSULAR CATARACT EXTRATION WITH INTRAOCULAR LENS IMPLANT       GENITOURINARY SURGERY      prostatectomy     IMPLANT PACEMAKER  12-3-12    [facility]     PROSTATE SURGERY         FAMILY HISTORY:  Family History   Problem Relation Age of  Onset     Cerebrovascular Disease Father      Heart Disease Father      Heart Disease Brother      Coronary Artery Disease Brother         MI age 50     Depression Daughter         raped in high school     Unknown/Adopted Maternal Grandmother      Unknown/Adopted Maternal Grandfather      Unknown/Adopted Paternal Grandmother      Unknown/Adopted Paternal Grandfather        SOCIAL HISTORY:  Social History     Socioeconomic History     Marital status:      Spouse name: None     Number of children: None     Years of education: None     Highest education level: None   Occupational History     None   Social Needs     Financial resource strain: None     Food insecurity:     Worry: None     Inability: None     Transportation needs:     Medical: None     Non-medical: None   Tobacco Use     Smoking status: Never Smoker     Smokeless tobacco: Never Used   Substance and Sexual Activity     Alcohol use: No     Alcohol/week: 0.0 standard drinks     Drug use: No     Sexual activity: Not Currently     Partners: Female   Lifestyle     Physical activity:     Days per week: None     Minutes per session: None     Stress: None   Relationships     Social connections:     Talks on phone: None     Gets together: None     Attends [employer] service: None     Active member of club or organization: None     Attends meetings of clubs or organizations: None     Relationship status: None     Intimate partner violence:     Fear of current or ex partner: None     Emotionally abused: None     Physically abused: None     Forced sexual activity: None   Other Topics Concern     Parent/sibling w/ CABG, MI or angioplasty before 65F 55M? Yes     Comment: brother and father had MI @ 40's      Service Not Asked     Blood Transfusions Not Asked     Caffeine Concern Yes     Comment: one diet mountain dew daily     Occupational Exposure Not Asked     Hobby Hazards Not Asked     Sleep Concern No     Stress Concern No     Weight Concern Not Asked  "    Special Diet No     Back Care Not Asked     Exercise Yes     Comment: golfing, does the stair at the office a lot     Bike Helmet Not Asked     Seat Belt Yes     Self-Exams Not Asked   Social History Narrative     None       Review of Systems:  Skin:  Positive for bruising     Eyes:  Positive for glasses implant in both eyes.  ENT:  Negative sinus trouble;postnasal drainage pt reports being seen by MD for sinus issues.    Respiratory:  Positive for dyspnea on exertion(if walking too fast) SOB when walking too quickly   Cardiovascular:  Negative Positive for;edema;fatigue(stable per patient) pt reports occasional swelling in lower legs, especially by the end of the day  Gastroenterology: Negative reflux    Genitourinary:  not assessed      Musculoskeletal:  Positive for joint pain pt reports occasion knee pain; Seeing MD in October.  Neurologic:  Positive for memory problems followed by neurology  Psychiatric:  Negative      Heme/Lymph/Imm:  Positive for easy bruising;allergies easy bruising.  Endocrine:  Negative diabetes borderline diabetes    Physical Exam:  Vitals: /74   Pulse 80   Ht 1.651 m (5' 5\")   Wt 79.8 kg (176 lb)   BMI 29.29 kg/m       Constitutional:  cooperative, alert and oriented, well developed, well nourished, in no acute distress overweight;appears younger than stated age      Skin:  warm and dry to the touch, no apparent skin lesions or masses noted          Head:  normocephalic, no masses or lesions        Eyes:  pupils equal and round, conjunctivae and lids unremarkable, sclera white, no xanthalasma, EOMS intact, no nystagmus        Lymph:      ENT:  no pallor or cyanosis, dentition good        Neck:  carotid pulses are full and equal bilaterally;no carotid bruit        Respiratory:  normal breath sounds, clear to auscultation, normal A-P diameter, normal symmetry, normal respiratory excursion, no use of accessory muscles         Cardiac: regular rhythm;normal S1 and S2;no " murmurs, gallops or rubs detected                pulses full and equal                                        GI:           Extremities and Muscular Skeletal:  no spinal abnormalities noted;normal muscle strength and tone   bilateral LE edema;trace          Neurological:  no gross motor deficits        Psych:  affect appropriate, oriented to time, person and place        CC  Eliezer Shah MD  [address] [place] [address]                Thank you for allowing me to participate in the care of your patient.      Sincerely,     Brandon Eli MD     [facility]    cc:   Eliezer Shah MD  [address] [place] [address]

## 2019-11-22 NOTE — PROGRESS NOTES
HPI and Plan:   See dictation    Orders Placed This Encounter   Procedures     Basic metabolic panel     Lipid Profile     Follow-Up with Cardiologist     Follow-Up with Cardiologist     Echocardiogram Complete       Orders Placed This Encounter   Medications     rosuvastatin (CRESTOR) 40 MG tablet     Sig: Take 1 tablet (40 mg) by mouth daily     Dispense:  90 tablet     Refill:  3       Medications Discontinued During This Encounter   Medication Reason     rosuvastatin (CRESTOR) 40 MG tablet Reorder         Encounter Diagnoses   Name Primary?     Coronary artery disease involving native coronary artery of native heart without angina pectoris      Essential hypertension, benign Yes     Mixed hyperlipidemia      Ischemic cardiomyopathy      Paroxysmal atrial fibrillation (H)      Peripheral edema      Chronic combined systolic and diastolic congestive heart failure (H)        CURRENT MEDICATIONS:  Current Outpatient Medications   Medication Sig Dispense Refill     aspirin 81 MG tablet Take 81 mg by mouth daily  30 tablet      beclomethasone (QVAR) 80 MCG/ACT Inhaler Inhale 2 puffs into the lungs 2 times daily 2 Inhaler 11     carvedilol (COREG) 6.25 MG tablet Take 1 tablet (6.25 mg) by mouth 2 times daily (with meals) 180 tablet 2     lisinopril (PRINIVIL/ZESTRIL) 5 MG tablet Take 0.5 tablets (2.5 mg) by mouth daily 90 tablet 3     MELATONIN PO Take 5 mg by mouth At Bedtime       mometasone (NASONEX) 50 MCG/ACT nasal spray Spray 2 sprays into both nostrils daily as needed.       nitroglycerin (NITROSTAT) 0.4 MG SL tablet Place 1 tablet (0.4 mg) under the tongue every 5 minutes as needed for chest pain 25 tablet 3     polyethylene glycol (MIRALAX/GLYCOLAX) packet Take 17 g by mouth daily        rosuvastatin (CRESTOR) 40 MG tablet Take 1 tablet (40 mg) by mouth daily 90 tablet 3     torsemide (DEMADEX) 20 MG tablet Take 1 tablet (20 mg) by mouth daily as needed (swollen ankles) 30 tablet 1     triamcinolone (KENALOG)  0.1 % cream Apply sparingly to affected area twotimes daily as needed 30 g 5       ALLERGIES     Allergies   Allergen Reactions     Advair Diskus      DENIED     Morphine Hcl      Decrease  Blood Pressure Lower Than Normal, Per Pt.     Vicodin [Hydrocodone-Acetaminophen] Visual Disturbance       PAST MEDICAL HISTORY:  Past Medical History:   Diagnosis Date     AAA (abdominal aortic aneurysm) (H)      Anemia due to blood loss, acute 10/10/2016     Asthma      Atrial fibrillation (H)     s/p left atrial appendage ligation     Cardiac arrest (H)      Cardiomyopathy (H)      Chronic kidney disease      Chronic sinusitis      Coronary artery disease     cardiac cath 2014: medical management; cath 2013: PTCA to diagonal; cath 2009: medical management; CABG 1998: LIMA to LAD, LISA to RCA, SVG to circumflex     GERD (gastroesophageal reflux disease)      History of angina      Hyperlipidaemia      Hypertension, goal below 140/90      Myocardial infarction (H)     MI with Vfib arrest 1998     Polymyalgia rheumatica (H)      Shingles 10/28/2016     Shortness of breath      Tachy-alix syndrome (H)     s/p dual chamber pacemaker 2012       PAST SURGICAL HISTORY:  Past Surgical History:   Procedure Laterality Date     ARTHROPLASTY KNEE Left 10/5/2016    Procedure: ARTHROPLASTY KNEE;  Surgeon: Keshav Zamudio MD;  Location:  OR     CARDIAC SURGERY  12/03/2012    pacemaker ; CABG 1998     CHOLECYSTECTOMY       COLONOSCOPY       ENT SURGERY  5-    sinus     EXTRACAPSULAR CATARACT EXTRATION WITH INTRAOCULAR LENS IMPLANT       GENITOURINARY SURGERY      prostatectomy     IMPLANT PACEMAKER  12-3-12    [facility]     PROSTATE SURGERY         FAMILY HISTORY:  Family History   Problem Relation Age of Onset     Cerebrovascular Disease Father      Heart Disease Father      Heart Disease Brother      Coronary Artery Disease Brother         MI age 50     Depression Daughter         raped in high school     Unknown/Adopted  Maternal Grandmother      Unknown/Adopted Maternal Grandfather      Unknown/Adopted Paternal Grandmother      Unknown/Adopted Paternal Grandfather        SOCIAL HISTORY:  Social History     Socioeconomic History     Marital status:      Spouse name: None     Number of children: None     Years of education: None     Highest education level: None   Occupational History     None   Social Needs     Financial resource strain: None     Food insecurity:     Worry: None     Inability: None     Transportation needs:     Medical: None     Non-medical: None   Tobacco Use     Smoking status: Never Smoker     Smokeless tobacco: Never Used   Substance and Sexual Activity     Alcohol use: No     Alcohol/week: 0.0 standard drinks     Drug use: No     Sexual activity: Not Currently     Partners: Female   Lifestyle     Physical activity:     Days per week: None     Minutes per session: None     Stress: None   Relationships     Social connections:     Talks on phone: None     Gets together: None     Attends [employer] service: None     Active member of club or organization: None     Attends meetings of clubs or organizations: None     Relationship status: None     Intimate partner violence:     Fear of current or ex partner: None     Emotionally abused: None     Physically abused: None     Forced sexual activity: None   Other Topics Concern     Parent/sibling w/ CABG, MI or angioplasty before 65F 55M? Yes     Comment: brother and father had MI @ 40's      Service Not Asked     Blood Transfusions Not Asked     Caffeine Concern Yes     Comment: one diet mountain dew daily     Occupational Exposure Not Asked     Hobby Hazards Not Asked     Sleep Concern No     Stress Concern No     Weight Concern Not Asked     Special Diet No     Back Care Not Asked     Exercise Yes     Comment: golfing, does the stair at the office a lot     Bike Helmet Not Asked     Seat Belt Yes     Self-Exams Not Asked   Social History Narrative      "None       Review of Systems:  Skin:  Positive for bruising     Eyes:  Positive for glasses implant in both eyes.  ENT:  Negative sinus trouble;postnasal drainage pt reports being seen by MD for sinus issues.    Respiratory:  Positive for dyspnea on exertion(if walking too fast) SOB when walking too quickly   Cardiovascular:  Negative Positive for;edema;fatigue(stable per patient) pt reports occasional swelling in lower legs, especially by the end of the day  Gastroenterology: Negative reflux    Genitourinary:  not assessed      Musculoskeletal:  Positive for joint pain pt reports occasion knee pain; Seeing MD in October.  Neurologic:  Positive for memory problems followed by neurology  Psychiatric:  Negative      Heme/Lymph/Imm:  Positive for easy bruising;allergies easy bruising.  Endocrine:  Negative diabetes borderline diabetes    Physical Exam:  Vitals: /74   Pulse 80   Ht 1.651 m (5' 5\")   Wt 79.8 kg (176 lb)   BMI 29.29 kg/m      Constitutional:  cooperative, alert and oriented, well developed, well nourished, in no acute distress overweight;appears younger than stated age      Skin:  warm and dry to the touch, no apparent skin lesions or masses noted          Head:  normocephalic, no masses or lesions        Eyes:  pupils equal and round, conjunctivae and lids unremarkable, sclera white, no xanthalasma, EOMS intact, no nystagmus        Lymph:      ENT:  no pallor or cyanosis, dentition good        Neck:  carotid pulses are full and equal bilaterally;no carotid bruit        Respiratory:  normal breath sounds, clear to auscultation, normal A-P diameter, normal symmetry, normal respiratory excursion, no use of accessory muscles         Cardiac: regular rhythm;normal S1 and S2;no murmurs, gallops or rubs detected                pulses full and equal                                        GI:           Extremities and Muscular Skeletal:  no spinal abnormalities noted;normal muscle strength and tone   " bilateral LE edema;trace          Neurological:  no gross motor deficits        Psych:  affect appropriate, oriented to time, person and place        CC  Eliezer Shah MD  [address]  [place] [address]

## 2019-11-22 NOTE — LETTER
"11/22/2019      Eliezer Shah MD  [address]  [facility] [address]      RE: Abbe Lambert       Dear Colleague,    I had the pleasure of seeing Abbe Lambert in the [facility].    Service Date: 11/22/2019      HISTORY OF PRESENT ILLNESS:  Mr. Lambert is a very nice 85-year-old gentleman with past medical history significant for myocardial infarction and a cardiac arrest at [facility] in 1998.  This led to coronary artery bypass grafting x3 with a free LISA pedicle graft to his right coronary artery, a LIMA graft to his left anterior descending artery, and a saphenous vein graft to a small diffusely diseased ramus intermedius branch.  Angiography in 2009 demonstrated the vein graft to be closed but arterial conduits wide open and diagonal filled by collaterals.  Most recent angiogram was in 2014 because of a drop in ejection fraction to 25%.  This again demonstrated both arterial grafts to still be widely patent.  His small diagonal which previously had been intervened on had a restenosis in the 30%-50% range with an FFR of 0.9.      Mr. Lambert also has paroxysmal atrial fibrillation for which he has not been anticoagulated as he had a left atrial appendage ligation and a maze procedure at the time of his surgery.  He also has a small abdominal aortic aneurysm.      He has \"noise\" in his pacemaker atrial lead for which he has seen Dr. Guerin.      Mr. Lambert returns to clinic stating he thinks he is doing fine.  He is not exercising as much as he has in the past and he states just orthopedic issues with being 85.  He is not having any chest, arm, neck, jaw or shoulder discomfort.  No dyspnea on exertion, orthopnea or PND.  No palpitations, lightheadedness, dizziness, syncope, near-syncope.  Peripheral edema is doing well.      Of note, his cholesterol profile is terrible.  In discussing this with him, he states he stopped taking his rosuvastatin because " "his wife had read that this may contribute to dementia and he was concerned.      ASSESSMENT AND PLAN:  Abbe appears to be doing well from a cardiac standpoint without clinical evidence of ischemia.  We will continue his medical regimen as is.      Heart failure appears to be very well compensated.  Last evaluation of his left ventricular function was an echocardiogram in 10/2018 where ejection fraction was 35%-40% with severe apical wall hypokinesia.  He also had moderate to severe inferior and inferolateral hypokinesia and no significant valvular pathology.  I will repeat his echocardiogram when he comes back next year to make sure his left ventricle has not deteriorated more.  It is also possible as he is pacing 99% in the ventricle that he could develop a pacemaker-mediated cardiomyopathy.      He does not appear to be having any significant arrhythmias, and this is supported by interrogation of his pacemaker.      Blood pressure is very well controlled at 125/74 with a pulse of 80.  If his ejection fraction continues to deteriorate and he can tolerate it, I may consider switching to Entresto.  At this time, he is only on very low doses of carvedilol 6.25 and lisinopril 2.5.  I am not sure he could tolerate Entresto from a blood pressure standpoint.  Nonetheless, clinically he is doing fine.      Weight is 176 pounds, which is down about 5 pounds from last spring, giving a body mass index of 29.3.      I have encouraged him to exercise as much as possible.  We talked about the importance of some sort of aerobic activity and maybe a little resistance activity, also at 85 flexibility and balance.      We talked about longevity and I encouraged him to read \"The [employer]\" as he states he is the longest living [place] dating back to the 1400s.      As stated, fasting lipid profile is terrible.  Total cholesterol is 191, up from 93, HDL is 41, up from 28, but LDL is 119, up from 39, and triglycerides are 154, up " from 129.  We talked about the recent study that showed statin withdrawal in elderly patients resulted in increased incidence of heart attacks.  I have also pointed out the literature that shows there is actually less dementia in patients who are on statins versus not on statins, and he states he is going to start back on his rosuvastatin.      He does have renal insufficiency, creatinine of 1.93, potassium is a bit high at 5.  BUN is 45.  We will have to keep an eye on this.  If he develops worsening renal failure, we may have to decrease his lisinopril, which as stated is only 2.5 mg daily.  He may be a bit dry.  We may want to decrease his torsemide, but right now everything appears to be very well compensated and I will leave things as is.  I will have him follow up with my JUAN A in 1 year.  As stated, we will repeat the echo at that time.  If he should have any problems, I would be glad to see him sooner.         EFRA PRINGLE MD, [facility]             D: 2019   T: 2019   MT: HANH      Name:     DARBY SHEETS   MRN:      [id number]        Account:      [id number]   :      1934           Service Date: 2019      Document: [id number]         Outpatient Encounter Medications as of 2019   Medication Sig Dispense Refill     aspirin 81 MG tablet Take 81 mg by mouth daily  30 tablet      beclomethasone (QVAR) 80 MCG/ACT Inhaler Inhale 2 puffs into the lungs 2 times daily 2 Inhaler 11     carvedilol (COREG) 6.25 MG tablet Take 1 tablet (6.25 mg) by mouth 2 times daily (with meals) 180 tablet 2     lisinopril (PRINIVIL/ZESTRIL) 5 MG tablet Take 0.5 tablets (2.5 mg) by mouth daily 90 tablet 3     MELATONIN PO Take 5 mg by mouth At Bedtime       mometasone (NASONEX) 50 MCG/ACT nasal spray Spray 2 sprays into both nostrils daily as needed.       nitroglycerin (NITROSTAT) 0.4 MG SL tablet Place 1 tablet (0.4 mg) under the tongue every 5 minutes as needed for chest pain 25 tablet 3      polyethylene glycol (MIRALAX/GLYCOLAX) packet Take 17 g by mouth daily        rosuvastatin (CRESTOR) 40 MG tablet Take 1 tablet (40 mg) by mouth daily 90 tablet 3     torsemide (DEMADEX) 20 MG tablet Take 1 tablet (20 mg) by mouth daily as needed (swollen ankles) 30 tablet 1     triamcinolone (KENALOG) 0.1 % cream Apply sparingly to affected area twotimes daily as needed 30 g 5     [DISCONTINUED] rosuvastatin (CRESTOR) 40 MG tablet Take 1 tablet (40 mg) by mouth daily 90 tablet 2     No facility-administered encounter medications on file as of 11/22/2019.        Again, thank you for allowing me to participate in the care of your patient.      Sincerely,    Brandon Eli MD     [facility]

## 2019-11-26 ENCOUNTER — ANCILLARY PROCEDURE (OUTPATIENT)
Dept: CARDIOLOGY | Facility: CLINIC | Age: 84
End: 2019-11-26
Attending: INTERNAL MEDICINE
Payer: MEDICARE

## 2019-11-26 DIAGNOSIS — Z95.0 CARDIAC PACEMAKER IN SITU: ICD-10-CM

## 2019-11-26 PROCEDURE — 93296 REM INTERROG EVL PM/IDS: CPT | Performed by: INTERNAL MEDICINE

## 2019-11-26 PROCEDURE — 93294 REM INTERROG EVL PM/LDLS PM: CPT | Performed by: INTERNAL MEDICINE

## 2019-11-27 LAB
MDC_IDC_EPISODE_DTM: NORMAL
MDC_IDC_EPISODE_DURATION: 104 S
MDC_IDC_EPISODE_DURATION: 110 S
MDC_IDC_EPISODE_DURATION: 14 S
MDC_IDC_EPISODE_DURATION: 144 S
MDC_IDC_EPISODE_DURATION: 172 S
MDC_IDC_EPISODE_DURATION: 18 S
MDC_IDC_EPISODE_DURATION: 180 S
MDC_IDC_EPISODE_DURATION: 32 S
MDC_IDC_EPISODE_DURATION: 90 S
MDC_IDC_EPISODE_ID: NORMAL
MDC_IDC_EPISODE_TYPE: NORMAL
MDC_IDC_LEAD_IMPLANT_DT: NORMAL
MDC_IDC_LEAD_IMPLANT_DT: NORMAL
MDC_IDC_LEAD_LOCATION: NORMAL
MDC_IDC_LEAD_LOCATION: NORMAL
MDC_IDC_LEAD_MFG: NORMAL
MDC_IDC_LEAD_MFG: NORMAL
MDC_IDC_LEAD_MODEL: NORMAL
MDC_IDC_LEAD_MODEL: NORMAL
MDC_IDC_LEAD_POLARITY_TYPE: NORMAL
MDC_IDC_LEAD_POLARITY_TYPE: NORMAL
MDC_IDC_LEAD_SERIAL: NORMAL
MDC_IDC_LEAD_SERIAL: NORMAL
MDC_IDC_MSMT_BATTERY_DTM: NORMAL
MDC_IDC_MSMT_BATTERY_REMAINING_LONGEVITY: 17 MO
MDC_IDC_MSMT_BATTERY_REMAINING_PERCENTAGE: 15 %
MDC_IDC_MSMT_BATTERY_RRT_TRIGGER: NORMAL
MDC_IDC_MSMT_BATTERY_STATUS: NORMAL
MDC_IDC_MSMT_BATTERY_VOLTAGE: 2.74 V
MDC_IDC_MSMT_LEADCHNL_RA_IMPEDANCE_VALUE: 340 OHM
MDC_IDC_MSMT_LEADCHNL_RA_LEAD_CHANNEL_STATUS: NORMAL
MDC_IDC_MSMT_LEADCHNL_RA_PACING_THRESHOLD_AMPLITUDE: 0.62 V
MDC_IDC_MSMT_LEADCHNL_RA_PACING_THRESHOLD_PULSEWIDTH: 0.5 MS
MDC_IDC_MSMT_LEADCHNL_RA_SENSING_INTR_AMPL: 4.8 MV
MDC_IDC_MSMT_LEADCHNL_RV_IMPEDANCE_VALUE: 510 OHM
MDC_IDC_MSMT_LEADCHNL_RV_LEAD_CHANNEL_STATUS: NORMAL
MDC_IDC_MSMT_LEADCHNL_RV_PACING_THRESHOLD_AMPLITUDE: 0.75 V
MDC_IDC_MSMT_LEADCHNL_RV_PACING_THRESHOLD_PULSEWIDTH: 0.5 MS
MDC_IDC_MSMT_LEADCHNL_RV_SENSING_INTR_AMPL: 12 MV
MDC_IDC_PG_IMPLANT_DTM: NORMAL
MDC_IDC_PG_MFG: NORMAL
MDC_IDC_PG_MODEL: NORMAL
MDC_IDC_PG_SERIAL: NORMAL
MDC_IDC_PG_TYPE: NORMAL
MDC_IDC_SESS_CLINIC_NAME: NORMAL
MDC_IDC_SESS_DTM: NORMAL
MDC_IDC_SESS_REPROGRAMMED: NO
MDC_IDC_SESS_TYPE: NORMAL
MDC_IDC_SET_BRADY_AT_MODE_SWITCH_MODE: NORMAL
MDC_IDC_SET_BRADY_AT_MODE_SWITCH_RATE: 180 {BEATS}/MIN
MDC_IDC_SET_BRADY_LOWRATE: 60 {BEATS}/MIN
MDC_IDC_SET_BRADY_MAX_SENSOR_RATE: 120 {BEATS}/MIN
MDC_IDC_SET_BRADY_MAX_TRACKING_RATE: 120 {BEATS}/MIN
MDC_IDC_SET_BRADY_MODE: NORMAL
MDC_IDC_SET_BRADY_PAV_DELAY_HIGH: 100 MS
MDC_IDC_SET_BRADY_PAV_DELAY_LOW: 200 MS
MDC_IDC_SET_BRADY_SAV_DELAY_HIGH: 100 MS
MDC_IDC_SET_BRADY_SAV_DELAY_LOW: 170 MS
MDC_IDC_SET_LEADCHNL_RA_PACING_AMPLITUDE: 1.62
MDC_IDC_SET_LEADCHNL_RA_PACING_ANODE_ELECTRODE_1: NORMAL
MDC_IDC_SET_LEADCHNL_RA_PACING_ANODE_LOCATION_1: NORMAL
MDC_IDC_SET_LEADCHNL_RA_PACING_CAPTURE_MODE: NORMAL
MDC_IDC_SET_LEADCHNL_RA_PACING_CATHODE_ELECTRODE_1: NORMAL
MDC_IDC_SET_LEADCHNL_RA_PACING_CATHODE_LOCATION_1: NORMAL
MDC_IDC_SET_LEADCHNL_RA_PACING_POLARITY: NORMAL
MDC_IDC_SET_LEADCHNL_RA_PACING_PULSEWIDTH: 0.5 MS
MDC_IDC_SET_LEADCHNL_RA_SENSING_ADAPTATION_MODE: NORMAL
MDC_IDC_SET_LEADCHNL_RA_SENSING_ANODE_ELECTRODE_1: NORMAL
MDC_IDC_SET_LEADCHNL_RA_SENSING_ANODE_LOCATION_1: NORMAL
MDC_IDC_SET_LEADCHNL_RA_SENSING_CATHODE_ELECTRODE_1: NORMAL
MDC_IDC_SET_LEADCHNL_RA_SENSING_CATHODE_LOCATION_1: NORMAL
MDC_IDC_SET_LEADCHNL_RA_SENSING_POLARITY: NORMAL
MDC_IDC_SET_LEADCHNL_RA_SENSING_SENSITIVITY: 1 MV
MDC_IDC_SET_LEADCHNL_RV_PACING_AMPLITUDE: 1 V
MDC_IDC_SET_LEADCHNL_RV_PACING_ANODE_ELECTRODE_1: NORMAL
MDC_IDC_SET_LEADCHNL_RV_PACING_ANODE_LOCATION_1: NORMAL
MDC_IDC_SET_LEADCHNL_RV_PACING_CAPTURE_MODE: NORMAL
MDC_IDC_SET_LEADCHNL_RV_PACING_CATHODE_ELECTRODE_1: NORMAL
MDC_IDC_SET_LEADCHNL_RV_PACING_CATHODE_LOCATION_1: NORMAL
MDC_IDC_SET_LEADCHNL_RV_PACING_POLARITY: NORMAL
MDC_IDC_SET_LEADCHNL_RV_PACING_PULSEWIDTH: 0.5 MS
MDC_IDC_SET_LEADCHNL_RV_SENSING_ADAPTATION_MODE: NORMAL
MDC_IDC_SET_LEADCHNL_RV_SENSING_ANODE_ELECTRODE_1: NORMAL
MDC_IDC_SET_LEADCHNL_RV_SENSING_ANODE_LOCATION_1: NORMAL
MDC_IDC_SET_LEADCHNL_RV_SENSING_CATHODE_ELECTRODE_1: NORMAL
MDC_IDC_SET_LEADCHNL_RV_SENSING_CATHODE_LOCATION_1: NORMAL
MDC_IDC_SET_LEADCHNL_RV_SENSING_POLARITY: NORMAL
MDC_IDC_SET_LEADCHNL_RV_SENSING_SENSITIVITY: 0.5 MV
MDC_IDC_STAT_AT_BURDEN_PERCENT: 1 %
MDC_IDC_STAT_AT_DTM_END: NORMAL
MDC_IDC_STAT_AT_DTM_START: NORMAL
MDC_IDC_STAT_AT_MODE_SW_COUNT: 9
MDC_IDC_STAT_AT_MODE_SW_COUNT_PER_DAY: 0
MDC_IDC_STAT_AT_MODE_SW_MAX_DURATION: 180 S
MDC_IDC_STAT_AT_MODE_SW_PERCENT_TIME: 1 %
MDC_IDC_STAT_BRADY_AP_VP_PERCENT: 37 %
MDC_IDC_STAT_BRADY_AP_VS_PERCENT: 62 %
MDC_IDC_STAT_BRADY_AS_VP_PERCENT: 1 %
MDC_IDC_STAT_BRADY_AS_VS_PERCENT: 1.2 %
MDC_IDC_STAT_BRADY_DTM_END: NORMAL
MDC_IDC_STAT_BRADY_DTM_START: NORMAL
MDC_IDC_STAT_BRADY_RA_PERCENT_PACED: 99 %
MDC_IDC_STAT_BRADY_RV_PERCENT_PACED: 37 %
MDC_IDC_STAT_CRT_DTM_END: NORMAL
MDC_IDC_STAT_CRT_DTM_START: NORMAL
MDC_IDC_STAT_HEART_RATE_ATRIAL_MAX: 330 {BEATS}/MIN
MDC_IDC_STAT_HEART_RATE_ATRIAL_MEAN: 75 {BEATS}/MIN
MDC_IDC_STAT_HEART_RATE_ATRIAL_MIN: 50 {BEATS}/MIN
MDC_IDC_STAT_HEART_RATE_DTM_END: NORMAL
MDC_IDC_STAT_HEART_RATE_DTM_START: NORMAL
MDC_IDC_STAT_HEART_RATE_VENTRICULAR_MAX: 200 {BEATS}/MIN
MDC_IDC_STAT_HEART_RATE_VENTRICULAR_MEAN: 75 {BEATS}/MIN
MDC_IDC_STAT_HEART_RATE_VENTRICULAR_MIN: 40 {BEATS}/MIN

## 2019-12-02 ENCOUNTER — TRANSFERRED RECORDS (OUTPATIENT)
Dept: HEALTH INFORMATION MANAGEMENT | Facility: CLINIC | Age: 84
End: 2019-12-02

## 2019-12-02 ENCOUNTER — OFFICE VISIT (OUTPATIENT)
Dept: FAMILY MEDICINE | Facility: CLINIC | Age: 84
End: 2019-12-02
Payer: MEDICARE

## 2019-12-02 VITALS
OXYGEN SATURATION: 100 % | SYSTOLIC BLOOD PRESSURE: 126 MMHG | TEMPERATURE: 97 F | RESPIRATION RATE: 16 BRPM | DIASTOLIC BLOOD PRESSURE: 64 MMHG | BODY MASS INDEX: 30.79 KG/M2 | WEIGHT: 185 LBS | HEART RATE: 66 BPM

## 2019-12-02 DIAGNOSIS — I71.40 ABDOMINAL AORTIC ANEURYSM (AAA) WITHOUT RUPTURE (H): ICD-10-CM

## 2019-12-02 DIAGNOSIS — I25.10 CORONARY ARTERY DISEASE INVOLVING NATIVE CORONARY ARTERY OF NATIVE HEART WITHOUT ANGINA PECTORIS: Chronic | ICD-10-CM

## 2019-12-02 DIAGNOSIS — Z85.46 HISTORY OF PROSTATE CANCER: ICD-10-CM

## 2019-12-02 DIAGNOSIS — R13.10 DYSPHAGIA, UNSPECIFIED TYPE: ICD-10-CM

## 2019-12-02 DIAGNOSIS — K21.9 GASTROESOPHAGEAL REFLUX DISEASE WITHOUT ESOPHAGITIS: ICD-10-CM

## 2019-12-02 DIAGNOSIS — J30.89 CHRONIC NON-SEASONAL ALLERGIC RHINITIS: ICD-10-CM

## 2019-12-02 DIAGNOSIS — N18.30 CKD (CHRONIC KIDNEY DISEASE) STAGE 3, GFR 30-59 ML/MIN (H): ICD-10-CM

## 2019-12-02 DIAGNOSIS — Z86.73 HISTORY OF CVA (CEREBROVASCULAR ACCIDENT): ICD-10-CM

## 2019-12-02 DIAGNOSIS — R73.03 PRE-DIABETES: ICD-10-CM

## 2019-12-02 DIAGNOSIS — Z01.818 PRE-OP EXAM: Primary | ICD-10-CM

## 2019-12-02 DIAGNOSIS — I73.9 PAD (PERIPHERAL ARTERY DISEASE) (H): ICD-10-CM

## 2019-12-02 DIAGNOSIS — I10 ESSENTIAL HYPERTENSION, BENIGN: ICD-10-CM

## 2019-12-02 DIAGNOSIS — I25.5 ISCHEMIC CARDIOMYOPATHY: Chronic | ICD-10-CM

## 2019-12-02 DIAGNOSIS — E78.2 MIXED HYPERLIPIDEMIA: ICD-10-CM

## 2019-12-02 DIAGNOSIS — K22.2 ESOPHAGEAL STRICTURE: ICD-10-CM

## 2019-12-02 DIAGNOSIS — I48.0 PAROXYSMAL ATRIAL FIBRILLATION (H): Chronic | ICD-10-CM

## 2019-12-02 DIAGNOSIS — I50.42 CHRONIC COMBINED SYSTOLIC AND DIASTOLIC CONGESTIVE HEART FAILURE (H): ICD-10-CM

## 2019-12-02 LAB — HBA1C MFR BLD: 5.6 % (ref 0–5.6)

## 2019-12-02 PROCEDURE — 80053 COMPREHEN METABOLIC PANEL: CPT | Performed by: FAMILY MEDICINE

## 2019-12-02 PROCEDURE — 84443 ASSAY THYROID STIM HORMONE: CPT | Performed by: FAMILY MEDICINE

## 2019-12-02 PROCEDURE — 83036 HEMOGLOBIN GLYCOSYLATED A1C: CPT | Performed by: FAMILY MEDICINE

## 2019-12-02 PROCEDURE — 82043 UR ALBUMIN QUANTITATIVE: CPT | Performed by: FAMILY MEDICINE

## 2019-12-02 PROCEDURE — 36415 COLL VENOUS BLD VENIPUNCTURE: CPT | Performed by: FAMILY MEDICINE

## 2019-12-02 PROCEDURE — 99215 OFFICE O/P EST HI 40 MIN: CPT | Performed by: FAMILY MEDICINE

## 2019-12-02 NOTE — LETTER
"December 5, 2019      Abbe Lambert  [address] [facility] [address]        Dear ,    All of your labs look fantastic!    --Your HgbA1c (\"Diabetes test\"), thyroid hormone level, liver function test, and blood sugar (glucose) are all NORMAL.    --Your kidney function is still chronically low but has has actually improved since the last time we checked it.    Resulted Orders   Hemoglobin A1c   Result Value Ref Range    Hemoglobin A1C 5.6 0 - 5.6 %      Comment:      Normal <5.7% Prediabetes 5.7-6.4%  Diabetes 6.5% or higher - adopted from ADA   consensus guidelines.     TSH with free T4 reflex   Result Value Ref Range    TSH 2.87 0.40 - 4.00 mU/L   Comprehensive metabolic panel   Result Value Ref Range    Sodium 141 133 - 144 mmol/L    Potassium 4.7 3.4 - 5.3 mmol/L    Chloride 111 (H) 94 - 109 mmol/L    Carbon Dioxide 26 20 - 32 mmol/L    Anion Gap 4 3 - 14 mmol/L    Glucose 79 70 - 99 mg/dL    Urea Nitrogen 22 7 - 30 mg/dL    Creatinine 1.68 (H) 0.66 - 1.25 mg/dL    GFR Estimate 36 (L) >60 mL/min/[1.73_m2]      Comment:      Non  GFR Calc  Starting 12/18/2018, serum creatinine based estimated GFR (eGFR) will be   calculated using the Chronic Kidney Disease Epidemiology Collaboration   (CKD-EPI) equation.      GFR Estimate If Black 42 (L) >60 mL/min/[1.73_m2]      Comment:       GFR Calc  Starting 12/18/2018, serum creatinine based estimated GFR (eGFR) will be   calculated using the Chronic Kidney Disease Epidemiology Collaboration   (CKD-EPI) equation.      Calcium 8.9 8.5 - 10.1 mg/dL    Bilirubin Total 0.2 0.2 - 1.3 mg/dL    Albumin 3.5 3.4 - 5.0 g/dL    Protein Total 7.6 6.8 - 8.8 g/dL    Alkaline Phosphatase 84 40 - 150 U/L    ALT 33 0 - 70 U/L    AST 26 0 - 45 U/L   Albumin Random Urine Quantitative with Creat Ratio   Result Value Ref Range    Creatinine Urine 86 mg/dL    Albumin Urine mg/L 7 mg/L    Albumin Urine mg/g Cr 8.57 0 - 17 mg/g Cr       If you " have any questions or concerns, please call the clinic at the number listed above.       Sincerely,    Fifi Palomo, DO  Fifi Palomo, DO

## 2019-12-02 NOTE — PROGRESS NOTES
[facility]  [address]  [facility] [address]  [phone]  Dept: [phone]    PRE-OP EVALUATION:  Today's date: 2019    Abbe Lambert (: 2/3/1934) presents for pre-operative evaluation assessment as requested by Dr. Calderon Herman.  He requires evaluation and anesthesia risk assessment prior to undergoing surgery/procedure for treatment of dysphagia and esophageal strictures with EGD and dilation.    Proposed Surgery/ Procedure: EGD  Date of Surgery/ Procedure: 12/3  Time of Surgery/ Procedure: [facility]/Surgical Facility: [facility]  Primary Physician: Eliezer Shah  Type of Anesthesia Anticipated: MAC    Patient has a Health Care Directive or Living Will:  YES     1. NO - Do you have a history of heart attack, stroke, stent, bypass or surgery on an artery in the head, neck, heart or legs?  2. NO - Do you ever have any pain or discomfort in your chest?  3. YES - DO YOU HAVE A HISTORY OF HEART FAILURE.  Sees Cardiology, has a pacemaker.  Stable per Cardiology.  4. YES - Are you troubled by shortness of breath when: walking on the level, up a slight hill or at night?  Chronic issue; tried to stay active.   5. NO - Do you currently have a cold, bronchitis or other respiratory infection?  6. NO - Do you have a cough, shortness of breath or wheezing?  7. NO - Do you sometimes get pains in the calves of your legs when you walk?  8. NO - Do you or anyone in your family have previous history of blood clots?  9. NO - Do you or does anyone in your family have a serious bleeding problem such as prolonged bleeding following surgeries or cuts?  10. NO - Have you ever had problems with anemia or been told to take iron pills?  11. NO - Have you had any abnormal blood loss such as black, tarry or bloody stools, or abnormal vaginal bleeding?  12. NO - Have you ever had a blood transfusion?  13. NO - Have you or any of your relatives ever had  problems with anesthesia?  14. NO - Do you have sleep apnea, excessive snoring or daytime drowsiness?  15. NO - Do you have any prosthetic heart valves?  16. NO - Do you have prosthetic joints?  17. NO - Is there any chance that you may be pregnant?      HPI:     HPI related to upcoming procedure:     Patient has recurrent problems with esophageal stricture requiring repeat dilations.  Last dilation was in Sept 2019, was told that he might need another one.  Food will sometimes get stuck in his throat--maybe happens once a week.     See problem list for active medical problems.  Problems all longstanding and stable, except as noted/documented.  See ROS for pertinent symptoms related to these conditions.    MEDICAL HISTORY:     Patient Active Problem List    Diagnosis Date Noted     Dysphagia, unspecified type 09/24/2019     Priority: Medium     Esophageal stricture 09/24/2019     Priority: Medium     History of CVA (cerebrovascular accident) 10/25/2018     Priority: Medium     Pre-diabetes 08/09/2018     Priority: Medium     History of prostate cancer 2003 w/po resection prostate 05/18/2018     Priority: Medium     PAD (peripheral artery disease) (H) 05/18/2018     Priority: Medium     Microalbuminuria 05/18/2018     Priority: Medium     Mild persistent asthma without complication 05/18/2018     Priority: Medium     Skin lesion 01/17/2018     Priority: Medium     occiput       Chronic non-seasonal allergic rhinitis, unspecified trigger 10/10/2017     Priority: Medium     Presbycusis of both ears 09/12/2017     Priority: Medium     Post herpetic neuralgia 01/03/2017     Priority: Medium     Gabapentin    Last  check - 1/3/17       Physical deconditioning 11/07/2016     Priority: Medium     Aftercare following knee joint replacement surgery, unspecified laterality 10/10/2016     Priority: Medium     Mixed hyperlipidemia 06/22/2016     Priority: Medium     Chronic combined systolic and diastolic congestive heart  failure (H) 10/08/2015     Priority: Medium     Essential hypertension, benign 10/08/2015     Priority: Medium     Tachy-alix syndrome (H)      Priority: Medium     s/p dual chamber pacemaker 2012       AAA (abdominal aortic aneurysm) (H)      Priority: Medium     Old myocardial infarction      Priority: Medium     MI with Vfib arrest 1998       GERD (gastroesophageal reflux disease) 12/15/2014     Priority: Medium     CKD (chronic kidney disease) stage 3, GFR 30-59 ml/min (H) 06/26/2014     Priority: Medium     Ischemic cardiomyopathy      Priority: Medium     Paroxysmal atrial fibrillation (H)      Priority: Medium     Coronary artery disease involving native coronary artery of native heart without angina pectoris      Priority: Medium     cardiac cath 2014: medical management; cath 2013: PTCA to diagonal; cath 2009: medical management; CABG 1998: LIMA to LAD, LISA to RCA, SVG to circumflex       Advanced directives, counseling/discussion 10/18/2013     Priority: Medium     Advance Care Planning:   Receipt of ACP document:  Received: Health Care Directive which was witnessed or notarized on 9-.  Document not previously scanned.  Validation form completed and sent with document to be scanned.  Code Status reflects choices in most recent ACP document.  Confirmed/documented designated decision maker(s). See permanent comments section of demographics in clinical tab. View document(s) and details by clicking on code status.   Added by Ayana Grace on 10/18/2013.             Polymyalgia rheumatica (H) 05/16/2013     Priority: Medium     traMADol (ULTRAM) 50 MG tablet    No CSA on file  Last  check - 1/3/17       Allergic rhinitis 10/30/2012     Priority: Medium     Problem list name updated by automated process. Provider to review       Peripheral edema 10/30/2012     Priority: Medium     Health Care Home 10/29/2012     Priority: Medium          DX V65.8 REPLACED WITH [facility] C[facility]  (04/08/2013)          Past Medical History:   Diagnosis Date     AAA (abdominal aortic aneurysm) (H)      Anemia due to blood loss, acute 10/10/2016     Asthma      Atrial fibrillation (H)     s/p left atrial appendage ligation     Cardiac arrest (H)      Cardiomyopathy (H)      Chronic kidney disease      Chronic sinusitis      Coronary artery disease     cardiac cath 2014: medical management; cath 2013: PTCA to diagonal; cath 2009: medical management; CABG 1998: LIMA to LAD, LISA to RCA, SVG to circumflex     GERD (gastroesophageal reflux disease)      History of angina      Hyperlipidaemia      Hypertension, goal below 140/90      Myocardial infarction (H)     MI with Vfib arrest 1998     Polymyalgia rheumatica (H)      Shingles 10/28/2016     Shortness of breath      Tachy-alix syndrome (H)     s/p dual chamber pacemaker 2012     Past Surgical History:   Procedure Laterality Date     ARTHROPLASTY KNEE Left 10/5/2016    Procedure: ARTHROPLASTY KNEE;  Surgeon: Keshav Zamudio MD;  Location: [facility] OR     CARDIAC SURGERY  12/03/2012    pacemaker ; CABG 1998     CHOLECYSTECTOMY       COLONOSCOPY       ENT SURGERY  5-    sinus     EXTRACAPSULAR CATARACT EXTRATION WITH INTRAOCULAR LENS IMPLANT       GENITOURINARY SURGERY      prostatectomy     IMPLANT PACEMAKER  12-3-12    [facility]     PROSTATE SURGERY       Current Outpatient Medications   Medication Sig Dispense Refill     aspirin 81 MG tablet Take 81 mg by mouth daily  30 tablet      beclomethasone (QVAR) 80 MCG/ACT Inhaler Inhale 2 puffs into the lungs 2 times daily 2 Inhaler 11     carvedilol (COREG) 6.25 MG tablet Take 1 tablet (6.25 mg) by mouth 2 times daily (with meals) 180 tablet 2     lisinopril (PRINIVIL/ZESTRIL) 5 MG tablet Take 0.5 tablets (2.5 mg) by mouth daily 90 tablet 3     MELATONIN PO Take 5 mg by mouth At Bedtime       mometasone (NASONEX) 50 MCG/ACT nasal spray Spray 2 sprays into both nostrils daily as needed.       nitroglycerin  (NITROSTAT) 0.4 MG SL tablet Place 1 tablet (0.4 mg) under the tongue every 5 minutes as needed for chest pain 25 tablet 3     polyethylene glycol (MIRALAX/GLYCOLAX) packet Take 17 g by mouth daily        rosuvastatin (CRESTOR) 40 MG tablet Take 1 tablet (40 mg) by mouth daily 90 tablet 3     torsemide (DEMADEX) 20 MG tablet Take 1 tablet (20 mg) by mouth daily as needed (swollen ankles) 30 tablet 1     triamcinolone (KENALOG) 0.1 % cream Apply sparingly to affected area twotimes daily as needed 30 g 5     OTC products: None, except as noted above    Allergies   Allergen Reactions     Advair Diskus      DENIED     Morphine Hcl      Decrease  Blood Pressure Lower Than Normal, Per Pt.     Vicodin [Hydrocodone-Acetaminophen] Visual Disturbance      Latex Allergy: NO    Social History     Tobacco Use     Smoking status: Never Smoker     Smokeless tobacco: Never Used   Substance Use Topics     Alcohol use: No     Alcohol/week: 0.0 standard drinks     History   Drug Use No       REVIEW OF SYSTEMS:   CONSTITUTIONAL: NEGATIVE for fever, chills, change in weight  INTEGUMENTARY/SKIN: NEGATIVE for worrisome rashes, moles or lesions  EYES: NEGATIVE for vision changes or irritation  ENT/MOUTH: NEGATIVE for ear, mouth and throat problems  RESP: NEGATIVE for significant cough or SOB  CV: NEGATIVE for chest pain, palpitations or peripheral edema  : NEGATIVE for frequency, dysuria, or hematuria  MUSCULOSKELETAL: NEGATIVE for significant arthralgias or myalgia  NEURO: NEGATIVE for weakness, dizziness or paresthesias  HEME: NEGATIVE for bleeding problems  PSYCHIATRIC: NEGATIVE for changes in mood or affect    EXAM:   /64   Pulse 66   Temp 97  F (36.1  C) (Tympanic)   Resp 16   Wt 83.9 kg (185 lb)   SpO2 100%   BMI 30.79 kg/m      GENERAL APPEARANCE: healthy, alert and no distress     EYES: EOMI,  PERRL     HENT: ear canals and TM's normal and nose and mouth without ulcers or lesions     NECK: no adenopathy, no asymmetry,  masses, or scars and thyroid normal to palpation     RESP: lungs clear to auscultation - no rales, rhonchi or wheezes     CV: regular rates and rhythm, normal S1 S2, no S3 or S4 and no murmur, click or rub     ABDOMEN:  soft, nontender, no HSM or masses and bowel sounds normal     MS: extremities normal- no gross deformities noted, no evidence of inflammation in joints, FROM in all extremities.       SKIN: no suspicious lesions or rashes     NEURO: Normal strength and tone, sensory exam grossly normal, mentation intact and speech normal     PSYCH: mentation appears normal. and affect normal/bright     LYMPHATICS: No cervical adenopathy    DIAGNOSTICS:     No EKG required for low risk surgery (cataract, skin procedure, breast biopsy, etc)  Labs Drawn and in Process:   Unresulted Labs Ordered in the Past 30 Days of this Admission     Date and Time Order Name Status Description    12/2/2019 1409 ALBUMIN RANDOM URINE QUANTITATIVE In process     12/2/2019 1409 COMPREHENSIVE METABOLIC PANEL In process     12/2/2019 1409 TSH WITH FREE T4 REFLEX In process     12/2/2019 1409 HEMOGLOBIN A1C In process           Recent Labs   Lab Test 08/06/19  1521 08/06/19  1331 03/18/19  1544  05/18/18  1409 12/26/17  1130  04/25/14  0739 04/24/14  0000   HGB  --  12.8* 11.7*  --   --   --    < > 13.3 13.0*   PLT  --  174 184  --   --   --    < > 256  --    INR  --   --   --   --   --   --   --  0.94 1.0     --  141   < > 141 141   < >  --  143   POTASSIUM 5.1  --  4.6   < > 4.2 4.1   < >  --  4.0   CR 1.93*  --  1.97*   < > 1.75* 1.56*   < >  --  1.6*   A1C  --   --   --   --  6.3* 5.9   < >  --   --     < > = values in this interval not displayed.        IMPRESSION:   Reason for surgery/procedure: recurrent problems with dysphagia due to esophageal stricture    The proposed surgical procedure is considered LOW risk.     REVISED CARDIAC RISK INDEX  The patient has the following serious cardiovascular risks for perioperative  complications such as (MI, PE, VFib and 3  AV Block):  Coronary Artery Disease (MI, positive stress test, angina, Qs on EKG)  INTERPRETATION: 1 risks: Class II (low risk - 0.9% complication rate)     The patient has the following additional risks for perioperative complications:  No identified additional risks      ICD-10-CM    1. Pre-op exam Z01.818    2. Gastroesophageal reflux disease without esophagitis K21.9    3. Dysphagia, unspecified type R13.10    4. Esophageal stricture K22.2    5. Chronic non-seasonal allergic rhinitis, unspecified trigger J30.89    6. Mixed hyperlipidemia E78.2    7. Ischemic cardiomyopathy I25.5    8. Paroxysmal atrial fibrillation (H) I48.0    9. Coronary artery disease involving native coronary artery of native heart without angina pectoris I25.10    10. Abdominal aortic aneurysm (AAA) without rupture (H) I71.4    11. Chronic combined systolic and diastolic congestive heart failure (H) I50.42    12. Essential hypertension, benign I10    13. PAD (peripheral artery disease) (H) I73.9    14. CKD (chronic kidney disease) stage 3, GFR 30-59 ml/min (H) N18.3 Comprehensive metabolic panel     Albumin Random Urine Quantitative with Creat Ratio   15. History of prostate cancer 2003 w/po resection prostate Z85.46    16. History of CVA (cerebrovascular accident) Z86.73    17. Pre-diabetes R73.03 Hemoglobin A1c     TSH with free T4 reflex       RECOMMENDATIONS:     --Consult hospital rounder / IM to assist post-op medical management     --Patient is to take all scheduled medications on the day of surgery for blood pressure with a very small sip of water.     APPROVAL GIVEN to proceed with proposed procedure, without further diagnostic evaluation      Signed Electronically by: Fifi Palomo DO    Copy of this evaluation report is provided to requesting physician.    [facility] Preop Guidelines    Revised Cardiac Risk Index

## 2019-12-03 ENCOUNTER — ANESTHESIA (OUTPATIENT)
Dept: GASTROENTEROLOGY | Facility: CLINIC | Age: 84
End: 2019-12-03
Payer: MEDICARE

## 2019-12-03 ENCOUNTER — ANESTHESIA EVENT (OUTPATIENT)
Dept: GASTROENTEROLOGY | Facility: CLINIC | Age: 84
End: 2019-12-03
Payer: MEDICARE

## 2019-12-03 ENCOUNTER — HOSPITAL ENCOUNTER (OUTPATIENT)
Facility: CLINIC | Age: 84
Discharge: HOME | End: 2019-12-03
Attending: INTERNAL MEDICINE | Admitting: INTERNAL MEDICINE
Payer: MEDICARE

## 2019-12-03 VITALS
DIASTOLIC BLOOD PRESSURE: 73 MMHG | HEART RATE: 60 BPM | SYSTOLIC BLOOD PRESSURE: 117 MMHG | BODY MASS INDEX: 30.79 KG/M2 | RESPIRATION RATE: 16 BRPM | WEIGHT: 185 LBS | OXYGEN SATURATION: 99 %

## 2019-12-03 LAB
ALBUMIN SERPL-MCNC: 3.5 G/DL (ref 3.4–5)
ALP SERPL-CCNC: 84 U/L (ref 40–150)
ALT SERPL W P-5'-P-CCNC: 33 U/L (ref 0–70)
ANION GAP SERPL CALCULATED.3IONS-SCNC: 4 MMOL/L (ref 3–14)
AST SERPL W P-5'-P-CCNC: 26 U/L (ref 0–45)
BILIRUB SERPL-MCNC: 0.2 MG/DL (ref 0.2–1.3)
BUN SERPL-MCNC: 22 MG/DL (ref 7–30)
CALCIUM SERPL-MCNC: 8.9 MG/DL (ref 8.5–10.1)
CHLORIDE SERPL-SCNC: 111 MMOL/L (ref 94–109)
CO2 SERPL-SCNC: 26 MMOL/L (ref 20–32)
CREAT SERPL-MCNC: 1.68 MG/DL (ref 0.66–1.25)
CREAT UR-MCNC: 86 MG/DL
GFR SERPL CREATININE-BSD FRML MDRD: 36 ML/MIN/{1.73_M2}
GLUCOSE SERPL-MCNC: 79 MG/DL (ref 70–99)
MICROALBUMIN UR-MCNC: 7 MG/L
MICROALBUMIN/CREAT UR: 8.57 MG/G CR (ref 0–17)
POTASSIUM SERPL-SCNC: 4.7 MMOL/L (ref 3.4–5.3)
PROT SERPL-MCNC: 7.6 G/DL (ref 6.8–8.8)
SODIUM SERPL-SCNC: 141 MMOL/L (ref 133–144)
TSH SERPL DL<=0.005 MIU/L-ACNC: 2.87 MU/L (ref 0.4–4)
UPPER GI ENDOSCOPY: NORMAL

## 2019-12-03 PROCEDURE — 43245 EGD DILATE STRICTURE: CPT | Performed by: INTERNAL MEDICINE

## 2019-12-03 PROCEDURE — 88305 TISSUE EXAM BY PATHOLOGIST: CPT | Performed by: INTERNAL MEDICINE

## 2019-12-03 PROCEDURE — 25000125 ZZHC RX 250: Performed by: NURSE ANESTHETIST, CERTIFIED REGISTERED

## 2019-12-03 PROCEDURE — 25000128 H RX IP 250 OP 636: Performed by: NURSE ANESTHETIST, CERTIFIED REGISTERED

## 2019-12-03 PROCEDURE — 40000010 ZZH STATISTIC ANES STAT CODE-CRNA PER MINUTE: Performed by: INTERNAL MEDICINE

## 2019-12-03 PROCEDURE — 43239 EGD BIOPSY SINGLE/MULTIPLE: CPT | Mod: XS | Performed by: INTERNAL MEDICINE

## 2019-12-03 PROCEDURE — 88305 TISSUE EXAM BY PATHOLOGIST: CPT | Mod: 26 | Performed by: INTERNAL MEDICINE

## 2019-12-03 PROCEDURE — 43249 ESOPH EGD DILATION <30 MM: CPT | Performed by: INTERNAL MEDICINE

## 2019-12-03 PROCEDURE — 37000008 ZZH ANESTHESIA TECHNICAL FEE, 1ST 30 MIN: Performed by: INTERNAL MEDICINE

## 2019-12-03 PROCEDURE — 25800030 ZZH RX IP 258 OP 636: Performed by: NURSE ANESTHETIST, CERTIFIED REGISTERED

## 2019-12-03 RX ORDER — PROPOFOL 10 MG/ML
INJECTION, EMULSION INTRAVENOUS PRN
Status: DISCONTINUED | OUTPATIENT
Start: 2019-12-03 | End: 2019-12-03

## 2019-12-03 RX ORDER — NALOXONE HYDROCHLORIDE 0.4 MG/ML
.1-.4 INJECTION, SOLUTION INTRAMUSCULAR; INTRAVENOUS; SUBCUTANEOUS
Status: DISCONTINUED | OUTPATIENT
Start: 2019-12-03 | End: 2019-12-03 | Stop reason: HOSPADM

## 2019-12-03 RX ORDER — PROPOFOL 10 MG/ML
INJECTION, EMULSION INTRAVENOUS CONTINUOUS PRN
Status: DISCONTINUED | OUTPATIENT
Start: 2019-12-03 | End: 2019-12-03

## 2019-12-03 RX ORDER — ONDANSETRON 2 MG/ML
4 INJECTION INTRAMUSCULAR; INTRAVENOUS
Status: DISCONTINUED | OUTPATIENT
Start: 2019-12-03 | End: 2019-12-03 | Stop reason: HOSPADM

## 2019-12-03 RX ORDER — LIDOCAINE HYDROCHLORIDE 20 MG/ML
INJECTION, SOLUTION INFILTRATION; PERINEURAL PRN
Status: DISCONTINUED | OUTPATIENT
Start: 2019-12-03 | End: 2019-12-03

## 2019-12-03 RX ORDER — FENTANYL CITRATE 50 UG/ML
INJECTION, SOLUTION INTRAMUSCULAR; INTRAVENOUS PRN
Status: DISCONTINUED | OUTPATIENT
Start: 2019-12-03 | End: 2019-12-03

## 2019-12-03 RX ORDER — ONDANSETRON 2 MG/ML
4 INJECTION INTRAMUSCULAR; INTRAVENOUS EVERY 6 HOURS PRN
Status: DISCONTINUED | OUTPATIENT
Start: 2019-12-03 | End: 2019-12-03 | Stop reason: HOSPADM

## 2019-12-03 RX ORDER — SODIUM CHLORIDE, SODIUM LACTATE, POTASSIUM CHLORIDE, CALCIUM CHLORIDE 600; 310; 30; 20 MG/100ML; MG/100ML; MG/100ML; MG/100ML
INJECTION, SOLUTION INTRAVENOUS CONTINUOUS PRN
Status: DISCONTINUED | OUTPATIENT
Start: 2019-12-03 | End: 2019-12-03

## 2019-12-03 RX ORDER — ONDANSETRON 4 MG/1
4 TABLET, ORALLY DISINTEGRATING ORAL EVERY 6 HOURS PRN
Status: DISCONTINUED | OUTPATIENT
Start: 2019-12-03 | End: 2019-12-03 | Stop reason: HOSPADM

## 2019-12-03 RX ORDER — LIDOCAINE 40 MG/G
CREAM TOPICAL
Status: DISCONTINUED | OUTPATIENT
Start: 2019-12-03 | End: 2019-12-03 | Stop reason: HOSPADM

## 2019-12-03 RX ORDER — ONDANSETRON 2 MG/ML
INJECTION INTRAMUSCULAR; INTRAVENOUS PRN
Status: DISCONTINUED | OUTPATIENT
Start: 2019-12-03 | End: 2019-12-03

## 2019-12-03 RX ORDER — FLUMAZENIL 0.1 MG/ML
0.2 INJECTION, SOLUTION INTRAVENOUS
Status: DISCONTINUED | OUTPATIENT
Start: 2019-12-03 | End: 2019-12-03 | Stop reason: HOSPADM

## 2019-12-03 RX ADMIN — PROPOFOL 20 MG: 10 INJECTION, EMULSION INTRAVENOUS at 09:12

## 2019-12-03 RX ADMIN — LIDOCAINE HYDROCHLORIDE 50 MG: 20 INJECTION, SOLUTION INFILTRATION; PERINEURAL at 09:13

## 2019-12-03 RX ADMIN — ONDANSETRON 4 MG: 2 INJECTION INTRAMUSCULAR; INTRAVENOUS at 09:19

## 2019-12-03 RX ADMIN — FENTANYL CITRATE 25 MCG: 50 INJECTION, SOLUTION INTRAMUSCULAR; INTRAVENOUS at 09:16

## 2019-12-03 RX ADMIN — SODIUM CHLORIDE, POTASSIUM CHLORIDE, SODIUM LACTATE AND CALCIUM CHLORIDE: 600; 310; 30; 20 INJECTION, SOLUTION INTRAVENOUS at 09:10

## 2019-12-03 RX ADMIN — FENTANYL CITRATE 25 MCG: 50 INJECTION, SOLUTION INTRAMUSCULAR; INTRAVENOUS at 09:23

## 2019-12-03 RX ADMIN — PROPOFOL 50 MCG/KG/MIN: 10 INJECTION, EMULSION INTRAVENOUS at 09:12

## 2019-12-03 RX ADMIN — PROPOFOL 20 MG: 10 INJECTION, EMULSION INTRAVENOUS at 09:15

## 2019-12-03 NOTE — ANESTHESIA POSTPROCEDURE EVALUATION
Patient: Abbe Lambert    Procedure(s):  ESOPHAGOGASTRODUODENOSCOPY (EGD) (MAC)  Esophagoscopy, gastroscopy, duodenoscopy (EGD), dilatation, combined    Diagnosis:GERD REFLUX HEARTBURN  Diagnosis Additional Information: No value filed.    Anesthesia Type:  MAC    Note:  Anesthesia Post Evaluation    Patient location during evaluation: PACU  Patient participation: Able to fully participate in evaluation  Level of consciousness: awake and alert  Pain management: adequate  Airway patency: patent  Cardiovascular status: acceptable and stable  Respiratory status: acceptable, spontaneous ventilation, unassisted and nonlabored ventilation  Hydration status: acceptable  PONV: none             Last vitals:  Vitals:    12/03/19 0940 12/03/19 0950 12/03/19 1000   BP: 116/84 133/69 117/73   Pulse:  60    Resp: 20 16    SpO2: 98% 100% 99%         Electronically Signed By: Isaac Yeager MD  December 3, 2019  10:51 AM

## 2019-12-03 NOTE — ANESTHESIA CARE TRANSFER NOTE
Patient: Abbe Lambert    Procedure(s):  ESOPHAGOGASTRODUODENOSCOPY (EGD) (MAC)  Esophagoscopy, gastroscopy, duodenoscopy (EGD), dilatation, combined    Diagnosis: GERD REFLUX HEARTBURN  Diagnosis Additional Information: No value filed.    Anesthesia Type:   MAC     Note:  Airway :Nasal Cannula  Patient transferred to:PACU  Comments: At end of procedure, spontaneous respirations, patient alert to voice, able to follow commands. Oxygen via nasal cannula at 2 liters per minute to PACU. Oxygen tubing connected to wall O2 in PACU, SpO2, NiBP, and EKG monitors and alarms on and functioning, report on patient's clinical status given to PACU RN, RN questions answered.Handoff Report: Identifed the Patient, Identified the Reponsible Provider, Reviewed the pertinent medical history, Discussed the surgical course, Reviewed Intra-OP anesthesia mangement and issues during anesthesia, Set expectations for post-procedure period and Allowed opportunity for questions and acknowledgement of understanding      Vitals: (Last set prior to Anesthesia Care Transfer)    CRNA VITALS  12/3/2019 0900 - 12/3/2019 0936      12/3/2019             Pulse:     Ht Rate:     SpO2:     Resp Rate (observed):     Resp Rate (set):     EKG:                 Electronically Signed By: CAROLYN Bashir CRNA  December 3, 2019  9:36 AM

## 2019-12-03 NOTE — ANESTHESIA PREPROCEDURE EVALUATION
Anesthesia Pre-Procedure Evaluation    Patient: Abbe Lambert   MRN: [id number] : 2/3/1934          Preoperative Diagnosis: GERD REFLUX HEARTBURN    Procedure(s):  ESOPHAGOGASTRODUODENOSCOPY (EGD) (MAC)    Past Medical History:   Diagnosis Date     AAA (abdominal aortic aneurysm) (H)      Anemia due to blood loss, acute 10/10/2016     Asthma      Atrial fibrillation (H)     s/p left atrial appendage ligation     Cardiac arrest (H)      Cardiomyopathy (H)      Chronic kidney disease      Chronic sinusitis      Coronary artery disease     cardiac cath : medical management; cath : PTCA to diagonal; cath : medical management; CABG : LIMA to LAD, LISA to RCA, SVG to circumflex     GERD (gastroesophageal reflux disease)      History of angina      Hyperlipidaemia      Hypertension, goal below 140/90      Myocardial infarction (H)     MI with Vfib arrest      Polymyalgia rheumatica (H)      Shingles 10/28/2016     Shortness of breath      Tachy-alix syndrome (H)     s/p dual chamber pacemaker      Past Surgical History:   Procedure Laterality Date     ARTHROPLASTY KNEE Left 10/5/2016    Procedure: ARTHROPLASTY KNEE;  Surgeon: Keshav Zamudio MD;  Location: [facility]     CARDIAC SURGERY  2012    pacemaker ; CABG      CHOLECYSTECTOMY       COLONOSCOPY       ENT SURGERY  2007    sinus     EXTRACAPSULAR CATARACT EXTRATION WITH INTRAOCULAR LENS IMPLANT       GENITOURINARY SURGERY      prostatectomy     IMPLANT PACEMAKER  12-3-12    [facility]     PROSTATE SURGERY         Anesthesia Evaluation     . Pt has had prior anesthetic. Type: General    No history of anesthetic complications          ROS/MED HX    ENT/Pulmonary:     (+)Intermittent asthma , . .   (-) sleep apnea   Neurologic: Comment: Patient and wife deny that he has had a CVA.      (-) CVA   Cardiovascular: Comment: AAA    10/2018 echo;     There is mild to moderate concentric left ventricular hypertrophy.  The visual  ejection fraction is estimated at 35-40%.  There is severe apical wall hypokinesis.  There is moderate to severe inferior and inferolateral wall hypokinesis.  Grade I or early diastolic dysfunction.  Pacer wire in right atrium  There is trace to mild aortic regurgitation.  The study was technically difficult. Contrast was used without apparent  complications. Compared to prior study, there is no significant change.    (+) Dyslipidemia, hypertension--CAD, -CABG-date: 1998, . : . CHF . . pacemaker Reason placed: tachy/alix:type: Dual chamber - Patient is dependent on pacemaker . a-fib, Irregular Heartbeat/Palpitations, .      (-) angina and angina   METS/Exercise Tolerance:     Hematologic:         Musculoskeletal:   (+) arthritis,  -       GI/Hepatic:     (+) GERD Asymptomatic on medication,       Renal/Genitourinary:     (+) chronic renal disease, type: CRI,       Endo:         Psychiatric:         Infectious Disease:         Malignancy:         Other: Comment: PMR                         Physical Exam      Airway   Mallampati: II  TM distance: >3 FB  Neck ROM: full    Dental     Cardiovascular   Rhythm and rate: regular      Pulmonary    breath sounds clear to auscultation            Lab Results   Component Value Date    WBC 9.7 08/06/2019    HGB 12.8 (L) 08/06/2019    HCT 40.0 08/06/2019     08/06/2019    CRP 8.1 (H) 11/15/2016    SED 39 (H) 11/15/2016     08/06/2019    POTASSIUM 5.1 08/06/2019    CHLORIDE 110 (H) 08/06/2019    CO2 26 08/06/2019    BUN 45 (H) 08/06/2019    CR 1.93 (H) 08/06/2019    GLC 86 08/06/2019    JANAY 9.0 08/06/2019    PHOS 2.4 (L) 11/03/2016    MAG 1.9 11/03/2016    ALBUMIN 3.5 09/12/2017    PROTTOTAL 7.9 09/12/2017    ALT 12 10/24/2018    AST 38 09/12/2017    ALKPHOS 115 09/12/2017    BILITOTAL 1.0 09/12/2017    BILIDIRECT 0.1 04/30/2014    LIPASE 329 (H) 12/10/2013    PTT 25 04/25/2014    INR 0.94 04/25/2014    TSH 2.74 12/05/2016    T4 0.80 11/08/2011       Preop Vitals  BP  "Readings from Last 3 Encounters:   12/02/19 126/64   11/22/19 125/74   09/24/19 116/68    Pulse Readings from Last 3 Encounters:   12/02/19 66   11/22/19 80   09/24/19 74      Resp Readings from Last 3 Encounters:   12/02/19 16   09/24/19 16   08/16/19 16    SpO2 Readings from Last 3 Encounters:   12/02/19 100%   09/24/19 97%   08/16/19 98%      Temp Readings from Last 1 Encounters:   12/02/19 36.1  C (97  F) (Tympanic)    Ht Readings from Last 1 Encounters:   11/22/19 1.651 m (5' 5\")      Wt Readings from Last 1 Encounters:   12/02/19 83.9 kg (185 lb)    Estimated body mass index is 30.79 kg/m  as calculated from the following:    Height as of 11/22/19: 1.651 m (5' 5\").    Weight as of 12/2/19: 83.9 kg (185 lb).       Anesthesia Plan      History & Physical Review  History and physical reviewed and following examination; no interval change.    ASA Status:  3 .    NPO Status:  > 8 hours    Plan for MAC with Intravenous induction. Reason for MAC:  Deep or markedly invasive procedure (G8)         Postoperative Care      Consents  Anesthetic plan, risks, benefits and alternatives discussed with:  Patient.  Use of blood products discussed: No .   .                 Isaac Yeager MD  "

## 2019-12-04 LAB — COPATH REPORT: NORMAL

## 2020-03-03 ENCOUNTER — OFFICE VISIT (OUTPATIENT)
Dept: FAMILY MEDICINE | Facility: CLINIC | Age: 85
End: 2020-03-03
Payer: MEDICARE

## 2020-03-03 VITALS
RESPIRATION RATE: 18 BRPM | WEIGHT: 177 LBS | OXYGEN SATURATION: 93 % | BODY MASS INDEX: 29.49 KG/M2 | HEIGHT: 65 IN | HEART RATE: 106 BPM | DIASTOLIC BLOOD PRESSURE: 60 MMHG | SYSTOLIC BLOOD PRESSURE: 130 MMHG | TEMPERATURE: 97.4 F

## 2020-03-03 DIAGNOSIS — E11.40 TYPE 2 DIABETES MELLITUS WITH DIABETIC NEUROPATHY, WITHOUT LONG-TERM CURRENT USE OF INSULIN (H): ICD-10-CM

## 2020-03-03 DIAGNOSIS — J45.30 MILD PERSISTENT ASTHMA WITHOUT COMPLICATION: ICD-10-CM

## 2020-03-03 DIAGNOSIS — I48.0 PAROXYSMAL ATRIAL FIBRILLATION (H): ICD-10-CM

## 2020-03-03 DIAGNOSIS — E78.2 MIXED HYPERLIPIDEMIA: ICD-10-CM

## 2020-03-03 DIAGNOSIS — E66.3 OVERWEIGHT (BMI 25.0-29.9): ICD-10-CM

## 2020-03-03 DIAGNOSIS — R80.9 MICROALBUMINURIA: ICD-10-CM

## 2020-03-03 DIAGNOSIS — Z13.89 SCREENING FOR DIABETIC PERIPHERAL NEUROPATHY: ICD-10-CM

## 2020-03-03 DIAGNOSIS — R19.7 DIARRHEA, UNSPECIFIED TYPE: ICD-10-CM

## 2020-03-03 DIAGNOSIS — J32.9 CHRONIC SINUSITIS, UNSPECIFIED LOCATION: ICD-10-CM

## 2020-03-03 DIAGNOSIS — I73.9 PAD (PERIPHERAL ARTERY DISEASE) (H): Primary | ICD-10-CM

## 2020-03-03 DIAGNOSIS — I50.42 CHRONIC COMBINED SYSTOLIC AND DIASTOLIC CONGESTIVE HEART FAILURE (H): ICD-10-CM

## 2020-03-03 DIAGNOSIS — N18.30 TYPE 2 DIABETES MELLITUS WITH STAGE 3 CHRONIC KIDNEY DISEASE, WITHOUT LONG-TERM CURRENT USE OF INSULIN (H): ICD-10-CM

## 2020-03-03 DIAGNOSIS — L29.9 PRURITIC DISORDER: ICD-10-CM

## 2020-03-03 DIAGNOSIS — E11.22 TYPE 2 DIABETES MELLITUS WITH STAGE 3 CHRONIC KIDNEY DISEASE, WITHOUT LONG-TERM CURRENT USE OF INSULIN (H): ICD-10-CM

## 2020-03-03 DIAGNOSIS — I87.2 STASIS DERMATITIS OF BOTH LEGS: ICD-10-CM

## 2020-03-03 DIAGNOSIS — L85.3 DRY SKIN: ICD-10-CM

## 2020-03-03 DIAGNOSIS — N18.30 CKD (CHRONIC KIDNEY DISEASE) STAGE 3, GFR 30-59 ML/MIN (H): ICD-10-CM

## 2020-03-03 DIAGNOSIS — I25.812 CORONARY ARTERY DISEASE INVOLVING BYPASS GRAFT OF TRANSPLANTED HEART WITHOUT ANGINA PECTORIS: ICD-10-CM

## 2020-03-03 DIAGNOSIS — M35.3 POLYMYALGIA RHEUMATICA (H): ICD-10-CM

## 2020-03-03 PROBLEM — F01.50 VASCULAR DEMENTIA (H): Status: ACTIVE | Noted: 2019-10-30

## 2020-03-03 PROBLEM — R41.3 MEMORY CHANGE: Status: ACTIVE | Noted: 2019-10-30

## 2020-03-03 PROCEDURE — 99207 C FOOT EXAM  NO CHARGE: CPT | Mod: 25 | Performed by: FAMILY MEDICINE

## 2020-03-03 PROCEDURE — 99215 OFFICE O/P EST HI 40 MIN: CPT | Performed by: FAMILY MEDICINE

## 2020-03-03 ASSESSMENT — MIFFLIN-ST. JEOR: SCORE: 1409.75

## 2020-03-03 NOTE — PATIENT INSTRUCTIONS
1. See vascular surgery :  [facility] and their group   Re the legs   [phone]    2.  Weight Loss Tips  1. Do not eat after 6 hrs before your expected bedtime  2. Have your heaviest meal for breakfast, a slightly lighter meal at lunch and a snack 6 hrs before bed  3. No sugar/calorie drinks except milk ie no fruit juice, pop, alcohol.  4. Drink milk 30min before meals to decrease your hunger. Also it is excellent as part of your last meal of the day snack  5. Drink lots of water  6. Increase fiber in diet: all bran cereal, salads, popcorn etc  7. Have only one small serving of fruit a day about 1/2 cup (as this is high in sugar)  8. EXERCISE is the bottom line. Without it, you will gain weight even on a low calorie diet. Best if done 2-3X a day as can    Being overweight contributes to high blood pressure and high cholesterol, both of which cause heart attacks, strokes and kidney failure, prediabetes and diabetes, arthritis, and liver disease     You must also decrease your caloric intake and especially decrease the carbs or carbohydrates as these are the most harmful regarding the above health risks    4.  Steroid creams   Over the counter hydrocortisone cream-at the [employer] --may apply 2-4 X a day   Decreases red, itch and scaling     Cover  The steroid cream with vaseline(or vaseline  intensive care)   Then plastic and hold it on with a cut open  Sock or sleeve of a T-shirt or a shower cap if on the scalp     5. The feet and legs being numb and painful is from the diabetes effecting the nerves and the lack of arterial blood supply to the legs     5. Stop the doxycycline --on it 2 weeks and may be the cause of the 2 weeks of diarrhea      3. Run a cold air vaporizer as much as possible. If you cannot,  boil water and breath the warm vapors 2-3 times a day to try to open up the sinuses take 2400mgm of guaifenesin per 24 hours   You can do this by taking  Mucinex plain blue  1200 mg  One tablet twice a  day (This may come as 600mg/tablet and you need to take 2 tabs twice a day) or you could buy the cheaper  generic 400mgm / tab and take 2 tablets 3 x a day or 1 and 1/2 tablets 4 x a day . .Guaifenesin is  the major component of most cough syrups, because it makes the mucus less thick, and therefore it drains out better and you are less likely to cough from it dripping on the back of your throat.  Irrigate the  nose with plain water under the kitchen sink faucet or the shower.  Rebeka pots, spray bottles, etc accumulate bacteria and are not recommended.   The tickle in the throat is also helped by gargling with vinegar and honey mixture, or pop or mouth wash as these coat the throat.  Please try to rinse teeth with water after using these .   Do not use sudafed or pseudephedrine as it dries the mucus up so it is harder to get it out, and it can raise your BP

## 2020-03-03 NOTE — PROGRESS NOTES
Subjective     Abbe Lambert is a 86 year old male who presents to clinic today for the following health issues:    HPI a    PAD & severe Venous Congestion /pain-starting in soles over 3 mo   With Stasis Dermatitis   In pt with DM       Duration: x 2-3 months    Description  Location: Lower Legs--drematitis  and Feet    Intensity:  mild    Accompanying signs and symptoms: Numbness& purple     History  Previous similar problem: no   Previous evaluation:  none    Precipitating or alleviating factors:  Trauma or overuse: no   Aggravating factors include: none    Therapies tried and outcome: nothing    Dry Skin - overall   With Pruritis of Upper Back       Duration:for a long time - worse  x 2 days    Description (location/character/radiation): Upper Mid-Back    Intensity:  mild    Accompanying signs and symptoms: Black Rough Spot= KERATOSIS - Rt scapular tip area     History (similar episodes/previous evaluation): Skin Cancer on Scalp    Precipitating or alleviating factors: None    Therapies tried and outcome: MOH's Surgery/Relief    Diarrhea      Duration: x 2-3 week    Onset when started doxycycline for sinuses   CHRONIC SINUSITIS     -not helping the sinusitis     Description:       Consistency of stool: loose, watery   Prior normal BM q am        Blood in stool: no        Number of loose stools past 24 hours: 0  1-2 / d     Intensity:  mild    Accompanying signs and symptoms:       Fever: no        Nausea/vomitting: no        Abdominal pain: no        Weight loss: no     History (recent antibiotics or travel/ill contacts/med changes/testing done): none    Precipitating or alleviating factors: None    Therapies tried and outcome: Imodium AD    OVERWEIGHT    --bmi = 29   -comorbid DM, A Fib h iLDL , CHF, CAD , PAD   -inactive with age     PMR    - no treatment necessary   - stable       Diabetes Follow-up      How often are you checking your blood sugar? Not at all    What concerns do you have today about your  diabetes? CKD 3 & albuminuria , PAD< CAD      Do you have any of these symptoms? (Select all that apply)  Numbness in feet, Burning in feet and Weight gain      BP Readings from Last 2 Encounters:   03/03/20 130/60   12/03/19 117/73     Hemoglobin A1C (%)   Date Value   12/02/2019 5.6   05/18/2018 6.3 (H)     LDL Cholesterol Calculated (mg/dL)   Date Value   11/08/2019 119 (H)   10/24/2018 39         MIxed Hyperlipidemia Follow-Up      Are you regularly taking any medication or supplement to lower your cholesterol?   Yes- crestor 40mgm     Are you having muscle aches or other side effects that you think could be caused by your cholesterol lowering medication?  No    Vascular Disease : A Fib, CAD , PAD       How often do you take nitroglycerin? Never    Do you take an aspirin every day? Yes      Combined Heart Failurew Hx of ISchemic Cardiomyopathy     Are you experiencing any shortness of breath? No    Are you experiencing any swelling in your legs or feet?  No    Are you using more pillows than usual? No    Do you cough at night?  No    Do you check your weight daily?  No    Have you had a weight change recently?  No    Are you having any of the following side effects from your medications? (Select all that apply)  The patient does not report symptoms of dizziness, fatigue, cough, swelling, or slow heart beat.    Since your last visit, how many times have you gone to the cardiologist, urgent care, emergency room, or hospital because of your heart failure?   None    Asthma Follow-Up    Was ACT completed today?    Yes    ACT Total Scores 3/3/2020   ACT TOTAL SCORE (Goal Greater than or Equal to 20) 24   In the past 12 months, how many times did you visit the emergency room for your asthma without being admitted to the hospital? 0   In the past 12 months, how many times were you hospitalized overnight because of your asthma? 0       How many days per week do you miss taking your asthma controller medication?  I do not  "have an asthma controller medication    Please describe any recent triggers for your asthma: None    Have you had any Emergency Room Visits, Urgent Care Visits, or Hospital Admissions since your last office visit?  No        Chronic Kidney Disease 3 Follow-up      Do you take any over the counter pain medicine?: No   -comorbid DM, A Fib h iLDL , CHF, CAD , PAD  Albuminuria             Reviewed and updated as needed this visit by Provider  Tobacco  Allergies  Meds  Problems  Med Hx  Surg Hx  Fam Hx         Review of Systems   ROS COMP: CONSTITUTIONAL: NEGATIVE for fever, chills, change in weight POS weak  INTEGUMENTARY/SKIN: NEGATIVE for worrisome rashes, moles or lesions POS  Stasis dermatisi of legs ; itchy back; dry skin overall   EYES: NEGATIVE for vision changes or irritation  ENT/MOUTH: NEGATIVE for ear, mouth and throat problems POS decreased hearing & sinusitis   RESP:NEGATIVE for significant cough or SOB, POSITIVE for , cough-non productive and dyspnea on exertion  BREAST: NEGATIVE for masses, tenderness or discharge  CV: NEGATIVE for chest pain, palpitations or peripheral edema  GI: NEGATIVE for nausea, abdominal pain, heartburn, or change in bowel habits  : NEGATIVE for frequency, dysuria, or hematuria  MUSCULOSKELETAL: NEGATIVE for significant arthralgias or myalgia  NEURO: NEGATIVE for weakness, dizziness or paresthesias  ENDOCRINE: NEGATIVE for temperature intolerance, skin/hair changes  HEME: NEGATIVE for bleeding problems  PSYCHIATRIC: NEGATIVE for changes in mood or affect      Objective    /60   Pulse 106   Temp 97.4  F (36.3  C) (Tympanic)   Resp 18   Ht 1.651 m (5' 5\")   Wt 80.3 kg (177 lb)   SpO2 93%   BMI 29.45 kg/m    Body mass index is 29.45 kg/m .  Physical Exam   GENERAL: healthy, alert, no distress, over weight, frail, elderly and fatigued; [place]   EYES: Eyes grossly normal to inspection, PERRL and conjunctivae and sclerae normal  NECK: no adenopathy, no asymmetry, " masses, or scars and thyroid normal to palpation  RESP: lungs clear to auscultation - no rales, rhonchi or wheezes  CV: regular rate and rhythm, normal S1 S2, no S3 or S4, no murmur, click or rub, no peripheral edema and peripheral pulses strong  ABDOMEN: soft, nontender, no hepatosplenomegaly, no masses and bowel sounds normal  MS: no gross musculoskeletal defects noted, no edema  SKIN: no suspicious lesions or rashesPOS  Itchy back with one seborrheic keratosis Rt scapular tip x 7 mm   Mod seevere pretib stasis dermatitis ; purple dependency of ft with slow venous return and poor DP/ PT pp   NEURO: Normal strength and tone, mentation intact and speech normal  PSYCH: mentation appears normal, affect normal/bright but has trouble with dating and comprehending   LYMPH: no cervical, supraclavicular, adenopathy  Diabetic Foot Exam: No sores, ulcers, erthematous pressure areas; poor/weak DP& PT pulses and poor  capillary filling time of 3-4 secs  ;decrreasing l sensation to monofilament  testing       Diagnostic Test Results:  Labs reviewed in Epic        Assessment & Plan       ICD-10-CM    1. PAD (peripheral artery disease) (H) I73.9    2. Chronic combined systolic and diastolic congestive heart failure (H) I50.42    3. Paroxysmal atrial fibrillation (H) I48.0    4. CKD (chronic kidney disease) stage 3, GFR 30-59 ml/min (H) N18.3    5. Type 2 diabetes mellitus with stage 3 chronic kidney disease, without long-term current use of insulin (H) E11.22     N18.3    6. Type 2 diabetes mellitus with diabetic neuropathy, without long-term current use of insulin (H) E11.40    7. Microalbuminuria R80.9    8. 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 I25.812    9. Mixed hyperlipidemia E78.2    10. Stasis dermatitis of both legs I87.2    11. Dry skin L85.3    12. Pruritic disorder from dry skin of upper back L29.9    13. Diarrhea, unspecified type since on doxycycline for  "sinuses since early 2-20  R19.7    14. Chronic sinusitis, unspecified location J32.9    15. Polymyalgia rheumatica (H) M35.3    16. Mild persistent asthma without complication J45.30    17. Overweight (BMI 25.0-29.9) E66.3    18. Screening for diabetic peripheral neuropathy Z13.89 C FOOT EXAM  NO CHARGE        BMI:   Estimated body mass index is 29.45 kg/m  as calculated from the following:    Height as of this encounter: 1.651 m (5' 5\").    Weight as of this encounter: 80.3 kg (177 lb).   Weight management plan: Discussed healthy diet and exercise guidelines        Patient Instructions   1. See vascular surgery :  [facility] and their group   Re the legs   [phone]    2.  Weight Loss Tips  1. Do not eat after 6 hrs before your expected bedtime  2. Have your heaviest meal for breakfast, a slightly lighter meal at lunch and a snack 6 hrs before bed  3. No sugar/calorie drinks except milk ie no fruit juice, pop, alcohol.  4. Drink milk 30min before meals to decrease your hunger. Also it is excellent as part of your last meal of the day snack  5. Drink lots of water  6. Increase fiber in diet: all bran cereal, salads, popcorn etc  7. Have only one small serving of fruit a day about 1/2 cup (as this is high in sugar)  8. EXERCISE is the bottom line. Without it, you will gain weight even on a low calorie diet. Best if done 2-3X a day as can    Being overweight contributes to high blood pressure and high cholesterol, both of which cause heart attacks, strokes and kidney failure, prediabetes and diabetes, arthritis, and liver disease     You must also decrease your caloric intake and especially decrease the carbs or carbohydrates as these are the most harmful regarding the above health risks    4.  Steroid creams   Over the counter hydrocortisone cream-at the [employer] --may apply 2-4 X a day   Decreases red, itch and scaling     Cover  The steroid cream with vaseline(or vaseline  intensive care)   Then plastic " and hold it on with a cut open  Sock or sleeve of a T-shirt or a shower cap if on the scalp     5. The feet and legs being numb and painful is from the diabetes effecting the nerves and the lack of arterial blood supply to the legs     5. Stop the doxycycline --on it 2 weeks and may be the cause of the 2 weeks of diarrhea      3. Run a cold air vaporizer as much as possible. If you cannot,  boil water and breath the warm vapors 2-3 times a day to try to open up the sinuses take 2400mgm of guaifenesin per 24 hours   You can do this by taking  Mucinex plain blue  1200 mg  One tablet twice a day (This may come as 600mg/tablet and you need to take 2 tabs twice a day) or you could buy the cheaper  generic 400mgm / tab and take 2 tablets 3 x a day or 1 and 1/2 tablets 4 x a day . .Guaifenesin is  the major component of most cough syrups, because it makes the mucus less thick, and therefore it drains out better and you are less likely to cough from it dripping on the back of your throat.  Irrigate the  nose with plain water under the kitchen sink faucet or the shower.  Rebeka pots, spray bottles, etc accumulate bacteria and are not recommended.   The tickle in the throat is also helped by gargling with vinegar and honey mixture, or pop or mouth wash as these coat the throat.  Please try to rinse teeth with water after using these .   Do not use sudafed or pseudephedrine as it dries the mucus up so it is harder to get it out, and it can raise your BP             discussion---Time spent with the patient 43 mins, more than 50% in counseling and coordinating care, Re all  medical problems.    1. Feet are hyposensitive and purple dependency from:  PAD--need to r/o sever PAD  That needs treatment -- and DM neuropathy   + venous stasis     2. Diarrhea    -likely from the doxy as fits the time period   - doxy has not helped the chronic sinusitis   - would do alphonse with local measures as above     I  Explained the treatment and the  reason for it     Return in about 2 months (around 5/3/2020) for Physical Exam, impaired glucose, cholesterol.    Ana Rice MD  [facility]        Ana Rice MD

## 2020-03-03 NOTE — LETTER
My Asthma Action Plan    Name: Abbe Lambert   YOB: 1934  Date: 3/3/2020   My doctor: Ana Rice MD   My clinic: [facility]        My Rescue Medicine:   Albuterol inhaler (Proair/Ventolin/Proventil HFA)  2-4 puffs EVERY 4 HOURS as needed. Use a spacer if recommended by your provider.   My Asthma Severity:   Intermittent / Exercise Induced  Know your asthma triggers: Patient is unaware of triggers  None          GREEN ZONE   Good Control    I feel good    No cough or wheeze    Can work, sleep and play without asthma symptoms       Take your asthma control medicine every day.     1. If exercise triggers your asthma, take your rescue medication    15 minutes before exercise or sports, and    During exercise if you have asthma symptoms  2. Spacer to use with inhaler: If you have a spacer, make sure to use it with your inhaler             YELLOW ZONE Getting Worse  I have ANY of these:    I do not feel good    Cough or wheeze    Chest feels tight    Wake up at night   1. Keep taking your Green Zone medications  2. Start taking your rescue medicine:    every 20 minutes for up to 1 hour. Then every 4 hours for 24-48 hours.  3. If you stay in the Yellow Zone for more than 12-24 hours, contact your doctor.  4. If you do not return to the Green Zone in 12-24 hours or you get worse, start taking your oral steroid medicine if prescribed by your provider.           RED ZONE Medical Alert - Get Help  I have ANY of these:    I feel awful    Medicine is not helping    Breathing getting harder    Trouble walking or talking    Nose opens wide to breathe       1. Take your rescue medicine NOW  2. If your provider has prescribed an oral steroid medicine, start taking it NOW  3. Call your doctor NOW  4. If you are still in the Red Zone after 20 minutes and you have not reached your doctor:    Take your rescue medicine again and    Call 911 or go to the emergency room right  away    See your regular doctor within 2 weeks of an Emergency Room or Urgent Care visit for follow-up treatment.          Annual Reminders:  Meet with Asthma Educator,  Flu Shot in the Fall, consider Pneumonia Vaccination for patients with asthma (aged 19 and older).    Pharmacy:    [id number] - [place], [place] - [address] AT [facility] [address]  WRITTEN PRESCRIPTION REQUESTED  [facility] FOR MAIL[facility] ONLY - [place], [place]  [facility] - [place], [place] - [address] [place] [address]    Electronically signed by Ana Rice MD   Date: 03/03/20                    Asthma Triggers  How To Control Things That Make Your Asthma Worse    Triggers are things that make your asthma worse.  Look at the list below to help you find your triggers and   what you can do about them. You can help prevent asthma flare-ups by staying away from your triggers.      Trigger                                                          What you can do   Cigarette Smoke  Tobacco smoke can make asthma worse. Do not allow smoking in your home, car or around you.  Be sure no one smokes at a child s day care or school.  If you smoke, ask your health care provider for ways to help you quit.  Ask family members to quit too.  Ask your health care provider for a referral to Quit Plan to help you quit smoking, or call 8-962-275-PLAN.     Colds, Flu, Bronchitis  These are common triggers of asthma. Wash your hands often.  Don t touch your eyes, nose or mouth.  Get a flu shot every year.     Dust Mites  These are tiny bugs that live in cloth or carpet. They are too small to see. Wash sheets and blankets in hot water every week.   Encase pillows and mattress in dust mite proof covers.  Avoid having carpet if you can. If you have carpet, vacuum weekly.   Use a dust mask and HEPA vacuum.   Pollen and Outdoor Mold  Some people are allergic to trees, grass, or weed pollen, or molds. Try to keep your windows  closed.  Limit time out doors when pollen count is high.   Ask you health care provider about taking medicine during allergy season.     Animal Dander  Some people are allergic to skin flakes, urine or saliva from pets with fur or feathers. Keep pets with fur or feathers out of your home.    If you can t keep the pet outdoors, then keep the pet out of your bedroom.  Keep the bedroom door closed.  Keep pets off cloth furniture and away from stuffed toys.     Mice, Rats, and Cockroaches  Some people are allergic to the waste from these pests.   Cover food and garbage.  Clean up spills and food crumbs.  Store grease in the refrigerator.   Keep food out of the bedroom.   Indoor Mold  This can be a trigger if your home has high moisture. Fix leaking faucets, pipes, or other sources of water.   Clean moldy surfaces.  Dehumidify basement if it is damp and smelly.   Smoke, Strong Odors, and Sprays  These can reduce air quality. Stay away from strong odors and sprays, such as perfume, powder, hair spray, paints, smoke incense, paint, cleaning products, candles and new carpet.   Exercise or Sports  Some people with asthma have this trigger. Be active!  Ask your doctor about taking medicine before sports or exercise to prevent symptoms.    Warm up for 5-10 minutes before and after sports or exercise.     Other Triggers of Asthma  Cold air:  Cover your nose and mouth with a scarf.  Sometimes laughing or crying can be a trigger.  Some medicines and food can trigger asthma.

## 2020-03-04 ASSESSMENT — ASTHMA QUESTIONNAIRES: ACT_TOTALSCORE: 24

## 2020-04-14 ENCOUNTER — ANCILLARY PROCEDURE (OUTPATIENT)
Dept: CARDIOLOGY | Facility: CLINIC | Age: 85
End: 2020-04-14
Attending: INTERNAL MEDICINE
Payer: MEDICARE

## 2020-04-14 DIAGNOSIS — I49.5 SICK SINUS SYNDROME (H): ICD-10-CM

## 2020-04-14 DIAGNOSIS — I49.5 SICK SINUS SYNDROME (H): Primary | ICD-10-CM

## 2020-04-14 PROCEDURE — 93294 REM INTERROG EVL PM/LDLS PM: CPT | Performed by: INTERNAL MEDICINE

## 2020-04-14 PROCEDURE — 93296 REM INTERROG EVL PM/IDS: CPT | Performed by: INTERNAL MEDICINE

## 2020-04-20 LAB
MDC_IDC_LEAD_IMPLANT_DT: NORMAL
MDC_IDC_LEAD_IMPLANT_DT: NORMAL
MDC_IDC_LEAD_LOCATION: NORMAL
MDC_IDC_LEAD_LOCATION: NORMAL
MDC_IDC_LEAD_MFG: NORMAL
MDC_IDC_LEAD_MFG: NORMAL
MDC_IDC_LEAD_MODEL: NORMAL
MDC_IDC_LEAD_MODEL: NORMAL
MDC_IDC_LEAD_POLARITY_TYPE: NORMAL
MDC_IDC_LEAD_POLARITY_TYPE: NORMAL
MDC_IDC_LEAD_SERIAL: NORMAL
MDC_IDC_LEAD_SERIAL: NORMAL
MDC_IDC_MSMT_BATTERY_DTM: NORMAL
MDC_IDC_MSMT_BATTERY_REMAINING_LONGEVITY: 3 MO
MDC_IDC_MSMT_BATTERY_REMAINING_PERCENTAGE: 3 %
MDC_IDC_MSMT_BATTERY_RRT_TRIGGER: NORMAL
MDC_IDC_MSMT_BATTERY_STATUS: NORMAL
MDC_IDC_MSMT_BATTERY_VOLTAGE: 2.65 V
MDC_IDC_MSMT_LEADCHNL_RA_IMPEDANCE_VALUE: 360 OHM
MDC_IDC_MSMT_LEADCHNL_RA_LEAD_CHANNEL_STATUS: NORMAL
MDC_IDC_MSMT_LEADCHNL_RA_PACING_THRESHOLD_AMPLITUDE: 0.62 V
MDC_IDC_MSMT_LEADCHNL_RA_PACING_THRESHOLD_PULSEWIDTH: 0.5 MS
MDC_IDC_MSMT_LEADCHNL_RA_SENSING_INTR_AMPL: 3.8 MV
MDC_IDC_MSMT_LEADCHNL_RV_IMPEDANCE_VALUE: 480 OHM
MDC_IDC_MSMT_LEADCHNL_RV_LEAD_CHANNEL_STATUS: NORMAL
MDC_IDC_MSMT_LEADCHNL_RV_PACING_THRESHOLD_AMPLITUDE: 0.75 V
MDC_IDC_MSMT_LEADCHNL_RV_PACING_THRESHOLD_PULSEWIDTH: 0.5 MS
MDC_IDC_MSMT_LEADCHNL_RV_SENSING_INTR_AMPL: 12 MV
MDC_IDC_PG_IMPLANT_DTM: NORMAL
MDC_IDC_PG_MFG: NORMAL
MDC_IDC_PG_MODEL: NORMAL
MDC_IDC_PG_SERIAL: NORMAL
MDC_IDC_PG_TYPE: NORMAL
MDC_IDC_SESS_CLINIC_NAME: NORMAL
MDC_IDC_SESS_DTM: NORMAL
MDC_IDC_SESS_REPROGRAMMED: NO
MDC_IDC_SESS_TYPE: NORMAL
MDC_IDC_SET_BRADY_AT_MODE_SWITCH_MODE: NORMAL
MDC_IDC_SET_BRADY_AT_MODE_SWITCH_RATE: 180 {BEATS}/MIN
MDC_IDC_SET_BRADY_LOWRATE: 60 {BEATS}/MIN
MDC_IDC_SET_BRADY_MAX_SENSOR_RATE: 120 {BEATS}/MIN
MDC_IDC_SET_BRADY_MAX_TRACKING_RATE: 120 {BEATS}/MIN
MDC_IDC_SET_BRADY_MODE: NORMAL
MDC_IDC_SET_BRADY_PAV_DELAY_HIGH: 100 MS
MDC_IDC_SET_BRADY_PAV_DELAY_LOW: 200 MS
MDC_IDC_SET_BRADY_SAV_DELAY_HIGH: 100 MS
MDC_IDC_SET_BRADY_SAV_DELAY_LOW: 170 MS
MDC_IDC_SET_LEADCHNL_RA_PACING_AMPLITUDE: 1.62
MDC_IDC_SET_LEADCHNL_RA_PACING_ANODE_ELECTRODE_1: NORMAL
MDC_IDC_SET_LEADCHNL_RA_PACING_ANODE_LOCATION_1: NORMAL
MDC_IDC_SET_LEADCHNL_RA_PACING_CAPTURE_MODE: NORMAL
MDC_IDC_SET_LEADCHNL_RA_PACING_CATHODE_ELECTRODE_1: NORMAL
MDC_IDC_SET_LEADCHNL_RA_PACING_CATHODE_LOCATION_1: NORMAL
MDC_IDC_SET_LEADCHNL_RA_PACING_POLARITY: NORMAL
MDC_IDC_SET_LEADCHNL_RA_PACING_PULSEWIDTH: 0.5 MS
MDC_IDC_SET_LEADCHNL_RA_SENSING_ADAPTATION_MODE: NORMAL
MDC_IDC_SET_LEADCHNL_RA_SENSING_ANODE_ELECTRODE_1: NORMAL
MDC_IDC_SET_LEADCHNL_RA_SENSING_ANODE_LOCATION_1: NORMAL
MDC_IDC_SET_LEADCHNL_RA_SENSING_CATHODE_ELECTRODE_1: NORMAL
MDC_IDC_SET_LEADCHNL_RA_SENSING_CATHODE_LOCATION_1: NORMAL
MDC_IDC_SET_LEADCHNL_RA_SENSING_POLARITY: NORMAL
MDC_IDC_SET_LEADCHNL_RA_SENSING_SENSITIVITY: 1 MV
MDC_IDC_SET_LEADCHNL_RV_PACING_AMPLITUDE: 1 V
MDC_IDC_SET_LEADCHNL_RV_PACING_ANODE_ELECTRODE_1: NORMAL
MDC_IDC_SET_LEADCHNL_RV_PACING_ANODE_LOCATION_1: NORMAL
MDC_IDC_SET_LEADCHNL_RV_PACING_CAPTURE_MODE: NORMAL
MDC_IDC_SET_LEADCHNL_RV_PACING_CATHODE_ELECTRODE_1: NORMAL
MDC_IDC_SET_LEADCHNL_RV_PACING_CATHODE_LOCATION_1: NORMAL
MDC_IDC_SET_LEADCHNL_RV_PACING_POLARITY: NORMAL
MDC_IDC_SET_LEADCHNL_RV_PACING_PULSEWIDTH: 0.5 MS
MDC_IDC_SET_LEADCHNL_RV_SENSING_ADAPTATION_MODE: NORMAL
MDC_IDC_SET_LEADCHNL_RV_SENSING_ANODE_ELECTRODE_1: NORMAL
MDC_IDC_SET_LEADCHNL_RV_SENSING_ANODE_LOCATION_1: NORMAL
MDC_IDC_SET_LEADCHNL_RV_SENSING_CATHODE_ELECTRODE_1: NORMAL
MDC_IDC_SET_LEADCHNL_RV_SENSING_CATHODE_LOCATION_1: NORMAL
MDC_IDC_SET_LEADCHNL_RV_SENSING_POLARITY: NORMAL
MDC_IDC_SET_LEADCHNL_RV_SENSING_SENSITIVITY: 0.5 MV
MDC_IDC_STAT_AT_BURDEN_PERCENT: 0 %
MDC_IDC_STAT_AT_DTM_END: NORMAL
MDC_IDC_STAT_AT_DTM_START: NORMAL
MDC_IDC_STAT_AT_MODE_SW_COUNT: 0
MDC_IDC_STAT_AT_MODE_SW_COUNT_PER_DAY: 0
MDC_IDC_STAT_AT_MODE_SW_PERCENT_TIME: 0 %
MDC_IDC_STAT_BRADY_AP_VP_PERCENT: 83 %
MDC_IDC_STAT_BRADY_AP_VS_PERCENT: 17 %
MDC_IDC_STAT_BRADY_AS_VP_PERCENT: 1 %
MDC_IDC_STAT_BRADY_AS_VS_PERCENT: 1 %
MDC_IDC_STAT_BRADY_DTM_END: NORMAL
MDC_IDC_STAT_BRADY_DTM_START: NORMAL
MDC_IDC_STAT_BRADY_RA_PERCENT_PACED: 99 %
MDC_IDC_STAT_BRADY_RV_PERCENT_PACED: 83 %
MDC_IDC_STAT_CRT_DTM_END: NORMAL
MDC_IDC_STAT_CRT_DTM_START: NORMAL
MDC_IDC_STAT_HEART_RATE_ATRIAL_MAX: 280 {BEATS}/MIN
MDC_IDC_STAT_HEART_RATE_ATRIAL_MEAN: 74 {BEATS}/MIN
MDC_IDC_STAT_HEART_RATE_ATRIAL_MIN: 50 {BEATS}/MIN
MDC_IDC_STAT_HEART_RATE_DTM_END: NORMAL
MDC_IDC_STAT_HEART_RATE_DTM_START: NORMAL
MDC_IDC_STAT_HEART_RATE_VENTRICULAR_MAX: 260 {BEATS}/MIN
MDC_IDC_STAT_HEART_RATE_VENTRICULAR_MEAN: 74 {BEATS}/MIN
MDC_IDC_STAT_HEART_RATE_VENTRICULAR_MIN: 40 {BEATS}/MIN

## 2020-05-14 DIAGNOSIS — J30.9 ALLERGIC RHINITIS, UNSPECIFIED SEASONALITY, UNSPECIFIED TRIGGER: ICD-10-CM

## 2020-05-14 RX ORDER — IPRATROPIUM BROMIDE 21 UG/1
SPRAY, METERED NASAL
Qty: 30 ML | Refills: 5 | Status: ON HOLD | OUTPATIENT
Start: 2020-05-14 | End: 2020-09-11

## 2020-05-15 ENCOUNTER — ANCILLARY PROCEDURE (OUTPATIENT)
Dept: CARDIOLOGY | Facility: CLINIC | Age: 85
End: 2020-05-15
Attending: INTERNAL MEDICINE
Payer: MEDICARE

## 2020-05-15 DIAGNOSIS — Z95.0 CARDIAC PACEMAKER IN SITU: ICD-10-CM

## 2020-05-15 PROCEDURE — 93294 REM INTERROG EVL PM/LDLS PM: CPT | Performed by: INTERNAL MEDICINE

## 2020-05-15 PROCEDURE — 93296 REM INTERROG EVL PM/IDS: CPT | Performed by: INTERNAL MEDICINE

## 2020-05-21 LAB
MDC_IDC_LEAD_IMPLANT_DT: NORMAL
MDC_IDC_LEAD_IMPLANT_DT: NORMAL
MDC_IDC_LEAD_LOCATION: NORMAL
MDC_IDC_LEAD_LOCATION: NORMAL
MDC_IDC_LEAD_MFG: NORMAL
MDC_IDC_LEAD_MFG: NORMAL
MDC_IDC_LEAD_MODEL: NORMAL
MDC_IDC_LEAD_MODEL: NORMAL
MDC_IDC_LEAD_POLARITY_TYPE: NORMAL
MDC_IDC_LEAD_POLARITY_TYPE: NORMAL
MDC_IDC_LEAD_SERIAL: NORMAL
MDC_IDC_LEAD_SERIAL: NORMAL
MDC_IDC_MSMT_BATTERY_DTM: NORMAL
MDC_IDC_MSMT_BATTERY_REMAINING_LONGEVITY: 1 MO
MDC_IDC_MSMT_BATTERY_REMAINING_PERCENTAGE: 0.5 %
MDC_IDC_MSMT_BATTERY_RRT_TRIGGER: NORMAL
MDC_IDC_MSMT_BATTERY_STATUS: NORMAL
MDC_IDC_MSMT_BATTERY_VOLTAGE: 2.62 V
MDC_IDC_MSMT_LEADCHNL_RA_IMPEDANCE_VALUE: 380 OHM
MDC_IDC_MSMT_LEADCHNL_RA_LEAD_CHANNEL_STATUS: NORMAL
MDC_IDC_MSMT_LEADCHNL_RA_PACING_THRESHOLD_AMPLITUDE: 0.75 V
MDC_IDC_MSMT_LEADCHNL_RA_PACING_THRESHOLD_PULSEWIDTH: 0.5 MS
MDC_IDC_MSMT_LEADCHNL_RA_SENSING_INTR_AMPL: 3.9 MV
MDC_IDC_MSMT_LEADCHNL_RV_IMPEDANCE_VALUE: 540 OHM
MDC_IDC_MSMT_LEADCHNL_RV_LEAD_CHANNEL_STATUS: NORMAL
MDC_IDC_MSMT_LEADCHNL_RV_PACING_THRESHOLD_AMPLITUDE: 0.75 V
MDC_IDC_MSMT_LEADCHNL_RV_PACING_THRESHOLD_PULSEWIDTH: 0.5 MS
MDC_IDC_MSMT_LEADCHNL_RV_SENSING_INTR_AMPL: 12 MV
MDC_IDC_PG_IMPLANT_DTM: NORMAL
MDC_IDC_PG_MFG: NORMAL
MDC_IDC_PG_MODEL: NORMAL
MDC_IDC_PG_SERIAL: NORMAL
MDC_IDC_PG_TYPE: NORMAL
MDC_IDC_SESS_CLINIC_NAME: NORMAL
MDC_IDC_SESS_DTM: NORMAL
MDC_IDC_SESS_REPROGRAMMED: NO
MDC_IDC_SESS_TYPE: NORMAL
MDC_IDC_SET_BRADY_AT_MODE_SWITCH_MODE: NORMAL
MDC_IDC_SET_BRADY_AT_MODE_SWITCH_RATE: 180 {BEATS}/MIN
MDC_IDC_SET_BRADY_LOWRATE: 60 {BEATS}/MIN
MDC_IDC_SET_BRADY_MAX_SENSOR_RATE: 120 {BEATS}/MIN
MDC_IDC_SET_BRADY_MAX_TRACKING_RATE: 120 {BEATS}/MIN
MDC_IDC_SET_BRADY_MODE: NORMAL
MDC_IDC_SET_BRADY_PAV_DELAY_HIGH: 100 MS
MDC_IDC_SET_BRADY_PAV_DELAY_LOW: 200 MS
MDC_IDC_SET_BRADY_SAV_DELAY_HIGH: 100 MS
MDC_IDC_SET_BRADY_SAV_DELAY_LOW: 170 MS
MDC_IDC_SET_LEADCHNL_RA_PACING_AMPLITUDE: 1.75 V
MDC_IDC_SET_LEADCHNL_RA_PACING_ANODE_ELECTRODE_1: NORMAL
MDC_IDC_SET_LEADCHNL_RA_PACING_ANODE_LOCATION_1: NORMAL
MDC_IDC_SET_LEADCHNL_RA_PACING_CAPTURE_MODE: NORMAL
MDC_IDC_SET_LEADCHNL_RA_PACING_CATHODE_ELECTRODE_1: NORMAL
MDC_IDC_SET_LEADCHNL_RA_PACING_CATHODE_LOCATION_1: NORMAL
MDC_IDC_SET_LEADCHNL_RA_PACING_POLARITY: NORMAL
MDC_IDC_SET_LEADCHNL_RA_PACING_PULSEWIDTH: 0.5 MS
MDC_IDC_SET_LEADCHNL_RA_SENSING_ADAPTATION_MODE: NORMAL
MDC_IDC_SET_LEADCHNL_RA_SENSING_ANODE_ELECTRODE_1: NORMAL
MDC_IDC_SET_LEADCHNL_RA_SENSING_ANODE_LOCATION_1: NORMAL
MDC_IDC_SET_LEADCHNL_RA_SENSING_CATHODE_ELECTRODE_1: NORMAL
MDC_IDC_SET_LEADCHNL_RA_SENSING_CATHODE_LOCATION_1: NORMAL
MDC_IDC_SET_LEADCHNL_RA_SENSING_POLARITY: NORMAL
MDC_IDC_SET_LEADCHNL_RA_SENSING_SENSITIVITY: 1 MV
MDC_IDC_SET_LEADCHNL_RV_PACING_AMPLITUDE: 1 V
MDC_IDC_SET_LEADCHNL_RV_PACING_ANODE_ELECTRODE_1: NORMAL
MDC_IDC_SET_LEADCHNL_RV_PACING_ANODE_LOCATION_1: NORMAL
MDC_IDC_SET_LEADCHNL_RV_PACING_CAPTURE_MODE: NORMAL
MDC_IDC_SET_LEADCHNL_RV_PACING_CATHODE_ELECTRODE_1: NORMAL
MDC_IDC_SET_LEADCHNL_RV_PACING_CATHODE_LOCATION_1: NORMAL
MDC_IDC_SET_LEADCHNL_RV_PACING_POLARITY: NORMAL
MDC_IDC_SET_LEADCHNL_RV_PACING_PULSEWIDTH: 0.5 MS
MDC_IDC_SET_LEADCHNL_RV_SENSING_ADAPTATION_MODE: NORMAL
MDC_IDC_SET_LEADCHNL_RV_SENSING_ANODE_ELECTRODE_1: NORMAL
MDC_IDC_SET_LEADCHNL_RV_SENSING_ANODE_LOCATION_1: NORMAL
MDC_IDC_SET_LEADCHNL_RV_SENSING_CATHODE_ELECTRODE_1: NORMAL
MDC_IDC_SET_LEADCHNL_RV_SENSING_CATHODE_LOCATION_1: NORMAL
MDC_IDC_SET_LEADCHNL_RV_SENSING_POLARITY: NORMAL
MDC_IDC_SET_LEADCHNL_RV_SENSING_SENSITIVITY: 0.5 MV
MDC_IDC_STAT_AT_BURDEN_PERCENT: 0 %
MDC_IDC_STAT_AT_DTM_END: NORMAL
MDC_IDC_STAT_AT_DTM_START: NORMAL
MDC_IDC_STAT_AT_MODE_SW_COUNT: 0
MDC_IDC_STAT_AT_MODE_SW_COUNT_PER_DAY: 0
MDC_IDC_STAT_AT_MODE_SW_PERCENT_TIME: 0 %
MDC_IDC_STAT_BRADY_AP_VP_PERCENT: 81 %
MDC_IDC_STAT_BRADY_AP_VS_PERCENT: 19 %
MDC_IDC_STAT_BRADY_AS_VP_PERCENT: 1 %
MDC_IDC_STAT_BRADY_AS_VS_PERCENT: 1 %
MDC_IDC_STAT_BRADY_DTM_END: NORMAL
MDC_IDC_STAT_BRADY_DTM_START: NORMAL
MDC_IDC_STAT_BRADY_RA_PERCENT_PACED: 99 %
MDC_IDC_STAT_BRADY_RV_PERCENT_PACED: 81 %
MDC_IDC_STAT_CRT_DTM_END: NORMAL
MDC_IDC_STAT_CRT_DTM_START: NORMAL
MDC_IDC_STAT_HEART_RATE_ATRIAL_MAX: 170 {BEATS}/MIN
MDC_IDC_STAT_HEART_RATE_ATRIAL_MEAN: 74 {BEATS}/MIN
MDC_IDC_STAT_HEART_RATE_ATRIAL_MIN: 50 {BEATS}/MIN
MDC_IDC_STAT_HEART_RATE_DTM_END: NORMAL
MDC_IDC_STAT_HEART_RATE_DTM_START: NORMAL
MDC_IDC_STAT_HEART_RATE_VENTRICULAR_MAX: 210 {BEATS}/MIN
MDC_IDC_STAT_HEART_RATE_VENTRICULAR_MEAN: 74 {BEATS}/MIN
MDC_IDC_STAT_HEART_RATE_VENTRICULAR_MIN: 40 {BEATS}/MIN

## 2020-06-23 DIAGNOSIS — I10 ESSENTIAL HYPERTENSION: ICD-10-CM

## 2020-06-24 ENCOUNTER — ANCILLARY PROCEDURE (OUTPATIENT)
Dept: CARDIOLOGY | Facility: CLINIC | Age: 85
End: 2020-06-24
Attending: INTERNAL MEDICINE
Payer: MEDICARE

## 2020-06-24 DIAGNOSIS — Z95.0 CARDIAC PACEMAKER IN SITU: ICD-10-CM

## 2020-06-24 LAB
MDC_IDC_LEAD_IMPLANT_DT: NORMAL
MDC_IDC_LEAD_IMPLANT_DT: NORMAL
MDC_IDC_LEAD_LOCATION: NORMAL
MDC_IDC_LEAD_LOCATION: NORMAL
MDC_IDC_LEAD_MFG: NORMAL
MDC_IDC_LEAD_MFG: NORMAL
MDC_IDC_LEAD_MODEL: NORMAL
MDC_IDC_LEAD_MODEL: NORMAL
MDC_IDC_LEAD_POLARITY_TYPE: NORMAL
MDC_IDC_LEAD_POLARITY_TYPE: NORMAL
MDC_IDC_LEAD_SERIAL: NORMAL
MDC_IDC_LEAD_SERIAL: NORMAL
MDC_IDC_MSMT_BATTERY_DTM: NORMAL
MDC_IDC_MSMT_BATTERY_REMAINING_LONGEVITY: 1 MO
MDC_IDC_MSMT_BATTERY_REMAINING_PERCENTAGE: 0.5 %
MDC_IDC_MSMT_BATTERY_RRT_TRIGGER: NORMAL
MDC_IDC_MSMT_BATTERY_STATUS: NORMAL
MDC_IDC_MSMT_BATTERY_VOLTAGE: 2.6 V
MDC_IDC_MSMT_LEADCHNL_RA_IMPEDANCE_VALUE: 360 OHM
MDC_IDC_MSMT_LEADCHNL_RA_LEAD_CHANNEL_STATUS: NORMAL
MDC_IDC_MSMT_LEADCHNL_RA_PACING_THRESHOLD_AMPLITUDE: 0.62 V
MDC_IDC_MSMT_LEADCHNL_RA_PACING_THRESHOLD_PULSEWIDTH: 0.5 MS
MDC_IDC_MSMT_LEADCHNL_RA_SENSING_INTR_AMPL: 4.8 MV
MDC_IDC_MSMT_LEADCHNL_RV_IMPEDANCE_VALUE: 480 OHM
MDC_IDC_MSMT_LEADCHNL_RV_LEAD_CHANNEL_STATUS: NORMAL
MDC_IDC_MSMT_LEADCHNL_RV_PACING_THRESHOLD_AMPLITUDE: 0.88 V
MDC_IDC_MSMT_LEADCHNL_RV_PACING_THRESHOLD_PULSEWIDTH: 0.5 MS
MDC_IDC_MSMT_LEADCHNL_RV_SENSING_INTR_AMPL: 12 MV
MDC_IDC_PG_IMPLANT_DTM: NORMAL
MDC_IDC_PG_MFG: NORMAL
MDC_IDC_PG_MODEL: NORMAL
MDC_IDC_PG_SERIAL: NORMAL
MDC_IDC_PG_TYPE: NORMAL
MDC_IDC_SESS_CLINIC_NAME: NORMAL
MDC_IDC_SESS_DTM: NORMAL
MDC_IDC_SESS_REPROGRAMMED: NO
MDC_IDC_SESS_TYPE: NORMAL
MDC_IDC_SET_BRADY_AT_MODE_SWITCH_MODE: NORMAL
MDC_IDC_SET_BRADY_AT_MODE_SWITCH_RATE: 180 {BEATS}/MIN
MDC_IDC_SET_BRADY_LOWRATE: 60 {BEATS}/MIN
MDC_IDC_SET_BRADY_MAX_SENSOR_RATE: 120 {BEATS}/MIN
MDC_IDC_SET_BRADY_MAX_TRACKING_RATE: 120 {BEATS}/MIN
MDC_IDC_SET_BRADY_MODE: NORMAL
MDC_IDC_SET_BRADY_PAV_DELAY_HIGH: 100 MS
MDC_IDC_SET_BRADY_PAV_DELAY_LOW: 200 MS
MDC_IDC_SET_BRADY_SAV_DELAY_HIGH: 100 MS
MDC_IDC_SET_BRADY_SAV_DELAY_LOW: 170 MS
MDC_IDC_SET_LEADCHNL_RA_PACING_AMPLITUDE: 1.62
MDC_IDC_SET_LEADCHNL_RA_PACING_ANODE_ELECTRODE_1: NORMAL
MDC_IDC_SET_LEADCHNL_RA_PACING_ANODE_LOCATION_1: NORMAL
MDC_IDC_SET_LEADCHNL_RA_PACING_CAPTURE_MODE: NORMAL
MDC_IDC_SET_LEADCHNL_RA_PACING_CATHODE_ELECTRODE_1: NORMAL
MDC_IDC_SET_LEADCHNL_RA_PACING_CATHODE_LOCATION_1: NORMAL
MDC_IDC_SET_LEADCHNL_RA_PACING_POLARITY: NORMAL
MDC_IDC_SET_LEADCHNL_RA_PACING_PULSEWIDTH: 0.5 MS
MDC_IDC_SET_LEADCHNL_RA_SENSING_ADAPTATION_MODE: NORMAL
MDC_IDC_SET_LEADCHNL_RA_SENSING_ANODE_ELECTRODE_1: NORMAL
MDC_IDC_SET_LEADCHNL_RA_SENSING_ANODE_LOCATION_1: NORMAL
MDC_IDC_SET_LEADCHNL_RA_SENSING_CATHODE_ELECTRODE_1: NORMAL
MDC_IDC_SET_LEADCHNL_RA_SENSING_CATHODE_LOCATION_1: NORMAL
MDC_IDC_SET_LEADCHNL_RA_SENSING_POLARITY: NORMAL
MDC_IDC_SET_LEADCHNL_RA_SENSING_SENSITIVITY: 1 MV
MDC_IDC_SET_LEADCHNL_RV_PACING_AMPLITUDE: 1.12
MDC_IDC_SET_LEADCHNL_RV_PACING_ANODE_ELECTRODE_1: NORMAL
MDC_IDC_SET_LEADCHNL_RV_PACING_ANODE_LOCATION_1: NORMAL
MDC_IDC_SET_LEADCHNL_RV_PACING_CAPTURE_MODE: NORMAL
MDC_IDC_SET_LEADCHNL_RV_PACING_CATHODE_ELECTRODE_1: NORMAL
MDC_IDC_SET_LEADCHNL_RV_PACING_CATHODE_LOCATION_1: NORMAL
MDC_IDC_SET_LEADCHNL_RV_PACING_POLARITY: NORMAL
MDC_IDC_SET_LEADCHNL_RV_PACING_PULSEWIDTH: 0.5 MS
MDC_IDC_SET_LEADCHNL_RV_SENSING_ADAPTATION_MODE: NORMAL
MDC_IDC_SET_LEADCHNL_RV_SENSING_ANODE_ELECTRODE_1: NORMAL
MDC_IDC_SET_LEADCHNL_RV_SENSING_ANODE_LOCATION_1: NORMAL
MDC_IDC_SET_LEADCHNL_RV_SENSING_CATHODE_ELECTRODE_1: NORMAL
MDC_IDC_SET_LEADCHNL_RV_SENSING_CATHODE_LOCATION_1: NORMAL
MDC_IDC_SET_LEADCHNL_RV_SENSING_POLARITY: NORMAL
MDC_IDC_SET_LEADCHNL_RV_SENSING_SENSITIVITY: 0.5 MV
MDC_IDC_STAT_AT_BURDEN_PERCENT: 0 %
MDC_IDC_STAT_AT_DTM_END: NORMAL
MDC_IDC_STAT_AT_DTM_START: NORMAL
MDC_IDC_STAT_AT_MODE_SW_COUNT: 0
MDC_IDC_STAT_AT_MODE_SW_COUNT_PER_DAY: 0
MDC_IDC_STAT_AT_MODE_SW_PERCENT_TIME: 0 %
MDC_IDC_STAT_BRADY_AP_VP_PERCENT: 76 %
MDC_IDC_STAT_BRADY_AP_VS_PERCENT: 23 %
MDC_IDC_STAT_BRADY_AS_VP_PERCENT: 1 %
MDC_IDC_STAT_BRADY_AS_VS_PERCENT: 1 %
MDC_IDC_STAT_BRADY_DTM_END: NORMAL
MDC_IDC_STAT_BRADY_DTM_START: NORMAL
MDC_IDC_STAT_BRADY_RA_PERCENT_PACED: 99 %
MDC_IDC_STAT_BRADY_RV_PERCENT_PACED: 76 %
MDC_IDC_STAT_CRT_DTM_END: NORMAL
MDC_IDC_STAT_CRT_DTM_START: NORMAL
MDC_IDC_STAT_HEART_RATE_ATRIAL_MAX: 230 {BEATS}/MIN
MDC_IDC_STAT_HEART_RATE_ATRIAL_MEAN: 74 {BEATS}/MIN
MDC_IDC_STAT_HEART_RATE_ATRIAL_MIN: 50 {BEATS}/MIN
MDC_IDC_STAT_HEART_RATE_DTM_END: NORMAL
MDC_IDC_STAT_HEART_RATE_DTM_START: NORMAL
MDC_IDC_STAT_HEART_RATE_VENTRICULAR_MAX: 160 {BEATS}/MIN
MDC_IDC_STAT_HEART_RATE_VENTRICULAR_MEAN: 74 {BEATS}/MIN
MDC_IDC_STAT_HEART_RATE_VENTRICULAR_MIN: 40 {BEATS}/MIN

## 2020-06-24 RX ORDER — CARVEDILOL 6.25 MG/1
TABLET ORAL
Qty: 180 TABLET | Refills: 2 | Status: SHIPPED | OUTPATIENT
Start: 2020-06-24 | End: 2021-03-24

## 2020-07-29 ENCOUNTER — ANCILLARY PROCEDURE (OUTPATIENT)
Dept: CARDIOLOGY | Facility: CLINIC | Age: 85
End: 2020-07-29
Attending: INTERNAL MEDICINE
Payer: MEDICARE

## 2020-07-29 DIAGNOSIS — Z95.0 CARDIAC PACEMAKER IN SITU: ICD-10-CM

## 2020-07-29 PROCEDURE — 93294 REM INTERROG EVL PM/LDLS PM: CPT | Performed by: INTERNAL MEDICINE

## 2020-07-29 PROCEDURE — 93296 REM INTERROG EVL PM/IDS: CPT | Performed by: INTERNAL MEDICINE

## 2020-07-30 LAB
MDC_IDC_LEAD_IMPLANT_DT: NORMAL
MDC_IDC_LEAD_IMPLANT_DT: NORMAL
MDC_IDC_LEAD_LOCATION: NORMAL
MDC_IDC_LEAD_LOCATION: NORMAL
MDC_IDC_LEAD_MFG: NORMAL
MDC_IDC_LEAD_MFG: NORMAL
MDC_IDC_LEAD_MODEL: NORMAL
MDC_IDC_LEAD_MODEL: NORMAL
MDC_IDC_LEAD_POLARITY_TYPE: NORMAL
MDC_IDC_LEAD_POLARITY_TYPE: NORMAL
MDC_IDC_LEAD_SERIAL: NORMAL
MDC_IDC_LEAD_SERIAL: NORMAL
MDC_IDC_MSMT_BATTERY_DTM: NORMAL
MDC_IDC_MSMT_BATTERY_REMAINING_LONGEVITY: 1 MO
MDC_IDC_MSMT_BATTERY_REMAINING_PERCENTAGE: 0.5 %
MDC_IDC_MSMT_BATTERY_RRT_TRIGGER: NORMAL
MDC_IDC_MSMT_BATTERY_STATUS: NORMAL
MDC_IDC_MSMT_BATTERY_VOLTAGE: 2.59 V
MDC_IDC_MSMT_LEADCHNL_RA_IMPEDANCE_VALUE: 400 OHM
MDC_IDC_MSMT_LEADCHNL_RA_LEAD_CHANNEL_STATUS: NORMAL
MDC_IDC_MSMT_LEADCHNL_RA_PACING_THRESHOLD_AMPLITUDE: 0.62 V
MDC_IDC_MSMT_LEADCHNL_RA_PACING_THRESHOLD_PULSEWIDTH: 0.5 MS
MDC_IDC_MSMT_LEADCHNL_RA_SENSING_INTR_AMPL: 4.2 MV
MDC_IDC_MSMT_LEADCHNL_RV_IMPEDANCE_VALUE: 540 OHM
MDC_IDC_MSMT_LEADCHNL_RV_LEAD_CHANNEL_STATUS: NORMAL
MDC_IDC_MSMT_LEADCHNL_RV_PACING_THRESHOLD_AMPLITUDE: 0.88 V
MDC_IDC_MSMT_LEADCHNL_RV_PACING_THRESHOLD_PULSEWIDTH: 0.5 MS
MDC_IDC_MSMT_LEADCHNL_RV_SENSING_INTR_AMPL: 7.3 MV
MDC_IDC_PG_IMPLANT_DTM: NORMAL
MDC_IDC_PG_MFG: NORMAL
MDC_IDC_PG_MODEL: NORMAL
MDC_IDC_PG_SERIAL: NORMAL
MDC_IDC_PG_TYPE: NORMAL
MDC_IDC_SESS_CLINIC_NAME: NORMAL
MDC_IDC_SESS_DTM: NORMAL
MDC_IDC_SESS_REPROGRAMMED: NO
MDC_IDC_SESS_TYPE: NORMAL
MDC_IDC_SET_BRADY_AT_MODE_SWITCH_MODE: NORMAL
MDC_IDC_SET_BRADY_AT_MODE_SWITCH_RATE: 180 {BEATS}/MIN
MDC_IDC_SET_BRADY_LOWRATE: 60 {BEATS}/MIN
MDC_IDC_SET_BRADY_MAX_SENSOR_RATE: 120 {BEATS}/MIN
MDC_IDC_SET_BRADY_MAX_TRACKING_RATE: 120 {BEATS}/MIN
MDC_IDC_SET_BRADY_MODE: NORMAL
MDC_IDC_SET_BRADY_PAV_DELAY_HIGH: 100 MS
MDC_IDC_SET_BRADY_PAV_DELAY_LOW: 200 MS
MDC_IDC_SET_BRADY_SAV_DELAY_HIGH: 100 MS
MDC_IDC_SET_BRADY_SAV_DELAY_LOW: 170 MS
MDC_IDC_SET_LEADCHNL_RA_PACING_AMPLITUDE: 1.62
MDC_IDC_SET_LEADCHNL_RA_PACING_ANODE_ELECTRODE_1: NORMAL
MDC_IDC_SET_LEADCHNL_RA_PACING_ANODE_LOCATION_1: NORMAL
MDC_IDC_SET_LEADCHNL_RA_PACING_CAPTURE_MODE: NORMAL
MDC_IDC_SET_LEADCHNL_RA_PACING_CATHODE_ELECTRODE_1: NORMAL
MDC_IDC_SET_LEADCHNL_RA_PACING_CATHODE_LOCATION_1: NORMAL
MDC_IDC_SET_LEADCHNL_RA_PACING_POLARITY: NORMAL
MDC_IDC_SET_LEADCHNL_RA_PACING_PULSEWIDTH: 0.5 MS
MDC_IDC_SET_LEADCHNL_RA_SENSING_ADAPTATION_MODE: NORMAL
MDC_IDC_SET_LEADCHNL_RA_SENSING_ANODE_ELECTRODE_1: NORMAL
MDC_IDC_SET_LEADCHNL_RA_SENSING_ANODE_LOCATION_1: NORMAL
MDC_IDC_SET_LEADCHNL_RA_SENSING_CATHODE_ELECTRODE_1: NORMAL
MDC_IDC_SET_LEADCHNL_RA_SENSING_CATHODE_LOCATION_1: NORMAL
MDC_IDC_SET_LEADCHNL_RA_SENSING_POLARITY: NORMAL
MDC_IDC_SET_LEADCHNL_RA_SENSING_SENSITIVITY: 1 MV
MDC_IDC_SET_LEADCHNL_RV_PACING_AMPLITUDE: 1.12
MDC_IDC_SET_LEADCHNL_RV_PACING_ANODE_ELECTRODE_1: NORMAL
MDC_IDC_SET_LEADCHNL_RV_PACING_ANODE_LOCATION_1: NORMAL
MDC_IDC_SET_LEADCHNL_RV_PACING_CAPTURE_MODE: NORMAL
MDC_IDC_SET_LEADCHNL_RV_PACING_CATHODE_ELECTRODE_1: NORMAL
MDC_IDC_SET_LEADCHNL_RV_PACING_CATHODE_LOCATION_1: NORMAL
MDC_IDC_SET_LEADCHNL_RV_PACING_POLARITY: NORMAL
MDC_IDC_SET_LEADCHNL_RV_PACING_PULSEWIDTH: 0.5 MS
MDC_IDC_SET_LEADCHNL_RV_SENSING_ADAPTATION_MODE: NORMAL
MDC_IDC_SET_LEADCHNL_RV_SENSING_ANODE_ELECTRODE_1: NORMAL
MDC_IDC_SET_LEADCHNL_RV_SENSING_ANODE_LOCATION_1: NORMAL
MDC_IDC_SET_LEADCHNL_RV_SENSING_CATHODE_ELECTRODE_1: NORMAL
MDC_IDC_SET_LEADCHNL_RV_SENSING_CATHODE_LOCATION_1: NORMAL
MDC_IDC_SET_LEADCHNL_RV_SENSING_POLARITY: NORMAL
MDC_IDC_SET_LEADCHNL_RV_SENSING_SENSITIVITY: 0.5 MV
MDC_IDC_STAT_AT_BURDEN_PERCENT: 0 %
MDC_IDC_STAT_AT_DTM_END: NORMAL
MDC_IDC_STAT_AT_DTM_START: NORMAL
MDC_IDC_STAT_AT_MODE_SW_COUNT: 0
MDC_IDC_STAT_AT_MODE_SW_COUNT_PER_DAY: 0
MDC_IDC_STAT_AT_MODE_SW_PERCENT_TIME: 0 %
MDC_IDC_STAT_BRADY_AP_VP_PERCENT: 61 %
MDC_IDC_STAT_BRADY_AP_VS_PERCENT: 37 %
MDC_IDC_STAT_BRADY_AS_VP_PERCENT: 1 %
MDC_IDC_STAT_BRADY_AS_VS_PERCENT: 1.5 %
MDC_IDC_STAT_BRADY_DTM_END: NORMAL
MDC_IDC_STAT_BRADY_DTM_START: NORMAL
MDC_IDC_STAT_BRADY_RA_PERCENT_PACED: 98 %
MDC_IDC_STAT_BRADY_RV_PERCENT_PACED: 62 %
MDC_IDC_STAT_CRT_DTM_END: NORMAL
MDC_IDC_STAT_CRT_DTM_START: NORMAL
MDC_IDC_STAT_HEART_RATE_ATRIAL_MAX: 250 {BEATS}/MIN
MDC_IDC_STAT_HEART_RATE_ATRIAL_MEAN: 74 {BEATS}/MIN
MDC_IDC_STAT_HEART_RATE_ATRIAL_MIN: 50 {BEATS}/MIN
MDC_IDC_STAT_HEART_RATE_DTM_END: NORMAL
MDC_IDC_STAT_HEART_RATE_DTM_START: NORMAL
MDC_IDC_STAT_HEART_RATE_VENTRICULAR_MAX: 240 {BEATS}/MIN
MDC_IDC_STAT_HEART_RATE_VENTRICULAR_MEAN: 74 {BEATS}/MIN
MDC_IDC_STAT_HEART_RATE_VENTRICULAR_MIN: 40 {BEATS}/MIN

## 2020-08-05 NOTE — TELEPHONE ENCOUNTER
"Requested Prescriptions   Pending Prescriptions Disp Refills     torsemide (DEMADEX) 20 MG tablet  Last Written Prescription Date:  7/15/2019  Last Fill Quantity: 30,  # refills: 0   Last office visit: 8/16/2019 with prescribing provider:  Alyssa   Future Office Visit:   Next 5 appointments (look out 90 days)    Sep 24, 2019 10:00 AM CDT  PHYSICAL with Eliezer Shah MD  [facility] ([facility] [address] [place] [id number]  [phone]          30 tablet 0     Sig: Take 1 tablet (20 mg) by mouth daily       Diuretics (Including Combos) Protocol Failed - 8/16/2019  1:11 PM        Failed - Normal serum creatinine on file in past 12 months     Recent Labs   Lab Test 08/06/19  1521   CR 1.93*              Passed - Blood pressure under 140/90 in past 12 months     BP Readings from Last 3 Encounters:   08/16/19 124/68   08/06/19 (!) 148/78   04/12/19 101/51                 Passed - Recent (12 mo) or future (30 days) visit within the authorizing provider's specialty     Patient had office visit in the last 12 months or has a visit in the next 30 days with authorizing provider or within the authorizing provider's specialty.  See \"Patient Info\" tab in inbasket, or \"Choose Columns\" in Meds & Orders section of the refill encounter.              Passed - Medication is active on med list        Passed - Patient is age 18 or older        Passed - Normal serum potassium on file in past 12 months     Recent Labs   Lab Test 08/06/19  1521   POTASSIUM 5.1                    Passed - Normal serum sodium on file in past 12 months     Recent Labs   Lab Test 08/06/19  1521                   " none

## 2020-08-25 ENCOUNTER — OFFICE VISIT (OUTPATIENT)
Dept: FAMILY MEDICINE | Facility: CLINIC | Age: 85
End: 2020-08-25
Payer: MEDICARE

## 2020-08-25 ENCOUNTER — ANCILLARY PROCEDURE (OUTPATIENT)
Dept: CARDIOLOGY | Facility: CLINIC | Age: 85
End: 2020-08-25
Attending: INTERNAL MEDICINE
Payer: MEDICARE

## 2020-08-25 VITALS
SYSTOLIC BLOOD PRESSURE: 130 MMHG | BODY MASS INDEX: 30.16 KG/M2 | HEIGHT: 65 IN | HEART RATE: 62 BPM | TEMPERATURE: 97.9 F | WEIGHT: 181 LBS | RESPIRATION RATE: 14 BRPM | OXYGEN SATURATION: 98 % | DIASTOLIC BLOOD PRESSURE: 50 MMHG

## 2020-08-25 DIAGNOSIS — R41.3 MEMORY CHANGE: Primary | ICD-10-CM

## 2020-08-25 DIAGNOSIS — R41.3 MEMORY CHANGE: ICD-10-CM

## 2020-08-25 DIAGNOSIS — L21.9 SEBORRHEIC DERMATITIS: ICD-10-CM

## 2020-08-25 DIAGNOSIS — Z95.0 CARDIAC PACEMAKER IN SITU: ICD-10-CM

## 2020-08-25 PROCEDURE — 99214 OFFICE O/P EST MOD 30 MIN: CPT | Performed by: FAMILY MEDICINE

## 2020-08-25 RX ORDER — DONEPEZIL HYDROCHLORIDE 5 MG/1
5 TABLET, FILM COATED ORAL AT BEDTIME
Qty: 30 TABLET | Refills: 1 | Status: SHIPPED | OUTPATIENT
Start: 2020-08-25 | End: 2020-08-25

## 2020-08-25 RX ORDER — DONEPEZIL HYDROCHLORIDE 5 MG/1
TABLET, FILM COATED ORAL
Qty: 90 TABLET | Refills: 3 | Status: SHIPPED | OUTPATIENT
Start: 2020-08-25 | End: 2020-09-30 | Stop reason: DRUGHIGH

## 2020-08-25 ASSESSMENT — MIFFLIN-ST. JEOR: SCORE: 1427.89

## 2020-08-25 NOTE — PROGRESS NOTES
"Subjective     Abbe Lambert is a 86 year old male who presents to clinic today for the following health issues:    HPI       Skin Lesion  Onset/Duration: X6 month  Description  Location: By right ear.  Color: brown and skin colored  Border description: scaly  Character: raised  Itching: no  Bleeding:  no  Intensity:  mild  Progression of Symptoms:  same  Accompanying signs and symptoms:   Bleeding: no  Scaling: YES  Excessive sun exposure/tanning: no  Sunscreen used: no  History:           Any previous history of skin cancer:Skin Cancer on top of head  Any family history of melanoma: no  Previous episodes of similar lesion: no  Precipitating or alleviating factors: none  Therapies tried and outcome: none  Concern - Memory Loss  Onset: X1 year  Description: patient is aware of loss  Intensity: moderate  Progression of Symptoms:  same  Accompanying Signs & Symptoms: None  Previous history of similar problem: Ongoing  Precipitating factors:        Worsened by: na  Alleviating factors:        Improved by: na  Therapies tried and outcome: Patient had tried Prevagen without good effect.  Blood pressure 130/50, pulse 62, temperature 97.9  F (36.6  C), temperature source Tympanic, resp. rate 14, height 1.651 m (5' 5\"), weight 82.1 kg (181 lb), SpO2 98 %.     Review of Systems   Constitutional, HEENT, cardiovascular, pulmonary, gi and gu systems are negative, except as otherwise noted.      Objective    /50 (BP Location: Right arm, Patient Position: Sitting, Cuff Size: Adult Regular)   Pulse 62   Temp 97.9  F (36.6  C) (Tympanic)   Resp 14   Ht 1.651 m (5' 5\")   Wt 82.1 kg (181 lb)   SpO2 98%   BMI 30.12 kg/m    Body mass index is 30.12 kg/m .  Physical Exam   GENERAL APPEARANCE: healthy, alert and no distress  SKIN: There is flakiness on the scalp skin.            Assessment & Plan     Memory change  I placed the patient on Aricept 5 mg daily.  He will follow-up with a virtual visit in 1 " "month.    Seborrheic dermatitis  They already have acquired a tar shampoo.  They can use that and in addition add 1% hydrocortisone cream 3-4 times daily.       BMI:   Estimated body mass index is 30.12 kg/m  as calculated from the following:    Height as of this encounter: 1.651 m (5' 5\").    Weight as of this encounter: 82.1 kg (181 lb).           There are no Patient Instructions on file for this visit.    Return in about 4 weeks (around 9/22/2020) for Medication check.    Eliezer Shah MD  [facility]    "

## 2020-08-26 DIAGNOSIS — Z95.0 CARDIAC PACEMAKER IN SITU: Primary | ICD-10-CM

## 2020-08-31 ENCOUNTER — TELEPHONE (OUTPATIENT)
Dept: CARDIOLOGY | Facility: CLINIC | Age: 85
End: 2020-08-31

## 2020-08-31 DIAGNOSIS — I49.5 SICK SINUS SYNDROME (H): ICD-10-CM

## 2020-08-31 DIAGNOSIS — Z95.0 CARDIAC PACEMAKER IN SITU: Primary | ICD-10-CM

## 2020-08-31 NOTE — TELEPHONE ENCOUNTER
Patient's pacemaker triggered LYNN on 8/06/20. Placed order for H&P and generator change. Papers given to scheduling who will contact the patient. Spoke with the patient and he is aware of plan.

## 2020-09-01 DIAGNOSIS — Z11.59 ENCOUNTER FOR SCREENING FOR OTHER VIRAL DISEASES: Primary | ICD-10-CM

## 2020-09-01 PROBLEM — I49.5 SICK SINUS SYNDROME (H): Status: ACTIVE | Noted: 2020-09-01

## 2020-09-02 ENCOUNTER — TELEPHONE (OUTPATIENT)
Dept: CARDIOLOGY | Facility: CLINIC | Age: 85
End: 2020-09-02

## 2020-09-02 ENCOUNTER — OFFICE VISIT (OUTPATIENT)
Dept: CARDIOLOGY | Facility: CLINIC | Age: 85
End: 2020-09-02
Payer: MEDICARE

## 2020-09-02 VITALS
HEIGHT: 70 IN | SYSTOLIC BLOOD PRESSURE: 137 MMHG | DIASTOLIC BLOOD PRESSURE: 78 MMHG | BODY MASS INDEX: 25.84 KG/M2 | HEART RATE: 62 BPM | WEIGHT: 180.5 LBS

## 2020-09-02 DIAGNOSIS — I48.0 PAROXYSMAL ATRIAL FIBRILLATION (H): ICD-10-CM

## 2020-09-02 DIAGNOSIS — I49.5 SICK SINUS SYNDROME (H): Primary | ICD-10-CM

## 2020-09-02 PROCEDURE — 99214 OFFICE O/P EST MOD 30 MIN: CPT | Performed by: NURSE PRACTITIONER

## 2020-09-02 ASSESSMENT — MIFFLIN-ST. JEOR: SCORE: 1504.99

## 2020-09-02 NOTE — PROGRESS NOTES
HPI and Plan: [id number]  See dictation    No orders of the defined types were placed in this encounter.      No orders of the defined types were placed in this encounter.      There are no discontinued medications.      Encounter Diagnosis   Name Primary?     Sick sinus syndrome (H) Yes       CURRENT MEDICATIONS:  Current Outpatient Medications   Medication Sig Dispense Refill     aspirin 81 MG tablet Take 81 mg by mouth daily  30 tablet      carvedilol (COREG) 6.25 MG tablet TAKE 1 TABLET(6.25 MG) BY MOUTH TWICE DAILY WITH MEALS 180 tablet 2     donepezil (ARICEPT) 5 MG tablet TAKE 1 TABLET(5 MG) BY MOUTH AT BEDTIME 90 tablet 3     ipratropium (ATROVENT) 0.03 % nasal spray USE 1 SPRAY IN EACH NOSTRIL EVERY 12 HOURS AS NEEDED FOR RHINITIS 30 mL 5     lisinopril (PRINIVIL/ZESTRIL) 5 MG tablet Take 0.5 tablets (2.5 mg) by mouth daily 90 tablet 3     MELATONIN PO Take 5 mg by mouth At Bedtime       mometasone (NASONEX) 50 MCG/ACT nasal spray Spray 2 sprays into both nostrils daily as needed.       nitroglycerin (NITROSTAT) 0.4 MG SL tablet Place 1 tablet (0.4 mg) under the tongue every 5 minutes as needed for chest pain 25 tablet 3     polyethylene glycol (MIRALAX/GLYCOLAX) packet Take 17 g by mouth daily        rosuvastatin (CRESTOR) 40 MG tablet Take 1 tablet (40 mg) by mouth daily 90 tablet 3     torsemide (DEMADEX) 20 MG tablet Take 1 tablet (20 mg) by mouth daily as needed (swollen ankles) 30 tablet 1     triamcinolone (KENALOG) 0.1 % cream Apply sparingly to affected area twotimes daily as needed 30 g 5       ALLERGIES     Allergies   Allergen Reactions     Advair Diskus      DENIED     Morphine Hcl      Decrease  Blood Pressure Lower Than Normal, Per Pt.     Vicodin [Hydrocodone-Acetaminophen] Visual Disturbance       PAST MEDICAL HISTORY:  Past Medical History:   Diagnosis Date     AAA (abdominal aortic aneurysm) (H)      Anemia due to blood loss, acute 10/10/2016     Asthma      Atrial fibrillation (H)     s/p  left atrial appendage ligation     Cardiac arrest (H)      Cardiomyopathy (H)      Chronic kidney disease      Chronic sinusitis      Coronary artery disease     cardiac cath 2014: medical management; cath 2013: PTCA to diagonal; cath 2009: medical management; CABG 1998: LIMA to LAD, LISA to RCA, SVG to circumflex     GERD (gastroesophageal reflux disease)      History of angina      Hyperlipidaemia      Hypertension, goal below 140/90      Myocardial infarction (H)     MI with Vfib arrest 1998     Polymyalgia rheumatica (H)      Shingles 10/28/2016     Shortness of breath      Tachy-alix syndrome (H)     s/p dual chamber pacemaker 2012       PAST SURGICAL HISTORY:  Past Surgical History:   Procedure Laterality Date     ARTHROPLASTY KNEE Left 10/5/2016    Procedure: ARTHROPLASTY KNEE;  Surgeon: Keshav Zamudio MD;  Location:  OR     CARDIAC SURGERY  12/03/2012    pacemaker ; CABG 1998     CHOLECYSTECTOMY       COLONOSCOPY       ENT SURGERY  5-    sinus     ESOPHAGOSCOPY, GASTROSCOPY, DUODENOSCOPY (EGD), COMBINED N/A 12/3/2019    Procedure: ESOPHAGOGASTRODUODENOSCOPY (EGD) (MAC);  Surgeon: Calderon Herman MD;  Location:  GI     ESOPHAGOSCOPY, GASTROSCOPY, DUODENOSCOPY (EGD), DILATATION, COMBINED N/A 12/3/2019    Procedure: Esophagoscopy, gastroscopy, duodenoscopy (EGD), dilatation, combined;  Surgeon: Calderon Herman MD;  Location:  GI     EXTRACAPSULAR CATARACT EXTRATION WITH INTRAOCULAR LENS IMPLANT       GENITOURINARY SURGERY      prostatectomy     IMPLANT PACEMAKER  12-3-12    [facility]     PROSTATE SURGERY         FAMILY HISTORY:  Family History   Problem Relation Age of Onset     Cerebrovascular Disease Father      Heart Disease Father      Heart Disease Brother      Coronary Artery Disease Brother         MI age 50     Depression Daughter         raped in high school     Unknown/Adopted Maternal Grandmother      Unknown/Adopted Maternal Grandfather      Unknown/Adopted Paternal  Grandmother      Unknown/Adopted Paternal Grandfather        SOCIAL HISTORY:  Social History     Socioeconomic History     Marital status:      Spouse name: None     Number of children: None     Years of education: None     Highest education level: None   Occupational History     None   Social Needs     Financial resource strain: None     Food insecurity     Worry: None     Inability: None     Transportation needs     Medical: None     Non-medical: None   Tobacco Use     Smoking status: Never Smoker     Smokeless tobacco: Never Used   Substance and Sexual Activity     Alcohol use: No     Alcohol/week: 0.0 standard drinks     Drug use: No     Sexual activity: Not Currently     Partners: Female   Lifestyle     Physical activity     Days per week: None     Minutes per session: None     Stress: None   Relationships     Social connections     Talks on phone: None     Gets together: None     Attends Jew service: None     Active member of club or organization: None     Attends meetings of clubs or organizations: None     Relationship status: None     Intimate partner violence     Fear of current or ex partner: None     Emotionally abused: None     Physically abused: None     Forced sexual activity: None   Other Topics Concern     Parent/sibling w/ CABG, MI or angioplasty before 65F 55M? Yes     Comment: brother and father had MI @ 40's      Service Not Asked     Blood Transfusions Not Asked     Caffeine Concern Yes     Comment: one diet mountain dew daily     Occupational Exposure Not Asked     Hobby Hazards Not Asked     Sleep Concern No     Stress Concern No     Weight Concern Not Asked     Special Diet No     Back Care Not Asked     Exercise Yes     Comment: golfing, does the stair at the office a lot     Bike Helmet Not Asked     Seat Belt Yes     Self-Exams Not Asked   Social History Narrative     None       Review of Systems:  Skin:  Negative       Eyes:  Negative      ENT:  Positive for hearing  "loss    Respiratory:  Negative       Cardiovascular:  Negative;palpitations;chest pain;dizziness;lightheadedness;syncope or near-syncope;cyanosis Positive for;edema;fatigue    Gastroenterology: Negative      Genitourinary:  not assessed      Musculoskeletal:  Negative      Neurologic:  Positive for memory problems    Psychiatric:  Negative      Heme/Lymph/Imm:  Negative      Endocrine:  Negative        Physical Exam:  Vitals: /78   Pulse 62   Ht 1.778 m (5' 10\")   Wt 81.9 kg (180 lb 8 oz)   BMI 25.90 kg/m      Constitutional:  cooperative, alert and oriented, well developed, well nourished, in no acute distress appears younger than stated age      Skin:  warm and dry to the touch, no apparent skin lesions or masses noted   pacemaker incision in the left infraclavicular area was well-healed      Head:  normocephalic, no masses or lesions        Eyes:  pupils equal and round        Lymph:      ENT:  no pallor or cyanosis, dentition good        Neck:  carotid pulses are full and equal bilaterally;no carotid bruit        Respiratory:  normal breath sounds, clear to auscultation, normal A-P diameter, normal symmetry, normal respiratory excursion, no use of accessory muscles         Cardiac: regular rhythm;normal S1 and S2;no murmurs, gallops or rubs detected                not assessed this visit                                        GI:  abdomen soft        Extremities and Muscular Skeletal:  no spinal abnormalities noted;normal muscle strength and tone   bilateral LE edema;trace          Neurological:  no gross motor deficits        Psych:  affect appropriate, oriented to time, person and place;Alert and Oriented x 3        CC  No referring provider defined for this encounter.              "

## 2020-09-02 NOTE — LETTER
9/2/2020    Eliezer Shah MD  [address]  [facility] [address]    RE: Abbe Lambert       Dear Colleague,    I had the pleasure of seeing Abbe Lambert in the [facility].    HPI and Plan: [id number]  See dictation    No orders of the defined types were placed in this encounter.      No orders of the defined types were placed in this encounter.      There are no discontinued medications.      Encounter Diagnosis   Name Primary?     Sick sinus syndrome (H) Yes       CURRENT MEDICATIONS:  Current Outpatient Medications   Medication Sig Dispense Refill     aspirin 81 MG tablet Take 81 mg by mouth daily  30 tablet      carvedilol (COREG) 6.25 MG tablet TAKE 1 TABLET(6.25 MG) BY MOUTH TWICE DAILY WITH MEALS 180 tablet 2     donepezil (ARICEPT) 5 MG tablet TAKE 1 TABLET(5 MG) BY MOUTH AT BEDTIME 90 tablet 3     ipratropium (ATROVENT) 0.03 % nasal spray USE 1 SPRAY IN EACH NOSTRIL EVERY 12 HOURS AS NEEDED FOR RHINITIS 30 mL 5     lisinopril (PRINIVIL/ZESTRIL) 5 MG tablet Take 0.5 tablets (2.5 mg) by mouth daily 90 tablet 3     MELATONIN PO Take 5 mg by mouth At Bedtime       mometasone (NASONEX) 50 MCG/ACT nasal spray Spray 2 sprays into both nostrils daily as needed.       nitroglycerin (NITROSTAT) 0.4 MG SL tablet Place 1 tablet (0.4 mg) under the tongue every 5 minutes as needed for chest pain 25 tablet 3     polyethylene glycol (MIRALAX/GLYCOLAX) packet Take 17 g by mouth daily        rosuvastatin (CRESTOR) 40 MG tablet Take 1 tablet (40 mg) by mouth daily 90 tablet 3     torsemide (DEMADEX) 20 MG tablet Take 1 tablet (20 mg) by mouth daily as needed (swollen ankles) 30 tablet 1     triamcinolone (KENALOG) 0.1 % cream Apply sparingly to affected area twotimes daily as needed 30 g 5       ALLERGIES     Allergies   Allergen Reactions     Advair Diskus      DENIED     Morphine Hcl      Decrease  Blood Pressure Lower Than Normal, Per Pt.     Vicodin  [Hydrocodone-Acetaminophen] Visual Disturbance       PAST MEDICAL HISTORY:  Past Medical History:   Diagnosis Date     AAA (abdominal aortic aneurysm) (H)      Anemia due to blood loss, acute 10/10/2016     Asthma      Atrial fibrillation (H)     s/p left atrial appendage ligation     Cardiac arrest (H)      Cardiomyopathy (H)      Chronic kidney disease      Chronic sinusitis      Coronary artery disease     cardiac cath 2014: medical management; cath 2013: PTCA to diagonal; cath 2009: medical management; CABG 1998: LIMA to LAD, LISA to RCA, SVG to circumflex     GERD (gastroesophageal reflux disease)      History of angina      Hyperlipidaemia      Hypertension, goal below 140/90      Myocardial infarction (H)     MI with Vfib arrest 1998     Polymyalgia rheumatica (H)      Shingles 10/28/2016     Shortness of breath      Tachy-alix syndrome (H)     s/p dual chamber pacemaker 2012       PAST SURGICAL HISTORY:  Past Surgical History:   Procedure Laterality Date     ARTHROPLASTY KNEE Left 10/5/2016    Procedure: ARTHROPLASTY KNEE;  Surgeon: Keshav Zamudio MD;  Location:  OR     CARDIAC SURGERY  12/03/2012    pacemaker ; CABG 1998     CHOLECYSTECTOMY       COLONOSCOPY       ENT SURGERY  5-    sinus     ESOPHAGOSCOPY, GASTROSCOPY, DUODENOSCOPY (EGD), COMBINED N/A 12/3/2019    Procedure: ESOPHAGOGASTRODUODENOSCOPY (EGD) (MAC);  Surgeon: Calderon Herman MD;  Location:  GI     ESOPHAGOSCOPY, GASTROSCOPY, DUODENOSCOPY (EGD), DILATATION, COMBINED N/A 12/3/2019    Procedure: Esophagoscopy, gastroscopy, duodenoscopy (EGD), dilatation, combined;  Surgeon: Calderon Herman MD;  Location:  GI     EXTRACAPSULAR CATARACT EXTRATION WITH INTRAOCULAR LENS IMPLANT       GENITOURINARY SURGERY      prostatectomy     IMPLANT PACEMAKER  12-3-12    [employer]     PROSTATE SURGERY         FAMILY HISTORY:  Family History   Problem Relation Age of Onset     Cerebrovascular Disease Father      Heart Disease Father       Heart Disease Brother      Coronary Artery Disease Brother         MI age 50     Depression Daughter         raped in high school     Unknown/Adopted Maternal Grandmother      Unknown/Adopted Maternal Grandfather      Unknown/Adopted Paternal Grandmother      Unknown/Adopted Paternal Grandfather        SOCIAL HISTORY:  Social History     Socioeconomic History     Marital status:      Spouse name: None     Number of children: None     Years of education: None     Highest education level: None   Occupational History     None   Social Needs     Financial resource strain: None     Food insecurity     Worry: None     Inability: None     Transportation needs     Medical: None     Non-medical: None   Tobacco Use     Smoking status: Never Smoker     Smokeless tobacco: Never Used   Substance and Sexual Activity     Alcohol use: No     Alcohol/week: 0.0 standard drinks     Drug use: No     Sexual activity: Not Currently     Partners: Female   Lifestyle     Physical activity     Days per week: None     Minutes per session: None     Stress: None   Relationships     Social connections     Talks on phone: None     Gets together: None     Attends Anglican service: None     Active member of club or organization: None     Attends meetings of clubs or organizations: None     Relationship status: None     Intimate partner violence     Fear of current or ex partner: None     Emotionally abused: None     Physically abused: None     Forced sexual activity: None   Other Topics Concern     Parent/sibling w/ CABG, MI or angioplasty before 65F 55M? Yes     Comment: brother and father had MI @ 40's      Service Not Asked     Blood Transfusions Not Asked     Caffeine Concern Yes     Comment: one diet mountain dew daily     Occupational Exposure Not Asked     Hobby Hazards Not Asked     Sleep Concern No     Stress Concern No     Weight Concern Not Asked     Special Diet No     Back Care Not Asked     Exercise Yes      "Comment: golfing, does the stair at the office a lot     Bike Helmet Not Asked     Seat Belt Yes     Self-Exams Not Asked   Social History Narrative     None       Review of Systems:  Skin:  Negative       Eyes:  Negative      ENT:  Positive for hearing loss    Respiratory:  Negative       Cardiovascular:  Negative;palpitations;chest pain;dizziness;lightheadedness;syncope or near-syncope;cyanosis Positive for;edema;fatigue    Gastroenterology: Negative      Genitourinary:  not assessed      Musculoskeletal:  Negative      Neurologic:  Positive for memory problems    Psychiatric:  Negative      Heme/Lymph/Imm:  Negative      Endocrine:  Negative        Physical Exam:  Vitals: /78   Pulse 62   Ht 1.778 m (5' 10\")   Wt 81.9 kg (180 lb 8 oz)   BMI 25.90 kg/m      Constitutional:  cooperative, alert and oriented, well developed, well nourished, in no acute distress appears younger than stated age      Skin:  warm and dry to the touch, no apparent skin lesions or masses noted   pacemaker incision in the left infraclavicular area was well-healed      Head:  normocephalic, no masses or lesions        Eyes:  pupils equal and round        Lymph:      ENT:  no pallor or cyanosis, dentition good        Neck:  carotid pulses are full and equal bilaterally;no carotid bruit        Respiratory:  normal breath sounds, clear to auscultation, normal A-P diameter, normal symmetry, normal respiratory excursion, no use of accessory muscles         Cardiac: regular rhythm;normal S1 and S2;no murmurs, gallops or rubs detected                not assessed this visit                                        GI:  abdomen soft        Extremities and Muscular Skeletal:  no spinal abnormalities noted;normal muscle strength and tone   bilateral LE edema;trace          Neurological:  no gross motor deficits        Psych:  affect appropriate, oriented to time, person and place;Alert and Oriented x 3        CC  No referring provider defined " for this encounter.                Thank you for allowing me to participate in the care of your patient.      Sincerely,     Aneta Haq NP, APRN CNP     [facility]    cc:   No referring provider defined for this encounter.

## 2020-09-02 NOTE — LETTER
"9/2/2020      Eliezer Shah MD  [address]  [facility] [address]      RE: Abbe Lambert       Dear Colleague,    I had the pleasure of seeing Abbe Lambert in the [facility].    Service Date: 09/02/2020      HISTORY OF PRESENT ILLNESS:  Mr. Lambert is a delightful 86-year-old gentleman that I am having the pleasure of meeting today.  He is an established patient of Dr. Perry.  He has also been seen in the past by Dr. Guerin.  He saw Dr. Guerin back in 2019 when there was concern of \"noise\" on his atrial lead.  Briefly, he has a history of cardiomyopathy with EF of 35%-40%, mixed ischemic and nonischemic disease.  He underwent implantation of his dual-chamber pacemaker in 2012 for sick sinus syndrome and paroxysmal atrial fibrillation.  He had a prior left atrial appendage ligation and is not on anticoagulation.  He also has a history of hypertension, chronic kidney disease and previous CVA.      His lead is a [employer] Tendril, model 2088.  Dr. Guerin pointed out that not all of the episodes of PAF on his interrogation are not real atrial fibrillation.  His sensing and pacing thresholds appear to be stable.      His interrogation 07/29 showed that he was 98% A paced, 62% V paced.  Battery longevity is now less than 3 months, triggering LYNN.  He is scheduled on 09/11 to have his generator changed.  He is here today to discuss this.  All other review of systems, past medical history and physical exam are noted below.      ASSESSMENT AND PLAN:  Mr. Abbe Lambert is a delightful 86-year-old gentleman that I am having the pleasure of meeting today.   1.  Sick sinus syndrome with a history of paroxysmal atrial fibrillation.    He received a [employer]igar dual-chamber device in 2012.  There has been concern about noise on the atrial lead. The device nurse (SUZANNE) reviewed his device interrogations and the measurements have been stable. This was reviewed with Dr. Guerin and the " atrial lead noise has resolved and appears to be functioning normally.    Therefore, this should be a same day procedure, barring any complications.  The patient will be n.p.o. prior to admission.  Risks include but are not limited to:  Bruising, bleeding, pocket hematoma, pocket infection. Consent will be signed by the procedural doctor on .     2.  History of coronary artery disease with CABG x3 with a free LISA to the RCA, LIMA to the LAD, saphenous vein graft to a small ramus intermedius.  The patient has no complaints of chest pain.    Angiogram in  demonstrated vein graft to be closed, but arterial conduits wide open and diagonal filled by collaterals.     3.  History of paroxysmal atrial fibrillation with previous left atrial appendage ligation and maze procedure at the time of his bypass surgery.   He is not     4.  History of small abdominal aortic aneurysm, being monitored by Dr. Eli.     5.  Hypercholesterolemia, on Crestor.    Lipids in 2019 were slightly elevated with LDL of 119.  Echocardiogram and followup with Dr. Eli is ordered and included a lipid ALT for 2020.     6.  History of hypertension, currently well-controlled.     7.  Chronic kidney disease, stage 3:  Creatinine of 1.68, a GFR of 36.      As always, I appreciate being involved in his care.  Feel free to contact us if you have questions or concerns regarding today's assessment and plan.         CAMILLA LERMA NP         D: 2020   T: 2020   MT: al      Name:     DARBY SHEETS   MRN:      [id number]        Account:      [id number]   :      1934           Service Date: 2020      Document: [id number]        Outpatient Encounter Medications as of 2020   Medication Sig Dispense Refill     aspirin 81 MG tablet Take 81 mg by mouth daily  30 tablet      carvedilol (COREG) 6.25 MG tablet TAKE 1 TABLET(6.25 MG) BY MOUTH TWICE DAILY WITH MEALS 180 tablet 2     donepezil (ARICEPT) 5 MG tablet  TAKE 1 TABLET(5 MG) BY MOUTH AT BEDTIME 90 tablet 3     ipratropium (ATROVENT) 0.03 % nasal spray USE 1 SPRAY IN EACH NOSTRIL EVERY 12 HOURS AS NEEDED FOR RHINITIS 30 mL 5     lisinopril (PRINIVIL/ZESTRIL) 5 MG tablet Take 0.5 tablets (2.5 mg) by mouth daily 90 tablet 3     MELATONIN PO Take 5 mg by mouth At Bedtime       mometasone (NASONEX) 50 MCG/ACT nasal spray Spray 2 sprays into both nostrils daily as needed.       nitroglycerin (NITROSTAT) 0.4 MG SL tablet Place 1 tablet (0.4 mg) under the tongue every 5 minutes as needed for chest pain 25 tablet 3     polyethylene glycol (MIRALAX/GLYCOLAX) packet Take 17 g by mouth daily        rosuvastatin (CRESTOR) 40 MG tablet Take 1 tablet (40 mg) by mouth daily 90 tablet 3     torsemide (DEMADEX) 20 MG tablet Take 1 tablet (20 mg) by mouth daily as needed (swollen ankles) 30 tablet 1     triamcinolone (KENALOG) 0.1 % cream Apply sparingly to affected area twotimes daily as needed 30 g 5     No facility-administered encounter medications on file as of 9/2/2020.        Again, thank you for allowing me to participate in the care of your patient.      Sincerely,    Aneta Haq NP, APRN [facility]

## 2020-09-02 NOTE — TELEPHONE ENCOUNTER
Seen by Aneta SUMMERS today for H/P prior to gen change. He was seen by Dr Guerin in 3/18/2019 due to 'noise' on atrial lead. There has been minimal 'noise' on atrial lead since then and sensing, pacing and impedance measurements have been stable. Will message Dr Guerin.

## 2020-09-02 NOTE — PROGRESS NOTES
"Service Date: 09/02/2020      HISTORY OF PRESENT ILLNESS:  Mr. Lambert is a delightful 86-year-old gentleman that I am having the pleasure of meeting today.  He is an established patient of Dr. Perry.  He has also been seen in the past by Dr. Guerin.  He saw Dr. Guerin back in 2019 when there was concern of \"noise\" on his atrial lead.  Briefly, he has a history of cardiomyopathy with EF of 35%-40%, mixed ischemic and nonischemic disease.  He underwent implantation of his dual-chamber pacemaker in 2012 for sick sinus syndrome and paroxysmal atrial fibrillation.  He had a prior left atrial appendage ligation and is not on anticoagulation.  He also has a history of hypertension, chronic kidney disease and previous CVA.      His lead is a [employer]ar Tendril, model 2088.  Dr. Guerin pointed out that not all of the episodes of PAF on his interrogation are not real atrial fibrillation.  His sensing and pacing thresholds appear to be stable.      His interrogation 07/29 showed that he was 98% A paced, 62% V paced.  Battery longevity is now less than 3 months, triggering LYNN.  He is scheduled on 09/11 to have his generator changed.  He is here today to discuss this.  All other review of systems, past medical history and physical exam are noted below.      ASSESSMENT AND PLAN:  Mr. Abbe Lambert is a delightful 86-year-old gentleman that I am having the pleasure of meeting today.   1.  Sick sinus syndrome with a history of paroxysmal atrial fibrillation.    He received a [employer]ar dual-chamber device in 2012.  There has been concern about noise on the atrial lead. The device nurse (SUZANNE) reviewed his device interrogations and the measurements have been stable. This was reviewed with Dr. Guerin and the atrial lead noise has resolved and appears to be functioning normally.    Therefore, this should be a same day procedure, barring any complications.  The patient will be n.p.o. prior to admission.  Risks include but are not limited " to:  Bruising, bleeding, pocket hematoma, pocket infection. Consent will be signed by the procedural doctor on .     2.  History of coronary artery disease with CABG x3 with a free LISA to the RCA, LIMA to the LAD, saphenous vein graft to a small ramus intermedius.  The patient has no complaints of chest pain.    Angiogram in  demonstrated vein graft to be closed, but arterial conduits wide open and diagonal filled by collaterals.     3.  History of paroxysmal atrial fibrillation with previous left atrial appendage ligation and maze procedure at the time of his bypass surgery.   He is not     4.  History of small abdominal aortic aneurysm, being monitored by Dr. Eli.     5.  Hypercholesterolemia, on Crestor.    Lipids in 2019 were slightly elevated with LDL of 119.  Echocardiogram and followup with Dr. Eli is ordered and included a lipid ALT for 2020.     6.  History of hypertension, currently well-controlled.     7.  Chronic kidney disease, stage 3:  Creatinine of 1.68, a GFR of 36.      As always, I appreciate being involved in his care.  Feel free to contact us if you have questions or concerns regarding today's assessment and plan.         CAMILLA LERMA NP             D: 2020   T: 2020   MT: al      Name:     DARBY SHEETS   MRN:      [id number]        Account:      [id number]   :      1934           Service Date: 2020      Document: [id number]

## 2020-09-02 NOTE — PATIENT INSTRUCTIONS
Call my nurse with any questions or concerns:  [phone]  *If you have concerns after hours, please call [phone], option 2 to speak with on call Cardiologist.

## 2020-09-06 LAB
MDC_IDC_LEAD_IMPLANT_DT: NORMAL
MDC_IDC_LEAD_IMPLANT_DT: NORMAL
MDC_IDC_LEAD_LOCATION: NORMAL
MDC_IDC_LEAD_LOCATION: NORMAL
MDC_IDC_LEAD_MFG: NORMAL
MDC_IDC_LEAD_MFG: NORMAL
MDC_IDC_LEAD_MODEL: NORMAL
MDC_IDC_LEAD_MODEL: NORMAL
MDC_IDC_LEAD_POLARITY_TYPE: NORMAL
MDC_IDC_LEAD_POLARITY_TYPE: NORMAL
MDC_IDC_LEAD_SERIAL: NORMAL
MDC_IDC_LEAD_SERIAL: NORMAL
MDC_IDC_MSMT_BATTERY_DTM: NORMAL
MDC_IDC_MSMT_BATTERY_REMAINING_LONGEVITY: 0 MO
MDC_IDC_MSMT_BATTERY_RRT_TRIGGER: NORMAL
MDC_IDC_MSMT_BATTERY_STATUS: NORMAL
MDC_IDC_MSMT_BATTERY_VOLTAGE: 2.57 V
MDC_IDC_MSMT_LEADCHNL_RA_IMPEDANCE_VALUE: 350 OHM
MDC_IDC_MSMT_LEADCHNL_RA_LEAD_CHANNEL_STATUS: NORMAL
MDC_IDC_MSMT_LEADCHNL_RA_PACING_THRESHOLD_AMPLITUDE: 0.5 V
MDC_IDC_MSMT_LEADCHNL_RA_PACING_THRESHOLD_PULSEWIDTH: 0.5 MS
MDC_IDC_MSMT_LEADCHNL_RA_SENSING_INTR_AMPL: 4.4 MV
MDC_IDC_MSMT_LEADCHNL_RV_IMPEDANCE_VALUE: 510 OHM
MDC_IDC_MSMT_LEADCHNL_RV_LEAD_CHANNEL_STATUS: NORMAL
MDC_IDC_MSMT_LEADCHNL_RV_PACING_THRESHOLD_AMPLITUDE: 0.75 V
MDC_IDC_MSMT_LEADCHNL_RV_PACING_THRESHOLD_PULSEWIDTH: 0.5 MS
MDC_IDC_MSMT_LEADCHNL_RV_SENSING_INTR_AMPL: 10.9 MV
MDC_IDC_PG_IMPLANT_DTM: NORMAL
MDC_IDC_PG_MFG: NORMAL
MDC_IDC_PG_MODEL: NORMAL
MDC_IDC_PG_SERIAL: NORMAL
MDC_IDC_PG_TYPE: NORMAL
MDC_IDC_SESS_CLINIC_NAME: NORMAL
MDC_IDC_SESS_DTM: NORMAL
MDC_IDC_SESS_REPROGRAMMED: NO
MDC_IDC_SESS_TYPE: NORMAL
MDC_IDC_SET_BRADY_AT_MODE_SWITCH_MODE: NORMAL
MDC_IDC_SET_BRADY_AT_MODE_SWITCH_RATE: 180 {BEATS}/MIN
MDC_IDC_SET_BRADY_LOWRATE: 60 {BEATS}/MIN
MDC_IDC_SET_BRADY_MAX_TRACKING_RATE: 120 {BEATS}/MIN
MDC_IDC_SET_BRADY_MODE: NORMAL
MDC_IDC_SET_BRADY_PAV_DELAY_HIGH: 100 MS
MDC_IDC_SET_BRADY_PAV_DELAY_LOW: 200 MS
MDC_IDC_SET_BRADY_SAV_DELAY_HIGH: 100 MS
MDC_IDC_SET_BRADY_SAV_DELAY_LOW: 170 MS
MDC_IDC_SET_LEADCHNL_RA_PACING_AMPLITUDE: 1.5 V
MDC_IDC_SET_LEADCHNL_RA_PACING_ANODE_ELECTRODE_1: NORMAL
MDC_IDC_SET_LEADCHNL_RA_PACING_ANODE_LOCATION_1: NORMAL
MDC_IDC_SET_LEADCHNL_RA_PACING_CAPTURE_MODE: NORMAL
MDC_IDC_SET_LEADCHNL_RA_PACING_CATHODE_ELECTRODE_1: NORMAL
MDC_IDC_SET_LEADCHNL_RA_PACING_CATHODE_LOCATION_1: NORMAL
MDC_IDC_SET_LEADCHNL_RA_PACING_POLARITY: NORMAL
MDC_IDC_SET_LEADCHNL_RA_PACING_PULSEWIDTH: 0.5 MS
MDC_IDC_SET_LEADCHNL_RA_SENSING_ADAPTATION_MODE: NORMAL
MDC_IDC_SET_LEADCHNL_RA_SENSING_ANODE_ELECTRODE_1: NORMAL
MDC_IDC_SET_LEADCHNL_RA_SENSING_ANODE_LOCATION_1: NORMAL
MDC_IDC_SET_LEADCHNL_RA_SENSING_CATHODE_ELECTRODE_1: NORMAL
MDC_IDC_SET_LEADCHNL_RA_SENSING_CATHODE_LOCATION_1: NORMAL
MDC_IDC_SET_LEADCHNL_RA_SENSING_POLARITY: NORMAL
MDC_IDC_SET_LEADCHNL_RA_SENSING_SENSITIVITY: 1 MV
MDC_IDC_SET_LEADCHNL_RV_PACING_AMPLITUDE: 1 V
MDC_IDC_SET_LEADCHNL_RV_PACING_ANODE_ELECTRODE_1: NORMAL
MDC_IDC_SET_LEADCHNL_RV_PACING_ANODE_LOCATION_1: NORMAL
MDC_IDC_SET_LEADCHNL_RV_PACING_CAPTURE_MODE: NORMAL
MDC_IDC_SET_LEADCHNL_RV_PACING_CATHODE_ELECTRODE_1: NORMAL
MDC_IDC_SET_LEADCHNL_RV_PACING_CATHODE_LOCATION_1: NORMAL
MDC_IDC_SET_LEADCHNL_RV_PACING_POLARITY: NORMAL
MDC_IDC_SET_LEADCHNL_RV_PACING_PULSEWIDTH: 0.5 MS
MDC_IDC_SET_LEADCHNL_RV_SENSING_ADAPTATION_MODE: NORMAL
MDC_IDC_SET_LEADCHNL_RV_SENSING_ANODE_ELECTRODE_1: NORMAL
MDC_IDC_SET_LEADCHNL_RV_SENSING_ANODE_LOCATION_1: NORMAL
MDC_IDC_SET_LEADCHNL_RV_SENSING_CATHODE_ELECTRODE_1: NORMAL
MDC_IDC_SET_LEADCHNL_RV_SENSING_CATHODE_LOCATION_1: NORMAL
MDC_IDC_SET_LEADCHNL_RV_SENSING_POLARITY: NORMAL
MDC_IDC_SET_LEADCHNL_RV_SENSING_SENSITIVITY: 0.5 MV
MDC_IDC_STAT_AT_BURDEN_PERCENT: 0 %
MDC_IDC_STAT_AT_DTM_END: NORMAL
MDC_IDC_STAT_AT_DTM_START: NORMAL
MDC_IDC_STAT_AT_MODE_SW_COUNT: 0
MDC_IDC_STAT_AT_MODE_SW_COUNT_PER_DAY: 0
MDC_IDC_STAT_AT_MODE_SW_PERCENT_TIME: 0 %
MDC_IDC_STAT_BRADY_AP_VP_PERCENT: 53 %
MDC_IDC_STAT_BRADY_AP_VS_PERCENT: 26 %
MDC_IDC_STAT_BRADY_AS_VP_PERCENT: 1 %
MDC_IDC_STAT_BRADY_AS_VS_PERCENT: 20 %
MDC_IDC_STAT_BRADY_DTM_END: NORMAL
MDC_IDC_STAT_BRADY_DTM_START: NORMAL
MDC_IDC_STAT_BRADY_RA_PERCENT_PACED: 79 %
MDC_IDC_STAT_BRADY_RV_PERCENT_PACED: 54 %
MDC_IDC_STAT_CRT_DTM_END: NORMAL
MDC_IDC_STAT_CRT_DTM_START: NORMAL
MDC_IDC_STAT_HEART_RATE_ATRIAL_MAX: 180 {BEATS}/MIN
MDC_IDC_STAT_HEART_RATE_ATRIAL_MEAN: 63 {BEATS}/MIN
MDC_IDC_STAT_HEART_RATE_ATRIAL_MIN: 50 {BEATS}/MIN
MDC_IDC_STAT_HEART_RATE_DTM_END: NORMAL
MDC_IDC_STAT_HEART_RATE_DTM_START: NORMAL
MDC_IDC_STAT_HEART_RATE_VENTRICULAR_MAX: 170 {BEATS}/MIN
MDC_IDC_STAT_HEART_RATE_VENTRICULAR_MEAN: 63 {BEATS}/MIN
MDC_IDC_STAT_HEART_RATE_VENTRICULAR_MIN: 40 {BEATS}/MIN

## 2020-09-08 DIAGNOSIS — Z11.59 ENCOUNTER FOR SCREENING FOR OTHER VIRAL DISEASES: ICD-10-CM

## 2020-09-08 DIAGNOSIS — Z95.0 CARDIAC PACEMAKER IN SITU: Primary | ICD-10-CM

## 2020-09-08 PROCEDURE — U0003 INFECTIOUS AGENT DETECTION BY NUCLEIC ACID (DNA OR RNA); SEVERE ACUTE RESPIRATORY SYNDROME CORONAVIRUS 2 (SARS-COV-2) (CORONAVIRUS DISEASE [COVID-19]), AMPLIFIED PROBE TECHNIQUE, MAKING USE OF HIGH THROUGHPUT TECHNOLOGIES AS DESCRIBED BY CMS-2020-01-R: HCPCS | Performed by: INTERNAL MEDICINE

## 2020-09-08 RX ORDER — SODIUM CHLORIDE 450 MG/100ML
INJECTION, SOLUTION INTRAVENOUS CONTINUOUS
Status: CANCELLED | OUTPATIENT
Start: 2020-09-08

## 2020-09-08 RX ORDER — LIDOCAINE 40 MG/G
CREAM TOPICAL
Status: CANCELLED | OUTPATIENT
Start: 2020-09-08

## 2020-09-08 RX ORDER — CEFAZOLIN SODIUM 2 G/100ML
2 INJECTION, SOLUTION INTRAVENOUS
Status: CANCELLED | OUTPATIENT
Start: 2020-09-08

## 2020-09-08 NOTE — PROGRESS NOTES
Called patient with pre-procedure instructions for device implant:     Anticoagulation: N/A  Oral diabetes meds: N/A  Insulin: N/A  Diuretic: Hold torsemide morning of procedure   Contrast allergy: None   Pt informed to be NPO at midnight, may have clear liquid breakfast before 8am    Instructed pt to shower the morning of the procedure, and then put on a clean shirt in order to help prevent infection.     Pt has post-procedure transportation and 24 hours monitoring set up.   Pt aware of no driving for 24 hours post procedure due to sedation.     COVID test scheduled: 9/8/2020    Pt reminded to self-quarantine from the time of the COVID test to the procedure.    Pt aware of arrival time of 11AM at [facility] location. Pt verbalized understanding of instructions.

## 2020-09-10 LAB
SARS-COV-2 RNA SPEC QL NAA+PROBE: NOT DETECTED
SPECIMEN SOURCE: NORMAL

## 2020-09-11 ENCOUNTER — HOSPITAL ENCOUNTER (OUTPATIENT)
Facility: CLINIC | Age: 85
Discharge: HOME | End: 2020-09-11
Admitting: INTERNAL MEDICINE
Payer: MEDICARE

## 2020-09-11 ENCOUNTER — SURGERY (OUTPATIENT)
Age: 85
End: 2020-09-11
Payer: MEDICARE

## 2020-09-11 VITALS
WEIGHT: 180.5 LBS | DIASTOLIC BLOOD PRESSURE: 67 MMHG | OXYGEN SATURATION: 98 % | HEIGHT: 70 IN | TEMPERATURE: 97 F | HEART RATE: 64 BPM | SYSTOLIC BLOOD PRESSURE: 154 MMHG | BODY MASS INDEX: 25.84 KG/M2 | RESPIRATION RATE: 16 BRPM

## 2020-09-11 DIAGNOSIS — I49.5 SICK SINUS SYNDROME (H): ICD-10-CM

## 2020-09-11 DIAGNOSIS — Z95.0 CARDIAC PACEMAKER IN SITU: ICD-10-CM

## 2020-09-11 DIAGNOSIS — I49.5 SICK SINUS SYNDROME (H): Primary | ICD-10-CM

## 2020-09-11 LAB
ANION GAP SERPL CALCULATED.3IONS-SCNC: 4 MMOL/L (ref 3–14)
BUN SERPL-MCNC: 27 MG/DL (ref 7–30)
CALCIUM SERPL-MCNC: 9.1 MG/DL (ref 8.5–10.1)
CHLORIDE SERPL-SCNC: 110 MMOL/L (ref 94–109)
CO2 SERPL-SCNC: 24 MMOL/L (ref 20–32)
CREAT SERPL-MCNC: 1.74 MG/DL (ref 0.66–1.25)
ERYTHROCYTE [DISTWIDTH] IN BLOOD BY AUTOMATED COUNT: 14.4 % (ref 10–15)
GFR SERPL CREATININE-BSD FRML MDRD: 35 ML/MIN/{1.73_M2}
GLUCOSE SERPL-MCNC: 93 MG/DL (ref 70–99)
HCT VFR BLD AUTO: 40.7 % (ref 40–53)
HGB BLD-MCNC: 13.3 G/DL (ref 13.3–17.7)
MCH RBC QN AUTO: 31.7 PG (ref 26.5–33)
MCHC RBC AUTO-ENTMCNC: 32.7 G/DL (ref 31.5–36.5)
MCV RBC AUTO: 97 FL (ref 78–100)
PLATELET # BLD AUTO: 276 10E9/L (ref 150–450)
POTASSIUM SERPL-SCNC: 4.2 MMOL/L (ref 3.4–5.3)
RBC # BLD AUTO: 4.19 10E12/L (ref 4.4–5.9)
SODIUM SERPL-SCNC: 138 MMOL/L (ref 133–144)
WBC # BLD AUTO: 10.6 10E9/L (ref 4–11)

## 2020-09-11 PROCEDURE — 25000125 ZZHC RX 250: Performed by: INTERNAL MEDICINE

## 2020-09-11 PROCEDURE — 33213 INSERT PULSE GEN DUAL LEADS: CPT | Performed by: INTERNAL MEDICINE

## 2020-09-11 PROCEDURE — 40000852 ZZH STATISTIC HEART CATH LAB OR EP LAB

## 2020-09-11 PROCEDURE — 36591 DRAW BLOOD OFF VENOUS DEVICE: CPT

## 2020-09-11 PROCEDURE — C1785 PMKR, DUAL, RATE-RESP: HCPCS | Performed by: INTERNAL MEDICINE

## 2020-09-11 PROCEDURE — 99152 MOD SED SAME PHYS/QHP 5/>YRS: CPT | Performed by: INTERNAL MEDICINE

## 2020-09-11 PROCEDURE — 25000128 H RX IP 250 OP 636: Performed by: INTERNAL MEDICINE

## 2020-09-11 PROCEDURE — 99152 MOD SED SAME PHYS/QHP 5/>YRS: CPT | Mod: 59 | Performed by: INTERNAL MEDICINE

## 2020-09-11 PROCEDURE — 80048 BASIC METABOLIC PNL TOTAL CA: CPT | Performed by: INTERNAL MEDICINE

## 2020-09-11 PROCEDURE — 25800029 ZZH RX IP 258 OP 250: Performed by: INTERNAL MEDICINE

## 2020-09-11 PROCEDURE — 85027 COMPLETE CBC AUTOMATED: CPT | Performed by: INTERNAL MEDICINE

## 2020-09-11 PROCEDURE — 33228 REMV&REPLC PM GEN DUAL LEAD: CPT | Performed by: INTERNAL MEDICINE

## 2020-09-11 PROCEDURE — 27210794 ZZH OR GENERAL SUPPLY STERILE: Performed by: INTERNAL MEDICINE

## 2020-09-11 DEVICE — IMPLANTABLE DEVICE: Type: IMPLANTABLE DEVICE | Status: FUNCTIONAL

## 2020-09-11 RX ORDER — HEPARIN SODIUM 200 [USP'U]/100ML
100-500 INJECTION, SOLUTION INTRAVENOUS CONTINUOUS PRN
Status: DISCONTINUED | OUTPATIENT
Start: 2020-09-11 | End: 2020-09-11 | Stop reason: HOSPADM

## 2020-09-11 RX ORDER — SODIUM CHLORIDE 450 MG/100ML
INJECTION, SOLUTION INTRAVENOUS CONTINUOUS
Status: DISCONTINUED | OUTPATIENT
Start: 2020-09-11 | End: 2020-09-11 | Stop reason: HOSPADM

## 2020-09-11 RX ORDER — BUPIVACAINE HYDROCHLORIDE 2.5 MG/ML
INJECTION, SOLUTION EPIDURAL; INFILTRATION; INTRACAUDAL
Status: DISCONTINUED | OUTPATIENT
Start: 2020-09-11 | End: 2020-09-11 | Stop reason: HOSPADM

## 2020-09-11 RX ORDER — CEFAZOLIN SODIUM 1 G/3ML
1 INJECTION, POWDER, FOR SOLUTION INTRAMUSCULAR; INTRAVENOUS
Status: DISCONTINUED | OUTPATIENT
Start: 2020-09-11 | End: 2020-09-11 | Stop reason: HOSPADM

## 2020-09-11 RX ORDER — CEFAZOLIN SODIUM 2 G/100ML
2 INJECTION, SOLUTION INTRAVENOUS
Status: COMPLETED | OUTPATIENT
Start: 2020-09-11 | End: 2020-09-11

## 2020-09-11 RX ORDER — FENTANYL CITRATE 50 UG/ML
INJECTION, SOLUTION INTRAMUSCULAR; INTRAVENOUS
Status: DISCONTINUED | OUTPATIENT
Start: 2020-09-11 | End: 2020-09-11 | Stop reason: HOSPADM

## 2020-09-11 RX ORDER — LIDOCAINE 40 MG/G
CREAM TOPICAL
Status: DISCONTINUED | OUTPATIENT
Start: 2020-09-11 | End: 2020-09-11 | Stop reason: HOSPADM

## 2020-09-11 RX ORDER — HEPARIN SODIUM 200 [USP'U]/100ML
100-600 INJECTION, SOLUTION INTRAVENOUS CONTINUOUS PRN
Status: DISCONTINUED | OUTPATIENT
Start: 2020-09-11 | End: 2020-09-11 | Stop reason: HOSPADM

## 2020-09-11 RX ORDER — ACETAMINOPHEN 325 MG/1
650 TABLET ORAL EVERY 4 HOURS PRN
Status: CANCELLED | OUTPATIENT
Start: 2020-09-11

## 2020-09-11 RX ADMIN — CEFAZOLIN SODIUM 2 G: 2 INJECTION, SOLUTION INTRAVENOUS at 12:37

## 2020-09-11 RX ADMIN — BUPIVACAINE HYDROCHLORIDE 10 ML: 2.5 INJECTION, SOLUTION EPIDURAL; INFILTRATION; INTRACAUDAL; PERINEURAL at 13:13

## 2020-09-11 RX ADMIN — SODIUM CHLORIDE: 4.5 INJECTION, SOLUTION INTRAVENOUS at 12:37

## 2020-09-11 RX ADMIN — MIDAZOLAM 1 MG: 1 INJECTION INTRAMUSCULAR; INTRAVENOUS at 13:15

## 2020-09-11 RX ADMIN — LIDOCAINE HYDROCHLORIDE 10 ML: 10 INJECTION, SOLUTION EPIDURAL; INFILTRATION; INTRACAUDAL; PERINEURAL at 13:13

## 2020-09-11 RX ADMIN — FENTANYL CITRATE 50 MCG: 50 INJECTION, SOLUTION INTRAMUSCULAR; INTRAVENOUS at 13:00

## 2020-09-11 RX ADMIN — MIDAZOLAM 1 MG: 1 INJECTION INTRAMUSCULAR; INTRAVENOUS at 13:00

## 2020-09-11 ASSESSMENT — MIFFLIN-ST. JEOR: SCORE: 1504.99

## 2020-09-11 NOTE — PROGRESS NOTES
Care Suites Admission Nursing Note    Patient Information  Name: Abbe Lambert  Age: 86 year old  Reason for admission: PM gen change  Care Suites arrival time: 1100    Visitor Information  Name: [place]  Informed of visitor restrictions: Yes  1 visitor allowed per patient   Visitor must screen negative for COVID symptoms   Visitor must wear a mask  Waiting rooms closed to visitors    Patient Admission/Assessment   Pre-procedure assessment complete: Yes  If abnormal assessment/labs, provider notified: N/A  NPO: Yes  Medications held per instructions/orders: N/A  Consent: pending  If applicable, pregnancy test status: na  Patient oriented to room: Yes  Education/questions answered: Yes  Plan/other: proceed  Pt states he is not currently taking any medications besides Aricept as they do not do any good.      Discharge Planning  Discharge name/phone number:Madison  Overnight post sedation caregiver: Madison  wife  Discharge location: home    Chivo Diane RN

## 2020-09-11 NOTE — PROGRESS NOTES
Dictated.    Successful replacement of dual-chamber pacemaker generator (SJM).    Programmed DDDR 60/120 ppm  No apparent complication.    EBL<10 cc.      Plan:  - home later today, if doing well   - EP device clinic in 1 week

## 2020-09-11 NOTE — Clinical Note
adhesive bandage, dressing applied, guaze packing and pressure/compression dressing applied to wound securely.

## 2020-09-11 NOTE — PROGRESS NOTES
Care Suites Discharge Nursing Note    Patient Information  Name: Abbe Lambert  Age: 86 year old    Discharge Education:  Discharge instructions reviewed: Yes  Additional education/resources provided: no  Patient/patient representative verbalizes understanding: Yes  Patient discharging on new medications: Yes  Medication education completed: N/A    Discharge Plans:   Discharge location: home  Discharge ride contacted: Yes  Approximate discharge time: 1445    Discharge Criteria:  Discharge criteria met and vital signs stable: Yes    Patient Belongs:  Patient belongings returned to patient: Yes    Lakisha Dawn RN

## 2020-09-11 NOTE — PRE-PROCEDURE
GENERAL PRE-PROCEDURE:   Procedure:  PM gen replacement  Date/Time:  9/11/2020 1:03 PM    Written consent obtained?: Yes    Risks and benefits: Risks, benefits and alternatives were discussed    Consent given by:  Patient  Patient states understanding of procedure being performed: Yes    Patient's understanding of procedure matches consent: Yes    Procedure consent matches procedure scheduled: Yes    Expected level of sedation:  Moderate  Appropriately NPO:  Yes  ASA Class:  Class 2- mild systemic disease, no acute problems, no functional limitations  Mallampati  :  Grade 2- soft palate, base of uvula, tonsillar pillars, and portion of posterior pharyngeal wall visible  Lungs:  Lungs clear with good breath sounds bilaterally  Heart:  Normal heart sounds and rate  History & Physical reviewed:  History and physical reviewed and no updates needed  Statement of review:  I have reviewed the lab findings, diagnostic data, medications, and the plan for sedation

## 2020-09-11 NOTE — PROGRESS NOTES
PATIENT/VISITOR WELLNESS SCREENING    Step 1 Patient Screening    1. In the last month, have you been in contact with someone who was confirmed or suspected to have Coronavirus/COVID-19? No    2. Do you have the following symptoms?  Fever/Chills? No   Cough? No   Shortness of breath? No   New loss of taste or smell? No  Sore throat? No  Muscle or body aches? No  Headaches? No  Fatigue? No  Vomiting or diarrhea? No    Step 2 Visitor Screening    1. Name of Visitor (1 visitor per patient): [place]    2. In the last month, have you been in contact with someone who was confirmed or suspected to have Coronavirus/COVID-19? No    3. Do you have the following symptoms?  Fever/Chills? No   Cough? No   Shortness of breath? No   Skin rash? No   Loss of taste or smell? No  Sore throat? No  Runny or stuffy nose? No  Muscle or body aches? No  Headaches? No  Fatigue? No  Vomiting or diarrhea? No    If the visitor has positive symptoms, notify supervisor/manger  Per policy, the visitor will need to leave the facility     Step 3 Refer to logic grid below for actions    NO SYMPTOM(S)    ACTIONS:  1. Standard rooming process  2. Provider to assess per normal protocol  3. Implement precautions as needed and per guidelines     POSITIVE SYMPTOM(S)  If positive for ANY of the following symptoms: fever, cough, shortness of breath, rash    ACTION:  1. Continue to have the patient wear a mask   2. Room patient as soon as possible  3. Don appropriate PPE when entering room  4. Provider evaluation

## 2020-09-11 NOTE — DISCHARGE INSTRUCTIONS
Pacemaker/ICD Generator Change Discharge Instructions    After you go home:      Have an adult stay with you until tomorrow.    You may resume your normal diet.       For 24 hours - due to the sedation you received:    Relax and take it easy.    Do NOT make any important or legal decisions.    Do NOT drive or operate machines at home or at work.    Do NOT drink alcohol.    Care of Chest Incision:      Keep the bandage on at least 3 days. You may remove the dressing on Tuesday. Change it only if it gets loose or soaked. If you need to change it, use 4x4-inch gauze and a large clear bandage.     If there is a pressure dressing (gauze & tape) - 24 hours after your procedure you may remove ONLY the top dressing. Leave the bottom dressing on.    Leave the strips of tape on. They will fall off on their own, or we will remove them at your first check-up.    Check your wound daily for signs of infection, such as increased redness, severe swelling or draining. Fever may also be a sign of infection. Call us if you see any of these signs.    If there are no signs of infection, you may shower after the bandage comes off in 3 days. If you take a tub bath, keep the wound dry.    No soaking the incision (swimming pool, bathtub, hot tub) for 2 weeks.    You may have mild to medium pain for 3 to 5 days. Take Acetaminophen (Tylenol) or Ibuprofen (Advil) for the pain. If the pain persists or is severe, call us.    Activity:      You can begin to use your arm as it feels comfortable to you.    No driving for one day & limit to necessary driving for one week.    Bleeding:      If you start bleeding from the incision site, sit down and press firmly on the site for 10 minutes.     Once bleeding stops, call [facility] as soon as you can.       Call 911 right away if you have heavy bleeding or bleeding that does not stop.      Medicines:      Take your medications, including blood thinners, unless your provider tells you not to.    If  you have stopped any other medicines, check with your provider about when to restart them.    Follow Up Appointments:      Follow up with Device Clinic at [facility] of patient preference in 7-10 days.    Call the clinic if:      You have a large or growing hard lump around the site.    The site is red, swollen, hot or tender.    Blood or fluid is draining from the site.    You have chills or a fever greater than 101 F (38 C).    You feel dizzy or light-headed.    Questions or concerns.      Telling others about your device:      Before you leave the hospital, you will receive a temporary ID card. A permanent card will be mailed to you about 6 to 8 weeks later. Always carry the ID card with you. It has important details about your device.    You may also get a Medical Alert bracelet or tag that says you have a pacemaker or a defibrillator (ICD).  Go to [email or web address].     Always tell doctors, dentists and other care providers that you have a device implanted in you.    Let us know before you plan any surgeries. Your care team must take special steps to keep you safe during certain procedures. These steps will depend on the type of device you have. Your provider will need to see your ID card. They may need to call us for instructions.    Device Safety:      Please refer to device  s booklet for further information.        [facility] [place]:    [phone] [facility] (7 days a week)

## 2020-09-14 ENCOUNTER — TELEPHONE (OUTPATIENT)
Dept: CARDIOLOGY | Facility: CLINIC | Age: 85
End: 2020-09-14

## 2020-09-14 DIAGNOSIS — I49.5 SICK SINUS SYNDROME (H): ICD-10-CM

## 2020-09-14 DIAGNOSIS — Z95.0 CARDIAC PACEMAKER IN SITU: Primary | ICD-10-CM

## 2020-09-14 NOTE — TELEPHONE ENCOUNTER
Post device implant discharge phone call.    Reviewed the following:  Remove outer dressing 3 days after implant. May shower after outer dressing removed. Leave steri-strips in place, will be removed at 1 week device check  Limit driving for: N/A  Watch for redness, drainage, warmth, or fever. Call device clinic if any signs of infection.     1 week device check scheduled: 9/18/20 at 3:00pm     Pt states understanding of all instructions.

## 2020-09-17 ENCOUNTER — DOCUMENTATION ONLY (OUTPATIENT)
Dept: CARDIOLOGY | Facility: CLINIC | Age: 85
End: 2020-09-17

## 2020-09-17 NOTE — PROGRESS NOTES
Wellness Screening Tool    Symptom Screening:    Do you have one of the following NEW symptoms:      Fever (subjective or >100.0)?  No    New cough? No    Shortness of breath? No    Chills? No    New loss of taste or smell? No    Generalized body aches? No    New persistent headache? No    New sore throat? No    Nausea, vomiting or diarrhea? No    Within the past 2 weeks, have you been exposed to someone with a known positive illness below?      COVID - 19 (known or suspected) No    Chicken pox?  No    Measles? No    Pertussis? No    Have you had a positive COVID-19 diagnostic test (nasal swab test) in the last 14 days or are you currently   on self-quarantine restrictions (i.e.travel restriction, exposure, etc?) No        Patient notified of visitor restriction: Yes  Patient informed to wear a mask: Yes    Patient's appointment status: Patient will be seen in clinic as scheduled on 9/18/20 @ 3:00pm. Diana MACARIO

## 2020-09-18 ENCOUNTER — ANCILLARY PROCEDURE (OUTPATIENT)
Dept: CARDIOLOGY | Facility: CLINIC | Age: 85
End: 2020-09-18
Attending: INTERNAL MEDICINE
Payer: MEDICARE

## 2020-09-18 DIAGNOSIS — I49.5 SICK SINUS SYNDROME (H): ICD-10-CM

## 2020-09-18 DIAGNOSIS — Z95.0 CARDIAC PACEMAKER IN SITU: ICD-10-CM

## 2020-09-18 DIAGNOSIS — I49.5 TACHY-BRADY SYNDROME (H): Primary | ICD-10-CM

## 2020-09-18 PROCEDURE — 93280 PM DEVICE PROGR EVAL DUAL: CPT | Performed by: INTERNAL MEDICINE

## 2020-09-30 ENCOUNTER — OFFICE VISIT (OUTPATIENT)
Dept: FAMILY MEDICINE | Facility: CLINIC | Age: 85
End: 2020-09-30
Payer: MEDICARE

## 2020-09-30 VITALS
WEIGHT: 178.5 LBS | DIASTOLIC BLOOD PRESSURE: 64 MMHG | TEMPERATURE: 95.6 F | OXYGEN SATURATION: 99 % | BODY MASS INDEX: 25.61 KG/M2 | HEART RATE: 61 BPM | SYSTOLIC BLOOD PRESSURE: 126 MMHG

## 2020-09-30 DIAGNOSIS — R41.3 MEMORY CHANGE: ICD-10-CM

## 2020-09-30 DIAGNOSIS — L84 CALLUS OF FOOT: Primary | ICD-10-CM

## 2020-09-30 LAB
MDC_IDC_LEAD_IMPLANT_DT: NORMAL
MDC_IDC_LEAD_IMPLANT_DT: NORMAL
MDC_IDC_LEAD_LOCATION: NORMAL
MDC_IDC_LEAD_LOCATION: NORMAL
MDC_IDC_LEAD_MFG: NORMAL
MDC_IDC_LEAD_MFG: NORMAL
MDC_IDC_LEAD_MODEL: NORMAL
MDC_IDC_LEAD_MODEL: NORMAL
MDC_IDC_LEAD_POLARITY_TYPE: NORMAL
MDC_IDC_LEAD_POLARITY_TYPE: NORMAL
MDC_IDC_LEAD_SERIAL: NORMAL
MDC_IDC_LEAD_SERIAL: NORMAL
MDC_IDC_MSMT_BATTERY_REMAINING_LONGEVITY: 128 MO
MDC_IDC_MSMT_BATTERY_STATUS: NORMAL
MDC_IDC_MSMT_BATTERY_VOLTAGE: 3.08 V
MDC_IDC_MSMT_LEADCHNL_RA_IMPEDANCE_VALUE: 400 OHM
MDC_IDC_MSMT_LEADCHNL_RA_PACING_THRESHOLD_AMPLITUDE: 0.75 V
MDC_IDC_MSMT_LEADCHNL_RA_PACING_THRESHOLD_AMPLITUDE: 0.75 V
MDC_IDC_MSMT_LEADCHNL_RA_PACING_THRESHOLD_PULSEWIDTH: 0.5 MS
MDC_IDC_MSMT_LEADCHNL_RA_PACING_THRESHOLD_PULSEWIDTH: 0.5 MS
MDC_IDC_MSMT_LEADCHNL_RA_SENSING_INTR_AMPL: 3.4 MV
MDC_IDC_MSMT_LEADCHNL_RV_IMPEDANCE_VALUE: 525 OHM
MDC_IDC_MSMT_LEADCHNL_RV_PACING_THRESHOLD_AMPLITUDE: 1 V
MDC_IDC_MSMT_LEADCHNL_RV_PACING_THRESHOLD_AMPLITUDE: 1 V
MDC_IDC_MSMT_LEADCHNL_RV_PACING_THRESHOLD_PULSEWIDTH: 0.5 MS
MDC_IDC_MSMT_LEADCHNL_RV_PACING_THRESHOLD_PULSEWIDTH: 0.5 MS
MDC_IDC_MSMT_LEADCHNL_RV_SENSING_INTR_AMPL: 12 MV
MDC_IDC_PG_IMPLANT_DTM: NORMAL
MDC_IDC_PG_MFG: NORMAL
MDC_IDC_PG_MODEL: NORMAL
MDC_IDC_PG_SERIAL: NORMAL
MDC_IDC_PG_TYPE: NORMAL
MDC_IDC_SESS_CLINIC_NAME: NORMAL
MDC_IDC_SESS_DTM: NORMAL
MDC_IDC_SESS_TYPE: NORMAL
MDC_IDC_SET_BRADY_AT_MODE_SWITCH_MODE: NORMAL
MDC_IDC_SET_BRADY_AT_MODE_SWITCH_RATE: 180 {BEATS}/MIN
MDC_IDC_SET_BRADY_HYSTRATE: NORMAL
MDC_IDC_SET_BRADY_LOWRATE: 60 {BEATS}/MIN
MDC_IDC_SET_BRADY_MAX_SENSOR_RATE: 120 {BEATS}/MIN
MDC_IDC_SET_BRADY_MAX_TRACKING_RATE: 120 {BEATS}/MIN
MDC_IDC_SET_BRADY_MODE: NORMAL
MDC_IDC_SET_BRADY_NIGHT_RATE: NORMAL
MDC_IDC_SET_BRADY_PAV_DELAY_LOW: 200 MS
MDC_IDC_SET_BRADY_SAV_DELAY_LOW: 170 MS
MDC_IDC_SET_LEADCHNL_RA_PACING_AMPLITUDE: 1.75 V
MDC_IDC_SET_LEADCHNL_RA_PACING_CAPTURE_MODE: NORMAL
MDC_IDC_SET_LEADCHNL_RA_PACING_POLARITY: NORMAL
MDC_IDC_SET_LEADCHNL_RA_PACING_PULSEWIDTH: 0.5 MS
MDC_IDC_SET_LEADCHNL_RA_SENSING_ADAPTATION_MODE: NORMAL
MDC_IDC_SET_LEADCHNL_RA_SENSING_POLARITY: NORMAL
MDC_IDC_SET_LEADCHNL_RV_PACING_AMPLITUDE: 1.12
MDC_IDC_SET_LEADCHNL_RV_PACING_CAPTURE_MODE: NORMAL
MDC_IDC_SET_LEADCHNL_RV_PACING_POLARITY: NORMAL
MDC_IDC_SET_LEADCHNL_RV_PACING_PULSEWIDTH: 0.5 MS
MDC_IDC_SET_LEADCHNL_RV_SENSING_POLARITY: NORMAL
MDC_IDC_SET_LEADCHNL_RV_SENSING_SENSITIVITY: 2 MV
MDC_IDC_STAT_AT_MODE_SW_COUNT: 0
MDC_IDC_STAT_BRADY_RA_PERCENT_PACED: 85 %
MDC_IDC_STAT_BRADY_RV_PERCENT_PACED: 1 %

## 2020-09-30 PROCEDURE — 99214 OFFICE O/P EST MOD 30 MIN: CPT | Performed by: FAMILY MEDICINE

## 2020-09-30 RX ORDER — DONEPEZIL HYDROCHLORIDE 10 MG/1
10 TABLET, FILM COATED ORAL AT BEDTIME
Qty: 30 TABLET | Refills: 1 | Status: SHIPPED | OUTPATIENT
Start: 2020-09-30 | End: 2020-12-08

## 2020-09-30 RX ORDER — DONEPEZIL HYDROCHLORIDE 5 MG/1
TABLET, FILM COATED ORAL
Qty: 90 TABLET | OUTPATIENT
Start: 2020-09-30

## 2020-09-30 RX ORDER — DONEPEZIL HYDROCHLORIDE 5 MG/1
5 TABLET, FILM COATED ORAL AT BEDTIME
Qty: 30 TABLET | Refills: 1 | Status: SHIPPED | OUTPATIENT
Start: 2020-09-30 | End: 2020-09-30 | Stop reason: DRUGHIGH

## 2020-09-30 RX ORDER — DONEPEZIL HYDROCHLORIDE 10 MG/1
TABLET, FILM COATED ORAL
Qty: 90 TABLET | OUTPATIENT
Start: 2020-09-30

## 2020-09-30 NOTE — PROGRESS NOTES
Subjective     Abbe Lambert is a 86 year old male who presents to clinic today for the following health issues:    HPI       Concern - foot callus   Onset: 3 months  Description: left foot bottom, pad  Intensity: moderate  Progression of Symptoms:  growing  Accompanying Signs & Symptoms: feel like its getting bigger  Previous history of similar problem: yes  Precipitating factors:        Worsened by: none  Alleviating factors:        Improved by: none  Therapies tried and outcome:  none       Pt also concerned about memory   Concern -memory issues  Onset: Months  Description: Short-term memory problems.  Intensity: mild  Progression of Symptoms:  same  Accompanying Signs & Symptoms: None  Previous history of similar problem: Currently on 5 mg of Aricept daily  Precipitating factors:        Worsened by: Nothing  Alleviating factors:        Improved by: On Aricept.  Therapies tried and outcome: See above    Review of Systems   Constitutional, HEENT, cardiovascular, pulmonary, gi and gu systems are negative, except as otherwise noted.      Objective    /64 (Cuff Size: Adult Large)   Pulse 61   Temp 95.6  F (35.3  C) (Tympanic)   Wt 81 kg (178 lb 8 oz)   SpO2 99%   BMI 25.61 kg/m    Body mass index is 25.61 kg/m .  Physical Exam   GENERAL APPEARANCE: healthy, alert and no distress  SKIN: There is a very large very thick callus present over the left plantar surface at the base of the first metatarsal.            Assessment & Plan     Callus of foot    - Orthopedic & Spine  Referral    Memory change    - donepezil (ARICEPT) 10 MG tablet  Dispense: 30 tablet; Refill: 1         Patient Instructions   With the patient's memory issues I will have him increase his Aricept to 10 mg daily.  Prescription was sent to the pharmacy.  He will follow-up in 1 month.    With respect to the callus on his foot, given his history of arterial issues, I referred him to podiatry for removal of his callus.  He also has  prediabetes so I think podiatry removal is appropriate.      Return in about 4 weeks (around 10/28/2020) for Memory issues.    Eliezer Shah MD  [facility]

## 2020-09-30 NOTE — PATIENT INSTRUCTIONS
With the patient's memory issues I will have him increase his Aricept to 10 mg daily.  Prescription was sent to the pharmacy.  He will follow-up in 1 month.    With respect to the callus on his foot, given his history of arterial issues, I referred him to podiatry for removal of his callus.  He also has prediabetes so I think podiatry removal is appropriate.

## 2020-10-01 ENCOUNTER — OFFICE VISIT (OUTPATIENT)
Dept: PODIATRY | Facility: CLINIC | Age: 85
End: 2020-10-01
Attending: FAMILY MEDICINE
Payer: MEDICARE

## 2020-10-01 VITALS
SYSTOLIC BLOOD PRESSURE: 122 MMHG | WEIGHT: 178.5 LBS | DIASTOLIC BLOOD PRESSURE: 70 MMHG | HEIGHT: 70 IN | BODY MASS INDEX: 25.56 KG/M2

## 2020-10-01 DIAGNOSIS — L97.521 ULCER OF FOOT, LEFT, LIMITED TO BREAKDOWN OF SKIN (H): Primary | ICD-10-CM

## 2020-10-01 DIAGNOSIS — E11.40 TYPE 2 DIABETES MELLITUS WITH DIABETIC NEUROPATHY, WITHOUT LONG-TERM CURRENT USE OF INSULIN (H): ICD-10-CM

## 2020-10-01 DIAGNOSIS — L84 CALLUS OF FOOT: ICD-10-CM

## 2020-10-01 DIAGNOSIS — L85.3 XEROSIS OF SKIN: ICD-10-CM

## 2020-10-01 DIAGNOSIS — M21.41 FLAT FEET: ICD-10-CM

## 2020-10-01 DIAGNOSIS — R20.8 DECREASED SENSATION OF FOOT: ICD-10-CM

## 2020-10-01 DIAGNOSIS — M21.42 FLAT FEET: ICD-10-CM

## 2020-10-01 PROCEDURE — 99213 OFFICE O/P EST LOW 20 MIN: CPT | Mod: 25 | Performed by: PODIATRIST

## 2020-10-01 PROCEDURE — 97597 DBRDMT OPN WND 1ST 20 CM/<: CPT | Performed by: PODIATRIST

## 2020-10-01 RX ORDER — AMMONIUM LACTATE 12 G/100G
CREAM TOPICAL 2 TIMES DAILY
Qty: 385 G | Refills: 1 | Status: SHIPPED | OUTPATIENT
Start: 2020-10-01 | End: 2020-12-29

## 2020-10-01 ASSESSMENT — MIFFLIN-ST. JEOR: SCORE: 1495.92

## 2020-10-01 NOTE — PROGRESS NOTES
ASSESSMENT/PLAN:  Encounter Diagnoses   Name Primary?     Callus of foot      Ulcer of foot, left, limited to breakdown of skin (H) Yes     Xerosis of pedal skin      Flat feet      Decreased sensation of foot      Type 2 diabetes mellitus with diabetic neuropathy, without long-term current use of insulin (H)      Using a #15 scalpel, the left plantar foot callus and ulcer was debrided down to a deeper layer of skin.  The area was cleansed with alcohol and bandage applied.     Abbe Lambert is referred to [facility] for prescription orthoses and extra-depth shoes: callus and ulcer, diabetes, decreased sensation, flat feet    (L84) Callus of foot  (L85.3) Xerosis of pedal skin  Plan: ammonium lactate (AMLACTIN) 12 % external cream          Follow up if the wound has not healed in two weeks.  Wound Care Recommendations:    1)  Keep the wound covered by a bandage when bathing.    2)  Gently clean the wound with soap water, separate from bath/shower water.      3)  Each day, apply a topical antibiotic ointment to the wound (Neosporin, Triple antibiotic, Bacitracin).   Cover with large band-aid or gauze.      5)  Please seek immediate medical attention if any increasing redness, drainage, smell, or pain related to the wound.     6)  Please return to clinic in the period of time requested by Dr. Hatfield.      Keshav Hatfield DPM, FACFAS, MS    [facility]le [facility]      ____________________________________________________________________    HPI:         Chief Complaint: callus on toe  Onset of problem: 4 weeks of pain  Pain/ discomfort is described as:  Mild when walking  Pain Ratin/10 at worst.  Frequency:  intermittent    The pain is exacerbated by weight bearing, walking.  Previous treatment: no    Type 2 DM  History of pedal ulceration  Last Hgb A1C = 5.6    Patient Active Problem List   Diagnosis     Health Care Home     Allergic rhinitis     Peripheral edema      Polymyalgia rheumatica (H)     Advanced directives, counseling/discussion     Ischemic cardiomyopathy     Paroxysmal atrial fibrillation (H)     Coronary artery disease involving native coronary artery of native heart without angina pectoris     CKD (chronic kidney disease) stage 3, GFR 30-59 ml/min     GERD (gastroesophageal reflux disease)     Tachy-alix syndrome (H)     AAA (abdominal aortic aneurysm) (H)     Old myocardial infarction     Chronic combined systolic and diastolic congestive heart failure (H)     Essential hypertension, benign     Type 2 diabetes mellitus with diabetic neuropathy, without long-term current use of insulin (H)     Mixed hyperlipidemia     Screening for diabetic peripheral neuropathy     Physical deconditioning     Post herpetic neuralgia     Presbycusis of both ears     Chronic non-seasonal allergic rhinitis, unspecified trigger     Skin lesion     History of prostate cancer 2003 w/po resection prostate     PAD (peripheral artery disease) (H)     Microalbuminuria     Mild persistent asthma without complication     Pre-diabetes     History of CVA (cerebrovascular accident)     Dysphagia, unspecified type     Esophageal stricture     Memory change     Vascular dementia (H)     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     Dry skin     Stasis dermatitis of both legs     Pruritic disorder from dry skin of upper back     Diarrhea, unspecified type since on doxycycline for sinuses since early 2-20      Overweight (BMI 25.0-29.9)     Chronic sinusitis, unspecified location     Cardiac pacemaker in situ     Sick sinus syndrome (H)     Past Surgical History:   Procedure Laterality Date     ARTHROPLASTY KNEE Left 10/5/2016    Procedure: ARTHROPLASTY KNEE;  Surgeon: Keshav Zamudio MD;  Location:  OR     CARDIAC SURGERY  12/03/2012    pacemaker ; CABG 1998     CHOLECYSTECTOMY       COLONOSCOPY       ENT SURGERY  5-     sinus     EP PACEMAKER N/A 9/11/2020    Procedure: EP Pacemaker;  Surgeon: Caron Guerin MD;  Location:  HEART CARDIAC CATH LAB     ESOPHAGOSCOPY, GASTROSCOPY, DUODENOSCOPY (EGD), COMBINED N/A 12/3/2019    Procedure: ESOPHAGOGASTRODUODENOSCOPY (EGD) (MAC);  Surgeon: Calderon Herman MD;  Location:  GI     ESOPHAGOSCOPY, GASTROSCOPY, DUODENOSCOPY (EGD), DILATATION, COMBINED N/A 12/3/2019    Procedure: Esophagoscopy, gastroscopy, duodenoscopy (EGD), dilatation, combined;  Surgeon: Calderon Herman MD;  Location:  GI     EXTRACAPSULAR CATARACT EXTRATION WITH INTRAOCULAR LENS IMPLANT       GENITOURINARY SURGERY      prostatectomy     IMPLANT PACEMAKER  12-3-12    [facility]     PROSTATE SURGERY       Current Outpatient Medications   Medication Sig Dispense Refill     carvedilol (COREG) 6.25 MG tablet TAKE 1 TABLET(6.25 MG) BY MOUTH TWICE DAILY WITH MEALS 180 tablet 2     donepezil (ARICEPT) 10 MG tablet Take 1 tablet (10 mg) by mouth At Bedtime 30 tablet 1     MELATONIN PO Take 5 mg by mouth At Bedtime       mometasone (NASONEX) 50 MCG/ACT nasal spray Spray 2 sprays into both nostrils daily as needed.       nitroglycerin (NITROSTAT) 0.4 MG SL tablet Place 1 tablet (0.4 mg) under the tongue every 5 minutes as needed for chest pain 25 tablet 3     polyethylene glycol (MIRALAX/GLYCOLAX) packet Take 17 g by mouth daily        rosuvastatin (CRESTOR) 40 MG tablet Take 1 tablet (40 mg) by mouth daily 90 tablet 3     torsemide (DEMADEX) 20 MG tablet Take 1 tablet (20 mg) by mouth daily as needed (swollen ankles) 30 tablet 1     triamcinolone (KENALOG) 0.1 % cream Apply sparingly to affected area twotimes daily as needed 30 g 5       ROS:    A 10-point review of systems was performed.  It is positive for that noted in the HPI and as seen below.  All other systems found to be negative.     Numbness in feet?  no   Calf pain with walking? no  Recent foot/ankle injury? no  Weight change  over past 12  "months? 10# loss  Self perception as overweight? no  Recent flu-like symptoms? no  Joint pain other than feet ? no    EXAM:    Vitals: Ht 1.778 m (5' 10\")   Wt 81 kg (178 lb 8 oz)   BMI 25.61 kg/m    BMI: Body mass index is 25.61 kg/m .  Height: 5' 10\"    Constitutional/ general:  Pt is in no apparent distress, appears well-nourished.  Cooperative with history and physical exam.     Diabetic Foot Exam (details below)    Vascular:  Pedal pulses are faintly palpable bilaterally for both the DP and PT arteries.  CFT < 3 sec.  No edema.  Pedal hair growth noted.     Neuro:  Alert and oriented x 3. Coordinated gait.  Light touch sensation is intact to the L4, L5, S1 distributions. No obvious deficits.  No evidence of neurological-based weakness, spasticity, or contracture in the lower extremities.     Protective sensation is diminished bilateral foot per [place] [facility]of[facility] testing.    Derm: Normal texture and turgor.  No erythema, ecchymosis, or cyanosis.  No open lesions.     Prolific callus sub left 1st metatarsal head with underlying ulceration, depth to deeper skin.      Atrophy of the plantar fat pad, bilateral forefoot    Musculoskeletal:    Lower extremity muscle strength is normal.  Patient is ambulatory without an assistive device or brace .  Significant pes planus, Right > left.          "

## 2020-10-01 NOTE — PATIENT INSTRUCTIONS
Thank you for choosing [facility] Podiatry / Foot & Ankle Surgery!    [facility] [place] SCHEDULE SURGERY: [phone]   [address] BILLING QUESTIONS: [phone]   [place] [address] AFTER HOURS: [phone]   PH: [phone] CONSUMER MATHUR LINE:[phone]   FAX: [phone] APPOINTMENTS: [phone]     Lachydrin cream sent to pharmacy    [facility]  [facility]  [address]  [place] [address]  Phone: [phone]  Fax: [phone] [facility]  [address]  [place] [address]  Phone: [phone]   Fax: [phone]   [facility]  [address]  [place] [address]  Phone: [phone]  Fax: [phone] [facility]  [address]  [place] [address]  Phone: [phone]   Fax: [phone]   [facility]   [address]  [place] [address]  Phone: [phone]  Fax: [phone] * Please call any location listed to make an appointment for a casting/fitting. Your referral was sent to their central office and they will all have the order on file.     Wound Care Recommendations:    1)  Keep the wound covered by a bandage when bathing.    2)  Gently clean the wound with soap water, separate from bath/shower water.      3)  Each day, apply a topical antibiotic ointment to the wound (Neosporin, Triple antibiotic, Bacitracin).   Cover with large band-aid or gauze.      5)  Please seek immediate medical attention if any increasing redness, drainage, smell, or pain related to the wound.     6)  Please return to clinic in the period of time requested by Dr. Hatfield.            SIGNS OF INFECTION    expanding redness around the wound     yellow or greenish-colored pus or cloudy wound drainage     red streaking spreading from the wound     increased swelling, tenderness, or pain around the wound      fever  *If you notice any of these signs of infection, call us right away!

## 2020-10-01 NOTE — LETTER
10/1/2020         RE: Abbe Lambert  [id number] [facility] [id number]        Dear Colleague,    Thank you for referring your patient, Abbe Lambert, to the [facility]. Please see a copy of my visit note below.    ASSESSMENT/PLAN:  Encounter Diagnoses   Name Primary?     Callus of foot      Ulcer of foot, left, limited to breakdown of skin (H) Yes     Xerosis of pedal skin      Flat feet      Decreased sensation of foot      Type 2 diabetes mellitus with diabetic neuropathy, without long-term current use of insulin (H)      Using a #15 scalpel, the left plantar foot callus and ulcer was debrided down to a deeper layer of skin.  The area was cleansed with alcohol and bandage applied.     Abbe Lambert is referred to [facility] for prescription orthoses and extra-depth shoes: callus and ulcer, diabetes, decreased sensation, flat feet    (L84) Callus of foot  (L85.3) Xerosis of pedal skin  Plan: ammonium lactate (AMLACTIN) 12 % external cream          Follow up if the wound has not healed in two weeks.  Wound Care Recommendations:    1)  Keep the wound covered by a bandage when bathing.    2)  Gently clean the wound with soap water, separate from bath/shower water.      3)  Each day, apply a topical antibiotic ointment to the wound (Neosporin, Triple antibiotic, Bacitracin).   Cover with large band-aid or gauze.      5)  Please seek immediate medical attention if any increasing redness, drainage, smell, or pain related to the wound.     6)  Please return to clinic in the period of time requested by Dr. Hatfield.      Keshav Hatfield, DPODILIA, FACFAS, [facility] Department of Podiatry/Foot & Ankle Surgery      ____________________________________________________________________    HPI:         Chief Complaint: callus on toe  Onset of problem: 4 weeks of pain  Pain/ discomfort is described as:  Mild when walking  Pain Ratin/10 at  worst.  Frequency:  intermittent    The pain is exacerbated by weight bearing, walking.  Previous treatment: no    Type 2 DM  History of pedal ulceration  Last Hgb A1C = 5.6    Patient Active Problem List   Diagnosis     Health Care Home     Allergic rhinitis     Peripheral edema     Polymyalgia rheumatica (H)     Advanced directives, counseling/discussion     Ischemic cardiomyopathy     Paroxysmal atrial fibrillation (H)     Coronary artery disease involving native coronary artery of native heart without angina pectoris     CKD (chronic kidney disease) stage 3, GFR 30-59 ml/min     GERD (gastroesophageal reflux disease)     Tachy-alix syndrome (H)     AAA (abdominal aortic aneurysm) (H)     Old myocardial infarction     Chronic combined systolic and diastolic congestive heart failure (H)     Essential hypertension, benign     Type 2 diabetes mellitus with diabetic neuropathy, without long-term current use of insulin (H)     Mixed hyperlipidemia     Screening for diabetic peripheral neuropathy     Physical deconditioning     Post herpetic neuralgia     Presbycusis of both ears     Chronic non-seasonal allergic rhinitis, unspecified trigger     Skin lesion     History of prostate cancer 2003 w/po resection prostate     PAD (peripheral artery disease) (H)     Microalbuminuria     Mild persistent asthma without complication     Pre-diabetes     History of CVA (cerebrovascular accident)     Dysphagia, unspecified type     Esophageal stricture     Memory change     Vascular dementia (H)     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     Dry skin     Stasis dermatitis of both legs     Pruritic disorder from dry skin of upper back     Diarrhea, unspecified type since on doxycycline for sinuses since early 2-20      Overweight (BMI 25.0-29.9)     Chronic sinusitis, unspecified location     Cardiac pacemaker in situ     Sick sinus syndrome (H)     Past Surgical History:    Procedure Laterality Date     ARTHROPLASTY KNEE Left 10/5/2016    Procedure: ARTHROPLASTY KNEE;  Surgeon: Keshav Zamudio MD;  Location:  OR     CARDIAC SURGERY  12/03/2012    pacemaker ; CABG 1998     CHOLECYSTECTOMY       COLONOSCOPY       ENT SURGERY  5-    sinus     EP PACEMAKER N/A 9/11/2020    Procedure: EP Pacemaker;  Surgeon: Caron Guerin MD;  Location:  HEART CARDIAC CATH LAB     ESOPHAGOSCOPY, GASTROSCOPY, DUODENOSCOPY (EGD), COMBINED N/A 12/3/2019    Procedure: ESOPHAGOGASTRODUODENOSCOPY (EGD) (MAC);  Surgeon: Calderon Herman MD;  Location:  GI     ESOPHAGOSCOPY, GASTROSCOPY, DUODENOSCOPY (EGD), DILATATION, COMBINED N/A 12/3/2019    Procedure: Esophagoscopy, gastroscopy, duodenoscopy (EGD), dilatation, combined;  Surgeon: Calderon Herman MD;  Location:  GI     EXTRACAPSULAR CATARACT EXTRATION WITH INTRAOCULAR LENS IMPLANT       GENITOURINARY SURGERY      prostatectomy     IMPLANT PACEMAKER  12-3-12    [facility]     PROSTATE SURGERY       Current Outpatient Medications   Medication Sig Dispense Refill     carvedilol (COREG) 6.25 MG tablet TAKE 1 TABLET(6.25 MG) BY MOUTH TWICE DAILY WITH MEALS 180 tablet 2     donepezil (ARICEPT) 10 MG tablet Take 1 tablet (10 mg) by mouth At Bedtime 30 tablet 1     MELATONIN PO Take 5 mg by mouth At Bedtime       mometasone (NASONEX) 50 MCG/ACT nasal spray Spray 2 sprays into both nostrils daily as needed.       nitroglycerin (NITROSTAT) 0.4 MG SL tablet Place 1 tablet (0.4 mg) under the tongue every 5 minutes as needed for chest pain 25 tablet 3     polyethylene glycol (MIRALAX/GLYCOLAX) packet Take 17 g by mouth daily        rosuvastatin (CRESTOR) 40 MG tablet Take 1 tablet (40 mg) by mouth daily 90 tablet 3     torsemide (DEMADEX) 20 MG tablet Take 1 tablet (20 mg) by mouth daily as needed (swollen ankles) 30 tablet 1     triamcinolone (KENALOG) 0.1 % cream Apply sparingly to affected area twotimes daily as needed 30 g 5  "      ROS:    A 10-point review of systems was performed.  It is positive for that noted in the HPI and as seen below.  All other systems found to be negative.     Numbness in feet?  no   Calf pain with walking? no  Recent foot/ankle injury? no  Weight change  over past 12 months? 10# loss  Self perception as overweight? no  Recent flu-like symptoms? no  Joint pain other than feet ? no    EXAM:    Vitals: Ht 1.778 m (5' 10\")   Wt 81 kg (178 lb 8 oz)   BMI 25.61 kg/m    BMI: Body mass index is 25.61 kg/m .  Height: 5' 10\"    Constitutional/ general:  Pt is in no apparent distress, appears well-nourished.  Cooperative with history and physical exam.     Diabetic Foot Exam (details below)    Vascular:  Pedal pulses are faintly palpable bilaterally for both the DP and PT arteries.  CFT < 3 sec.  No edema.  Pedal hair growth noted.     Neuro:  Alert and oriented x 3. Coordinated gait.  Light touch sensation is intact to the L4, L5, S1 distributions. No obvious deficits.  No evidence of neurological-based weakness, spasticity, or contracture in the lower extremities.     Protective sensation is diminished bilateral foot per Phelan-Devora Monofilament testing.    Derm: Normal texture and turgor.  No erythema, ecchymosis, or cyanosis.  No open lesions.     Prolific callus sub left 1st metatarsal head with underlying ulceration, depth to deeper skin.      Atrophy of the plantar fat pad, bilateral forefoot    Musculoskeletal:    Lower extremity muscle strength is normal.  Patient is ambulatory without an assistive device or brace .  Significant pes planus, Right > left.              Again, thank you for allowing me to participate in the care of your patient.        Sincerely,        Keshav Hatfield DPM    "

## 2020-10-08 DIAGNOSIS — R41.3 MEMORY CHANGE: ICD-10-CM

## 2020-10-09 RX ORDER — DONEPEZIL HYDROCHLORIDE 5 MG/1
TABLET, FILM COATED ORAL
Qty: 90 TABLET | OUTPATIENT
Start: 2020-10-09

## 2020-11-03 DIAGNOSIS — I50.42 CHRONIC COMBINED SYSTOLIC AND DIASTOLIC CONGESTIVE HEART FAILURE (H): ICD-10-CM

## 2020-11-03 RX ORDER — TORSEMIDE 20 MG/1
20 TABLET ORAL DAILY PRN
Qty: 30 TABLET | Refills: 1 | Status: ON HOLD | OUTPATIENT
Start: 2020-11-03 | End: 2021-03-31

## 2020-11-03 NOTE — TELEPHONE ENCOUNTER
Routing refill request to provider for review/approval because:  Diuretics (Including Combos) Protocol [id number]/03/2020 07:34 AM   Normal serum creatinine on file in past 12 months

## 2020-11-03 NOTE — TELEPHONE ENCOUNTER
Reason for Call:  Other request    Detailed comments: Pt's wife called requesting a new script for torsemide for the pt.  His pharmacy won't give pt a refill until there is a new script. Pt has 1 pill left after today.    Phone Number Patient can be reached at: Home number on file [phone] (home)    Best Time: late morning    Can we leave a detailed message on this number? YES    Call taken on 11/3/2020 at 7:28 AM by ZEENAT GALLEGOS

## 2020-11-18 ENCOUNTER — PRE VISIT (OUTPATIENT)
Dept: CARDIOLOGY | Facility: CLINIC | Age: 85
End: 2020-11-18

## 2020-11-23 PROBLEM — Z86.73 HISTORY OF CVA (CEREBROVASCULAR ACCIDENT): Chronic | Status: ACTIVE | Noted: 2018-10-25

## 2020-11-23 PROBLEM — Z95.0 CARDIAC PACEMAKER IN SITU: Chronic | Status: ACTIVE | Noted: 2020-08-26

## 2020-11-23 PROBLEM — R41.3 MEMORY CHANGE: Chronic | Status: ACTIVE | Noted: 2019-10-30

## 2020-11-23 PROBLEM — Z85.46 HISTORY OF PROSTATE CANCER: Chronic | Status: ACTIVE | Noted: 2018-05-18

## 2020-11-24 ENCOUNTER — OFFICE VISIT (OUTPATIENT)
Dept: FAMILY MEDICINE | Facility: CLINIC | Age: 85
End: 2020-11-24
Payer: MEDICARE

## 2020-11-24 VITALS
DIASTOLIC BLOOD PRESSURE: 56 MMHG | BODY MASS INDEX: 24.03 KG/M2 | OXYGEN SATURATION: 97 % | WEIGHT: 167.5 LBS | TEMPERATURE: 96.6 F | HEART RATE: 79 BPM | SYSTOLIC BLOOD PRESSURE: 100 MMHG

## 2020-11-24 DIAGNOSIS — R19.4 CHANGE IN BOWEL HABITS: Primary | ICD-10-CM

## 2020-11-24 DIAGNOSIS — R63.4 WEIGHT LOSS: ICD-10-CM

## 2020-11-24 PROCEDURE — 99213 OFFICE O/P EST LOW 20 MIN: CPT | Performed by: INTERNAL MEDICINE

## 2020-11-24 SDOH — ECONOMIC STABILITY: INCOME INSECURITY: HOW HARD IS IT FOR YOU TO PAY FOR THE VERY BASICS LIKE FOOD, HOUSING, MEDICAL CARE, AND HEATING?: NOT ASKED

## 2020-11-24 SDOH — ECONOMIC STABILITY: TRANSPORTATION INSECURITY
IN THE PAST 12 MONTHS, HAS THE LACK OF TRANSPORTATION KEPT YOU FROM MEDICAL APPOINTMENTS OR FROM GETTING MEDICATIONS?: NOT ASKED

## 2020-11-24 SDOH — ECONOMIC STABILITY: FOOD INSECURITY: WITHIN THE PAST 12 MONTHS, YOU WORRIED THAT YOUR FOOD WOULD RUN OUT BEFORE YOU GOT MONEY TO BUY MORE.: NOT ASKED

## 2020-11-24 SDOH — ECONOMIC STABILITY: FOOD INSECURITY: WITHIN THE PAST 12 MONTHS, THE FOOD YOU BOUGHT JUST DIDN'T LAST AND YOU DIDN'T HAVE MONEY TO GET MORE.: NOT ASKED

## 2020-11-24 SDOH — ECONOMIC STABILITY: TRANSPORTATION INSECURITY
IN THE PAST 12 MONTHS, HAS LACK OF TRANSPORTATION KEPT YOU FROM MEETINGS, WORK, OR FROM GETTING THINGS NEEDED FOR DAILY LIVING?: NOT ASKED

## 2020-11-24 NOTE — PATIENT INSTRUCTIONS
Let's do this :            You appear to be having some memory problems.                 I will type out some instructions for you in terms of your bowel function, and other issues.                                                                   If you are taking donepezil ( prescribed by Dr Shah to help with your memory), then I suggest that you stop taking it for now, as it can cause diarrhea.                                 Get some sugar free metamucil, and take 1 teaspoon mixed in liquid twice per day.             If you have not had a bowel movement for 3 days, then take a scoop of Miralax mixed in liquid.                    You can take this daily until you have a good bowel movement.                             You should make a follow up appointment with Dr Shah or me in 2-3 weeks.                   PLEASE BRING IN ALL YOUR PILL BOTTLES AT YOUR FOLLOW UP VISIT, AND ALSO BRING IN A FAMILY MEMBER.

## 2020-11-24 NOTE — PROGRESS NOTES
"Subjective     Abbe Lambert is a 86 year old male who presents to clinic today for the following health issues:    HPI         Constipation  Onset/Duration: 1 week  Description:  Frequency of bowel movements: 2 times per week  Consistency of stool: soft  Progression of Symptoms: same  Accompanying signs and symptoms:    Abdominal pain: YES   Rectal pain: no   Blood in stool: YES   Nausea/Vomiting: no   Weight loss or gain: no  History:   Similar problems in past: no  History of abdominal surgery: no  Chronic laxative use: no  New medications: no  Precipitating or alleviating factors: none  Therapies tried and outcome: None         The scales indicate that he has lost 13 lbs in 2 months!                   He states he is eating less; he does not feel as hungry.           The chart indicates that donepezil was added and then increased in 9/20.     He is not sure if he is taking this medication.                            A neurology note of 12/19 states that namenda was added.                        He states that he is not taking any medications!                       States that he is having \"trouble with his bowel movements\".              States he had 2 \"explosions\" and later may have been constipated.     States he does not take any bowel meds; no metamucil or miralax.                  S/p colonoscopy 2007; normal.         Metamucil 1 tsp bid advised at that time, \"for the rest of the patient's life\".                          States that he is the \"longest living male in his family history for 100 years\".                                          wrt weight loss: s/p EGD 12/19; TSH nml earlier this year; CBC ok; creat 1.7.                Patient Active Problem List    Diagnosis Date Noted     Cardiac pacemaker in situ 08/26/2020     Priority: High     Added automatically from request for surgery [id number]       Memory change 10/30/2019     Priority: High     Vascular dementia (H) 10/30/2019     Priority: High "     History of CVA (cerebrovascular accident) 10/25/2018     Priority: High     History of prostate cancer 2003 w/po resection prostate 05/18/2018     Priority: High     PAD (peripheral artery disease) (H) 05/18/2018     Priority: High     Type 2 diabetes mellitus with diabetic neuropathy, without long-term current use of insulin (H) 10/12/2015     Priority: High     Chronic combined systolic and diastolic congestive heart failure (H) 10/08/2015     Priority: High     Essential hypertension, benign 10/08/2015     Priority: High     AAA (abdominal aortic aneurysm) (H)      Priority: High     Old myocardial infarction      Priority: High     MI with Vfib arrest 1998       Ischemic cardiomyopathy      Priority: High     Paroxysmal atrial fibrillation (H)      Priority: High     s/p left atrial appendage ligation       Coronary artery disease involving native coronary artery of native heart without angina pectoris      Priority: High     cardiac cath 2014: medical management; cath 2013: PTCA to diagonal; cath 2009: medical management; CABG 1998: LIMA to LAD, LISA to RCA, SVG to circumflex       Weight loss 11/24/2020     Priority: Medium     Change in bowel habits 11/24/2020     Priority: Medium     Dry skin 03/03/2020     Priority: Medium     Stasis dermatitis of both legs 03/03/2020     Priority: Medium     Pruritic disorder from dry skin of upper back 03/03/2020     Priority: Medium     Diarrhea, unspecified type since on doxycycline for sinuses since early 2-20  03/03/2020     Priority: Medium     Overweight (BMI 25.0-29.9) 03/03/2020     Priority: Medium     Chronic sinusitis, unspecified location 03/03/2020     Priority: Medium     Dysphagia, unspecified type 09/24/2019     Priority: Medium     Esophageal stricture 09/24/2019     Priority: Medium     Pre-diabetes 08/09/2018     Priority: Medium     Microalbuminuria 05/18/2018     Priority: Medium     Mild persistent asthma without complication 05/18/2018      Priority: Medium     Skin lesion 01/17/2018     Priority: Medium     occiput       Chronic non-seasonal allergic rhinitis, unspecified trigger 10/10/2017     Priority: Medium     Presbycusis of both ears 09/12/2017     Priority: Medium     Post herpetic neuralgia 01/03/2017     Priority: Medium     Gabapentin    Last  check - 1/3/17       Physical deconditioning 11/07/2016     Priority: Medium     Screening for diabetic peripheral neuropathy 10/10/2016     Priority: Medium     Mixed hyperlipidemia 06/22/2016     Priority: Medium     Tachy-alix syndrome (H)      Priority: Medium     generator replacement 9/2020; implanted dual chamber pacemaker 2012       GERD (gastroesophageal reflux disease) 12/15/2014     Priority: Medium     CKD (chronic kidney disease) stage 3, GFR 30-59 ml/min 06/26/2014     Priority: Medium     Polymyalgia rheumatica (H) 05/16/2013     Priority: Medium     traMADol (ULTRAM) 50 MG tablet    No CSA on file  Last  check - 1/3/17       Allergic rhinitis 10/30/2012     Priority: Medium     Problem list name updated by automated process. Provider to review       Peripheral edema 10/30/2012     Priority: Medium     Advanced directives, counseling/discussion 10/18/2013     Priority: Low     Advance Care Planning:   Receipt of ACP document:  Received: Health Care Directive which was witnessed or notarized on 9-.  Document not previously scanned.  Validation form completed and sent with document to be scanned.  Code Status reflects choices in most recent ACP document.  Confirmed/documented designated decision maker(s). See permanent comments section of demographics in clinical tab. View document(s) and details by clicking on code status.   Added by Ayana Grace on 10/18/2013.             Health Care Home 10/29/2012     Priority: Low          DX V65.8 REPLACED WITH 17756 HEALTH CARE HOME (04/08/2013)         Past Surgical History:   Procedure Laterality Date     ARTHROPLASTY KNEE Left  10/5/2016    Procedure: ARTHROPLASTY KNEE;  Surgeon: Keshav Zamudio MD;  Location:  OR     CARDIAC SURGERY  12/03/2012    pacemaker ; CABG 1998     CHOLECYSTECTOMY       COLONOSCOPY       ENT SURGERY  5-    sinus     EP PACEMAKER N/A 9/11/2020    Procedure: EP Pacemaker;  Surgeon: Caron Guerin MD;  Location:  HEART CARDIAC CATH LAB     ESOPHAGOSCOPY, GASTROSCOPY, DUODENOSCOPY (EGD), COMBINED N/A 12/3/2019    Procedure: ESOPHAGOGASTRODUODENOSCOPY (EGD) (MAC);  Surgeon: Calderon Herman MD;  Location:  GI     ESOPHAGOSCOPY, GASTROSCOPY, DUODENOSCOPY (EGD), DILATATION, COMBINED N/A 12/3/2019    Procedure: Esophagoscopy, gastroscopy, duodenoscopy (EGD), dilatation, combined;  Surgeon: Calderon Herman MD;  Location:  GI     EXTRACAPSULAR CATARACT EXTRATION WITH INTRAOCULAR LENS IMPLANT       GENITOURINARY SURGERY      prostatectomy     IMPLANT PACEMAKER  12-3-12    [facility]     PROSTATE SURGERY         Current Outpatient Medications   Medication Sig Dispense Refill     ammonium lactate (AMLACTIN) 12 % external cream Apply topically 2 times daily 385 g 1     carvedilol (COREG) 6.25 MG tablet TAKE 1 TABLET(6.25 MG) BY MOUTH TWICE DAILY WITH MEALS 180 tablet 2     donepezil (ARICEPT) 10 MG tablet Take 1 tablet (10 mg) by mouth At Bedtime 30 tablet 1     MELATONIN PO Take 5 mg by mouth At Bedtime       mometasone (NASONEX) 50 MCG/ACT nasal spray Spray 2 sprays into both nostrils daily as needed.       nitroglycerin (NITROSTAT) 0.4 MG SL tablet Place 1 tablet (0.4 mg) under the tongue every 5 minutes as needed for chest pain 25 tablet 3     polyethylene glycol (MIRALAX/GLYCOLAX) packet Take 17 g by mouth daily        rosuvastatin (CRESTOR) 40 MG tablet Take 1 tablet (40 mg) by mouth daily 90 tablet 3     torsemide (DEMADEX) 20 MG tablet Take 1 tablet (20 mg) by mouth daily as needed (swollen ankles) 30 tablet 1     triamcinolone (KENALOG) 0.1 % cream Apply sparingly to affected  area twotimes daily as needed 30 g 5           Review of Systems   CONSTITUTIONAL:NEGATIVE  for chills and fever   GI: NEGATIVE for abdominal pain generalized, hematochezia and melena      Wt Readings from Last 4 Encounters:   11/24/20 76 kg (167 lb 8 oz)   10/01/20 81 kg (178 lb 8 oz)   09/30/20 81 kg (178 lb 8 oz)   09/11/20 81.9 kg (180 lb 8 oz)       Objective    /56 (Cuff Size: Adult Large)   Pulse 79   Temp 96.6  F (35.9  C) (Tympanic)   Wt 76 kg (167 lb 8 oz)   SpO2 97%   BMI 24.03 kg/m    Body mass index is 24.03 kg/m .  Physical Exam   GENERAL APPEARANCE: alert and no distress  CV: regular rates and rhythm and no edema  ABDOMEN: soft, nontender, without hepatosplenomegaly or masses  PSYCH: poor recall            Assessment & Plan     Abbe was seen today for constipation.    Diagnoses and all orders for this visit:    Change in bowel habits    Weight loss            Summary and implications:          His memory issues complicate the evaluation.                 Patient Instructions                 Let's do this :            You appear to be having some memory problems.                 I will type out some instructions for you in terms of your bowel function, and other issues.                                                                   If you are taking donepezil ( prescribed by Dr Shah to help with your memory), then I suggest that you stop taking it for now, as it can cause diarrhea.                                 Get some sugar free metamucil, and take 1 teaspoon mixed in liquid twice per day.             If you have not had a bowel movement for 3 days, then take a scoop of Miralax mixed in liquid.                    You can take this daily until you have a good bowel movement.                             You should make a follow up appointment with Dr Shah or me in 2-3 weeks.                   PLEASE BRING IN ALL YOUR PILL BOTTLES AT YOUR FOLLOW UP VISIT, AND ALSO BRING IN A  FAMILY MEMBER.                        Return in about 3 weeks (around 12/15/2020) for follow up of several issues.    Lucas Nguyen MD  [facility]

## 2020-12-03 ENCOUNTER — TELEPHONE (OUTPATIENT)
Dept: CARDIOLOGY | Facility: CLINIC | Age: 85
End: 2020-12-03

## 2020-12-03 DIAGNOSIS — I25.10 CORONARY ARTERY DISEASE INVOLVING NATIVE CORONARY ARTERY OF NATIVE HEART WITHOUT ANGINA PECTORIS: Primary | Chronic | ICD-10-CM

## 2020-12-03 NOTE — TELEPHONE ENCOUNTER
Patient called requesting a Dr. Eli next available visit and FLP's. Last BMP done 9-2-2020.   Patient will call scheduling for visit appointment and lab appointment.

## 2020-12-07 DIAGNOSIS — R41.3 MEMORY CHANGE: ICD-10-CM

## 2020-12-08 DIAGNOSIS — I25.10 CORONARY ARTERY DISEASE INVOLVING NATIVE CORONARY ARTERY OF NATIVE HEART WITHOUT ANGINA PECTORIS: ICD-10-CM

## 2020-12-08 RX ORDER — DONEPEZIL HYDROCHLORIDE 10 MG/1
10 TABLET, FILM COATED ORAL AT BEDTIME
Qty: 30 TABLET | Refills: 10 | Status: SHIPPED | OUTPATIENT
Start: 2020-12-08 | End: 2020-12-29

## 2020-12-08 RX ORDER — ROSUVASTATIN CALCIUM 40 MG/1
40 TABLET, COATED ORAL DAILY
Qty: 90 TABLET | Refills: 0 | Status: SHIPPED | OUTPATIENT
Start: 2020-12-08 | End: 2020-12-29

## 2020-12-15 ENCOUNTER — OFFICE VISIT (OUTPATIENT)
Dept: INTERNAL MEDICINE | Facility: CLINIC | Age: 85
End: 2020-12-15
Payer: MEDICARE

## 2020-12-15 VITALS
SYSTOLIC BLOOD PRESSURE: 120 MMHG | WEIGHT: 166.7 LBS | TEMPERATURE: 94.2 F | OXYGEN SATURATION: 98 % | RESPIRATION RATE: 16 BRPM | DIASTOLIC BLOOD PRESSURE: 60 MMHG | BODY MASS INDEX: 23.92 KG/M2 | HEART RATE: 74 BPM

## 2020-12-15 DIAGNOSIS — R19.4 CHANGE IN BOWEL HABITS: Primary | ICD-10-CM

## 2020-12-15 DIAGNOSIS — E11.40 TYPE 2 DIABETES MELLITUS WITH DIABETIC NEUROPATHY, WITHOUT LONG-TERM CURRENT USE OF INSULIN (H): ICD-10-CM

## 2020-12-15 DIAGNOSIS — R41.3 MEMORY CHANGE: ICD-10-CM

## 2020-12-15 DIAGNOSIS — I10 ESSENTIAL HYPERTENSION, BENIGN: ICD-10-CM

## 2020-12-15 PROCEDURE — 99214 OFFICE O/P EST MOD 30 MIN: CPT | Performed by: FAMILY MEDICINE

## 2020-12-15 NOTE — PROGRESS NOTES
Subjective     Abbe Lambert is a 86 year old male who presents to clinic today for the following health issues:    HPI         Pt is here to f/u on constipation on visit 11/24/2020. Constipation issues has gotten better since off the donepezil.  Patient is currently taking MiraLAX as needed.  Is explosive diarrhea occur after not having a bowel movement for a day or 2 has all stopped with discontinuation of Aricept.  He had been taking Metamucil and it is not clear from either he or his wife if he currently has.    Pt's feet have pains that come and go.          Review of Systems   Constitutional, HEENT, cardiovascular, pulmonary, gi and gu systems are negative, except as otherwise noted.      Objective    /60 (BP Location: Right arm, Patient Position: Chair, Cuff Size: Adult Regular)   Pulse 74   Temp 94.2  F (34.6  C) (Temporal)   Resp 16   Wt 75.6 kg (166 lb 11.2 oz)   SpO2 98%   BMI 23.92 kg/m    Body mass index is 23.92 kg/m .  Physical Exam   GENERAL APPEARANCE: healthy, alert and no distress            Assessment & Plan     Change in bowel habits      Memory change      Essential hypertension, benign      Type 2 diabetes mellitus with diabetic neuropathy, without long-term current use of insulin (H)            Patient Instructions   I have discussed with the patient and his wife that he should go home and find out all of the medication while I was performing thanks and then call us back so that we can make sure that her medication list is accurate.  It is very unclear from the couple what medicines he is taking and it is not taking.    He is intermittently getting discomfort in his feet that sometimes lasts for seconds sometimes for a few minutes.  It then spontaneously disappears.  He has not taken anything for that.  We did discuss the fact that acetaminophen would probably be a first-line choice if the pain is persisting.      Return in about 3 months (around 3/15/2021) for wellness  exam.    Eliezer Shah MD  [facility]

## 2020-12-15 NOTE — PATIENT INSTRUCTIONS
I have discussed with the patient and his wife that he should go home and find out all of the medication while I was performing thanks and then call us back so that we can make sure that her medication list is accurate.  It is very unclear from the couple what medicines he is taking and it is not taking.    He is intermittently getting discomfort in his feet that sometimes lasts for seconds sometimes for a few minutes.  It then spontaneously disappears.  He has not taken anything for that.  We did discuss the fact that acetaminophen would probably be a first-line choice if the pain is persisting.

## 2020-12-18 DIAGNOSIS — I25.5 ISCHEMIC CARDIOMYOPATHY: Primary | Chronic | ICD-10-CM

## 2020-12-22 ENCOUNTER — ANCILLARY PROCEDURE (OUTPATIENT)
Dept: CARDIOLOGY | Facility: CLINIC | Age: 85
End: 2020-12-22
Attending: INTERNAL MEDICINE
Payer: MEDICARE

## 2020-12-22 DIAGNOSIS — Z95.0 CARDIAC PACEMAKER IN SITU: ICD-10-CM

## 2020-12-22 DIAGNOSIS — I49.5 TACHY-BRADY SYNDROME (H): ICD-10-CM

## 2020-12-22 PROCEDURE — 93294 REM INTERROG EVL PM/LDLS PM: CPT | Performed by: INTERNAL MEDICINE

## 2020-12-22 PROCEDURE — 93296 REM INTERROG EVL PM/IDS: CPT | Performed by: INTERNAL MEDICINE

## 2020-12-23 ENCOUNTER — NURSE TRIAGE (OUTPATIENT)
Dept: FAMILY MEDICINE | Facility: CLINIC | Age: 85
End: 2020-12-23

## 2020-12-23 NOTE — TELEPHONE ENCOUNTER
"Spoke with patient's wife. They did not go to ER because she was not feeling very good so waited. (She is not having diarrhea, says she is dealing with something she has \"been living with for a long time\".)  No blood seen when patient went in bathroom again. Not having diarrhea now. He skipped his miralax today, she said Dr. Shah just said to use it as needed and that the bottle says no longer than 7 days.  No nausea, vomiting, pain. Still advised he be examined in ER but refusing at this point. Also refusing UC. Should he follow up in clinic tomorrow or what would you advise?  "

## 2020-12-23 NOTE — TELEPHONE ENCOUNTER
"Patient's wife calling. Says patient saw Dr. Shah for constipation and was ok for a few days. Yesterday had \"an explosion\". This morning says it is continuously dripping. Going through his pads. Hardly eating. Having some water. Unsure if dehydrated. Unsure of BP. Took miralax yesterday morning for first dose, but then said he had a dose a week ago too. Thinks she was told to have him take it daily for 7 days. Yesterday had some explosive diarrhea after taking it. Felt better but then this morning is having non stop \"dripping\". Yesterday the miralax took a few hours to work. Last week worked after a couple hours. Thinks BM are on yellow side. Blood with it too. Thinks has had blood off an on for awhile. Blood mixed in with stool, thinks bright red. Unsure if blood when wipes or in toilet. Says patient is \"kind of out of it\". She then put patient on phone but he could not hear me at all. He did say he cccasionally has bleeding, not every time and that he thinks it is from him \"digging around to try to get blockage out\".  Per wife, patient had pain when constipation started but not currently.     Advised patient's wife that patient should go to ER right away but does not sound like they are willing to do that at this point. Advised on reasons and risks. She said they would talk about it but also \"we will see what happens\".    Please advise.   "

## 2020-12-23 NOTE — TELEPHONE ENCOUNTER
"Patient's wife calling. Says patient saw Dr. Shah for constipation and was ok for a few days. Yesterday had \"an explosion\". This morning says it is continuously dripping. Going through his pads. Hardly eating. Having some water. Unsure if dehydrated. Unsure of BP. Took miralax yesterday morning for first dose, but then said he had a dose a week ago too. Thinks she was told to have him take it daily for 7 days. Yesterday had some explosive diarrhea after taking it. Felt better but then this morning is having non stop \"dripping\". Yesterday the miralax took a few hours to work. Last week worked after a couple hours. Thinks BM are on yellow side. Blood with it too. Thinks has had blood off an on for awhile. Blood mixed in with stool, thinks bright red. Unsure if blood when wipes or in toilet. Says patient is \"kind of out of it\". She then put patient on phone but he could not hear me at all. He did say he occcasionally has bleeding, not every time and that he thinks it is from him \"digging around to try to get blockage out\".  He also said \"now my wife is in the bathroom\" and is unsure if she is experiencing any symptoms herself. Per wife, patient had pain when constipation started but not currently.     Advised patient's wife that patient should go to ER right away but does not sound like they are willing to do that at this point. Advised on reasons and risks. She said they would talk about it but also \"we will see what happens\".    Please advise.   Reason for Disposition    Bloody, black, or tarry bowel movements    Additional Information    Negative: Shock suspected (e.g., cold/pale/clammy skin, too weak to stand, low BP, rapid pulse)    Negative: Difficult to awaken or acting confused (e.g., disoriented, slurred speech)    Negative: Sounds like a life-threatening emergency to the triager    Negative: Vomiting also present and worse than the diarrhea    Negative: Blood in stool and without diarrhea    Negative: " SEVERE abdominal pain (e.g., excruciating) and present > 1 hour    Negative: SEVERE abdominal pain and age > 60    Protocols used: DIARRHEA-A-OH

## 2020-12-24 ENCOUNTER — HOSPITAL ENCOUNTER (EMERGENCY)
Facility: CLINIC | Age: 85
Discharge: HOME | End: 2020-12-24
Attending: PHYSICIAN ASSISTANT | Admitting: PHYSICIAN ASSISTANT
Payer: MEDICARE

## 2020-12-24 ENCOUNTER — APPOINTMENT (OUTPATIENT)
Dept: CT IMAGING | Facility: CLINIC | Age: 85
End: 2020-12-24
Attending: PHYSICIAN ASSISTANT
Payer: MEDICARE

## 2020-12-24 VITALS
TEMPERATURE: 97.4 F | DIASTOLIC BLOOD PRESSURE: 70 MMHG | RESPIRATION RATE: 16 BRPM | HEART RATE: 70 BPM | SYSTOLIC BLOOD PRESSURE: 118 MMHG | OXYGEN SATURATION: 96 %

## 2020-12-24 DIAGNOSIS — I71.43 ANEURYSM OF INFRARENAL ABDOMINAL AORTA (H): ICD-10-CM

## 2020-12-24 DIAGNOSIS — R19.7 ACUTE DIARRHEA: ICD-10-CM

## 2020-12-24 LAB
ALBUMIN SERPL-MCNC: 3.1 G/DL (ref 3.4–5)
ALP SERPL-CCNC: 95 U/L (ref 40–150)
ALT SERPL W P-5'-P-CCNC: 25 U/L (ref 0–70)
ANION GAP SERPL CALCULATED.3IONS-SCNC: 3 MMOL/L (ref 3–14)
AST SERPL W P-5'-P-CCNC: 30 U/L (ref 0–45)
BASOPHILS # BLD AUTO: 0 10E9/L (ref 0–0.2)
BASOPHILS NFR BLD AUTO: 0.5 %
BILIRUB SERPL-MCNC: 0.4 MG/DL (ref 0.2–1.3)
BUN SERPL-MCNC: 31 MG/DL (ref 7–30)
CALCIUM SERPL-MCNC: 9.2 MG/DL (ref 8.5–10.1)
CHLORIDE SERPL-SCNC: 110 MMOL/L (ref 94–109)
CO2 SERPL-SCNC: 28 MMOL/L (ref 20–32)
CREAT SERPL-MCNC: 1.76 MG/DL (ref 0.66–1.25)
DIFFERENTIAL METHOD BLD: ABNORMAL
EOSINOPHIL # BLD AUTO: 0.1 10E9/L (ref 0–0.7)
EOSINOPHIL NFR BLD AUTO: 1.3 %
ERYTHROCYTE [DISTWIDTH] IN BLOOD BY AUTOMATED COUNT: 14.6 % (ref 10–15)
GFR SERPL CREATININE-BSD FRML MDRD: 34 ML/MIN/{1.73_M2}
GLUCOSE SERPL-MCNC: 84 MG/DL (ref 70–99)
HCT VFR BLD AUTO: 36.3 % (ref 40–53)
HGB BLD-MCNC: 11.5 G/DL (ref 13.3–17.7)
IMM GRANULOCYTES # BLD: 0 10E9/L (ref 0–0.4)
IMM GRANULOCYTES NFR BLD: 0.3 %
LYMPHOCYTES # BLD AUTO: 1.3 10E9/L (ref 0.8–5.3)
LYMPHOCYTES NFR BLD AUTO: 16.6 %
MCH RBC QN AUTO: 30.7 PG (ref 26.5–33)
MCHC RBC AUTO-ENTMCNC: 31.7 G/DL (ref 31.5–36.5)
MCV RBC AUTO: 97 FL (ref 78–100)
MONOCYTES # BLD AUTO: 0.8 10E9/L (ref 0–1.3)
MONOCYTES NFR BLD AUTO: 10 %
NEUTROPHILS # BLD AUTO: 5.7 10E9/L (ref 1.6–8.3)
NEUTROPHILS NFR BLD AUTO: 71.3 %
NRBC # BLD AUTO: 0 10*3/UL
NRBC BLD AUTO-RTO: 0 /100
PLATELET # BLD AUTO: 166 10E9/L (ref 150–450)
POTASSIUM SERPL-SCNC: 4.8 MMOL/L (ref 3.4–5.3)
PROT SERPL-MCNC: 7.6 G/DL (ref 6.8–8.8)
RBC # BLD AUTO: 3.75 10E12/L (ref 4.4–5.9)
SODIUM SERPL-SCNC: 141 MMOL/L (ref 133–144)
WBC # BLD AUTO: 7.9 10E9/L (ref 4–11)

## 2020-12-24 PROCEDURE — 96361 HYDRATE IV INFUSION ADD-ON: CPT

## 2020-12-24 PROCEDURE — 80053 COMPREHEN METABOLIC PANEL: CPT | Performed by: PHYSICIAN ASSISTANT

## 2020-12-24 PROCEDURE — 258N000003 HC RX IP 258 OP 636: Performed by: PHYSICIAN ASSISTANT

## 2020-12-24 PROCEDURE — 96360 HYDRATION IV INFUSION INIT: CPT

## 2020-12-24 PROCEDURE — 85025 COMPLETE CBC W/AUTO DIFF WBC: CPT | Performed by: PHYSICIAN ASSISTANT

## 2020-12-24 PROCEDURE — 99284 EMERGENCY DEPT VISIT MOD MDM: CPT | Mod: 25

## 2020-12-24 PROCEDURE — 74176 CT ABD & PELVIS W/O CONTRAST: CPT

## 2020-12-24 RX ADMIN — SODIUM CHLORIDE 1000 ML: 9 INJECTION, SOLUTION INTRAVENOUS at 16:21

## 2020-12-24 ASSESSMENT — ENCOUNTER SYMPTOMS
FEVER: 0
RECTAL PAIN: 0
NAUSEA: 0
DYSURIA: 0
DIARRHEA: 1
BLOOD IN STOOL: 1
VOMITING: 0
ABDOMINAL PAIN: 0

## 2020-12-24 NOTE — ED PROVIDER NOTES
History   Chief Complaint:  Diarrhea     The history is provided by the patient and the spouse. The history is limited by the condition of the patient.      Abbe Lambert is a 86 year old male with history of AAA, Anemia, MI, hypertension, Alzheimer disease and pacemaker in place, who presents with diarrhea. The patient's wife notes that yesterday morning the patient had a small twinge of pain in his abdomen and was unable to make it to the bathroom. She notes that he has been having troubles for the past few days with multiple bouts of diarrhea. She denies changes to his meds or changes in food. His wife notes that there was some red blood in his stool and diarrhea, but only one episode and not continuously.  He denies dysuria or abdominal pain, rectal pain, fever, or antibiotic use.     Review of Systems   Unable to perform ROS: Dementia   Constitutional: Negative for fever.   Cardiovascular: Negative for chest pain.   Gastrointestinal: Positive for blood in stool and diarrhea. Negative for abdominal pain, nausea, rectal pain and vomiting.   Genitourinary: Negative for dysuria.   All other systems reviewed and are negative.    Allergies:  Advair Diskus  Morphine Hcl  Vicodin [Hydrocodone-Acetaminophen]    Medications:  Coreg  Aricept  Nitrostat  Crestor   Demadex    Past Medical History:    AAA  Anemia  Asthma  Atrial fibrillation   Cardiac arrest   Cardiomyopathy  CKD  GERD  Hyperlipidemia  Hypertension   MI   Polymyalgia rheumatica  Shingles  Tachy-alix syndrome   Cardiac pacemaker in place  Obesity  Vascular dementia  CVA  Prostate cancer  PAD  Type 2 diabetes  CHF  Atrial fibrillation Paroxysmal   Peripheral edema     Past Surgical History:    Arthroplasty knee, left  Cardiac surgery, pacemaker  Cholecystectomy   ENT surgery   EGD x2  Intraocular lens implant  Prostatectomy     Family History:    Cerebrovascular disease, father  Heart disease, father and brother   CAD, brother  Depression,  daughter    Social History:  Patient presents with his wife  PCP: Eliezer Shah      Physical Exam     Patient Vitals for the past 24 hrs:   BP Temp Temp src Pulse Resp SpO2   12/24/20 1534 126/65 97.4  F (36.3  C) Temporal 64 18 96 %       Physical Exam  General: Alert and interactive. Appears well. Cooperative and pleasant.   Eyes: The pupils are equal and round. EOMs intact. No scleral icterus.  ENT: No abnormalities to the external nose or ears. Mucous membranes moist. Posterior oropharynx is non-erythematous.      Neck: Trachea is in the midline. No nuchal rigidity.     CV: Regular rate and rhythm. S1 and S2 normal without murmur, click, gallop or rub.   Resp: Breath sounds are clear bilaterally, without rhonchi, wheezes, rales. Non-labored, no retractions or accessory muscle use.     GI: Abdomen is soft without distension. No tenderness to palpation. No peritoneal signs.   Rectal: Multiple skin tags. No hemorrhoids or active bleeding. Light brown stool on glove.   MS: Moving all extremities well. Good muscle tone.   Skin: Warm and dry. No rash or lesions noted.  Neuro: Alert and oriented x 3. No focal neurologic deficits. Good strength and sensation in upper and lower extremities.    Psych: Awake. Alert.  Normal affect. Appropriate interactions.  Lymph: No anterior or posterior cervical lymphadenopathy noted.    Emergency Department Course     Imaging:  CT Abdomen/Pelvis w/o contrast:   1. Within the limitation of noncontrast exam, no acute pathology in   the abdomen or pelvis.   2. Colonic diverticulosis without CT evidence of acute diverticulitis.   3. Infrarenal abdominal aortic aneurysm measuring 4.1 cm in diameter.     Reading per radiology    Laboratory:  CBC: WBC 7.9, HGB 11.5(L),    CMP: Chloride 110(H), BUN 31(H), GFR 34(L), (Creatinine: 1.76(H) , Albumin 3.1(L)    Emergency Department Course:  Reviewed:  I reviewed the patient's nursing notes, vitals, past medical records, Care  Everywhere.     Assessments:  1549 I performed an assessment and examination of the patient as noted above.       1810 Findings and plan explained to the Patient and spouse. Patient discharged home with instructions regarding supportive care, medications, and reasons to return. The importance of close follow-up was reviewed.       Interventions:  1621 NS 1L IV Bolus     Disposition:  The patient was discharged to home.       Impression & Plan   Medical Decision Making:  Abbe Lambert is a 86 year old male who presents for evaluation of diarrhea with one episode of dark red blood. There are no signs of worrisome intra-abdominal pathologies detected during the visit today and only one isolated episode of blood, per patient. No recent ABX use. Only one episode of diarrhea per day.     The patient has a completely benign abdominal exam without rebound, guarding, or marked tenderness to palpation. CT obtained shows no signs of colitis or SBO. He does have diverticulosis without diverticulitis. Upon further discussion, patient mentions that he may have scratched near his anus when try his own disimpaction a week ago when constipated. I find no active bleeding or hemorrhoids on exam. Hemoglobin and creatinine are stable.     Patient is well appearing, afebrile. COVID-19 considered but patient isolates at home and has no other symptoms. He has been occasionally incontinent of stool, but he complains of no back pain, urologic issues, or weakness in lower extremities, and I am doubtful of cauda equina. Patient to return to ED for persistent blood in stool, increasing pain, or fevers more than 102. Otherwise, should follow up with PCP for further workup.         Diagnosis:    ICD-10-CM    1. Acute diarrhea  R19.7    2. Aneurysm of infrarenal abdominal aorta (H)  I71.4     largely unchanged       Discharge Medications:  Discharge Medication List as of 12/24/2020  6:18 PM          Scribe Disclosure:  Fifi PATINO am  serving as a scribe at 3:32 PM on 12/24/2020 to document services personally performed by Sade Atwood PA-C based on my observations and the provider's statements to me.              Sade Atwood PA-C  12/24/20 1467

## 2020-12-24 NOTE — ED AVS SNAPSHOT
[facility] Emergency Dept  [address] [address]  Phone: [phone]  Fax: [phone]                                    Abbe Lambert   MRN: [id number]    Department: [facility] Emergency Dept   Date of Visit: 12/24/2020           After Visit Summary Signature Page    I have received my discharge instructions, and my questions have been answered. I have discussed any challenges I see with this plan with the nurse or doctor.    ..........................................................................................................................................  Patient/Patient Representative Signature      ..........................................................................................................................................  Patient Representative Print Name and Relationship to Patient    ..................................................               ................................................  Date                                   Time    ..........................................................................................................................................  Reviewed by Signature/Title    ...................................................              ..............................................  Date                                               Time          22EPIC Rev 08/18

## 2020-12-24 NOTE — TELEPHONE ENCOUNTER
Pt notified to observe and monitor his sx and if he worsens again to go to ER and he agreed.Britney Ott RN

## 2020-12-25 NOTE — DISCHARGE INSTRUCTIONS
Try a clear liquid diet and other boring foods for the next 48 hours.   Return if fevers, abdominal pain, consistent bloody stools.   Follow up with primary care ASAP for definitive management.     Discharge Instructions  Adult Diarrhea    You have been seen today for diarrhea (loose stools). This is usually caused by a virus, but some bacteria, parasites, medicines, or other medical conditions can cause similar symptoms. At this time your provider does not find that your diarrhea is a sign of anything dangerous or life-threatening. However, sometimes the signs of serious illness do not show up right away. If you have new or worse symptoms, you may need to be seen again in the Emergency Department or by your primary provider.     Generally, every Emergency Department visit should have a follow-up clinic visit with either a primary or a specialty clinic/provider. Please follow-up as instructed by your emergency provider today.    Return to the Emergency Department if:  You feel you are getting dehydrated, such as being very thirsty, not urinating (peeing) like usual, or feeling faint or lightheaded.   You develop a new fever.  You have abdominal (belly) pain that seems worse than cramps, is in one spot, or is getting worse over time.   You have blood in your stool or your stool becomes black.  (Remember that if you take Pepto-Bismol , this will turn your stool black).   You feel very weak.    What can I do to help myself?  The most important thing to do is to drink clear liquids.   It is best to have only small, frequent sips of liquids. Drinking too much at once may cause more diarrhea. You should also replace minerals, sodium and potassium lost with diarrhea. Pedialyte  and sports drinks can help you replace these minerals. You can also drink clear liquids such as water, weak tea, apple juice, and 7-Up . Avoid acidic liquids (orange juice), caffeine (coffee) or alcohol. Milk products will make the diarrhea  "worse.  Eat bland (plain) foods. Soda crackers, toast, plain noodles, gelatin, applesauce and bananas are good first choices. Avoid foods that have acid, are spicy, fatty or fibrous (such as meats, coarse grains, vegetables). You may start eating these foods again in about 3 days when you are better.   Sometimes treatment includes prescription medicine to prevent diarrhea. If your provider prescribes these for you, take them as directed.   Nonprescription medicine is available for the treatment of diarrhea and can be very effective. If you use it, make sure you use the dose recommended on the package. Check with your healthcare provider before you use any medicine for diarrhea.   Do not take ibuprofen, or other nonsteroidal anti-inflammatory medicines, without checking with your healthcare provider.   Probiotics: If you have been given an antibiotic, you may want to also take a probiotic pill or eat yogurt with live cultures. Probiotics have \"good bacteria\" to help your intestines stay healthy. Studies have shown that probiotics help prevent diarrhea and other intestine problems (including C. diff infection) when you take antibiotics. You can buy these without a prescription in the pharmacy section of the store.   If you were given a prescription for medicine here today, be sure to read all of the information (including the package insert) that comes with your prescription.  This will include important information about the medicine, its side effects, and any warnings that you need to know about.  The pharmacist who fills the prescription can provide more information and answer questions you may have about the medicine.  If you have questions or concerns that the pharmacist cannot address, please call or return to the Emergency Department.  Remember that you can always come back to the Emergency Department if you are not able to see your regular provider in the amount of time listed above, if you get any new " symptoms, or if there is anything that worries you.

## 2020-12-29 ENCOUNTER — VIRTUAL VISIT (OUTPATIENT)
Dept: INTERNAL MEDICINE | Facility: CLINIC | Age: 85
End: 2020-12-29
Payer: MEDICARE

## 2020-12-29 DIAGNOSIS — R41.3 MEMORY CHANGE: ICD-10-CM

## 2020-12-29 DIAGNOSIS — R19.4 CHANGE IN BOWEL HABITS: Primary | ICD-10-CM

## 2020-12-29 DIAGNOSIS — R19.7 DIARRHEA, UNSPECIFIED TYPE: ICD-10-CM

## 2020-12-29 PROCEDURE — 99441 PR PHYSICIAN TELEPHONE EVALUATION 5-10 MIN: CPT | Mod: 95 | Performed by: FAMILY MEDICINE

## 2020-12-29 NOTE — PROGRESS NOTES
"Abbe Lambert is a 86 year old male who is being evaluated via a billable telephone visit.      The patient has been notified of following:     \"This telephone visit will be conducted via a call between you and your physician/provider. We have found that certain health care needs can be provided without the need for a physical exam.  This service lets us provide the care you need with a short phone conversation.  If a prescription is necessary we can send it directly to your pharmacy.  If lab work is needed we can place an order for that and you can then stop by our lab to have the test done at a later time.    Telephone visits are billed at different rates depending on your insurance coverage. During this emergency period, for some insurers they may be billed the same as an in-person visit.  Please reach out to your insurance provider with any questions.    If during the course of the call the physician/provider feels a telephone visit is not appropriate, you will not be charged for this service.\"    Patient has given verbal consent for Telephone visit?  Yes    What phone number would you like to be contacted at? [phone]    How would you like to obtain your AVS? Mail a copy    Subjective     Abbe Lambert is a 86 year old male who presents via phone visit today for the following health issues:    HPI     ED/UC Followup:    Facility:  [facility]  Date of visit: 12/24/20  Reason for visit: Diarrhea  Current Status: Still having loose stools.                 Review of Systems   Constitutional, HEENT, cardiovascular, pulmonary, gi and gu systems are negative, except as otherwise noted.  When I got on the phone with the patient he professes that his stools are working just fine.  He is taking MiraLAX on a daily basis and is not having any troubles with his bowel movements at all.       Objective          Vitals:  No vitals were obtained today due to virtual visit.    healthy, alert and no distress  PSYCH: " Alert and oriented times 3; coherent speech, normal   rate and volume, able to articulate logical thoughts, able   to abstract reason, no tangential thoughts, no hallucinations   or delusions  His affect is normal  RESP: No cough, no audible wheezing, able to talk in full sentences  Remainder of exam unable to be completed due to telephone visits            Assessment/Plan:    Assessment & Plan     Change in bowel habits      Diarrhea, unspecified type since on doxycycline for sinuses since early 2-20       Memory change            Patient Instructions   The patient will continue with MiraLAX daily.  He will follow-up in January for his annual wellness exam.      Return in about 4 weeks (around 1/26/2021) for wellness exam.    Eliezer Shah MD  [facility]    Phone call duration:  7 minutes

## 2020-12-30 NOTE — PATIENT INSTRUCTIONS
The patient will continue with MiraLAX daily.  He will follow-up in January for his annual wellness exam.

## 2021-01-02 LAB
MDC_IDC_LEAD_IMPLANT_DT: NORMAL
MDC_IDC_LEAD_IMPLANT_DT: NORMAL
MDC_IDC_LEAD_LOCATION: NORMAL
MDC_IDC_LEAD_LOCATION: NORMAL
MDC_IDC_LEAD_MFG: NORMAL
MDC_IDC_LEAD_MFG: NORMAL
MDC_IDC_LEAD_MODEL: NORMAL
MDC_IDC_LEAD_MODEL: NORMAL
MDC_IDC_LEAD_POLARITY_TYPE: NORMAL
MDC_IDC_LEAD_POLARITY_TYPE: NORMAL
MDC_IDC_LEAD_SERIAL: NORMAL
MDC_IDC_LEAD_SERIAL: NORMAL
MDC_IDC_MSMT_BATTERY_DTM: NORMAL
MDC_IDC_MSMT_BATTERY_REMAINING_LONGEVITY: 121 MO
MDC_IDC_MSMT_BATTERY_REMAINING_PERCENTAGE: 95.5 %
MDC_IDC_MSMT_BATTERY_RRT_TRIGGER: NORMAL
MDC_IDC_MSMT_BATTERY_STATUS: NORMAL
MDC_IDC_MSMT_BATTERY_VOLTAGE: 3.02 V
MDC_IDC_MSMT_LEADCHNL_RA_IMPEDANCE_VALUE: 400 OHM
MDC_IDC_MSMT_LEADCHNL_RA_LEAD_CHANNEL_STATUS: NORMAL
MDC_IDC_MSMT_LEADCHNL_RA_PACING_THRESHOLD_AMPLITUDE: 0.75 V
MDC_IDC_MSMT_LEADCHNL_RA_PACING_THRESHOLD_PULSEWIDTH: 0.5 MS
MDC_IDC_MSMT_LEADCHNL_RA_SENSING_INTR_AMPL: 3.8 MV
MDC_IDC_MSMT_LEADCHNL_RV_IMPEDANCE_VALUE: 510 OHM
MDC_IDC_MSMT_LEADCHNL_RV_LEAD_CHANNEL_STATUS: NORMAL
MDC_IDC_MSMT_LEADCHNL_RV_PACING_THRESHOLD_AMPLITUDE: 1 V
MDC_IDC_MSMT_LEADCHNL_RV_PACING_THRESHOLD_PULSEWIDTH: 0.5 MS
MDC_IDC_MSMT_LEADCHNL_RV_SENSING_INTR_AMPL: 12 MV
MDC_IDC_PG_IMPLANT_DTM: NORMAL
MDC_IDC_PG_MFG: NORMAL
MDC_IDC_PG_MODEL: NORMAL
MDC_IDC_PG_SERIAL: NORMAL
MDC_IDC_PG_TYPE: NORMAL
MDC_IDC_SESS_CLINIC_NAME: NORMAL
MDC_IDC_SESS_DTM: NORMAL
MDC_IDC_SESS_REPROGRAMMED: NO
MDC_IDC_SESS_TYPE: NORMAL
MDC_IDC_SET_BRADY_AT_MODE_SWITCH_MODE: NORMAL
MDC_IDC_SET_BRADY_AT_MODE_SWITCH_RATE: 180 {BEATS}/MIN
MDC_IDC_SET_BRADY_LOWRATE: 60 {BEATS}/MIN
MDC_IDC_SET_BRADY_MAX_SENSOR_RATE: 120 {BEATS}/MIN
MDC_IDC_SET_BRADY_MAX_TRACKING_RATE: 120 {BEATS}/MIN
MDC_IDC_SET_BRADY_MODE: NORMAL
MDC_IDC_SET_BRADY_PAV_DELAY_LOW: 200 MS
MDC_IDC_SET_BRADY_SAV_DELAY_LOW: 170 MS
MDC_IDC_SET_LEADCHNL_RA_PACING_AMPLITUDE: 1.75 V
MDC_IDC_SET_LEADCHNL_RA_PACING_ANODE_ELECTRODE_1: NORMAL
MDC_IDC_SET_LEADCHNL_RA_PACING_ANODE_LOCATION_1: NORMAL
MDC_IDC_SET_LEADCHNL_RA_PACING_CAPTURE_MODE: NORMAL
MDC_IDC_SET_LEADCHNL_RA_PACING_CATHODE_ELECTRODE_1: NORMAL
MDC_IDC_SET_LEADCHNL_RA_PACING_CATHODE_LOCATION_1: NORMAL
MDC_IDC_SET_LEADCHNL_RA_PACING_POLARITY: NORMAL
MDC_IDC_SET_LEADCHNL_RA_PACING_PULSEWIDTH: 0.5 MS
MDC_IDC_SET_LEADCHNL_RA_SENSING_ADAPTATION_MODE: NORMAL
MDC_IDC_SET_LEADCHNL_RA_SENSING_ANODE_ELECTRODE_1: NORMAL
MDC_IDC_SET_LEADCHNL_RA_SENSING_ANODE_LOCATION_1: NORMAL
MDC_IDC_SET_LEADCHNL_RA_SENSING_CATHODE_ELECTRODE_1: NORMAL
MDC_IDC_SET_LEADCHNL_RA_SENSING_CATHODE_LOCATION_1: NORMAL
MDC_IDC_SET_LEADCHNL_RA_SENSING_POLARITY: NORMAL
MDC_IDC_SET_LEADCHNL_RA_SENSING_SENSITIVITY: 1 MV
MDC_IDC_SET_LEADCHNL_RV_PACING_AMPLITUDE: 1.25 V
MDC_IDC_SET_LEADCHNL_RV_PACING_ANODE_ELECTRODE_1: NORMAL
MDC_IDC_SET_LEADCHNL_RV_PACING_ANODE_LOCATION_1: NORMAL
MDC_IDC_SET_LEADCHNL_RV_PACING_CAPTURE_MODE: NORMAL
MDC_IDC_SET_LEADCHNL_RV_PACING_CATHODE_ELECTRODE_1: NORMAL
MDC_IDC_SET_LEADCHNL_RV_PACING_CATHODE_LOCATION_1: NORMAL
MDC_IDC_SET_LEADCHNL_RV_PACING_POLARITY: NORMAL
MDC_IDC_SET_LEADCHNL_RV_PACING_PULSEWIDTH: 0.5 MS
MDC_IDC_SET_LEADCHNL_RV_SENSING_ADAPTATION_MODE: NORMAL
MDC_IDC_SET_LEADCHNL_RV_SENSING_ANODE_ELECTRODE_1: NORMAL
MDC_IDC_SET_LEADCHNL_RV_SENSING_ANODE_LOCATION_1: NORMAL
MDC_IDC_SET_LEADCHNL_RV_SENSING_CATHODE_ELECTRODE_1: NORMAL
MDC_IDC_SET_LEADCHNL_RV_SENSING_CATHODE_LOCATION_1: NORMAL
MDC_IDC_SET_LEADCHNL_RV_SENSING_POLARITY: NORMAL
MDC_IDC_SET_LEADCHNL_RV_SENSING_SENSITIVITY: 2 MV
MDC_IDC_STAT_AT_BURDEN_PERCENT: 0 %
MDC_IDC_STAT_AT_DTM_END: NORMAL
MDC_IDC_STAT_AT_DTM_START: NORMAL
MDC_IDC_STAT_AT_MODE_SW_COUNT: 0
MDC_IDC_STAT_AT_MODE_SW_COUNT_PER_DAY: 0
MDC_IDC_STAT_AT_MODE_SW_PERCENT_TIME: 0 %
MDC_IDC_STAT_BRADY_AP_VP_PERCENT: 1 %
MDC_IDC_STAT_BRADY_AP_VS_PERCENT: 88 %
MDC_IDC_STAT_BRADY_AS_VP_PERCENT: 1 %
MDC_IDC_STAT_BRADY_AS_VS_PERCENT: 11 %
MDC_IDC_STAT_BRADY_DTM_END: NORMAL
MDC_IDC_STAT_BRADY_DTM_START: NORMAL
MDC_IDC_STAT_BRADY_RA_PERCENT_PACED: 88 %
MDC_IDC_STAT_BRADY_RV_PERCENT_PACED: 1 %
MDC_IDC_STAT_CRT_DTM_END: NORMAL
MDC_IDC_STAT_CRT_DTM_START: NORMAL
MDC_IDC_STAT_HEART_RATE_ATRIAL_MAX: 210 {BEATS}/MIN
MDC_IDC_STAT_HEART_RATE_ATRIAL_MEAN: 67 {BEATS}/MIN
MDC_IDC_STAT_HEART_RATE_ATRIAL_MIN: 50 {BEATS}/MIN
MDC_IDC_STAT_HEART_RATE_DTM_END: NORMAL
MDC_IDC_STAT_HEART_RATE_DTM_START: NORMAL
MDC_IDC_STAT_HEART_RATE_VENTRICULAR_MAX: 200 {BEATS}/MIN
MDC_IDC_STAT_HEART_RATE_VENTRICULAR_MEAN: 67 {BEATS}/MIN
MDC_IDC_STAT_HEART_RATE_VENTRICULAR_MIN: 40 {BEATS}/MIN

## 2021-02-13 ENCOUNTER — NURSE TRIAGE (OUTPATIENT)
Dept: NURSING | Facility: CLINIC | Age: 86
End: 2021-02-13

## 2021-02-13 ENCOUNTER — HOSPITAL ENCOUNTER (EMERGENCY)
Facility: CLINIC | Age: 86
Discharge: HOME | End: 2021-02-13
Attending: EMERGENCY MEDICINE | Admitting: EMERGENCY MEDICINE
Payer: MEDICARE

## 2021-02-13 VITALS
SYSTOLIC BLOOD PRESSURE: 120 MMHG | RESPIRATION RATE: 16 BRPM | DIASTOLIC BLOOD PRESSURE: 72 MMHG | BODY MASS INDEX: 20.76 KG/M2 | WEIGHT: 145 LBS | OXYGEN SATURATION: 98 % | HEIGHT: 70 IN | HEART RATE: 64 BPM | TEMPERATURE: 97.8 F

## 2021-02-13 DIAGNOSIS — K59.00 CONSTIPATION, UNSPECIFIED CONSTIPATION TYPE: ICD-10-CM

## 2021-02-13 PROCEDURE — 250N000013 HC RX MED GY IP 250 OP 250 PS 637: Performed by: EMERGENCY MEDICINE

## 2021-02-13 PROCEDURE — 99283 EMERGENCY DEPT VISIT LOW MDM: CPT

## 2021-02-13 RX ADMIN — DOCUSATE SODIUM 286 ML: 50 LIQUID ORAL at 13:30

## 2021-02-13 ASSESSMENT — ENCOUNTER SYMPTOMS
ABDOMINAL PAIN: 1
RECTAL PAIN: 1
CONSTIPATION: 1

## 2021-02-13 ASSESSMENT — MIFFLIN-ST. JEOR: SCORE: 1338.97

## 2021-02-13 NOTE — TELEPHONE ENCOUNTER
"Wife - pt call in   Don is in extreme pain > abdominal > 10 /10  He says he is presently sitting on toilet - says \" its Stuck \" as in stool    Says last BM 2 days ago     When asked if he had tried any suppositories or enema as in fleets > pt says NO    Pt says he is in so much pain \" I don't know if Im going to make it \"    Because of the above and age - advised to be seen now     Pt will go to [facility] ED    Protocol and care advice reviewed  Caller states understanding of the recommended disposition  Advised to call back if further questions or concerns    Junior Hutchins , RN / [facility]      Reason for Disposition    Patient sounds very sick or weak to the triager    Protocols used: CONSTIPATION-A-AH      "

## 2021-02-13 NOTE — ED PROVIDER NOTES
History   Chief Complaint:  Constipation    HPI   Abbe Lambert is a 87 year old male with a complex medical history including AAA, CKD, CHF, coronary artery disease, polymyalgia rheumatica, type II diabetes, prostate cancer and stroke who presents with constipation for the past several days. The patient reports that he has not had a bowel movement in the last several days and is having associated rectal pain. He was able to have a partial bowel movement this morning, which has relieved some of his pain, but he still feels constipated. He also mentions some occasional lower left abdominal pain, but denies any other symptoms.       Review of Systems   Gastrointestinal: Positive for abdominal pain, constipation and rectal pain.   All other systems reviewed and are negative.      Allergies:  Advair Diskus  Morphine Hcl  Vicodin [Hydrocodone-Acetaminophen]    Medications:  Carvedilol  Melatonin  Nasonex  Nitrostat  Miralax  Demadex    Past Medical History:    Abdominal aortic aneurysm  Anemia  Asthma  Cardiac arrest  Cardiomyopathy  Chronic kidney disease   Chronic sinusitis  Coronary artery disease   GERD  Angina  Hyperlipidemia  Hypertension  Myocardial infarction  Polymyalgia rheumatica  Shingles  Shortness of breath  Tachy-alix syndrome   Dermatitis  Vascular dementia  Dysphagia  Esophageal stricture  CVA  Prostate cancer  Peripheral artery disease  Type II diabetes  Congestive heart failure      Past Surgical History:    Knee arthroplasty  Pacemaker  Cholecystectomy  Colonoscopy  Sinus surgery  Pacemaker   Esophagoscopy, gastroscopy, duodenoscopy, combined x2  Cataract extraction  Prostatectomy    Family History:    Father: cerebrovascular disease, heart disease   Brother: heart disease, coronary artery disease   Daughter: depression    Social History:  The patient presents to the emergency department alone.      Physical Exam     Patient Vitals for the past 24 hrs:   BP Temp Temp src Pulse Resp SpO2 Height  "Weight   02/13/21 1234 120/72 97.8  F (36.6  C) Oral 64 16 98 % 1.778 m (5' 10\") 65.8 kg (145 lb)       Physical Exam  General/Appearance: appears stated age, well-groomed, appears comfortable  Eyes: EOMI, no scleral injection, no icterus  ENT: MMM  Neck: supple, nl ROM, no stiffness  Cardiovascular: RRR, nl S1S2, no m/r/g, 2+ pulses in all 4 extremities, cap refill <2sec  Respiratory: CTAB, good air movement throughout, no wheezes/rhonchi/rales, no increased WOB, no retractions  GI: abd soft, non-distended, nttp,  no HSM, no rebound, no guarding, nl BS  MSK: FLOYD, good tone, no bony abnormality  Neuro: GCS 15, alert and oriented, no gross focal neuro deficits  Psych: interacts appropriately  Heme: no petechia, no purpura, no active bleeding        Emergency Department Course     Emergency Department Course:    Reviewed:  I reviewed nursing notes, vitals and past medical history    Assessments:  1238 I obtained history and examined the patient as noted above.   1425 I rechecked the patient and explained findings. He reports feeling improved and is ready to go home.     Interventions:  1330 Pink Lady enema 286 mL Rectal    Disposition:  The patient was discharged to home.    Impression & Plan     Medical Decision Making:  This pt presents for signs/sxs most-consistent with constipation.  There are no signs at this point for a need for rectal disimpaction.  There are no signs of obstruction nor other intraabdominal catastrophe and I do not feel that abdominal imaging would benefit the pt. He feels improved after the enema. They know to f/u with their PCP or return to the ED if the problem does not resolve, they develop abdominal pain/bloating/nausea/vomiting, they develop fevers, have weight loss, or develop other new and concerning symptoms. Patient is agreeable with this plan and is appropriate for discharge.        Diagnosis:    ICD-10-CM    1. Constipation, unspecified constipation type  K59.00        Discharge " Medications:  None      Scribe Disclosure:  I, Redd Saldanay, am serving as a scribe at 12:35 PM on 2/13/2021 to document services personally performed by Tiffani Mishra* based on my observations and the provider's statements to me.              Tiffani Mishra MD  02/13/21 1483

## 2021-03-11 ENCOUNTER — IMMUNIZATION (OUTPATIENT)
Dept: NURSING | Facility: CLINIC | Age: 86
End: 2021-03-11
Payer: MEDICARE

## 2021-03-11 PROCEDURE — 91300 PR COVID VAC PFIZER DIL RECON 30 MCG/0.3 ML IM: CPT

## 2021-03-11 PROCEDURE — 0001A PR COVID VAC PFIZER DIL RECON 30 MCG/0.3 ML IM: CPT

## 2021-03-17 ENCOUNTER — HOSPITAL ENCOUNTER (EMERGENCY)
Facility: CLINIC | Age: 86
Discharge: HOME | End: 2021-03-17
Attending: EMERGENCY MEDICINE | Admitting: EMERGENCY MEDICINE
Payer: MEDICARE

## 2021-03-17 VITALS
HEIGHT: 70 IN | DIASTOLIC BLOOD PRESSURE: 53 MMHG | OXYGEN SATURATION: 99 % | TEMPERATURE: 97.6 F | RESPIRATION RATE: 14 BRPM | SYSTOLIC BLOOD PRESSURE: 150 MMHG | WEIGHT: 142 LBS | HEART RATE: 75 BPM | BODY MASS INDEX: 20.33 KG/M2

## 2021-03-17 DIAGNOSIS — Z86.79 HX OF CONGESTIVE HEART FAILURE: ICD-10-CM

## 2021-03-17 DIAGNOSIS — R60.0 BILATERAL LEG EDEMA: Primary | ICD-10-CM

## 2021-03-17 LAB
ALBUMIN SERPL-MCNC: 3.1 G/DL (ref 3.4–5)
ALP SERPL-CCNC: 91 U/L (ref 40–150)
ALT SERPL W P-5'-P-CCNC: 29 U/L (ref 0–70)
ANION GAP SERPL CALCULATED.3IONS-SCNC: 2 MMOL/L (ref 3–14)
AST SERPL W P-5'-P-CCNC: 24 U/L (ref 0–45)
BASOPHILS # BLD AUTO: 0 10E9/L (ref 0–0.2)
BASOPHILS NFR BLD AUTO: 0.5 %
BILIRUB SERPL-MCNC: 0.2 MG/DL (ref 0.2–1.3)
BUN SERPL-MCNC: 28 MG/DL (ref 7–30)
CALCIUM SERPL-MCNC: 8.9 MG/DL (ref 8.5–10.1)
CHLORIDE SERPL-SCNC: 109 MMOL/L (ref 94–109)
CO2 SERPL-SCNC: 28 MMOL/L (ref 20–32)
CREAT SERPL-MCNC: 1.62 MG/DL (ref 0.66–1.25)
DIFFERENTIAL METHOD BLD: ABNORMAL
EOSINOPHIL # BLD AUTO: 0.2 10E9/L (ref 0–0.7)
EOSINOPHIL NFR BLD AUTO: 2 %
ERYTHROCYTE [DISTWIDTH] IN BLOOD BY AUTOMATED COUNT: 14.4 % (ref 10–15)
GFR SERPL CREATININE-BSD FRML MDRD: 38 ML/MIN/{1.73_M2}
GLUCOSE SERPL-MCNC: 86 MG/DL (ref 70–99)
HCT VFR BLD AUTO: 35 % (ref 40–53)
HGB BLD-MCNC: 10.8 G/DL (ref 13.3–17.7)
IMM GRANULOCYTES # BLD: 0 10E9/L (ref 0–0.4)
IMM GRANULOCYTES NFR BLD: 0.2 %
INTERPRETATION ECG - MUSE: NORMAL
LYMPHOCYTES # BLD AUTO: 1.7 10E9/L (ref 0.8–5.3)
LYMPHOCYTES NFR BLD AUTO: 19.9 %
MCH RBC QN AUTO: 30.4 PG (ref 26.5–33)
MCHC RBC AUTO-ENTMCNC: 30.9 G/DL (ref 31.5–36.5)
MCV RBC AUTO: 99 FL (ref 78–100)
MONOCYTES # BLD AUTO: 0.7 10E9/L (ref 0–1.3)
MONOCYTES NFR BLD AUTO: 7.9 %
NEUTROPHILS # BLD AUTO: 5.9 10E9/L (ref 1.6–8.3)
NEUTROPHILS NFR BLD AUTO: 69.5 %
NRBC # BLD AUTO: 0 10*3/UL
NRBC BLD AUTO-RTO: 0 /100
NT-PROBNP SERPL-MCNC: 866 PG/ML (ref 0–1800)
PLATELET # BLD AUTO: 303 10E9/L (ref 150–450)
POTASSIUM SERPL-SCNC: 3.9 MMOL/L (ref 3.4–5.3)
PROT SERPL-MCNC: 7.8 G/DL (ref 6.8–8.8)
RBC # BLD AUTO: 3.55 10E12/L (ref 4.4–5.9)
SODIUM SERPL-SCNC: 139 MMOL/L (ref 133–144)
TROPONIN I SERPL-MCNC: <0.015 UG/L (ref 0–0.04)
TSH SERPL DL<=0.005 MIU/L-ACNC: 2.83 MU/L (ref 0.4–4)
WBC # BLD AUTO: 8.5 10E9/L (ref 4–11)

## 2021-03-17 PROCEDURE — 85025 COMPLETE CBC W/AUTO DIFF WBC: CPT | Performed by: EMERGENCY MEDICINE

## 2021-03-17 PROCEDURE — 99284 EMERGENCY DEPT VISIT MOD MDM: CPT

## 2021-03-17 PROCEDURE — 80053 COMPREHEN METABOLIC PANEL: CPT | Performed by: EMERGENCY MEDICINE

## 2021-03-17 PROCEDURE — 84484 ASSAY OF TROPONIN QUANT: CPT | Performed by: EMERGENCY MEDICINE

## 2021-03-17 PROCEDURE — 83880 ASSAY OF NATRIURETIC PEPTIDE: CPT | Performed by: EMERGENCY MEDICINE

## 2021-03-17 PROCEDURE — 84443 ASSAY THYROID STIM HORMONE: CPT | Performed by: EMERGENCY MEDICINE

## 2021-03-17 PROCEDURE — 93005 ELECTROCARDIOGRAM TRACING: CPT

## 2021-03-17 ASSESSMENT — ENCOUNTER SYMPTOMS
ABDOMINAL PAIN: 0
FEVER: 0
NAUSEA: 0
DIARRHEA: 0
SHORTNESS OF BREATH: 0
BACK PAIN: 0
VOMITING: 0

## 2021-03-17 ASSESSMENT — MIFFLIN-ST. JEOR: SCORE: 1325.36

## 2021-03-17 NOTE — ED PROVIDER NOTES
History   Chief Complaint:  Leg Swelling    HPI   Abbe Lambert is a 87 year old male, s/p CABG and pacemaker implantation with a history of combined systolic and diastolic CHF, atrial fibrillation, MI, tachy-alix syndrome, CKD III, hypertension, hyperlipidemia, vascular dementia, amongst others, who presents to the ED for evaluation of leg swelling. The patient reports he woke up this morning and he found his bilateral lower extremities to have increased swelling up to his knees. He states this has happened in the past and was able to take his Demadex and that would correct his leg swelling. He noted he took one dose of his Demadex this morning, but shortly after he did not have any improvement and therefore he came into the emergency department. The patient denies any chest pain, shortness of breath, or fever. He has not had any calf pain or lower extremity pain. The patient denies any abdominal pain, nausea, vomiting, or diarrhea. He has not had any new back pain. The patient's only complaint is his leg swelling.     Review of Systems   Constitutional: Negative for fever.   Respiratory: Negative for shortness of breath.    Cardiovascular: Positive for leg swelling. Negative for chest pain.   Gastrointestinal: Negative for abdominal pain, diarrhea, nausea and vomiting.   Musculoskeletal: Negative for back pain.   All other systems reviewed and are negative.    Allergies:  Advair Diskus  Morphine  Vicodin    Medications:    Coreg  Nitrostat  Demadex    Past Medical History:    AAA  Asthma  Atrial fibrillation  Cardiac arrest  Cardiomyopathy  CKD III  GERD  Hyperlipidemia  Angina  Hypertension  MI  Tachy-alix syndrome  Shingles  Polymyalgia rheumatic  Stasis dermatitis of bilateral legs  Vascular dementia  CVA  Prostate cancer  PAD  Systolic and diastolic CHF  DM II  CAD    Past Surgical History:    Left knee arthroplasty  Pacemaker implantation  CABG  Cholecystectomy  Intraocular lens  "implantation  Prostatectomy  Sinus surgery    Family History:    Cerebrovascular disease  Heart disease  CAD  Depression    Social History:  PCP: Eliezer Shah  Presents to the ED alone    Physical Exam     Patient Vitals for the past 24 hrs:   BP Temp Temp src Pulse Resp SpO2 Height Weight   03/17/21 0857 (!) 150/53 97.6  F (36.4  C) Oral 75 14 99 % 1.778 m (5' 10\") 64.4 kg (142 lb)     Physical Exam  General: Alert, appears well-developed and well-nourished. Cooperative.     In mild distress  HEENT:  Head:  Atraumatic  Ears:  External ears are normal  Mouth/Throat:  Oropharynx is without erythema or exudate and mucous membranes are moist.   Eyes:   Conjunctivae normal and EOM are normal. No scleral icterus.  CV:  Normal rate, regular rhythm, normal heart sounds and radial pulses are 2+ and symmetric.  Systolic murmur.  Resp:  Breath sounds are clear bilaterally    Non-labored, no retractions or accessory muscle use  GI:  Abdomen is soft, no distension, no tenderness. No rebound or guarding.  No CVA tenderness bilaterally  MS:  Normal range of motion. 1+ pitting edema to BLE below knees.      Normal strength in all 4 extremities.     Back atraumatic.    No midline cervical, thoracic, or lumbar tenderness  Skin:  Warm and dry.  No rash or lesions noted.  Neuro: Alert. Normal strength.  GCS: 15  Psych:  Normal mood and affect.    Emergency Department Course   ECG (09:07:34):  Rate 60 bpm. OR interval 280. QRS duration 126. QT/QTc 462/462. P-R-T axes 62 -61 10. Atrial-paced rhythm with prolonged AV conduction. Left axis deviation. Left bundle branch block. Abnormal ECG. No significant change compared to EKG on 12/19/16. Interpreted at 0919 by Calderon Uriarte MD.    Laboratory:  CBC: HGB 10.8 (L), o/w WNL (WBC 8.5, )    CMP: Anion Gap 2 (L), Creatinine 1.62 (H), GFR 38 (L), Albumin 3.1 (L), o/w WNL     Troponin (Collected 0917): <0.015    BNP: 866    TSH with free T4 reflex: 2.83    Emergency Department " Course:    Reviewed:  I reviewed the patient's nursing notes, vitals, past available medical records.     Assessments:  0919: I obtained history and examined the patient as noted above.     1019: I rechecked the patient and explained findings. Understands and is amendable to the plan.     Disposition:  The patient was discharged to home.    Impression & Plan    Medical Decision Making:  Patient is an 87-year-old male who presents with bilateral lower extremity swelling increasing in the last 2 days.  Patient does have some pitting edema to the lower extremities consistent with a mild CHF exacerbation.  Reassuringly his blood work appears baseline for him with baseline CKD.  Patient has no evidence of ACS with a nonischemic EKG, unchanged in comparison with prior EKGs and an undetectable troponin.  Reassuringly he also has no chest pain or shortness of breath.  BNP within normal range.  Patient is not hypoxic and otherwise has normal vital signs.  He does take torsemide as needed and we discussed taking this regularly for the next 3 to 4 days until the swelling has improved and then he can return to an as-needed basis.  I would like him to follow-up with his primary care provider in reassessment from the ED visit in about 3 to 4 days for recheck.  Return sooner to the emergency department if he develops worsening swelling, development of shortness of breath or any change in his symptoms.  After all questions addressed and return precautions understood, discharged home.    Diagnosis:    ICD-10-CM    1. Bilateral leg edema  R60.0    2. Hx of congestive heart failure  Z86.79      Scribe Disclosure:  Faraz PATINO, am serving as a scribe at 9:19 AM on 3/17/2021 to document services personally performed by Calderon Uriarte MD based on my observations and the provider's statements to me.      Calderon Uriarte MD  03/17/21 [id number]

## 2021-03-17 NOTE — DISCHARGE INSTRUCTIONS
Plan to take torsemide every day for the next 4 days. If swelling in the lower legs persists, continue taking torsemide ever day and follow up with PCP.  If swelling is improved, you can return to taking torsemide as needed for swelling.

## 2021-03-17 NOTE — ED TRIAGE NOTES
"Bilat leg swelling with leg pain. H/O CHF and similar sxs in the past. \"This is the worst it's been.\" Denies cp or sob.   "

## 2021-03-22 ENCOUNTER — OFFICE VISIT (OUTPATIENT)
Dept: INTERNAL MEDICINE | Facility: CLINIC | Age: 86
End: 2021-03-22
Payer: MEDICARE

## 2021-03-22 VITALS
TEMPERATURE: 97.7 F | BODY MASS INDEX: 22.38 KG/M2 | WEIGHT: 156 LBS | OXYGEN SATURATION: 98 % | SYSTOLIC BLOOD PRESSURE: 126 MMHG | DIASTOLIC BLOOD PRESSURE: 62 MMHG | HEART RATE: 60 BPM

## 2021-03-22 DIAGNOSIS — I25.5 ISCHEMIC CARDIOMYOPATHY: Primary | ICD-10-CM

## 2021-03-22 DIAGNOSIS — R60.0 PERIPHERAL EDEMA: ICD-10-CM

## 2021-03-22 DIAGNOSIS — N18.32 STAGE 3B CHRONIC KIDNEY DISEASE (H): ICD-10-CM

## 2021-03-22 DIAGNOSIS — D64.9 ANEMIA, UNSPECIFIED TYPE: ICD-10-CM

## 2021-03-22 LAB
FOLATE SERPL-MCNC: 16.4 NG/ML
HGB BLD-MCNC: 12.2 G/DL (ref 13.3–17.7)
VIT B12 SERPL-MCNC: 426 PG/ML (ref 193–986)

## 2021-03-22 PROCEDURE — 99214 OFFICE O/P EST MOD 30 MIN: CPT | Performed by: INTERNAL MEDICINE

## 2021-03-22 PROCEDURE — 82607 VITAMIN B-12: CPT | Performed by: INTERNAL MEDICINE

## 2021-03-22 PROCEDURE — 82746 ASSAY OF FOLIC ACID SERUM: CPT | Performed by: INTERNAL MEDICINE

## 2021-03-22 PROCEDURE — 85018 HEMOGLOBIN: CPT | Performed by: INTERNAL MEDICINE

## 2021-03-22 PROCEDURE — 82728 ASSAY OF FERRITIN: CPT | Performed by: INTERNAL MEDICINE

## 2021-03-22 PROCEDURE — 84132 ASSAY OF SERUM POTASSIUM: CPT | Performed by: INTERNAL MEDICINE

## 2021-03-22 PROCEDURE — 83550 IRON BINDING TEST: CPT | Performed by: INTERNAL MEDICINE

## 2021-03-22 PROCEDURE — 83540 ASSAY OF IRON: CPT | Performed by: INTERNAL MEDICINE

## 2021-03-22 PROCEDURE — 36415 COLL VENOUS BLD VENIPUNCTURE: CPT | Performed by: INTERNAL MEDICINE

## 2021-03-22 NOTE — PATIENT INSTRUCTIONS
Following refill request to [facility] in [facility].    triamterene-hydroCHLOROthiazide (MAXZIDE-25) 37.5-25 MG per tablet   You should plan to see your cardiologist in the near future.                        Call them for an appointment.                           Take off the support stockings in the evening; put them back on in the morning.                                                     Keep taking the torsemide 20 mg per day for now; cut back to 10 mg per day when your edema improves.               Try cutting back salt intake and elevate your legs.                                                                                I will let you know your lab results.

## 2021-03-22 NOTE — PROGRESS NOTES
Assessment & Plan     Ischemic cardiomyopathy           His lungs sound clear.  Much of his edema seems to be peripheral, right heart related versus venous insufficiency.                 This level of edema is very upsetting to him.           He can continue torsemide.  He may need some additional potassium.             - Potassium    Peripheral edema    - Potassium    Stage 3b chronic kidney disease      Anemia, unspecified type  Etiology uncertain.  Additional labs are ordered.  He is not a good candidate for a GI work-up given his dementia and advanced age etc.  - Ferritin  - Iron and iron binding capacity  - Folate  - Vitamin B12  - Hemoglobin               Patient Instructions   You should plan to see your cardiologist in the near future.                        Call them for an appointment.                           Take off the support stockings in the evening; put them back on in the morning.                                                     Keep taking the torsemide 20 mg per day for now; cut back to 10 mg per day when your edema improves.               Try cutting back salt intake and elevate your legs.                                                                                I will let you know your lab results.                      No follow-ups on file.    Lucas Nguyen MD  [facility]    Paresh Mcadams is a 87 year old who presents for the following health issues  accompanied by his spouse:    HPI     ED/UC Followup:    Facility:  [facility]  Date of visit: 3/17/21  Reason for visit: leg swelling  Current Status: swelling in calves up to knees - hasn't improved much.     This patient is here with his wife.  He is very hard of hearing and has dementia so she is helpful in terms of communication.  He was in the emergency room recently with complaint of peripheral edema.      He was advised to use his torsemide.  He has 20 mg tablets.  He  complains about urinary frequency.        He also complains that his peripheral edema has not improved.    He is not complaining of shortness of breath or chest pain.     His BNP was only mildly elevated.                  He has a history of ischemic cardiomyopathy.            His most recent echocardiogram 2.5 years ago showed ejection fraction estimated at 35 to 40%.                           In the ER, he was also found to be somewhat anemic, with a hemoglobin of 10.8.     This had been 13.3 last September, and 11.5 in December.                  Recent  TSH was normal, potassium was 3.9,   BUN 28, creatinine 1.62.    Renal function is chronically abnormal.                  We discussed his falling hemoglobin.  He has lost weight.    He denies    melena or hematochezia.                                               Review of Systems         Current Outpatient Medications   Medication Sig Dispense Refill     carvedilol (COREG) 6.25 MG tablet TAKE 1 TABLET(6.25 MG) BY MOUTH TWICE DAILY WITH MEALS 180 tablet 2     MELATONIN PO Take 5 mg by mouth At Bedtime       mometasone (NASONEX) 50 MCG/ACT nasal spray Spray 2 sprays into both nostrils daily as needed.       nitroglycerin (NITROSTAT) 0.4 MG SL tablet Place 1 tablet (0.4 mg) under the tongue every 5 minutes as needed for chest pain 25 tablet 3     polyethylene glycol (MIRALAX/GLYCOLAX) packet Take 17 g by mouth daily        torsemide (DEMADEX) 20 MG tablet Take 1 tablet (20 mg) by mouth daily as needed (swollen ankles) 30 tablet 1                   Wt Readings from Last 4 Encounters:   03/22/21 70.8 kg (156 lb)   03/17/21 64.4 kg (142 lb)   02/13/21 65.8 kg (145 lb)   12/15/20 75.6 kg (166 lb 11.2 oz)         Objective    /62   Pulse 60   Temp 97.7  F (36.5  C) (Oral)   Wt 70.8 kg (156 lb)   SpO2 98%   BMI 22.38 kg/m    Body mass index is 22.38 kg/m .  Physical Exam   GENERAL APPEARANCE: alert and no distress  RESP: no rales or rhonchi  CV: regular rates  and rhythm and pitting B/L LE edema to 1+ to the knees.  He is wearing support stockings.  PSYCH: hearing poor, tangential and poor memory      Addendum:   Results for orders placed or performed in visit on 03/22/21   Potassium     Status: None   Result Value Ref Range    Potassium 4.1 3.4 - 5.3 mmol/L   Ferritin     Status: None   Result Value Ref Range    Ferritin 202 26 - 388 ng/mL   Iron and iron binding capacity     Status: None   Result Value Ref Range    Iron 70 35 - 180 ug/dL    Iron Binding Cap 368 240 - 430 ug/dL    Iron Saturation Index 19 15 - 46 %   Folate     Status: None   Result Value Ref Range    Folate 16.4 >5.4 ng/mL   Vitamin B12     Status: None   Result Value Ref Range    Vitamin B12 426 193 - 986 pg/mL   Hemoglobin     Status: Abnormal   Result Value Ref Range    Hemoglobin 12.2 (L) 13.3 - 17.7 g/dL     Letter sent.               Your hemoglobin is a little bit better, and you have plenty of iron, folic acid, and vitamin B12.         The potassium is also normal.             The potassium level will need to be rechecked in a few weeks, if you keep taking the torsemide.

## 2021-03-22 NOTE — LETTER
March 23, 2021      Abbe Lambert  [id number] [facility] [id number]        Dear ,    We are writing to inform you of your test results.               Your hemoglobin is a little bit better, and you have plenty of iron, folic acid, and vitamin B12.         The potassium is also normal.             The potassium level will need to be rechecked in a few weeks, if you keep taking the torsemide.      Results for orders placed or performed in visit on 03/22/21   Potassium     Status: None   Result Value Ref Range    Potassium 4.1 3.4 - 5.3 mmol/L   Ferritin     Status: None   Result Value Ref Range    Ferritin 202 26 - 388 ng/mL   Iron and iron binding capacity     Status: None   Result Value Ref Range    Iron 70 35 - 180 ug/dL    Iron Binding Cap 368 240 - 430 ug/dL    Iron Saturation Index 19 15 - 46 %   Folate     Status: None   Result Value Ref Range    Folate 16.4 >5.4 ng/mL   Vitamin B12     Status: None   Result Value Ref Range    Vitamin B12 426 193 - 986 pg/mL   Hemoglobin     Status: Abnormal   Result Value Ref Range    Hemoglobin 12.2 (L) 13.3 - 17.7 g/dL         If you have any questions or concerns, please call the clinic at the number listed above.       Sincerely,

## 2021-03-23 LAB
FERRITIN SERPL-MCNC: 202 NG/ML (ref 26–388)
IRON SATN MFR SERPL: 19 % (ref 15–46)
IRON SERPL-MCNC: 70 UG/DL (ref 35–180)
POTASSIUM SERPL-SCNC: 4.1 MMOL/L (ref 3.4–5.3)
TIBC SERPL-MCNC: 368 UG/DL (ref 240–430)

## 2021-03-24 ENCOUNTER — TELEPHONE (OUTPATIENT)
Dept: CARDIOLOGY | Facility: CLINIC | Age: 86
End: 2021-03-24

## 2021-03-24 DIAGNOSIS — I25.5 ISCHEMIC CARDIOMYOPATHY: Primary | Chronic | ICD-10-CM

## 2021-03-24 DIAGNOSIS — I10 ESSENTIAL HYPERTENSION: ICD-10-CM

## 2021-03-24 RX ORDER — CARVEDILOL 6.25 MG/1
TABLET ORAL
Qty: 180 TABLET | Refills: 0 | Status: ON HOLD | OUTPATIENT
Start: 2021-03-24 | End: 2021-03-31

## 2021-03-24 NOTE — TELEPHONE ENCOUNTER
Received update from [facility] that patient has not refilled his crestor script.  Reviewed Epic chart, patient was no show for November echo and canceled visit with Dr. Eli.    Per Epic chart, patient was seen in ED and then with PCP for peripheral edema. Has been started on torsemide 20mg daily.    Called patient to ask about meds. He is only taking the torsemide and his memory meds. Patient states he will restart the carvedilol if we send a new script today.    Message to scheduling to contact patient for echo, lipids, bmp and OV with Dr. lEi  Patient has a spot on April 20 @ 3:45, will needs tests set up before that.

## 2021-03-30 ENCOUNTER — APPOINTMENT (OUTPATIENT)
Dept: CT IMAGING | Facility: CLINIC | Age: 86
End: 2021-03-30
Attending: EMERGENCY MEDICINE
Payer: MEDICARE

## 2021-03-30 ENCOUNTER — ANCILLARY PROCEDURE (OUTPATIENT)
Dept: CARDIOLOGY | Facility: CLINIC | Age: 86
End: 2021-03-30
Attending: INTERNAL MEDICINE
Payer: MEDICARE

## 2021-03-30 ENCOUNTER — ANCILLARY PROCEDURE (OUTPATIENT)
Dept: ULTRASOUND IMAGING | Facility: CLINIC | Age: 86
End: 2021-03-30
Attending: EMERGENCY MEDICINE
Payer: MEDICARE

## 2021-03-30 ENCOUNTER — HOSPITAL ENCOUNTER (OUTPATIENT)
Facility: CLINIC | Age: 86
Setting detail: OBSERVATION
Discharge: HOME | End: 2021-03-31
Attending: EMERGENCY MEDICINE | Admitting: INTERNAL MEDICINE
Payer: MEDICARE

## 2021-03-30 ENCOUNTER — OFFICE VISIT (OUTPATIENT)
Dept: URGENT CARE | Facility: URGENT CARE | Age: 86
End: 2021-03-30
Payer: MEDICARE

## 2021-03-30 ENCOUNTER — NURSE TRIAGE (OUTPATIENT)
Dept: INTERNAL MEDICINE | Facility: CLINIC | Age: 86
End: 2021-03-30

## 2021-03-30 VITALS
DIASTOLIC BLOOD PRESSURE: 59 MMHG | SYSTOLIC BLOOD PRESSURE: 97 MMHG | HEART RATE: 59 BPM | OXYGEN SATURATION: 100 % | HEIGHT: 70 IN | TEMPERATURE: 97.9 F | RESPIRATION RATE: 16 BRPM | BODY MASS INDEX: 21.47 KG/M2 | WEIGHT: 150 LBS

## 2021-03-30 DIAGNOSIS — R41.3 MEMORY CHANGE: Chronic | ICD-10-CM

## 2021-03-30 DIAGNOSIS — I10 ESSENTIAL HYPERTENSION: ICD-10-CM

## 2021-03-30 DIAGNOSIS — Z95.0 CARDIAC PACEMAKER IN SITU: ICD-10-CM

## 2021-03-30 DIAGNOSIS — R42 LIGHTHEADEDNESS: Primary | ICD-10-CM

## 2021-03-30 DIAGNOSIS — J31.0 CHRONIC RHINITIS: ICD-10-CM

## 2021-03-30 DIAGNOSIS — R53.1 GENERALIZED WEAKNESS: ICD-10-CM

## 2021-03-30 DIAGNOSIS — E86.9 VOLUME DEPLETION: ICD-10-CM

## 2021-03-30 DIAGNOSIS — R13.10 DYSPHAGIA, UNSPECIFIED TYPE: Primary | ICD-10-CM

## 2021-03-30 DIAGNOSIS — I25.5 ISCHEMIC CARDIOMYOPATHY: Chronic | ICD-10-CM

## 2021-03-30 DIAGNOSIS — R20.0 BILATERAL LEG NUMBNESS: ICD-10-CM

## 2021-03-30 DIAGNOSIS — Z76.89 HEALTH CARE HOME: ICD-10-CM

## 2021-03-30 DIAGNOSIS — R41.0 CONFUSION: ICD-10-CM

## 2021-03-30 DIAGNOSIS — E86.1 HYPOTENSION DUE TO HYPOVOLEMIA: ICD-10-CM

## 2021-03-30 PROBLEM — J45.30 MILD PERSISTENT ASTHMA WITHOUT COMPLICATION: Status: ACTIVE | Noted: 2018-05-18

## 2021-03-30 PROBLEM — F01.50 VASCULAR DEMENTIA (H): Chronic | Status: ACTIVE | Noted: 2019-10-30

## 2021-03-30 PROBLEM — I73.9 PAD (PERIPHERAL ARTERY DISEASE) (H): Chronic | Status: ACTIVE | Noted: 2018-05-18

## 2021-03-30 LAB
ALBUMIN SERPL-MCNC: 3.6 G/DL (ref 3.4–5)
ALBUMIN UR-MCNC: NEGATIVE MG/DL
ALP SERPL-CCNC: 97 U/L (ref 40–150)
ALT SERPL W P-5'-P-CCNC: 32 U/L (ref 0–70)
ANION GAP SERPL CALCULATED.3IONS-SCNC: 5 MMOL/L (ref 3–14)
APPEARANCE UR: CLEAR
AST SERPL W P-5'-P-CCNC: 28 U/L (ref 0–45)
BASOPHILS # BLD AUTO: 0 10E9/L (ref 0–0.2)
BASOPHILS NFR BLD AUTO: 0.4 %
BILIRUB SERPL-MCNC: 0.5 MG/DL (ref 0.2–1.3)
BILIRUB UR QL STRIP: NEGATIVE
BUN SERPL-MCNC: 65 MG/DL (ref 7–30)
CALCIUM SERPL-MCNC: 9.2 MG/DL (ref 8.5–10.1)
CHLORIDE SERPL-SCNC: 104 MMOL/L (ref 94–109)
CO2 SERPL-SCNC: 29 MMOL/L (ref 20–32)
COLOR UR AUTO: ABNORMAL
CREAT SERPL-MCNC: 2.26 MG/DL (ref 0.66–1.25)
CRP SERPL-MCNC: <2.9 MG/L (ref 0–8)
DIFFERENTIAL METHOD BLD: ABNORMAL
EOSINOPHIL # BLD AUTO: 0.1 10E9/L (ref 0–0.7)
EOSINOPHIL NFR BLD AUTO: 1 %
ERYTHROCYTE [DISTWIDTH] IN BLOOD BY AUTOMATED COUNT: 14.5 % (ref 10–15)
ERYTHROCYTE [SEDIMENTATION RATE] IN BLOOD BY WESTERGREN METHOD: 37 MM/H (ref 0–20)
GFR SERPL CREATININE-BSD FRML MDRD: 25 ML/MIN/{1.73_M2}
GLUCOSE SERPL-MCNC: 164 MG/DL (ref 70–99)
GLUCOSE UR STRIP-MCNC: NEGATIVE MG/DL
HCT VFR BLD AUTO: 38.1 % (ref 40–53)
HGB BLD-MCNC: 12 G/DL (ref 13.3–17.7)
HGB UR QL STRIP: NEGATIVE
HYALINE CASTS #/AREA URNS LPF: 8 /LPF (ref 0–2)
IMM GRANULOCYTES # BLD: 0 10E9/L (ref 0–0.4)
IMM GRANULOCYTES NFR BLD: 0.2 %
KETONES UR STRIP-MCNC: NEGATIVE MG/DL
LABORATORY COMMENT REPORT: NORMAL
LACTATE BLD-SCNC: 1.3 MMOL/L (ref 0.7–2)
LEUKOCYTE ESTERASE UR QL STRIP: NEGATIVE
LYMPHOCYTES # BLD AUTO: 1.6 10E9/L (ref 0.8–5.3)
LYMPHOCYTES NFR BLD AUTO: 16.5 %
MCH RBC QN AUTO: 31.1 PG (ref 26.5–33)
MCHC RBC AUTO-ENTMCNC: 31.5 G/DL (ref 31.5–36.5)
MCV RBC AUTO: 99 FL (ref 78–100)
MONOCYTES # BLD AUTO: 0.6 10E9/L (ref 0–1.3)
MONOCYTES NFR BLD AUTO: 6.3 %
NEUTROPHILS # BLD AUTO: 7.4 10E9/L (ref 1.6–8.3)
NEUTROPHILS NFR BLD AUTO: 75.6 %
NITRATE UR QL: NEGATIVE
NRBC # BLD AUTO: 0 10*3/UL
NRBC BLD AUTO-RTO: 0 /100
PH UR STRIP: 6.5 PH (ref 5–7)
PLATELET # BLD AUTO: 188 10E9/L (ref 150–450)
POTASSIUM SERPL-SCNC: 3.8 MMOL/L (ref 3.4–5.3)
PROT SERPL-MCNC: 8.6 G/DL (ref 6.8–8.8)
RBC # BLD AUTO: 3.86 10E12/L (ref 4.4–5.9)
RBC #/AREA URNS AUTO: <1 /HPF (ref 0–2)
SARS-COV-2 RNA RESP QL NAA+PROBE: NEGATIVE
SODIUM SERPL-SCNC: 138 MMOL/L (ref 133–144)
SOURCE: ABNORMAL
SP GR UR STRIP: 1.01 (ref 1–1.03)
SPECIMEN SOURCE: NORMAL
SQUAMOUS #/AREA URNS AUTO: 0 /HPF (ref 0–1)
UROBILINOGEN UR STRIP-MCNC: 0 MG/DL (ref 0–2)
WBC # BLD AUTO: 9.8 10E9/L (ref 4–11)
WBC #/AREA URNS AUTO: <1 /HPF (ref 0–5)

## 2021-03-30 PROCEDURE — 85025 COMPLETE CBC W/AUTO DIFF WBC: CPT | Performed by: EMERGENCY MEDICINE

## 2021-03-30 PROCEDURE — C9803 HOPD COVID-19 SPEC COLLECT: HCPCS

## 2021-03-30 PROCEDURE — 250N000013 HC RX MED GY IP 250 OP 250 PS 637: Performed by: INTERNAL MEDICINE

## 2021-03-30 PROCEDURE — 81001 URINALYSIS AUTO W/SCOPE: CPT | Performed by: EMERGENCY MEDICINE

## 2021-03-30 PROCEDURE — 93294 REM INTERROG EVL PM/LDLS PM: CPT | Performed by: INTERNAL MEDICINE

## 2021-03-30 PROCEDURE — 82550 ASSAY OF CK (CPK): CPT | Performed by: EMERGENCY MEDICINE

## 2021-03-30 PROCEDURE — 72129 CT CHEST SPINE W/DYE: CPT

## 2021-03-30 PROCEDURE — 80053 COMPREHEN METABOLIC PANEL: CPT | Performed by: EMERGENCY MEDICINE

## 2021-03-30 PROCEDURE — 96360 HYDRATION IV INFUSION INIT: CPT

## 2021-03-30 PROCEDURE — 72128 CT CHEST SPINE W/O DYE: CPT

## 2021-03-30 PROCEDURE — 85652 RBC SED RATE AUTOMATED: CPT | Performed by: EMERGENCY MEDICINE

## 2021-03-30 PROCEDURE — 76604 US EXAM CHEST: CPT

## 2021-03-30 PROCEDURE — 83605 ASSAY OF LACTIC ACID: CPT | Performed by: EMERGENCY MEDICINE

## 2021-03-30 PROCEDURE — 84443 ASSAY THYROID STIM HORMONE: CPT | Performed by: EMERGENCY MEDICINE

## 2021-03-30 PROCEDURE — 99213 OFFICE O/P EST LOW 20 MIN: CPT | Performed by: FAMILY MEDICINE

## 2021-03-30 PROCEDURE — 86140 C-REACTIVE PROTEIN: CPT | Performed by: EMERGENCY MEDICINE

## 2021-03-30 PROCEDURE — 99285 EMERGENCY DEPT VISIT HI MDM: CPT | Mod: 25

## 2021-03-30 PROCEDURE — U0003 INFECTIOUS AGENT DETECTION BY NUCLEIC ACID (DNA OR RNA); SEVERE ACUTE RESPIRATORY SYNDROME CORONAVIRUS 2 (SARS-COV-2) (CORONAVIRUS DISEASE [COVID-19]), AMPLIFIED PROBE TECHNIQUE, MAKING USE OF HIGH THROUGHPUT TECHNOLOGIES AS DESCRIBED BY CMS-2020-01-R: HCPCS | Performed by: EMERGENCY MEDICINE

## 2021-03-30 PROCEDURE — 93296 REM INTERROG EVL PM/IDS: CPT | Performed by: INTERNAL MEDICINE

## 2021-03-30 PROCEDURE — 258N000003 HC RX IP 258 OP 636: Performed by: EMERGENCY MEDICINE

## 2021-03-30 PROCEDURE — U0005 INFEC AGEN DETEC AMPLI PROBE: HCPCS | Performed by: EMERGENCY MEDICINE

## 2021-03-30 PROCEDURE — G0378 HOSPITAL OBSERVATION PER HR: HCPCS

## 2021-03-30 PROCEDURE — 76705 ECHO EXAM OF ABDOMEN: CPT

## 2021-03-30 PROCEDURE — 72132 CT LUMBAR SPINE W/DYE: CPT

## 2021-03-30 PROCEDURE — 72131 CT LUMBAR SPINE W/O DYE: CPT

## 2021-03-30 PROCEDURE — 99220 PR INITIAL OBSERVATION CARE,LEVEL III: CPT | Performed by: INTERNAL MEDICINE

## 2021-03-30 PROCEDURE — 96361 HYDRATE IV INFUSION ADD-ON: CPT

## 2021-03-30 PROCEDURE — 84100 ASSAY OF PHOSPHORUS: CPT | Performed by: EMERGENCY MEDICINE

## 2021-03-30 RX ORDER — ROSUVASTATIN CALCIUM 40 MG/1
40 TABLET, COATED ORAL DAILY
COMMUNITY
Start: 2020-12-08 | End: 2021-04-20

## 2021-03-30 RX ORDER — LANOLIN ALCOHOL/MO/W.PET/CERES
100 CREAM (GRAM) TOPICAL DAILY
Status: DISCONTINUED | OUTPATIENT
Start: 2021-03-31 | End: 2021-03-31 | Stop reason: HOSPADM

## 2021-03-30 RX ORDER — DONEPEZIL HYDROCHLORIDE 10 MG/1
10 TABLET, FILM COATED ORAL AT BEDTIME
COMMUNITY
Start: 2020-12-08 | End: 2021-05-03

## 2021-03-30 RX ORDER — IPRATROPIUM BROMIDE 21 UG/1
SPRAY, METERED NASAL
Status: ON HOLD | COMMUNITY
Start: 2020-05-14 | End: 2021-03-31

## 2021-03-30 RX ORDER — MULTIVITAMIN,THERAPEUTIC
1 TABLET ORAL DAILY
Status: DISCONTINUED | OUTPATIENT
Start: 2021-03-31 | End: 2021-03-31 | Stop reason: HOSPADM

## 2021-03-30 RX ORDER — SODIUM CHLORIDE 9 MG/ML
INJECTION, SOLUTION INTRAVENOUS ONCE
Status: COMPLETED | OUTPATIENT
Start: 2021-03-30 | End: 2021-03-30

## 2021-03-30 RX ORDER — DONEPEZIL HYDROCHLORIDE 10 MG/1
10 TABLET, FILM COATED ORAL AT BEDTIME
Status: DISCONTINUED | OUTPATIENT
Start: 2021-03-30 | End: 2021-03-31 | Stop reason: HOSPADM

## 2021-03-30 RX ORDER — ACETAMINOPHEN 650 MG/1
650 SUPPOSITORY RECTAL EVERY 4 HOURS PRN
Status: DISCONTINUED | OUTPATIENT
Start: 2021-03-30 | End: 2021-03-31 | Stop reason: HOSPADM

## 2021-03-30 RX ORDER — SODIUM CHLORIDE 9 MG/ML
INJECTION, SOLUTION INTRAVENOUS CONTINUOUS
Status: DISCONTINUED | OUTPATIENT
Start: 2021-03-30 | End: 2021-03-30

## 2021-03-30 RX ORDER — ONDANSETRON 4 MG/1
4 TABLET, ORALLY DISINTEGRATING ORAL EVERY 6 HOURS PRN
Status: DISCONTINUED | OUTPATIENT
Start: 2021-03-30 | End: 2021-03-31 | Stop reason: HOSPADM

## 2021-03-30 RX ORDER — ONDANSETRON 2 MG/ML
4 INJECTION INTRAMUSCULAR; INTRAVENOUS EVERY 6 HOURS PRN
Status: DISCONTINUED | OUTPATIENT
Start: 2021-03-30 | End: 2021-03-31 | Stop reason: HOSPADM

## 2021-03-30 RX ORDER — ACETAMINOPHEN 325 MG/1
650 TABLET ORAL EVERY 4 HOURS PRN
Status: DISCONTINUED | OUTPATIENT
Start: 2021-03-30 | End: 2021-03-31 | Stop reason: HOSPADM

## 2021-03-30 RX ADMIN — DONEPEZIL HYDROCHLORIDE 10 MG: 10 TABLET ORAL at 22:17

## 2021-03-30 RX ADMIN — SODIUM CHLORIDE 1000 ML: 9 INJECTION, SOLUTION INTRAVENOUS at 15:38

## 2021-03-30 RX ADMIN — SODIUM CHLORIDE: 9 INJECTION, SOLUTION INTRAVENOUS at 19:22

## 2021-03-30 ASSESSMENT — ENCOUNTER SYMPTOMS
CHILLS: 0
WOUND: 0
WEAKNESS: 1
NUMBNESS: 1
CONFUSION: 1
ABDOMINAL PAIN: 0
FEVER: 0
SHORTNESS OF BREATH: 0
APPETITE CHANGE: 1
COUGH: 0

## 2021-03-30 ASSESSMENT — MIFFLIN-ST. JEOR
SCORE: 1337.61
SCORE: 1375.25
SCORE: 1361.65

## 2021-03-30 NOTE — TELEPHONE ENCOUNTER
Spoke with [place],     Leg pain recurrent daily. Patient has been taking his diuretic. Pain in legs radiates from feet to knee's. Maybe a little forgetful but AxOx3. Also noted he is a little irritable.    Additional Information    Negative: Looks like a broken bone or dislocated joint (e.g., crooked or deformed)    Negative: Sounds like a life-threatening emergency to the triager    Negative: Followed a hip injury    Negative: Followed a knee injury    Negative: Followed an ankle or foot injury    Negative: Back pain radiating (shooting) into leg(s)    Negative: Foot pain is the main symptom    Negative: Ankle pain is the main symptom    Negative: Knee pain is the main symptom    Negative: Leg swelling is the main symptom    Negative: Chest pain    Negative: Difficulty breathing    Negative: Entire foot is cool or blue in comparison to other side    Negative: Unable to walk    Negative: Fever and red area (or area very tender to touch)    Negative: Fever and swollen joint    Negative: Thigh or calf pain in only one leg and present > 1 hour    Negative: Thigh, calf, or ankle swelling in only one leg    Negative: Thigh, calf, or ankle swelling in both legs, but one side is definitely more swollen    Negative: History of prior 'blood clot' in leg or lungs (i.e., deep vein thrombosis, pulmonary embolism)    Negative: History of inherited increased risk of blood clots (e.g., factor 5 Leiden, antithrombin 3, protein C or protein S deficiency, prothrombin mutation)    Negative: Recent illness requiring prolonged bed rest (immobilization)    Negative: Hip or leg fracture in past 2 months (e.g., or had cast on leg or ankle)    Negative: Major surgery in the past two months    Negative: Cancer treatment in the past two months (or has cancer now)    Negative: Recent long-distance travel with prolonged time in car, bus, plane, or train (i.e., within past 2 weeks; 6 or more hours duration)    Negative: Patient sounds very sick or  "weak to the triager    Patient wants to be seen    Negative: SEVERE pain (e.g., excruciating, unable to do any normal activities)    Negative: Cast on leg or ankle and now has increasing pain    Negative: Red area or streak and large (> 2 in. or 5 cm)    Negative: Localized pain, redness or hard lump along vein    Negative: Numbness in a leg or foot (i.e., loss of sensation)    Negative: Painful rash with multiple small blisters grouped together (i.e., dermatomal distribution or 'band' or 'stripe')    Negative: Looks like a boil, infected sore, deep ulcer, or other infected rash (spreading redness, pus)    Negative: Localized rash is very painful (no fever)    Answer Assessment - Initial Assessment Questions  1. ONSET: \"When did the pain start?\"       Recurrent over the last week  2. LOCATION: \"Where is the pain located?\"       Both legs from knee down to feet  3. PAIN: \"How bad is the pain?\"    (Scale 1-10; or mild, moderate, severe)    -  MILD (1-3): doesn't interfere with normal activities     -  MODERATE (4-7): interferes with normal activities (e.g., work or school) or awakens from sleep, limping     -  SEVERE (8-10): excruciating pain, unable to do any normal activities, unable to walk      Mild to Moderate  4. WORK OR EXERCISE: \"Has there been any recent work or exercise that involved this part of the body?\"       n/a  5. CAUSE: \"What do you think is causing the leg pain?\"      Unsure if related to swelling  6. OTHER SYMPTOMS: \"Do you have any other symptoms?\" (e.g., chest pain, back pain, breathing difficulty, swelling, rash, fever, numbness, weakness)      More irritable  7. PREGNANCY: \"Is there any chance you are pregnant?\" \"When was your last menstrual period?\"      n/a    Protocols used: LEG PAIN-A-OH      "

## 2021-03-30 NOTE — TELEPHONE ENCOUNTER
Reason for Call:  Same Day Appointment, Requested Provider:  any    PCP: Eliezer Shah    Reason for visit: pain in both legs    Duration of symptoms: three weeks    Have you been treated for this in the past? No    Additional comments: is wondering if he could be worked in today at [facility]    Can we leave a detailed message on this number? YES    Phone number patient can be reached at: Home number on file [phone] (home)    Best Time: any    Call taken on 3/30/2021 at 9:48 AM by Ally Ngo

## 2021-03-30 NOTE — ED PROVIDER NOTES
History   Chief Complaint:  Leg Pain     The history is provided by the patient and the spouse.      Abbe Lambert is a 87 year old male with history of osystolic and diastolic CHF, atrial fibrillation, pacemaker, hypertension, CKD, hyperlipidemia, and vascular dementia who presents with leg pain. The patient tells us that he has been having leg numbness for a few weeks that started bilaterally in his feet and has progressively moved up his legs and now into his knees. He tells us that his legs feel numb and weak but they do not hurt. The patient is able to walk on his own. The patients wife tells us that he has also become increasingly confused and has been lacking on drinking water. The patient denies back pain, changes to diet, fever, chills, abdominal pain, weight lose, syncope, cough, shortness of breath, chest pain or history of back surgery.     The patients wife also notes that they have an echo scheduled in a few weeks.     Review of Systems   Constitutional: Positive for appetite change. Negative for chills and fever.   Respiratory: Negative for cough and shortness of breath.    Cardiovascular: Negative for chest pain.   Gastrointestinal: Negative for abdominal pain.   Skin: Negative for wound.   Neurological: Positive for weakness (Legs) and numbness (Legs). Negative for syncope.   Psychiatric/Behavioral: Positive for confusion.   All other systems reviewed and are negative.  o  Allergies:  Advair Diskus  Morphine  Vicodin    Medications:  Coreg  Aricept  Atrovent  Nasonex  Nitrostat  Miralax  Crestor  Demadex    Past Medical History:    AAA  Asthma  Atrial fibrillation  Cardiac arrest  Cardiomyopathy  CKD III  GERD  Hyperlipidemia  Angina  Hypertension  MI  Tachy-alix syndrome  Shingles  Polymyalgia rheumatic  Stasis dermatitis of bilateral legs  Vascular dementia  CVA  Prostate cancer  PAD  Systolic and diastolic CHF  DM   CAD     Past Surgical History:    Left knee arthroplasty  Pacemaker  "implantation   CABG  Cholecystectomy  Intraocular lens implantation   Prostatectomy  Sinus surgery     Family History:    Cerebrovascular disease  Heart disease  CAD  Depression     Social History:  The patient presents to the ED with his wife.   The patient denies significant alcohol use.     Physical Exam     Patient Vitals for the past 24 hrs:   BP Temp Temp src Pulse Resp SpO2 Height Weight   03/30/21 1225 (!) 87/46 97.6  F (36.4  C) Temporal 61 16 100 % 1.778 m (5' 10\") 69.4 kg (153 lb)       Physical Exam    Gen: Pleasant, accompanied by wife.    Eye:   Irregular pupil on the right.     Sclera non-injected.    ENT:   Moist mucus membranes.     Normal tongue.    Oropharynx without lesions.    Cardiac:     Normal rate and regular rhythm.    No murmurs, gallops, or rubs.    Pulmonary:     Clear to auscultation bilaterally.    No wheezes, rales, or rhonchi.    Abdomen:     Normal active bowel sounds.     Abdomen is soft and non-distended, without focal tenderness.    Musculoskeletal:     Normal movement of all extremities without evidence for deficit.    Extremities:    No edema.    Skin:   Warm and dry.  Skin tenting on exam.    Neurologic:    5/5 strength in hip flexors, knee extensors, dorsiflexion, plantarflexion, EHL, FHL.  Fine touch sensation intact throughout bilateral lower extremities.  Normal and symmetric reflexes at patella tendon bilaterally.      Psychiatric:     Normal affect with appropriate interaction with examiner.      Emergency Department Course     Imaging:    .  CT Lumbar Spine w/o Contrast   Final Result   IMPRESSION:   1. No acute osseous abnormality of the lumbar spine.   2. Multilevel degenerative disc disease and facet arthropathy, as   described.   3. There appears to be up to moderate spinal canal stenosis at L4-L5   and mild/moderate spinal canal stenosis at L3-L4. Mild to moderate   multilevel neural foraminal stenosis is present.   4. Within the limitations of noncontrast " technique, no gross change in   the previously demonstrated infrarenal abdominal aortic aneurysm.      TO ENGEL MD      CT Thoracic Spine w/o Contrast   Final Result   IMPRESSION:   1. No acute fracture or posttraumatic malalignment of the thoracic   spine.   2. Multilevel degenerative changes, as described.   3. No high-grade spinal canal stenosis. Varying degrees of multilevel   neural foraminal narrowing, as described.   4. Grade 1 degenerative anterolisthesis of C7 on T1.      TO ENGEL MD      POC US OB TRANSABDOMINAL LIMITED   Final Result   [facility] Procedure Note        Limited Bedside ED Aorta Ultrasound:      PROCEDURE: PERFORMED BY: Dr. Supriya Latham MD   INDICATIONS:  Hypotension     PROBE:  Low frequency convex probe   BODY LOCATION: Abdomen   FINDINGS:  The ultrasound was performed using longitudinal and transverse views.    Normal: Abdominal aorta < 4 cm.  MEASUREMENT:  3.2 cm    No evidence of free fluid in hepatorenal (Morison's pouch)   INTERPRETATION:  The aorta was dilated to 3.2 cm in the infrarenal area. There was no abdominal free fluid.   IMAGE DOCUMENTATION: Images were archived to PACs system.         Cardiac Device Check - Inpatient    (Results Pending)   XR Chest 2 Views    (Results Pending)     Laboratory:     CBC: WBC 9.8, HGB 12.0(L),     CMP: Glucose 164(H), Urea Nitrogen 65(H); Creatinine 2.26(H); GFR 25(L), o/w WNL     CRP Inflammation - <2.9    Lactic acid (result time 1535) - 1.3      UA with microscopic: Hyaline Casts 8(H) o/w WNL    Erythrocyte Sedimentation Rate - 37(H)    Asymptomatic COVID19 Virus PCR by nasopharyngeal swab: Pending    Emergency Department Course:    Reviewed:  I reviewed nursing notes, vitals, past medical history and care everywhere    Assessments:  1508 I obtained history and examined the patient as noted above.   1914 I rechecked the patient and explained findings.     Interventions:  1538 NS 1L IV    Disposition:  The  patient was admitted to the hospital under the care of Dr. Pierre.       Impression & Plan     Medical Decision Making:  Abbe Lambert is a 87 year old male with history of osystolic and diastolic CHF, atrial fibrillation, pacemaker, hypertension, CKD, hyperlipidemia, and vascular dementia who presents with leg pain versus numbness.  On exam, he is neurologically intact.  Bedside ultrasound and CT of the lumbar spine demonstrate a stable AAA.  Symptoms are not suggestive of cauda equina.  CT of thoracic and lumbar spine demonstrate spinal stenosis, DJD, and other chronic changes, but no hematoma, mass, or other compressive process.  Exam and history does not suggest Guillan [place].  The cause of his leg paresthesias is unclear.  He arrived in the ED hypotensive, but this resolved with 1L of normal saline.  Labs are also consistent with volume depletion, with elevated BUN and creatinine.  He will be brought in for continued gentle fluids and recheck of labs in the morning.    Covid-19  Abbe Lambert was evaluated during a global COVID-19 pandemic, which necessitated consideration that the patient might be at risk for infection with the SARS-CoV-2 virus that causes COVID-19.   Applicable protocols for evaluation were followed during the patient's care.   COVID-19 was considered as part of the patient's evaluation. The plan for testing is:  a test was obtained during this visit.    Diagnosis:    ICD-10-CM    1. Volume depletion  E86.9    2. Hypotension due to hypovolemia  I95.89     E86.1      Scribe Disclosure:  Live PATINO, am serving as a scribe at 3:08 PM on 3/30/2021 to document services personally performed by Supriya Latham MD, based on my observations and the provider's statements to me.              Supriya Latham MD  03/31/21 0254

## 2021-03-30 NOTE — PROGRESS NOTES
"SUBJECTIVE: Abbe Lambert is a 87 year old male presenting with a chief complaint of lightheadedness/confusion and bilateral leg numbness.  Onset of symptoms was week(s) ago.  Course of illness is worsening.    Current and Associated symptoms: none  Treatment measures tried include None tried.  Predisposing factors include see histroy.    Past Medical History:   Diagnosis Date     AAA (abdominal aortic aneurysm) (H)      Anemia due to blood loss, acute 10/10/2016     Asthma      Atrial fibrillation (H)     s/p left atrial appendage ligation     Cardiac arrest (H)      Cardiomyopathy (H)      Chronic kidney disease      Chronic sinusitis      Coronary artery disease     cardiac cath 2014: medical management; cath 2013: PTCA to diagonal; cath 2009: medical management; CABG 1998: LIMA to LAD, LISA to RCA, SVG to circumflex     GERD (gastroesophageal reflux disease)      History of angina      Hyperlipidaemia      Hypertension, goal below 140/90      Myocardial infarction (H)     MI with Vfib arrest 1998     Polymyalgia rheumatica (H)      Shingles 10/28/2016     Shortness of breath      Tachy-alix syndrome (H)     generator replacement 9/2020; implanted dual chamber pacemaker 2012     Allergies   Allergen Reactions     Advair Diskus      DENIED     Morphine Hcl      Decrease  Blood Pressure Lower Than Normal, Per Pt.     Vicodin [Hydrocodone-Acetaminophen] Visual Disturbance     Social History     Tobacco Use     Smoking status: Never Smoker     Smokeless tobacco: Never Used   Substance Use Topics     Alcohol use: Not on file       ROS:  SKIN: no rash  GI: no vomiting    OBJECTIVE:  BP 97/59   Pulse 59   Temp 97.9  F (36.6  C)   Resp 16   Ht 1.778 m (5' 10\")   Wt 68 kg (150 lb)   SpO2 100%   BMI 21.52 kg/m  GENERAL APPEARANCE: healthy, alert and no distress  EYES: EOMI,  PERRL, conjunctiva clear  HENT: ear canals and TM's normal.  Nose and mouth without ulcers, erythema or lesions  RESP: lungs clear to " auscultation - no rales, rhonchi or wheezes  CV: regular rates and rhythm, normal S1 S2, no murmur noted  NEURO: Normal strength and tone, sensory exam grossly normal,  normal speech and mentation  SKIN: no suspicious lesions or rashes  No lower ext edema      ICD-10-CM    1. Lightheadedness  R42    2. Confusion  R41.0    3. Bilateral leg numbness  R20.0      Pt will go through ED for cont w/u

## 2021-03-30 NOTE — ED TRIAGE NOTES
"Pt states his leg bilaterally are sort of numb in the shin area - and that he feels \"paulina foggy \" . Wife then came in the triage room and stated the legs started a few months ago - he has been seen twice for this - fogginess has been progressing.   "

## 2021-03-31 ENCOUNTER — APPOINTMENT (OUTPATIENT)
Dept: GENERAL RADIOLOGY | Facility: CLINIC | Age: 86
End: 2021-03-31
Attending: INTERNAL MEDICINE
Payer: MEDICARE

## 2021-03-31 VITALS
DIASTOLIC BLOOD PRESSURE: 47 MMHG | SYSTOLIC BLOOD PRESSURE: 113 MMHG | TEMPERATURE: 97.4 F | BODY MASS INDEX: 21.95 KG/M2 | HEIGHT: 69 IN | WEIGHT: 148.2 LBS | RESPIRATION RATE: 14 BRPM | OXYGEN SATURATION: 98 % | HEART RATE: 60 BPM

## 2021-03-31 PROBLEM — E43 SEVERE MALNUTRITION (H): Status: ACTIVE | Noted: 2021-03-31

## 2021-03-31 PROBLEM — E86.9 VOLUME DEPLETION: Status: RESOLVED | Noted: 2021-03-30 | Resolved: 2021-03-31

## 2021-03-31 PROBLEM — R62.7 FAILURE TO THRIVE IN ADULT: Status: ACTIVE | Noted: 2021-03-31

## 2021-03-31 PROBLEM — N17.9 AKI (ACUTE KIDNEY INJURY) (H): Status: ACTIVE | Noted: 2021-03-31

## 2021-03-31 PROBLEM — R63.4 WEIGHT LOSS: Status: ACTIVE | Noted: 2020-11-24

## 2021-03-31 PROBLEM — M48.062 SPINAL STENOSIS, LUMBAR REGION, WITH NEUROGENIC CLAUDICATION: Status: ACTIVE | Noted: 2021-03-31

## 2021-03-31 LAB
ANION GAP SERPL CALCULATED.3IONS-SCNC: 4 MMOL/L (ref 3–14)
BUN SERPL-MCNC: 52 MG/DL (ref 7–30)
CALCIUM SERPL-MCNC: 8.9 MG/DL (ref 8.5–10.1)
CHLORIDE SERPL-SCNC: 109 MMOL/L (ref 94–109)
CK SERPL-CCNC: 34 U/L (ref 30–300)
CO2 SERPL-SCNC: 27 MMOL/L (ref 20–32)
CREAT SERPL-MCNC: 1.72 MG/DL (ref 0.66–1.25)
ERYTHROCYTE [DISTWIDTH] IN BLOOD BY AUTOMATED COUNT: 14.4 % (ref 10–15)
GFR SERPL CREATININE-BSD FRML MDRD: 35 ML/MIN/{1.73_M2}
GLUCOSE SERPL-MCNC: 81 MG/DL (ref 70–99)
HCT VFR BLD AUTO: 33.7 % (ref 40–53)
HGB BLD-MCNC: 10.7 G/DL (ref 13.3–17.7)
MCH RBC QN AUTO: 31 PG (ref 26.5–33)
MCHC RBC AUTO-ENTMCNC: 31.8 G/DL (ref 31.5–36.5)
MCV RBC AUTO: 98 FL (ref 78–100)
PHOSPHATE SERPL-MCNC: 3.9 MG/DL (ref 2.5–4.5)
PLATELET # BLD AUTO: 160 10E9/L (ref 150–450)
POTASSIUM SERPL-SCNC: 3.6 MMOL/L (ref 3.4–5.3)
RBC # BLD AUTO: 3.45 10E12/L (ref 4.4–5.9)
SODIUM SERPL-SCNC: 140 MMOL/L (ref 133–144)
TSH SERPL DL<=0.005 MIU/L-ACNC: 2.2 MU/L (ref 0.4–4)
WBC # BLD AUTO: 7.9 10E9/L (ref 4–11)

## 2021-03-31 PROCEDURE — 999N000147 HC STATISTIC PT IP EVAL DEFER

## 2021-03-31 PROCEDURE — G0378 HOSPITAL OBSERVATION PER HR: HCPCS

## 2021-03-31 PROCEDURE — 36415 COLL VENOUS BLD VENIPUNCTURE: CPT | Performed by: INTERNAL MEDICINE

## 2021-03-31 PROCEDURE — 99217 PR OBSERVATION CARE DISCHARGE: CPT | Performed by: INTERNAL MEDICINE

## 2021-03-31 PROCEDURE — 85027 COMPLETE CBC AUTOMATED: CPT | Performed by: INTERNAL MEDICINE

## 2021-03-31 PROCEDURE — 71046 X-RAY EXAM CHEST 2 VIEWS: CPT

## 2021-03-31 PROCEDURE — 80048 BASIC METABOLIC PNL TOTAL CA: CPT | Performed by: INTERNAL MEDICINE

## 2021-03-31 RX ORDER — CARVEDILOL 6.25 MG/1
3.12 TABLET ORAL 2 TIMES DAILY WITH MEALS
Qty: 180 TABLET | Refills: 0 | Status: SHIPPED | OUTPATIENT
Start: 2021-03-31 | End: 2021-03-31

## 2021-03-31 RX ORDER — IPRATROPIUM BROMIDE 21 UG/1
1 SPRAY, METERED NASAL 2 TIMES DAILY PRN
Refills: 0
Start: 2021-03-31 | End: 2022-03-16

## 2021-03-31 RX ORDER — CARVEDILOL 6.25 MG/1
3.12 TABLET ORAL 2 TIMES DAILY WITH MEALS
Qty: 180 TABLET | Refills: 0 | Status: SHIPPED | OUTPATIENT
Start: 2021-03-31 | End: 2022-02-18 | Stop reason: DRUGHIGH

## 2021-03-31 NOTE — PLAN OF CARE
Discharge instructions and education reviewed, medication schedule and follow up appts discussed. Pt had no questions. All belongings sent with pt.

## 2021-03-31 NOTE — DISCHARGE SUMMARY
[facility]    Discharge Summary  Hospitalist    Date of Admission:  3/30/2021  Date of Discharge:  3/31/2021  Discharging Provider: Tre Marion MD    Discharge Diagnoses   Principal Problem:    Failure to thrive in adult  Active Problems:    Ischemic cardiomyopathy    Paroxysmal atrial fibrillation (H)    Coronary artery disease involving native coronary artery of native heart without angina pectoris    CKD (chronic kidney disease) stage 3, GFR 30-59 ml/min    GERD (gastroesophageal reflux disease)    Tachy-alix syndrome (H)    AAA (3.7 cm on CT 3/30/21)    Essential hypertension, benign    Type 2 diabetes mellitus with diabetic neuropathy, without long-term current use of insulin (H)    PAD (peripheral artery disease) (H)    Mild persistent asthma without complication    Dysphagia, unspecified type    Vascular dementia (H)    Anemia, unspecified type    Hypotension due to hypovolemia    Generalized weakness    LUANNE (acute kidney injury) (H)    Moderate Lumb Spinal Sten w leg numbness    Severe malnutrition (H)      History of Present Illness   87 year old male complex past medical history including paroxysmal atrial fibrillation status post left atrial appendage ligation, ischemic cardiomyopathy, coronary artery disease see problem list overview for details, history of CABG, last cardiac cath 2014 medical management plan at that time, CKD stage III, GERD, history of tachybradycardia syndrome status post pacemaker placement 2012, hypertension, type 2 diabetes, dyslipidemia, deconditioned state, peripheral vascular disease, AAA anemia, vascular dementia history of dysphasia who presents with weakness and numbness in his legs and decreased p.o. intake with hypotension.  He has lost 60 lbs in last year (wt 200 down to 140 lbs) and he continues to take his Coreg and Torsemide dose.     In ER, BP 76/23, Hgb 12.0, Creat 2.26.     Hospital Course   Admitted to observation, given 2  liters IV fluids, BP better and felt stronger and repeat Creat 1.72 and hgb 10.7.      Decreased Coreg from 6.25 mg bid to 3.125 mg bid, and stopped his Torsemide 20 mg daily.  Advised wife to take over his medication management because of his moderate dementia.  Seen by PT and able to ambulate independently.     Follow-up with Eliezer Shah in 1 week, check BMP and BP then.       Tre Marion MD  Pager: [phone]  Cell Phone:  [phone]       Significant Results and Procedures   As above    Pending Results   These results will be followed up by Dr. Marion  Unresulted Labs Ordered in the Past 30 Days of this Admission     No orders found for last 31 day(s).          Code Status   Full Code (discussed possible DNR/DNI with wife, but she wants to think about it)       Primary Care Physician   Eliezer Shah    Physical Exam   Temp: 97.4  F (36.3  C) Temp src: Oral BP: 113/47 Pulse: 60   Resp: 14 SpO2: 98 % O2 Device: None (Room air)    Vitals:    03/30/21 1225 03/30/21 2150   Weight: 69.4 kg (153 lb) 67.2 kg (148 lb 3.2 oz)     Vital Signs with Ranges  Temp:  [97.3  F (36.3  C)-97.4  F (36.3  C)] 97.4  F (36.3  C)  Pulse:  [60-65] 60  Resp:  [14-16] 14  BP: ()/(23-76) 113/47  SpO2:  [96 %-100 %] 98 %  I/O last 3 completed shifts:  In: 1000 [IV Piggyback:1000]  Out: -     Exam on discharge:   Lungs clear  CV with reg S1S2  Neuro -- alert, Ox1.5, pleasant     Discharge Disposition   Discharged to home  Condition at discharge: Fair    Consultations This Hospital Stay   PHYSICAL THERAPY ADULT IP CONSULT  CARE MANAGEMENT / SOCIAL WORK IP CONSULT    Time Spent on this Encounter   I spent a total of 20 minutes discharging this patient.     Discharge Orders      Care Coordination Referral      Reason for your hospital stay    Weakness related to dehydration     Follow-up and recommended labs and tests     Follow up with primary care provider, Eliezer Shah, in 5-7 days, check  BMP and BP then.     Activity    Your activity upon discharge: activity as tolerated     Discharge Instructions    Call Dr. Reyes if any medical questions at Cell Phone [phone].     Full Code     Diet    Follow this diet upon discharge: Orders Placed This Encounter      Regular diet.     Discharge Medications   Current Discharge Medication List      CONTINUE these medications which have CHANGED    Details   carvedilol (COREG) 6.25 MG tablet Take 0.5 tablets (3.125 mg) by mouth 2 times daily (with meals)  Qty: 180 tablet, Refills: 0    Associated Diagnoses: Essential hypertension; Ischemic cardiomyopathy      ipratropium (ATROVENT) 0.03 % nasal spray Spray 1 spray into both nostrils 2 times daily as needed for rhinitis  Refills: 0    Associated Diagnoses: Chronic rhinitis         CONTINUE these medications which have NOT CHANGED    Details   donepezil (ARICEPT) 10 MG tablet Take 10 mg by mouth At Bedtime       MELATONIN PO Take 5 mg by mouth nightly as needed       mometasone (NASONEX) 50 MCG/ACT nasal spray Spray 2 sprays into both nostrils daily as needed.      nitroglycerin (NITROSTAT) 0.4 MG SL tablet Place 1 tablet (0.4 mg) under the tongue every 5 minutes as needed for chest pain  Qty: 25 tablet, Refills: 3    Associated Diagnoses: Chest pain on exertion      polyethylene glycol (MIRALAX/GLYCOLAX) packet Take 17 g by mouth daily as needed       rosuvastatin (CRESTOR) 40 MG tablet 40 mg daily          STOP taking these medications       torsemide (DEMADEX) 20 MG tablet Comments:   Reason for Stopping:             Allergies   Allergies   Allergen Reactions     Advair Diskus      DENIED     Morphine Hcl      Decrease  Blood Pressure Lower Than Normal, Per Pt.     Vicodin [Hydrocodone-Acetaminophen] Visual Disturbance     Data   Most Recent 3 CBC's:  Recent Labs   Lab Test 03/31/21  0617 03/30/21  1535 03/22/21  1628 03/17/21  0917   WBC 7.9 9.8  --  8.5   HGB 10.7* 12.0* 12.2* 10.8*   MCV 98 99  --  99     188  --  303      Most Recent 3 BMP's:  Recent Labs   Lab Test 03/31/21  0617 03/30/21  1535 03/22/21  1628 03/17/21  0917    138  --  139   POTASSIUM 3.6 3.8 4.1 3.9   CHLORIDE 109 104  --  109   CO2 27 29  --  28   BUN 52* 65*  --  28   CR 1.72* 2.26*  --  1.62*   ANIONGAP 4 5  --  2*   JANAY 8.9 9.2  --  8.9   GLC 81 164*  --  86     Most Recent 2 LFT's:  Recent Labs   Lab Test 03/30/21  1535 03/17/21  0917   AST 28 24   ALT 32 29   ALKPHOS 97 91   BILITOTAL 0.5 0.2     Most Recent INR's and Anticoagulation Dosing History:  Anticoagulation Dose History     Recent Dosing and Labs Latest Ref Rng & Units 4/21/2009 12/3/2012 12/31/2012 8/23/2013 12/10/2013 4/24/2014 4/25/2014    INR 0.86 - 1.14 0.87 0.86 1.00 0.92 1.00 1.0 0.94        Most Recent 3 Troponin's:  Recent Labs   Lab Test 03/17/21  0917 12/19/16  1441 11/22/16  0440   TROPI <0.015 <0.015  The 99th percentile for upper reference range is 0.045 ug/L.  Troponin values in   the range of 0.045 - 0.120 ug/L may be associated with risks of adverse   clinical events.   <0.015  The 99th percentile for upper reference range is 0.045 ug/L.  Troponin values in   the range of 0.045 - 0.120 ug/L may be associated with risks of adverse   clinical events.       Most Recent Cholesterol Panel:  Recent Labs   Lab Test 11/08/19  0929   CHOL 191   *   HDL 41   TRIG 154*     Most Recent 6 Bacteria Isolates From Any Culture (See EPIC Reports for Culture Details):No lab results found.  Most Recent TSH, T4 and A1c Labs:  Recent Labs   Lab Test 03/30/21  1535 12/02/19  1413 12/02/19  1413   TSH 2.20   < > 2.87   A1C  --   --  5.6    < > = values in this interval not displayed.

## 2021-03-31 NOTE — H&P
[facility]    History and Physical  Hospitalist       Date of Admission:  3/30/2021    Assessment & Plan   Abbe Lambert is a 87 year old male complex past medical history including paroxysmal atrial fibrillation status post left atrial appendage ligation, ischemic cardiomyopathy, coronary artery disease see problem list overview for details, history of CABG, last cardiac cath 2014 medical management plan at that time, CKD stage III, GERD, history of tachybradycardia syndrome status post pacemaker placement 2012, hypertension, type 2 diabetes, dyslipidemia, deconditioned state, peripheral vascular disease, AAA anemia, vascular dementia history of dysphasia who presents with weakness and numbness in his legs and decreased p.o. intake with hypotension  Patient found to be dehydrated with a nonfocal neuro exam.  Dehydration and LUANNE explain his hypotension and weakness.    Active Problems:         Coronary artery disease involving native coronary artery of native heart without angina pectoris  History of's ischemic cardiomyopathy    Assessment: no Cardiopulmonary symptoms.  He was having some lower extremity edema for which his torsemide was changed from as needed to scheduled dosing since recent ER visit and PCP visit.  He is develops mild LUANNE with dehydration and hypotension secondary to diuretics and poor p.o. intake.  Echo 2018 showed EF of 35 to 40%.  Severe apical wall hypokinesis.  Moderate to severe inferior inferior lateral wall hypokinesis.  Grade 1 and early diastolic dysfunction.  Mild to moderate LVH.  -He appears lying down on exam.  No edema.    Plan: Is not on aspirin.  Follow-up with PCP.  Continue Coreg at time of discharge.  Consider dose reduction.  Hold Coreg tonight.  Continues statin.  Check CK hold torsemide at time of discharge close follow-up with PCP, daily weights, restart torsemide if weight increased by 5 pounds in 5 to 7 days.  Likely benefit from going back to  a as needed dosing.  His weight may change as he is having ongoing weight loss secondary to poor p.o. intake.  He has risk of dehydration going forward.      CKD (chronic kidney disease) stage 3, GFR 30-59 ml/min   LUANNE  Dehydration    Hypotension due to hypovolemia    Assessment:     -baseline Creatinine 1.6-1.7.  -LUANNE superimposed on CKD secondary to poor chronic p.o. intake and torsemide.  Secondary to volume down from poor p.o. intake and torsemide.  Received 1 L normal saline and maintenance fluids in the emergency room.    Plan: Ordering labs, will hold on further IV fluids at this time as he is able to take p.o. and do not want to risk following overload.  Every 4 hours vital signs.      Generalized weakness    Assessment: Type factorial secondary to deconditioned state, dehydration    Plan: Fall precautions, physical therapy consult, close PCP follow-up, patient hydrated    Weight loss and anorexia.  Poor p.o. intake per wife.  Be multifactorial in part to his dementia.  Clear focal upper or lower GI symptoms other than occasional constipation with some loose stools.  No recent endoscopy.  He is not significantly anemic though will check hemoglobin following IV hydration.  CT scan December 2020 no no acute findings or mass.  Will need close ongoing follow-up with PCP and monitoring of weight with additional work-up to be considered.  Will check chest x-ray to rule out mass UA to rule out significant microscopic hematuria, and have his PSA checked outpatient, will start thiamine 5-day course and multivitamin consider Ensure or boost,      Neurologic  Lower extremity complaints  -Patient presents with a vague complaints of leg pain over the last several weeks and then numbness today.  -Normal vascular, neurologic exam lower extremities.  Normal Ortho exam.  No focal inflammatory arthritis.  No evidence for myelopathy or radiculopathy.  -CT thoracic spine and lumbar spine show no acute fracture or high-grade  spinal canal stenosis.  She has moderate spinal stenosis L4-L5 but does not appear to be having symptoms related to this though this can be followed outpatient.  Is possible his vague complaints are related to his spinal stenosis.  -PT consult,  Will check TSH, no further evaluation needed at this time.  PCP follow-up     GERD (gastroesophageal reflux disease)    Assessment: Has any symptoms.  Does not seem to be playing a role in poor p.o. intake.  Not on therapy.  Noted on problem list      Tachy-alix syndrome (H)  hx pacemaker placement      Assessment: Given presenting symptoms will check device check.     Paroxysmal atrial fibrillation (H)    Assessment: Status post watchman device.  Not on anticoagulation.  Post pacemaker for tachybradycardia syndrome      AAA (abdominal aortic aneurysm) (H)    Assessment: noted. No abdominal pain or back pain.      Essential hypertension, benign    Assessment: Presents with hypotension likely secondary to dehydration, overdiuresis, and coreg    Plan: Hold torsemide, hold Coreg for tonight, resume in the morning.  Likely hold torsemide at time of discharge and monitor weights daily close follow-up PCP      Type 2 diabetes mellitus with diabetic neuropathy, without long-term current use of insulin (H)    Assessment: Noted on problem list.  Not on therapy.  Sugar 164.  Patient would be at risk for hypoglycemia given his overall poor oral intake.      Mixed hyperlipidemia    Assessment: On statin therapy Crestor 40 mg daily.    Plan: Given presentation will check a CK level      Physical deconditioning    Assessment: Patient likely deconditioned.  We will have him seen by physical therapy.  He does not use a walker or cane.      PAD (peripheral artery disease) (H)    Assessment: Wife notes patient complains of leg pain but does not seem to be consistent.  It is possible that may represent peripheral vascular disease.  No signs of ischemia.    Plan: Follow-up with PCP, continue  Crestor, follow-up with PCP regarding whether he should be on a daily aspirin      Mild persistent asthma without complication    Assessment: Does not have any cardiopulmonary complaints.  Lungs clear.    Plan: follow      Dysphagia, unspecified type    Assessment: Noted in problem list.  No complaints of this on review of systems.    Plan: All of her issues of dysphagia while in the hospital.  Follow-up with PCP      Vascular dementia (H)    Assessment: Noted.  Patient forgetful but is oriented.  Has a nonfocal neuro exam.    Plan: Patient and wife should be informed that wife should assist patient with medication administration going forward      Anemia, unspecified type    Assessment: Mild anemia.  Hemoglobin stable.  No bleeding history.  CP recently did work-up in clinic.  Hemoglobin 12.2 but likely mild dehydration, B12 normal folic acid normal iron saturation and iron levels normal ferritin normal    Plan: CBC in the morning following hydration     DVT Prophylaxis: Pneumoboots  Code Status: Discussed at length with patient and wife.  They are uncertain as to how to proceed regarding this and would like to have full CODE STATUS at this time.  Encourage them to follow-up with patient's PCP to have an ongoing discussion.    Disposition: Expected discharge in 2 days, physical therapy occupational therapy, social work consult    Ramos Pierre MD    Primary Care Physician   Eliezer Shah    Chief Complaint   Weakness and leg numbness poor oral intake    History is obtained from the patient, EDMD, chart    History of Present Illness   Abbe Lambert is a 87 year old male complex past medical history including paroxysmal atrial fibrillation status post left atrial appendage ligation, ischemic cardiomyopathy, coronary artery disease see problem list overview for details, history of CABG, last cardiac cath 2014 medical management plan at that time, CKD stage III, GERD, history of tachybradycardia  syndrome status post pacemaker placement 2012, hypertension, type 2 diabetes, dyslipidemia, deconditioned state, peripheral vascular disease, AAA anemia, vascular dementia history of dysphasia who presents with weakness and numbness in his legs and decreased p.o. intake with hypotension      ED visit March 17 for leg swelling he was encouraged to take torsemide regularly rather than as needed.  He was to follow-up with PCP .  Seen by PCP March 22.  He had anemia evaluation labs obtained.  Plan was to continue torsemide 20 mg daily then decrease to 10 mg/day once edema resolves.  Limit salt intake.  PCP noted swelling in the calves up to the knees at that clinic visit.  Since his ED visit patient has been taking the torsemide 20 mg daily.  He has continued to take this dose despite having no edema.  Per wife patient manages his medications.      Patient provides a vague inconsistent history regarding his leg symptoms.  He noted to the ER doctor that he had some weakness but also some numbness.  Patient's wife notes patient is complained of leg numbness today but not previously.  He has intermittently complained of leg pain in the bilateral legs over the last several weeks.  He has not been specific.  He is not specifically complained of focal joint pain or burning or paresthesias.  Patient's wife has not noticed any change in his gait or functional status.  Patient has not had any back pain or radiating back pain.  No falls.  No dizziness.  She denies any focal joint pain or pain with ambulation.  He denies any back pain or radicular pain.    Over the past year patient has had ongoing weight loss with poor appetite.  His appetite and p.o. intake is dropped off further in the last several weeks.    No fevers chills nausea vomiting diarrhea.  No bloody stools.  No problems passing urine.  No bloody urine.  No chest pain palpitations falls or syncope.  No abdominal pain.  No dysphagia or odynophagia.  No cough or  myalgias.    He and his wife have had their first Covid vaccine are due for the second vaccine this Thursday.    Patient presented to [facility] ER for further evaluation.  Patient afebrile.  Initial systolic pressures were 87 systolic.  This improved with IV fluids.  Pressures in the 106/56.  Pulse in the 60s.  Normal oxygen saturations.  Labs notable for sodium 138, potassium 3.8, BUN 65 and creatinine 2.2.  Normal LFTs.  CRP normal.  Lactic acid 1.3, blood sugar 164, hemoglobin 12, white blood cell count 9.8, platelet count 188, normal differential urinalysis unremarkable except hyaline casts,    Imaging-  CT lumbar spine showed no acute abnormalities.  IMPRESSION:   1. No acute osseous abnormality of the lumbar spine.   2. Multilevel degenerative disc disease and facet arthropathy, as   described.   3. There appears to be up to moderate spinal canal stenosis at L4-L5   and mild/moderate spinal canal stenosis at L3-L4. Mild to moderate   multilevel neural foraminal stenosis is present.   4. Within the limitations of noncontrast technique, no gross change in   the previously demonstrated infrarenal abdominal aortic aneurysm.    Emergency room patient received 1 L normal saline    Exam in the emergency room patient had reflexes strength throughout.  No focal weakness.  He appeared clinically dehydrated.  Patient went back and forth as to whether he was having weakness or numbness in his legs.  He had similar presentation December 2020    Past Medical History    I have reviewed this patient's medical history and updated it with pertinent information if needed.   Past Medical History:   Diagnosis Date     AAA (abdominal aortic aneurysm) (H)      Anemia due to blood loss, acute 10/10/2016     Asthma      Atrial fibrillation (H)     s/p left atrial appendage ligation     Cardiac arrest (H)      Cardiomyopathy (H)      Chronic kidney disease      Chronic sinusitis      Coronary artery disease     cardiac cath  2014: medical management; cath 2013: PTCA to diagonal; cath 2009: medical management; CABG 1998: LIMA to LAD, LISA to RCA, SVG to circumflex     GERD (gastroesophageal reflux disease)      History of angina      Hyperlipidaemia      Hypertension, goal below 140/90      Myocardial infarction (H)     MI with Vfib arrest 1998     Polymyalgia rheumatica (H)      Shingles 10/28/2016     Shortness of breath      Tachy-alix syndrome (H)     generator replacement 9/2020; implanted dual chamber pacemaker 2012       Past Surgical History   I have reviewed this patient's surgical history and updated it with pertinent information if needed.  Past Surgical History:   Procedure Laterality Date     ARTHROPLASTY KNEE Left 10/5/2016    Procedure: ARTHROPLASTY KNEE;  Surgeon: Keshav Zamudio MD;  Location:  OR     CARDIAC SURGERY  12/03/2012    pacemaker ; CABG 1998     CHOLECYSTECTOMY       COLONOSCOPY       ENT SURGERY  5-    sinus     EP PACEMAKER N/A 9/11/2020    Procedure: EP Pacemaker;  Surgeon: Caron Guerin MD;  Location:  HEART CARDIAC CATH LAB     ESOPHAGOSCOPY, GASTROSCOPY, DUODENOSCOPY (EGD), COMBINED N/A 12/3/2019    Procedure: ESOPHAGOGASTRODUODENOSCOPY (EGD) (MAC);  Surgeon: Calderon Herman MD;  Location:  GI     ESOPHAGOSCOPY, GASTROSCOPY, DUODENOSCOPY (EGD), DILATATION, COMBINED N/A 12/3/2019    Procedure: Esophagoscopy, gastroscopy, duodenoscopy (EGD), dilatation, combined;  Surgeon: Calderon Herman MD;  Location:  GI     EXTRACAPSULAR CATARACT EXTRATION WITH INTRAOCULAR LENS IMPLANT       GENITOURINARY SURGERY      prostatectomy     IMPLANT PACEMAKER  12-3-12    [employer]     PROSTATE SURGERY         Prior to Admission Medications   Prior to Admission Medications   Prescriptions Last Dose Informant Patient Reported? Taking?   MELATONIN PO prn  Yes Yes   Sig: Take 5 mg by mouth nightly as needed    carvedilol (COREG) 6.25 MG tablet 3/29/2021 at Unknown time  No Yes   Sig:  TAKE 1 TABLET(6.25 MG) BY MOUTH TWICE DAILY WITH MEALS   donepezil (ARICEPT) 10 MG tablet 3/29/2021 at Unknown time  Yes Yes   Sig: 10 mg At Bedtime    ipratropium (ATROVENT) 0.03 % nasal spray prn  Yes Yes   Sig: U 1 SPR IEN Q 12 H PRF RHINITIS   mometasone (NASONEX) 50 MCG/ACT nasal spray prn Self Yes Yes   Sig: Spray 2 sprays into both nostrils daily as needed.   nitroglycerin (NITROSTAT) 0.4 MG SL tablet prn Self No Yes   Sig: Place 1 tablet (0.4 mg) under the tongue every 5 minutes as needed for chest pain   polyethylene glycol (MIRALAX/GLYCOLAX) packet  Self Yes Yes   Sig: Take 17 g by mouth daily as needed    rosuvastatin (CRESTOR) 40 MG tablet 3/29/2021 at Unknown time  Yes Yes   Si mg daily    torsemide (DEMADEX) 20 MG tablet Past Week at Unknown time  No Yes   Sig: Take 1 tablet (20 mg) by mouth daily as needed (swollen ankles)      Facility-Administered Medications: None     Allergies   Allergies   Allergen Reactions     Advair Diskus      DENIED     Morphine Hcl      Decrease  Blood Pressure Lower Than Normal, Per Pt.     Vicodin [Hydrocodone-Acetaminophen] Visual Disturbance       Social History   I have reviewed this patient's social history and updated it with pertinent information if needed. Abbe Lambert  reports that he has never smoked. He has never used smokeless tobacco. He reports that he does not use drugs.    Family History   I have reviewed this patient's family history and updated it with pertinent information if needed.   Family History   Problem Relation Age of Onset     Cerebrovascular Disease Father      Heart Disease Father      Heart Disease Brother      Coronary Artery Disease Brother         MI age 50     Depression Daughter         raped in high school     Unknown/Adopted Maternal Grandmother      Unknown/Adopted Maternal Grandfather      Unknown/Adopted Paternal Grandmother      Unknown/Adopted Paternal Grandfather        Review of Systems   The 10 point Review of Systems  is negative other than noted in the HPI or here.     Physical Exam   Temp: 97.6  F (36.4  C) Temp src: Temporal BP: 106/56 Pulse: 62   Resp: 16 SpO2: 100 %      Vital Signs with Ranges  Temp:  [97.6  F (36.4  C)-97.9  F (36.6  C)] 97.6  F (36.4  C)  Pulse:  [59-65] 62  Resp:  [16] 16  BP: ()/(23-76) 106/56  SpO2:  [100 %] 100 %  153 lbs 0 oz    Constitutional: Frail elderly appearing gentleman.  No acute distress, nontoxic-appearing  Eyes: Pupils equal round reactive to light and accommodating, extraocular eye motions intact, normal sclera  Neck-nontender, supple, normal JVP  HEENT: Mucous membranes, normal oropharynx  Respiratory: Clear to auscultation bilaterally  Chest-no chest wall tenderness.  Pacemaker left upper chest.  Cardiovascular: Regular rate and rhythm no rubs gallops or murmurs appreciated  GI: Soft nontender nondistended no hepatosplenomegaly lLymph/Hematologic: No axillary or cervical lymphadenopathy no inguinal lymphadenopathy  Genitourinary: No pain over the bladder  Skin: He has chronic venous stasis changes bilateral lower extremities.  No edema.  Extremities are warm.  Good cap refill.  No rash  Musculoskeletal: No focal joint swelling erythema.  No pain with passive range of motion of the upper and lower extremities joints.  No spinal tenderness  Neurologic: He is forgetful.  He is oriented to person place day month year but not date.  Cranial nerves II through XII grossly intact, strength 5 out of 5 in extremities x4, toes downgoing bilaterally, no clonus, patellar reflexes 2+ symmetric.  Brachial reflexes 2+ symmetric throughout, normal dorsal and plantar flexion,   Psychiatric: Pleasant and cooperative, calm    Data   Data reviewed today:  I personally reviewed laboratory studies and imaging studies performed the emergency room.  Recent Labs   Lab 03/30/21  1535   WBC 9.8   HGB 12.0*   MCV 99         POTASSIUM 3.8   CHLORIDE 104   CO2 29   BUN 65*   CR 2.26*   ANIONGAP 5    JANAY 9.2   *   ALBUMIN 3.6   PROTTOTAL 8.6   BILITOTAL 0.5   ALKPHOS 97   ALT 32   AST 28       Imaging:  Recent Results (from the past 24 hour(s))   Cardiac Device Check - Remote   Result Value    Date Time Interrogation Session [id number]    Implantable Pulse Generator  [employer]    Implantable Pulse Generator Model 2272 Assurity MRI    Implantable Pulse Generator Serial Number [id number]    Type Interrogation Session Remote     Re-programmed During Session NO    Clinic Name [facility]    Implantable Pulse Generator Type Pacemaker    Implantable Pulse Generator Implant Date 20200911    Implantable Lead  [employer]    Implantable Lead Model 2088TC Tendril STS    Implantable Lead Serial Number [id number]    Implantable Lead Implant Date 20121203    Implantable Lead Polarity Type Bipolar Lead    Implantable Lead Location Right Ventricle    Implantable Lead  [employer]    Implantable Lead Model 2088TC Tendril STS    Implantable Lead Serial Number [id number]    Implantable Lead Implant Date 20121203    Implantable Lead Polarity Type Bipolar Lead    Implantable Lead Location Right Atrium    Haim Setting Mode (NBG Code) DDDR    Haim Setting Lower Rate Limit 60    Haim Setting Maximum Tracking Rate 120    Haim Setting Maximum Sensor Rate 120    Haim Setting YOLI Delay Low 170    Haim Setting PAV Delay Low 200    Haim Setting AT Mode Switch Rate 180    Haim Setting AT Mode Switch Mode DDIR    Lead Channel Setting Sensing Polarity Bipolar    Lead Channel Setting Sensing Anode Location Right Atrium    Lead Channel Setting Sensing Anode Terminal Ring    Lead Channel Setting Sensing Cathode Location Right Atrium    Lead Channel Setting Sensing Cathode Terminal Tip    Lead Channel Setting Sensing Sensitivity 1.0    Lead Channel Setting Sensing Adaptation Mode Fixed     Lead Channel Setting Sensing Polarity Bipolar    Lead Channel Setting Sensing Anode Location  Right Ventricle    Lead Channel Setting Sensing Anode Terminal Ring    Lead Channel Setting Sensing Cathode Location Right Ventricle    Lead Channel Setting Sensing Cathode Terminal Tip    Lead Channel Setting Sensing Sensitivity 2.0    Lead Channel Setting Sensing Adaptation Mode Fixed     Lead Channel Setting Pacing Polarity Bipolar    Lead Channel Setting Pacing Anode Location Right Atrium    Lead Channel Setting Pacing Anode Terminal Ring    Lead Channel Setting Sensing Cathode Location Right Atrium    Lead Channel Setting Sensing Cathode Terminal Tip    Lead Channel Setting Pacing Pulse Width 0.5    Lead Channel Setting Pacing Amplitude 1.75    Lead Channel Setting Pacing Capture Mode Adaptive    Lead Channel Setting Pacing Polarity Bipolar    Lead Channel Setting Pacing Anode Location Right Ventricle    Lead Channel Setting Pacing Anode Terminal Ring    Lead Channel Setting Sensing Cathode Location Right Ventricle    Lead Channel Setting Sensing Cathode Terminal Tip    Lead Channel Setting Pacing Pulse Width 0.5    Lead Channel Setting Pacing Amplitude 1.25    Lead Channel Setting Pacing Capture Mode Adaptive    Lead Channel Status Null    Lead Channel Impedance Value 410    Lead Channel Sensing Intrinsic Amplitude 4.6    Lead Channel Pacing Threshold Amplitude 0.75    Lead Channel Pacing Threshold Pulse Width 0.5    Lead Channel Status Null    Lead Channel Impedance Value 540    Lead Channel Sensing Intrinsic Amplitude 12.0    Lead Channel Pacing Threshold Amplitude 1.0    Lead Channel Pacing Threshold Pulse Width 0.5    Battery Date Time of Measurements [id number]    Battery Status Middle of Service    Battery RRT Trigger When current voltage < 2.6 volts    Battery Remaining Longevity 120    Battery Remaining Percentage 95.5    Battery Voltage 3.01    Statistic Heart Rate Date Time Start 94799848122990    Statistic Heart Rate Date Time End 29215155759942    Statistic Atrial Heart Rate Min 50    Statistic  Atrial Heart Rate Mean 68    Statistic Atrial Heart Rate Max 320    Statistic Ventricular Heart Rate Min 40    Statistic Ventricular Heart Rate Mean 67    Statistic Ventricular Heart Rate Max 210    Haim Statistic Date Time Start 20201222154021    Haim Statistic Date Time End 20210330032433    Haim Statistic RA Percent Paced 86.0    Haim Statistic RV Percent Paced 1.0    Haim Statistic AP  Percent 1.0    Haim Statistic AS  Percent 1.0    Haim Statistic AP VS Percent 86.0    Haim Statistic AS VS Percent 13.0    CRT Statistic Date Time Start 20201222154021    CRT Statistic Date Time End 20210330032433    Atrial Tachy Statistic Date Time Start 20201222154021    Atrial Tachy Statistic Date Time End 20210330032433    Atrial Tachy Statistic AT/AF [place] Percent 0    Atrial Tachy Statistic Number Of Mode Switches 0    Atrial Tachy Statistic Percent Time In Mode Switch 0    Atrial Tachy Statistic Number Of Mode Switches Per Day 0    Narrative    [employer] (D) Remote PPM Device Check  AP: 86%  : <1%  Mode: DDDR  Presenting Rhythm: AP/VS  Heart Rate: Adequate rates per histogram  Sensing: stable  Pacing Threshold: stable  Impedance: stable  Battery Status: 9.8-10.2 years  Atrial Arrhythmia: None  Ventricular Arrhythmia: None    Care Plan: F/u PPM Merlin q 3 months. LM with results. AMIRAH Mancilla    I have reviewed and interpreted the device interrogation, settings, programming and nurse's summary. The device is functioning within normal device parameters. I agree with the current findings, assessment and plan.   POC US OB TRANSABDOMINAL LIMITED    [facility] Procedure Note      Limited Bedside ED Aorta Ultrasound:    PROCEDURE: PERFORMED BY: Dr. Supriya Latham MD  INDICATIONS:  Hypotension    PROBE:  Low frequency convex probe  BODY LOCATION: Abdomen  FINDINGS:  The ultrasound was performed using longitudinal and transverse views.   Normal: Abdominal aorta < 4 cm.  MEASUREMENT:   3.2 cm   No evidence of free fluid in hepatorenal (Morison's pouch)  INTERPRETATION:  The aorta was dilated to 3.2 cm in the infrarenal area. There was no abdominal free fluid.  IMAGE DOCUMENTATION: Images were archived to PACs system.     CT Lumbar Spine w/o Contrast    Narrative    EXAM: CT lumbar spine without contrast 3/30/2021 6:21 PM    COMPARISON: Lumbar spine radiographs dated 1/29/2017. Correlation is  also made with CT of the abdomen and pelvis dated 12/24/2020.    TECHNIQUE: Routine CT of the lumbar spine without contrast.  Multiplanar reformats. Radiation dose for this scan was reduced using  automated exposure control, adjustment of the mA and/or kV according  to patient size, or iterative reconstruction technique.    INDICATION: Progressive lower extremity numbness. Lower extremity  weakness.    FINDINGS: No acute fracture identified. There is approximately 3 mm of  degenerative retrolisthesis of L2 on L3 and 3 mm anterolisthesis of L4  on L5. Minimal levoconvex curvature at L2-L3 and dextroconvex  curvature centered at the lumbosacral junction. Multilevel  degenerative disc disease including moderate disc height loss at L2-L3  and moderate to severe disc height loss at L5-S1 with lesser degrees  of disc height loss elsewhere. Vacuum disc phenomenon at L2-L3, L4-L5  and L5-S1. Degenerative unchanged small subcortical cyst along the  anterior aspect of the L5 inferior endplate, likely degenerative.  There is multilevel degenerative facet arthropathy, including severe  bilateral degenerative facet disease at L4-L5.    There appears to be about up to moderate spinal canal stenosis at  L4-L5 and mild to moderate spinal canal stenosis at L3-L4 with  relatively milder degrees of spinal canal narrowing at other levels.  There appears to be a partially calcified left central disc herniation  at L5-S1 with marginal endplate osteophytes, with mild associated mass  effect on the ventral thecal sac. Mild to  moderate multilevel neural  foraminal stenosis is present.    There is an infrarenal abdominal aortic aneurysm measuring up to  approximately 3.7 cm in the axial plane (series 5 image 59) not  significantly changed from the prior exam. Diffuse atherosclerotic  disease of the abdominal aorta and iliac arteries. Bilateral  sacroiliac joint degenerative changes with marginal osteophyte fusion  across the anterior aspect of the right sacroiliac joint.      Impression    IMPRESSION:  1. No acute osseous abnormality of the lumbar spine.  2. Multilevel degenerative disc disease and facet arthropathy, as  described.  3. There appears to be up to moderate spinal canal stenosis at L4-L5  and mild/moderate spinal canal stenosis at L3-L4. Mild to moderate  multilevel neural foraminal stenosis is present.  4. Within the limitations of noncontrast technique, no gross change in  the previously demonstrated infrarenal abdominal aortic aneurysm.

## 2021-03-31 NOTE — PHARMACY-ADMISSION MEDICATION HISTORY
Admission medication history interview status for the 3/30/2021  admission is complete. See EPIC admission navigator for prior to admission medications     Medication history source reliability:Good    Actions taken by pharmacist (provider contacted, etc):None     Additional medication history information not noted on PTA med list :None    Medication reconciliation/reorder completed by provider prior to medication history? No    Time spent in this activity: 15min ,  Source wife    Prior to Admission medications    Medication Sig Last Dose Taking? Auth Provider   carvedilol (COREG) 6.25 MG tablet TAKE 1 TABLET(6.25 MG) BY MOUTH TWICE DAILY WITH MEALS 3/29/2021 at Unknown time Yes Brandon Eli MD   donepezil (ARICEPT) 10 MG tablet 10 mg At Bedtime  3/29/2021 at Unknown time Yes Reported, Patient   ipratropium (ATROVENT) 0.03 % nasal spray U 1 SPR IEN Q 12 H PRF RHINITIS prn Yes Reported, Patient   MELATONIN PO Take 5 mg by mouth nightly as needed  prn Yes Reported, Patient   mometasone (NASONEX) 50 MCG/ACT nasal spray Spray 2 sprays into both nostrils daily as needed. prn Yes    nitroglycerin (NITROSTAT) 0.4 MG SL tablet Place 1 tablet (0.4 mg) under the tongue every 5 minutes as needed for chest pain prn Yes Tom Suarez MD   polyethylene glycol (MIRALAX/GLYCOLAX) packet Take 17 g by mouth daily as needed   Yes Unknown, Entered By History   rosuvastatin (CRESTOR) 40 MG tablet 40 mg daily  3/29/2021 at Unknown time Yes Reported, Patient   torsemide (DEMADEX) 20 MG tablet Take 1 tablet (20 mg) by mouth daily as needed (swollen ankles) Past Week at Unknown time Yes Eliezer Shah MD

## 2021-03-31 NOTE — ED NOTES
"[facility]  ED Nurse Handoff Report    ED Chief complaint: Leg Pain      ED Diagnosis:   Final diagnoses:   None       Code Status:  Admitting MD to assess.      Allergies:   Allergies   Allergen Reactions     Advair Diskus      DENIED     Morphine Hcl      Decrease  Blood Pressure Lower Than Normal, Per Pt.     Vicodin [Hydrocodone-Acetaminophen] Visual Disturbance       Patient Story: Pt states his leg bilaterally are sort of numb in the shin area - and that he feels \"paulina foggy \" . Wife then came in the triage room and stated the legs started a few months ago - he has been seen twice for this - fogginess has been progressing.   Focused Assessment:  Patient is alert and oriented x 4, calm and cooperative. VS are stable, skin is warm and dry, respirations are even and non-labored on room air. Patient denied chest pain, shortness of breath, dizziness, and nausea.       Treatments and/or interventions provided:   Medications   0.9% sodium chloride BOLUS (0 mLs Intravenous Stopped 3/30/21 1729)     Followed by   sodium chloride 0.9% infusion (has no administration in time range)   sodium chloride 0.9% infusion ( Intravenous New Bag 3/30/21 1922)       Patient's response to treatments and/or interventions: Stable    To be done/followed up on inpatient unit:  Monitor    Does this patient have any cognitive concerns?: Forgetful    Activity level - Baseline/Home:  Unknown  Activity Level - Current:   Unknown    Patient's Preferred language: English   Needed?: No    Isolation: None  Infection: Not Applicable  Patient tested for COVID 19 prior to admission: YES  Bariatric?: No    Vital Signs:   Vitals:    03/30/21 1645 03/30/21 1700 03/30/21 1715 03/30/21 1730   BP: 93/57 97/53 108/58 106/56   Pulse: 65 60 62 62   Resp:       Temp:       TempSrc:       SpO2:       Weight:       Height:           Cardiac Rhythm:     Was the PSS-3 completed:   Yes  What interventions are required if any?           "     Family Comments: Spouse is at the bedside.  OBS brochure/video discussed/provided to patient/family: Yes              Name of person given brochure if not patient: NA              Relationship to patient: NA    For the majority of the shift this patient's behavior was Green.   Behavioral interventions performed were  information and reassurance provided.  .    ED NURSE PHONE NUMBER: [phone]

## 2021-03-31 NOTE — PROGRESS NOTES
Care Management Discharge Note    Discharge Date: 03/31/21  Discharge Disposition: Home  Discharge Services: None  Discharge DME: None  Discharge Transportation: family or friend will provide  Private pay costs discussed: Not applicable  PAS Confirmation Code: (n/a)  Patient/family educated on Medicare website which has current facility and service quality ratings: no  Education Provided on the Discharge Plan:  YES  Persons Notified of Discharge Plans: patient, spouse, bedside RN, hospitalist   Patient/Family in Agreement with the Plan:  YES  Handoff Referral Completed: Yes  Additional Information:  See AVS for appointment details.    Veronica Mathias RN

## 2021-03-31 NOTE — PLAN OF CARE
AOx4; forgetful at times. VSS on RA. Up indecently. Regular diet; poor appetite. Encouraging fluids. Denies pain. IV; SL. Skin; CDI. Voiding independently.  Expected discharge in 2 days; pending PT/OT/Social consults.

## 2021-03-31 NOTE — PROGRESS NOTES
RECEIVING UNIT ED HANDOFF REVIEW    ED Nurse Handoff Report was reviewed by: Gladys Sherman RN on March 30, 2021 at 9:23 PM

## 2021-03-31 NOTE — PLAN OF CARE
Observation Goals:     -diagnostic tests and consults completed and resulted  MET  -vital signs normal or at patient baseline MET  -tolerating oral intake to maintain hydration MET  -returns to baseline functional status MET  Nurse to notify provider when observation goals have been met and patient is ready for discharge    A&Ox4. VSS. Denies pain. Creatinine 2.26 --> 1.72. Chest x-ray complete. Device check complete. Ambulating ind. Tolerating diet. Pt eager to discharge.

## 2021-03-31 NOTE — PROGRESS NOTES
Observation Goals:    -diagnostic tests and consults completed and resulted  NOT MET  -vital signs normal or at patient baseline MET  -tolerating oral intake to maintain hydration NOT MET; hasn't eaten yet  -returns to baseline functional status NOT MET  Nurse to notify provider when observation goals have been met and patient is ready for discharge.

## 2021-03-31 NOTE — PLAN OF CARE
PT: Orders received. Chart reviewed and discussed with care team. Pt has been up independently in room per RN. RN reports no mobility concerns for PT to address. PT not indicated due to no IP PT needs identified.  Defer discharge recommendations to medical team.  Will complete orders.

## 2021-03-31 NOTE — CONSULTS
Care Management Initial Consult    General Information  Assessment completed with: Patient, Spouse or significant other, (Abbe & Madison Bolton)   Type of CM/SW Visit: Offer D/C Planning    Primary Care Provider verified and updated as needed: Yes([facility], Dr. Shah [phone] )   Readmission within the last 30 days:        Reason for Consult: discharge planning  Advance Care Planning:    there is a Health Care Directive dated 09-18-06 on file / scanned into EPIC     Communication Assessment  Patient's communication style: spoken language (English or Bilingual)(but is very hard of hearing )  Hearing Difficulty or Deaf: yes   Wear Glasses or Blind: yes    Cognitive  Cognitive/Neuro/Behavioral: WDL                      Living Environment:   People in home: spouse  Madison Bolton   Current living Arrangements: house      Able to return to prior arrangements:  yes    Family/Social Support:  Care provided by: self, spouse/significant other  Provides care for:  n/a  Marital Status:   Support system: Wife  Madison Bolton        Description of Support System: Supportive, Involved       Current Resources:   Patient receiving home care services: No  Community Resources: None(does have Cardiologist & established PCP clinic )  Equipment currently used at home: none  Supplies currently used at home: Compression Stockings    Employment/Financial:  Employment Status: retired     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  Financial Concerns: No concerns identified   Finance Comments: active MEDICARE/MEDICARE and COMMERCIAL/AARP insurance     Lifestyle & Psychosocial Needs:  Socioeconomic History     Marital status:      Spouse name: Not on file     Number of children: 3     Years of education: Not on file     Highest education level: Not on file     Tobacco Use     Smoking status: Never Smoker     Smokeless tobacco: Never Used   Substance and Sexual Activity     Drug use: No     Sexual activity: Not  Currently     Partners: Female       Functional Status:  Prior to admission patient needed assistance:   Dependent ADLs:: Independent  Dependent IADLs:: (wife assists )  Assesssment of Functional Status: At functional baseline    Mental Health Status:  Mental Health Status: No Current Concerns       Chemical Dependency Status:  Chemical Dependency Status: No Current Concerns       Values/Beliefs:  Spiritual, Cultural Beliefs, Mormonism Practices, Values that affect care: no          Values/Beliefs Comment: [place]    Additional Information:  Met with patient and wife in room, introduced self and role in discharge planning.  Pt is very hard of hearing but did ask if he can leave (MD was paged for orders).  Wife supportive, and in agreement with the appointments below.  She identifies no needs, except wishing pt would use a cane in some circumstances, and noting his hearing aides only work when someone is within a very close range.     Pt has the following appointments already entered on AVS (COVID vaccination 4/1 and Hospital Follow-up appointment 4/6 scheduled by CCRN Task team request).     Apr 01, 2021  2:45 PM   (Arrive by 2:30 PM)   COVID-19 Vaccine 2nd Dose with OX XERXES COVID VAC 21 DAY PFIZER   [facility] [address] [facility]   [address]   [facility] [address] [phone]   Please arrive at your scheduled appointment time and expect your appointment to last up to 45 minutes.     If you feel sick or have a fever, please call [phone] to reschedule. Please wear a short sleeved shirt and mask. And only those getting a vaccine should come to allow for social distancing.     Please see [email or web address] to prepare for your vaccine visit.   Apr 06, 2021 11:00 AM   Office Visit with Verónica Garcia PA-C   [facility]  [facility] ) [address] [id number] [phone]   Bring a current list of meds and any records pertaining to this visit.  For Physicals, please bring immunization records and any forms needing to be filled out. Please arrive 10 minutes early to complete paperwork.   Apr 14, 2021  8:15 AM   LAB with [facility]   [facility] ([facility] ) [address] [place] [address] [address]   [facility] [id number] [phone]   Before you lab test (cholesterol test): Unless we tell you otherwise, you may only have water before your test. Stop all other food and drink 8 hours before the test (no juice, tea, coffee, soda, soft foods etc.) If you take medicine in the morning, take it with water.   Apr 14, 2021  8:45 AM   Echo Complete with [id number]   [facility] ([facility] - [facility] ) [address] [place] [address]   [address]   [facility] [address] [phone]   1. Please bring or wear a comfortable two-piece outfit.  2. You may eat, drink and take your normal medicines.  3. Please do not apply perfumes or lotions on the day of your exam.        Veronica Mathias RN, BSN, PHN  [facility]  Inpatient Care Management - FLOAT  Mobile: [phone] 03/31/21 until 4pm  (after today's date, please call the patient's unit)

## 2021-03-31 NOTE — PROVIDER NOTIFICATION
Paged hospitalist: Pt very ready for discharge, putting on his jacket and wife is here.  No needs from Care Coordination standpoint, followups are scheduled.  Can discharge order be placed?  You can text or call with questions.     Veronica Mathias RN, BSN, PHN  [facility]  Inpatient Care Management - FLOAT  Mobile: [phone] 03/31/21 until 4pm  (after today's date, please call the patient's unit)

## 2021-04-01 ENCOUNTER — IMMUNIZATION (OUTPATIENT)
Dept: NURSING | Facility: CLINIC | Age: 86
End: 2021-04-01
Payer: MEDICARE

## 2021-04-01 ENCOUNTER — PATIENT OUTREACH (OUTPATIENT)
Dept: NURSING | Facility: CLINIC | Age: 86
End: 2021-04-01
Payer: MEDICARE

## 2021-04-01 ENCOUNTER — TELEPHONE (OUTPATIENT)
Dept: INTERNAL MEDICINE | Facility: CLINIC | Age: 86
End: 2021-04-01

## 2021-04-01 PROCEDURE — 91300 PR COVID VAC PFIZER DIL RECON 30 MCG/0.3 ML IM: CPT

## 2021-04-01 PROCEDURE — 0002A PR COVID VAC PFIZER DIL RECON 30 MCG/0.3 ML IM: CPT

## 2021-04-01 NOTE — TELEPHONE ENCOUNTER
PCP please advise if ok with changing next weeks open virtual slots to in clinic visit as patient is due for a hospital follow up.    Teresa ALVARESN, RN, PHN

## 2021-04-01 NOTE — PROGRESS NOTES
Clinic Care Coordination Contact  Community Health Worker Initial Outreach  Spoke with Patient's wife, Madison Bolton    GERMANIA Initial Information Gathering:  Referral Source: IP Report    Chart Review: Referral from a discharge.  [facility] 3/30/21-3/31/21  IP for Weakness related to dehydration  Medication Changes- Yes     Patient accepts CC: No, Not at this time. Patient will be sent Care Coordination introduction letter for future reference.     Plan: Care Coordinator will send care coordination introduction letter with care coordinator contact information and explanation of care coordination services via mail. Care Coordinator will do no further outreaches at this time.    GERMANIA Dorman  Clinic Care Coordination  [facility]: [place], [place], [place], and [facility]  Phone: [phone]

## 2021-04-01 NOTE — TELEPHONE ENCOUNTER
LM on Madison 's. Changed patient hospital f/u to see PCP instead of Acute Care provider Verónica.     Next 5 appointments (look out 90 days)    Apr 06, 2021  8:40 AM  Office Visit with Eliezer Shah MD  [facility] ([facility] ) [address] [address]  [phone]   Apr 20, 2021  3:45 PM  Return Visit with Brandon Eli MD  [facility] [place] ([facility] ) [address] [place] [address] [address]  [facility] [address]  [phone]        Please reschedule if time does not work.    Teresa ALVARESN, RN, PHN

## 2021-04-01 NOTE — LETTER
[facility] CARE COORDINATION  [address]  [facility] [address]      April 1, 2021      Abbe Lambert  [address] [facility] [address]      Dear Abbe,    I am a clinic community health worker who works with Eliezer Shah MD at [facility]. I wanted to thank you for spending the time to talk with me.  Below is a description of clinic care coordination and how I can further assist you.      The clinic care coordination team is made up of a registered nurse,  and community health worker who understand the health care system. The goal of clinic care coordination is to help you manage your health and improve access to the health care system in the most efficient manner. The team can assist you in meeting your health care goals by providing education, coordinating services, strengthening the communication among your providers and supporting you with any resource needs.    Please feel free to contact me at [phone] with any questions or concerns. We are focused on providing you with the highest-quality healthcare experience possible and that all starts with you.     Sincerely,     GERMANIA Dorman  Clinic Care Coordination  [facility] Clin[facility]: [place], [place], [facility], and [place] [facility]  Phone: [phone]

## 2021-04-01 NOTE — TELEPHONE ENCOUNTER
"Hospital/TCU/ED for chronic condition Discharge Protocol    \"Hi, my name is Teresa Dobbs RN, a registered nurse, and I am calling from [facility].  I am calling to follow up and see how things are going for you after your recent emergency visit/hospital/TCU stay.\"    Tell me how you are doing now that you are home?\" spoke with Madison, Legs are doing better. So far he seem's to be ok. Has not yet checked BP today but will call and advise if SBP less than 80.       Discharge Instructions    \"Let's review your discharge instructions.  What is/are the follow-up recommendations?  Pt. Response: f/u with Dr. Shah    \"Has an appointment with your primary care provider been scheduled?\"   Yes. (confirm)    \"When you see the provider, I would recommend that you bring your medications with you.\"    Medications    \"Tell me what changed about your medicines when you discharged?\"    Changes to chronic meds?    0-1    \"What questions do you have about your medications?\"    None     New diagnoses of heart failure, COPD, diabetes, or MI?    No              Post Discharge Medication Reconciliation Status: discharge medications reconciled and changed, per note/orders.    Was MTM referral placed (*Make sure to put transitions as reason for referral)?   No    Call Summary    \"What questions or concerns do you have about your recent visit and your follow-up care?\"     none    \"If you have questions or things don't continue to improve, we encourage you contact us through the main clinic number (give number).  Even if the clinic is not open, triage nurses are available 24/7 to help you.     We would like you to know that our clinic has extended hours (provide information).  We also have urgent care (provide details on closest location and hours/contact info)\"      \"Thank you for your time and take care!\"         Teresa MALIN, RN, PHN      "

## 2021-04-06 ENCOUNTER — OFFICE VISIT (OUTPATIENT)
Dept: INTERNAL MEDICINE | Facility: CLINIC | Age: 86
End: 2021-04-06
Payer: MEDICARE

## 2021-04-06 ENCOUNTER — PRE VISIT (OUTPATIENT)
Dept: CARDIOLOGY | Facility: CLINIC | Age: 86
End: 2021-04-06

## 2021-04-06 VITALS
OXYGEN SATURATION: 100 % | BODY MASS INDEX: 23.07 KG/M2 | DIASTOLIC BLOOD PRESSURE: 50 MMHG | HEART RATE: 63 BPM | WEIGHT: 156.2 LBS | RESPIRATION RATE: 16 BRPM | TEMPERATURE: 95.7 F | SYSTOLIC BLOOD PRESSURE: 100 MMHG

## 2021-04-06 DIAGNOSIS — F01.50 VASCULAR DEMENTIA WITHOUT BEHAVIORAL DISTURBANCE (H): ICD-10-CM

## 2021-04-06 DIAGNOSIS — E11.40 TYPE 2 DIABETES MELLITUS WITH DIABETIC NEUROPATHY, WITHOUT LONG-TERM CURRENT USE OF INSULIN (H): Primary | Chronic | ICD-10-CM

## 2021-04-06 DIAGNOSIS — R62.7 FAILURE TO THRIVE IN ADULT: ICD-10-CM

## 2021-04-06 DIAGNOSIS — R53.1 GENERALIZED WEAKNESS: ICD-10-CM

## 2021-04-06 LAB
ALBUMIN SERPL-MCNC: 3.3 G/DL (ref 3.4–5)
ALP SERPL-CCNC: 98 U/L (ref 40–150)
ALT SERPL W P-5'-P-CCNC: 54 U/L (ref 0–70)
ANION GAP SERPL CALCULATED.3IONS-SCNC: 2 MMOL/L (ref 3–14)
AST SERPL W P-5'-P-CCNC: 44 U/L (ref 0–45)
BILIRUB SERPL-MCNC: 0.4 MG/DL (ref 0.2–1.3)
BUN SERPL-MCNC: 21 MG/DL (ref 7–30)
CALCIUM SERPL-MCNC: 9.6 MG/DL (ref 8.5–10.1)
CHLORIDE SERPL-SCNC: 107 MMOL/L (ref 94–109)
CO2 SERPL-SCNC: 28 MMOL/L (ref 20–32)
CREAT SERPL-MCNC: 1.59 MG/DL (ref 0.66–1.25)
GFR SERPL CREATININE-BSD FRML MDRD: 38 ML/MIN/{1.73_M2}
GLUCOSE SERPL-MCNC: 128 MG/DL (ref 70–99)
HBA1C MFR BLD: 5.9 % (ref 0–5.6)
POTASSIUM SERPL-SCNC: 4.2 MMOL/L (ref 3.4–5.3)
PROT SERPL-MCNC: 8.1 G/DL (ref 6.8–8.8)
SODIUM SERPL-SCNC: 137 MMOL/L (ref 133–144)
TSH SERPL DL<=0.005 MIU/L-ACNC: 1.8 MU/L (ref 0.4–4)

## 2021-04-06 PROCEDURE — 99495 TRANSJ CARE MGMT MOD F2F 14D: CPT | Performed by: FAMILY MEDICINE

## 2021-04-06 PROCEDURE — 80053 COMPREHEN METABOLIC PANEL: CPT | Performed by: FAMILY MEDICINE

## 2021-04-06 PROCEDURE — 36415 COLL VENOUS BLD VENIPUNCTURE: CPT | Performed by: FAMILY MEDICINE

## 2021-04-06 PROCEDURE — 83036 HEMOGLOBIN GLYCOSYLATED A1C: CPT | Performed by: FAMILY MEDICINE

## 2021-04-06 PROCEDURE — 84443 ASSAY THYROID STIM HORMONE: CPT | Performed by: FAMILY MEDICINE

## 2021-04-06 ASSESSMENT — ASTHMA QUESTIONNAIRES
QUESTION_2 LAST FOUR WEEKS HOW OFTEN HAVE YOU HAD SHORTNESS OF BREATH: NOT AT ALL
QUESTION_4 LAST FOUR WEEKS HOW OFTEN HAVE YOU USED YOUR RESCUE INHALER OR NEBULIZER MEDICATION (SUCH AS ALBUTEROL): NOT AT ALL
QUESTION_5 LAST FOUR WEEKS HOW WOULD YOU RATE YOUR ASTHMA CONTROL: COMPLETELY CONTROLLED
ACT_TOTALSCORE: 25
QUESTION_1 LAST FOUR WEEKS HOW MUCH OF THE TIME DID YOUR ASTHMA KEEP YOU FROM GETTING AS MUCH DONE AT WORK, SCHOOL OR AT HOME: NONE OF THE TIME
QUESTION_3 LAST FOUR WEEKS HOW OFTEN DID YOUR ASTHMA SYMPTOMS (WHEEZING, COUGHING, SHORTNESS OF BREATH, CHEST TIGHTNESS OR PAIN) WAKE YOU UP AT NIGHT OR EARLIER THAN USUAL IN THE MORNING: NOT AT ALL

## 2021-04-06 NOTE — PROGRESS NOTES
Assessment & Plan     Type 2 diabetes mellitus with diabetic neuropathy, without long-term current use of insulin (H)    - Comprehensive metabolic panel  - Hemoglobin A1c  - Albumin Random Urine Quantitative with Creat Ratio  - TSH    Generalized weakness      Failure to thrive in adult      Vascular dementia without behavioral disturbance (H)                 Patient Instructions   Since discharge from the hospital the patient is started to eat a little bit better.  His weakness is starting to resolve.  His weight has stabilized.  Labs were ordered to check on his electrolytes and his kidney function and his diabetes and his thyroid.  His memory function has not changed.  He is able to continue to live independently with his wife.  We discussed nutritional supplements in the form of Ensure and boost.  The patient and his wife who accompanied him today will consider that as part of his treatment regimen.  He will follow-up in 3 months, sooner if he has more issues.  Hopefully his weakness and appetite, etc. will continue to improve.  Further follow-up will be pending review of his tests done today.  I did not change any of his medications today.  Medication list was updated.      Return in about 3 months (around 7/6/2021) for diabetes, failure to thrive.    Eliezer Shah MD  [facility]    Paresh Mcadams is a 87 year old who presents for the following health issues     HPI       Hospital Follow-up Visit:    Hospital/Nursing Home/IP Rehab Facility: [facility]  Date of Admission: 03/30/2021  Date of Discharge: 03/31/2021  Reason(s) for Admission: Failure to thrive      Was your hospitalization related to COVID-19? No   Problems taking medications regularly:  None  Medication changes since discharge: Stop torsemide  Problems adhering to non-medication therapy:  None    Summary of hospitalization:  [facility] discharge summary reviewed  Diagnostic  Tests/Treatments reviewed.  Follow up needed: none  Other Healthcare Providers Involved in Patient s Care:         None  Update since discharge: improved. Post Discharge Medication Reconciliation: discharge medications reconciled, continue medications without change.  Plan of care communicated with patient and family              Review of Systems   Constitutional, HEENT, cardiovascular, pulmonary, gi and gu systems are negative, except as otherwise noted.      Objective    /50 (BP Location: Right arm, Patient Position: Chair, Cuff Size: Adult Regular)   Pulse 63   Temp 95.7  F (35.4  C) (Temporal)   Resp 16   Wt 70.9 kg (156 lb 3.2 oz)   SpO2 100%   BMI 23.07 kg/m    Body mass index is 23.07 kg/m .  Physical Exam   GENERAL APPEARANCE: healthy, alert and no distress  RESP: lungs clear to auscultation - no rales, rhonchi or wheezes  CV: regular rates and rhythm, normal S1 S2, no S3 or S4 and no murmur, click or rub

## 2021-04-06 NOTE — LETTER
My Asthma Action Plan    Name: Abbe Lambert   YOB: 1934  Date: 4/6/2021   My doctor: Eliezer Shah MD   My clinic: [facility]        My Control Medicine: None  My Rescue Medicine: None  My Oral Steroid Medicine: none My Asthma Severity:   Mild Persistent  Know your asthma triggers: Patient is unaware of triggers              GREEN ZONE   Good Control    I feel good    No cough or wheeze    Can work, sleep and play without asthma symptoms       Take your asthma control medicine every day.     1. If exercise triggers your asthma, take your rescue medication    15 minutes before exercise or sports, and    During exercise if you have asthma symptoms  2. Spacer to use with inhaler: If you have a spacer, make sure to use it with your inhaler             YELLOW ZONE Getting Worse  I have ANY of these:    I do not feel good    Cough or wheeze    Chest feels tight    Wake up at night   1. Keep taking your Green Zone medications  2. Start taking your rescue medicine:    every 20 minutes for up to 1 hour. Then every 4 hours for 24-48 hours.  3. If you stay in the Yellow Zone for more than 12-24 hours, contact your doctor.  4. If you do not return to the Green Zone in 12-24 hours or you get worse, start taking your oral steroid medicine if prescribed by your provider.           RED ZONE Medical Alert - Get Help  I have ANY of these:    I feel awful    Medicine is not helping    Breathing getting harder    Trouble walking or talking    Nose opens wide to breathe       1. Take your rescue medicine NOW  2. If your provider has prescribed an oral steroid medicine, start taking it NOW  3. Call your doctor NOW  4. If you are still in the Red Zone after 20 minutes and you have not reached your doctor:    Take your rescue medicine again and    Call 911 or go to the emergency room right away    See your regular doctor within 2 weeks of an Emergency Room or Urgent Care visit  for follow-up treatment.          Annual Reminders:  Meet with Asthma Educator,  Flu Shot in the Fall, consider Pneumonia Vaccination for patients with asthma (aged 19 and older).    Pharmacy:    [id number] - [facility] [address] AT [facility] [address]  WRITTEN PRESCRIPTION REQUESTED  [facility] - USE FOR MAILING ONLY - [place], [place]  [facility]  FOR [facility] - [employer], [place] - [facility]    Electronically signed by Eliezer Shah MD   Date: 04/06/21                      Asthma Triggers  How To Control Things That Make Your Asthma Worse    Triggers are things that make your asthma worse.  Look at the list below to help you find your triggers and what you can do about them.  You can help prevent asthma flare-ups by staying away from your triggers.      Trigger                                                          What you can do   Cigarette Smoke  Tobacco smoke can make asthma worse. Do not allow smoking in your home, car or around you.  Be sure no one smokes at a child s day care or school.  If you smoke, ask your health care provider for ways to help you quit.  Ask family members to quit too.  Ask your health care provider for a referral to Quit Plan to help you quit smoking, or call 5-629-603-PLAN.     Colds, Flu, Bronchitis  These are common triggers of asthma. Wash your hands often.  Don t touch your eyes, nose or mouth.  Get a flu shot every year.     Dust Mites  These are tiny bugs that live in cloth or carpet. They are too small to see. Wash sheets and blankets in hot water every week.   Encase pillows and mattress in dust mite proof covers.  Avoid having carpet if you can. If you have carpet, vacuum weekly.   Use a dust mask and HEPA vacuum.   Pollen and Outdoor Mold  Some people are allergic to trees, grass, or weed pollen, or molds. Try to keep your windows closed.  Limit time out doors when pollen count is high.   Ask you health care  provider about taking medicine during allergy season.     Animal Dander  Some people are allergic to skin flakes, urine or saliva from pets with fur or feathers. Keep pets with fur or feathers out of your home.    If you can t keep the pet outdoors, then keep the pet out of your bedroom.  Keep the bedroom door closed.  Keep pets off cloth furniture and away from stuffed toys.     Mice, Rats, and Cockroaches   Some people are allergic to the waste from these pests.   Cover food and garbage.  Clean up spills and food crumbs.  Store grease in the refrigerator.   Keep food out of the bedroom.   Indoor Mold  This can be a trigger if your home has high moisture. Fix leaking faucets, pipes, or other sources of water.   Clean moldy surfaces.  Dehumidify basement if it is damp and smelly.   Smoke, Strong Odors, and Sprays  These can reduce air quality. Stay away from strong odors and sprays, such as perfume, powder, hair spray, paints, smoke incense, paint, cleaning products, candles and new carpet.   Exercise or Sports  Some people with asthma have this trigger. Be active!  Ask your doctor about taking medicine before sports or exercise to prevent symptoms.    Warm up for 5-10 minutes before and after sports or exercise.     Other Triggers of Asthma  Cold air:  Cover your nose and mouth with a scarf.  Sometimes laughing or crying can be a trigger.  Some medicines and food can trigger asthma.

## 2021-04-06 NOTE — LETTER
April 8, 2021      Abbe Lambert  [address] [facility] [id number]        Dear ,    We are writing to inform you of your test results.    This is all pretty stable and can be repeated again in 1 month.  That can be a lab appointment only.    Resulted Orders   Comprehensive metabolic panel   Result Value Ref Range    Sodium 137 133 - 144 mmol/L    Potassium 4.2 3.4 - 5.3 mmol/L    Chloride 107 94 - 109 mmol/L    Carbon Dioxide 28 20 - 32 mmol/L    Anion Gap 2 (L) 3 - 14 mmol/L    Glucose 128 (H) 70 - 99 mg/dL    Urea Nitrogen 21 7 - 30 mg/dL    Creatinine 1.59 (H) 0.66 - 1.25 mg/dL    GFR Estimate 38 (L) >60 mL/min/[1.73_m2]      Comment:      Non  GFR Calc  Starting 12/18/2018, serum creatinine based estimated GFR (eGFR) will be   calculated using the Chronic Kidney Disease Epidemiology Collaboration   (CKD-EPI) equation.      GFR Estimate If Black 45 (L) >60 mL/min/[1.73_m2]      Comment:       GFR Calc  Starting 12/18/2018, serum creatinine based estimated GFR (eGFR) will be   calculated using the Chronic Kidney Disease Epidemiology Collaboration   (CKD-EPI) equation.      Calcium 9.6 8.5 - 10.1 mg/dL    Bilirubin Total 0.4 0.2 - 1.3 mg/dL    Albumin 3.3 (L) 3.4 - 5.0 g/dL    Protein Total 8.1 6.8 - 8.8 g/dL    Alkaline Phosphatase 98 40 - 150 U/L    ALT 54 0 - 70 U/L    AST 44 0 - 45 U/L   Hemoglobin A1c   Result Value Ref Range    Hemoglobin A1C 5.9 (H) 0 - 5.6 %      Comment:      Normal <5.7% Prediabetes 5.7-6.4%  Diabetes 6.5% or higher - adopted from ADA   consensus guidelines.     TSH   Result Value Ref Range    TSH 1.80 0.40 - 4.00 mU/L       If you have any questions or concerns, please call the clinic at the number listed above.       Sincerely,      Eliezer Shah MD

## 2021-04-07 ASSESSMENT — ASTHMA QUESTIONNAIRES: ACT_TOTALSCORE: 25

## 2021-04-08 DIAGNOSIS — N18.32 STAGE 3B CHRONIC KIDNEY DISEASE (H): Primary | ICD-10-CM

## 2021-04-13 LAB
MDC_IDC_LEAD_IMPLANT_DT: NORMAL
MDC_IDC_LEAD_IMPLANT_DT: NORMAL
MDC_IDC_LEAD_LOCATION: NORMAL
MDC_IDC_LEAD_LOCATION: NORMAL
MDC_IDC_LEAD_MFG: NORMAL
MDC_IDC_LEAD_MFG: NORMAL
MDC_IDC_LEAD_MODEL: NORMAL
MDC_IDC_LEAD_MODEL: NORMAL
MDC_IDC_LEAD_POLARITY_TYPE: NORMAL
MDC_IDC_LEAD_POLARITY_TYPE: NORMAL
MDC_IDC_LEAD_SERIAL: NORMAL
MDC_IDC_LEAD_SERIAL: NORMAL
MDC_IDC_MSMT_BATTERY_DTM: NORMAL
MDC_IDC_MSMT_BATTERY_REMAINING_LONGEVITY: 120 MO
MDC_IDC_MSMT_BATTERY_REMAINING_PERCENTAGE: 95.5 %
MDC_IDC_MSMT_BATTERY_RRT_TRIGGER: NORMAL
MDC_IDC_MSMT_BATTERY_STATUS: NORMAL
MDC_IDC_MSMT_BATTERY_VOLTAGE: 3.01 V
MDC_IDC_MSMT_LEADCHNL_RA_IMPEDANCE_VALUE: 410 OHM
MDC_IDC_MSMT_LEADCHNL_RA_LEAD_CHANNEL_STATUS: NORMAL
MDC_IDC_MSMT_LEADCHNL_RA_PACING_THRESHOLD_AMPLITUDE: 0.75 V
MDC_IDC_MSMT_LEADCHNL_RA_PACING_THRESHOLD_PULSEWIDTH: 0.5 MS
MDC_IDC_MSMT_LEADCHNL_RA_SENSING_INTR_AMPL: 4.6 MV
MDC_IDC_MSMT_LEADCHNL_RV_IMPEDANCE_VALUE: 540 OHM
MDC_IDC_MSMT_LEADCHNL_RV_LEAD_CHANNEL_STATUS: NORMAL
MDC_IDC_MSMT_LEADCHNL_RV_PACING_THRESHOLD_AMPLITUDE: 1 V
MDC_IDC_MSMT_LEADCHNL_RV_PACING_THRESHOLD_PULSEWIDTH: 0.5 MS
MDC_IDC_MSMT_LEADCHNL_RV_SENSING_INTR_AMPL: 12 MV
MDC_IDC_PG_IMPLANT_DTM: NORMAL
MDC_IDC_PG_MFG: NORMAL
MDC_IDC_PG_MODEL: NORMAL
MDC_IDC_PG_SERIAL: NORMAL
MDC_IDC_PG_TYPE: NORMAL
MDC_IDC_SESS_CLINIC_NAME: NORMAL
MDC_IDC_SESS_DTM: NORMAL
MDC_IDC_SESS_REPROGRAMMED: NO
MDC_IDC_SESS_TYPE: NORMAL
MDC_IDC_SET_BRADY_AT_MODE_SWITCH_MODE: NORMAL
MDC_IDC_SET_BRADY_AT_MODE_SWITCH_RATE: 180 {BEATS}/MIN
MDC_IDC_SET_BRADY_LOWRATE: 60 {BEATS}/MIN
MDC_IDC_SET_BRADY_MAX_SENSOR_RATE: 120 {BEATS}/MIN
MDC_IDC_SET_BRADY_MAX_TRACKING_RATE: 120 {BEATS}/MIN
MDC_IDC_SET_BRADY_MODE: NORMAL
MDC_IDC_SET_BRADY_PAV_DELAY_LOW: 200 MS
MDC_IDC_SET_BRADY_SAV_DELAY_LOW: 170 MS
MDC_IDC_SET_LEADCHNL_RA_PACING_AMPLITUDE: 1.75 V
MDC_IDC_SET_LEADCHNL_RA_PACING_ANODE_ELECTRODE_1: NORMAL
MDC_IDC_SET_LEADCHNL_RA_PACING_ANODE_LOCATION_1: NORMAL
MDC_IDC_SET_LEADCHNL_RA_PACING_CAPTURE_MODE: NORMAL
MDC_IDC_SET_LEADCHNL_RA_PACING_CATHODE_ELECTRODE_1: NORMAL
MDC_IDC_SET_LEADCHNL_RA_PACING_CATHODE_LOCATION_1: NORMAL
MDC_IDC_SET_LEADCHNL_RA_PACING_POLARITY: NORMAL
MDC_IDC_SET_LEADCHNL_RA_PACING_PULSEWIDTH: 0.5 MS
MDC_IDC_SET_LEADCHNL_RA_SENSING_ADAPTATION_MODE: NORMAL
MDC_IDC_SET_LEADCHNL_RA_SENSING_ANODE_ELECTRODE_1: NORMAL
MDC_IDC_SET_LEADCHNL_RA_SENSING_ANODE_LOCATION_1: NORMAL
MDC_IDC_SET_LEADCHNL_RA_SENSING_CATHODE_ELECTRODE_1: NORMAL
MDC_IDC_SET_LEADCHNL_RA_SENSING_CATHODE_LOCATION_1: NORMAL
MDC_IDC_SET_LEADCHNL_RA_SENSING_POLARITY: NORMAL
MDC_IDC_SET_LEADCHNL_RA_SENSING_SENSITIVITY: 1 MV
MDC_IDC_SET_LEADCHNL_RV_PACING_AMPLITUDE: 1.25 V
MDC_IDC_SET_LEADCHNL_RV_PACING_ANODE_ELECTRODE_1: NORMAL
MDC_IDC_SET_LEADCHNL_RV_PACING_ANODE_LOCATION_1: NORMAL
MDC_IDC_SET_LEADCHNL_RV_PACING_CAPTURE_MODE: NORMAL
MDC_IDC_SET_LEADCHNL_RV_PACING_CATHODE_ELECTRODE_1: NORMAL
MDC_IDC_SET_LEADCHNL_RV_PACING_CATHODE_LOCATION_1: NORMAL
MDC_IDC_SET_LEADCHNL_RV_PACING_POLARITY: NORMAL
MDC_IDC_SET_LEADCHNL_RV_PACING_PULSEWIDTH: 0.5 MS
MDC_IDC_SET_LEADCHNL_RV_SENSING_ADAPTATION_MODE: NORMAL
MDC_IDC_SET_LEADCHNL_RV_SENSING_ANODE_ELECTRODE_1: NORMAL
MDC_IDC_SET_LEADCHNL_RV_SENSING_ANODE_LOCATION_1: NORMAL
MDC_IDC_SET_LEADCHNL_RV_SENSING_CATHODE_ELECTRODE_1: NORMAL
MDC_IDC_SET_LEADCHNL_RV_SENSING_CATHODE_LOCATION_1: NORMAL
MDC_IDC_SET_LEADCHNL_RV_SENSING_POLARITY: NORMAL
MDC_IDC_SET_LEADCHNL_RV_SENSING_SENSITIVITY: 2 MV
MDC_IDC_STAT_AT_BURDEN_PERCENT: 0 %
MDC_IDC_STAT_AT_DTM_END: NORMAL
MDC_IDC_STAT_AT_DTM_START: NORMAL
MDC_IDC_STAT_AT_MODE_SW_COUNT: 0
MDC_IDC_STAT_AT_MODE_SW_COUNT_PER_DAY: 0
MDC_IDC_STAT_AT_MODE_SW_PERCENT_TIME: 0 %
MDC_IDC_STAT_BRADY_AP_VP_PERCENT: 1 %
MDC_IDC_STAT_BRADY_AP_VS_PERCENT: 86 %
MDC_IDC_STAT_BRADY_AS_VP_PERCENT: 1 %
MDC_IDC_STAT_BRADY_AS_VS_PERCENT: 13 %
MDC_IDC_STAT_BRADY_DTM_END: NORMAL
MDC_IDC_STAT_BRADY_DTM_START: NORMAL
MDC_IDC_STAT_BRADY_RA_PERCENT_PACED: 86 %
MDC_IDC_STAT_BRADY_RV_PERCENT_PACED: 1 %
MDC_IDC_STAT_CRT_DTM_END: NORMAL
MDC_IDC_STAT_CRT_DTM_START: NORMAL
MDC_IDC_STAT_HEART_RATE_ATRIAL_MAX: 320 {BEATS}/MIN
MDC_IDC_STAT_HEART_RATE_ATRIAL_MEAN: 68 {BEATS}/MIN
MDC_IDC_STAT_HEART_RATE_ATRIAL_MIN: 50 {BEATS}/MIN
MDC_IDC_STAT_HEART_RATE_DTM_END: NORMAL
MDC_IDC_STAT_HEART_RATE_DTM_START: NORMAL
MDC_IDC_STAT_HEART_RATE_VENTRICULAR_MAX: 210 {BEATS}/MIN
MDC_IDC_STAT_HEART_RATE_VENTRICULAR_MEAN: 67 {BEATS}/MIN
MDC_IDC_STAT_HEART_RATE_VENTRICULAR_MIN: 40 {BEATS}/MIN

## 2021-04-14 ENCOUNTER — HOSPITAL ENCOUNTER (OUTPATIENT)
Dept: CARDIOLOGY | Facility: CLINIC | Age: 86
Discharge: HOME | End: 2021-04-14
Attending: INTERNAL MEDICINE | Admitting: INTERNAL MEDICINE
Payer: MEDICARE

## 2021-04-14 DIAGNOSIS — I25.5 ISCHEMIC CARDIOMYOPATHY: Chronic | ICD-10-CM

## 2021-04-14 DIAGNOSIS — I25.10 CORONARY ARTERY DISEASE INVOLVING NATIVE CORONARY ARTERY OF NATIVE HEART WITHOUT ANGINA PECTORIS: Chronic | ICD-10-CM

## 2021-04-14 DIAGNOSIS — Z95.0 CARDIAC PACEMAKER IN SITU: Primary | Chronic | ICD-10-CM

## 2021-04-14 LAB
ALT SERPL W P-5'-P-CCNC: 82 U/L (ref 0–70)
ANION GAP SERPL CALCULATED.3IONS-SCNC: 3 MMOL/L (ref 3–14)
BUN SERPL-MCNC: 21 MG/DL (ref 7–30)
CALCIUM SERPL-MCNC: 9.2 MG/DL (ref 8.5–10.1)
CHLORIDE SERPL-SCNC: 107 MMOL/L (ref 94–109)
CHOLEST SERPL-MCNC: 92 MG/DL
CO2 SERPL-SCNC: 29 MMOL/L (ref 20–32)
CREAT SERPL-MCNC: 1.51 MG/DL (ref 0.66–1.25)
GFR SERPL CREATININE-BSD FRML MDRD: 41 ML/MIN/{1.73_M2}
GLUCOSE SERPL-MCNC: 89 MG/DL (ref 70–99)
HDLC SERPL-MCNC: 46 MG/DL
LDLC SERPL CALC-MCNC: 27 MG/DL
NONHDLC SERPL-MCNC: 46 MG/DL
POTASSIUM SERPL-SCNC: 4 MMOL/L (ref 3.4–5.3)
SODIUM SERPL-SCNC: 139 MMOL/L (ref 133–144)
TRIGL SERPL-MCNC: 94 MG/DL

## 2021-04-14 PROCEDURE — 80061 LIPID PANEL: CPT | Performed by: INTERNAL MEDICINE

## 2021-04-14 PROCEDURE — 255N000002 HC RX 255 OP 636: Performed by: INTERNAL MEDICINE

## 2021-04-14 PROCEDURE — 80048 BASIC METABOLIC PNL TOTAL CA: CPT | Performed by: INTERNAL MEDICINE

## 2021-04-14 PROCEDURE — 36415 COLL VENOUS BLD VENIPUNCTURE: CPT | Performed by: INTERNAL MEDICINE

## 2021-04-14 PROCEDURE — 93306 TTE W/DOPPLER COMPLETE: CPT | Mod: 26 | Performed by: INTERNAL MEDICINE

## 2021-04-14 PROCEDURE — 999N000208 ECHOCARDIOGRAM COMPLETE

## 2021-04-14 PROCEDURE — 84460 ALANINE AMINO (ALT) (SGPT): CPT | Performed by: INTERNAL MEDICINE

## 2021-04-14 RX ADMIN — HUMAN ALBUMIN MICROSPHERES AND PERFLUTREN 3 ML: 10; .22 INJECTION, SOLUTION INTRAVENOUS at 09:44

## 2021-04-20 ENCOUNTER — OFFICE VISIT (OUTPATIENT)
Dept: CARDIOLOGY | Facility: CLINIC | Age: 86
End: 2021-04-20
Payer: MEDICARE

## 2021-04-20 VITALS
HEIGHT: 70 IN | DIASTOLIC BLOOD PRESSURE: 64 MMHG | BODY MASS INDEX: 23.08 KG/M2 | HEART RATE: 61 BPM | WEIGHT: 161.2 LBS | SYSTOLIC BLOOD PRESSURE: 107 MMHG

## 2021-04-20 DIAGNOSIS — I48.0 PAROXYSMAL ATRIAL FIBRILLATION (H): Chronic | ICD-10-CM

## 2021-04-20 DIAGNOSIS — Z95.0 CARDIAC PACEMAKER IN SITU: Chronic | ICD-10-CM

## 2021-04-20 DIAGNOSIS — I25.10 CORONARY ARTERY DISEASE INVOLVING NATIVE CORONARY ARTERY OF NATIVE HEART WITHOUT ANGINA PECTORIS: Chronic | ICD-10-CM

## 2021-04-20 DIAGNOSIS — I87.2 STASIS DERMATITIS OF BOTH LEGS: ICD-10-CM

## 2021-04-20 DIAGNOSIS — N18.32 STAGE 3B CHRONIC KIDNEY DISEASE (H): ICD-10-CM

## 2021-04-20 DIAGNOSIS — I10 ESSENTIAL HYPERTENSION, BENIGN: Chronic | ICD-10-CM

## 2021-04-20 DIAGNOSIS — I25.5 ISCHEMIC CARDIOMYOPATHY: Primary | ICD-10-CM

## 2021-04-20 DIAGNOSIS — E78.2 MIXED HYPERLIPIDEMIA: ICD-10-CM

## 2021-04-20 DIAGNOSIS — R60.0 PERIPHERAL EDEMA: ICD-10-CM

## 2021-04-20 DIAGNOSIS — I50.42 CHRONIC COMBINED SYSTOLIC AND DIASTOLIC CONGESTIVE HEART FAILURE (H): Chronic | ICD-10-CM

## 2021-04-20 PROCEDURE — 99214 OFFICE O/P EST MOD 30 MIN: CPT | Performed by: INTERNAL MEDICINE

## 2021-04-20 SDOH — HEALTH STABILITY: MENTAL HEALTH: HOW OFTEN DO YOU HAVE A DRINK CONTAINING ALCOHOL?: NEVER

## 2021-04-20 ASSESSMENT — MIFFLIN-ST. JEOR: SCORE: 1412.45

## 2021-04-20 NOTE — PROGRESS NOTES
HPI and Plan:   See dictation    Orders Placed This Encounter   Procedures     Basic metabolic panel     Lipid Profile     Follow-Up with Cardiac Advanced Practice Provider     Follow-Up with Cardiac Advanced Practice Provider     Follow-Up with Cardiologist       No orders of the defined types were placed in this encounter.      Medications Discontinued During This Encounter   Medication Reason     rosuvastatin (CRESTOR) 40 MG tablet          Encounter Diagnoses   Name Primary?     Ischemic cardiomyopathy Yes     Paroxysmal atrial fibrillation (H)      Coronary artery disease involving native coronary artery of native heart without angina pectoris      Chronic combined systolic and diastolic congestive heart failure (H)      Essential hypertension, benign      Mixed hyperlipidemia      Cardiac pacemaker in situ      Peripheral edema      Stage 3b chronic kidney disease      Stasis dermatitis of both legs        CURRENT MEDICATIONS:  Current Outpatient Medications   Medication Sig Dispense Refill     carvedilol (COREG) 6.25 MG tablet Take 0.5 tablets (3.125 mg) by mouth 2 times daily (with meals) 180 tablet 0     donepezil (ARICEPT) 10 MG tablet Take 10 mg by mouth At Bedtime        ipratropium (ATROVENT) 0.03 % nasal spray Spray 1 spray into both nostrils 2 times daily as needed for rhinitis  0     MELATONIN PO Take 5 mg by mouth nightly as needed        mometasone (NASONEX) 50 MCG/ACT nasal spray Spray 2 sprays into both nostrils daily as needed.       nitroglycerin (NITROSTAT) 0.4 MG SL tablet Place 1 tablet (0.4 mg) under the tongue every 5 minutes as needed for chest pain 25 tablet 3     polyethylene glycol (MIRALAX/GLYCOLAX) packet Take 17 g by mouth daily as needed          ALLERGIES     Allergies   Allergen Reactions     Advair Diskus      DENIED     Morphine Hcl      Decrease  Blood Pressure Lower Than Normal, Per Pt.     Vicodin [Hydrocodone-Acetaminophen] Visual Disturbance       PAST MEDICAL  HISTORY:  Past Medical History:   Diagnosis Date     AAA (abdominal aortic aneurysm) (H)      Anemia due to blood loss, acute 10/10/2016     Asthma      Atrial fibrillation (H)     s/p left atrial appendage ligation     Cardiac arrest (H)      Cardiomyopathy (H)      Chronic kidney disease      Chronic sinusitis      Coronary artery disease     cardiac cath 2014: medical management; cath 2013: PTCA to diagonal; cath 2009: medical management; CABG 1998: LIMA to LAD, LISA to RCA, SVG to circumflex     GERD (gastroesophageal reflux disease)      History of angina      Hyperlipidaemia      Hypertension, goal below 140/90      Myocardial infarction (H)     MI with Vfib arrest 1998     Polymyalgia rheumatica (H)      Shingles 10/28/2016     Shortness of breath      Tachy-alix syndrome (H)     generator replacement 9/2020; implanted dual chamber pacemaker 2012       PAST SURGICAL HISTORY:  Past Surgical History:   Procedure Laterality Date     ARTHROPLASTY KNEE Left 10/5/2016    Procedure: ARTHROPLASTY KNEE;  Surgeon: Keshav Zamudio MD;  Location:  OR     CARDIAC SURGERY  12/03/2012    pacemaker ; CABG 1998     CHOLECYSTECTOMY       COLONOSCOPY       ENT SURGERY  5-    sinus     EP PACEMAKER N/A 9/11/2020    Procedure: EP Pacemaker;  Surgeon: Caron Guerin MD;  Location:  HEART CARDIAC CATH LAB     ESOPHAGOSCOPY, GASTROSCOPY, DUODENOSCOPY (EGD), COMBINED N/A 12/3/2019    Procedure: ESOPHAGOGASTRODUODENOSCOPY (EGD) (MAC);  Surgeon: Calderon Herman MD;  Location:  GI     ESOPHAGOSCOPY, GASTROSCOPY, DUODENOSCOPY (EGD), DILATATION, COMBINED N/A 12/3/2019    Procedure: Esophagoscopy, gastroscopy, duodenoscopy (EGD), dilatation, combined;  Surgeon: Calderon Herman MD;  Location:  GI     EXTRACAPSULAR CATARACT EXTRATION WITH INTRAOCULAR LENS IMPLANT       GENITOURINARY SURGERY      prostatectomy     IMPLANT PACEMAKER  12-3-12    [facility]     PROSTATE SURGERY         FAMILY  HISTORY:  Family History   Problem Relation Age of Onset     Cerebrovascular Disease Father      Heart Disease Father      Heart Disease Brother      Coronary Artery Disease Brother         MI age 50     Depression Daughter         raped in high school     Unknown/Adopted Maternal Grandmother      Unknown/Adopted Maternal Grandfather      Unknown/Adopted Paternal Grandmother      Unknown/Adopted Paternal Grandfather        SOCIAL HISTORY:  Social History     Socioeconomic History     Marital status:      Spouse name: None     Number of children: 3     Years of education: None     Highest education level: None   Occupational History     None   Social Needs     Financial resource strain: None     Food insecurity     Worry: None     Inability: None     Transportation needs     Medical: None     Non-medical: None   Tobacco Use     Smoking status: Never Smoker     Smokeless tobacco: Never Used   Substance and Sexual Activity     Alcohol use: Never     Alcohol/week: 0.0 standard drinks     Frequency: Never     Drug use: No     Sexual activity: Not Currently     Partners: Female   Lifestyle     Physical activity     Days per week: None     Minutes per session: None     Stress: None   Relationships     Social connections     Talks on phone: None     Gets together: None     Attends [employer] service: None     Active member of club or organization: None     Attends meetings of clubs or organizations: None     Relationship status: None     Intimate partner violence     Fear of current or ex partner: None     Emotionally abused: None     Physically abused: None     Forced sexual activity: None   Other Topics Concern     Parent/sibling w/ CABG, MI or angioplasty before 65F 55M? Yes     Comment: brother and father had MI @ 40's      Service Not Asked     Blood Transfusions Not Asked     Caffeine Concern Yes     Comment: one diet mountain dew daily     Occupational Exposure Not Asked     Hobby Hazards Not Asked      "Sleep Concern No     Stress Concern No     Weight Concern Not Asked     Special Diet No     Back Care Not Asked     Exercise Yes     Comment: golfing, does the stair at the office a lot     Bike Helmet Not Asked     Seat Belt Yes     Self-Exams Not Asked   Social History Narrative    6 GC;         Review of Systems:  Skin:  Negative pigmentation;scaling red scaling on both legs   Eyes:  Positive for glasses;cataracts reading, poor vision in right eye  ENT:  Positive for hearing loss    Respiratory:  Negative       Cardiovascular:    Positive for;edema;fatigue slight swelling legs  Gastroenterology: Negative      Genitourinary:  Negative      Musculoskeletal:  Negative      Neurologic:  Positive for memory problems    Psychiatric:  Negative      Heme/Lymph/Imm:  Negative allergies seasonal  Endocrine:  Negative        Physical Exam:  Vitals: /64   Pulse 61   Ht 1.778 m (5' 10\")   Wt 73.1 kg (161 lb 3.2 oz)   BMI 23.13 kg/m      Constitutional:  cooperative, alert and oriented, well developed, well nourished, in no acute distress        Skin:  warm and dry to the touch, no apparent skin lesions or masses noted   pacemaker incision in the left infraclavicular area was well-healed      Head:  normocephalic, no masses or lesions        Eyes:  pupils equal and round, conjunctivae and lids unremarkable, sclera white, no xanthalasma, EOMS intact, no nystagmus        Lymph:      ENT:  no pallor or cyanosis, dentition good        Neck:  carotid pulses are full and equal bilaterally;no carotid bruit        Respiratory:  normal breath sounds, clear to auscultation, normal A-P diameter, normal symmetry, normal respiratory excursion, no use of accessory muscles         Cardiac: regular rhythm;normal S1 and S2;no murmurs, gallops or rubs detected                pulses full and equal                                        GI:           Extremities and Muscular Skeletal:  no spinal abnormalities noted;normal muscle " strength and tone stasis pigmentation bilateral LE edema;trace          Neurological:  no gross motor deficits   Appears to have Parkinson type features.    Psych:  affect appropriate, oriented to time, person and place        CC  No referring provider defined for this encounter.

## 2021-04-20 NOTE — LETTER
4/20/2021    Eliezer Shah MD  [address]  [facility] [address]    RE: Abbe Lambert       Dear Colleague,    I had the pleasure of seeing Abbe Lambert in the [facility].    HPI and Plan:   See dictation    Orders Placed This Encounter   Procedures     Basic metabolic panel     Lipid Profile     Follow-Up with Cardiac Advanced Practice Provider     Follow-Up with Cardiac Advanced Practice Provider     Follow-Up with Cardiologist       No orders of the defined types were placed in this encounter.      Medications Discontinued During This Encounter   Medication Reason     rosuvastatin (CRESTOR) 40 MG tablet          Encounter Diagnoses   Name Primary?     Ischemic cardiomyopathy Yes     Paroxysmal atrial fibrillation (H)      Coronary artery disease involving native coronary artery of native heart without angina pectoris      Chronic combined systolic and diastolic congestive heart failure (H)      Essential hypertension, benign      Mixed hyperlipidemia      Cardiac pacemaker in situ      Peripheral edema      Stage 3b chronic kidney disease      Stasis dermatitis of both legs        CURRENT MEDICATIONS:  Current Outpatient Medications   Medication Sig Dispense Refill     carvedilol (COREG) 6.25 MG tablet Take 0.5 tablets (3.125 mg) by mouth 2 times daily (with meals) 180 tablet 0     donepezil (ARICEPT) 10 MG tablet Take 10 mg by mouth At Bedtime        ipratropium (ATROVENT) 0.03 % nasal spray Spray 1 spray into both nostrils 2 times daily as needed for rhinitis  0     MELATONIN PO Take 5 mg by mouth nightly as needed        mometasone (NASONEX) 50 MCG/ACT nasal spray Spray 2 sprays into both nostrils daily as needed.       nitroglycerin (NITROSTAT) 0.4 MG SL tablet Place 1 tablet (0.4 mg) under the tongue every 5 minutes as needed for chest pain 25 tablet 3     polyethylene glycol (MIRALAX/GLYCOLAX) packet Take 17 g by mouth daily as  needed          ALLERGIES     Allergies   Allergen Reactions     Advair Diskus      DENIED     Morphine Hcl      Decrease  Blood Pressure Lower Than Normal, Per Pt.     Vicodin [Hydrocodone-Acetaminophen] Visual Disturbance       PAST MEDICAL HISTORY:  Past Medical History:   Diagnosis Date     AAA (abdominal aortic aneurysm) (H)      Anemia due to blood loss, acute 10/10/2016     Asthma      Atrial fibrillation (H)     s/p left atrial appendage ligation     Cardiac arrest (H)      Cardiomyopathy (H)      Chronic kidney disease      Chronic sinusitis      Coronary artery disease     cardiac cath 2014: medical management; cath 2013: PTCA to diagonal; cath 2009: medical management; CABG 1998: LIMA to LAD, LISA to RCA, SVG to circumflex     GERD (gastroesophageal reflux disease)      History of angina      Hyperlipidaemia      Hypertension, goal below 140/90      Myocardial infarction (H)     MI with Vfib arrest 1998     Polymyalgia rheumatica (H)      Shingles 10/28/2016     Shortness of breath      Tachy-alix syndrome (H)     generator replacement 9/2020; implanted dual chamber pacemaker 2012       PAST SURGICAL HISTORY:  Past Surgical History:   Procedure Laterality Date     ARTHROPLASTY KNEE Left 10/5/2016    Procedure: ARTHROPLASTY KNEE;  Surgeon: Keshav Zamudio MD;  Location:  OR     CARDIAC SURGERY  12/03/2012    pacemaker ; CABG 1998     CHOLECYSTECTOMY       COLONOSCOPY       ENT SURGERY  5-    sinus     EP PACEMAKER N/A 9/11/2020    Procedure: EP Pacemaker;  Surgeon: Caron Guerin MD;  Location:  HEART CARDIAC CATH LAB     ESOPHAGOSCOPY, GASTROSCOPY, DUODENOSCOPY (EGD), COMBINED N/A 12/3/2019    Procedure: ESOPHAGOGASTRODUODENOSCOPY (EGD) (MAC);  Surgeon: Calderon Herman MD;  Location:  GI     ESOPHAGOSCOPY, GASTROSCOPY, DUODENOSCOPY (EGD), DILATATION, COMBINED N/A 12/3/2019    Procedure: Esophagoscopy, gastroscopy, duodenoscopy (EGD), dilatation, combined;  Surgeon:  Calderon Herman MD;  Location:  GI     EXTRACAPSULAR CATARACT EXTRATION WITH INTRAOCULAR LENS IMPLANT       GENITOURINARY SURGERY      prostatectomy     IMPLANT PACEMAKER  12-3-12    [facility]     PROSTATE SURGERY         FAMILY HISTORY:  Family History   Problem Relation Age of Onset     Cerebrovascular Disease Father      Heart Disease Father      Heart Disease Brother      Coronary Artery Disease Brother         MI age 50     Depression Daughter         raped in high school     Unknown/Adopted Maternal Grandmother      Unknown/Adopted Maternal Grandfather      Unknown/Adopted Paternal Grandmother      Unknown/Adopted Paternal Grandfather        SOCIAL HISTORY:  Social History     Socioeconomic History     Marital status:      Spouse name: None     Number of children: 3     Years of education: None     Highest education level: None   Occupational History     None   Social Needs     Financial resource strain: None     Food insecurity     Worry: None     Inability: None     Transportation needs     Medical: None     Non-medical: None   Tobacco Use     Smoking status: Never Smoker     Smokeless tobacco: Never Used   Substance and Sexual Activity     Alcohol use: Never     Alcohol/week: 0.0 standard drinks     Frequency: Never     Drug use: No     Sexual activity: Not Currently     Partners: Female   Lifestyle     Physical activity     Days per week: None     Minutes per session: None     Stress: None   Relationships     Social connections     Talks on phone: None     Gets together: None     Attends Ten[employer] service: None     Active member of club or organization: None     Attends meetings of clubs or organizations: None     Relationship status: None     Intimate partner violence     Fear of current or ex partner: None     Emotionally abused: None     Physically abused: None     Forced sexual activity: None   Other Topics Concern     Parent/sibling w/ CABG, MI or angioplasty before 65F 55M? Yes      "Comment: brother and father had MI @ 40's      Service Not Asked     Blood Transfusions Not Asked     Caffeine Concern Yes     Comment: one diet mountain dew daily     Occupational Exposure Not Asked     Hobby Hazards Not Asked     Sleep Concern No     Stress Concern No     Weight Concern Not Asked     Special Diet No     Back Care Not Asked     Exercise Yes     Comment: golfing, does the stair at the office a lot     Bike Helmet Not Asked     Seat Belt Yes     Self-Exams Not Asked   Social History Narrative    6 GC;         Review of Systems:  Skin:  Negative pigmentation;scaling red scaling on both legs   Eyes:  Positive for glasses;cataracts reading, poor vision in right eye  ENT:  Positive for hearing loss    Respiratory:  Negative       Cardiovascular:    Positive for;edema;fatigue slight swelling legs  Gastroenterology: Negative      Genitourinary:  Negative      Musculoskeletal:  Negative      Neurologic:  Positive for memory problems    Psychiatric:  Negative      Heme/Lymph/Imm:  Negative allergies seasonal  Endocrine:  Negative        Physical Exam:  Vitals: /64   Pulse 61   Ht 1.778 m (5' 10\")   Wt 73.1 kg (161 lb 3.2 oz)   BMI 23.13 kg/m      Constitutional:  cooperative, alert and oriented, well developed, well nourished, in no acute distress        Skin:  warm and dry to the touch, no apparent skin lesions or masses noted   pacemaker incision in the left infraclavicular area was well-healed      Head:  normocephalic, no masses or lesions        Eyes:  pupils equal and round, conjunctivae and lids unremarkable, sclera white, no xanthalasma, EOMS intact, no nystagmus        Lymph:      ENT:  no pallor or cyanosis, dentition good        Neck:  carotid pulses are full and equal bilaterally;no carotid bruit        Respiratory:  normal breath sounds, clear to auscultation, normal A-P diameter, normal symmetry, normal respiratory excursion, no use of accessory muscles         Cardiac: " regular rhythm;normal S1 and S2;no murmurs, gallops or rubs detected                pulses full and equal                                        GI:           Extremities and Muscular Skeletal:  no spinal abnormalities noted;normal muscle strength and tone stasis pigmentation bilateral LE edema;trace          Neurological:  no gross motor deficits   Appears to have Parkinson type features.    Psych:  affect appropriate, oriented to time, person and place    Thank you for allowing me to participate in the care of your patient.      Sincerely,     Brandon Eli MD     [facility]    cc:   No referring provider defined for this encounter.

## 2021-04-21 NOTE — PROGRESS NOTES
Service Date: 04/20/2021      CLINIC VISIT       HISTORY OF PRESENT ILLNESS:  Mr. Lambert is a very nice, 87-year-old gentleman with a past medical history significant for a myocardial infarction and cardiac arrest at [facility] in 1998.  This led to coronary artery bypass grafting x3 with a free LISA pedicle graft to his right coronary artery and a LIMA graft to his left anterior descending artery and a saphenous vein graft to a small, diffusely diseased ramus intermedius branch.  Angiography in 2009 demonstrated the vein graft to be closed, but arterial conduits were wide open and diagonal filled by collaterals.  Angiogram in 2014 because of a drop in his ejection fraction of 25% again demonstrated both arterial conduits to be widely patent.  A small diagonal, which had previously been intervened on, had restenosis in the 30%-50% range with an FFR of 0.90.      Mr. Lambert also has paroxysmal atrial fibrillation, for which he has not been anticoagulated as he had a left atrial appendage ligation and a maze procedure at the time of his surgery.  He also has a small abdominal aortic aneurysm.      He has noise in his pacemaker lead, for which he has seen Dr. Guerin in the past.      Mr. Lambert returns to clinic, and clearly aging is catching up with him.  He appears to have some parkinsonian features.  He is not exercising as much as he had in the past, as he has quite a bit of problems with leg aching.  He has no chest, arm, neck, jaw or shoulder discomfort.  No dyspnea on exertion, orthopnea or PND.  He has no palpitations, lightheadedness, dizziness, syncope or near syncope.  No symptoms to suggest a TIA or CVA.  Peripheral edema is doing well.  He does have chronic venous stasis changes.  His wife describes his legs at times getting swollen and looking very purple.  When I saw him last 2 years ago, he was off his statin because his wife was concerned it may be contributing to his dementia.  He has been  restarted on his rosuvastatin.      ASSESSMENT AND PLAN:  Sánchez appears to be doing well from a cardiac standpoint without clinical evidence of ischemia.      Heart failure is very well compensated.  As a matter of fact, at this time his echocardiogram now reports his ejection fraction has improved all the way back to 50%-55%, much improved from his 2018 echocardiogram of 35%-40%.      We interrogated his device on the 30th, and this demonstrates that he paces in the atrium 86% of the time and only less than 1% in the ventricle.  He has had no significant atrial or ventricular arrhythmias.  His battery life is about 10 years.      Blood pressure is very well controlled at 107/64 with a pulse of 61.      Weight is down a bit at 161, giving a body mass index of 23, down about 5 pounds from last fall.      Fasting lipid profile is probably overtreated with a total cholesterol of 92, HDL of 46, LDL of 27 and triglycerides of 94.  I am concerned that his leg pain may be rosuvastatin, so I proposed a statin holiday for 1 month.  I will have him follow up with my JUAN A in 1 month.  At that time I would consider restarting rosuvastatin at half the dose, as I think his cholesterol is overtreated at this time, and we will have to see how much it may be contributing to his leg discomfort.      Basic metabolic profile shows his creatinine is better than it has been over the last 4 with a steady improvement with each subsequent creatinine, now at 1.5, giving him a GFR of 41.  Earlier this year it was 2.26 with a GFR of 25.  Electrolytes are normal.      He asks about his venous stasis changes.  I think he just has dependent edema.  Obviously, there is some venous insufficiency, but I do not think at this time it warrants evaluation.  We talked about the importance of a low-salt diet and elevating his legs as necessary.      His leg discomfort very well may be a neuropathy, but nonetheless we will give a try at holding the  rosuvastatin.      He is currently taking carvedilol 6.25, a half a pill twice a day.  His wife states they have plenty of pills, but I may give him a new prescription down the road for 3.125 so they do not have to split pills anymore.      Thank you for allowing me to participate in his care.      Over 30 minutes was spent with Abbe today.         EFRA PRINGLE MD, [facility]             D: 2021   T: 2021   MT: perla      Name:     ABBE SHEETS   MRN:      [id number]        Account:      [id number]   :      1934           Service Date: 2021      Document: [id number]

## 2021-04-22 NOTE — PATIENT INSTRUCTIONS
Since discharge from the hospital the patient is started to eat a little bit better.  His weakness is starting to resolve.  His weight has stabilized.  Labs were ordered to check on his electrolytes and his kidney function and his diabetes and his thyroid.  His memory function has not changed.  He is able to continue to live independently with his wife.  We discussed nutritional supplements in the form of Ensure and boost.  The patient and his wife who accompanied him today will consider that as part of his treatment regimen.  He will follow-up in 3 months, sooner if he has more issues.  Hopefully his weakness and appetite, etc. will continue to improve.  Further follow-up will be pending review of his tests done today.  I did not change any of his medications today.  Medication list was updated.

## 2021-05-03 ENCOUNTER — TELEPHONE (OUTPATIENT)
Dept: INTERNAL MEDICINE | Facility: CLINIC | Age: 86
End: 2021-05-03

## 2021-05-03 ENCOUNTER — OFFICE VISIT (OUTPATIENT)
Dept: INTERNAL MEDICINE | Facility: CLINIC | Age: 86
End: 2021-05-03
Payer: MEDICARE

## 2021-05-03 VITALS
DIASTOLIC BLOOD PRESSURE: 60 MMHG | SYSTOLIC BLOOD PRESSURE: 120 MMHG | BODY MASS INDEX: 22.18 KG/M2 | OXYGEN SATURATION: 100 % | RESPIRATION RATE: 16 BRPM | TEMPERATURE: 96.2 F | HEART RATE: 64 BPM | WEIGHT: 154.6 LBS

## 2021-05-03 DIAGNOSIS — R41.3 MEMORY CHANGE: Chronic | ICD-10-CM

## 2021-05-03 DIAGNOSIS — I77.1 STRICTURE OF ARTERY (H): ICD-10-CM

## 2021-05-03 DIAGNOSIS — M79.605 BILATERAL LEG PAIN: Primary | ICD-10-CM

## 2021-05-03 DIAGNOSIS — M79.604 BILATERAL LEG PAIN: Primary | ICD-10-CM

## 2021-05-03 PROCEDURE — 99214 OFFICE O/P EST MOD 30 MIN: CPT | Performed by: INTERNAL MEDICINE

## 2021-05-03 RX ORDER — DONEPEZIL HYDROCHLORIDE 10 MG/1
10 TABLET, FILM COATED ORAL AT BEDTIME
Qty: 90 TABLET | Refills: 3 | Status: SHIPPED | OUTPATIENT
Start: 2021-05-03 | End: 2022-03-16

## 2021-05-03 NOTE — PROGRESS NOTES
Assessment & Plan     Bilateral leg pain  Unclear etiology.  Will check for significant peripheral arterial disease with rest/stress ABIs, though precise symmetrical nature of his pain would argue against this.  - US REBECCA Doppler with Exercise Bilateral; Future    Memory change  Needs refill of Aricept  - donepezil (ARICEPT) 10 MG tablet; Take 1 tablet (10 mg) by mouth At Bedtime    Stricture of artery (H)     - US REBECCA Doppler with Exercise Bilateral; Future                 Return in about 4 weeks (around 5/31/2021) for recheck, if symptoms fail to improve.    Ajit Butterfield MD  [facility]        Paresh Mcadams is a 87 year old who presents for the following health issues     HPI     Musculoskeletal problem/pain  Onset/Duration: off and on for a couple of months  Description  Location: leg - bilateral  Joint Swelling: no  Redness: YES- redness on both ankles  Pain: YES  Warmth: no  Intensity:  Mild and irritated  Progression of Symptoms:  intermittent  Accompanying signs and symptoms:   Fevers: no  Numbness/tingling/weakness: no  History  Trauma to the area: no  Recent illness:  no  Previous similar problem: YES  Previous evaluation:  YES- Had ultrasound done in March came back negative  Precipitating or alleviating factors:  Aggravating factors include: standing and walking  Therapies tried and outcome: acetaminophen with relief      As above.  He has been complaining of symmetric, bilateral lower extremity pain, seemingly involving his feet all the way up to just proximal to both knees.  Somewhat of a poor historian, no obvious provocative or palliative features.  Does not seem to be any worse or better with exertion, or rest.  Is not described as a tingly or numb sensation.        Review of Systems   Constitutional, HEENT, cardiovascular, pulmonary, GI, , musculoskeletal, neuro, skin, endocrine and psych systems are negative, except as otherwise noted.       Objective    /60 (BP Location: Left arm, Patient Position: Chair, Cuff Size: Adult Regular)   Pulse 64   Temp 96.2  F (35.7  C) (Temporal)   Resp 16   Wt 70.1 kg (154 lb 9.6 oz)   SpO2 100%   BMI 22.18 kg/m    Body mass index is 22.18 kg/m .  Physical Exam   GENERAL: healthy, alert and no distress  NECK: no adenopathy, no asymmetry, masses, or scars and thyroid normal to palpation  RESP: lungs clear to auscultation - no rales, rhonchi or wheezes  CV: regular rate and rhythm, normal S1 S2, no S3 or S4, no murmur, click or rub, no peripheral edema and peripheral pulses strong  ABDOMEN: soft, nontender, no hepatosplenomegaly, no masses and bowel sounds normal  MS: Brawny skin changes in pretibial regions bilaterally, but no significant edema.  Feet are somewhat cool, weak DP pulses.

## 2021-05-03 NOTE — TELEPHONE ENCOUNTER
Pt wife calling stating pt has had leg pain for several months on and off.  He has been evaluated in the ER and by primary care for this problem and no one can find the problem according to wife.     Wife states pt is frustrated and the pain is worse today. Pt has appointment scheduled appointment for tomorrow.      Advised if she thinks pt is dehydrated they need to go to ER. She does not think they need to go to ER.    Wife transferred to scheduling to schedule appointment for today.     Veronica ALVARESN, RN

## 2021-05-11 ENCOUNTER — HOSPITAL ENCOUNTER (OUTPATIENT)
Dept: ULTRASOUND IMAGING | Facility: CLINIC | Age: 86
Discharge: HOME | End: 2021-05-11
Attending: INTERNAL MEDICINE | Admitting: INTERNAL MEDICINE
Payer: MEDICARE

## 2021-05-11 DIAGNOSIS — I77.1 STRICTURE OF ARTERY (H): ICD-10-CM

## 2021-05-11 DIAGNOSIS — M79.605 BILATERAL LEG PAIN: ICD-10-CM

## 2021-05-11 DIAGNOSIS — M79.604 BILATERAL LEG PAIN: ICD-10-CM

## 2021-05-11 PROCEDURE — 93924 LWR XTR VASC STDY BILAT: CPT

## 2021-05-21 ENCOUNTER — OFFICE VISIT (OUTPATIENT)
Dept: CARDIOLOGY | Facility: CLINIC | Age: 86
End: 2021-05-21
Attending: INTERNAL MEDICINE
Payer: MEDICARE

## 2021-05-21 VITALS
DIASTOLIC BLOOD PRESSURE: 83 MMHG | BODY MASS INDEX: 22.58 KG/M2 | SYSTOLIC BLOOD PRESSURE: 132 MMHG | WEIGHT: 157.7 LBS | HEIGHT: 70 IN | HEART RATE: 60 BPM

## 2021-05-21 DIAGNOSIS — I25.5 ISCHEMIC CARDIOMYOPATHY: ICD-10-CM

## 2021-05-21 PROCEDURE — 99213 OFFICE O/P EST LOW 20 MIN: CPT | Performed by: PHYSICIAN ASSISTANT

## 2021-05-21 RX ORDER — ROSUVASTATIN CALCIUM 20 MG/1
20 TABLET, COATED ORAL AT BEDTIME
Qty: 90 TABLET | Refills: 3 | Status: SHIPPED | OUTPATIENT
Start: 2021-05-21 | End: 2022-03-16

## 2021-05-21 ASSESSMENT — MIFFLIN-ST. JEOR: SCORE: 1396.57

## 2021-05-21 NOTE — LETTER
5/21/2021    Eliezer Shah MD  [address] [place]  [facility] [address]    RE: Abbe Lambert       Dear Colleague,    I had the pleasure of seeing Abbe Lambert in the [facility].  Cardiology Clinic Progress Note    Abbe Lambert MRN# [id number]   YOB: 1934 Age: 87 year old   Primary cardiologist: Dr. Eli         Assessment and Plan:     In summary, Abbe Lambert presents today for follow-up on leg discomfort after a statin hold, resulting in no improvement in his symptoms.     Plan:  -Restart Crestor at 20 mg daily, which is half of his prior dose.  This should keep his LDL well controlled, but we will check a lipid panel in 2-3 months to be sure.    - Encouraged leg elevation and compression in case this is related to venous insufficiency. If no improvement in about a month, we will get a venous competency study to see if he would benefit from ablation.   - Follow up with PCP as well.     It was a pleasure meeting Abbe and his wife today.        History of Presenting Illness:      Abbe Lambert is a pleasant 87 year old patient who presents today to follow-up on leg discomfort after a statin hold.    The patient has a history of the following -   1.  CAD status post MI and cardiac arrest at [facility] in 1998.  Status post CABG x3 with known closure of vein graft to ramus and patent LISA-RCA and LIMA-LAD per angiogram in 2014.  2.  History of mixed cardiomyopathy, LVEF as low as 25%, improved to 50-55% in 2021.  3.  PAF, status post SHARI ligation and maze procedure at the time of his bypass surgery.  Has not been anticoagulated.  4.  Abdominal aortic aneurysm.  5.  Status post pacemaker placement.    In brief, Abbe saw Dr. Eli for routine follow-up last month.  They reviewed his lipid profile which looked excellent with an LDL of 27.  However he complained of some lower extremity neuropathy type  "symptoms, for which Dr. Crowell proposed a statin holiday.    Today, Abbe returns to clinic with his wife stating he feels the same. No improvement in his discomfort with the statin hold. I note he had normal REBECCA's a couple of weeks ago.          Review of Systems:     12-pt ROS is negative except for as noted in the HPI.          Physical Exam:     Vitals: /83   Pulse 60   Ht 1.778 m (5' 10\")   Wt 71.5 kg (157 lb 11.2 oz)   BMI 22.63 kg/m    Wt Readings from Last 10 Encounters:   05/21/21 71.5 kg (157 lb 11.2 oz)   05/03/21 70.1 kg (154 lb 9.6 oz)   04/20/21 73.1 kg (161 lb 3.2 oz)   04/06/21 70.9 kg (156 lb 3.2 oz)   03/30/21 67.2 kg (148 lb 3.2 oz)   03/30/21 68 kg (150 lb)   03/22/21 70.8 kg (156 lb)   03/17/21 64.4 kg (142 lb)   02/13/21 65.8 kg (145 lb)   12/15/20 75.6 kg (166 lb 11.2 oz)       Constitutional:  Patient is pleasant, alert, cooperative, and in NAD.  HEENT:  NCAT. PERRLA. EOM's intact.   Neck:  CVP appears normal. No carotid bruits.   Pulmonary: Normal respiratory effort. CTAB.   Cardiac: RRR, normal S1/S2, no S3/S4, no murmur or rub.   Abdomen:  Non-tender abdomen, no hepatosplenomegaly appreciated.   Vascular: Pulses in the upper and lower extremities are 2+ and equal bilaterally.  Extremities: Trace ankle - pretibial edema bilaterally with chronic venous stasis changes.   Skin:  No rashes or lesions appreciated.   Neurological:  No gross motor or sensory deficits.   Psych: Appropriate affect.        Data:     Labs reviewed:  Recent Labs   Lab Test 04/14/21  0808 04/06/21  0917 03/30/21  1535 03/22/21  1628 03/17/21  0917 11/08/19  0929 11/08/19  0929 10/24/18  0939 12/05/16  1505 12/05/16  1505 09/08/16  1359 09/08/16  1359 07/07/16  1546   LDL 27  --   --   --   --   --  119* 39   < >  --    < >  --   --    HDL 46  --   --   --   --   --  41 28*   < >  --    < >  --   --    NHDL 46  --   --   --   --   --  150* 65   < >  --    < >  --   --    CHOL 92  --   --   --   --   --  191 " 93   < >  --    < >  --   --    TRIG 94  --   --   --   --   --  154* 129   < >  --    < >  --   --    TSH  --  1.80 2.20  --  2.83   < >  --   --   --  2.74  --   --   --    NTBNP  --   --   --   --   --   --   --   --   --  696*  --  836* 557*   IRON  --   --   --  70  --   --   --   --   --   --   --   --   --    FEB  --   --   --  368  --   --   --   --   --   --   --   --   --    IRONSAT  --   --   --  19  --   --   --   --   --   --   --   --   --    ALICE  --   --   --  202  --   --   --   --   --   --   --   --   --     < > = values in this interval not displayed.       Lab Results   Component Value Date    WBC 7.9 03/31/2021    RBC 3.45 (L) 03/31/2021    HGB 10.7 (L) 03/31/2021    HCT 33.7 (L) 03/31/2021    MCV 98 03/31/2021    MCH 31.0 03/31/2021    MCHC 31.8 03/31/2021    RDW 14.4 03/31/2021     03/31/2021       Lab Results   Component Value Date     04/14/2021    POTASSIUM 4.0 04/14/2021    CHLORIDE 107 04/14/2021    CO2 29 04/14/2021    ANIONGAP 3 04/14/2021    GLC 89 04/14/2021    BUN 21 04/14/2021    CR 1.51 (H) 04/14/2021    GFRESTIMATED 41 (L) 04/14/2021    GFRESTBLACK 47 (L) 04/14/2021    JANAY 9.2 04/14/2021      Lab Results   Component Value Date    AST 44 04/06/2021    ALT 82 (H) 04/14/2021       Lab Results   Component Value Date    A1C 5.9 (H) 04/06/2021       Lab Results   Component Value Date    INR 0.94 04/25/2014    INR 1.0 04/24/2014           Problem List:     Patient Active Problem List   Diagnosis     Health Care Home     Allergic rhinitis     Peripheral edema     Polymyalgia rheumatica (H)     Advanced directives, counseling/discussion     Ischemic cardiomyopathy     Paroxysmal atrial fibrillation (H)     Coronary artery disease involving native coronary artery of native heart without angina pectoris     Stage 3b chronic kidney disease     GERD (gastroesophageal reflux disease)     Tachy-alix syndrome (H)     AAA (3.7 cm on CT 3/30/21)     Old myocardial infarction      Chronic combined systolic and diastolic congestive heart failure (H)     Essential hypertension, benign     Type 2 diabetes mellitus with diabetic neuropathy, without long-term current use of insulin (H)     Mixed hyperlipidemia     Screening for diabetic peripheral neuropathy     Post herpetic neuralgia     Presbycusis of both ears     Chronic non-seasonal allergic rhinitis, unspecified trigger     Skin lesion     History of prostate cancer 2003 w/po resection prostate     PAD (peripheral artery disease) (H)     Microalbuminuria     Mild persistent asthma without complication     Pre-diabetes     History of CVA (cerebrovascular accident)     Dysphagia, unspecified type     Esophageal stricture     Memory change     Vascular dementia (H)     Dry skin     Stasis dermatitis of both legs     Pruritic disorder from dry skin of upper back     Diarrhea, unspecified type since on doxycycline for sinuses since early 2-20      Overweight (BMI 25.0-29.9)     Chronic sinusitis, unspecified location     Cardiac pacemaker in situ     Weight loss (60 lbs in last year)     Change in bowel habits     Anemia, unspecified type     Hypotension due to hypovolemia     Generalized weakness     LUANNE (acute kidney injury) (H)     Moderate Lumb Spinal Sten w leg numbness     Failure to thrive in adult     Severe malnutrition (H)           Medications:     Current Outpatient Medications   Medication Sig Dispense Refill     carvedilol (COREG) 6.25 MG tablet Take 0.5 tablets (3.125 mg) by mouth 2 times daily (with meals) 180 tablet 0     donepezil (ARICEPT) 10 MG tablet Take 1 tablet (10 mg) by mouth At Bedtime 90 tablet 3     ipratropium (ATROVENT) 0.03 % nasal spray Spray 1 spray into both nostrils 2 times daily as needed for rhinitis  0     MELATONIN PO Take 5 mg by mouth nightly as needed        mometasone (NASONEX) 50 MCG/ACT nasal spray Spray 2 sprays into both nostrils daily as needed.       nitroglycerin (NITROSTAT) 0.4 MG SL tablet  Place 1 tablet (0.4 mg) under the tongue every 5 minutes as needed for chest pain 25 tablet 3     polyethylene glycol (MIRALAX/GLYCOLAX) packet Take 17 g by mouth daily as needed              Past Medical History:     Past Medical History:   Diagnosis Date     AAA (abdominal aortic aneurysm) (H)      Anemia due to blood loss, acute 10/10/2016     Asthma      Atrial fibrillation (H)     s/p left atrial appendage ligation     Cardiac arrest (H)      Cardiomyopathy (H)      Chronic kidney disease      Chronic sinusitis      Coronary artery disease     cardiac cath 2014: medical management; cath 2013: PTCA to diagonal; cath 2009: medical management; CABG 1998: LIMA to LAD, LISA to RCA, SVG to circumflex     GERD (gastroesophageal reflux disease)      History of angina      Hyperlipidaemia      Hypertension, goal below 140/90      Myocardial infarction (H)     MI with Vfib arrest 1998     Polymyalgia rheumatica (H)      Shingles 10/28/2016     Shortness of breath      Tachy-alix syndrome (H)     generator replacement 9/2020; implanted dual chamber pacemaker 2012     Past Surgical History:   Procedure Laterality Date     ARTHROPLASTY KNEE Left 10/5/2016    Procedure: ARTHROPLASTY KNEE;  Surgeon: Keshav Zamudio MD;  Location:  OR     CARDIAC SURGERY  12/03/2012    pacemaker ; CABG 1998     CHOLECYSTECTOMY       COLONOSCOPY       ENT SURGERY  5-    sinus     EP PACEMAKER N/A 9/11/2020    Procedure: EP Pacemaker;  Surgeon: Caron Guerin MD;  Location:  HEART CARDIAC CATH LAB     ESOPHAGOSCOPY, GASTROSCOPY, DUODENOSCOPY (EGD), COMBINED N/A 12/3/2019    Procedure: ESOPHAGOGASTRODUODENOSCOPY (EGD) (MAC);  Surgeon: Calderon Herman MD;  Location:  GI     ESOPHAGOSCOPY, GASTROSCOPY, DUODENOSCOPY (EGD), DILATATION, COMBINED N/A 12/3/2019    Procedure: Esophagoscopy, gastroscopy, duodenoscopy (EGD), dilatation, combined;  Surgeon: Calderon Herman MD;  Location:  GI     EXTRACAPSULAR  CATARACT EXTRATION WITH INTRAOCULAR LENS IMPLANT       GENITOURINARY SURGERY      prostatectomy     IMPLANT PACEMAKER  12-3-12    [facility]     PROSTATE SURGERY       Family History   Problem Relation Age of Onset     Cerebrovascular Disease Father      Heart Disease Father      Heart Disease Brother      Coronary Artery Disease Brother         MI age 50     Depression Daughter         raped in high school     Unknown/Adopted Maternal Grandmother      Unknown/Adopted Maternal Grandfather      Unknown/Adopted Paternal Grandmother      Unknown/Adopted Paternal Grandfather      Social History     Socioeconomic History     Marital status:      Spouse name: Not on file     Number of children: 3     Years of education: Not on file     Highest education level: Not on file   Occupational History     Not on file   Social Needs     Financial resource strain: Not on file     Food insecurity     Worry: Not on file     Inability: Not on file     Transportation needs     Medical: Not on file     Non-medical: Not on file   Tobacco Use     Smoking status: Never Smoker     Smokeless tobacco: Never Used   Substance and Sexual Activity     Alcohol use: Never     Alcohol/week: 0.0 standard drinks     Frequency: Never     Drug use: No     Sexual activity: Not Currently     Partners: Female   Lifestyle     Physical activity     Days per week: Not on file     Minutes per session: Not on file     Stress: Not on file   Relationships     Social connections     Talks on phone: Not on file     Gets together: Not on file     Attends Evangelical service: Not on file     Active member of club or organization: Not on file     Attends meetings of clubs or organizations: Not on file     Relationship status: Not on file     Intimate partner violence     Fear of current or ex partner: Not on file     Emotionally abused: Not on file     Physically abused: Not on file     Forced sexual activity: Not on file   Other Topics Concern     Parent/sibling  w/ CABG, MI or angioplasty before 65F 55M? Yes     Comment: brother and father had MI @ 40's      Service Not Asked     Blood Transfusions Not Asked     Caffeine Concern Yes     Comment: one diet mountain dew daily     Occupational Exposure Not Asked     Hobby Hazards Not Asked     Sleep Concern No     Stress Concern No     Weight Concern Not Asked     Special Diet No     Back Care Not Asked     Exercise Yes     Comment: golfing, does the stair at the office a lot     Bike Helmet Not Asked     Seat Belt Yes     Self-Exams Not Asked   Social History Narrative    6 GC;             Allergies:   Advair diskus, Morphine hcl, and Vicodin [hydrocodone-acetaminophen]      HELEN [facility]  Pager: [phone]    cc:   Brandon Eli MD  [address]  [place] [address]

## 2021-05-21 NOTE — PROGRESS NOTES
Cardiology Clinic Progress Note    Abbe Lambert MRN# [id number]   YOB: 1934 Age: 87 year old   Primary cardiologist: Dr. Eli         Assessment and Plan:     In summary, Abbe Lambert presents today for follow-up on leg discomfort after a statin hold, resulting in no improvement in his symptoms.     Plan:  -Restart Crestor at 20 mg daily, which is half of his prior dose.  This should keep his LDL well controlled, but we will check a lipid panel in 2-3 months to be sure.    - Encouraged leg elevation and compression in case this is related to venous insufficiency. If no improvement in about a month, we will get a venous competency study to see if he would benefit from ablation.   - Follow up with PCP as well.     It was a pleasure meeting Abbe and his wife today.        History of Presenting Illness:      Abbe Lambert is a pleasant 87 year old patient who presents today to follow-up on leg discomfort after a statin hold.    The patient has a history of the following -   1.  CAD status post MI and cardiac arrest at [facility] in 1998.  Status post CABG x3 with known closure of vein graft to ramus and patent LISA-RCA and LIMA-LAD per angiogram in 2014.  2.  History of mixed cardiomyopathy, LVEF as low as 25%, improved to 50-55% in 2021.  3.  PAF, status post SHARI ligation and maze procedure at the time of his bypass surgery.  Has not been anticoagulated.  4.  Abdominal aortic aneurysm.  5.  Status post pacemaker placement.    In brief, Abbe saw Dr. Eli for routine follow-up last month.  They reviewed his lipid profile which looked excellent with an LDL of 27.  However he complained of some lower extremity neuropathy type symptoms, for which Dr. Crowell proposed a statin holiday.    Today, Abbe returns to clinic with his wife stating he feels the same. No improvement in his discomfort with the statin hold. I note he had normal REBECCA's a couple of weeks ago.          Review  "of Systems:     12-pt ROS is negative except for as noted in the HPI.          Physical Exam:     Vitals: /83   Pulse 60   Ht 1.778 m (5' 10\")   Wt 71.5 kg (157 lb 11.2 oz)   BMI 22.63 kg/m    Wt Readings from Last 10 Encounters:   05/21/21 71.5 kg (157 lb 11.2 oz)   05/03/21 70.1 kg (154 lb 9.6 oz)   04/20/21 73.1 kg (161 lb 3.2 oz)   04/06/21 70.9 kg (156 lb 3.2 oz)   03/30/21 67.2 kg (148 lb 3.2 oz)   03/30/21 68 kg (150 lb)   03/22/21 70.8 kg (156 lb)   03/17/21 64.4 kg (142 lb)   02/13/21 65.8 kg (145 lb)   12/15/20 75.6 kg (166 lb 11.2 oz)       Constitutional:  Patient is pleasant, alert, cooperative, and in NAD.  HEENT:  NCAT. PERRLA. EOM's intact.   Neck:  CVP appears normal. No carotid bruits.   Pulmonary: Normal respiratory effort. CTAB.   Cardiac: RRR, normal S1/S2, no S3/S4, no murmur or rub.   Abdomen:  Non-tender abdomen, no hepatosplenomegaly appreciated.   Vascular: Pulses in the upper and lower extremities are 2+ and equal bilaterally.  Extremities: Trace ankle - pretibial edema bilaterally with chronic venous stasis changes.   Skin:  No rashes or lesions appreciated.   Neurological:  No gross motor or sensory deficits.   Psych: Appropriate affect.        Data:     Labs reviewed:  Recent Labs   Lab Test 04/14/21  0808 04/06/21  0917 03/30/21  1535 03/22/21  1628 03/17/21  0917 11/08/19  0929 11/08/19  0929 10/24/18  0939 12/05/16  1505 12/05/16  1505 09/08/16  1359 09/08/16  1359 07/07/16  1546   LDL 27  --   --   --   --   --  119* 39   < >  --    < >  --   --    HDL 46  --   --   --   --   --  41 28*   < >  --    < >  --   --    NHDL 46  --   --   --   --   --  150* 65   < >  --    < >  --   --    CHOL 92  --   --   --   --   --  191 93   < >  --    < >  --   --    TRIG 94  --   --   --   --   --  154* 129   < >  --    < >  --   --    TSH  --  1.80 2.20  --  2.83   < >  --   --   --  2.74  --   --   --    NTBNP  --   --   --   --   --   --   --   --   --  696*  --  836* 557*   IRON  --  "  --   --  70  --   --   --   --   --   --   --   --   --    FEB  --   --   --  368  --   --   --   --   --   --   --   --   --    IRONSAT  --   --   --  19  --   --   --   --   --   --   --   --   --    ALICE  --   --   --  202  --   --   --   --   --   --   --   --   --     < > = values in this interval not displayed.       Lab Results   Component Value Date    WBC 7.9 03/31/2021    RBC 3.45 (L) 03/31/2021    HGB 10.7 (L) 03/31/2021    HCT 33.7 (L) 03/31/2021    MCV 98 03/31/2021    MCH 31.0 03/31/2021    MCHC 31.8 03/31/2021    RDW 14.4 03/31/2021     03/31/2021       Lab Results   Component Value Date     04/14/2021    POTASSIUM 4.0 04/14/2021    CHLORIDE 107 04/14/2021    CO2 29 04/14/2021    ANIONGAP 3 04/14/2021    GLC 89 04/14/2021    BUN 21 04/14/2021    CR 1.51 (H) 04/14/2021    GFRESTIMATED 41 (L) 04/14/2021    GFRESTBLACK 47 (L) 04/14/2021    JANAY 9.2 04/14/2021      Lab Results   Component Value Date    AST 44 04/06/2021    ALT 82 (H) 04/14/2021       Lab Results   Component Value Date    A1C 5.9 (H) 04/06/2021       Lab Results   Component Value Date    INR 0.94 04/25/2014    INR 1.0 04/24/2014           Problem List:     Patient Active Problem List   Diagnosis     Health Care Home     Allergic rhinitis     Peripheral edema     Polymyalgia rheumatica (H)     Advanced directives, counseling/discussion     Ischemic cardiomyopathy     Paroxysmal atrial fibrillation (H)     Coronary artery disease involving native coronary artery of native heart without angina pectoris     Stage 3b chronic kidney disease     GERD (gastroesophageal reflux disease)     Tachy-alix syndrome (H)     AAA (3.7 cm on CT 3/30/21)     Old myocardial infarction     Chronic combined systolic and diastolic congestive heart failure (H)     Essential hypertension, benign     Type 2 diabetes mellitus with diabetic neuropathy, without long-term current use of insulin (H)     Mixed hyperlipidemia     Screening for diabetic  peripheral neuropathy     Post herpetic neuralgia     Presbycusis of both ears     Chronic non-seasonal allergic rhinitis, unspecified trigger     Skin lesion     History of prostate cancer 2003 w/po resection prostate     PAD (peripheral artery disease) (H)     Microalbuminuria     Mild persistent asthma without complication     Pre-diabetes     History of CVA (cerebrovascular accident)     Dysphagia, unspecified type     Esophageal stricture     Memory change     Vascular dementia (H)     Dry skin     Stasis dermatitis of both legs     Pruritic disorder from dry skin of upper back     Diarrhea, unspecified type since on doxycycline for sinuses since early 2-20      Overweight (BMI 25.0-29.9)     Chronic sinusitis, unspecified location     Cardiac pacemaker in situ     Weight loss (60 lbs in last year)     Change in bowel habits     Anemia, unspecified type     Hypotension due to hypovolemia     Generalized weakness     LUANNE (acute kidney injury) (H)     Moderate Lumb Spinal Sten w leg numbness     Failure to thrive in adult     Severe malnutrition (H)           Medications:     Current Outpatient Medications   Medication Sig Dispense Refill     carvedilol (COREG) 6.25 MG tablet Take 0.5 tablets (3.125 mg) by mouth 2 times daily (with meals) 180 tablet 0     donepezil (ARICEPT) 10 MG tablet Take 1 tablet (10 mg) by mouth At Bedtime 90 tablet 3     ipratropium (ATROVENT) 0.03 % nasal spray Spray 1 spray into both nostrils 2 times daily as needed for rhinitis  0     MELATONIN PO Take 5 mg by mouth nightly as needed        mometasone (NASONEX) 50 MCG/ACT nasal spray Spray 2 sprays into both nostrils daily as needed.       nitroglycerin (NITROSTAT) 0.4 MG SL tablet Place 1 tablet (0.4 mg) under the tongue every 5 minutes as needed for chest pain 25 tablet 3     polyethylene glycol (MIRALAX/GLYCOLAX) packet Take 17 g by mouth daily as needed              Past Medical History:     Past Medical History:   Diagnosis Date      AAA (abdominal aortic aneurysm) (H)      Anemia due to blood loss, acute 10/10/2016     Asthma      Atrial fibrillation (H)     s/p left atrial appendage ligation     Cardiac arrest (H)      Cardiomyopathy (H)      Chronic kidney disease      Chronic sinusitis      Coronary artery disease     cardiac cath 2014: medical management; cath 2013: PTCA to diagonal; cath 2009: medical management; CABG 1998: LIMA to LAD, LISA to RCA, SVG to circumflex     GERD (gastroesophageal reflux disease)      History of angina      Hyperlipidaemia      Hypertension, goal below 140/90      Myocardial infarction (H)     MI with Vfib arrest 1998     Polymyalgia rheumatica (H)      Shingles 10/28/2016     Shortness of breath      Tachy-alix syndrome (H)     generator replacement 9/2020; implanted dual chamber pacemaker 2012     Past Surgical History:   Procedure Laterality Date     ARTHROPLASTY KNEE Left 10/5/2016    Procedure: ARTHROPLASTY KNEE;  Surgeon: Keshav Zamudio MD;  Location:  OR     CARDIAC SURGERY  12/03/2012    pacemaker ; CABG 1998     CHOLECYSTECTOMY       COLONOSCOPY       ENT SURGERY  5-    sinus     EP PACEMAKER N/A 9/11/2020    Procedure: EP Pacemaker;  Surgeon: Caron Guerin MD;  Location:  HEART CARDIAC CATH LAB     ESOPHAGOSCOPY, GASTROSCOPY, DUODENOSCOPY (EGD), COMBINED N/A 12/3/2019    Procedure: ESOPHAGOGASTRODUODENOSCOPY (EGD) (MAC);  Surgeon: Calderon Herman MD;  Location:  GI     ESOPHAGOSCOPY, GASTROSCOPY, DUODENOSCOPY (EGD), DILATATION, COMBINED N/A 12/3/2019    Procedure: Esophagoscopy, gastroscopy, duodenoscopy (EGD), dilatation, combined;  Surgeon: Calderon Herman MD;  Location:  GI     EXTRACAPSULAR CATARACT EXTRATION WITH INTRAOCULAR LENS IMPLANT       GENITOURINARY SURGERY      prostatectomy     IMPLANT PACEMAKER  12-3-12    [facility]     PROSTATE SURGERY       Family History   Problem Relation Age of Onset     Cerebrovascular Disease Father      Heart  Disease Father      Heart Disease Brother      Coronary Artery Disease Brother         MI age 50     Depression Daughter         raped in high school     Unknown/Adopted Maternal Grandmother      Unknown/Adopted Maternal Grandfather      Unknown/Adopted Paternal Grandmother      Unknown/Adopted Paternal Grandfather      Social History     Socioeconomic History     Marital status:      Spouse name: Not on file     Number of children: 3     Years of education: Not on file     Highest education level: Not on file   Occupational History     Not on file   Social Needs     Financial resource strain: Not on file     Food insecurity     Worry: Not on file     Inability: Not on file     Transportation needs     Medical: Not on file     Non-medical: Not on file   Tobacco Use     Smoking status: Never Smoker     Smokeless tobacco: Never Used   Substance and Sexual Activity     Alcohol use: Never     Alcohol/week: 0.0 standard drinks     Frequency: Never     Drug use: No     Sexual activity: Not Currently     Partners: Female   Lifestyle     Physical activity     Days per week: Not on file     Minutes per session: Not on file     Stress: Not on file   Relationships     Social connections     Talks on phone: Not on file     Gets together: Not on file     Attends [employer] service: Not on file     Active member of club or organization: Not on file     Attends meetings of clubs or organizations: Not on file     Relationship status: Not on file     Intimate partner violence     Fear of current or ex partner: Not on file     Emotionally abused: Not on file     Physically abused: Not on file     Forced sexual activity: Not on file   Other Topics Concern     Parent/sibling w/ CABG, MI or angioplasty before 65F 55M? Yes     Comment: brother and father had MI @ 40's      Service Not Asked     Blood Transfusions Not Asked     Caffeine Concern Yes     Comment: one diet mountain dew daily     Occupational Exposure Not Asked      Hobby Hazards Not Asked     Sleep Concern No     Stress Concern No     Weight Concern Not Asked     Special Diet No     Back Care Not Asked     Exercise Yes     Comment: golfing, does the stair at the office a lot     Bike Helmet Not Asked     Seat Belt Yes     Self-Exams Not Asked   Social History Narrative    6 GC;             Allergies:   Advair diskus, Morphine hcl, and Vicodin [hydrocodone-acetaminophen]      HELEN [facility]  Pager: [phone]

## 2021-07-20 ENCOUNTER — ANCILLARY PROCEDURE (OUTPATIENT)
Dept: CARDIOLOGY | Facility: CLINIC | Age: 86
End: 2021-07-20
Attending: INTERNAL MEDICINE
Payer: MEDICARE

## 2021-07-20 DIAGNOSIS — Z95.0 CARDIAC PACEMAKER IN SITU: ICD-10-CM

## 2021-07-20 PROCEDURE — 93296 REM INTERROG EVL PM/IDS: CPT | Performed by: INTERNAL MEDICINE

## 2021-07-20 PROCEDURE — 93294 REM INTERROG EVL PM/LDLS PM: CPT | Performed by: INTERNAL MEDICINE

## 2021-07-25 LAB
MDC_IDC_LEAD_IMPLANT_DT: NORMAL
MDC_IDC_LEAD_IMPLANT_DT: NORMAL
MDC_IDC_LEAD_LOCATION: NORMAL
MDC_IDC_LEAD_LOCATION: NORMAL
MDC_IDC_LEAD_MFG: NORMAL
MDC_IDC_LEAD_MFG: NORMAL
MDC_IDC_LEAD_MODEL: NORMAL
MDC_IDC_LEAD_MODEL: NORMAL
MDC_IDC_LEAD_POLARITY_TYPE: NORMAL
MDC_IDC_LEAD_POLARITY_TYPE: NORMAL
MDC_IDC_LEAD_SERIAL: NORMAL
MDC_IDC_LEAD_SERIAL: NORMAL
MDC_IDC_MSMT_BATTERY_DTM: NORMAL
MDC_IDC_MSMT_BATTERY_REMAINING_LONGEVITY: 115 MO
MDC_IDC_MSMT_BATTERY_REMAINING_PERCENTAGE: 95.5 %
MDC_IDC_MSMT_BATTERY_RRT_TRIGGER: NORMAL
MDC_IDC_MSMT_BATTERY_STATUS: NORMAL
MDC_IDC_MSMT_BATTERY_VOLTAGE: 3.01 V
MDC_IDC_MSMT_LEADCHNL_RA_IMPEDANCE_VALUE: 380 OHM
MDC_IDC_MSMT_LEADCHNL_RA_LEAD_CHANNEL_STATUS: NORMAL
MDC_IDC_MSMT_LEADCHNL_RA_PACING_THRESHOLD_AMPLITUDE: 0.62 V
MDC_IDC_MSMT_LEADCHNL_RA_PACING_THRESHOLD_PULSEWIDTH: 0.5 MS
MDC_IDC_MSMT_LEADCHNL_RA_SENSING_INTR_AMPL: 3.4 MV
MDC_IDC_MSMT_LEADCHNL_RV_IMPEDANCE_VALUE: 530 OHM
MDC_IDC_MSMT_LEADCHNL_RV_LEAD_CHANNEL_STATUS: NORMAL
MDC_IDC_MSMT_LEADCHNL_RV_PACING_THRESHOLD_AMPLITUDE: 0.88 V
MDC_IDC_MSMT_LEADCHNL_RV_PACING_THRESHOLD_PULSEWIDTH: 0.5 MS
MDC_IDC_MSMT_LEADCHNL_RV_SENSING_INTR_AMPL: 9 MV
MDC_IDC_PG_IMPLANT_DTM: NORMAL
MDC_IDC_PG_MFG: NORMAL
MDC_IDC_PG_MODEL: NORMAL
MDC_IDC_PG_SERIAL: NORMAL
MDC_IDC_PG_TYPE: NORMAL
MDC_IDC_SESS_CLINIC_NAME: NORMAL
MDC_IDC_SESS_DTM: NORMAL
MDC_IDC_SESS_REPROGRAMMED: NO
MDC_IDC_SESS_TYPE: NORMAL
MDC_IDC_SET_BRADY_AT_MODE_SWITCH_MODE: NORMAL
MDC_IDC_SET_BRADY_AT_MODE_SWITCH_RATE: 180 {BEATS}/MIN
MDC_IDC_SET_BRADY_LOWRATE: 60 {BEATS}/MIN
MDC_IDC_SET_BRADY_MAX_SENSOR_RATE: 120 {BEATS}/MIN
MDC_IDC_SET_BRADY_MAX_TRACKING_RATE: 120 {BEATS}/MIN
MDC_IDC_SET_BRADY_MODE: NORMAL
MDC_IDC_SET_BRADY_PAV_DELAY_LOW: 200 MS
MDC_IDC_SET_BRADY_SAV_DELAY_LOW: 170 MS
MDC_IDC_SET_LEADCHNL_RA_PACING_AMPLITUDE: 1.62
MDC_IDC_SET_LEADCHNL_RA_PACING_ANODE_ELECTRODE_1: NORMAL
MDC_IDC_SET_LEADCHNL_RA_PACING_ANODE_LOCATION_1: NORMAL
MDC_IDC_SET_LEADCHNL_RA_PACING_CAPTURE_MODE: NORMAL
MDC_IDC_SET_LEADCHNL_RA_PACING_CATHODE_ELECTRODE_1: NORMAL
MDC_IDC_SET_LEADCHNL_RA_PACING_CATHODE_LOCATION_1: NORMAL
MDC_IDC_SET_LEADCHNL_RA_PACING_POLARITY: NORMAL
MDC_IDC_SET_LEADCHNL_RA_PACING_PULSEWIDTH: 0.5 MS
MDC_IDC_SET_LEADCHNL_RA_SENSING_ADAPTATION_MODE: NORMAL
MDC_IDC_SET_LEADCHNL_RA_SENSING_ANODE_ELECTRODE_1: NORMAL
MDC_IDC_SET_LEADCHNL_RA_SENSING_ANODE_LOCATION_1: NORMAL
MDC_IDC_SET_LEADCHNL_RA_SENSING_CATHODE_ELECTRODE_1: NORMAL
MDC_IDC_SET_LEADCHNL_RA_SENSING_CATHODE_LOCATION_1: NORMAL
MDC_IDC_SET_LEADCHNL_RA_SENSING_POLARITY: NORMAL
MDC_IDC_SET_LEADCHNL_RA_SENSING_SENSITIVITY: 1 MV
MDC_IDC_SET_LEADCHNL_RV_PACING_AMPLITUDE: 1.12
MDC_IDC_SET_LEADCHNL_RV_PACING_ANODE_ELECTRODE_1: NORMAL
MDC_IDC_SET_LEADCHNL_RV_PACING_ANODE_LOCATION_1: NORMAL
MDC_IDC_SET_LEADCHNL_RV_PACING_CAPTURE_MODE: NORMAL
MDC_IDC_SET_LEADCHNL_RV_PACING_CATHODE_ELECTRODE_1: NORMAL
MDC_IDC_SET_LEADCHNL_RV_PACING_CATHODE_LOCATION_1: NORMAL
MDC_IDC_SET_LEADCHNL_RV_PACING_POLARITY: NORMAL
MDC_IDC_SET_LEADCHNL_RV_PACING_PULSEWIDTH: 0.5 MS
MDC_IDC_SET_LEADCHNL_RV_SENSING_ADAPTATION_MODE: NORMAL
MDC_IDC_SET_LEADCHNL_RV_SENSING_ANODE_ELECTRODE_1: NORMAL
MDC_IDC_SET_LEADCHNL_RV_SENSING_ANODE_LOCATION_1: NORMAL
MDC_IDC_SET_LEADCHNL_RV_SENSING_CATHODE_ELECTRODE_1: NORMAL
MDC_IDC_SET_LEADCHNL_RV_SENSING_CATHODE_LOCATION_1: NORMAL
MDC_IDC_SET_LEADCHNL_RV_SENSING_POLARITY: NORMAL
MDC_IDC_SET_LEADCHNL_RV_SENSING_SENSITIVITY: 2 MV
MDC_IDC_STAT_AT_BURDEN_PERCENT: 0 %
MDC_IDC_STAT_AT_DTM_END: NORMAL
MDC_IDC_STAT_AT_DTM_START: NORMAL
MDC_IDC_STAT_AT_MODE_SW_COUNT: 0
MDC_IDC_STAT_AT_MODE_SW_COUNT_PER_DAY: 0
MDC_IDC_STAT_AT_MODE_SW_PERCENT_TIME: 0 %
MDC_IDC_STAT_BRADY_AP_VP_PERCENT: 1.2 %
MDC_IDC_STAT_BRADY_AP_VS_PERCENT: 95 %
MDC_IDC_STAT_BRADY_AS_VP_PERCENT: 1 %
MDC_IDC_STAT_BRADY_AS_VS_PERCENT: 3.9 %
MDC_IDC_STAT_BRADY_DTM_END: NORMAL
MDC_IDC_STAT_BRADY_DTM_START: NORMAL
MDC_IDC_STAT_BRADY_RA_PERCENT_PACED: 95 %
MDC_IDC_STAT_BRADY_RV_PERCENT_PACED: 1.2 %
MDC_IDC_STAT_CRT_DTM_END: NORMAL
MDC_IDC_STAT_CRT_DTM_START: NORMAL
MDC_IDC_STAT_HEART_RATE_ATRIAL_MAX: 220 {BEATS}/MIN
MDC_IDC_STAT_HEART_RATE_ATRIAL_MEAN: 66 {BEATS}/MIN
MDC_IDC_STAT_HEART_RATE_ATRIAL_MIN: 50 {BEATS}/MIN
MDC_IDC_STAT_HEART_RATE_DTM_END: NORMAL
MDC_IDC_STAT_HEART_RATE_DTM_START: NORMAL
MDC_IDC_STAT_HEART_RATE_VENTRICULAR_MAX: 200 {BEATS}/MIN
MDC_IDC_STAT_HEART_RATE_VENTRICULAR_MEAN: 66 {BEATS}/MIN
MDC_IDC_STAT_HEART_RATE_VENTRICULAR_MIN: 40 {BEATS}/MIN

## 2021-09-17 ENCOUNTER — LAB (OUTPATIENT)
Dept: LAB | Facility: CLINIC | Age: 86
End: 2021-09-17
Payer: MEDICARE

## 2021-09-17 DIAGNOSIS — I25.5 ISCHEMIC CARDIOMYOPATHY: ICD-10-CM

## 2021-09-17 LAB
CHOLEST SERPL-MCNC: 113 MG/DL
FASTING STATUS PATIENT QL REPORTED: YES
HDLC SERPL-MCNC: 50 MG/DL
LDLC SERPL CALC-MCNC: 43 MG/DL
NONHDLC SERPL-MCNC: 63 MG/DL
TRIGL SERPL-MCNC: 101 MG/DL

## 2021-09-17 PROCEDURE — 80061 LIPID PANEL: CPT

## 2021-09-17 PROCEDURE — 36415 COLL VENOUS BLD VENIPUNCTURE: CPT

## 2021-09-21 NOTE — RESULT ENCOUNTER NOTE
Called and spoke with pt's wife, Madison Bolton (listed on pt's consent to communicate) and reviewed that pt's cholesterol labs were reviewed by HELEN Huerta and levels are a little higher than last check but still very well controled so no changes recommended based on the results. Madison Bolton verbalized understanding and agrees to relay this information to pt.     ALCON Jasso 2:44 PM 9/21/2021

## 2021-10-22 ENCOUNTER — ANCILLARY PROCEDURE (OUTPATIENT)
Dept: CARDIOLOGY | Facility: CLINIC | Age: 86
End: 2021-10-22
Attending: INTERNAL MEDICINE
Payer: MEDICARE

## 2021-10-22 DIAGNOSIS — Z95.0 CARDIAC PACEMAKER IN SITU: ICD-10-CM

## 2021-10-22 DIAGNOSIS — I49.5 SSS (SICK SINUS SYNDROME) (H): Primary | ICD-10-CM

## 2021-10-22 PROCEDURE — 93280 PM DEVICE PROGR EVAL DUAL: CPT | Performed by: INTERNAL MEDICINE

## 2021-10-23 LAB
MDC_IDC_LEAD_IMPLANT_DT: NORMAL
MDC_IDC_LEAD_IMPLANT_DT: NORMAL
MDC_IDC_LEAD_LOCATION: NORMAL
MDC_IDC_LEAD_LOCATION: NORMAL
MDC_IDC_LEAD_MFG: NORMAL
MDC_IDC_LEAD_MFG: NORMAL
MDC_IDC_LEAD_MODEL: NORMAL
MDC_IDC_LEAD_MODEL: NORMAL
MDC_IDC_LEAD_POLARITY_TYPE: NORMAL
MDC_IDC_LEAD_POLARITY_TYPE: NORMAL
MDC_IDC_LEAD_SERIAL: NORMAL
MDC_IDC_LEAD_SERIAL: NORMAL
MDC_IDC_MSMT_BATTERY_REMAINING_LONGEVITY: 123 MO
MDC_IDC_MSMT_BATTERY_STATUS: NORMAL
MDC_IDC_MSMT_BATTERY_VOLTAGE: 3.01 V
MDC_IDC_MSMT_LEADCHNL_RA_IMPEDANCE_VALUE: 362.5 OHM
MDC_IDC_MSMT_LEADCHNL_RA_PACING_THRESHOLD_AMPLITUDE: 0.75 V
MDC_IDC_MSMT_LEADCHNL_RA_PACING_THRESHOLD_AMPLITUDE: 0.75 V
MDC_IDC_MSMT_LEADCHNL_RA_PACING_THRESHOLD_PULSEWIDTH: 0.5 MS
MDC_IDC_MSMT_LEADCHNL_RA_PACING_THRESHOLD_PULSEWIDTH: 0.5 MS
MDC_IDC_MSMT_LEADCHNL_RA_SENSING_INTR_AMPL: 2.5 MV
MDC_IDC_MSMT_LEADCHNL_RV_IMPEDANCE_VALUE: 525 OHM
MDC_IDC_MSMT_LEADCHNL_RV_PACING_THRESHOLD_AMPLITUDE: 1.25 V
MDC_IDC_MSMT_LEADCHNL_RV_PACING_THRESHOLD_AMPLITUDE: 1.25 V
MDC_IDC_MSMT_LEADCHNL_RV_PACING_THRESHOLD_PULSEWIDTH: 0.5 MS
MDC_IDC_MSMT_LEADCHNL_RV_PACING_THRESHOLD_PULSEWIDTH: 0.5 MS
MDC_IDC_MSMT_LEADCHNL_RV_SENSING_INTR_AMPL: 7.4 MV
MDC_IDC_PG_IMPLANT_DTM: NORMAL
MDC_IDC_PG_MFG: NORMAL
MDC_IDC_PG_MODEL: NORMAL
MDC_IDC_PG_SERIAL: NORMAL
MDC_IDC_PG_TYPE: NORMAL
MDC_IDC_SESS_CLINIC_NAME: NORMAL
MDC_IDC_SESS_DTM: NORMAL
MDC_IDC_SESS_TYPE: NORMAL
MDC_IDC_SET_BRADY_AT_MODE_SWITCH_MODE: NORMAL
MDC_IDC_SET_BRADY_AT_MODE_SWITCH_RATE: 180 {BEATS}/MIN
MDC_IDC_SET_BRADY_HYSTRATE: NORMAL
MDC_IDC_SET_BRADY_LOWRATE: 60 {BEATS}/MIN
MDC_IDC_SET_BRADY_MAX_SENSOR_RATE: 120 {BEATS}/MIN
MDC_IDC_SET_BRADY_MAX_TRACKING_RATE: 120 {BEATS}/MIN
MDC_IDC_SET_BRADY_MODE: NORMAL
MDC_IDC_SET_BRADY_NIGHT_RATE: NORMAL
MDC_IDC_SET_BRADY_PAV_DELAY_LOW: 200 MS
MDC_IDC_SET_BRADY_SAV_DELAY_LOW: 170 MS
MDC_IDC_SET_LEADCHNL_RA_PACING_AMPLITUDE: 1.62
MDC_IDC_SET_LEADCHNL_RA_PACING_CAPTURE_MODE: NORMAL
MDC_IDC_SET_LEADCHNL_RA_PACING_POLARITY: NORMAL
MDC_IDC_SET_LEADCHNL_RA_PACING_PULSEWIDTH: 0.5 MS
MDC_IDC_SET_LEADCHNL_RA_SENSING_ADAPTATION_MODE: NORMAL
MDC_IDC_SET_LEADCHNL_RA_SENSING_POLARITY: NORMAL
MDC_IDC_SET_LEADCHNL_RV_PACING_AMPLITUDE: 1.12
MDC_IDC_SET_LEADCHNL_RV_PACING_CAPTURE_MODE: NORMAL
MDC_IDC_SET_LEADCHNL_RV_PACING_POLARITY: NORMAL
MDC_IDC_SET_LEADCHNL_RV_PACING_PULSEWIDTH: 0.5 MS
MDC_IDC_SET_LEADCHNL_RV_SENSING_POLARITY: NORMAL
MDC_IDC_SET_LEADCHNL_RV_SENSING_SENSITIVITY: 2 MV
MDC_IDC_STAT_AT_MODE_SW_COUNT: 0
MDC_IDC_STAT_BRADY_RA_PERCENT_PACED: 97 %
MDC_IDC_STAT_BRADY_RV_PERCENT_PACED: 1.7 %

## 2021-11-30 ENCOUNTER — VIRTUAL VISIT (OUTPATIENT)
Dept: INTERNAL MEDICINE | Facility: CLINIC | Age: 86
End: 2021-11-30
Payer: MEDICARE

## 2021-11-30 DIAGNOSIS — R05.9 COUGH: ICD-10-CM

## 2021-11-30 DIAGNOSIS — Z20.822 EXPOSURE TO 2019 NOVEL CORONAVIRUS: Primary | ICD-10-CM

## 2021-11-30 DIAGNOSIS — Z20.822 SUSPECTED 2019 NOVEL CORONAVIRUS INFECTION: ICD-10-CM

## 2021-11-30 PROCEDURE — 99441 PR PHYSICIAN TELEPHONE EVALUATION 5-10 MIN: CPT | Mod: 95 | Performed by: PHYSICIAN ASSISTANT

## 2021-11-30 NOTE — PROGRESS NOTES
Abbe is a 87 year old who is being evaluated via a billable telephone visit.      What phone number would you like to be contacted at? [phone]  How would you like to obtain your AVS? Mail a copy    Assessment & Plan     Exposure to 2019 novel coronavirus    - Symptomatic COVID-19 Virus (Coronavirus) by PCR; Future    Cough    - Symptomatic COVID-19 Virus (Coronavirus) by PCR; Future    Suspected 2019 novel coronavirus infection    - Symptomatic COVID-19 Virus (Coronavirus) by PCR; Future             See Patient Instructions    No follow-ups on file.    HELEN [facility]    Paresh Mcadams is a 87 year old who presents for the following health issues     HPI     Acute Illness  Acute illness concerns: Covid Exposure  Onset/Duration: Last week  Symptoms:  Fever: no  Chills/Sweats: no  Headache (location?): no  Sinus Pressure: YES  Conjunctivitis:  no  Ear Pain: no  Rhinorrhea: YES  Congestion: YES  Sore Throat: no  Cough: YES rattle like stuff  Wheeze: no  Decreased Appetite: no  Nausea: no  Vomiting: no  Diarrhea: no  Dysuria/Freq.: no  Dysuria or Hematuria: no  Fatigue/Achiness: YES  Sick/Strep Exposure: Exposure to covid  No loss of taste or smell   No SOB      Therapies tried and outcome: None      Review of Systems         Objective           Vitals:  No vitals were obtained today due to virtual visit.    Physical Exam   healthy, alert and no distress  PSYCH: Alert and oriented times 3; coherent speech, normal   rate and volume, able to articulate logical thoughts, able   to abstract reason, no tangential thoughts, no hallucinations   or delusions  His affect is normal  RESP: No cough, no audible wheezing, able to talk in full sentences  Remainder of exam unable to be completed due to telephone visits                Phone call duration: 7 minutes

## 2021-11-30 NOTE — PATIENT INSTRUCTIONS
Instructions for Patients  It is recommended that you have a test for coronavirus (COVID-19). This illness can cause fever, cough and trouble breathing. Many people get a mild case and get better on their own. Some people can get very sick.     Please follow these steps:    1. We will call to schedule your test.  2. A member of our care team will ask you some questions. Then, they will use a swab to collect samples from your nose and throat.     Our testing team will send you your test results.    How can I protect others?    Stay home and away from others (self-isolate) until:    You ve had no fever--and no medicine that reduces fever--for 1 full day (24 hours). And      Your other symptoms have resolved (gotten better). For example, your cough or breathing has improved. And     At least 10 days have passed since your symptoms started.    Stay at least 6 feet away from others. (If someone will drive you to your test, stay in the backseat, as far away from the  as you can.)     Don t go to work, school or anywhere else. When it s time for your test, go straight to the testing site. Don t make any stops on the way there or back.     Wash your hands and face often. Use soap and water.     Cover your mouth and nose with a mask, tissue or washcloth.     Don t touch anyone. No hugging, kissing or handshakes.    How can I take care of myself?    1. Get lots of rest. Drink extra fluids (unless a doctor has told you not to).     2. Take Tylenol (acetaminophen) for fever or pain. If you have liver or kidney problems, ask your family doctor if it's okay to take Tylenol.     Adults can take either:     650 mg (two 325 mg pills) every 4 to 6 hours, or     1,000 mg (two 500 mg pills) every 8 hours as needed.     Note: Don't take more than 3,000 mg in one day.   Acetaminophen is found in many medicines (both prescribed and over-the-counter medicines). Read all labels to be sure you don't take too much.   For children,  check the Tylenol bottle for the right dose. The dose is based on  the child's age or weight.    3. If you have other health problems (like cancer, heart failure, an organ transplant or severe kidney disease): Call your specialty clinic if you don't feel better in the next 2 days.    4. Know when to call 911: If your breathing is so bad that it keeps you from doing normal activities, call 911 or go to the emergency room. Tell them that you've been staying home and may have COVID-19.      Thank you for limiting contact with others, wearing a simple mask to cover your cough, practice good hand hygiene habits and accessing our virtual services where possible to limit the spread of this virus.    For more information about COVID19 and options for caring for yourself at home, please visit the CDC website at https://www.cdc.gov/coronavirus/2019-ncov/about/steps-when-sick.html  For more options for care at [facility], please visit our website at [email or web address]

## 2021-12-01 ENCOUNTER — LAB (OUTPATIENT)
Dept: URGENT CARE | Facility: URGENT CARE | Age: 86
End: 2021-12-01
Attending: PHYSICIAN ASSISTANT
Payer: MEDICARE

## 2021-12-01 DIAGNOSIS — R05.9 COUGH: ICD-10-CM

## 2021-12-01 DIAGNOSIS — Z20.822 EXPOSURE TO 2019 NOVEL CORONAVIRUS: ICD-10-CM

## 2021-12-01 DIAGNOSIS — Z20.822 SUSPECTED 2019 NOVEL CORONAVIRUS INFECTION: ICD-10-CM

## 2021-12-01 PROCEDURE — U0003 INFECTIOUS AGENT DETECTION BY NUCLEIC ACID (DNA OR RNA); SEVERE ACUTE RESPIRATORY SYNDROME CORONAVIRUS 2 (SARS-COV-2) (CORONAVIRUS DISEASE [COVID-19]), AMPLIFIED PROBE TECHNIQUE, MAKING USE OF HIGH THROUGHPUT TECHNOLOGIES AS DESCRIBED BY CMS-2020-01-R: HCPCS

## 2021-12-01 PROCEDURE — U0005 INFEC AGEN DETEC AMPLI PROBE: HCPCS

## 2021-12-02 ENCOUNTER — TELEPHONE (OUTPATIENT)
Dept: INTERNAL MEDICINE | Facility: CLINIC | Age: 86
End: 2021-12-02
Payer: MEDICARE

## 2021-12-02 LAB — SARS-COV-2 RNA RESP QL NAA+PROBE: POSITIVE

## 2021-12-03 NOTE — TELEPHONE ENCOUNTER
"-Coronavirus (COVID-19) Notification    Caller Name (Patient, parent, daughter/son, grandparent, etc)  Patient    Reason for call  Notify of Positive Coronavirus (COVID-19) lab results, assess symptoms,  review  [employer] recommendations    Lab Result    Lab test:  2019-nCoV rRt-PCR or SARS-CoV-2 PCR    Oropharyngeal AND/OR nasopharyngeal swabs is POSITIVE for 2019-nCoV RNA/SARS-COV-2 PCR (COVID-19 virus)    RN Recommendations/Instructions per [facility] Coronavirus COVID-19 recommendations    Brief introduction script  Introduce self then review script:  \"I am calling on behalf of [employer].  We were notified that your Coronavirus test (COVID-19) for was POSITIVE for the virus.  I have some information to relay to you but first I wanted to mention that the [place] Dept of Health will be contacting you shortly [it's possible MD already called Patient] to talk to you more about how you are feeling and other people you have had contact with who might now also have the virus.  Also,  [employer] is Partnering with the [facility] for Covid-19 research, you may be contacted directly by research staff.\"    Assessment (Inquire about Patient's current symptoms)   Assessment   Current Symptoms at time of phone call: (if no symptoms, document No symptoms] Occasional cough   Symptoms onset (if applicable) 6 days ago     If at time of call, Patients symptoms hare worsened, the Patient should contact 911 or have someone drive them to Emergency Dept promptly:      If Patient calling 911, inform 911 personal that you have tested positive for the Coronavirus (COVID-19).  Place mask on and await 911 to arrive.    If Emergency Dept, If possible, please have another adult drive you to the Emergency Dept but you need to wear mask when in contact with other people.      Monoclonal Antibody Administration    You may be eligible to receive a new treatment with a monoclonal antibody for preventing " "hospitalization in patients at high risk for complications from COVID-19.   This medication is still experimental and available on a limited basis; it is given through an IV and must be given at an infusion center. Please note that not all people who are eligible will receive the medication since it is in limited supply.     Are you interested in being considered for this medication?  Yes.   Is the patient symptomatic?  Yes. Is the patient 18 years of age or older? Yes.  Is the patient newly (within the last week) on supplemental oxygen or requiring more oxygen than usual?  No. Patient criteria for selection: Is the patients weight equal to or greater than 40 kg (88 lbs)? Yes.  Is the patient's age 65 years or older?  Yes.  Patient qualifies, refer patient to [facility].   Does the patient fit the criteria: Yes: Patient referred to [place] website, patient or family/friend will complete application.    If patient qualifies based on above criteria:  \"You will be contacted if you are selected to receive this treatment in the next 1-2 business days.   This is time sensitive and if you are not selected in the next 1-2 business days, you will not receive the medication.  If you do not receive a call to schedule, you have not been selected.\"      Review information with Patient    Your result was positive. This means you have COVID-19 (coronavirus).  We have sent you a letter that reviews the information that I'll be reviewing with you now.    How can I protect others?    If you have symptoms: stay home and away from others (self-isolate) until:    You've had no fever--and no medicine that reduces fever--for 1 full day (24 hours). And       Your other symptoms have gotten better. For example, your cough or breathing has improved. And     At least 10 days have passed since your symptoms started. (If you've been told by a doctor that you have a weak immune system, wait 20 days.)     If you don't have symptoms: Stay home and away " from others (self-isolate) until at least 10 days have passed since your first positive COVID-19 test. (Date test collected)    During this time:    Stay in your own room, including for meals. Use your own bathroom if you can.    Stay away from others in your home. No hugging, kissing or shaking hands. No visitors.     Don't go to work, school or anywhere else.     Clean  high touch  surfaces often (doorknobs, counters, handles, etc.). Use a household cleaning spray or wipes. You'll find a full list on the EPA website at www.epa.gov/pesticide-registration/list-n-disinfectants-use-against-sars-cov-2.     Cover your mouth and nose with a mask, tissue or other face covering to avoid spreading germs.    Wash your hands and face often with soap and water.    Make a list of people you have been in close contact with recently, even if either of you wore a face covering.   ; Start your list from 2 days before you became ill or had a positive test.  ; Include anyone that was within 6 feet of you for a cumulative total of 15 minutes or more in 24 hours. (Example: if you sat next to Eliezer for 5 minutes in the morning and 10 minutes in the afternoon, then you were in close contact for 15 minutes total that day. Eliezer would be added to your list.)    A public health worker will call or text you. It is important that you answer. They will ask you questions about possible exposures to COVID-19, such as people you have been in direct contact with and places you have visited.    Tell the people on your list that you have COVID-19; they should stay away from others for 14 days starting from the last time they were in contact with you (unless you are told something different from a public health worker).     Caregivers in these groups are at risk for severe illness due to COVID-19:  o People 65 years and older  o People who live in a nursing home or long-term care facility  o People with chronic disease (lung, heart, cancer, diabetes,  kidney, liver, immunologic)  o People who have a weakened immune system, including those who:  - Are in cancer treatment  - Take medicine that weakens the immune system, such as corticosteroids  - Had a bone marrow or organ transplant  - Have an immune deficiency  - Have poorly controlled HIV or AIDS  - Are obese (body mass index of 40 or higher)  - Smoke regularly    Caregivers should wear gloves while washing dishes, handling laundry and cleaning bedrooms and bathrooms.    Wash and dry laundry with special caution. Don't shake dirty laundry, and use the warmest water setting you can.    If you have a weakened immune system, ask your doctor about other actions you should take.    For more tips, go to www.cdc.gov/coronavirus/2019-ncov/downloads/10Things.pdf.    You should not go back to work until you meet the guidelines above for ending your home isolation. You don't need to be retested for COVID-19 before going back to work--studies show that you won't spread the virus if it's been at least 10 days since your symptoms started (or 20 days, if you have a weak immune system).    Employers: This document serves as formal notice of your employee's medical guidelines for going back to work. They must meet the above guidelines before going back to work in person.    How can I take care of myself?    1. Get lots of rest. Drink extra fluids (unless a doctor has told you not to).    2. Take Tylenol (acetaminophen) for fever or pain. If you have liver or kidney problems, ask your family doctor if it's okay to take Tylenol.     Take either:     650 mg (two 325 mg pills) every 4 to 6 hours, or     1,000 mg (two 500 mg pills) every 8 hours as needed.     Note: Don't take more than 3,000 mg in one day. Acetaminophen is found in many medicines (both prescribed and over-the-counter medicines). Read all labels to be sure you don't take too much.    For children, check the Tylenol bottle for the right dose (based on their age or  weight).    3. If you have other health problems (like cancer, heart failure, an organ transplant or severe kidney disease): Call your specialty clinic if you don't feel better in the next 2 days.    4. Know when to call 911: Emergency warning signs include:    Trouble breathing or shortness of breath    Pain or pressure in the chest that doesn't go away    Feeling confused like you haven't felt before, or not being able to wake up    Bluish-colored lips or face    5. Sign up for [employer]. We know it's scary to hear that you have COVID-19. We want to track your symptoms to make sure you're okay over the next 2 weeks. Please look for an email from [employer]--this is a free, online program that we'll use to keep in touch. To sign up, follow the link in the email. Learn more at [email or web address].    Where can I get more information?    [facility]: [email or web address]    Coronavirus Basics: www.health.[facility].[place]./diseases/coronavirus/basics.html    What to Do If You're Sick: www.cdc.gov/coronavirus/2019-ncov/about/steps-when-sick.html    Ending Home Isolation: www.cdc.gov/coronavirus/2019-ncov/hcp/disposition-in-home-patients.html     Caring for Someone with COVID-19: www.cdc.gov/coronavirus/2019-ncov/if-you-are-sick/care-for-someone.html     [facility] clinical trials (COVID-19 research studies): clinicalaffairs.[facility]/n-clinical-trials     A Positive COVID-19 letter will be sent via [employer] or the mail. (Exception, no letters sent to Presurgerical/Preprocedure Patients)    Tiffanie Junior LPN

## 2022-01-01 ENCOUNTER — TELEPHONE (OUTPATIENT)
Dept: INTERNAL MEDICINE | Facility: CLINIC | Age: 87
End: 2022-01-01

## 2022-01-01 ENCOUNTER — LAB (OUTPATIENT)
Dept: URGENT CARE | Facility: URGENT CARE | Age: 87
End: 2022-01-01
Payer: COMMERCIAL

## 2022-01-01 ENCOUNTER — VIRTUAL VISIT (OUTPATIENT)
Dept: FAMILY MEDICINE | Facility: CLINIC | Age: 87
End: 2022-01-01
Payer: COMMERCIAL

## 2022-01-01 ENCOUNTER — APPOINTMENT (OUTPATIENT)
Dept: CT IMAGING | Facility: CLINIC | Age: 87
End: 2022-01-01
Attending: EMERGENCY MEDICINE
Payer: COMMERCIAL

## 2022-01-01 ENCOUNTER — ANCILLARY PROCEDURE (OUTPATIENT)
Dept: CARDIOLOGY | Facility: CLINIC | Age: 87
End: 2022-01-01
Attending: INTERNAL MEDICINE
Payer: COMMERCIAL

## 2022-01-01 ENCOUNTER — HOSPITAL ENCOUNTER (EMERGENCY)
Facility: CLINIC | Age: 87
Discharge: HOME | End: 2022-12-10
Attending: EMERGENCY MEDICINE | Admitting: EMERGENCY MEDICINE
Payer: COMMERCIAL

## 2022-01-01 ENCOUNTER — OFFICE VISIT (OUTPATIENT)
Dept: INTERNAL MEDICINE | Facility: CLINIC | Age: 87
End: 2022-01-01
Payer: COMMERCIAL

## 2022-01-01 ENCOUNTER — OFFICE VISIT (OUTPATIENT)
Dept: URGENT CARE | Facility: URGENT CARE | Age: 87
End: 2022-01-01
Payer: COMMERCIAL

## 2022-01-01 ENCOUNTER — APPOINTMENT (OUTPATIENT)
Dept: ULTRASOUND IMAGING | Facility: CLINIC | Age: 87
End: 2022-01-01
Attending: EMERGENCY MEDICINE
Payer: COMMERCIAL

## 2022-01-01 ENCOUNTER — NURSE TRIAGE (OUTPATIENT)
Dept: NURSING | Facility: CLINIC | Age: 87
End: 2022-01-01

## 2022-01-01 ENCOUNTER — HOSPITAL ENCOUNTER (EMERGENCY)
Facility: CLINIC | Age: 87
Discharge: HOME | End: 2022-12-16
Attending: EMERGENCY MEDICINE | Admitting: EMERGENCY MEDICINE
Payer: COMMERCIAL

## 2022-01-01 VITALS
DIASTOLIC BLOOD PRESSURE: 95 MMHG | RESPIRATION RATE: 16 BRPM | HEART RATE: 59 BPM | BODY MASS INDEX: 25.77 KG/M2 | WEIGHT: 180 LBS | OXYGEN SATURATION: 99 % | TEMPERATURE: 97.5 F | HEIGHT: 70 IN | SYSTOLIC BLOOD PRESSURE: 146 MMHG

## 2022-01-01 VITALS
RESPIRATION RATE: 16 BRPM | HEIGHT: 69 IN | SYSTOLIC BLOOD PRESSURE: 153 MMHG | HEART RATE: 60 BPM | DIASTOLIC BLOOD PRESSURE: 69 MMHG | OXYGEN SATURATION: 100 % | WEIGHT: 180 LBS | TEMPERATURE: 97 F | BODY MASS INDEX: 26.66 KG/M2

## 2022-01-01 VITALS
TEMPERATURE: 97 F | WEIGHT: 190 LBS | HEART RATE: 60 BPM | SYSTOLIC BLOOD PRESSURE: 138 MMHG | DIASTOLIC BLOOD PRESSURE: 58 MMHG | BODY MASS INDEX: 27.26 KG/M2 | OXYGEN SATURATION: 97 % | RESPIRATION RATE: 15 BRPM

## 2022-01-01 VITALS
DIASTOLIC BLOOD PRESSURE: 72 MMHG | TEMPERATURE: 97.7 F | OXYGEN SATURATION: 99 % | WEIGHT: 172 LBS | BODY MASS INDEX: 25.4 KG/M2 | RESPIRATION RATE: 16 BRPM | SYSTOLIC BLOOD PRESSURE: 137 MMHG | HEART RATE: 64 BPM

## 2022-01-01 DIAGNOSIS — I49.5 SSS (SICK SINUS SYNDROME) (H): Primary | ICD-10-CM

## 2022-01-01 DIAGNOSIS — N18.9 CHRONIC RENAL FAILURE, UNSPECIFIED CKD STAGE: ICD-10-CM

## 2022-01-01 DIAGNOSIS — N18.32 STAGE 3B CHRONIC KIDNEY DISEASE (H): ICD-10-CM

## 2022-01-01 DIAGNOSIS — R60.9 PITTING EDEMA: ICD-10-CM

## 2022-01-01 DIAGNOSIS — S81.802A OPEN WOUND OF LEFT LOWER EXTREMITY, INITIAL ENCOUNTER: Primary | ICD-10-CM

## 2022-01-01 DIAGNOSIS — E11.40 TYPE 2 DIABETES MELLITUS WITH DIABETIC NEUROPATHY, WITHOUT LONG-TERM CURRENT USE OF INSULIN (H): ICD-10-CM

## 2022-01-01 DIAGNOSIS — L97.801 VENOUS STASIS ULCER OF OTHER PART OF LOWER LEG LIMITED TO BREAKDOWN OF SKIN WITH VARICOSE VEINS, UNSPECIFIED LATERALITY (H): Primary | ICD-10-CM

## 2022-01-01 DIAGNOSIS — I71.43 INFRARENAL ABDOMINAL AORTIC ANEURYSM (AAA) WITHOUT RUPTURE (H): ICD-10-CM

## 2022-01-01 DIAGNOSIS — Z95.0 CARDIAC PACEMAKER IN SITU: ICD-10-CM

## 2022-01-01 DIAGNOSIS — I83.028 VENOUS STASIS ULCER OF OTHER PART OF LEFT LOWER LEG, UNSPECIFIED ULCER STAGE, UNSPECIFIED WHETHER VARICOSE VEINS PRESENT (H): ICD-10-CM

## 2022-01-01 DIAGNOSIS — K63.89 EPIPLOIC APPENDAGITIS: ICD-10-CM

## 2022-01-01 DIAGNOSIS — U07.1 INFECTION DUE TO 2019 NOVEL CORONAVIRUS: Primary | ICD-10-CM

## 2022-01-01 DIAGNOSIS — Z20.822 SUSPECTED COVID-19 VIRUS INFECTION: ICD-10-CM

## 2022-01-01 DIAGNOSIS — I83.008 VENOUS STASIS ULCER OF OTHER PART OF LOWER LEG LIMITED TO BREAKDOWN OF SKIN WITH VARICOSE VEINS, UNSPECIFIED LATERALITY (H): Primary | ICD-10-CM

## 2022-01-01 DIAGNOSIS — L97.829 VENOUS STASIS ULCER OF OTHER PART OF LEFT LOWER LEG, UNSPECIFIED ULCER STAGE, UNSPECIFIED WHETHER VARICOSE VEINS PRESENT (H): ICD-10-CM

## 2022-01-01 DIAGNOSIS — M71.20 SYNOVIAL CYST OF POPLITEAL SPACE, UNSPECIFIED LATERALITY: ICD-10-CM

## 2022-01-01 LAB
ALBUMIN SERPL-MCNC: 3.1 G/DL (ref 3.4–5)
ALBUMIN UR-MCNC: NEGATIVE MG/DL
ALP SERPL-CCNC: 87 U/L (ref 40–150)
ALT SERPL W P-5'-P-CCNC: 27 U/L (ref 0–70)
ANION GAP SERPL CALCULATED.3IONS-SCNC: 1 MMOL/L (ref 3–14)
APPEARANCE UR: CLEAR
AST SERPL W P-5'-P-CCNC: 20 U/L (ref 0–45)
BASOPHILS # BLD MANUAL: 0 10E3/UL (ref 0–0.2)
BASOPHILS NFR BLD MANUAL: 0 %
BILIRUB SERPL-MCNC: 0.3 MG/DL (ref 0.2–1.3)
BILIRUB UR QL STRIP: NEGATIVE
BUN SERPL-MCNC: 31 MG/DL (ref 7–30)
CALCIUM SERPL-MCNC: 8.8 MG/DL (ref 8.5–10.1)
CHLORIDE BLD-SCNC: 108 MMOL/L (ref 94–109)
CO2 SERPL-SCNC: 27 MMOL/L (ref 20–32)
COLOR UR AUTO: NORMAL
CREAT SERPL-MCNC: 1.71 MG/DL (ref 0.66–1.25)
EOSINOPHIL # BLD MANUAL: 0.1 10E3/UL (ref 0–0.7)
EOSINOPHIL NFR BLD MANUAL: 1 %
ERYTHROCYTE [DISTWIDTH] IN BLOOD BY AUTOMATED COUNT: 15.6 % (ref 10–15)
GFR SERPL CREATININE-BSD FRML MDRD: 38 ML/MIN/1.73M2
GLUCOSE BLD-MCNC: 92 MG/DL (ref 70–99)
GLUCOSE UR STRIP-MCNC: NEGATIVE MG/DL
HCT VFR BLD AUTO: 38 % (ref 40–53)
HGB BLD-MCNC: 11.6 G/DL (ref 13.3–17.7)
HGB UR QL STRIP: NEGATIVE
KETONES UR STRIP-MCNC: NEGATIVE MG/DL
LEUKOCYTE ESTERASE UR QL STRIP: NEGATIVE
LIPASE SERPL-CCNC: 212 U/L (ref 73–393)
LYMPHOCYTES # BLD MANUAL: 1.2 10E3/UL (ref 0.8–5.3)
LYMPHOCYTES NFR BLD MANUAL: 14 %
MCH RBC QN AUTO: 31.4 PG (ref 26.5–33)
MCHC RBC AUTO-ENTMCNC: 30.5 G/DL (ref 31.5–36.5)
MCV RBC AUTO: 103 FL (ref 78–100)
MDC_IDC_LEAD_IMPLANT_DT: NORMAL
MDC_IDC_LEAD_IMPLANT_DT: NORMAL
MDC_IDC_LEAD_LOCATION: NORMAL
MDC_IDC_LEAD_LOCATION: NORMAL
MDC_IDC_LEAD_MFG: NORMAL
MDC_IDC_LEAD_MFG: NORMAL
MDC_IDC_LEAD_MODEL: NORMAL
MDC_IDC_LEAD_MODEL: NORMAL
MDC_IDC_LEAD_POLARITY_TYPE: NORMAL
MDC_IDC_LEAD_POLARITY_TYPE: NORMAL
MDC_IDC_LEAD_SERIAL: NORMAL
MDC_IDC_LEAD_SERIAL: NORMAL
MDC_IDC_MSMT_BATTERY_REMAINING_LONGEVITY: 98 MO
MDC_IDC_MSMT_BATTERY_STATUS: NORMAL
MDC_IDC_MSMT_BATTERY_VOLTAGE: 3.01 V
MDC_IDC_MSMT_LEADCHNL_RA_IMPEDANCE_VALUE: 375 OHM
MDC_IDC_MSMT_LEADCHNL_RA_PACING_THRESHOLD_AMPLITUDE: 0.75 V
MDC_IDC_MSMT_LEADCHNL_RA_PACING_THRESHOLD_AMPLITUDE: 0.75 V
MDC_IDC_MSMT_LEADCHNL_RA_PACING_THRESHOLD_PULSEWIDTH: 0.5 MS
MDC_IDC_MSMT_LEADCHNL_RA_PACING_THRESHOLD_PULSEWIDTH: 0.5 MS
MDC_IDC_MSMT_LEADCHNL_RA_SENSING_INTR_AMPL: 2.7 MV
MDC_IDC_MSMT_LEADCHNL_RV_IMPEDANCE_VALUE: 562.5 OHM
MDC_IDC_MSMT_LEADCHNL_RV_PACING_THRESHOLD_AMPLITUDE: 1 V
MDC_IDC_MSMT_LEADCHNL_RV_PACING_THRESHOLD_AMPLITUDE: 1 V
MDC_IDC_MSMT_LEADCHNL_RV_PACING_THRESHOLD_PULSEWIDTH: 0.5 MS
MDC_IDC_MSMT_LEADCHNL_RV_PACING_THRESHOLD_PULSEWIDTH: 0.5 MS
MDC_IDC_MSMT_LEADCHNL_RV_SENSING_INTR_AMPL: 8.4 MV
MDC_IDC_PG_IMPLANT_DTM: NORMAL
MDC_IDC_PG_MFG: NORMAL
MDC_IDC_PG_MODEL: NORMAL
MDC_IDC_PG_SERIAL: NORMAL
MDC_IDC_PG_TYPE: NORMAL
MDC_IDC_SESS_CLINIC_NAME: NORMAL
MDC_IDC_SESS_DTM: NORMAL
MDC_IDC_SESS_TYPE: NORMAL
MDC_IDC_SET_BRADY_AT_MODE_SWITCH_MODE: NORMAL
MDC_IDC_SET_BRADY_AT_MODE_SWITCH_RATE: 180 {BEATS}/MIN
MDC_IDC_SET_BRADY_HYSTRATE: NORMAL
MDC_IDC_SET_BRADY_LOWRATE: 60 {BEATS}/MIN
MDC_IDC_SET_BRADY_MAX_SENSOR_RATE: 120 {BEATS}/MIN
MDC_IDC_SET_BRADY_MAX_TRACKING_RATE: 120 {BEATS}/MIN
MDC_IDC_SET_BRADY_MODE: NORMAL
MDC_IDC_SET_BRADY_NIGHT_RATE: NORMAL
MDC_IDC_SET_BRADY_PAV_DELAY_LOW: 200 MS
MDC_IDC_SET_BRADY_SAV_DELAY_LOW: 170 MS
MDC_IDC_SET_LEADCHNL_RA_PACING_AMPLITUDE: 1.62
MDC_IDC_SET_LEADCHNL_RA_PACING_CAPTURE_MODE: NORMAL
MDC_IDC_SET_LEADCHNL_RA_PACING_POLARITY: NORMAL
MDC_IDC_SET_LEADCHNL_RA_PACING_PULSEWIDTH: 0.5 MS
MDC_IDC_SET_LEADCHNL_RA_SENSING_ADAPTATION_MODE: NORMAL
MDC_IDC_SET_LEADCHNL_RA_SENSING_POLARITY: NORMAL
MDC_IDC_SET_LEADCHNL_RV_PACING_AMPLITUDE: 1.38
MDC_IDC_SET_LEADCHNL_RV_PACING_CAPTURE_MODE: NORMAL
MDC_IDC_SET_LEADCHNL_RV_PACING_POLARITY: NORMAL
MDC_IDC_SET_LEADCHNL_RV_PACING_PULSEWIDTH: 0.5 MS
MDC_IDC_SET_LEADCHNL_RV_SENSING_POLARITY: NORMAL
MDC_IDC_SET_LEADCHNL_RV_SENSING_SENSITIVITY: 2 MV
MDC_IDC_STAT_AT_MODE_SW_COUNT: 0
MDC_IDC_STAT_BRADY_RA_PERCENT_PACED: 94 %
MDC_IDC_STAT_BRADY_RV_PERCENT_PACED: 4.2 %
MONOCYTES # BLD MANUAL: 0.9 10E3/UL (ref 0–1.3)
MONOCYTES NFR BLD MANUAL: 11 %
NEUTROPHILS # BLD MANUAL: 6.3 10E3/UL (ref 1.6–8.3)
NEUTROPHILS NFR BLD MANUAL: 74 %
NITRATE UR QL: NEGATIVE
PH UR STRIP: 6 [PH] (ref 5–7)
PLAT MORPH BLD: NORMAL
PLATELET # BLD AUTO: 214 10E3/UL (ref 150–450)
POTASSIUM BLD-SCNC: 4.5 MMOL/L (ref 3.4–5.3)
PROT SERPL-MCNC: 8.5 G/DL (ref 6.8–8.8)
RBC # BLD AUTO: 3.7 10E6/UL (ref 4.4–5.9)
RBC MORPH BLD: NORMAL
RBC URINE: 0 /HPF
SARS-COV-2 RNA RESP QL NAA+PROBE: POSITIVE
SODIUM SERPL-SCNC: 136 MMOL/L (ref 133–144)
SP GR UR STRIP: 1.01 (ref 1–1.03)
UROBILINOGEN UR STRIP-MCNC: NORMAL MG/DL
WBC # BLD AUTO: 8.5 10E3/UL (ref 4–11)
WBC URINE: <1 /HPF

## 2022-01-01 PROCEDURE — 93970 EXTREMITY STUDY: CPT

## 2022-01-01 PROCEDURE — 83690 ASSAY OF LIPASE: CPT | Performed by: EMERGENCY MEDICINE

## 2022-01-01 PROCEDURE — U0003 INFECTIOUS AGENT DETECTION BY NUCLEIC ACID (DNA OR RNA); SEVERE ACUTE RESPIRATORY SYNDROME CORONAVIRUS 2 (SARS-COV-2) (CORONAVIRUS DISEASE [COVID-19]), AMPLIFIED PROBE TECHNIQUE, MAKING USE OF HIGH THROUGHPUT TECHNOLOGIES AS DESCRIBED BY CMS-2020-01-R: HCPCS

## 2022-01-01 PROCEDURE — 93280 PM DEVICE PROGR EVAL DUAL: CPT | Performed by: INTERNAL MEDICINE

## 2022-01-01 PROCEDURE — 36415 COLL VENOUS BLD VENIPUNCTURE: CPT | Performed by: EMERGENCY MEDICINE

## 2022-01-01 PROCEDURE — 80053 COMPREHEN METABOLIC PANEL: CPT | Performed by: EMERGENCY MEDICINE

## 2022-01-01 PROCEDURE — 74177 CT ABD & PELVIS W/CONTRAST: CPT

## 2022-01-01 PROCEDURE — 99213 OFFICE O/P EST LOW 20 MIN: CPT | Performed by: NURSE PRACTITIONER

## 2022-01-01 PROCEDURE — 250N000009 HC RX 250: Performed by: EMERGENCY MEDICINE

## 2022-01-01 PROCEDURE — 99214 OFFICE O/P EST MOD 30 MIN: CPT | Performed by: INTERNAL MEDICINE

## 2022-01-01 PROCEDURE — U0005 INFEC AGEN DETEC AMPLI PROBE: HCPCS

## 2022-01-01 PROCEDURE — 99442 PR PHYSICIAN TELEPHONE EVALUATION 11-20 MIN: CPT | Mod: CS | Performed by: FAMILY MEDICINE

## 2022-01-01 PROCEDURE — 96361 HYDRATE IV INFUSION ADD-ON: CPT

## 2022-01-01 PROCEDURE — 96374 THER/PROPH/DIAG INJ IV PUSH: CPT | Mod: 59

## 2022-01-01 PROCEDURE — 85027 COMPLETE CBC AUTOMATED: CPT | Performed by: EMERGENCY MEDICINE

## 2022-01-01 PROCEDURE — 85007 BL SMEAR W/DIFF WBC COUNT: CPT | Performed by: EMERGENCY MEDICINE

## 2022-01-01 PROCEDURE — 99285 EMERGENCY DEPT VISIT HI MDM: CPT | Mod: 25

## 2022-01-01 PROCEDURE — 250N000011 HC RX IP 250 OP 636: Performed by: EMERGENCY MEDICINE

## 2022-01-01 PROCEDURE — 258N000003 HC RX IP 258 OP 636: Performed by: EMERGENCY MEDICINE

## 2022-01-01 PROCEDURE — 81001 URINALYSIS AUTO W/SCOPE: CPT | Performed by: EMERGENCY MEDICINE

## 2022-01-01 PROCEDURE — 99284 EMERGENCY DEPT VISIT MOD MDM: CPT | Mod: 25

## 2022-01-01 RX ORDER — ALBUTEROL SULFATE 90 UG/1
1-2 AEROSOL, METERED RESPIRATORY (INHALATION) EVERY 6 HOURS
Qty: 18 G | Refills: 0 | Status: SHIPPED | OUTPATIENT
Start: 2022-01-01 | End: 2023-01-01

## 2022-01-01 RX ORDER — FENTANYL CITRATE 50 UG/ML
25 INJECTION, SOLUTION INTRAMUSCULAR; INTRAVENOUS ONCE
Status: COMPLETED | OUTPATIENT
Start: 2022-01-01 | End: 2022-01-01

## 2022-01-01 RX ORDER — IOPAMIDOL 755 MG/ML
91 INJECTION, SOLUTION INTRAVASCULAR ONCE
Status: COMPLETED | OUTPATIENT
Start: 2022-01-01 | End: 2022-01-01

## 2022-01-01 RX ORDER — BENZONATATE 100 MG/1
100 CAPSULE ORAL 3 TIMES DAILY PRN
Qty: 42 CAPSULE | Refills: 0 | Status: SHIPPED | OUTPATIENT
Start: 2022-01-01 | End: 2022-01-01

## 2022-01-01 RX ORDER — TRAMADOL HYDROCHLORIDE 50 MG/1
50 TABLET ORAL EVERY 6 HOURS PRN
Qty: 10 TABLET | Refills: 0 | Status: SHIPPED | OUTPATIENT
Start: 2022-01-01 | End: 2022-01-01

## 2022-01-01 RX ADMIN — SODIUM CHLORIDE 1000 ML: 9 INJECTION, SOLUTION INTRAVENOUS at 17:10

## 2022-01-01 RX ADMIN — SODIUM CHLORIDE 66 ML: 900 INJECTION INTRAVENOUS at 18:20

## 2022-01-01 RX ADMIN — FENTANYL CITRATE 25 MCG: 50 INJECTION INTRAMUSCULAR; INTRAVENOUS at 17:54

## 2022-01-01 RX ADMIN — IOPAMIDOL 91 ML: 755 INJECTION, SOLUTION INTRAVENOUS at 18:19

## 2022-01-01 ASSESSMENT — ENCOUNTER SYMPTOMS
ABDOMINAL PAIN: 0
COLOR CHANGE: 0
CHILLS: 0
DIARRHEA: 0
SHORTNESS OF BREATH: 0
DIARRHEA: 0
FLANK PAIN: 0
NAUSEA: 0
WOUND: 1
FEVER: 0
DYSURIA: 0
HEMATURIA: 0
APPETITE CHANGE: 0
FEVER: 0
ABDOMINAL PAIN: 1
VOMITING: 0
BACK PAIN: 0

## 2022-01-01 ASSESSMENT — ACTIVITIES OF DAILY LIVING (ADL)
ADLS_ACUITY_SCORE: 37

## 2022-01-01 ASSESSMENT — ASTHMA QUESTIONNAIRES
QUESTION_3 LAST FOUR WEEKS HOW OFTEN DID YOUR ASTHMA SYMPTOMS (WHEEZING, COUGHING, SHORTNESS OF BREATH, CHEST TIGHTNESS OR PAIN) WAKE YOU UP AT NIGHT OR EARLIER THAN USUAL IN THE MORNING: NOT AT ALL
ACT_TOTALSCORE: 25
QUESTION_1 LAST FOUR WEEKS HOW MUCH OF THE TIME DID YOUR ASTHMA KEEP YOU FROM GETTING AS MUCH DONE AT WORK, SCHOOL OR AT HOME: NONE OF THE TIME
ACT_TOTALSCORE: 25
QUESTION_4 LAST FOUR WEEKS HOW OFTEN HAVE YOU USED YOUR RESCUE INHALER OR NEBULIZER MEDICATION (SUCH AS ALBUTEROL): NOT AT ALL
QUESTION_2 LAST FOUR WEEKS HOW OFTEN HAVE YOU HAD SHORTNESS OF BREATH: NOT AT ALL
QUESTION_5 LAST FOUR WEEKS HOW WOULD YOU RATE YOUR ASTHMA CONTROL: COMPLETELY CONTROLLED

## 2022-01-25 ENCOUNTER — ANCILLARY PROCEDURE (OUTPATIENT)
Dept: CARDIOLOGY | Facility: CLINIC | Age: 87
End: 2022-01-25
Attending: INTERNAL MEDICINE
Payer: COMMERCIAL

## 2022-01-25 DIAGNOSIS — I49.5 SSS (SICK SINUS SYNDROME) (H): ICD-10-CM

## 2022-01-25 DIAGNOSIS — Z95.0 CARDIAC PACEMAKER IN SITU: ICD-10-CM

## 2022-01-25 PROCEDURE — 93294 REM INTERROG EVL PM/LDLS PM: CPT | Performed by: INTERNAL MEDICINE

## 2022-01-25 PROCEDURE — 93296 REM INTERROG EVL PM/IDS: CPT | Performed by: INTERNAL MEDICINE

## 2022-01-31 LAB
MDC_IDC_LEAD_IMPLANT_DT: NORMAL
MDC_IDC_LEAD_IMPLANT_DT: NORMAL
MDC_IDC_LEAD_LOCATION: NORMAL
MDC_IDC_LEAD_LOCATION: NORMAL
MDC_IDC_LEAD_MFG: NORMAL
MDC_IDC_LEAD_MFG: NORMAL
MDC_IDC_LEAD_MODEL: NORMAL
MDC_IDC_LEAD_MODEL: NORMAL
MDC_IDC_LEAD_POLARITY_TYPE: NORMAL
MDC_IDC_LEAD_POLARITY_TYPE: NORMAL
MDC_IDC_LEAD_SERIAL: NORMAL
MDC_IDC_LEAD_SERIAL: NORMAL
MDC_IDC_MSMT_BATTERY_DTM: NORMAL
MDC_IDC_MSMT_BATTERY_REMAINING_LONGEVITY: 119 MO
MDC_IDC_MSMT_BATTERY_REMAINING_PERCENTAGE: 95.5 %
MDC_IDC_MSMT_BATTERY_RRT_TRIGGER: NORMAL
MDC_IDC_MSMT_BATTERY_STATUS: NORMAL
MDC_IDC_MSMT_BATTERY_VOLTAGE: 3.01 V
MDC_IDC_MSMT_LEADCHNL_RA_IMPEDANCE_VALUE: 380 OHM
MDC_IDC_MSMT_LEADCHNL_RA_LEAD_CHANNEL_STATUS: NORMAL
MDC_IDC_MSMT_LEADCHNL_RA_PACING_THRESHOLD_AMPLITUDE: 0.5 V
MDC_IDC_MSMT_LEADCHNL_RA_PACING_THRESHOLD_PULSEWIDTH: 0.5 MS
MDC_IDC_MSMT_LEADCHNL_RA_SENSING_INTR_AMPL: 2.9 MV
MDC_IDC_MSMT_LEADCHNL_RV_IMPEDANCE_VALUE: 540 OHM
MDC_IDC_MSMT_LEADCHNL_RV_LEAD_CHANNEL_STATUS: NORMAL
MDC_IDC_MSMT_LEADCHNL_RV_PACING_THRESHOLD_AMPLITUDE: 1 V
MDC_IDC_MSMT_LEADCHNL_RV_PACING_THRESHOLD_PULSEWIDTH: 0.5 MS
MDC_IDC_MSMT_LEADCHNL_RV_SENSING_INTR_AMPL: 11 MV
MDC_IDC_PG_IMPLANT_DTM: NORMAL
MDC_IDC_PG_MFG: NORMAL
MDC_IDC_PG_MODEL: NORMAL
MDC_IDC_PG_SERIAL: NORMAL
MDC_IDC_PG_TYPE: NORMAL
MDC_IDC_SESS_CLINIC_NAME: NORMAL
MDC_IDC_SESS_DTM: NORMAL
MDC_IDC_SESS_REPROGRAMMED: NO
MDC_IDC_SESS_TYPE: NORMAL
MDC_IDC_SET_BRADY_AT_MODE_SWITCH_MODE: NORMAL
MDC_IDC_SET_BRADY_AT_MODE_SWITCH_RATE: 180 {BEATS}/MIN
MDC_IDC_SET_BRADY_LOWRATE: 60 {BEATS}/MIN
MDC_IDC_SET_BRADY_MAX_SENSOR_RATE: 120 {BEATS}/MIN
MDC_IDC_SET_BRADY_MAX_TRACKING_RATE: 120 {BEATS}/MIN
MDC_IDC_SET_BRADY_MODE: NORMAL
MDC_IDC_SET_BRADY_PAV_DELAY_LOW: 200 MS
MDC_IDC_SET_BRADY_SAV_DELAY_LOW: 170 MS
MDC_IDC_SET_LEADCHNL_RA_PACING_AMPLITUDE: 1.5 V
MDC_IDC_SET_LEADCHNL_RA_PACING_ANODE_ELECTRODE_1: NORMAL
MDC_IDC_SET_LEADCHNL_RA_PACING_ANODE_LOCATION_1: NORMAL
MDC_IDC_SET_LEADCHNL_RA_PACING_CAPTURE_MODE: NORMAL
MDC_IDC_SET_LEADCHNL_RA_PACING_CATHODE_ELECTRODE_1: NORMAL
MDC_IDC_SET_LEADCHNL_RA_PACING_CATHODE_LOCATION_1: NORMAL
MDC_IDC_SET_LEADCHNL_RA_PACING_POLARITY: NORMAL
MDC_IDC_SET_LEADCHNL_RA_PACING_PULSEWIDTH: 0.5 MS
MDC_IDC_SET_LEADCHNL_RA_SENSING_ADAPTATION_MODE: NORMAL
MDC_IDC_SET_LEADCHNL_RA_SENSING_ANODE_ELECTRODE_1: NORMAL
MDC_IDC_SET_LEADCHNL_RA_SENSING_ANODE_LOCATION_1: NORMAL
MDC_IDC_SET_LEADCHNL_RA_SENSING_CATHODE_ELECTRODE_1: NORMAL
MDC_IDC_SET_LEADCHNL_RA_SENSING_CATHODE_LOCATION_1: NORMAL
MDC_IDC_SET_LEADCHNL_RA_SENSING_POLARITY: NORMAL
MDC_IDC_SET_LEADCHNL_RA_SENSING_SENSITIVITY: 1 MV
MDC_IDC_SET_LEADCHNL_RV_PACING_AMPLITUDE: 1.25 V
MDC_IDC_SET_LEADCHNL_RV_PACING_ANODE_ELECTRODE_1: NORMAL
MDC_IDC_SET_LEADCHNL_RV_PACING_ANODE_LOCATION_1: NORMAL
MDC_IDC_SET_LEADCHNL_RV_PACING_CAPTURE_MODE: NORMAL
MDC_IDC_SET_LEADCHNL_RV_PACING_CATHODE_ELECTRODE_1: NORMAL
MDC_IDC_SET_LEADCHNL_RV_PACING_CATHODE_LOCATION_1: NORMAL
MDC_IDC_SET_LEADCHNL_RV_PACING_POLARITY: NORMAL
MDC_IDC_SET_LEADCHNL_RV_PACING_PULSEWIDTH: 0.5 MS
MDC_IDC_SET_LEADCHNL_RV_SENSING_ADAPTATION_MODE: NORMAL
MDC_IDC_SET_LEADCHNL_RV_SENSING_ANODE_ELECTRODE_1: NORMAL
MDC_IDC_SET_LEADCHNL_RV_SENSING_ANODE_LOCATION_1: NORMAL
MDC_IDC_SET_LEADCHNL_RV_SENSING_CATHODE_ELECTRODE_1: NORMAL
MDC_IDC_SET_LEADCHNL_RV_SENSING_CATHODE_LOCATION_1: NORMAL
MDC_IDC_SET_LEADCHNL_RV_SENSING_POLARITY: NORMAL
MDC_IDC_SET_LEADCHNL_RV_SENSING_SENSITIVITY: 2 MV
MDC_IDC_STAT_AT_BURDEN_PERCENT: 0 %
MDC_IDC_STAT_AT_DTM_END: NORMAL
MDC_IDC_STAT_AT_DTM_START: NORMAL
MDC_IDC_STAT_AT_MODE_SW_COUNT: 0
MDC_IDC_STAT_AT_MODE_SW_COUNT_PER_DAY: 0
MDC_IDC_STAT_AT_MODE_SW_PERCENT_TIME: 0 %
MDC_IDC_STAT_BRADY_AP_VP_PERCENT: 4.2 %
MDC_IDC_STAT_BRADY_AP_VS_PERCENT: 93 %
MDC_IDC_STAT_BRADY_AS_VP_PERCENT: 1 %
MDC_IDC_STAT_BRADY_AS_VS_PERCENT: 2.5 %
MDC_IDC_STAT_BRADY_DTM_END: NORMAL
MDC_IDC_STAT_BRADY_DTM_START: NORMAL
MDC_IDC_STAT_BRADY_RA_PERCENT_PACED: 97 %
MDC_IDC_STAT_BRADY_RV_PERCENT_PACED: 4.2 %
MDC_IDC_STAT_CRT_DTM_END: NORMAL
MDC_IDC_STAT_CRT_DTM_START: NORMAL
MDC_IDC_STAT_HEART_RATE_ATRIAL_MAX: 220 {BEATS}/MIN
MDC_IDC_STAT_HEART_RATE_ATRIAL_MEAN: 67 {BEATS}/MIN
MDC_IDC_STAT_HEART_RATE_ATRIAL_MIN: 50 {BEATS}/MIN
MDC_IDC_STAT_HEART_RATE_DTM_END: NORMAL
MDC_IDC_STAT_HEART_RATE_DTM_START: NORMAL
MDC_IDC_STAT_HEART_RATE_VENTRICULAR_MAX: 190 {BEATS}/MIN
MDC_IDC_STAT_HEART_RATE_VENTRICULAR_MEAN: 66 {BEATS}/MIN
MDC_IDC_STAT_HEART_RATE_VENTRICULAR_MIN: 40 {BEATS}/MIN

## 2022-02-18 ENCOUNTER — TELEPHONE (OUTPATIENT)
Dept: CARDIOLOGY | Facility: CLINIC | Age: 87
End: 2022-02-18
Payer: COMMERCIAL

## 2022-02-18 DIAGNOSIS — I50.42 CHRONIC COMBINED SYSTOLIC AND DIASTOLIC CONGESTIVE HEART FAILURE (H): Primary | ICD-10-CM

## 2022-02-18 RX ORDER — CARVEDILOL 3.12 MG/1
3.12 TABLET ORAL 2 TIMES DAILY WITH MEALS
Qty: 180 TABLET | Refills: 1 | Status: SHIPPED | OUTPATIENT
Start: 2022-02-18 | End: 2023-01-01

## 2022-02-18 NOTE — TELEPHONE ENCOUNTER
[facility] Call Center    Phone Message    May a detailed message be left on voicemail: yes     Reason for Call: Medication Question or concern regarding medication   Prescription Clarification  Name of Medication: Carvedilol  Prescribing Provider: Cherie Queen   Pharmacy: [id number] [place], [place] - [address] AT [facility] [address]   What on the order needs clarification? Pt wife wants to request for Carvedilol pt will be running out soon. Wife states last time medication was prescribed by a different MD and Cherie said to cut them in half so not sure which dosage they should request for just know they need some soon. FYI pt wife was hard of hearing with  please call and confirm.          Action Taken: Message routed to:  Other: Cardiology    Travel Screening: Not Applicable

## 2022-02-18 NOTE — TELEPHONE ENCOUNTER
Per Dr. Eli's dictation in April - OK to change coreg to 3.125mg BID so patient does not need to split pills.    Refill sent with new dose through OV in April. [facility] Cardiology Refill Guideline reviewed.

## 2022-02-23 ENCOUNTER — TELEPHONE (OUTPATIENT)
Dept: INTERNAL MEDICINE | Facility: CLINIC | Age: 87
End: 2022-02-23
Payer: COMMERCIAL

## 2022-02-23 NOTE — TELEPHONE ENCOUNTER
Una Estrada (EC) calling to discuss consent to communicate forms. Discussed that forms can be picked up at registration desk. Would also like to have noted in chart that there are some concerns with Abbe's driving. Advised to bring these concerns up at annual wellness physical on 3/16/22. Verbalized understanding.  Mai Guardado RN

## 2022-03-16 ENCOUNTER — OFFICE VISIT (OUTPATIENT)
Dept: INTERNAL MEDICINE | Facility: CLINIC | Age: 87
End: 2022-03-16
Payer: COMMERCIAL

## 2022-03-16 VITALS
SYSTOLIC BLOOD PRESSURE: 90 MMHG | HEIGHT: 69 IN | OXYGEN SATURATION: 99 % | TEMPERATURE: 97.5 F | DIASTOLIC BLOOD PRESSURE: 60 MMHG | WEIGHT: 174.7 LBS | BODY MASS INDEX: 25.87 KG/M2 | HEART RATE: 60 BPM | RESPIRATION RATE: 16 BRPM

## 2022-03-16 DIAGNOSIS — I48.0 PAROXYSMAL ATRIAL FIBRILLATION (H): ICD-10-CM

## 2022-03-16 DIAGNOSIS — E11.40 TYPE 2 DIABETES MELLITUS WITH DIABETIC NEUROPATHY, WITHOUT LONG-TERM CURRENT USE OF INSULIN (H): ICD-10-CM

## 2022-03-16 DIAGNOSIS — N18.32 STAGE 3B CHRONIC KIDNEY DISEASE (H): ICD-10-CM

## 2022-03-16 DIAGNOSIS — E78.2 MIXED HYPERLIPIDEMIA: ICD-10-CM

## 2022-03-16 DIAGNOSIS — M35.3 POLYMYALGIA RHEUMATICA (H): ICD-10-CM

## 2022-03-16 DIAGNOSIS — Z00.00 MEDICARE ANNUAL WELLNESS VISIT, SUBSEQUENT: Primary | ICD-10-CM

## 2022-03-16 DIAGNOSIS — F01.50 VASCULAR DEMENTIA WITHOUT BEHAVIORAL DISTURBANCE (H): ICD-10-CM

## 2022-03-16 DIAGNOSIS — I77.1 STRICTURE OF ARTERY (H): ICD-10-CM

## 2022-03-16 DIAGNOSIS — I25.5 ISCHEMIC CARDIOMYOPATHY: ICD-10-CM

## 2022-03-16 DIAGNOSIS — I50.42 CHRONIC COMBINED SYSTOLIC AND DIASTOLIC CONGESTIVE HEART FAILURE (H): ICD-10-CM

## 2022-03-16 PROBLEM — L97.521: Status: ACTIVE | Noted: 2022-03-16

## 2022-03-16 PROBLEM — E43 SEVERE MALNUTRITION (H): Status: RESOLVED | Noted: 2021-03-31 | Resolved: 2022-03-16

## 2022-03-16 PROBLEM — L97.521: Status: RESOLVED | Noted: 2022-03-16 | Resolved: 2022-03-16

## 2022-03-16 LAB
ALBUMIN SERPL-MCNC: 3.4 G/DL (ref 3.4–5)
ALP SERPL-CCNC: 90 U/L (ref 40–150)
ALT SERPL W P-5'-P-CCNC: 27 U/L
ANION GAP SERPL CALCULATED.3IONS-SCNC: 2 MMOL/L (ref 3–14)
AST SERPL W P-5'-P-CCNC: 30 U/L (ref 0–45)
BILIRUB SERPL-MCNC: 0.6 MG/DL (ref 0.2–1.3)
BUN SERPL-MCNC: 20 MG/DL (ref 7–30)
CALCIUM SERPL-MCNC: 8.9 MG/DL (ref 8.5–10.1)
CHLORIDE BLD-SCNC: 107 MMOL/L (ref 94–109)
CO2 SERPL-SCNC: 30 MMOL/L (ref 20–32)
CREAT SERPL-MCNC: 1.7 MG/DL (ref 0.66–1.25)
ERYTHROCYTE [DISTWIDTH] IN BLOOD BY AUTOMATED COUNT: 14.6 % (ref 10–15)
GFR SERPL CREATININE-BSD FRML MDRD: 38 ML/MIN/1.73M2
GLUCOSE BLD-MCNC: 92 MG/DL (ref 70–99)
HBA1C MFR BLD: 5.9 % (ref 0–5.6)
HCT VFR BLD AUTO: 38.5 % (ref 40–53)
HGB BLD-MCNC: 11.9 G/DL (ref 13.3–17.7)
MCH RBC QN AUTO: 31.3 PG (ref 26.5–33)
MCHC RBC AUTO-ENTMCNC: 30.9 G/DL (ref 31.5–36.5)
MCV RBC AUTO: 101 FL (ref 78–100)
PLATELET # BLD AUTO: 203 10E3/UL (ref 150–450)
POTASSIUM BLD-SCNC: 4.5 MMOL/L (ref 3.4–5.3)
PROT SERPL-MCNC: 8.2 G/DL (ref 6.8–8.8)
PTH-INTACT SERPL-MCNC: 181 PG/ML (ref 18–80)
RBC # BLD AUTO: 3.8 10E6/UL (ref 4.4–5.9)
SODIUM SERPL-SCNC: 139 MMOL/L (ref 133–144)
WBC # BLD AUTO: 9 10E3/UL (ref 4–11)

## 2022-03-16 PROCEDURE — 99397 PER PM REEVAL EST PAT 65+ YR: CPT | Performed by: INTERNAL MEDICINE

## 2022-03-16 PROCEDURE — 80053 COMPREHEN METABOLIC PANEL: CPT | Performed by: INTERNAL MEDICINE

## 2022-03-16 PROCEDURE — 83036 HEMOGLOBIN GLYCOSYLATED A1C: CPT | Performed by: INTERNAL MEDICINE

## 2022-03-16 PROCEDURE — 83970 ASSAY OF PARATHORMONE: CPT | Performed by: INTERNAL MEDICINE

## 2022-03-16 PROCEDURE — 36415 COLL VENOUS BLD VENIPUNCTURE: CPT | Performed by: INTERNAL MEDICINE

## 2022-03-16 PROCEDURE — 85027 COMPLETE CBC AUTOMATED: CPT | Performed by: INTERNAL MEDICINE

## 2022-03-16 RX ORDER — ROSUVASTATIN CALCIUM 20 MG/1
20 TABLET, COATED ORAL AT BEDTIME
Qty: 90 TABLET | Refills: 3 | Status: SHIPPED | OUTPATIENT
Start: 2022-03-16 | End: 2022-04-07

## 2022-03-16 NOTE — PATIENT INSTRUCTIONS
Patient Education   Personalized Prevention Plan  You are due for the preventive services outlined below.  Your care team is available to assist you in scheduling these services.  If you have already completed any of these items, please share that information with your care team to update in your medical record.  Health Maintenance Due   Topic Date Due     Parathyroid Lab  Never done     ANNUAL REVIEW OF HM ORDERS  Never done     Annual Wellness Visit  09/12/2018     Discuss Advance Care Planning  10/18/2018     Kidney Microalbumin Urine Test  06/02/2020     Eye Exam  08/01/2020     FALL RISK ASSESSMENT  03/03/2021     Heart Failure Action Plan  05/18/2021     Diabetic Foot Exam  10/01/2021     A1C Lab  10/06/2021     Asthma Control Test  10/06/2021     Basic Metabolic Panel  10/14/2021     PHQ-2 (once per calendar year)  01/01/2022     Complete Blood Count  03/31/2022     Hemoglobin  03/31/2022     Asthma Action Plan - yearly  04/06/2022     Liver Monitoring Lab  04/14/2022

## 2022-03-16 NOTE — LETTER
4/6/2022         Abbe Lambert  [address] [address] [facility] [facility] [address]            Dear Mr. Lambert,    I am writing to inform you of the lab tests you had performed recently.      Your lab results are as follows:    Kidney function: Slightly worse than most recent results  Electrolytes: NORMAL  Glucose: NORMAL    Your lab testing indicates that your kidney function is slightly worse than your most recent tests, but still within range of where your kidney function has been in the last few years.  That said, I think the initial intervention should be to make a concerted effort to drink more fluids every day.  It is easy for us to forget to stay hydrated.  You are not on any medications that would affect your kidney function, so I do not think adjustment in medications is necessary.  Then, I would like to recheck this in 1 month.  Please make a lab only appointment at that time.    Thank you for allowing me to participate in your care.  If you have further questions, please contact us at [phone].      Sincerely,        CHELSEY Butterfield MD  Dept. of Internal Medicine  [facility]

## 2022-03-16 NOTE — PROGRESS NOTES
"  SUBJECTIVE:   Abbe Lambert is a 88 year old male who presents for Preventive Visit.      Patient has been advised of split billing requirements and indicates understanding: Yes  Are you in the first 12 months of your Medicare Part B coverage?  No    Physical Health:    In general, how would you rate your overall physical health? good    Outside of work, how many days during the week do you exercise? none    Outside of work, approximately how many minutes a day do you exercise?not applicable    If you drink alcohol do you typically have >3 drinks per day or >7 drinks per week? No    Do you usually eat at least 4 servings of fruit and vegetables a day, include whole grains & fiber and avoid regularly eating high fat or \"junk\" foods? Yes    Do you have any problems taking medications regularly?  No    Do you have any side effects from medications? none    Needs assistance for the following daily activities: no assistance needed    Which of the following safety concerns are present in your home?  none identified     Hearing impairment: Yes, hearing aids    In the past 6 months, have you been bothered by leaking of urine? no    Mental Health:    In general, how would you rate your overall mental or emotional health? excellent  PHQ-2 Score:      Do you feel safe in your environment? Yes    Have you ever done Advance Care Planning? (For example, a Health Directive, POLST, or a discussion with a medical provider or your loved ones about your wishes): Yes, advance care planning is on file.    Additional concerns to address?  No    Fall risk:  Fallen 2 or more times in the past year?: Yes  Any fall with injury in the past year?: No    Cognitive Screenin) Repeat 3 items (Leader, Season, Table)    2) Clock draw: NORMAL  3) 3 item recall: Recalls NO objects   Results: normal clock and no items recalled    Mini-CogTM Copyright [employer]. Licensed by the author for use in [facility]; reprinted with " permission (sharan@[facility]). All rights reserved.      Do you have sleep apnea, excessive snoring or daytime drowsiness?: no            Reviewed and updated as needed this visit by clinical staff   Tobacco  Allergies  Meds              Reviewed and updated as needed this visit by Provider                 Social History     Tobacco Use     Smoking status: Never Smoker     Smokeless tobacco: Never Used   Substance Use Topics     Alcohol use: Never     Alcohol/week: 0.0 standard drinks                           Current providers sharing in care for this patient include:   Patient Care Team:  Ajit Butterfield MD as PCP - General (Internal Medicine)  Anne Nova RN as   Keshav Hatfield DPM as Assigned Musculoskeletal Provider  Cherie Queen PA-C as Assigned Heart and Vascular Provider  Ajit Butterfield MD as Assigned PCP    The following health maintenance items are reviewed in Epic and correct as of today:  Health Maintenance   Topic Date Due     PARATHYROID  Never done     ANNUAL REVIEW OF HM ORDERS  Never done     ADVANCE CARE PLANNING  10/18/2018     MICROALBUMIN  06/02/2020     EYE EXAM  08/01/2020     FALL RISK ASSESSMENT  03/03/2021     HF ACTION PLAN  05/18/2021     DIABETIC FOOT EXAM  10/01/2021     ASTHMA CONTROL TEST  10/06/2021     BMP  10/14/2021     PHQ-2 (once per calendar year)  01/01/2022     ASTHMA ACTION PLAN  04/06/2022     ALT  04/14/2022     A1C  09/16/2022     LIPID  09/17/2022     MEDICARE ANNUAL WELLNESS VISIT  03/16/2023     CBC  03/16/2023     HEMOGLOBIN  03/16/2023     DTAP/TDAP/TD IMMUNIZATION (2 - Td or Tdap) 05/17/2023     PHOSPHORUS  Completed     TSH W/FREE T4 REFLEX  Completed     INFLUENZA VACCINE  Completed     Pneumococcal Vaccine: 65+ Years  Completed     URINALYSIS  Completed     ALK PHOS  Completed     ZOSTER IMMUNIZATION  Completed     COVID-19 Vaccine  Completed     IPV IMMUNIZATION  Aged Out     MENINGITIS IMMUNIZATION  Aged Out  "          ROS:  Constitutional, HEENT, cardiovascular, pulmonary, GI, , musculoskeletal, neuro, skin, endocrine and psych systems are negative, except as otherwise noted.    OBJECTIVE:   BP 90/60 (BP Location: Left arm, Patient Position: Sitting, Cuff Size: Adult Regular)   Pulse 60   Temp 97.5  F (36.4  C) (Oral)   Resp 16   Ht 1.753 m (5' 9\")   Wt 79.2 kg (174 lb 11.2 oz)   SpO2 99%   BMI 25.80 kg/m   Estimated body mass index is 25.8 kg/m  as calculated from the following:    Height as of this encounter: 1.753 m (5' 9\").    Weight as of this encounter: 79.2 kg (174 lb 11.2 oz).  EXAM:   GENERAL: healthy, alert and no distress  NECK: no adenopathy, no asymmetry, masses, or scars and thyroid normal to palpation  RESP: lungs clear to auscultation - no rales, rhonchi or wheezes  CV: regular rate and rhythm, normal S1 S2, no S3 or S4, no murmur, click or rub, no peripheral edema and peripheral pulses strong  ABDOMEN: soft, nontender, no hepatosplenomegaly, no masses and bowel sounds normal  MS: no gross musculoskeletal defects noted, no edema        ASSESSMENT / PLAN:   Medicare annual wellness visit, subsequent      Stage 3b chronic kidney disease (H)  Cr stable  - Parathyroid Hormone Intact; Future  - Comprehensive metabolic panel (BMP + Alb, Alk Phos, ALT, AST, Total. Bili, TP); Future  - Hemoglobin A1c; Future  - CBC with platelets; Future  - Parathyroid Hormone Intact  - Comprehensive metabolic panel (BMP + Alb, Alk Phos, ALT, AST, Total. Bili, TP)  - Hemoglobin A1c  - CBC with platelets    Type 2 diabetes mellitus with diabetic neuropathy, without long-term current use of insulin (H)  Diet-controlled; stable  - Comprehensive metabolic panel (BMP + Alb, Alk Phos, ALT, AST, Total. Bili, TP); Future  - Hemoglobin A1c; Future  - Comprehensive metabolic panel (BMP + Alb, Alk Phos, ALT, AST, Total. Bili, TP)  - Hemoglobin A1c    Chronic combined systolic and diastolic congestive heart failure (H)  Continues " "to follow-up with cardiology    Mixed hyperlipidemia  Stable, continue rosuvastatin    Ischemic cardiomyopathy    - rosuvastatin (CRESTOR) 20 MG tablet; Take 1 tablet (20 mg) by mouth At Bedtime    Polymyalgia rheumatica (H)  Not actively symptomatic    Vascular dementia without behavioral disturbance (H)  Has stopped Aricept - reasonable.  Recommende formal driving assessment if he wants to drive again, but he seems satisfied letting his wife do the driving.    Paroxysmal atrial fibrillation (H)  Stable    Stricture of artery (H)  Known PAD          COUNSELING:  Reviewed preventive health counseling, as reflected in patient instructions    Estimated body mass index is 25.8 kg/m  as calculated from the following:    Height as of this encounter: 1.753 m (5' 9\").    Weight as of this encounter: 79.2 kg (174 lb 11.2 oz).        He reports that he has never smoked. He has never used smokeless tobacco.    Appropriate preventive services were discussed with this patient, including applicable screening as appropriate for cardiovascular disease, diabetes, osteopenia/osteoporosis, and glaucoma.  As appropriate for age/gender, discussed screening for colorectal cancer, prostate cancer, breast cancer, and cervical cancer. Checklist reviewing preventive services available has been given to the patient.    Reviewed patients plan of care and provided an AVS. The Basic Care Plan (routine screening as documented in Health Maintenance) for Abbe meets the Care Plan requirement. This Care Plan has been established and reviewed with the Patient.    Counseling Resources:  ATP IV Guidelines  Pooled Cohorts Equation Calculator  Breast Cancer Risk Calculator  BRCA-Related Cancer Risk Assessment: FHS-7 Tool  FRAX Risk Assessment  ICSI Preventive Guidelines  Dietary Guidelines for Americans, 2010  USDA's MyPlate  ASA Prophylaxis  Lung CA Screening    Ajit Butterfield MD  [facility]  "

## 2022-03-31 ENCOUNTER — TELEPHONE (OUTPATIENT)
Dept: INTERNAL MEDICINE | Facility: CLINIC | Age: 87
End: 2022-03-31
Payer: COMMERCIAL

## 2022-03-31 NOTE — TELEPHONE ENCOUNTER
Madison Bolton calling clinic requesting AVS and lab comments be printed. No comments on labs from 3/16/22 and no letter sent to patient. Pt's wife more concerned about AVS which was printed and given to reception for .      If PCP has any additional comments or interventions based on labs ok to send letter to home address.

## 2022-04-06 ENCOUNTER — OFFICE VISIT (OUTPATIENT)
Dept: CARDIOLOGY | Facility: CLINIC | Age: 87
End: 2022-04-06
Payer: COMMERCIAL

## 2022-04-06 ENCOUNTER — LAB (OUTPATIENT)
Dept: LAB | Facility: CLINIC | Age: 87
End: 2022-04-06
Payer: COMMERCIAL

## 2022-04-06 ENCOUNTER — CARE COORDINATION (OUTPATIENT)
Dept: CARDIOLOGY | Facility: CLINIC | Age: 87
End: 2022-04-06

## 2022-04-06 VITALS
WEIGHT: 172.3 LBS | BODY MASS INDEX: 25.52 KG/M2 | HEART RATE: 60 BPM | HEIGHT: 69 IN | SYSTOLIC BLOOD PRESSURE: 124 MMHG | DIASTOLIC BLOOD PRESSURE: 54 MMHG | OXYGEN SATURATION: 96 %

## 2022-04-06 DIAGNOSIS — I25.10 CORONARY ARTERY DISEASE INVOLVING NATIVE CORONARY ARTERY OF NATIVE HEART WITHOUT ANGINA PECTORIS: Primary | ICD-10-CM

## 2022-04-06 DIAGNOSIS — I25.5 ISCHEMIC CARDIOMYOPATHY: ICD-10-CM

## 2022-04-06 DIAGNOSIS — I10 ESSENTIAL HYPERTENSION, BENIGN: ICD-10-CM

## 2022-04-06 DIAGNOSIS — E78.2 MIXED HYPERLIPIDEMIA: ICD-10-CM

## 2022-04-06 DIAGNOSIS — I48.0 PAROXYSMAL ATRIAL FIBRILLATION (H): ICD-10-CM

## 2022-04-06 DIAGNOSIS — I71.40 ABDOMINAL AORTIC ANEURYSM (AAA) WITHOUT RUPTURE (H): ICD-10-CM

## 2022-04-06 DIAGNOSIS — I50.42 CHRONIC COMBINED SYSTOLIC AND DIASTOLIC CONGESTIVE HEART FAILURE (H): ICD-10-CM

## 2022-04-06 LAB
ANION GAP SERPL CALCULATED.3IONS-SCNC: 1 MMOL/L (ref 3–14)
BUN SERPL-MCNC: 25 MG/DL (ref 7–30)
CALCIUM SERPL-MCNC: 9.2 MG/DL (ref 8.5–10.1)
CHLORIDE BLD-SCNC: 106 MMOL/L (ref 94–109)
CHOLEST SERPL-MCNC: 96 MG/DL
CO2 SERPL-SCNC: 30 MMOL/L (ref 20–32)
CREAT SERPL-MCNC: 2.12 MG/DL (ref 0.66–1.25)
FASTING STATUS PATIENT QL REPORTED: YES
GFR SERPL CREATININE-BSD FRML MDRD: 29 ML/MIN/1.73M2
GLUCOSE BLD-MCNC: 100 MG/DL (ref 70–99)
HDLC SERPL-MCNC: 44 MG/DL
LDLC SERPL CALC-MCNC: 34 MG/DL
NONHDLC SERPL-MCNC: 52 MG/DL
POTASSIUM BLD-SCNC: 4.1 MMOL/L (ref 3.4–5.3)
SODIUM SERPL-SCNC: 137 MMOL/L (ref 133–144)
TRIGL SERPL-MCNC: 89 MG/DL

## 2022-04-06 PROCEDURE — 36415 COLL VENOUS BLD VENIPUNCTURE: CPT | Performed by: INTERNAL MEDICINE

## 2022-04-06 PROCEDURE — 99214 OFFICE O/P EST MOD 30 MIN: CPT | Performed by: PHYSICIAN ASSISTANT

## 2022-04-06 PROCEDURE — 80061 LIPID PANEL: CPT | Performed by: INTERNAL MEDICINE

## 2022-04-06 PROCEDURE — 80048 BASIC METABOLIC PNL TOTAL CA: CPT | Performed by: INTERNAL MEDICINE

## 2022-04-06 NOTE — PROGRESS NOTES
Reviewed results including lipid panel and BMP that returned after patient had left the clinic today.     Lipid panel is well-controlled, potentially a little too controlled for Abbe's age. I am recommending reduction in his Crestor dose from 20 to 10 mg daily.     BMP shows LUANNE with a creatinine of 2.1 (baseline appears to be ~1.5-1.7). I will route this to Abbe's PCP for review. He is not on any nephrotoxic cardiac medications.     Thank you!    Cherie Queen PA-C  [facility]  Pager: [phone]  Text Page  (7:30am - 4pm M-F)

## 2022-04-06 NOTE — LETTER
4/6/2022    Ajit Butterfield MD  [address] [place] [id number]    RE: Abbe Lambert       Dear Colleague,     I had the pleasure of seeing Abbe Lambert in the [facility].    Cardiology Clinic Progress Note    Abbe Lambert MRN# [id number]   YOB: 1934 Age: 88 year old   Primary cardiologist: Dr. Eli         Assessment and Plan:     In summary, Abbe Lambert presents today for an annual follow-up visit.    1.  CAD, status post three-vessel CABG in 2014.  Denies angina.  2.  History of mixed cardiomyopathy, EF 50-55% per echo last year.  3.  Infrarenal abdominal aortic aneurysm, 4.2 cm per CT in 12/2020.  4.  Sinus node dysfunction, status post pacemaker placement, with nearly complete atrial pacing, minimal ventricular pacing, and no arrhythmias.  5.  History of PAF, not anticoagulated due to SHARI ligation and maze procedure at the time of his bypass surgery, and no documented recurrence on pacemaker interrogations.  6.  Hyperlipidemia, well controlled as of 9/2021 on Crestor.  7.  CKD-III, baseline creat ~1.5.    Plan:  -Labs today including lipid panel and BMP are currently pending.  I will let him know of the results once they return.  -I noticed that he is not on baby aspirin.  I have asked him to restart this now.  Otherwise, he will continue his appropriate medical therapy.  -Follow-up with Dr. Eli in 1 year, with ultrasound of the abdominal aorta and fasting lipid panel prior.  He will notify me in the meantime if any issues arise, and I will be happy to see him sooner.        History of Presenting Illness:      Abbe Lambert is a pleasant 88 year old patient who presents today for an annual follow-up visit.    The patient has a history of the following -   1.  CAD status post MI and cardiac arrest at [facility] in 1998.  Status post CABG x3 with known closure of vein graft to ramus and patent LISA-RCA and LIMA-LAD per angiogram in  "2014.  2.  History of mixed cardiomyopathy, LVEF as low as 25%, improved to 50-55% in 2021.  3.  History of PAF, not anticoagulated due to SHARI ligation and maze procedure at the time of his bypass surgery.   4.  Abdominal aortic aneurysm.  5.  Status post pacemaker placement.  6.  History of leg discomfort/neuropathy, normal ABIs, and no improvement with statin hold.    Today, Abbe returns to clinic with his wife stating \"I'm doing great for an 88 year old!\". He denies any cardiac symptoms, namely chest pain, shortness of breath, edema, orthopnea, palpitations, near-syncope. He admits that he doesn't walk as much as he used to due to fatigue. He was advised to try arch supports for his chronic foot discomfort. He has slight ankle edema that isn't bothersome to him. Weight is up 20 lbs from last year, at 170 pounds, which seems more appropriate for him. He has a good appetite.     Today's labs including BMP and lipid panel are currently pending. He had labs performed with his PCP last month, showing a creatinine of 1.7, which is up a little from his baseline around 1.5, hemoglobin A1c of 5.9, and a mild stable anemia with a hemoglobin around 12.  His last lipid panel was in September of last year, showing an LDL of 43, HDL of 50, triglycerides of 101, and total cholesterol of 113.  His most recent echo is in April of last year, showing EF of 50-55% with mild MR.  His last device interrogation was in January of this year, showing 97% atrial paced, 4.2% ventricular paced, no arrhythmias, and 9.9 years remaining on battery life. Last evaluation of his infrarenal abdominal aortic aneurysm was per CT in December 2020, measuring 4.2 cm at that time.         Review of Systems:     12-pt ROS is negative except for as noted in the HPI.          Physical Exam:     Vitals: /54   Pulse 60   Ht 1.753 m (5' 9\")   Wt 78.2 kg (172 lb 4.8 oz)   SpO2 96%   BMI 25.44 kg/m    Wt Readings from Last 10 Encounters:   04/06/22 " 78.2 kg (172 lb 4.8 oz)   03/16/22 79.2 kg (174 lb 11.2 oz)   05/21/21 71.5 kg (157 lb 11.2 oz)   05/03/21 70.1 kg (154 lb 9.6 oz)   04/20/21 73.1 kg (161 lb 3.2 oz)   04/06/21 70.9 kg (156 lb 3.2 oz)   03/30/21 67.2 kg (148 lb 3.2 oz)   03/30/21 68 kg (150 lb)   03/22/21 70.8 kg (156 lb)   03/17/21 64.4 kg (142 lb)       Constitutional:  Patient is pleasant, alert, cooperative, and in NAD.  HEENT:  NCAT. PERRLA. EOM's intact.   Neck:  CVP appears normal. No carotid bruits.   Pulmonary: Normal respiratory effort. CTAB.   Cardiac: RRR, normal S1/S2, no S3/S4, no murmur or rub.   Abdomen:  Non-tender abdomen, no hepatosplenomegaly appreciated.   Vascular: Pulses in the upper and lower extremities are 2+ and equal bilaterally.  Extremities: Trace ankle - pretibial edema bilaterally with chronic venous stasis changes.   Skin:  No rashes or lesions appreciated.   Neurological:  No gross motor or sensory deficits.   Psych: Appropriate affect.        Data:     Labs reviewed:  Recent Labs   Lab Test 09/17/21  1037 04/14/21  0808 04/06/21  0917 03/30/21  1535 03/22/21  1628 03/17/21  0917 12/02/19  1413 11/08/19  0929 09/12/17  0956 12/05/16  1505 09/20/16  0958 09/08/16  1359 07/07/16  1546   LDL 43 27  --   --   --   --   --  119*   < >  --    < >  --   --    HDL 50 46  --   --   --   --   --  41   < >  --    < >  --   --    NHDL 63 46  --   --   --   --   --  150*   < >  --    < >  --   --    CHOL 113 92  --   --   --   --   --  191   < >  --    < >  --   --    TRIG 101 94  --   --   --   --   --  154*   < >  --    < >  --   --    TSH  --   --  1.80 2.20  --  2.83   < >  --   --  2.74  --   --   --    NTBNP  --   --   --   --   --   --   --   --   --  696*  --  836* 557*   IRON  --   --   --   --  70  --   --   --   --   --   --   --   --    FEB  --   --   --   --  368  --   --   --   --   --   --   --   --    IRONSAT  --   --   --   --  19  --   --   --   --   --   --   --   --    ALICE  --   --   --   --  202  --   --    --   --   --   --   --   --     < > = values in this interval not displayed.       Lab Results   Component Value Date    WBC 9.0 03/16/2022    WBC 7.9 03/31/2021    RBC 3.80 (L) 03/16/2022    RBC 3.45 (L) 03/31/2021    HGB 11.9 (L) 03/16/2022    HGB 10.7 (L) 03/31/2021    HCT 38.5 (L) 03/16/2022    HCT 33.7 (L) 03/31/2021     (H) 03/16/2022    MCV 98 03/31/2021    MCH 31.3 03/16/2022    MCH 31.0 03/31/2021    MCHC 30.9 (L) 03/16/2022    MCHC 31.8 03/31/2021    RDW 14.6 03/16/2022    RDW 14.4 03/31/2021     03/16/2022     03/31/2021       Lab Results   Component Value Date     03/16/2022     04/14/2021    POTASSIUM 4.5 03/16/2022    POTASSIUM 4.0 04/14/2021    CHLORIDE 107 03/16/2022    CHLORIDE 107 04/14/2021    CO2 30 03/16/2022    CO2 29 04/14/2021    ANIONGAP 2 (L) 03/16/2022    ANIONGAP 3 04/14/2021    GLC 92 03/16/2022    GLC 89 04/14/2021    BUN 20 03/16/2022    BUN 21 04/14/2021    CR 1.70 (H) 03/16/2022    CR 1.51 (H) 04/14/2021    GFRESTIMATED 38 (L) 03/16/2022    GFRESTIMATED 41 (L) 04/14/2021    GFRESTBLACK 47 (L) 04/14/2021    JANAY 8.9 03/16/2022    JANAY 9.2 04/14/2021      Lab Results   Component Value Date    AST 30 03/16/2022    AST 44 04/06/2021    ALT 27 03/16/2022    ALT 82 (H) 04/14/2021       Lab Results   Component Value Date    A1C 5.9 (H) 03/16/2022    A1C 5.9 (H) 04/06/2021       Lab Results   Component Value Date    INR 0.94 04/25/2014    INR 1.0 04/24/2014           Problem List:     Patient Active Problem List   Diagnosis     Health Care Home     Allergic rhinitis     Peripheral edema     Polymyalgia rheumatica (H)     Advanced directives, counseling/discussion     Ischemic cardiomyopathy     Paroxysmal atrial fibrillation (H)     Coronary artery disease involving native coronary artery of native heart without angina pectoris     Stage 3b chronic kidney disease (H)     GERD (gastroesophageal reflux disease)     Tachy-alix syndrome (H)     AAA (3.7 cm on CT  3/30/21)     Old myocardial infarction     Chronic combined systolic and diastolic congestive heart failure (H)     Essential hypertension, benign     Type 2 diabetes mellitus with diabetic neuropathy, without long-term current use of insulin (H)     Mixed hyperlipidemia     Screening for diabetic peripheral neuropathy     Post herpetic neuralgia     Presbycusis of both ears     Chronic non-seasonal allergic rhinitis, unspecified trigger     Skin lesion     History of prostate cancer 2003 w/po resection prostate     PAD (peripheral artery disease) (H)     Microalbuminuria     Mild persistent asthma without complication     Pre-diabetes     History of CVA (cerebrovascular accident)     Dysphagia, unspecified type     Esophageal stricture     Memory change     Vascular dementia (H)     Dry skin     Stasis dermatitis of both legs     Pruritic disorder from dry skin of upper back     Diarrhea, unspecified type since on doxycycline for sinuses since early 2-20      Overweight (BMI 25.0-29.9)     Chronic sinusitis, unspecified location     Cardiac pacemaker in situ     Weight loss (60 lbs in last year)     Change in bowel habits     Anemia, unspecified type     Hypotension due to hypovolemia     Generalized weakness     LUANNE (acute kidney injury) (H)     Moderate Lumb Spinal Sten w leg numbness     Failure to thrive in adult           Medications:     Current Outpatient Medications   Medication Sig Dispense Refill     carvedilol (COREG) 3.125 MG tablet Take 1 tablet (3.125 mg) by mouth 2 times daily (with meals) 180 tablet 1     MELATONIN PO Take 5 mg by mouth nightly as needed        nitroglycerin (NITROSTAT) 0.4 MG SL tablet Place 1 tablet (0.4 mg) under the tongue every 5 minutes as needed for chest pain 25 tablet 3     rosuvastatin (CRESTOR) 20 MG tablet Take 1 tablet (20 mg) by mouth At Bedtime 90 tablet 3           Past Medical History:     Past Medical History:   Diagnosis Date     AAA (abdominal aortic aneurysm)  (H)      Anemia due to blood loss, acute 10/10/2016     Asthma      Atrial fibrillation (H)     s/p left atrial appendage ligation     Cardiac arrest (H)      Cardiomyopathy (H)      Chronic kidney disease      Chronic sinusitis      Coronary artery disease     cardiac cath 2014: medical management; cath 2013: PTCA to diagonal; cath 2009: medical management; CABG 1998: LIMA to LAD, LISA to RCA, SVG to circumflex     GERD (gastroesophageal reflux disease)      History of angina      Hyperlipidaemia      Hypertension, goal below 140/90      Myocardial infarction (H)     MI with Vfib arrest 1998     Polymyalgia rheumatica (H)      Shingles 10/28/2016     Shortness of breath      Tachy-alix syndrome (H)     generator replacement 9/2020; implanted dual chamber pacemaker 2012     Past Surgical History:   Procedure Laterality Date     ARTHROPLASTY KNEE Left 10/5/2016    Procedure: ARTHROPLASTY KNEE;  Surgeon: Keshav Zamudio MD;  Location:  OR     CARDIAC SURGERY  12/03/2012    pacemaker ; CABG 1998     CHOLECYSTECTOMY       COLONOSCOPY       ENT SURGERY  5-    sinus     EP PACEMAKER N/A 9/11/2020    Procedure: EP Pacemaker;  Surgeon: Caron Guerin MD;  Location:  HEART CARDIAC CATH LAB     ESOPHAGOSCOPY, GASTROSCOPY, DUODENOSCOPY (EGD), COMBINED N/A 12/3/2019    Procedure: ESOPHAGOGASTRODUODENOSCOPY (EGD) (MAC);  Surgeon: Calderon Herman MD;  Location:  GI     ESOPHAGOSCOPY, GASTROSCOPY, DUODENOSCOPY (EGD), DILATATION, COMBINED N/A 12/3/2019    Procedure: Esophagoscopy, gastroscopy, duodenoscopy (EGD), dilatation, combined;  Surgeon: Calderon Herman MD;  Location:  GI     EXTRACAPSULAR CATARACT EXTRATION WITH INTRAOCULAR LENS IMPLANT       GENITOURINARY SURGERY      prostatectomy     IMPLANT PACEMAKER  12-3-12    [facility]     PROSTATE SURGERY       Family History   Problem Relation Age of Onset     Cerebrovascular Disease Father      Heart Disease Father      Heart Disease  Brother      Coronary Artery Disease Brother         MI age 50     Depression Daughter         raped in high school     Unknown/Adopted Maternal Grandmother      Unknown/Adopted Maternal Grandfather      Unknown/Adopted Paternal Grandmother      Unknown/Adopted Paternal Grandfather      Social History     Socioeconomic History     Marital status:      Spouse name: Not on file     Number of children: 3     Years of education: Not on file     Highest education level: Not on file   Occupational History     Not on file   Tobacco Use     Smoking status: Never Smoker     Smokeless tobacco: Never Used   Substance and Sexual Activity     Alcohol use: Never     Alcohol/week: 0.0 standard drinks     Drug use: No     Sexual activity: Not Currently     Partners: Female   Other Topics Concern     Parent/sibling w/ CABG, MI or angioplasty before 65F 55M? Yes     Comment: brother and father had MI @ 40's      Service Not Asked     Blood Transfusions Not Asked     Caffeine Concern Yes     Comment: one diet mountain dew daily     Occupational Exposure Not Asked     Hobby Hazards Not Asked     Sleep Concern No     Stress Concern No     Weight Concern Not Asked     Special Diet No     Back Care Not Asked     Exercise Yes     Comment: golfing, does the stair at the office a lot     Bike Helmet Not Asked     Seat Belt Yes     Self-Exams Not Asked   Social History Narrative    6 GC;       Social Determinants of Health     Financial Resource Strain: Not on file   Food Insecurity: Not on file   Transportation Needs: Not on file   Physical Activity: Not on file   Stress: Not on file   Social Connections: Not on file   Intimate Partner Violence: Not on file   Housing Stability: Not on file           Allergies:   Advair diskus, Morphine hcl, and Vicodin [hydrocodone-acetaminophen]      HELEN [facility]  Pager: [phone]    Today's clinic visit entailed:  Review of the result(s) of  each unique test - Lipid panel, BMP  Ordering of each unique test  Prescription drug management  I spent a total of 35 minutes on the day of the visit.   Time spent doing chart review, history and exam, documentation and further activities per the note  Provider  Link to MDM Help Grid     The level of medical decision making during this visit was of moderate complexity.    Thank you for allowing me to participate in the care of your patient.      Sincerely,     HELEN [facility]  cc:   No referring provider defined for this encounter.

## 2022-04-06 NOTE — PROGRESS NOTES
Cardiology Clinic Progress Note    Abbe Lambert MRN# [id number]   YOB: 1934 Age: 88 year old   Primary cardiologist: Dr. Eli         Assessment and Plan:     In summary, Abbe Lambert presents today for an annual follow-up visit.    1.  CAD, status post three-vessel CABG in 2014.  Denies angina.  2.  History of mixed cardiomyopathy, EF 50-55% per echo last year.  3.  Infrarenal abdominal aortic aneurysm, 4.2 cm per CT in 12/2020.  4.  Sinus node dysfunction, status post pacemaker placement, with nearly complete atrial pacing, minimal ventricular pacing, and no arrhythmias.  5.  History of PAF, not anticoagulated due to SHARI ligation and maze procedure at the time of his bypass surgery, and no documented recurrence on pacemaker interrogations.  6.  Hyperlipidemia, well controlled as of 9/2021 on Crestor.  7.  CKD-III, baseline creat ~1.5.    Plan:  -Labs today including lipid panel and BMP are currently pending.  I will let him know of the results once they return.  -I noticed that he is not on baby aspirin.  I have asked him to restart this now.  Otherwise, he will continue his appropriate medical therapy.  -Follow-up with Dr. Eli in 1 year, with ultrasound of the abdominal aorta and fasting lipid panel prior.  He will notify me in the meantime if any issues arise, and I will be happy to see him sooner.        History of Presenting Illness:      Abbe Lambert is a pleasant 88 year old patient who presents today for an annual follow-up visit.    The patient has a history of the following -   1.  CAD status post MI and cardiac arrest at [facility] in 1998.  Status post CABG x3 with known closure of vein graft to ramus and patent LISA-RCA and LIMA-LAD per angiogram in 2014.  2.  History of mixed cardiomyopathy, LVEF as low as 25%, improved to 50-55% in 2021.  3.  History of PAF, not anticoagulated due to SHARI ligation and maze procedure at the time of his bypass surgery.   4.   "Abdominal aortic aneurysm.  5.  Status post pacemaker placement.  6.  History of leg discomfort/neuropathy, normal ABIs, and no improvement with statin hold.    Today, Abbe returns to clinic with his wife stating \"I'm doing great for an 88 year old!\". He denies any cardiac symptoms, namely chest pain, shortness of breath, edema, orthopnea, palpitations, near-syncope. He admits that he doesn't walk as much as he used to due to fatigue. He was advised to try arch supports for his chronic foot discomfort. He has slight ankle edema that isn't bothersome to him. Weight is up 20 lbs from last year, at 170 pounds, which seems more appropriate for him. He has a good appetite.     Today's labs including BMP and lipid panel are currently pending. He had labs performed with his PCP last month, showing a creatinine of 1.7, which is up a little from his baseline around 1.5, hemoglobin A1c of 5.9, and a mild stable anemia with a hemoglobin around 12.  His last lipid panel was in September of last year, showing an LDL of 43, HDL of 50, triglycerides of 101, and total cholesterol of 113.  His most recent echo is in April of last year, showing EF of 50-55% with mild MR.  His last device interrogation was in January of this year, showing 97% atrial paced, 4.2% ventricular paced, no arrhythmias, and 9.9 years remaining on battery life. Last evaluation of his infrarenal abdominal aortic aneurysm was per CT in December 2020, measuring 4.2 cm at that time.         Review of Systems:     12-pt ROS is negative except for as noted in the HPI.          Physical Exam:     Vitals: /54   Pulse 60   Ht 1.753 m (5' 9\")   Wt 78.2 kg (172 lb 4.8 oz)   SpO2 96%   BMI 25.44 kg/m    Wt Readings from Last 10 Encounters:   04/06/22 78.2 kg (172 lb 4.8 oz)   03/16/22 79.2 kg (174 lb 11.2 oz)   05/21/21 71.5 kg (157 lb 11.2 oz)   05/03/21 70.1 kg (154 lb 9.6 oz)   04/20/21 73.1 kg (161 lb 3.2 oz)   04/06/21 70.9 kg (156 lb 3.2 oz)   03/30/21 " 67.2 kg (148 lb 3.2 oz)   03/30/21 68 kg (150 lb)   03/22/21 70.8 kg (156 lb)   03/17/21 64.4 kg (142 lb)       Constitutional:  Patient is pleasant, alert, cooperative, and in NAD.  HEENT:  NCAT. PERRLA. EOM's intact.   Neck:  CVP appears normal. No carotid bruits.   Pulmonary: Normal respiratory effort. CTAB.   Cardiac: RRR, normal S1/S2, no S3/S4, no murmur or rub.   Abdomen:  Non-tender abdomen, no hepatosplenomegaly appreciated.   Vascular: Pulses in the upper and lower extremities are 2+ and equal bilaterally.  Extremities: Trace ankle - pretibial edema bilaterally with chronic venous stasis changes.   Skin:  No rashes or lesions appreciated.   Neurological:  No gross motor or sensory deficits.   Psych: Appropriate affect.        Data:     Labs reviewed:  Recent Labs   Lab Test 09/17/21  1037 04/14/21  0808 04/06/21  0917 03/30/21  1535 03/22/21  1628 03/17/21  0917 12/02/19  1413 11/08/19  0929 09/12/17  0956 12/05/16  1505 09/20/16  0958 09/08/16  1359 07/07/16  1546   LDL 43 27  --   --   --   --   --  119*   < >  --    < >  --   --    HDL 50 46  --   --   --   --   --  41   < >  --    < >  --   --    NHDL 63 46  --   --   --   --   --  150*   < >  --    < >  --   --    CHOL 113 92  --   --   --   --   --  191   < >  --    < >  --   --    TRIG 101 94  --   --   --   --   --  154*   < >  --    < >  --   --    TSH  --   --  1.80 2.20  --  2.83   < >  --   --  2.74  --   --   --    NTBNP  --   --   --   --   --   --   --   --   --  696*  --  836* 557*   IRON  --   --   --   --  70  --   --   --   --   --   --   --   --    FEB  --   --   --   --  368  --   --   --   --   --   --   --   --    IRONSAT  --   --   --   --  19  --   --   --   --   --   --   --   --    ALICE  --   --   --   --  202  --   --   --   --   --   --   --   --     < > = values in this interval not displayed.       Lab Results   Component Value Date    WBC 9.0 03/16/2022    WBC 7.9 03/31/2021    RBC 3.80 (L) 03/16/2022    RBC 3.45 (L)  03/31/2021    HGB 11.9 (L) 03/16/2022    HGB 10.7 (L) 03/31/2021    HCT 38.5 (L) 03/16/2022    HCT 33.7 (L) 03/31/2021     (H) 03/16/2022    MCV 98 03/31/2021    MCH 31.3 03/16/2022    MCH 31.0 03/31/2021    MCHC 30.9 (L) 03/16/2022    MCHC 31.8 03/31/2021    RDW 14.6 03/16/2022    RDW 14.4 03/31/2021     03/16/2022     03/31/2021       Lab Results   Component Value Date     03/16/2022     04/14/2021    POTASSIUM 4.5 03/16/2022    POTASSIUM 4.0 04/14/2021    CHLORIDE 107 03/16/2022    CHLORIDE 107 04/14/2021    CO2 30 03/16/2022    CO2 29 04/14/2021    ANIONGAP 2 (L) 03/16/2022    ANIONGAP 3 04/14/2021    GLC 92 03/16/2022    GLC 89 04/14/2021    BUN 20 03/16/2022    BUN 21 04/14/2021    CR 1.70 (H) 03/16/2022    CR 1.51 (H) 04/14/2021    GFRESTIMATED 38 (L) 03/16/2022    GFRESTIMATED 41 (L) 04/14/2021    GFRESTBLACK 47 (L) 04/14/2021    JANAY 8.9 03/16/2022    JANAY 9.2 04/14/2021      Lab Results   Component Value Date    AST 30 03/16/2022    AST 44 04/06/2021    ALT 27 03/16/2022    ALT 82 (H) 04/14/2021       Lab Results   Component Value Date    A1C 5.9 (H) 03/16/2022    A1C 5.9 (H) 04/06/2021       Lab Results   Component Value Date    INR 0.94 04/25/2014    INR 1.0 04/24/2014           Problem List:     Patient Active Problem List   Diagnosis     Health Care Home     Allergic rhinitis     Peripheral edema     Polymyalgia rheumatica (H)     Advanced directives, counseling/discussion     Ischemic cardiomyopathy     Paroxysmal atrial fibrillation (H)     Coronary artery disease involving native coronary artery of native heart without angina pectoris     Stage 3b chronic kidney disease (H)     GERD (gastroesophageal reflux disease)     Tachy-alix syndrome (H)     AAA (3.7 cm on CT 3/30/21)     Old myocardial infarction     Chronic combined systolic and diastolic congestive heart failure (H)     Essential hypertension, benign     Type 2 diabetes mellitus with diabetic neuropathy,  without long-term current use of insulin (H)     Mixed hyperlipidemia     Screening for diabetic peripheral neuropathy     Post herpetic neuralgia     Presbycusis of both ears     Chronic non-seasonal allergic rhinitis, unspecified trigger     Skin lesion     History of prostate cancer 2003 w/po resection prostate     PAD (peripheral artery disease) (H)     Microalbuminuria     Mild persistent asthma without complication     Pre-diabetes     History of CVA (cerebrovascular accident)     Dysphagia, unspecified type     Esophageal stricture     Memory change     Vascular dementia (H)     Dry skin     Stasis dermatitis of both legs     Pruritic disorder from dry skin of upper back     Diarrhea, unspecified type since on doxycycline for sinuses since early 2-20      Overweight (BMI 25.0-29.9)     Chronic sinusitis, unspecified location     Cardiac pacemaker in situ     Weight loss (60 lbs in last year)     Change in bowel habits     Anemia, unspecified type     Hypotension due to hypovolemia     Generalized weakness     LUANNE (acute kidney injury) (H)     Moderate Lumb Spinal Sten w leg numbness     Failure to thrive in adult           Medications:     Current Outpatient Medications   Medication Sig Dispense Refill     carvedilol (COREG) 3.125 MG tablet Take 1 tablet (3.125 mg) by mouth 2 times daily (with meals) 180 tablet 1     MELATONIN PO Take 5 mg by mouth nightly as needed        nitroglycerin (NITROSTAT) 0.4 MG SL tablet Place 1 tablet (0.4 mg) under the tongue every 5 minutes as needed for chest pain 25 tablet 3     rosuvastatin (CRESTOR) 20 MG tablet Take 1 tablet (20 mg) by mouth At Bedtime 90 tablet 3           Past Medical History:     Past Medical History:   Diagnosis Date     AAA (abdominal aortic aneurysm) (H)      Anemia due to blood loss, acute 10/10/2016     Asthma      Atrial fibrillation (H)     s/p left atrial appendage ligation     Cardiac arrest (H)      Cardiomyopathy (H)      Chronic kidney  disease      Chronic sinusitis      Coronary artery disease     cardiac cath 2014: medical management; cath 2013: PTCA to diagonal; cath 2009: medical management; CABG 1998: LIMA to LAD, LISA to RCA, SVG to circumflex     GERD (gastroesophageal reflux disease)      History of angina      Hyperlipidaemia      Hypertension, goal below 140/90      Myocardial infarction (H)     MI with Vfib arrest 1998     Polymyalgia rheumatica (H)      Shingles 10/28/2016     Shortness of breath      Tachy-alix syndrome (H)     generator replacement 9/2020; implanted dual chamber pacemaker 2012     Past Surgical History:   Procedure Laterality Date     ARTHROPLASTY KNEE Left 10/5/2016    Procedure: ARTHROPLASTY KNEE;  Surgeon: Keshav Zamudio MD;  Location:  OR     CARDIAC SURGERY  12/03/2012    pacemaker ; CABG 1998     CHOLECYSTECTOMY       COLONOSCOPY       ENT SURGERY  5-    sinus     EP PACEMAKER N/A 9/11/2020    Procedure: EP Pacemaker;  Surgeon: Caron Guerin MD;  Location:  HEART CARDIAC CATH LAB     ESOPHAGOSCOPY, GASTROSCOPY, DUODENOSCOPY (EGD), COMBINED N/A 12/3/2019    Procedure: ESOPHAGOGASTRODUODENOSCOPY (EGD) (MAC);  Surgeon: Calderon Herman MD;  Location:  GI     ESOPHAGOSCOPY, GASTROSCOPY, DUODENOSCOPY (EGD), DILATATION, COMBINED N/A 12/3/2019    Procedure: Esophagoscopy, gastroscopy, duodenoscopy (EGD), dilatation, combined;  Surgeon: Calderon Herman MD;  Location:  GI     EXTRACAPSULAR CATARACT EXTRATION WITH INTRAOCULAR LENS IMPLANT       GENITOURINARY SURGERY      prostatectomy     IMPLANT PACEMAKER  12-3-12    [facility]     PROSTATE SURGERY       Family History   Problem Relation Age of Onset     Cerebrovascular Disease Father      Heart Disease Father      Heart Disease Brother      Coronary Artery Disease Brother         MI age 50     Depression Daughter         raped in high school     Unknown/Adopted Maternal Grandmother      Unknown/Adopted Maternal Grandfather       Unknown/Adopted Paternal Grandmother      Unknown/Adopted Paternal Grandfather      Social History     Socioeconomic History     Marital status:      Spouse name: Not on file     Number of children: 3     Years of education: Not on file     Highest education level: Not on file   Occupational History     Not on file   Tobacco Use     Smoking status: Never Smoker     Smokeless tobacco: Never Used   Substance and Sexual Activity     Alcohol use: Never     Alcohol/week: 0.0 standard drinks     Drug use: No     Sexual activity: Not Currently     Partners: Female   Other Topics Concern     Parent/sibling w/ CABG, MI or angioplasty before 65F 55M? Yes     Comment: brother and father had MI @ 40's      Service Not Asked     Blood Transfusions Not Asked     Caffeine Concern Yes     Comment: one diet mountain dew daily     Occupational Exposure Not Asked     Hobby Hazards Not Asked     Sleep Concern No     Stress Concern No     Weight Concern Not Asked     Special Diet No     Back Care Not Asked     Exercise Yes     Comment: golfing, does the stair at the office a lot     Bike Helmet Not Asked     Seat Belt Yes     Self-Exams Not Asked   Social History Narrative    6 GC;       Social Determinants of Health     Financial Resource Strain: Not on file   Food Insecurity: Not on file   Transportation Needs: Not on file   Physical Activity: Not on file   Stress: Not on file   Social Connections: Not on file   Intimate Partner Violence: Not on file   Housing Stability: Not on file           Allergies:   Advair diskus, Morphine hcl, and Vicodin [hydrocodone-acetaminophen]      Cherie Queen PA-C  [facility]  Pager: [phone]    Today's clinic visit entailed:  Review of the result(s) of each unique test - Lipid panel, BMP  Ordering of each unique test  Prescription drug management  I spent a total of 35 minutes on the day of the visit.   Time spent doing chart review, history and  exam, documentation and further activities per the note  Provider  Link to MDM Help Grid     The level of medical decision making during this visit was of moderate complexity.

## 2022-04-06 NOTE — PATIENT INSTRUCTIONS
Today's Plan:   -Abbe, I will let you know about your lab results once they return today.  -Please restart taking an 81 mg baby aspirin every day, with your history of coronary artery disease this is important.  -Return to see Dr. Eli in 1 year, with an ultrasound of your abdominal aorta and blood test prior.    If you have questions or concerns please call my nurse team at [phone].   Scheduling phone number: [phone]  For after hours urgent concerns call [phone] option 2.   Reminder: Please bring in all current medications, over the counter supplements and vitamin bottles to your next appointment.    It was a pleasure seeing you today!     Cherie Queen PA-C

## 2022-04-07 RX ORDER — ROSUVASTATIN CALCIUM 10 MG/1
10 TABLET, COATED ORAL AT BEDTIME
Qty: 90 TABLET | Refills: 2 | Status: SHIPPED | OUTPATIENT
Start: 2022-04-07 | End: 2023-01-01

## 2022-04-07 NOTE — PROGRESS NOTES
Spoke with patient and labs reviewed with recommendations to decrease Crestor to 10 mg daily. Patient may cut in half his current tablet of 20 mgs .  Patient requests new prescription to be sent to Pharmacy for crestor 10 mg tablets, 1 daily. Refill e scribed.  Reviewed BMP with Patient and that Cr. Was above his ranges and Patient will wait for PCP recommendations or will call in 1 - 2 days if has not heard.  Patient agrees with plan.

## 2022-04-26 ENCOUNTER — ANCILLARY PROCEDURE (OUTPATIENT)
Dept: CARDIOLOGY | Facility: CLINIC | Age: 87
End: 2022-04-26
Attending: INTERNAL MEDICINE
Payer: COMMERCIAL

## 2022-04-26 DIAGNOSIS — I49.5 SSS (SICK SINUS SYNDROME) (H): ICD-10-CM

## 2022-04-26 DIAGNOSIS — Z95.0 CARDIAC PACEMAKER IN SITU: ICD-10-CM

## 2022-04-26 LAB
MDC_IDC_LEAD_IMPLANT_DT: NORMAL
MDC_IDC_LEAD_IMPLANT_DT: NORMAL
MDC_IDC_LEAD_LOCATION: NORMAL
MDC_IDC_LEAD_LOCATION: NORMAL
MDC_IDC_LEAD_MFG: NORMAL
MDC_IDC_LEAD_MFG: NORMAL
MDC_IDC_LEAD_MODEL: NORMAL
MDC_IDC_LEAD_MODEL: NORMAL
MDC_IDC_LEAD_POLARITY_TYPE: NORMAL
MDC_IDC_LEAD_POLARITY_TYPE: NORMAL
MDC_IDC_LEAD_SERIAL: NORMAL
MDC_IDC_LEAD_SERIAL: NORMAL
MDC_IDC_MSMT_BATTERY_DTM: NORMAL
MDC_IDC_MSMT_BATTERY_REMAINING_LONGEVITY: 123 MO
MDC_IDC_MSMT_BATTERY_REMAINING_PERCENTAGE: 95.5 %
MDC_IDC_MSMT_BATTERY_RRT_TRIGGER: NORMAL
MDC_IDC_MSMT_BATTERY_STATUS: NORMAL
MDC_IDC_MSMT_BATTERY_VOLTAGE: 3.01 V
MDC_IDC_MSMT_LEADCHNL_RA_IMPEDANCE_VALUE: 380 OHM
MDC_IDC_MSMT_LEADCHNL_RA_LEAD_CHANNEL_STATUS: NORMAL
MDC_IDC_MSMT_LEADCHNL_RA_PACING_THRESHOLD_AMPLITUDE: 0.62 V
MDC_IDC_MSMT_LEADCHNL_RA_PACING_THRESHOLD_PULSEWIDTH: 0.5 MS
MDC_IDC_MSMT_LEADCHNL_RA_SENSING_INTR_AMPL: 3.1 MV
MDC_IDC_MSMT_LEADCHNL_RV_IMPEDANCE_VALUE: 560 OHM
MDC_IDC_MSMT_LEADCHNL_RV_LEAD_CHANNEL_STATUS: NORMAL
MDC_IDC_MSMT_LEADCHNL_RV_PACING_THRESHOLD_AMPLITUDE: 1 V
MDC_IDC_MSMT_LEADCHNL_RV_PACING_THRESHOLD_PULSEWIDTH: 0.5 MS
MDC_IDC_MSMT_LEADCHNL_RV_SENSING_INTR_AMPL: 8.7 MV
MDC_IDC_PG_IMPLANT_DTM: NORMAL
MDC_IDC_PG_MFG: NORMAL
MDC_IDC_PG_MODEL: NORMAL
MDC_IDC_PG_SERIAL: NORMAL
MDC_IDC_PG_TYPE: NORMAL
MDC_IDC_SESS_CLINIC_NAME: NORMAL
MDC_IDC_SESS_DTM: NORMAL
MDC_IDC_SESS_REPROGRAMMED: NO
MDC_IDC_SESS_TYPE: NORMAL
MDC_IDC_SET_BRADY_AT_MODE_SWITCH_MODE: NORMAL
MDC_IDC_SET_BRADY_AT_MODE_SWITCH_RATE: 180 {BEATS}/MIN
MDC_IDC_SET_BRADY_LOWRATE: 60 {BEATS}/MIN
MDC_IDC_SET_BRADY_MAX_SENSOR_RATE: 120 {BEATS}/MIN
MDC_IDC_SET_BRADY_MAX_TRACKING_RATE: 120 {BEATS}/MIN
MDC_IDC_SET_BRADY_MODE: NORMAL
MDC_IDC_SET_BRADY_PAV_DELAY_LOW: 200 MS
MDC_IDC_SET_BRADY_SAV_DELAY_LOW: 170 MS
MDC_IDC_SET_LEADCHNL_RA_PACING_AMPLITUDE: 1.62
MDC_IDC_SET_LEADCHNL_RA_PACING_ANODE_ELECTRODE_1: NORMAL
MDC_IDC_SET_LEADCHNL_RA_PACING_ANODE_LOCATION_1: NORMAL
MDC_IDC_SET_LEADCHNL_RA_PACING_CAPTURE_MODE: NORMAL
MDC_IDC_SET_LEADCHNL_RA_PACING_CATHODE_ELECTRODE_1: NORMAL
MDC_IDC_SET_LEADCHNL_RA_PACING_CATHODE_LOCATION_1: NORMAL
MDC_IDC_SET_LEADCHNL_RA_PACING_POLARITY: NORMAL
MDC_IDC_SET_LEADCHNL_RA_PACING_PULSEWIDTH: 0.5 MS
MDC_IDC_SET_LEADCHNL_RA_SENSING_ADAPTATION_MODE: NORMAL
MDC_IDC_SET_LEADCHNL_RA_SENSING_ANODE_ELECTRODE_1: NORMAL
MDC_IDC_SET_LEADCHNL_RA_SENSING_ANODE_LOCATION_1: NORMAL
MDC_IDC_SET_LEADCHNL_RA_SENSING_CATHODE_ELECTRODE_1: NORMAL
MDC_IDC_SET_LEADCHNL_RA_SENSING_CATHODE_LOCATION_1: NORMAL
MDC_IDC_SET_LEADCHNL_RA_SENSING_POLARITY: NORMAL
MDC_IDC_SET_LEADCHNL_RA_SENSING_SENSITIVITY: 1 MV
MDC_IDC_SET_LEADCHNL_RV_PACING_AMPLITUDE: 1.25 V
MDC_IDC_SET_LEADCHNL_RV_PACING_ANODE_ELECTRODE_1: NORMAL
MDC_IDC_SET_LEADCHNL_RV_PACING_ANODE_LOCATION_1: NORMAL
MDC_IDC_SET_LEADCHNL_RV_PACING_CAPTURE_MODE: NORMAL
MDC_IDC_SET_LEADCHNL_RV_PACING_CATHODE_ELECTRODE_1: NORMAL
MDC_IDC_SET_LEADCHNL_RV_PACING_CATHODE_LOCATION_1: NORMAL
MDC_IDC_SET_LEADCHNL_RV_PACING_POLARITY: NORMAL
MDC_IDC_SET_LEADCHNL_RV_PACING_PULSEWIDTH: 0.5 MS
MDC_IDC_SET_LEADCHNL_RV_SENSING_ADAPTATION_MODE: NORMAL
MDC_IDC_SET_LEADCHNL_RV_SENSING_ANODE_ELECTRODE_1: NORMAL
MDC_IDC_SET_LEADCHNL_RV_SENSING_ANODE_LOCATION_1: NORMAL
MDC_IDC_SET_LEADCHNL_RV_SENSING_CATHODE_ELECTRODE_1: NORMAL
MDC_IDC_SET_LEADCHNL_RV_SENSING_CATHODE_LOCATION_1: NORMAL
MDC_IDC_SET_LEADCHNL_RV_SENSING_POLARITY: NORMAL
MDC_IDC_SET_LEADCHNL_RV_SENSING_SENSITIVITY: 2 MV
MDC_IDC_STAT_AT_BURDEN_PERCENT: 0 %
MDC_IDC_STAT_AT_DTM_END: NORMAL
MDC_IDC_STAT_AT_DTM_START: NORMAL
MDC_IDC_STAT_AT_MODE_SW_COUNT: 0
MDC_IDC_STAT_AT_MODE_SW_COUNT_PER_DAY: 0
MDC_IDC_STAT_AT_MODE_SW_PERCENT_TIME: 0 %
MDC_IDC_STAT_BRADY_AP_VP_PERCENT: 1.4 %
MDC_IDC_STAT_BRADY_AP_VS_PERCENT: 93 %
MDC_IDC_STAT_BRADY_AS_VP_PERCENT: 1 %
MDC_IDC_STAT_BRADY_AS_VS_PERCENT: 5.6 %
MDC_IDC_STAT_BRADY_DTM_END: NORMAL
MDC_IDC_STAT_BRADY_DTM_START: NORMAL
MDC_IDC_STAT_BRADY_RA_PERCENT_PACED: 94 %
MDC_IDC_STAT_BRADY_RV_PERCENT_PACED: 1.4 %
MDC_IDC_STAT_CRT_DTM_END: NORMAL
MDC_IDC_STAT_CRT_DTM_START: NORMAL
MDC_IDC_STAT_HEART_RATE_ATRIAL_MAX: 200 {BEATS}/MIN
MDC_IDC_STAT_HEART_RATE_ATRIAL_MEAN: 66 {BEATS}/MIN
MDC_IDC_STAT_HEART_RATE_ATRIAL_MIN: 50 {BEATS}/MIN
MDC_IDC_STAT_HEART_RATE_DTM_END: NORMAL
MDC_IDC_STAT_HEART_RATE_DTM_START: NORMAL
MDC_IDC_STAT_HEART_RATE_VENTRICULAR_MAX: 210 {BEATS}/MIN
MDC_IDC_STAT_HEART_RATE_VENTRICULAR_MEAN: 66 {BEATS}/MIN
MDC_IDC_STAT_HEART_RATE_VENTRICULAR_MIN: 40 {BEATS}/MIN

## 2022-04-26 PROCEDURE — 93296 REM INTERROG EVL PM/IDS: CPT | Performed by: INTERNAL MEDICINE

## 2022-04-26 PROCEDURE — 93294 REM INTERROG EVL PM/LDLS PM: CPT | Performed by: INTERNAL MEDICINE

## 2022-05-31 ENCOUNTER — TELEPHONE (OUTPATIENT)
Dept: INTERNAL MEDICINE | Facility: CLINIC | Age: 87
End: 2022-05-31
Payer: COMMERCIAL

## 2022-05-31 DIAGNOSIS — R21 RASH: Primary | ICD-10-CM

## 2022-05-31 NOTE — TELEPHONE ENCOUNTER
Patient's wife calling on behalf of patient, requesting referral for Dermatology.     Patient has itchy patch on forehead that started approximately 1 year ago, site first appears reddened and eventually forms scab from itching. Patient tends to wash the scab off and red patch re-appears.     Referral pended    Can we leave a detailed message on this number? YES  Phone number patient can be reached at: Home number on file [phone] (home)    Abida Henderson RN  [facility] Triage

## 2022-06-01 NOTE — TELEPHONE ENCOUNTER
Called and left detailed voicemail for the patient informing patient referral has been placed and provided patient with the phone number to call and schedule an appointment.    Anaya Cline RN

## 2022-07-18 ENCOUNTER — NURSE TRIAGE (OUTPATIENT)
Dept: INTERNAL MEDICINE | Facility: CLINIC | Age: 87
End: 2022-07-18

## 2022-07-18 NOTE — TELEPHONE ENCOUNTER
"Pt called clinic along with his wife. Requesting a referral for a cortisone shot.     Reason for Disposition    Knee pain is a chronic symptom (recurrent or ongoing AND lasting > 4 weeks)    Knee pain    Additional Information    Negative: Followed a knee injury    Negative: Swollen knee joint and fever    Negative: Sounds like a life-threatening emergency to the triager    Negative: Thigh or calf pain and only 1 side and present > 1 hour    Negative: Thigh or calf swelling and only 1 side    Negative: Patient sounds very sick or weak to the triager    Negative: Can't move swollen joint at all    Negative: SEVERE pain (e.g., excruciating, unable to walk)    Negative: Very swollen joint    Negative: Painful rash with multiple small blisters grouped together (i.e., dermatomal distribution or 'band' or 'stripe')    Negative: Looks like a boil, infected sore, deep ulcer, or other infected rash (spreading redness, pus)    Negative: MODERATE pain (e.g., symptoms interfere with work or school, limping) and present > 3 days    Negative: Swollen knee joint (no fever or redness)    Negative: Fluid-filled sack just below knee cap  (no fever or redness)    Negative: MILD knee pain persists > 7 days    Negative: Patient wants to be seen    Protocols used: KNEE PAIN-A-OH    1. LOCATION and RADIATION: \"Where is the pain located?\"       Both knees, left knees worse.   2. QUALITY: \"What does the pain feel like?\"  (e.g., sharp, dull, aching, burning)      Every time he moves the knees it hurts. Sharp and dull pain. Able to sleep without pain.   3. SEVERITY: \"How bad is the pain?\" \"What does it keep you from doing?\"   (Scale 1-10; or mild, moderate, severe)    -  MODERATE (4-7): interferes with normal activities, does not awaken from sleep.  4. ONSET: \"When did the pain start?\" \"Does it come and go, or is it there all the time?\"     - Months ago.   5. RECURRENT: \"Have you had this pain before?\" If so, ask: \"When, and what happened " "then?\"      - Had a cortisone shot before in the knee and that helped. Started hurting after surgery 10 yrs ago.   6. SETTING: \"Has there been any recent work, exercise or other activity that involved that part of the body?\"       - No, just regular walking.   7. AGGRAVATING FACTORS: \"What makes the knee pain worse?\" (e.g., walking, climbing stairs, running)      Walking or moving.   8. ASSOCIATED SYMPTOMS: \"Is there any swelling or redness of the knee?\"      No.   9. OTHER SYMPTOMS: \"Do you have any other symptoms?\" (e.g., chest pain, difficulty breathing, fever, calf pain)      No.       "

## 2022-07-28 NOTE — TELEPHONE ENCOUNTER
Pt called to check on this     Requesting a referral for injection for knee pain     Thinks it may have been through [facility]     Did advise [facility] does not require referrals and gave their scheduling number [phone]    Veronica QUEEN, Triage RN  [facility]

## 2022-08-02 ENCOUNTER — ANCILLARY PROCEDURE (OUTPATIENT)
Dept: CARDIOLOGY | Facility: CLINIC | Age: 87
End: 2022-08-02
Attending: INTERNAL MEDICINE
Payer: COMMERCIAL

## 2022-08-02 DIAGNOSIS — Z95.0 CARDIAC PACEMAKER IN SITU: ICD-10-CM

## 2022-08-02 PROCEDURE — 93296 REM INTERROG EVL PM/IDS: CPT | Performed by: INTERNAL MEDICINE

## 2022-08-02 PROCEDURE — 93294 REM INTERROG EVL PM/LDLS PM: CPT | Performed by: INTERNAL MEDICINE

## 2022-08-09 LAB
MDC_IDC_LEAD_IMPLANT_DT: NORMAL
MDC_IDC_LEAD_IMPLANT_DT: NORMAL
MDC_IDC_LEAD_LOCATION: NORMAL
MDC_IDC_LEAD_LOCATION: NORMAL
MDC_IDC_LEAD_MFG: NORMAL
MDC_IDC_LEAD_MFG: NORMAL
MDC_IDC_LEAD_MODEL: NORMAL
MDC_IDC_LEAD_MODEL: NORMAL
MDC_IDC_LEAD_POLARITY_TYPE: NORMAL
MDC_IDC_LEAD_POLARITY_TYPE: NORMAL
MDC_IDC_LEAD_SERIAL: NORMAL
MDC_IDC_LEAD_SERIAL: NORMAL
MDC_IDC_MSMT_BATTERY_DTM: NORMAL
MDC_IDC_MSMT_BATTERY_REMAINING_LONGEVITY: 100 MO
MDC_IDC_MSMT_BATTERY_REMAINING_PERCENTAGE: 84 %
MDC_IDC_MSMT_BATTERY_RRT_TRIGGER: NORMAL
MDC_IDC_MSMT_BATTERY_STATUS: NORMAL
MDC_IDC_MSMT_BATTERY_VOLTAGE: 3.01 V
MDC_IDC_MSMT_LEADCHNL_RA_IMPEDANCE_VALUE: 380 OHM
MDC_IDC_MSMT_LEADCHNL_RA_LEAD_CHANNEL_STATUS: NORMAL
MDC_IDC_MSMT_LEADCHNL_RA_PACING_THRESHOLD_AMPLITUDE: 0.62 V
MDC_IDC_MSMT_LEADCHNL_RA_PACING_THRESHOLD_PULSEWIDTH: 0.5 MS
MDC_IDC_MSMT_LEADCHNL_RA_SENSING_INTR_AMPL: 3 MV
MDC_IDC_MSMT_LEADCHNL_RV_IMPEDANCE_VALUE: 540 OHM
MDC_IDC_MSMT_LEADCHNL_RV_LEAD_CHANNEL_STATUS: NORMAL
MDC_IDC_MSMT_LEADCHNL_RV_PACING_THRESHOLD_AMPLITUDE: 1 V
MDC_IDC_MSMT_LEADCHNL_RV_PACING_THRESHOLD_PULSEWIDTH: 0.5 MS
MDC_IDC_MSMT_LEADCHNL_RV_SENSING_INTR_AMPL: 8.6 MV
MDC_IDC_PG_IMPLANT_DTM: NORMAL
MDC_IDC_PG_MFG: NORMAL
MDC_IDC_PG_MODEL: NORMAL
MDC_IDC_PG_SERIAL: NORMAL
MDC_IDC_PG_TYPE: NORMAL
MDC_IDC_SESS_CLINIC_NAME: NORMAL
MDC_IDC_SESS_DTM: NORMAL
MDC_IDC_SESS_REPROGRAMMED: NO
MDC_IDC_SESS_TYPE: NORMAL
MDC_IDC_SET_BRADY_AT_MODE_SWITCH_MODE: NORMAL
MDC_IDC_SET_BRADY_AT_MODE_SWITCH_RATE: 180 {BEATS}/MIN
MDC_IDC_SET_BRADY_LOWRATE: 60 {BEATS}/MIN
MDC_IDC_SET_BRADY_MAX_SENSOR_RATE: 120 {BEATS}/MIN
MDC_IDC_SET_BRADY_MAX_TRACKING_RATE: 120 {BEATS}/MIN
MDC_IDC_SET_BRADY_MODE: NORMAL
MDC_IDC_SET_BRADY_PAV_DELAY_LOW: 200 MS
MDC_IDC_SET_BRADY_SAV_DELAY_LOW: 170 MS
MDC_IDC_SET_LEADCHNL_RA_PACING_AMPLITUDE: 1.62
MDC_IDC_SET_LEADCHNL_RA_PACING_ANODE_ELECTRODE_1: NORMAL
MDC_IDC_SET_LEADCHNL_RA_PACING_ANODE_LOCATION_1: NORMAL
MDC_IDC_SET_LEADCHNL_RA_PACING_CAPTURE_MODE: NORMAL
MDC_IDC_SET_LEADCHNL_RA_PACING_CATHODE_ELECTRODE_1: NORMAL
MDC_IDC_SET_LEADCHNL_RA_PACING_CATHODE_LOCATION_1: NORMAL
MDC_IDC_SET_LEADCHNL_RA_PACING_POLARITY: NORMAL
MDC_IDC_SET_LEADCHNL_RA_PACING_PULSEWIDTH: 0.5 MS
MDC_IDC_SET_LEADCHNL_RA_SENSING_ADAPTATION_MODE: NORMAL
MDC_IDC_SET_LEADCHNL_RA_SENSING_ANODE_ELECTRODE_1: NORMAL
MDC_IDC_SET_LEADCHNL_RA_SENSING_ANODE_LOCATION_1: NORMAL
MDC_IDC_SET_LEADCHNL_RA_SENSING_CATHODE_ELECTRODE_1: NORMAL
MDC_IDC_SET_LEADCHNL_RA_SENSING_CATHODE_LOCATION_1: NORMAL
MDC_IDC_SET_LEADCHNL_RA_SENSING_POLARITY: NORMAL
MDC_IDC_SET_LEADCHNL_RA_SENSING_SENSITIVITY: 1 MV
MDC_IDC_SET_LEADCHNL_RV_PACING_AMPLITUDE: 1.25 V
MDC_IDC_SET_LEADCHNL_RV_PACING_ANODE_ELECTRODE_1: NORMAL
MDC_IDC_SET_LEADCHNL_RV_PACING_ANODE_LOCATION_1: NORMAL
MDC_IDC_SET_LEADCHNL_RV_PACING_CAPTURE_MODE: NORMAL
MDC_IDC_SET_LEADCHNL_RV_PACING_CATHODE_ELECTRODE_1: NORMAL
MDC_IDC_SET_LEADCHNL_RV_PACING_CATHODE_LOCATION_1: NORMAL
MDC_IDC_SET_LEADCHNL_RV_PACING_POLARITY: NORMAL
MDC_IDC_SET_LEADCHNL_RV_PACING_PULSEWIDTH: 0.5 MS
MDC_IDC_SET_LEADCHNL_RV_SENSING_ADAPTATION_MODE: NORMAL
MDC_IDC_SET_LEADCHNL_RV_SENSING_ANODE_ELECTRODE_1: NORMAL
MDC_IDC_SET_LEADCHNL_RV_SENSING_ANODE_LOCATION_1: NORMAL
MDC_IDC_SET_LEADCHNL_RV_SENSING_CATHODE_ELECTRODE_1: NORMAL
MDC_IDC_SET_LEADCHNL_RV_SENSING_CATHODE_LOCATION_1: NORMAL
MDC_IDC_SET_LEADCHNL_RV_SENSING_POLARITY: NORMAL
MDC_IDC_SET_LEADCHNL_RV_SENSING_SENSITIVITY: 2 MV
MDC_IDC_STAT_AT_BURDEN_PERCENT: 0 %
MDC_IDC_STAT_AT_DTM_END: NORMAL
MDC_IDC_STAT_AT_DTM_START: NORMAL
MDC_IDC_STAT_AT_MODE_SW_COUNT: 0
MDC_IDC_STAT_AT_MODE_SW_COUNT_PER_DAY: 0
MDC_IDC_STAT_AT_MODE_SW_PERCENT_TIME: 0 %
MDC_IDC_STAT_BRADY_AP_VP_PERCENT: 1.1 %
MDC_IDC_STAT_BRADY_AP_VS_PERCENT: 94 %
MDC_IDC_STAT_BRADY_AS_VP_PERCENT: 1 %
MDC_IDC_STAT_BRADY_AS_VS_PERCENT: 4.6 %
MDC_IDC_STAT_BRADY_DTM_END: NORMAL
MDC_IDC_STAT_BRADY_DTM_START: NORMAL
MDC_IDC_STAT_BRADY_RA_PERCENT_PACED: 95 %
MDC_IDC_STAT_BRADY_RV_PERCENT_PACED: 1.1 %
MDC_IDC_STAT_CRT_DTM_END: NORMAL
MDC_IDC_STAT_CRT_DTM_START: NORMAL
MDC_IDC_STAT_HEART_RATE_ATRIAL_MAX: 240 {BEATS}/MIN
MDC_IDC_STAT_HEART_RATE_ATRIAL_MEAN: 67 {BEATS}/MIN
MDC_IDC_STAT_HEART_RATE_ATRIAL_MIN: 50 {BEATS}/MIN
MDC_IDC_STAT_HEART_RATE_DTM_END: NORMAL
MDC_IDC_STAT_HEART_RATE_DTM_START: NORMAL
MDC_IDC_STAT_HEART_RATE_VENTRICULAR_MAX: 200 {BEATS}/MIN
MDC_IDC_STAT_HEART_RATE_VENTRICULAR_MEAN: 67 {BEATS}/MIN
MDC_IDC_STAT_HEART_RATE_VENTRICULAR_MIN: 40 {BEATS}/MIN

## 2022-09-22 NOTE — TELEPHONE ENCOUNTER
Wife called in to check her and husbands covid test results results given and wife would like patient to see someone for covid treatment transferred to scheduling    Vinay Levine RN

## 2022-09-22 NOTE — TELEPHONE ENCOUNTER
"Nurse Triage SBAR    Is this a 2nd Level Triage? YES, LICENSED PRACTITIONER REVIEW IS REQUIRED    Situation: COVID    Pt's wife Madison Bolton calling, with c2c on file.    Background:   Symptoms started on 9/18  Tested positive via PCR on 9/21  High risk due to age and other underlying health concerns (asthma, cardiac concerns)    Pt's wife states that he's had sinus concerns in the past. No antibiotic has worked for his sinus congestion. He has to come in to the clinic to have has nasal passages \"cleaned.\"    Assessment:   Nasal congestion  Hard to sleep due to congestion  Coughing fits  Fatigue  Decreased appetite  Sleeping on recliner helps     No SOB  No chest pain  No fever      Protocol Recommended Disposition:   Discuss With PCP And Callback By Nurse Within 1 Hour    Recommendation:   COVID treatment recommended.  FNA warm transferred to COVID tx line, and booked him for same day telephone appointment    FNA did not forward 2nd level triage message to PCP    COVID treatment line    Does the patient meet one of the following criteria for ADS visit consideration? 16+ years old, with an [facility] PCP     TIP  Providers, please consider if this condition is appropriate for management at one of our Acute and Diagnostic Services sites.     If patient is a good candidate, please use dotphrase <dot>triageresponse and select Refer to ADS to document.    Chely Gracia RN/[facility] Nurse Advisor        Reason for Disposition    HIGH RISK for severe COVID complications (e.g., weak immune system, age > 64 years, obesity with BMI > 25, pregnant, chronic lung disease or other chronic medical condition) (Exception: Already seen by PCP and no new or worsening symptoms.)    Additional Information    Negative: SEVERE difficulty breathing (e.g., struggling for each breath, speaks in single words)    Negative: Difficult to awaken or acting confused (e.g., disoriented, slurred speech)    Negative: Bluish (or gray) lips or face now    " Negative: Shock suspected (e.g., cold/pale/clammy skin, too weak to stand, low BP, rapid pulse)    Negative: Sounds like a life-threatening emergency to the triager    Negative: SEVERE or constant chest pain or pressure  (Exception: Mild central chest pain, present only when coughing.)    Negative: MODERATE difficulty breathing (e.g., speaks in phrases, SOB even at rest, pulse 100-120)    Negative: Headache and stiff neck (can't touch chin to chest)    Negative: Oxygen level (e.g., pulse oximetry) 90 percent or lower    Negative: Chest pain or pressure    Negative: Patient sounds very sick or weak to the triager    Negative: MILD difficulty breathing (e.g., minimal/no SOB at rest, SOB with walking, pulse <100)    Negative: Fever > 103 F (39.4 C)    Negative: [1] Fever > 101 F (38.3 C) AND [2] over 60 years of age    Negative: [1] Fever > 100.0 F (37.8 C) AND [2] bedridden (e.g., nursing home patient, CVA, chronic illness, recovering from surgery)    Protocols used: CORONAVIRUS (COVID-19) DIAGNOSED OR [id number] 1.18.2022

## 2022-09-22 NOTE — PROGRESS NOTES
"Abbe is a 88 year old who is being evaluated via a billable telephone visit.      What phone number would you like to be contacted at? [phone]  How would you like to obtain your AVS? Mail a copy    Assessment & Plan     Infection due to 2019 novel coronavirus  Symptomatic therapy suggested: push fluids, rest, use vaporizer or mist needed  and use acetaminophen, ibuprofen as needed.   - molnupiravir (LAGEVRIO) 200 MG capsule; Take 4 capsules (800 mg) by mouth every 12 hours for 5 days  - albuterol (PROAIR HFA/PROVENTIL HFA/VENTOLIN HFA) 108 (90 Base) MCG/ACT inhaler; Inhale 1-2 puffs into the lungs every 6 hours  - benzonatate (TESSALON) 100 MG capsule; Take 1 capsule (100 mg) by mouth 3 times daily as needed for cough         BMI:   Estimated body mass index is 25.44 kg/m  as calculated from the following:    Height as of 4/6/22: 1.753 m (5' 9\").    Weight as of 4/6/22: 78.2 kg (172 lb 4.8 oz).     No follow-ups on file.    Kerri Saeed MD  [facility] BECKY Mcadams is a 88 year old accompanied by his spouse, presenting for the following health issues:  Covid Concern    Answers were completed by spouse due to patient sleeping at the time of the call. Spouse has C2C on file.     HPI       COVID-19 Symptom Review  How many days ago did these symptoms start? 4 days    Are any of the following symptoms significant for you?    New or worsening difficulty breathing? No    Worsening cough? Yes, I am coughing up mucus.    Fever or chills? No    Headache: YES    Sore throat: No    Chest pain: No    Diarrhea: No    Body aches? No    What treatments has patient tried? Decongestant - oral   Does patient live in a nursing home, group home, or shelter? No  Does patient have a way to get food/medications during quarantined? Yes, I have a friend or family member who can help me.        Review of Systems   Constitutional, HEENT, cardiovascular, pulmonary, GI, , musculoskeletal, neuro, " skin, endocrine and psych systems are negative, except as otherwise noted.      Objective           Vitals:  No vitals were obtained today due to virtual visit.    Physical Exam   healthy, alert and no distress  PSYCH: Alert and oriented times 3; coherent speech, normal   rate and volume, able to articulate logical thoughts, able   to abstract reason, no tangential thoughts, no hallucinations   or delusions  His affect is normal  RESP: No cough, no audible wheezing, able to talk in full sentences  Remainder of exam unable to be completed due to telephone visits          Phone call duration: 21 minutes

## 2022-09-22 NOTE — PATIENT INSTRUCTIONS
COVID-19 Outpatient Treatments  Your care team can help you find the best treatments for COVID-19. Talk to a health care provider or refer to the FDA medicine fact sheets below.    Important: You CAN'T have molnupiravir or Paxlovid if you are starting the medicine more than 5 days after your symptoms have started.  Paxlovid: https://www.fda.gov/media/521270/download  Molnupiravir: https://www.fda.gov/media/517596/download  Monoclonal antibodies: https://combatcovid.hhs.gov/what-are-monoclonal-antibodies  Paxlovid (nimatrelvir and ritonavir)  How it works  Two medicines (nirmatrelvir and ritonavir) are taken together. They stop the virus from growing. Less amount of virus is easier for your body to fight.  Benefits  Lowers risk of a hospital stay or death from COVID-19.  How to take    Medicine comes in a daily container with both medicine tablets. Take by mouth twice daily (once in the morning, once at night) for 5 days.    The number of tablets to take varies by patient.    Don't chew or break capsules. Swallow whole.  When to take  Take as soon as possible after positive COVID-19 test result, and within 5 days of your first symptoms.  Who can take it  Patients must be 12 years or older, weigh at least 88 pounds, and have tested positive for COVID-19. This is the preferred treatment for pregnant patients.  Possible side effects  Can cause altered sense of taste, diarrhea (loose, watery stools), high blood pressure, muscle aches.  Medicine conflicts    Some medicines may conflict with Paxlovid and may cause serious side effects.    Tell your care team about all the medicines you take, including prescription and over-the-counter medicines, vitamins and herbal supplements.    Your provider will review your medicines to make sure that you can safely take Paxlovid.  Cautions    Paxlovid is not advised for patients with severe kidney or liver disease. If you have kidney or liver problems, the dose may need to be  changed.    If you are pregnant or breastfeeding, talk to your care team about your options.    If you are sexually active, use trusted birth control while taking Paxlovid.  Molnupiravir  How it works  Stops the virus from growing. Less amount of virus is easier for your body to fight.  Benefits  Lowers risk of a hospital stay or death from COVID-19.  How to take  Take 4 capsules by mouth every 12 hours (4 in the morning and 4 at night) for 5 days. Don't chew or break capsules. Swallow whole.  When to take  Take as soon as possible after positive COVID-19 test result, and within 5 days of your first symptoms.  Who can take it  Patients must be 18 years or older and have tested positive for COVID-19.  Possible side effects  Diarrhea (loose, watery stools), nausea (feeling sick to your stomach), dizziness, headaches.  Medicine conflicts  Right now, there are no known conflicts with other drugs. But tell your care team all medicines you take.  Cautions    This is not advised for patients who are pregnant.    Patients who could become pregnant should use trusted birth control until 4 days after their last dose.    Sexually active people of any gender should use trusted birth control for 3 months after their last dose.  Monoclonal antibodies  How it works  Monoclonal antibodies can detect pieces of the COVID virus and stop it from infecting your cells.  Benefits  Lowers risk of a hospital stay or death from COVID-19. Monoclonal antibodies are known to work well against the omicron variant.  How it is given to you  You will receive the treatment either by an infusion through your vein (IV) or shots.  When to take  Get as soon as possible after you test positive for COVID-19, and within 7 days of your first symptoms.  Who can take it  Patients must be 12 years or older, weigh at least 88 pounds and have tested positive for COVID-19.  Possible side effects  Fever, chills, diarrhea (loose, watery stools), dizziness,  itchiness and rash.  More serious side effects include: fever, difficulty breathing, low oxygen level in your blood, chills, tiredness, fast or slow heart rate, chest discomfort or pain, weakness, confusion, nausea, headache, shortness of breath, low or high blood pressure, wheezing, swelling of your lips, face, or throat, rash including hives, itching, muscle aches, dizziness, feeling faint and sweating.  If you receive an IV, you may have brief pain, bleeding and bruising of the skin, soreness, swelling and possible infection at the place where you get the IV needle.  Medicine conflicts  Please tell you care team other medicines you take so they can assess if there are any conflicts.  Cautions  Your doctor will talk with you about risks and benefits of this treatment and will help choose the best option for you.  For informational purposes only. Not to replace the advice of your health care provider.  Copyright   2022 [facility]. All rights reserved. Clinically reviewed by Debbie Cobb. [employer] [id number] - 08/22.    Instructions for Patients      What are the symptoms of COVID-19?  Symptoms can include fever, cough, shortness of breath, chills, headache, muscle pain sore throat, fatigue, runny or stuffy nose, and loss of taste and smell. Other less common symptoms include nausea, vomiting, or diarrhea (watery stools).    Know when to call 911. Emergency warning signs include:    Trouble breathing or shortness of breath    Pain or pressure in the chest that doesn't go away    Feeling confused like you haven't felt before, or not being able to wake up    Bluish-colored lips or face    How can I take care of myself?  1. Get lots of rest. Drink extra fluids (unless a doctor has told you not to).  2. Take Tylenol (acetaminophen) for fever or pain. If you have liver or kidney problems, ask your family doctor if it's okay to take Tylenol   Adults:   650 mg (two 325 mg pills or tablets) every 4 to 6 hours,  or...   1,000 mg (two 500 mg pills or tablets) every 8 hours as needed.  Note: Don't take more than 3,000 mg in one day. Acetaminophen is found in many medicines (both prescribed and over the counter). Read all labels to be sure you don't take too much.  For children, check the Tylenol bottle for the right dose. The dose is based on the child's age or weight.  3. Take over the counter medicines for your symptoms as needed. Talk to your pharmacist.  4. If you have other health problems (like cancer, heart failure, an organ transplant, or severe kidney disease): Call your specialty clinic if you don't feel better in the next 2 days.    Where can I get more information?     [employer] COVID-19 Resource Hub: www.[employer].org/covid19/     CDC Quarantine & Isolation: https://www.cdc.gov/coronavirus/2019-ncov/your-health/quarantine-isolation.html     CDC - What to Do If You're Sick: https://www.cdc.gov/coronavirus/2019-ncov/if-you-are-sick/index.html    [facility] clinical trials (COVID-19 research studies): clinicalaffairs.[facility]/umn-clinical-trials    [place] Department of Health COVID-19 Public Hotline: 1-810.427.7492  Coping with Life After COVID-19  Being in the hospital because of COVID-19 is scary. Going home can be scary, too. You may face changes to your life, the way you work or what you can eat. It s hard to adjust to change, and it s normal to feel afraid, frustrated or even angry. These feelings usually go away over time. If your feelings don t start to get better, it s called  adjustment disorder.      What signs should I look out for?  Adjustment disorder can happen to anyone in a time of stress. It makes it hard to cope with daily life. You may feel lonely or fight with loved ones, even if you re glad to be home. Watch for these signs:  Fear or worry  Hard time focusing  Sadness or anger  Trouble talking to family or friends  Feeling like you don t fit in or isolating  yourself  Problems with sleep   Drinking alcohol or taking drugs to cope    What can I do?  You can help yourself get better. Feeling you have control helps you move forward. You may wonder if you ll be able to do things you did before. Be patient. Do your best to make the most of every day. Try to build relationships, be as active as you can, eat right and keep a sense of humor. Avoid smoking and drinking too much alcohol. Call your family doctor or clinic if you re not sure what to do. They can guide you to care or other services.    When should I get help?  Think about getting counseling if your sadness or frustration gets worse. Together with a trained counselor, you can talk about your problems adjusting to life after your hospital stay. You can come up with new ways to handle changes that give you more control. Your family doctor or care team can help you find a counselor.     Get help if you re thinking about hurting yourself. If you need help right away, call 911 or go to the nearest emergency room. You can also try the Crisis Text Line.    Crisis Text Line: 308-911 (http://www.crisistextline.org)  The Crisis Text Line serves anyone, in any crisis. It gives free, 24/7 support. Here's how it works:  Text HOME to 783199 from anywhere in the [place], anytime, about any type of crisis.  A live, trained Crisis Counselor will text you back quickly.  The volunteer Crisis Counselor can help you move from a  hot moment  to a  cool moment.  They can also help you work out a safety plan.

## 2022-12-10 NOTE — ED PROVIDER NOTES
History   Chief Complaint:  Abdominal Pain       The history is provided by the patient.      Abbe Lambert is a 88 year old male with history of hypertension, CAD, CHF, MI, and type II diabetes who presents with abdominal pain. Apparently had started about a week ago on the left side and is gotten worse. Sharp and localized to left side of his abdomen. It does not radiate. It hurts worse with movement. His appetite has been fine and no fever or chills. He denies urinary symptoms or blood in his urine. No history of diverticulitis. Denies nausea, vomiting, or diarrhea.    Review of Systems   Constitutional: Negative for appetite change, chills and fever.   Respiratory: Negative for shortness of breath.    Cardiovascular: Negative for chest pain.   Gastrointestinal: Positive for abdominal pain. Negative for diarrhea and nausea.   Genitourinary: Negative for dysuria, flank pain and hematuria.   Musculoskeletal: Negative for back pain.   All other systems reviewed and are negative.      Allergies:  Advair Diskus  Morphine Hcl  Vicodin [Hydrocodone-Acetaminophen]    Medications:  Proair  Tessalon  Coreg  Nitrostat  Crestor    Past Medical History:     Ischemic cardiomyopathy  Paroxysmal atrial fibrillation  CAD  GERD  Tachy-Haim syndrome  AAA  MI  CHF  Hypertension  Hyperlipidemia  Type II diabetes  Presbycusis of both ears  Skin lesion  PAD  Prostate cancer  Microalbuminuria  Asthma  Prediabetes  CVA  Dysphagia  Esophageal stricture  Vascular dementia  Stasis dermatitis  Pruritic disorder  Overweight  Chronic sinusitis  Cardiac pacemaker  Anemia  Hypotension  SKI  Spinal stenosis    Past Surgical History:    Arthroplasty knee  Cardiac surgery  Cholecystectomy  Colonoscopy  Sinus surgery  EP pacemaker  EGD x3  Extracapsular cataract extraction with IOL  Prostatectomy  Implant pacemaker    Family History:    Father: cerebrovascular disease, heart disease  Brother: heart disease, CAD, MI  Daughter:  "depression    Social History:  The patient presents to the ED alone  PCP: Ajit Butterfiedl     Physical Exam     Patient Vitals for the past 24 hrs:   BP Temp Temp src Pulse Resp SpO2 Height Weight   12/10/22 1730 (!) 153/69 -- -- 60 -- -- -- --   12/10/22 1700 (!) 146/66 -- -- 60 -- -- -- --   12/10/22 1652 (!) 159/66 -- -- 60 -- -- -- --   12/10/22 1552 (!) 149/54 97  F (36.1  C) Temporal 60 16 100 % 1.753 m (5' 9\") 81.6 kg (180 lb)       Physical Exam  Nursing note and vitals reviewed.    Constitutional:  Appears comfortable.    HENT:                Nose normal.  No discharge.      Oral mucosa is moist.  Eyes:    Conjunctivae are normal without injection.  Pupils are equal.  Cardiovascular:  Normal rate, regular rhythm with normal S1 and S2.      Normal heart sounds and peripheral pulses 2+ and equal.       No murmur or carla.  Pulmonary:  Effort normal and breath sounds clear to auscultation bilaterally.    No respiratory distress.  No stridor.     No wheezes. No rales.     GI:    Soft.  Localized left lateral abdominal pain with guarding.  No flank pain no right-sided pain.  He is not distended.  Musculoskeletal:  Normal range of motion. No extremity deformity.     No edema and no tenderness.    Neurological:   Alert.  No focal weakness.  Skin:    Skin is warm and dry. No rash noted.   Psychiatric:   Behavior is normal. Appropriate mood and affect.     Judgment and thought content normal.       Emergency Department Course     Imaging:  CT Abdomen Pelvis w Contrast   Final Result   IMPRESSION:    1.  Findings consistent with epiploic appendage I guess adjacent to the distal descending sigmoid colon.    2.  Sigmoid colonic diverticulosis without evidence of diverticulitis    3.  Unchanged appearance of the fusiform infrarenal abdominal aortic aneurysm with maximal diameter of 4.1 cm   4.  Approximately 25-50% stenosis of the SMA at its origin with normal filling of the mesenteric vessels distally. No CT " evidence of mesenteric ischemia seen at this time.   5.  Benign-appearing 1.2 cm hypodensity within the medial aspect of the spleen, favored reflect a splenic cyst and/or possibly a hemangioma   6.  Normal appendix        Report per radiology    Laboratory:  Labs Ordered and Resulted from Time of ED Arrival to Time of ED Departure   COMPREHENSIVE METABOLIC PANEL - Abnormal       Result Value    Sodium 136      Potassium 4.5      Chloride 108      Carbon Dioxide (CO2) 27      Anion Gap 1 (*)     Urea Nitrogen 31 (*)     Creatinine 1.71 (*)     Calcium 8.8      Glucose 92      Alkaline Phosphatase 87      AST 20      ALT 27      Protein Total 8.5      Albumin 3.1 (*)     Bilirubin Total 0.3      GFR Estimate 38 (*)    CBC WITH PLATELETS AND DIFFERENTIAL - Abnormal    WBC Count 8.5      RBC Count 3.70 (*)     Hemoglobin 11.6 (*)     Hematocrit 38.0 (*)      (*)     MCH 31.4      MCHC 30.5 (*)     RDW 15.6 (*)     Platelet Count 214     LIPASE - Normal    Lipase 212     ROUTINE UA WITH MICROSCOPIC REFLEX TO CULTURE - Normal    Color Urine Straw      Appearance Urine Clear      Glucose Urine Negative      Bilirubin Urine Negative      Ketones Urine Negative      Specific Gravity Urine 1.009      Blood Urine Negative      pH Urine 6.0      Protein Albumin Urine Negative      Urobilinogen Urine Normal      Nitrite Urine Negative      Leukocyte Esterase Urine Negative      RBC Urine 0      WBC Urine <1     DIFFERENTIAL    % Neutrophils 74      % Lymphocytes 14      % Monocytes 11      % Eosinophils 1      % Basophils 0      Absolute Neutrophils 6.3      Absolute Lymphocytes 1.2      Absolute Monocytes 0.9      Absolute Eosinophils 0.1      Absolute Basophils 0.0      RBC Morphology Confirmed RBC Indices      Platelet Assessment        Value: Automated Count Confirmed. Platelet morphology is normal.        Emergency Department Course:           Reviewed:  I reviewed nursing notes, vitals, past medical history and Care  Everywhere    Assessments:  1720 I obtained history and examined the patient as noted above.   1856 I rechecked the patient.  1945 I rechecked the patient and explained findings.     Interventions:  1710 NS 1,000 mL IV  1754 Sublimaze 25 mcg IV    Disposition:  The patient was discharged to home.     Impression & Plan     Medical Decision Making:  Patient comes in with left-sided abdominal pain for a week.  He was quite tender.  No fevers however and his white count is normal.  Comprehensive panel shows a chronic renal failure with a GFR of 38, creatinine 1.71 and normal electrolytes.  I got a CT scan of his abdomen and pelvis and it shows epiploic appendagitis but otherwise unremarkable.  He has an unchanged 4.1 cm AAA.  I told him that the appendagitis is symptomatic treatment only.  I am going to send him home with a few tramadol tablets in case ibuprofen or Tylenol is not adequate.  He will follow-up in the clinic as needed.    Tylenol as needed, or you could take the tramadol for more severe pain.  Follow-up with your doctor in the clinic as needed.  If you were to develop high fevers with vomiting and worsening pain, return to the emergency room.    Diagnosis:    ICD-10-CM    1. Epiploic appendagitis  K63.89       2. Chronic renal failure, unspecified CKD stage  N18.9       3. Infrarenal abdominal aortic aneurysm (AAA) without rupture  I71.43     Stable 4.1 cm          Discharge Medications:  Discharge Medication List as of 12/10/2022  7:57 PM      START taking these medications    Details   traMADol (ULTRAM) 50 MG tablet Take 1 tablet (50 mg) by mouth every 6 hours as needed for severe pain (7-10), Disp-10 tablet, R-0, E-Prescribe             Scribe Disclosure:  I, Sonal Tevin, am serving as a scribe at 5:06 PM on 12/10/2022 to document services personally performed by Supriya Sabillon MD based on my observations and the provider's statements to me.        Supriya Sabillon MD  12/10/22 4218

## 2022-12-10 NOTE — ED TRIAGE NOTES
Patient complaints of abdominal pain for 7 days.  Getting worse.  Pain to LLQ.      Triage Assessment     Row Name 12/10/22 3017       Triage Assessment (Adult)    Airway WDL WDL       Respiratory WDL    Respiratory WDL WDL       Skin Circulation/Temperature WDL    Skin Circulation/Temperature WDL WDL       Cardiac WDL    Cardiac WDL WDL       Peripheral/Neurovascular WDL    Peripheral Neurovascular WDL WDL       Cognitive/Neuro/Behavioral WDL    Cognitive/Neuro/Behavioral WDL WDL

## 2022-12-11 NOTE — DISCHARGE INSTRUCTIONS
Tylenol as needed, or you could take the tramadol for more severe pain.  Follow-up with your doctor in the clinic as needed.  If you were to develop high fevers with vomiting and worsening pain, return to the emergency room.

## 2022-12-12 NOTE — TELEPHONE ENCOUNTER
Reason for Call:  Appointment Request    Patient requesting this type of appt:  Hospital/ED Follow-Up     Requested provider: Ajit Butterfield    Reason patient unable to be scheduled: Not within requested timeframe    When does patient want to be seen/preferred time: 3-7 days    Comments: In ER on Sat 12/9 need followup w sooner in quite a bit of pain    Okay to leave a detailed message?: Yes at Home number on file [phone] (home)    Call taken on 12/12/2022 at 10:54 AM by Christa Iraheta

## 2022-12-16 NOTE — ED TRIAGE NOTES
Left ankle with swelling, redness, and bleeding that has been intermittent for a couple of days. Hx of BLE redness and discoloration, just worse on L side now.      Triage Assessment     Row Name 12/16/22 8751       Triage Assessment (Adult)    Airway WDL WDL       Respiratory WDL    Respiratory WDL WDL       Cardiac WDL    Cardiac WDL WDL       Cognitive/Neuro/Behavioral WDL    Cognitive/Neuro/Behavioral WDL WDL

## 2022-12-16 NOTE — PROGRESS NOTES
Assessment & Plan     Open wound of left lower extremity, initial encounter    Pitting edema    Applied water to sock, unable to remove sock from wound with sterile swab. Recommend further evaluation in emergency room for open wound adhered to clothing with surrounding pitting edema, unable to fully evaluate due to sock. Diuresis may be indicated with pitting edema. Patient agreeable and declines ambulance. He is discharged in stable condition.     Alice Lowry NP  [facility] ANETTE Mcadams is a 88 year old male who presents to clinic today with his wife for the following health issues:  Chief Complaint   Patient presents with     Urgent Care     Bilateral swelling ankle, ongoing for years      Patient presents for evaluation of left lower leg sore. Sore has been present for a few days and has been worsening. Associated symptoms: sock is stuck to leg for a couple days maybe, unsure when last changed sock, pain, drainage unsure color, redness, bilateral ankle swelling which has been present for years and has been worseing. Denies fever, chills.     He has appt with PCP 12/19/22 for ED follow-up. He was evaluated in ED 12/10/22 for abdominal pain when CT showed epiploic appendagitis and he was treated with tylenol and tramadol.     He has a history of MI, HTN, asthma, Atrial fibrillation, CAD, CKD, AAA, type 2 diabetes, bilateral stasis dermatitis.    Problem list, Medication list, Allergies, and Medical history reviewed in EPIC.    ROS:  Review of systems negative except for noted above        Objective    /72   Pulse 64   Temp 97.7  F (36.5  C)   Resp 16   Wt 78 kg (172 lb)   SpO2 99%   BMI 25.40 kg/m    Physical Exam  Constitutional:       General: He is not in acute distress.     Appearance: He is not toxic-appearing or diaphoretic.   Musculoskeletal:      Right lower leg: Edema present.      Left lower leg: Edema present.      Comments: Pitting edema  bilateral lower extermities   Skin:     General: Skin is warm.      Comments: Open wound left medial lower leg with sock adhered to skin. Dusky right lower leg

## 2022-12-17 NOTE — ED PROVIDER NOTES
History   Chief Complaint:  Wound Check       The history is provided by the patient.      Abbe Lambert is a 88 year old male with history of hypertension, CAD, CHF, MI, type 2 diabetes who presents with wound check. Patient has history of ankle swelling and discoloration but come into the ED today after lesion on left ankle began to bleed. Visited urgent care prior to arrival who directed him to the ED. Patient denies using compression stockings and mention that he saw his primary care physician a few months ago and informed them about his ankles. Patient denies any chest pain, fever, vomiting, diarrhea, new color change or abdominal pain.    Review of Systems   Constitutional: Negative for fever.   Cardiovascular: Negative for chest pain.   Gastrointestinal: Negative for abdominal pain, diarrhea and vomiting.   Skin: Positive for wound. Negative for color change (ankles).   All other systems reviewed and are negative.        Allergies:  Advair Diskus  Morphine Hcl  Vicodin [Hydrocodone-Acetaminophen]    Medications:  Albuterol   Tessalon   Coreg   Nitrostat   Crestor     Past Medical History:     Ischemic cardiomyopathy   Paroxysmal atrial fibrillation   CAD  CKD  GERD  Tachy-Haim syndrome  AAA  MI  CHF  Hypertension  Hyperlipidemia  Type II diabetes  Presbycusis of both ears  Skin lesion  PAD  Prostate cancer  Microalbuminuria  Asthma  CVA  Dysphagia  Esophageal stricture  Vascular dementia  Stasis dermatitis  Pruritic disorder  Chronic sinusitis  Cardiac pacemaker  Anemia  Hypotension  SKI  Spinal stenosis      Past Surgical History:    Arthroplasty knee  CABG  Cholecystectomy  Colonoscopy  Sinus surgery  EP pacemaker  EGD x3  Extracapsular cataract extraction with IOL  Prostatectomy  Implant pacemaker      Family History:    Cerebrovascular disease   Heart disease   CAD  MI  Depression     Social History:  The patient presents to the ED with wife  PCP: Ajit Butterfield     Physical Exam     Patient  "Vitals for the past 24 hrs:   BP Temp Temp src Pulse Resp SpO2 Height Weight   12/16/22 1600 (!) 180/62 97.5  F (36.4  C) Oral 60 16 98 % 1.778 m (5' 10\") 81.6 kg (180 lb)       Physical Exam  Vitals: reviewed by me  General: Pt seen on [facility], pleasant, cooperative, and alert to conversation  Eyes: Tracking well, clear conjunctiva BL  ENT: MMM, midline trachea.   Lungs: No tachypnea, no accessory muscle use. No respiratory distress.   CV: Rate as above  Abd: Soft, non tender, no guarding, no rebound. Non distended  MSK: no joint effusion.  No evidence of trauma, does have left-sided venous stasis ulcer on the medial side of his leg, roughly the size of a quarter.  No bleeding noted, does not go deep to bone.  Does not violate into muscle layer.  Chronic appearing discoloration and hyperpigmentation surrounding area, no erythema, no induration, no fluctuance or drainage or foul odor.  Robust dorsalis pedis pulse noted distally.  Skin: No rash  Neuro: Clear speech and no facial droop.  Psych: Not RIS, no e/o AH/VH      Emergency Department Course     Imaging:  US Lower Extremity Venous Duplex Bilateral   Final Result   IMPRESSION:   1.  No deep venous thrombosis in the bilateral lower extremities.      2.  2.6 x 2.0 x 1.2 cm right Baker cyst.        Report per radiology    Emergency Department Course:       Reviewed:  I reviewed nursing notes, vitals, past medical history and Care Everywhere    Assessments:  1814 I obtained history and examined the patient as noted above.   1944 I rechecked the patient and explained findings.     Disposition:  The patient was discharged to home.     Impression & Plan     Medical Decision Making:  This is a very pleasant 88-year-old male who presents to the emergency room with appears to be a venous stasis ulcer.  He tells me that he does not like to wear compression stockings, and I suspect that this is part of the reason why his swelling is getting worse.  We talked about " how his ultrasound was negative for an acute cause of his swelling, and how we should use elevation at night of his feet as well as compression stockings to help with his symptoms.  His stasis ulcer does appear to be superficial, nonbleeding, and has been treated with basic wound care here in the ER.  I do think that he may qualify for outpatient wound care, and that they can bring this up with her regular doctor when they see him next week for a follow-up appointment.  Red flags for when to come back to the ER were discussed in detail, patient and wife are both okay with this plan.  No evidence of an infection at this time or of any acute skin changes apart from the top of the ulcer essentially coming off and causing some bleeding earlier today.    Diagnosis:    ICD-10-CM    1. Venous stasis ulcer of other part of left lower leg, unspecified ulcer stage, unspecified whether varicose veins present (H)  I83.028     L97.829       2. Synovial cyst of popliteal space, unspecified laterality  M71.20           Discharge Medications:  Discharge Medication List as of 12/16/2022  7:48 PM          Scribe Disclosure:  I, Robinson Vo, am serving as a scribe at 6:10 PM on 12/16/2022 to document services personally performed by Keshav Barlow MD based on my observations and the provider's statements to me.            Keshav Barlow MD  12/16/22 [id number]

## 2022-12-17 NOTE — DISCHARGE INSTRUCTIONS
As we discussed, the ulcer in your left leg is likely due to venous stasis, and the ultrasound is actually quite reassuring and does not show any evidence of a blood clot or of an infection.  Given the chronic discoloration, I also do not think that there is an infection here.  The bleeding is stopped here without any intervention, and I think you should keep it covered and change the bandage every day until you see your regular doctor in the next 1 week.  It is very important you see your regular doctor in the next 1 week not only for a routine follow-up to make sure an infection is not growing, but also to see if you qualify for home wound care, as you may be dealing with this ulcer for some time.  Come back to the ER immediately with any other concerns you have, any worsening symptoms of any kind, or any skin changes in that area.  You also have an incidental Baker's cyst, which was found on the ultrasound, which is unlikely to be related to your ulcer today.

## 2022-12-19 NOTE — PROGRESS NOTES
"  Assessment & Plan     Venous stasis ulcer of other part of lower leg limited to breakdown of skin with varicose veins, unspecified laterality (H)  Redressed with Telfa, evidence of infection.  Reviewed strategies for treatment of venous stasis    Stage 3b chronic kidney disease (H)  Stable    Type 2 diabetes mellitus with diabetic neuropathy, without long-term current use of insulin (H)  Has been stably controlled             MED REC REQUIRED  Post Medication Reconciliation Status:  Discharge medications reconciled, continue medications without change    BMI:   Estimated body mass index is 27.26 kg/m  as calculated from the following:    Height as of 12/16/22: 1.778 m (5' 10\").    Weight as of this encounter: 86.2 kg (190 lb).           Return in about 3 months (around 3/19/2023) for Med Check.    Ajit Butterfield MD  [facility]          Paresh Mcadams is a 88 year old accompanied by his spouse, presenting for the following health issues:  ER F/U      HPI     ED/UC Followup:    Facility:  [facility] ER  Date of visit: 12/16/22  Reason for visit: Venous stasis ulcer of other part of left lower leg   Current Status: Patient states worsening, questioning how to care for area         Review of Systems   Constitutional, HEENT, cardiovascular, pulmonary, gi and gu systems are negative, except as otherwise noted.      Objective    /58   Pulse 60   Temp 97  F (36.1  C) (Temporal)   Resp 15   Wt 86.2 kg (190 lb)   SpO2 97%   BMI 27.26 kg/m    Body mass index is 27.26 kg/m .  Physical Exam   GENERAL: healthy, alert and no distress  NECK: no adenopathy, no asymmetry, masses, or scars and thyroid normal to palpation  RESP: lungs clear to auscultation - no rales, rhonchi or wheezes  CV: regular rate and rhythm, normal S1 S2, no S3 or S4, no murmur, click or rub, no peripheral edema and peripheral pulses strong  ABDOMEN: soft, nontender, no hepatosplenomegaly, no masses and bowel " sounds normal  MS: Healing stasis ulcer left lower extremity

## 2022-12-21 NOTE — TELEPHONE ENCOUNTER
Patient's wife calling clinic, not on C2C but patient was able to give verbal consent to speak with her. Patient's wife was concerned about applying compression stockings while patient has the venous ulcer and was wondering if she could still apply them.     RN advise patient's wife it is okay to apply stockings, but if patient's pain worsens or if ulcer worsens after applying stockings to remove them and call clinic back. Wife had just finished dressing the wound and will apply the stockings.     Patient's wife had no additional questions.

## 2023-01-01 ENCOUNTER — APPOINTMENT (OUTPATIENT)
Dept: SPEECH THERAPY | Facility: CLINIC | Age: 88
DRG: 057 | End: 2023-01-01
Payer: COMMERCIAL

## 2023-01-01 ENCOUNTER — APPOINTMENT (OUTPATIENT)
Dept: CT IMAGING | Facility: CLINIC | Age: 88
DRG: 057 | End: 2023-01-01
Attending: EMERGENCY MEDICINE
Payer: COMMERCIAL

## 2023-01-01 ENCOUNTER — DOCUMENTATION ONLY (OUTPATIENT)
Dept: CARDIOLOGY | Facility: CLINIC | Age: 88
End: 2023-01-01

## 2023-01-01 ENCOUNTER — LAB REQUISITION (OUTPATIENT)
Dept: LAB | Facility: CLINIC | Age: 88
End: 2023-01-01
Payer: COMMERCIAL

## 2023-01-01 ENCOUNTER — DOCUMENTATION ONLY (OUTPATIENT)
Dept: CARDIOLOGY | Facility: CLINIC | Age: 88
End: 2023-01-01
Payer: COMMERCIAL

## 2023-01-01 ENCOUNTER — DISCHARGE SUMMARY NURSING HOME (OUTPATIENT)
Dept: GERIATRICS | Facility: CLINIC | Age: 88
End: 2023-01-01
Payer: COMMERCIAL

## 2023-01-01 ENCOUNTER — APPOINTMENT (OUTPATIENT)
Dept: CARDIOLOGY | Facility: CLINIC | Age: 88
DRG: 057 | End: 2023-01-01
Attending: INTERNAL MEDICINE
Payer: COMMERCIAL

## 2023-01-01 ENCOUNTER — TRANSITIONAL CARE UNIT VISIT (OUTPATIENT)
Dept: GERIATRICS | Facility: CLINIC | Age: 88
End: 2023-01-01
Payer: COMMERCIAL

## 2023-01-01 ENCOUNTER — OFFICE VISIT (OUTPATIENT)
Dept: CARDIOLOGY | Facility: CLINIC | Age: 88
End: 2023-01-01
Attending: PHYSICIAN ASSISTANT
Payer: COMMERCIAL

## 2023-01-01 ENCOUNTER — PATIENT OUTREACH (OUTPATIENT)
Dept: CARE COORDINATION | Facility: CLINIC | Age: 88
End: 2023-01-01
Payer: COMMERCIAL

## 2023-01-01 ENCOUNTER — APPOINTMENT (OUTPATIENT)
Dept: SPEECH THERAPY | Facility: CLINIC | Age: 88
DRG: 057 | End: 2023-01-01
Attending: INTERNAL MEDICINE
Payer: COMMERCIAL

## 2023-01-01 ENCOUNTER — APPOINTMENT (OUTPATIENT)
Dept: GENERAL RADIOLOGY | Facility: CLINIC | Age: 88
DRG: 057 | End: 2023-01-01
Attending: EMERGENCY MEDICINE
Payer: COMMERCIAL

## 2023-01-01 ENCOUNTER — ASSISTED LIVING VISIT (OUTPATIENT)
Dept: GERIATRICS | Facility: CLINIC | Age: 88
End: 2023-01-01
Payer: COMMERCIAL

## 2023-01-01 ENCOUNTER — APPOINTMENT (OUTPATIENT)
Dept: GENERAL RADIOLOGY | Facility: CLINIC | Age: 88
DRG: 057 | End: 2023-01-01
Attending: INTERNAL MEDICINE
Payer: COMMERCIAL

## 2023-01-01 ENCOUNTER — APPOINTMENT (OUTPATIENT)
Dept: PHYSICAL THERAPY | Facility: CLINIC | Age: 88
DRG: 057 | End: 2023-01-01
Payer: COMMERCIAL

## 2023-01-01 ENCOUNTER — NURSE TRIAGE (OUTPATIENT)
Dept: NURSING | Facility: CLINIC | Age: 88
End: 2023-01-01
Payer: COMMERCIAL

## 2023-01-01 ENCOUNTER — PATIENT OUTREACH (OUTPATIENT)
Dept: INTERNAL MEDICINE | Facility: CLINIC | Age: 88
End: 2023-01-01
Payer: COMMERCIAL

## 2023-01-01 ENCOUNTER — TELEPHONE (OUTPATIENT)
Dept: INTERNAL MEDICINE | Facility: CLINIC | Age: 88
End: 2023-01-01
Payer: COMMERCIAL

## 2023-01-01 ENCOUNTER — LAB (OUTPATIENT)
Dept: LAB | Facility: CLINIC | Age: 88
End: 2023-01-01
Payer: COMMERCIAL

## 2023-01-01 ENCOUNTER — TELEPHONE (OUTPATIENT)
Dept: CARDIOLOGY | Facility: CLINIC | Age: 88
End: 2023-01-01
Payer: COMMERCIAL

## 2023-01-01 ENCOUNTER — TRANSFERRED RECORDS (OUTPATIENT)
Dept: HEALTH INFORMATION MANAGEMENT | Facility: CLINIC | Age: 88
End: 2023-01-01

## 2023-01-01 ENCOUNTER — HOSPITAL ENCOUNTER (INPATIENT)
Facility: CLINIC | Age: 88
LOS: 4 days | Discharge: NURSING, REHAB OR LONG-TERM CARE | DRG: 057 | End: 2023-05-18
Attending: EMERGENCY MEDICINE | Admitting: INTERNAL MEDICINE
Payer: COMMERCIAL

## 2023-01-01 ENCOUNTER — ANCILLARY PROCEDURE (OUTPATIENT)
Dept: CARDIOLOGY | Facility: CLINIC | Age: 88
End: 2023-01-01
Payer: COMMERCIAL

## 2023-01-01 ENCOUNTER — APPOINTMENT (OUTPATIENT)
Dept: MRI IMAGING | Facility: CLINIC | Age: 88
DRG: 057 | End: 2023-01-01
Attending: INTERNAL MEDICINE
Payer: COMMERCIAL

## 2023-01-01 ENCOUNTER — OFFICE VISIT (OUTPATIENT)
Dept: CARDIOLOGY | Facility: CLINIC | Age: 88
End: 2023-01-01
Attending: INTERNAL MEDICINE
Payer: COMMERCIAL

## 2023-01-01 ENCOUNTER — HEALTH MAINTENANCE LETTER (OUTPATIENT)
Age: 88
End: 2023-01-01

## 2023-01-01 ENCOUNTER — APPOINTMENT (OUTPATIENT)
Dept: PHYSICAL THERAPY | Facility: CLINIC | Age: 88
DRG: 057 | End: 2023-01-01
Attending: INTERNAL MEDICINE
Payer: COMMERCIAL

## 2023-01-01 ENCOUNTER — DOCUMENTATION ONLY (OUTPATIENT)
Dept: OTHER | Facility: CLINIC | Age: 88
End: 2023-01-01
Payer: COMMERCIAL

## 2023-01-01 ENCOUNTER — HOSPITAL ENCOUNTER (EMERGENCY)
Facility: CLINIC | Age: 88
Discharge: HOME | End: 2023-04-29
Attending: EMERGENCY MEDICINE | Admitting: EMERGENCY MEDICINE
Payer: COMMERCIAL

## 2023-01-01 ENCOUNTER — OFFICE VISIT (OUTPATIENT)
Dept: INTERNAL MEDICINE | Facility: CLINIC | Age: 88
End: 2023-01-01
Payer: COMMERCIAL

## 2023-01-01 VITALS
RESPIRATION RATE: 18 BRPM | BODY MASS INDEX: 24.31 KG/M2 | HEIGHT: 70 IN | OXYGEN SATURATION: 99 % | TEMPERATURE: 97.3 F | SYSTOLIC BLOOD PRESSURE: 145 MMHG | HEART RATE: 60 BPM | DIASTOLIC BLOOD PRESSURE: 64 MMHG | WEIGHT: 169.8 LBS

## 2023-01-01 VITALS
SYSTOLIC BLOOD PRESSURE: 133 MMHG | HEART RATE: 60 BPM | WEIGHT: 173.6 LBS | DIASTOLIC BLOOD PRESSURE: 57 MMHG | RESPIRATION RATE: 18 BRPM | TEMPERATURE: 97.9 F | OXYGEN SATURATION: 96 % | BODY MASS INDEX: 24.85 KG/M2 | HEIGHT: 70 IN

## 2023-01-01 VITALS
SYSTOLIC BLOOD PRESSURE: 113 MMHG | WEIGHT: 169.8 LBS | HEART RATE: 62 BPM | RESPIRATION RATE: 18 BRPM | DIASTOLIC BLOOD PRESSURE: 64 MMHG | OXYGEN SATURATION: 99 % | HEIGHT: 70 IN | BODY MASS INDEX: 24.31 KG/M2 | TEMPERATURE: 97.5 F

## 2023-01-01 VITALS
HEART RATE: 66 BPM | BODY MASS INDEX: 23.69 KG/M2 | TEMPERATURE: 97.7 F | SYSTOLIC BLOOD PRESSURE: 131 MMHG | OXYGEN SATURATION: 99 % | DIASTOLIC BLOOD PRESSURE: 51 MMHG | HEIGHT: 70 IN | RESPIRATION RATE: 17 BRPM | WEIGHT: 165.5 LBS

## 2023-01-01 VITALS
WEIGHT: 180.9 LBS | RESPIRATION RATE: 16 BRPM | OXYGEN SATURATION: 97 % | HEIGHT: 69 IN | HEART RATE: 68 BPM | DIASTOLIC BLOOD PRESSURE: 56 MMHG | SYSTOLIC BLOOD PRESSURE: 117 MMHG | BODY MASS INDEX: 26.79 KG/M2 | TEMPERATURE: 97 F

## 2023-01-01 VITALS
HEIGHT: 69 IN | SYSTOLIC BLOOD PRESSURE: 123 MMHG | BODY MASS INDEX: 26.81 KG/M2 | WEIGHT: 181 LBS | DIASTOLIC BLOOD PRESSURE: 76 MMHG | HEART RATE: 58 BPM | OXYGEN SATURATION: 98 %

## 2023-01-01 VITALS
SYSTOLIC BLOOD PRESSURE: 145 MMHG | OXYGEN SATURATION: 99 % | HEART RATE: 60 BPM | DIASTOLIC BLOOD PRESSURE: 64 MMHG | HEIGHT: 70 IN | BODY MASS INDEX: 24.31 KG/M2 | WEIGHT: 169.8 LBS | RESPIRATION RATE: 18 BRPM | TEMPERATURE: 97.3 F

## 2023-01-01 VITALS
WEIGHT: 174 LBS | HEART RATE: 86 BPM | TEMPERATURE: 97.2 F | DIASTOLIC BLOOD PRESSURE: 62 MMHG | HEIGHT: 70 IN | OXYGEN SATURATION: 98 % | RESPIRATION RATE: 18 BRPM | SYSTOLIC BLOOD PRESSURE: 147 MMHG | BODY MASS INDEX: 24.91 KG/M2

## 2023-01-01 VITALS
SYSTOLIC BLOOD PRESSURE: 108 MMHG | RESPIRATION RATE: 17 BRPM | OXYGEN SATURATION: 98 % | TEMPERATURE: 97.8 F | DIASTOLIC BLOOD PRESSURE: 51 MMHG | BODY MASS INDEX: 23.65 KG/M2 | HEART RATE: 60 BPM | HEIGHT: 70 IN | WEIGHT: 165.2 LBS

## 2023-01-01 VITALS
BODY MASS INDEX: 24.91 KG/M2 | SYSTOLIC BLOOD PRESSURE: 122 MMHG | HEIGHT: 70 IN | OXYGEN SATURATION: 98 % | DIASTOLIC BLOOD PRESSURE: 52 MMHG | TEMPERATURE: 97 F | RESPIRATION RATE: 17 BRPM | HEART RATE: 63 BPM

## 2023-01-01 VITALS
OXYGEN SATURATION: 98 % | WEIGHT: 174.1 LBS | RESPIRATION RATE: 20 BRPM | DIASTOLIC BLOOD PRESSURE: 40 MMHG | BODY MASS INDEX: 24.98 KG/M2 | TEMPERATURE: 97.5 F | HEART RATE: 60 BPM | SYSTOLIC BLOOD PRESSURE: 92 MMHG

## 2023-01-01 VITALS
OXYGEN SATURATION: 98 % | TEMPERATURE: 98 F | HEIGHT: 70 IN | SYSTOLIC BLOOD PRESSURE: 144 MMHG | BODY MASS INDEX: 23.42 KG/M2 | RESPIRATION RATE: 19 BRPM | WEIGHT: 163.6 LBS | HEART RATE: 93 BPM | DIASTOLIC BLOOD PRESSURE: 65 MMHG

## 2023-01-01 VITALS
BODY MASS INDEX: 22.9 KG/M2 | HEART RATE: 60 BPM | HEIGHT: 70 IN | WEIGHT: 160 LBS | SYSTOLIC BLOOD PRESSURE: 110 MMHG | DIASTOLIC BLOOD PRESSURE: 65 MMHG | OXYGEN SATURATION: 98 %

## 2023-01-01 VITALS
TEMPERATURE: 98.4 F | OXYGEN SATURATION: 100 % | HEART RATE: 66 BPM | DIASTOLIC BLOOD PRESSURE: 70 MMHG | RESPIRATION RATE: 18 BRPM | SYSTOLIC BLOOD PRESSURE: 149 MMHG

## 2023-01-01 DIAGNOSIS — R52 GENERALIZED PAIN: ICD-10-CM

## 2023-01-01 DIAGNOSIS — I73.9 PAD (PERIPHERAL ARTERY DISEASE) (H): Chronic | ICD-10-CM

## 2023-01-01 DIAGNOSIS — G31.83 LEWY BODY DEMENTIA WITH ANXIETY, UNSPECIFIED DEMENTIA SEVERITY (H): Primary | ICD-10-CM

## 2023-01-01 DIAGNOSIS — R41.0 DELIRIUM: ICD-10-CM

## 2023-01-01 DIAGNOSIS — N39.0 URINARY TRACT INFECTION, SITE NOT SPECIFIED: ICD-10-CM

## 2023-01-01 DIAGNOSIS — I49.5 SSS (SICK SINUS SYNDROME) (H): ICD-10-CM

## 2023-01-01 DIAGNOSIS — E78.2 MIXED HYPERLIPIDEMIA: ICD-10-CM

## 2023-01-01 DIAGNOSIS — F03.B11 MODERATE DEMENTIA WITH AGITATION, UNSPECIFIED DEMENTIA TYPE (H): ICD-10-CM

## 2023-01-01 DIAGNOSIS — N40.0 BENIGN PROSTATIC HYPERPLASIA, UNSPECIFIED WHETHER LOWER URINARY TRACT SYMPTOMS PRESENT: ICD-10-CM

## 2023-01-01 DIAGNOSIS — R79.82 ELEVATED C-REACTIVE PROTEIN (CRP): ICD-10-CM

## 2023-01-01 DIAGNOSIS — I25.10 CORONARY ARTERY DISEASE INVOLVING NATIVE CORONARY ARTERY OF NATIVE HEART WITHOUT ANGINA PECTORIS: Primary | ICD-10-CM

## 2023-01-01 DIAGNOSIS — Z53.9 DIAGNOSIS NOT YET DEFINED: Primary | ICD-10-CM

## 2023-01-01 DIAGNOSIS — I83.008 VENOUS STASIS ULCER OF OTHER PART OF LOWER LEG LIMITED TO BREAKDOWN OF SKIN WITH VARICOSE VEINS, UNSPECIFIED LATERALITY (H): ICD-10-CM

## 2023-01-01 DIAGNOSIS — M10.9 GOUT OF RIGHT HAND, UNSPECIFIED CAUSE, UNSPECIFIED CHRONICITY: ICD-10-CM

## 2023-01-01 DIAGNOSIS — R13.10 DYSPHAGIA, UNSPECIFIED TYPE: ICD-10-CM

## 2023-01-01 DIAGNOSIS — I10 BENIGN ESSENTIAL HYPERTENSION: ICD-10-CM

## 2023-01-01 DIAGNOSIS — G93.40 ENCEPHALOPATHY: ICD-10-CM

## 2023-01-01 DIAGNOSIS — I49.5 TACHYCARDIA-BRADYCARDIA SYNDROME (H): ICD-10-CM

## 2023-01-01 DIAGNOSIS — I50.42 CHRONIC COMBINED SYSTOLIC AND DIASTOLIC CONGESTIVE HEART FAILURE (H): ICD-10-CM

## 2023-01-01 DIAGNOSIS — M62.81 GENERALIZED MUSCLE WEAKNESS: ICD-10-CM

## 2023-01-01 DIAGNOSIS — F02.84 LEWY BODY DEMENTIA WITH ANXIETY, UNSPECIFIED DEMENTIA SEVERITY (H): Primary | ICD-10-CM

## 2023-01-01 DIAGNOSIS — R41.82 ACUTE ON CHRONIC ALTERATION IN MENTAL STATUS: ICD-10-CM

## 2023-01-01 DIAGNOSIS — I25.10 CORONARY ARTERY DISEASE INVOLVING NATIVE CORONARY ARTERY OF NATIVE HEART, UNSPECIFIED WHETHER ANGINA PRESENT: ICD-10-CM

## 2023-01-01 DIAGNOSIS — I10 ESSENTIAL HYPERTENSION, BENIGN: Chronic | ICD-10-CM

## 2023-01-01 DIAGNOSIS — Z95.0 CARDIAC PACEMAKER IN SITU: Chronic | ICD-10-CM

## 2023-01-01 DIAGNOSIS — I48.20 CHRONIC ATRIAL FIBRILLATION (H): ICD-10-CM

## 2023-01-01 DIAGNOSIS — M35.3 POLYMYALGIA RHEUMATICA (H): ICD-10-CM

## 2023-01-01 DIAGNOSIS — I25.5 ISCHEMIC CARDIOMYOPATHY: Chronic | ICD-10-CM

## 2023-01-01 DIAGNOSIS — R45.1 AGITATION: ICD-10-CM

## 2023-01-01 DIAGNOSIS — E78.5 HYPERLIPIDEMIA LDL GOAL <100: ICD-10-CM

## 2023-01-01 DIAGNOSIS — G47.00 INSOMNIA, UNSPECIFIED TYPE: ICD-10-CM

## 2023-01-01 DIAGNOSIS — I71.43 INFRARENAL ABDOMINAL AORTIC ANEURYSM (AAA) WITHOUT RUPTURE (H): ICD-10-CM

## 2023-01-01 DIAGNOSIS — I25.10 CORONARY ARTERY DISEASE INVOLVING NATIVE CORONARY ARTERY OF NATIVE HEART WITHOUT ANGINA PECTORIS: ICD-10-CM

## 2023-01-01 DIAGNOSIS — Z87.440 PERSONAL HISTORY OF URINARY TRACT INFECTION: ICD-10-CM

## 2023-01-01 DIAGNOSIS — J06.9 UPPER RESPIRATORY TRACT INFECTION, UNSPECIFIED TYPE: ICD-10-CM

## 2023-01-01 DIAGNOSIS — G47.51 CONFUSIONAL AROUSALS: ICD-10-CM
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
## 2023-01-01 DIAGNOSIS — R41.3 MEMORY CHANGES: ICD-10-CM

## 2023-01-01 DIAGNOSIS — I48.20 CHRONIC A-FIB (H): ICD-10-CM

## 2023-01-01 DIAGNOSIS — R53.81 PHYSICAL DECONDITIONING: ICD-10-CM

## 2023-01-01 DIAGNOSIS — R46.89 AGGRESSION: ICD-10-CM

## 2023-01-01 DIAGNOSIS — R41.0 CONFUSION: Primary | ICD-10-CM

## 2023-01-01 DIAGNOSIS — N18.32 STAGE 3B CHRONIC KIDNEY DISEASE (H): ICD-10-CM

## 2023-01-01 DIAGNOSIS — E11.40 TYPE 2 DIABETES MELLITUS WITH DIABETIC NEUROPATHY, WITHOUT LONG-TERM CURRENT USE OF INSULIN (H): Chronic | ICD-10-CM

## 2023-01-01 DIAGNOSIS — Z95.0 CARDIAC PACEMAKER IN SITU: ICD-10-CM

## 2023-01-01 DIAGNOSIS — L97.801 VENOUS STASIS ULCER OF OTHER PART OF LOWER LEG LIMITED TO BREAKDOWN OF SKIN WITH VARICOSE VEINS, UNSPECIFIED LATERALITY (H): ICD-10-CM

## 2023-01-01 DIAGNOSIS — R44.3 HALLUCINATIONS: ICD-10-CM

## 2023-01-01 DIAGNOSIS — I48.0 PAROXYSMAL ATRIAL FIBRILLATION (H): Chronic | ICD-10-CM

## 2023-01-01 DIAGNOSIS — I48.0 PAROXYSMAL ATRIAL FIBRILLATION (H): ICD-10-CM

## 2023-01-01 DIAGNOSIS — R60.0 PERIPHERAL EDEMA: ICD-10-CM

## 2023-01-01 DIAGNOSIS — N30.00 ACUTE CYSTITIS WITHOUT HEMATURIA: ICD-10-CM

## 2023-01-01 DIAGNOSIS — H61.20 WAX IN EAR: ICD-10-CM

## 2023-01-01 DIAGNOSIS — G93.40 ENCEPHALOPATHY: Primary | ICD-10-CM

## 2023-01-01 DIAGNOSIS — R41.82 ALTERED MENTAL STATUS, UNSPECIFIED: ICD-10-CM

## 2023-01-01 DIAGNOSIS — E11.40 TYPE 2 DIABETES MELLITUS WITH DIABETIC NEUROPATHY, WITHOUT LONG-TERM CURRENT USE OF INSULIN (H): ICD-10-CM

## 2023-01-01 DIAGNOSIS — R42 LIGHTHEADEDNESS: ICD-10-CM

## 2023-01-01 DIAGNOSIS — R54 FRAIL ELDERLY: ICD-10-CM

## 2023-01-01 DIAGNOSIS — H61.23 BILATERAL IMPACTED CERUMEN: ICD-10-CM

## 2023-01-01 LAB
ALBUMIN SERPL BCG-MCNC: 3.4 G/DL (ref 3.5–5.2)
ALBUMIN SERPL BCG-MCNC: 3.5 G/DL (ref 3.5–5.2)
ALBUMIN UR-MCNC: 10 MG/DL
ALBUMIN UR-MCNC: 20 MG/DL
ALBUMIN UR-MCNC: 30 MG/DL
ALBUMIN UR-MCNC: 50 MG/DL
ALBUMIN UR-MCNC: NEGATIVE MG/DL
ALP SERPL-CCNC: 96 U/L (ref 40–129)
ALP SERPL-CCNC: 96 U/L (ref 40–129)
ALT SERPL W P-5'-P-CCNC: 21 U/L (ref 10–50)
ALT SERPL W P-5'-P-CCNC: 27 U/L (ref 0–70)
ANION GAP SERPL CALCULATED.3IONS-SCNC: 10 MMOL/L (ref 7–15)
ANION GAP SERPL CALCULATED.3IONS-SCNC: 10 MMOL/L (ref 7–15)
ANION GAP SERPL CALCULATED.3IONS-SCNC: 11 MMOL/L (ref 7–15)
ANION GAP SERPL CALCULATED.3IONS-SCNC: 11 MMOL/L (ref 7–15)
ANION GAP SERPL CALCULATED.3IONS-SCNC: 14 MMOL/L (ref 7–15)
ANION GAP SERPL CALCULATED.3IONS-SCNC: 14 MMOL/L (ref 7–15)
APPEARANCE UR: ABNORMAL
APPEARANCE UR: CLEAR
APPEARANCE UR: CLEAR
AST SERPL W P-5'-P-CCNC: 33 U/L (ref 0–45)
AST SERPL W P-5'-P-CCNC: 37 U/L (ref 10–50)
ATRIAL RATE - MUSE: 60 BPM
ATRIAL RATE - MUSE: 64 BPM
BACTERIA #/AREA URNS HPF: ABNORMAL /HPF
BACTERIA BLD CULT: NO GROWTH
BACTERIA BLD CULT: NO GROWTH
BACTERIA UR CULT: ABNORMAL
BACTERIA UR CULT: NO GROWTH
BACTERIA UR CULT: NORMAL
BASOPHILS # BLD AUTO: 0 10E3/UL (ref 0–0.2)
BASOPHILS # BLD AUTO: 0 10E3/UL (ref 0–0.2)
BASOPHILS NFR BLD AUTO: 0 %
BASOPHILS NFR BLD AUTO: 0 %
BILIRUB SERPL-MCNC: 0.3 MG/DL
BILIRUB SERPL-MCNC: 0.4 MG/DL
BILIRUB UR QL STRIP: NEGATIVE
BUN SERPL-MCNC: 17.6 MG/DL (ref 8–23)
BUN SERPL-MCNC: 18.4 MG/DL (ref 8–23)
BUN SERPL-MCNC: 19.6 MG/DL (ref 8–23)
BUN SERPL-MCNC: 21.7 MG/DL (ref 8–23)
BUN SERPL-MCNC: 30.3 MG/DL (ref 8–23)
BUN SERPL-MCNC: 36.9 MG/DL (ref 8–23)
CALCIUM SERPL-MCNC: 10.7 MG/DL (ref 8.8–10.2)
CALCIUM SERPL-MCNC: 8.9 MG/DL (ref 8.8–10.2)
CALCIUM SERPL-MCNC: 8.9 MG/DL (ref 8.8–10.2)
CALCIUM SERPL-MCNC: 9.2 MG/DL (ref 8.8–10.2)
CALCIUM SERPL-MCNC: 9.4 MG/DL (ref 8.8–10.2)
CALCIUM SERPL-MCNC: 9.4 MG/DL (ref 8.8–10.2)
CAOX CRY #/AREA URNS HPF: ABNORMAL /HPF
CHLORIDE SERPL-SCNC: 102 MMOL/L (ref 98–107)
CHLORIDE SERPL-SCNC: 102 MMOL/L (ref 98–107)
CHLORIDE SERPL-SCNC: 104 MMOL/L (ref 98–107)
CHLORIDE SERPL-SCNC: 106 MMOL/L (ref 98–107)
CHLORIDE SERPL-SCNC: 107 MMOL/L (ref 98–107)
CHLORIDE SERPL-SCNC: 107 MMOL/L (ref 98–107)
CHOLEST SERPL-MCNC: 130 MG/DL
CHOLEST SERPL-MCNC: 97 MG/DL
COLOR UR AUTO: ABNORMAL
CREAT SERPL-MCNC: 1.26 MG/DL (ref 0.67–1.17)
CREAT SERPL-MCNC: 1.44 MG/DL (ref 0.67–1.17)
CREAT SERPL-MCNC: 1.48 MG/DL (ref 0.67–1.17)
CREAT SERPL-MCNC: 1.5 MG/DL (ref 0.67–1.17)
CREAT SERPL-MCNC: 1.57 MG/DL (ref 0.67–1.17)
CREAT SERPL-MCNC: 1.93 MG/DL (ref 0.67–1.17)
CRP SERPL-MCNC: 119.95 MG/L
CRP SERPL-MCNC: 195.04 MG/L
CRP SERPL-MCNC: 97.32 MG/L
DEPRECATED HCO3 PLAS-SCNC: 20 MMOL/L (ref 22–29)
DEPRECATED HCO3 PLAS-SCNC: 22 MMOL/L (ref 22–29)
DEPRECATED HCO3 PLAS-SCNC: 23 MMOL/L (ref 22–29)
DEPRECATED HCO3 PLAS-SCNC: 25 MMOL/L (ref 22–29)
DIASTOLIC BLOOD PRESSURE - MUSE: NORMAL MMHG
DIASTOLIC BLOOD PRESSURE - MUSE: NORMAL MMHG
EOSINOPHIL # BLD AUTO: 0 10E3/UL (ref 0–0.7)
EOSINOPHIL # BLD AUTO: 0.2 10E3/UL (ref 0–0.7)
EOSINOPHIL NFR BLD AUTO: 0 %
EOSINOPHIL NFR BLD AUTO: 2 %
ERYTHROCYTE [DISTWIDTH] IN BLOOD BY AUTOMATED COUNT: 15.1 % (ref 10–15)
ERYTHROCYTE [DISTWIDTH] IN BLOOD BY AUTOMATED COUNT: 15.2 % (ref 10–15)
ERYTHROCYTE [DISTWIDTH] IN BLOOD BY AUTOMATED COUNT: 15.3 % (ref 10–15)
ERYTHROCYTE [DISTWIDTH] IN BLOOD BY AUTOMATED COUNT: 15.5 % (ref 10–15)
ERYTHROCYTE [DISTWIDTH] IN BLOOD BY AUTOMATED COUNT: 15.6 % (ref 10–15)
ERYTHROCYTE [DISTWIDTH] IN BLOOD BY AUTOMATED COUNT: 15.7 % (ref 10–15)
ERYTHROCYTE [SEDIMENTATION RATE] IN BLOOD BY WESTERGREN METHOD: 61 MM/HR (ref 0–20)
ETHANOL SERPL-MCNC: <0.01 G/DL
FLUAV RNA SPEC QL NAA+PROBE: NEGATIVE
FLUBV RNA RESP QL NAA+PROBE: NEGATIVE
FOLATE SERPL-MCNC: 10.7 NG/ML (ref 4.6–34.8)
GFR SERPL CREATININE-BSD FRML MDRD: 33 ML/MIN/1.73M2
GFR SERPL CREATININE-BSD FRML MDRD: 42 ML/MIN/1.73M2
GFR SERPL CREATININE-BSD FRML MDRD: 44 ML/MIN/1.73M2
GFR SERPL CREATININE-BSD FRML MDRD: 45 ML/MIN/1.73M2
GFR SERPL CREATININE-BSD FRML MDRD: 46 ML/MIN/1.73M2
GFR SERPL CREATININE-BSD FRML MDRD: 55 ML/MIN/1.73M2
GLUCOSE BLDC GLUCOMTR-MCNC: 99 MG/DL (ref 70–99)
GLUCOSE SERPL-MCNC: 104 MG/DL (ref 70–99)
GLUCOSE SERPL-MCNC: 109 MG/DL (ref 70–99)
GLUCOSE SERPL-MCNC: 123 MG/DL (ref 70–99)
GLUCOSE SERPL-MCNC: 124 MG/DL (ref 70–99)
GLUCOSE SERPL-MCNC: 124 MG/DL (ref 70–99)
GLUCOSE SERPL-MCNC: 94 MG/DL (ref 70–99)
GLUCOSE UR STRIP-MCNC: NEGATIVE MG/DL
HCT VFR BLD AUTO: 33.4 % (ref 40–53)
HCT VFR BLD AUTO: 35.4 % (ref 40–53)
HCT VFR BLD AUTO: 35.6 % (ref 40–53)
HCT VFR BLD AUTO: 35.8 % (ref 40–53)
HCT VFR BLD AUTO: 36.6 % (ref 40–53)
HCT VFR BLD AUTO: 37.5 % (ref 40–53)
HDLC SERPL-MCNC: 30 MG/DL
HDLC SERPL-MCNC: 30 MG/DL
HGB BLD-MCNC: 10.6 G/DL (ref 13.3–17.7)
HGB BLD-MCNC: 11.1 G/DL (ref 13.3–17.7)
HGB BLD-MCNC: 11.2 G/DL (ref 13.3–17.7)
HGB BLD-MCNC: 11.2 G/DL (ref 13.3–17.7)
HGB BLD-MCNC: 11.5 G/DL (ref 13.3–17.7)
HGB BLD-MCNC: 11.7 G/DL (ref 13.3–17.7)
HGB UR QL STRIP: ABNORMAL
HGB UR QL STRIP: NEGATIVE
HGB UR QL STRIP: NEGATIVE
HOLD SPECIMEN: NORMAL
HYALINE CASTS: 28 /LPF
HYALINE CASTS: 7 /LPF
HYALINE CASTS: 8 /LPF
IMM GRANULOCYTES # BLD: 0 10E3/UL
IMM GRANULOCYTES # BLD: 0 10E3/UL
IMM GRANULOCYTES NFR BLD: 0 %
IMM GRANULOCYTES NFR BLD: 0 %
INTERPRETATION ECG - MUSE: NORMAL
INTERPRETATION ECG - MUSE: NORMAL
KETONES UR STRIP-MCNC: NEGATIVE MG/DL
LACTATE SERPL-SCNC: 0.8 MMOL/L (ref 0.7–2)
LACTATE SERPL-SCNC: 1 MMOL/L (ref 0.7–2)
LACTATE SERPL-SCNC: 1.1 MMOL/L (ref 0.7–2)
LACTATE SERPL-SCNC: 1.3 MMOL/L (ref 0.7–2)
LDLC SERPL CALC-MCNC: 41 MG/DL
LDLC SERPL CALC-MCNC: 80 MG/DL
LEUKOCYTE ESTERASE UR QL STRIP: ABNORMAL
LEUKOCYTE ESTERASE UR QL STRIP: NEGATIVE
LEUKOCYTE ESTERASE UR QL STRIP: NEGATIVE
LVEF ECHO: NORMAL
LYMPHOCYTES # BLD AUTO: 0.8 10E3/UL (ref 0.8–5.3)
LYMPHOCYTES # BLD AUTO: 1.4 10E3/UL (ref 0.8–5.3)
LYMPHOCYTES NFR BLD AUTO: 19 %
LYMPHOCYTES NFR BLD AUTO: 7 %
MCH RBC QN AUTO: 29.9 PG (ref 26.5–33)
MCH RBC QN AUTO: 30.9 PG (ref 26.5–33)
MCH RBC QN AUTO: 31 PG (ref 26.5–33)
MCH RBC QN AUTO: 31.2 PG (ref 26.5–33)
MCH RBC QN AUTO: 31.2 PG (ref 26.5–33)
MCH RBC QN AUTO: 31.3 PG (ref 26.5–33)
MCHC RBC AUTO-ENTMCNC: 29.6 G/DL (ref 31.5–36.5)
MCHC RBC AUTO-ENTMCNC: 31.5 G/DL (ref 31.5–36.5)
MCHC RBC AUTO-ENTMCNC: 31.6 G/DL (ref 31.5–36.5)
MCHC RBC AUTO-ENTMCNC: 31.7 G/DL (ref 31.5–36.5)
MCHC RBC AUTO-ENTMCNC: 32 G/DL (ref 31.5–36.5)
MCHC RBC AUTO-ENTMCNC: 32.1 G/DL (ref 31.5–36.5)
MCV RBC AUTO: 101 FL (ref 78–100)
MCV RBC AUTO: 97 FL (ref 78–100)
MCV RBC AUTO: 97 FL (ref 78–100)
MCV RBC AUTO: 98 FL (ref 78–100)
MCV RBC AUTO: 99 FL (ref 78–100)
MCV RBC AUTO: 99 FL (ref 78–100)
MDC_IDC_EPISODE_DTM: NORMAL
MDC_IDC_EPISODE_DTM: NORMAL
MDC_IDC_EPISODE_DURATION: 16 S
MDC_IDC_EPISODE_DURATION: 98 S
MDC_IDC_EPISODE_ID: NORMAL
MDC_IDC_EPISODE_ID: NORMAL
MDC_IDC_EPISODE_TYPE: NORMAL
MDC_IDC_EPISODE_TYPE: NORMAL
MDC_IDC_LEAD_IMPLANT_DT: NORMAL
MDC_IDC_LEAD_LOCATION: NORMAL
MDC_IDC_LEAD_MFG: NORMAL
MDC_IDC_LEAD_MODEL: NORMAL
MDC_IDC_LEAD_POLARITY_TYPE: NORMAL
MDC_IDC_LEAD_SERIAL: NORMAL
MDC_IDC_MSMT_BATTERY_DTM: NORMAL
MDC_IDC_MSMT_BATTERY_REMAINING_LONGEVITY: 87 MO
MDC_IDC_MSMT_BATTERY_REMAINING_LONGEVITY: 89 MO
MDC_IDC_MSMT_BATTERY_REMAINING_LONGEVITY: 94 MO
MDC_IDC_MSMT_BATTERY_REMAINING_PERCENTAGE: 73 %
MDC_IDC_MSMT_BATTERY_REMAINING_PERCENTAGE: 76 %
MDC_IDC_MSMT_BATTERY_REMAINING_PERCENTAGE: 79 %
MDC_IDC_MSMT_BATTERY_RRT_TRIGGER: NORMAL
MDC_IDC_MSMT_BATTERY_STATUS: NORMAL
MDC_IDC_MSMT_BATTERY_VOLTAGE: 3.01 V
MDC_IDC_MSMT_LEADCHNL_RA_IMPEDANCE_VALUE: 360 OHM
MDC_IDC_MSMT_LEADCHNL_RA_IMPEDANCE_VALUE: 360 OHM
MDC_IDC_MSMT_LEADCHNL_RA_IMPEDANCE_VALUE: 400 OHM
MDC_IDC_MSMT_LEADCHNL_RA_LEAD_CHANNEL_STATUS: NORMAL
MDC_IDC_MSMT_LEADCHNL_RA_PACING_THRESHOLD_AMPLITUDE: 0.62 V
MDC_IDC_MSMT_LEADCHNL_RA_PACING_THRESHOLD_PULSEWIDTH: 0.5 MS
MDC_IDC_MSMT_LEADCHNL_RA_SENSING_INTR_AMPL: 2.6 MV
MDC_IDC_MSMT_LEADCHNL_RA_SENSING_INTR_AMPL: 3.6 MV
MDC_IDC_MSMT_LEADCHNL_RA_SENSING_INTR_AMPL: 4.2 MV
MDC_IDC_MSMT_LEADCHNL_RV_IMPEDANCE_VALUE: 480 OHM
MDC_IDC_MSMT_LEADCHNL_RV_IMPEDANCE_VALUE: 510 OHM
MDC_IDC_MSMT_LEADCHNL_RV_IMPEDANCE_VALUE: 560 OHM
MDC_IDC_MSMT_LEADCHNL_RV_LEAD_CHANNEL_STATUS: NORMAL
MDC_IDC_MSMT_LEADCHNL_RV_PACING_THRESHOLD_AMPLITUDE: 1 V
MDC_IDC_MSMT_LEADCHNL_RV_PACING_THRESHOLD_AMPLITUDE: 1 V
MDC_IDC_MSMT_LEADCHNL_RV_PACING_THRESHOLD_AMPLITUDE: 1.12 V
MDC_IDC_MSMT_LEADCHNL_RV_PACING_THRESHOLD_PULSEWIDTH: 0.5 MS
MDC_IDC_MSMT_LEADCHNL_RV_SENSING_INTR_AMPL: 10.4 MV
MDC_IDC_MSMT_LEADCHNL_RV_SENSING_INTR_AMPL: 12 MV
MDC_IDC_MSMT_LEADCHNL_RV_SENSING_INTR_AMPL: 9.6 MV
MDC_IDC_PG_IMPLANT_DTM: NORMAL
MDC_IDC_PG_MFG: NORMAL
MDC_IDC_PG_MODEL: NORMAL
MDC_IDC_PG_SERIAL: NORMAL
MDC_IDC_PG_TYPE: NORMAL
MDC_IDC_SESS_CLINIC_NAME: NORMAL
MDC_IDC_SESS_DTM: NORMAL
MDC_IDC_SESS_REPROGRAMMED: NO
MDC_IDC_SESS_TYPE: NORMAL
MDC_IDC_SET_BRADY_AT_MODE_SWITCH_MODE: NORMAL
MDC_IDC_SET_BRADY_AT_MODE_SWITCH_RATE: 180 {BEATS}/MIN
MDC_IDC_SET_BRADY_LOWRATE: 60 {BEATS}/MIN
MDC_IDC_SET_BRADY_MAX_SENSOR_RATE: 120 {BEATS}/MIN
MDC_IDC_SET_BRADY_MAX_TRACKING_RATE: 120 {BEATS}/MIN
MDC_IDC_SET_BRADY_MODE: NORMAL
MDC_IDC_SET_BRADY_PAV_DELAY_LOW: 200 MS
MDC_IDC_SET_BRADY_SAV_DELAY_LOW: 170 MS
MDC_IDC_SET_LEADCHNL_RA_PACING_AMPLITUDE: 1.62
MDC_IDC_SET_LEADCHNL_RA_PACING_ANODE_ELECTRODE_1: NORMAL
MDC_IDC_SET_LEADCHNL_RA_PACING_ANODE_LOCATION_1: NORMAL
MDC_IDC_SET_LEADCHNL_RA_PACING_CAPTURE_MODE: NORMAL
MDC_IDC_SET_LEADCHNL_RA_PACING_CATHODE_ELECTRODE_1: NORMAL
MDC_IDC_SET_LEADCHNL_RA_PACING_CATHODE_LOCATION_1: NORMAL
MDC_IDC_SET_LEADCHNL_RA_PACING_POLARITY: NORMAL
MDC_IDC_SET_LEADCHNL_RA_PACING_PULSEWIDTH: 0.5 MS
MDC_IDC_SET_LEADCHNL_RA_SENSING_ADAPTATION_MODE: NORMAL
MDC_IDC_SET_LEADCHNL_RA_SENSING_ANODE_ELECTRODE_1: NORMAL
MDC_IDC_SET_LEADCHNL_RA_SENSING_ANODE_LOCATION_1: NORMAL
MDC_IDC_SET_LEADCHNL_RA_SENSING_CATHODE_ELECTRODE_1: NORMAL
MDC_IDC_SET_LEADCHNL_RA_SENSING_CATHODE_LOCATION_1: NORMAL
MDC_IDC_SET_LEADCHNL_RA_SENSING_POLARITY: NORMAL
MDC_IDC_SET_LEADCHNL_RA_SENSING_SENSITIVITY: 0.5 MV
MDC_IDC_SET_LEADCHNL_RV_PACING_AMPLITUDE: 1.25 V
MDC_IDC_SET_LEADCHNL_RV_PACING_AMPLITUDE: 1.25 V
MDC_IDC_SET_LEADCHNL_RV_PACING_AMPLITUDE: 1.38
MDC_IDC_SET_LEADCHNL_RV_PACING_ANODE_ELECTRODE_1: NORMAL
MDC_IDC_SET_LEADCHNL_RV_PACING_ANODE_LOCATION_1: NORMAL
MDC_IDC_SET_LEADCHNL_RV_PACING_CAPTURE_MODE: NORMAL
MDC_IDC_SET_LEADCHNL_RV_PACING_CATHODE_ELECTRODE_1: NORMAL
MDC_IDC_SET_LEADCHNL_RV_PACING_CATHODE_LOCATION_1: NORMAL
MDC_IDC_SET_LEADCHNL_RV_PACING_POLARITY: NORMAL
MDC_IDC_SET_LEADCHNL_RV_PACING_PULSEWIDTH: 0.5 MS
MDC_IDC_SET_LEADCHNL_RV_SENSING_ADAPTATION_MODE: NORMAL
MDC_IDC_SET_LEADCHNL_RV_SENSING_ANODE_ELECTRODE_1: NORMAL
MDC_IDC_SET_LEADCHNL_RV_SENSING_ANODE_LOCATION_1: NORMAL
MDC_IDC_SET_LEADCHNL_RV_SENSING_CATHODE_ELECTRODE_1: NORMAL
MDC_IDC_SET_LEADCHNL_RV_SENSING_CATHODE_LOCATION_1: NORMAL
MDC_IDC_SET_LEADCHNL_RV_SENSING_POLARITY: NORMAL
MDC_IDC_SET_LEADCHNL_RV_SENSING_SENSITIVITY: 2 MV
MDC_IDC_STAT_AT_BURDEN_PERCENT: 0 %
MDC_IDC_STAT_AT_BURDEN_PERCENT: 1 %
MDC_IDC_STAT_AT_BURDEN_PERCENT: 1 %
MDC_IDC_STAT_AT_DTM_END: NORMAL
MDC_IDC_STAT_AT_DTM_START: NORMAL
MDC_IDC_STAT_AT_MODE_SW_COUNT: 0
MDC_IDC_STAT_AT_MODE_SW_COUNT: 1
MDC_IDC_STAT_AT_MODE_SW_COUNT: 1
MDC_IDC_STAT_AT_MODE_SW_COUNT_PER_DAY: 0
MDC_IDC_STAT_AT_MODE_SW_MAX_DURATION: 16 S
MDC_IDC_STAT_AT_MODE_SW_MAX_DURATION: 98 S
MDC_IDC_STAT_AT_MODE_SW_PERCENT_TIME: 0 %
MDC_IDC_STAT_AT_MODE_SW_PERCENT_TIME: 1 %
MDC_IDC_STAT_AT_MODE_SW_PERCENT_TIME: 1 %
MDC_IDC_STAT_BRADY_AP_VP_PERCENT: 1 %
MDC_IDC_STAT_BRADY_AP_VP_PERCENT: 2.2 %
MDC_IDC_STAT_BRADY_AP_VP_PERCENT: 7.4 %
MDC_IDC_STAT_BRADY_AP_VS_PERCENT: 81 %
MDC_IDC_STAT_BRADY_AP_VS_PERCENT: 86 %
MDC_IDC_STAT_BRADY_AP_VS_PERCENT: 90 %
MDC_IDC_STAT_BRADY_AS_VP_PERCENT: 1 %
MDC_IDC_STAT_BRADY_AS_VS_PERCENT: 18 %
MDC_IDC_STAT_BRADY_AS_VS_PERCENT: 6.7 %
MDC_IDC_STAT_BRADY_AS_VS_PERCENT: 8.1 %
MDC_IDC_STAT_BRADY_DTM_END: NORMAL
MDC_IDC_STAT_BRADY_DTM_START: NORMAL
MDC_IDC_STAT_BRADY_RA_PERCENT_PACED: 81 %
MDC_IDC_STAT_BRADY_RA_PERCENT_PACED: 91 %
MDC_IDC_STAT_BRADY_RA_PERCENT_PACED: 93 %
MDC_IDC_STAT_BRADY_RV_PERCENT_PACED: 1 %
MDC_IDC_STAT_BRADY_RV_PERCENT_PACED: 2.2 %
MDC_IDC_STAT_BRADY_RV_PERCENT_PACED: 7.5 %
MDC_IDC_STAT_CRT_DTM_END: NORMAL
MDC_IDC_STAT_CRT_DTM_START: NORMAL
MDC_IDC_STAT_HEART_RATE_ATRIAL_MAX: 300 {BEATS}/MIN
MDC_IDC_STAT_HEART_RATE_ATRIAL_MAX: 310 {BEATS}/MIN
MDC_IDC_STAT_HEART_RATE_ATRIAL_MAX: 330 {BEATS}/MIN
MDC_IDC_STAT_HEART_RATE_ATRIAL_MEAN: 67 {BEATS}/MIN
MDC_IDC_STAT_HEART_RATE_ATRIAL_MIN: 50 {BEATS}/MIN
MDC_IDC_STAT_HEART_RATE_DTM_END: NORMAL
MDC_IDC_STAT_HEART_RATE_DTM_START: NORMAL
MDC_IDC_STAT_HEART_RATE_VENTRICULAR_MAX: 180 {BEATS}/MIN
MDC_IDC_STAT_HEART_RATE_VENTRICULAR_MAX: 190 {BEATS}/MIN
MDC_IDC_STAT_HEART_RATE_VENTRICULAR_MAX: 190 {BEATS}/MIN
MDC_IDC_STAT_HEART_RATE_VENTRICULAR_MEAN: 66 {BEATS}/MIN
MDC_IDC_STAT_HEART_RATE_VENTRICULAR_MEAN: 66 {BEATS}/MIN
MDC_IDC_STAT_HEART_RATE_VENTRICULAR_MEAN: 67 {BEATS}/MIN
MDC_IDC_STAT_HEART_RATE_VENTRICULAR_MIN: 40 {BEATS}/MIN
MONOCYTES # BLD AUTO: 0.8 10E3/UL (ref 0–1.3)
MONOCYTES # BLD AUTO: 1.2 10E3/UL (ref 0–1.3)
MONOCYTES NFR BLD AUTO: 10 %
MONOCYTES NFR BLD AUTO: 11 %
MUCOUS THREADS #/AREA URNS LPF: PRESENT /LPF
NEUTROPHILS # BLD AUTO: 4.9 10E3/UL (ref 1.6–8.3)
NEUTROPHILS # BLD AUTO: 9.8 10E3/UL (ref 1.6–8.3)
NEUTROPHILS NFR BLD AUTO: 68 %
NEUTROPHILS NFR BLD AUTO: 83 %
NITRATE UR QL: NEGATIVE
NITRATE UR QL: POSITIVE
NONHDLC SERPL-MCNC: 100 MG/DL
NONHDLC SERPL-MCNC: 67 MG/DL
NRBC # BLD AUTO: 0 10E3/UL
NRBC # BLD AUTO: 0 10E3/UL
NRBC BLD AUTO-RTO: 0 /100
NRBC BLD AUTO-RTO: 0 /100
P AXIS - MUSE: 14 DEGREES
P AXIS - MUSE: 35 DEGREES
PH UR STRIP: 5.5 [PH] (ref 5–7)
PH UR STRIP: 6 [PH] (ref 5–7)
PH UR STRIP: 6 [PH] (ref 5–7)
PLATELET # BLD AUTO: 190 10E3/UL (ref 150–450)
PLATELET # BLD AUTO: 206 10E3/UL (ref 150–450)
PLATELET # BLD AUTO: 211 10E3/UL (ref 150–450)
PLATELET # BLD AUTO: 218 10E3/UL (ref 150–450)
PLATELET # BLD AUTO: 227 10E3/UL (ref 150–450)
PLATELET # BLD AUTO: 284 10E3/UL (ref 150–450)
POTASSIUM SERPL-SCNC: 3.2 MMOL/L (ref 3.4–5.3)
POTASSIUM SERPL-SCNC: 3.4 MMOL/L (ref 3.4–5.3)
POTASSIUM SERPL-SCNC: 3.6 MMOL/L (ref 3.4–5.3)
POTASSIUM SERPL-SCNC: 3.8 MMOL/L (ref 3.4–5.3)
POTASSIUM SERPL-SCNC: 3.8 MMOL/L (ref 3.4–5.3)
POTASSIUM SERPL-SCNC: 3.9 MMOL/L (ref 3.4–5.3)
POTASSIUM SERPL-SCNC: 4.3 MMOL/L (ref 3.4–5.3)
PR INTERVAL - MUSE: 282 MS
PR INTERVAL - MUSE: 304 MS
PROCALCITONIN SERPL IA-MCNC: 0.06 NG/ML
PROT SERPL-MCNC: 8.2 G/DL (ref 6.4–8.3)
PROT SERPL-MCNC: 8.9 G/DL (ref 6.4–8.3)
QRS DURATION - MUSE: 130 MS
QRS DURATION - MUSE: 150 MS
QT - MUSE: 444 MS
QT - MUSE: 492 MS
QTC - MUSE: 458 MS
QTC - MUSE: 492 MS
R AXIS - MUSE: -63 DEGREES
R AXIS - MUSE: -67 DEGREES
RBC # BLD AUTO: 3.39 10E6/UL (ref 4.4–5.9)
RBC # BLD AUTO: 3.59 10E6/UL (ref 4.4–5.9)
RBC # BLD AUTO: 3.62 10E6/UL (ref 4.4–5.9)
RBC # BLD AUTO: 3.69 10E6/UL (ref 4.4–5.9)
RBC # BLD AUTO: 3.71 10E6/UL (ref 4.4–5.9)
RBC # BLD AUTO: 3.77 10E6/UL (ref 4.4–5.9)
RBC URINE: 0 /HPF
RBC URINE: 116 /HPF
RBC URINE: 2 /HPF
RBC URINE: 20 /HPF
RBC URINE: 3 /HPF
RBC URINE: 9 /HPF
RSV RNA SPEC NAA+PROBE: NEGATIVE
SARS-COV-2 RNA RESP QL NAA+PROBE: NEGATIVE
SODIUM SERPL-SCNC: 135 MMOL/L (ref 136–145)
SODIUM SERPL-SCNC: 138 MMOL/L (ref 136–145)
SODIUM SERPL-SCNC: 138 MMOL/L (ref 136–145)
SODIUM SERPL-SCNC: 139 MMOL/L (ref 136–145)
SODIUM SERPL-SCNC: 141 MMOL/L (ref 136–145)
SODIUM SERPL-SCNC: 143 MMOL/L (ref 136–145)
SP GR UR STRIP: 1.01 (ref 1–1.03)
SP GR UR STRIP: 1.02 (ref 1–1.03)
SQUAMOUS EPITHELIAL: 1 /HPF
SQUAMOUS EPITHELIAL: <1 /HPF
SYSTOLIC BLOOD PRESSURE - MUSE: NORMAL MMHG
SYSTOLIC BLOOD PRESSURE - MUSE: NORMAL MMHG
T AXIS - MUSE: -14 DEGREES
T AXIS - MUSE: 6 DEGREES
TRIGL SERPL-MCNC: 100 MG/DL
TRIGL SERPL-MCNC: 129 MG/DL
TROPONIN T SERPL HS-MCNC: 31 NG/L
TROPONIN T SERPL HS-MCNC: 47 NG/L
TROPONIN T SERPL HS-MCNC: 58 NG/L
TSH SERPL DL<=0.005 MIU/L-ACNC: 3.45 UIU/ML (ref 0.3–4.2)
URATE SERPL-MCNC: 6.6 MG/DL (ref 3.4–7)
UROBILINOGEN UR STRIP-MCNC: NORMAL MG/DL
VENTRICULAR RATE- MUSE: 60 BPM
VENTRICULAR RATE- MUSE: 64 BPM
VIT B12 SERPL-MCNC: 274 PG/ML (ref 232–1245)
WBC # BLD AUTO: 12 10E3/UL (ref 4–11)
WBC # BLD AUTO: 13 10E3/UL (ref 4–11)
WBC # BLD AUTO: 7.3 10E3/UL (ref 4–11)
WBC # BLD AUTO: 9.3 10E3/UL (ref 4–11)
WBC # BLD AUTO: 9.8 10E3/UL (ref 4–11)
WBC # BLD AUTO: 9.8 10E3/UL (ref 4–11)
WBC CLUMPS #/AREA URNS HPF: PRESENT /HPF
WBC URINE: 25 /HPF
WBC URINE: <1 /HPF
WBC URINE: <1 /HPF
WBC URINE: >182 /HPF

## 2023-01-01 PROCEDURE — 85027 COMPLETE CBC AUTOMATED: CPT | Performed by: INTERNAL MEDICINE

## 2023-01-01 PROCEDURE — 80053 COMPREHEN METABOLIC PANEL: CPT | Performed by: EMERGENCY MEDICINE

## 2023-01-01 PROCEDURE — 120N000001 HC R&B MED SURG/OB

## 2023-01-01 PROCEDURE — P9604 ONE-WAY ALLOW PRORATED TRIP: HCPCS | Mod: ORL

## 2023-01-01 PROCEDURE — 87086 URINE CULTURE/COLONY COUNT: CPT | Performed by: INTERNAL MEDICINE

## 2023-01-01 PROCEDURE — 99316 NF DSCHRG MGMT 30 MIN+: CPT

## 2023-01-01 PROCEDURE — 36415 COLL VENOUS BLD VENIPUNCTURE: CPT | Performed by: INTERNAL MEDICINE

## 2023-01-01 PROCEDURE — 258N000003 HC RX IP 258 OP 636: Performed by: INTERNAL MEDICINE

## 2023-01-01 PROCEDURE — 97530 THERAPEUTIC ACTIVITIES: CPT | Mod: GP

## 2023-01-01 PROCEDURE — 250N000013 HC RX MED GY IP 250 OP 250 PS 637: Performed by: INTERNAL MEDICINE

## 2023-01-01 PROCEDURE — 96361 HYDRATE IV INFUSION ADD-ON: CPT

## 2023-01-01 PROCEDURE — G0378 HOSPITAL OBSERVATION PER HR: HCPCS

## 2023-01-01 PROCEDURE — 250N000013 HC RX MED GY IP 250 OP 250 PS 637: Performed by: HOSPITALIST

## 2023-01-01 PROCEDURE — 85652 RBC SED RATE AUTOMATED: CPT | Performed by: INTERNAL MEDICINE

## 2023-01-01 PROCEDURE — 99233 SBSQ HOSP IP/OBS HIGH 50: CPT | Performed by: INTERNAL MEDICINE

## 2023-01-01 PROCEDURE — L3807 WHFO W/O JOINTS PRE CST: HCPCS

## 2023-01-01 PROCEDURE — 86140 C-REACTIVE PROTEIN: CPT | Performed by: INTERNAL MEDICINE

## 2023-01-01 PROCEDURE — 81001 URINALYSIS AUTO W/SCOPE: CPT | Mod: ORL | Performed by: NURSE PRACTITIONER

## 2023-01-01 PROCEDURE — 99350 HOME/RES VST EST HIGH MDM 60: CPT | Performed by: NURSE PRACTITIONER

## 2023-01-01 PROCEDURE — 999N000208 ECHOCARDIOGRAM COMPLETE

## 2023-01-01 PROCEDURE — 83605 ASSAY OF LACTIC ACID: CPT | Performed by: INTERNAL MEDICINE

## 2023-01-01 PROCEDURE — 93294 REM INTERROG EVL PM/LDLS PM: CPT | Performed by: INTERNAL MEDICINE

## 2023-01-01 PROCEDURE — 92526 ORAL FUNCTION THERAPY: CPT | Mod: GN

## 2023-01-01 PROCEDURE — 87086 URINE CULTURE/COLONY COUNT: CPT | Mod: ORL | Performed by: NURSE PRACTITIONER

## 2023-01-01 PROCEDURE — 92610 EVALUATE SWALLOWING FUNCTION: CPT | Mod: GN

## 2023-01-01 PROCEDURE — 85025 COMPLETE CBC W/AUTO DIFF WBC: CPT | Performed by: EMERGENCY MEDICINE

## 2023-01-01 PROCEDURE — 36415 COLL VENOUS BLD VENIPUNCTURE: CPT | Performed by: EMERGENCY MEDICINE

## 2023-01-01 PROCEDURE — 82310 ASSAY OF CALCIUM: CPT | Performed by: INTERNAL MEDICINE

## 2023-01-01 PROCEDURE — 99309 SBSQ NF CARE MODERATE MDM 30: CPT

## 2023-01-01 PROCEDURE — 70551 MRI BRAIN STEM W/O DYE: CPT

## 2023-01-01 PROCEDURE — 80061 LIPID PANEL: CPT | Performed by: PHYSICIAN ASSISTANT

## 2023-01-01 PROCEDURE — 97161 PT EVAL LOW COMPLEX 20 MIN: CPT | Mod: GP | Performed by: PHYSICAL THERAPIST

## 2023-01-01 PROCEDURE — 80061 LIPID PANEL: CPT | Performed by: INTERNAL MEDICINE

## 2023-01-01 PROCEDURE — 93296 REM INTERROG EVL PM/IDS: CPT | Performed by: INTERNAL MEDICINE

## 2023-01-01 PROCEDURE — 87088 URINE BACTERIA CULTURE: CPT | Mod: ORL | Performed by: NURSE PRACTITIONER

## 2023-01-01 PROCEDURE — 99284 EMERGENCY DEPT VISIT MOD MDM: CPT

## 2023-01-01 PROCEDURE — 84484 ASSAY OF TROPONIN QUANT: CPT | Performed by: EMERGENCY MEDICINE

## 2023-01-01 PROCEDURE — 99239 HOSP IP/OBS DSCHRG MGMT >30: CPT | Performed by: INTERNAL MEDICINE

## 2023-01-01 PROCEDURE — G0180 MD CERTIFICATION HHA PATIENT: HCPCS | Performed by: NURSE PRACTITIONER

## 2023-01-01 PROCEDURE — 80053 COMPREHEN METABOLIC PANEL: CPT | Performed by: INTERNAL MEDICINE

## 2023-01-01 PROCEDURE — 87040 BLOOD CULTURE FOR BACTERIA: CPT | Performed by: EMERGENCY MEDICINE

## 2023-01-01 PROCEDURE — 93306 TTE W/DOPPLER COMPLETE: CPT | Mod: 26 | Performed by: INTERNAL MEDICINE

## 2023-01-01 PROCEDURE — 73140 X-RAY EXAM OF FINGER(S): CPT | Mod: RT

## 2023-01-01 PROCEDURE — 84484 ASSAY OF TROPONIN QUANT: CPT | Performed by: INTERNAL MEDICINE

## 2023-01-01 PROCEDURE — 250N000011 HC RX IP 250 OP 636: Performed by: INTERNAL MEDICINE

## 2023-01-01 PROCEDURE — 99285 EMERGENCY DEPT VISIT HI MDM: CPT | Mod: 25,CS

## 2023-01-01 PROCEDURE — 250N000013 HC RX MED GY IP 250 OP 250 PS 637: Performed by: PHYSICIAN ASSISTANT

## 2023-01-01 PROCEDURE — 82077 ASSAY SPEC XCP UR&BREATH IA: CPT | Performed by: EMERGENCY MEDICINE

## 2023-01-01 PROCEDURE — 71046 X-RAY EXAM CHEST 2 VIEWS: CPT

## 2023-01-01 PROCEDURE — 250N000012 HC RX MED GY IP 250 OP 636 PS 637: Performed by: INTERNAL MEDICINE

## 2023-01-01 PROCEDURE — 93005 ELECTROCARDIOGRAM TRACING: CPT

## 2023-01-01 PROCEDURE — 81015 MICROSCOPIC EXAM OF URINE: CPT | Mod: ORL | Performed by: INTERNAL MEDICINE

## 2023-01-01 PROCEDURE — 73030 X-RAY EXAM OF SHOULDER: CPT | Mod: RT

## 2023-01-01 PROCEDURE — 99305 1ST NF CARE MODERATE MDM 35: CPT | Performed by: INTERNAL MEDICINE

## 2023-01-01 PROCEDURE — 99349 HOME/RES VST EST MOD MDM 40: CPT | Performed by: NURSE PRACTITIONER

## 2023-01-01 PROCEDURE — 70450 CT HEAD/BRAIN W/O DYE: CPT

## 2023-01-01 PROCEDURE — 255N000002 HC RX 255 OP 636: Performed by: INTERNAL MEDICINE

## 2023-01-01 PROCEDURE — 99232 SBSQ HOSP IP/OBS MODERATE 35: CPT | Performed by: INTERNAL MEDICINE

## 2023-01-01 PROCEDURE — 81001 URINALYSIS AUTO W/SCOPE: CPT | Performed by: EMERGENCY MEDICINE

## 2023-01-01 PROCEDURE — 85027 COMPLETE CBC AUTOMATED: CPT | Mod: ORL

## 2023-01-01 PROCEDURE — 84550 ASSAY OF BLOOD/URIC ACID: CPT | Performed by: INTERNAL MEDICINE

## 2023-01-01 PROCEDURE — 96360 HYDRATION IV INFUSION INIT: CPT

## 2023-01-01 PROCEDURE — 99215 OFFICE O/P EST HI 40 MIN: CPT | Performed by: PHYSICIAN ASSISTANT

## 2023-01-01 PROCEDURE — 36415 COLL VENOUS BLD VENIPUNCTURE: CPT | Mod: ORL

## 2023-01-01 PROCEDURE — 82607 VITAMIN B-12: CPT | Performed by: PSYCHIATRY & NEUROLOGY

## 2023-01-01 PROCEDURE — 82746 ASSAY OF FOLIC ACID SERUM: CPT | Mod: ORL

## 2023-01-01 PROCEDURE — 83605 ASSAY OF LACTIC ACID: CPT | Performed by: EMERGENCY MEDICINE

## 2023-01-01 PROCEDURE — 99222 1ST HOSP IP/OBS MODERATE 55: CPT | Performed by: INTERNAL MEDICINE

## 2023-01-01 PROCEDURE — 99310 SBSQ NF CARE HIGH MDM 45: CPT

## 2023-01-01 PROCEDURE — 84132 ASSAY OF SERUM POTASSIUM: CPT | Performed by: INTERNAL MEDICINE

## 2023-01-01 PROCEDURE — 80048 BASIC METABOLIC PNL TOTAL CA: CPT | Performed by: EMERGENCY MEDICINE

## 2023-01-01 PROCEDURE — C9803 HOPD COVID-19 SPEC COLLECT: HCPCS

## 2023-01-01 PROCEDURE — 99215 OFFICE O/P EST HI 40 MIN: CPT | Performed by: INTERNAL MEDICINE

## 2023-01-01 PROCEDURE — 99214 OFFICE O/P EST MOD 30 MIN: CPT | Performed by: PHYSICIAN ASSISTANT

## 2023-01-01 PROCEDURE — 92526 ORAL FUNCTION THERAPY: CPT | Mod: GN | Performed by: SPEECH-LANGUAGE PATHOLOGIST

## 2023-01-01 PROCEDURE — 80048 BASIC METABOLIC PNL TOTAL CA: CPT | Mod: ORL

## 2023-01-01 PROCEDURE — 36415 COLL VENOUS BLD VENIPUNCTURE: CPT | Performed by: PHYSICIAN ASSISTANT

## 2023-01-01 PROCEDURE — 87086 URINE CULTURE/COLONY COUNT: CPT | Mod: ORL | Performed by: INTERNAL MEDICINE

## 2023-01-01 PROCEDURE — 84443 ASSAY THYROID STIM HORMONE: CPT | Performed by: PSYCHIATRY & NEUROLOGY

## 2023-01-01 PROCEDURE — 87637 SARSCOV2&INF A&B&RSV AMP PRB: CPT | Performed by: EMERGENCY MEDICINE

## 2023-01-01 PROCEDURE — 99349 HOME/RES VST EST MOD MDM 40: CPT | Performed by: INTERNAL MEDICINE

## 2023-01-01 PROCEDURE — 97530 THERAPEUTIC ACTIVITIES: CPT | Mod: GP | Performed by: PHYSICAL THERAPIST

## 2023-01-01 PROCEDURE — 84145 PROCALCITONIN (PCT): CPT | Performed by: EMERGENCY MEDICINE

## 2023-01-01 RX ORDER — PREDNISONE 20 MG/1
20 TABLET ORAL DAILY
Status: DISCONTINUED | OUTPATIENT
Start: 2023-01-01 | End: 2023-01-01 | Stop reason: HOSPADM

## 2023-01-01 RX ORDER — HYDRALAZINE HYDROCHLORIDE 20 MG/ML
10 INJECTION INTRAMUSCULAR; INTRAVENOUS EVERY 4 HOURS PRN
Status: DISCONTINUED | OUTPATIENT
Start: 2023-01-01 | End: 2023-01-01 | Stop reason: HOSPADM

## 2023-01-01 RX ORDER — CARVEDILOL 3.12 MG/1
3.12 TABLET ORAL 2 TIMES DAILY WITH MEALS
Qty: 180 TABLET | Refills: 3 | Status: SHIPPED | OUTPATIENT
Start: 2023-01-01

## 2023-01-01 RX ORDER — ROSUVASTATIN CALCIUM 10 MG/1
10 TABLET, COATED ORAL EVERY MORNING
Status: DISCONTINUED | OUTPATIENT
Start: 2023-01-01 | End: 2023-01-01 | Stop reason: HOSPADM

## 2023-01-01 RX ORDER — ROSUVASTATIN CALCIUM 10 MG/1
10 TABLET, COATED ORAL EVERY MORNING
Qty: 90 TABLET | Refills: 3 | Status: SHIPPED | OUTPATIENT
Start: 2023-01-01

## 2023-01-01 RX ORDER — PREDNISONE 20 MG/1
20 TABLET ORAL DAILY
DISCHARGE
Start: 2023-01-01 | End: 2023-01-01

## 2023-01-01 RX ORDER — OLANZAPINE 5 MG/1
5 TABLET, ORALLY DISINTEGRATING ORAL AT BEDTIME
Status: DISCONTINUED | OUTPATIENT
Start: 2023-01-01 | End: 2023-01-01

## 2023-01-01 RX ORDER — LORAZEPAM 0.5 MG/1
0.5 TABLET ORAL EVERY 6 HOURS PRN
COMMUNITY
Start: 2023-01-01

## 2023-01-01 RX ORDER — OLANZAPINE 2.5 MG/1
2.5 TABLET, FILM COATED ORAL 2 TIMES DAILY
Qty: 90 TABLET | Refills: 3 | Status: SHIPPED | OUTPATIENT
Start: 2023-01-01

## 2023-01-01 RX ORDER — TRAZODONE HYDROCHLORIDE 50 MG/1
50 TABLET, FILM COATED ORAL AT BEDTIME
Qty: 30 TABLET | Refills: 3 | Status: SHIPPED | OUTPATIENT
Start: 2023-01-01

## 2023-01-01 RX ORDER — OLANZAPINE 2.5 MG/1
2.5 TABLET, FILM COATED ORAL 2 TIMES DAILY
Qty: 30 TABLET | Refills: 3 | Status: SHIPPED | OUTPATIENT
Start: 2023-01-01 | End: 2023-01-01

## 2023-01-01 RX ORDER — ACETAMINOPHEN 650 MG/1
650 SUPPOSITORY RECTAL EVERY 6 HOURS PRN
COMMUNITY
Start: 2023-01-01

## 2023-01-01 RX ORDER — OLANZAPINE 2.5 MG/1
2.5 TABLET, FILM COATED ORAL ONCE
Status: COMPLETED | OUTPATIENT
Start: 2023-01-01 | End: 2023-01-01

## 2023-01-01 RX ORDER — QUETIAPINE FUMARATE 25 MG/1
25 TABLET, FILM COATED ORAL ONCE
Status: COMPLETED | OUTPATIENT
Start: 2023-01-01 | End: 2023-01-01

## 2023-01-01 RX ORDER — LIDOCAINE PAIN RELIEF 40 MG/1000MG
PATCH TOPICAL
Qty: 30 PATCH | Refills: 11 | Status: SHIPPED | OUTPATIENT
Start: 2023-01-01

## 2023-01-01 RX ORDER — QUETIAPINE FUMARATE 25 MG/1
25 TABLET, FILM COATED ORAL 2 TIMES DAILY
Status: DISCONTINUED | OUTPATIENT
Start: 2023-01-01 | End: 2023-01-01

## 2023-01-01 RX ORDER — SODIUM CHLORIDE 9 MG/ML
INJECTION, SOLUTION INTRAVENOUS CONTINUOUS
Status: DISCONTINUED | OUTPATIENT
Start: 2023-01-01 | End: 2023-01-01

## 2023-01-01 RX ORDER — POTASSIUM CHLORIDE 1500 MG/1
40 TABLET, EXTENDED RELEASE ORAL ONCE
Status: COMPLETED | OUTPATIENT
Start: 2023-01-01 | End: 2023-01-01

## 2023-01-01 RX ORDER — PROCHLORPERAZINE MALEATE 10 MG
10 TABLET ORAL EVERY 6 HOURS PRN
COMMUNITY
Start: 2023-01-01

## 2023-01-01 RX ORDER — ACETAMINOPHEN 325 MG/1
325-650 TABLET ORAL EVERY 6 HOURS PRN
COMMUNITY
End: 2023-01-01

## 2023-01-01 RX ORDER — BISACODYL 10 MG
10 SUPPOSITORY, RECTAL RECTAL DAILY PRN
COMMUNITY
Start: 2023-01-01

## 2023-01-01 RX ORDER — CIPROFLOXACIN 500 MG/1
500 TABLET, FILM COATED ORAL DAILY
Qty: 5 TABLET | Refills: 0 | Status: SHIPPED | OUTPATIENT
Start: 2023-01-01 | End: 2023-01-01

## 2023-01-01 RX ORDER — NYSTATIN 100000/ML
500000 SUSPENSION, ORAL (FINAL DOSE FORM) ORAL 4 TIMES DAILY
Status: DISCONTINUED | OUTPATIENT
Start: 2023-01-01 | End: 2023-01-01 | Stop reason: HOSPADM

## 2023-01-01 RX ORDER — ACETAMINOPHEN 325 MG/1
325-650 TABLET ORAL EVERY 6 HOURS PRN
Status: DISCONTINUED | OUTPATIENT
Start: 2023-01-01 | End: 2023-01-01 | Stop reason: HOSPADM

## 2023-01-01 RX ORDER — CARVEDILOL 3.12 MG/1
3.12 TABLET ORAL 2 TIMES DAILY WITH MEALS
Status: DISCONTINUED | OUTPATIENT
Start: 2023-01-01 | End: 2023-01-01 | Stop reason: HOSPADM

## 2023-01-01 RX ORDER — OLANZAPINE 2.5 MG/1
2.5 TABLET, FILM COATED ORAL 2 TIMES DAILY PRN
Qty: 30 TABLET | Refills: 3 | Status: SHIPPED | OUTPATIENT
Start: 2023-01-01 | End: 2023-01-01

## 2023-01-01 RX ORDER — SODIUM CHLORIDE, SODIUM LACTATE, POTASSIUM CHLORIDE, CALCIUM CHLORIDE 600; 310; 30; 20 MG/100ML; MG/100ML; MG/100ML; MG/100ML
INJECTION, SOLUTION INTRAVENOUS CONTINUOUS
Status: DISCONTINUED | OUTPATIENT
Start: 2023-01-01 | End: 2023-01-01 | Stop reason: HOSPADM

## 2023-01-01 RX ORDER — ROSUVASTATIN CALCIUM 10 MG/1
10 TABLET, COATED ORAL EVERY MORNING
Qty: 90 TABLET | Refills: 3 | Status: SHIPPED | OUTPATIENT
Start: 2023-01-01 | End: 2023-01-01

## 2023-01-01 RX ORDER — TAMSULOSIN HYDROCHLORIDE 0.4 MG/1
0.4 CAPSULE ORAL EVERY EVENING
DISCHARGE
Start: 2023-01-01 | End: 2023-01-01

## 2023-01-01 RX ORDER — PANTOPRAZOLE SODIUM 40 MG/1
40 TABLET, DELAYED RELEASE ORAL DAILY
DISCHARGE
Start: 2023-01-01 | End: 2023-01-01

## 2023-01-01 RX ORDER — CARVEDILOL 3.12 MG/1
3.12 TABLET ORAL 2 TIMES DAILY WITH MEALS
Qty: 180 TABLET | Refills: 3 | Status: SHIPPED | OUTPATIENT
Start: 2023-01-01 | End: 2023-01-01

## 2023-01-01 RX ORDER — ONDANSETRON 4 MG/1
4 TABLET, ORALLY DISINTEGRATING ORAL EVERY 6 HOURS PRN
Status: DISCONTINUED | OUTPATIENT
Start: 2023-01-01 | End: 2023-01-01 | Stop reason: HOSPADM

## 2023-01-01 RX ORDER — TAMSULOSIN HYDROCHLORIDE 0.4 MG/1
0.4 CAPSULE ORAL EVERY EVENING
Qty: 90 CAPSULE | Refills: 3 | Status: SHIPPED | OUTPATIENT
Start: 2023-01-01

## 2023-01-01 RX ORDER — ONDANSETRON 2 MG/ML
4 INJECTION INTRAMUSCULAR; INTRAVENOUS EVERY 6 HOURS PRN
Status: DISCONTINUED | OUTPATIENT
Start: 2023-01-01 | End: 2023-01-01 | Stop reason: HOSPADM

## 2023-01-01 RX ORDER — LIDOCAINE 4 G/G
1 PATCH TOPICAL EVERY 24 HOURS
DISCHARGE
Start: 2023-01-01 | End: 2023-01-01

## 2023-01-01 RX ORDER — ACETAMINOPHEN 325 MG/1
650 TABLET ORAL 2 TIMES DAILY
Qty: 360 TABLET | Refills: 3 | Status: SHIPPED | OUTPATIENT
Start: 2023-01-01

## 2023-01-01 RX ORDER — TAMSULOSIN HYDROCHLORIDE 0.4 MG/1
0.4 CAPSULE ORAL EVERY EVENING
Status: DISCONTINUED | OUTPATIENT
Start: 2023-01-01 | End: 2023-01-01 | Stop reason: HOSPADM

## 2023-01-01 RX ORDER — LIDOCAINE 4 G/G
1 PATCH TOPICAL
Status: DISCONTINUED | OUTPATIENT
Start: 2023-01-01 | End: 2023-01-01 | Stop reason: HOSPADM

## 2023-01-01 RX ORDER — ACETAMINOPHEN 325 MG/1
650 TABLET ORAL 2 TIMES DAILY
Qty: 30 TABLET | Refills: 11 | Status: SHIPPED | OUTPATIENT
Start: 2023-01-01 | End: 2023-01-01

## 2023-01-01 RX ORDER — NYSTATIN 100000/ML
500000 SUSPENSION, ORAL (FINAL DOSE FORM) ORAL 4 TIMES DAILY
DISCHARGE
Start: 2023-01-01 | End: 2023-01-01

## 2023-01-01 RX ORDER — NITROGLYCERIN 0.4 MG/1
0.4 TABLET SUBLINGUAL EVERY 5 MIN PRN
Status: DISCONTINUED | OUTPATIENT
Start: 2023-01-01 | End: 2023-01-01 | Stop reason: HOSPADM

## 2023-01-01 RX ORDER — HALOPERIDOL 1 MG/1
1 TABLET ORAL EVERY 6 HOURS PRN
COMMUNITY
Start: 2023-01-01

## 2023-01-01 RX ORDER — QUETIAPINE FUMARATE 25 MG/1
12.5 TABLET, FILM COATED ORAL 2 TIMES DAILY PRN
DISCHARGE
Start: 2023-01-01 | End: 2023-01-01

## 2023-01-01 RX ADMIN — OLANZAPINE 5 MG: 5 TABLET, ORALLY DISINTEGRATING ORAL at 23:26

## 2023-01-01 RX ADMIN — NYSTATIN 500000 UNITS: 100000 SUSPENSION ORAL at 13:23

## 2023-01-01 RX ADMIN — TAMSULOSIN HYDROCHLORIDE 0.4 MG: 0.4 CAPSULE ORAL at 19:57

## 2023-01-01 RX ADMIN — CARVEDILOL 3.12 MG: 3.12 TABLET, FILM COATED ORAL at 09:10

## 2023-01-01 RX ADMIN — SODIUM CHLORIDE, POTASSIUM CHLORIDE, SODIUM LACTATE AND CALCIUM CHLORIDE: 600; 310; 30; 20 INJECTION, SOLUTION INTRAVENOUS at 06:50

## 2023-01-01 RX ADMIN — LIDOCAINE 1 PATCH: 560 PATCH PERCUTANEOUS; TOPICAL; TRANSDERMAL at 20:34

## 2023-01-01 RX ADMIN — NYSTATIN 500000 UNITS: 100000 SUSPENSION ORAL at 08:29

## 2023-01-01 RX ADMIN — SODIUM CHLORIDE, POTASSIUM CHLORIDE, SODIUM LACTATE AND CALCIUM CHLORIDE: 600; 310; 30; 20 INJECTION, SOLUTION INTRAVENOUS at 00:04

## 2023-01-01 RX ADMIN — TAMSULOSIN HYDROCHLORIDE 0.4 MG: 0.4 CAPSULE ORAL at 19:05

## 2023-01-01 RX ADMIN — ACETAMINOPHEN 650 MG: 325 TABLET, FILM COATED ORAL at 11:19

## 2023-01-01 RX ADMIN — DICLOFENAC SODIUM 2 G: 10 GEL TOPICAL at 08:54

## 2023-01-01 RX ADMIN — NYSTATIN 500000 UNITS: 100000 SUSPENSION ORAL at 19:13

## 2023-01-01 RX ADMIN — ACETAMINOPHEN 650 MG: 325 TABLET, FILM COATED ORAL at 11:49

## 2023-01-01 RX ADMIN — QUETIAPINE 12.5 MG: 25 TABLET, FILM COATED ORAL at 00:26

## 2023-01-01 RX ADMIN — SODIUM CHLORIDE, PRESERVATIVE FREE: 5 INJECTION INTRAVENOUS at 19:39

## 2023-01-01 RX ADMIN — CARVEDILOL 3.12 MG: 3.12 TABLET, FILM COATED ORAL at 17:58

## 2023-01-01 RX ADMIN — TAMSULOSIN HYDROCHLORIDE 0.4 MG: 0.4 CAPSULE ORAL at 19:39

## 2023-01-01 RX ADMIN — NYSTATIN 500000 UNITS: 100000 SUSPENSION ORAL at 08:53

## 2023-01-01 RX ADMIN — DICLOFENAC SODIUM 2 G: 10 GEL TOPICAL at 08:34

## 2023-01-01 RX ADMIN — SODIUM CHLORIDE: 9 INJECTION, SOLUTION INTRAVENOUS at 05:01

## 2023-01-01 RX ADMIN — HYDRALAZINE HYDROCHLORIDE 10 MG: 20 INJECTION INTRAMUSCULAR; INTRAVENOUS at 21:19

## 2023-01-01 RX ADMIN — SODIUM CHLORIDE, POTASSIUM CHLORIDE, SODIUM LACTATE AND CALCIUM CHLORIDE: 600; 310; 30; 20 INJECTION, SOLUTION INTRAVENOUS at 18:27

## 2023-01-01 RX ADMIN — DICLOFENAC SODIUM 2 G: 10 GEL TOPICAL at 19:06

## 2023-01-01 RX ADMIN — SODIUM CHLORIDE, PRESERVATIVE FREE: 5 INJECTION INTRAVENOUS at 11:26

## 2023-01-01 RX ADMIN — CARVEDILOL 3.12 MG: 3.12 TABLET, FILM COATED ORAL at 18:06

## 2023-01-01 RX ADMIN — CARVEDILOL 3.12 MG: 3.12 TABLET, FILM COATED ORAL at 17:00

## 2023-01-01 RX ADMIN — ACETAMINOPHEN 650 MG: 325 TABLET, FILM COATED ORAL at 00:26

## 2023-01-01 RX ADMIN — TAMSULOSIN HYDROCHLORIDE 0.4 MG: 0.4 CAPSULE ORAL at 20:28

## 2023-01-01 RX ADMIN — SODIUM CHLORIDE, POTASSIUM CHLORIDE, SODIUM LACTATE AND CALCIUM CHLORIDE: 600; 310; 30; 20 INJECTION, SOLUTION INTRAVENOUS at 02:08

## 2023-01-01 RX ADMIN — POTASSIUM CHLORIDE 40 MEQ: 1500 TABLET, EXTENDED RELEASE ORAL at 11:49

## 2023-01-01 RX ADMIN — CARVEDILOL 3.12 MG: 3.12 TABLET, FILM COATED ORAL at 10:35

## 2023-01-01 RX ADMIN — SODIUM CHLORIDE, PRESERVATIVE FREE: 5 INJECTION INTRAVENOUS at 01:10

## 2023-01-01 RX ADMIN — DICLOFENAC SODIUM 2 G: 10 GEL TOPICAL at 12:58

## 2023-01-01 RX ADMIN — DICLOFENAC SODIUM 2 G: 10 GEL TOPICAL at 13:23

## 2023-01-01 RX ADMIN — SODIUM CHLORIDE, POTASSIUM CHLORIDE, SODIUM LACTATE AND CALCIUM CHLORIDE: 600; 310; 30; 20 INJECTION, SOLUTION INTRAVENOUS at 08:17

## 2023-01-01 RX ADMIN — PREDNISONE 20 MG: 20 TABLET ORAL at 08:29

## 2023-01-01 RX ADMIN — CARVEDILOL 3.12 MG: 3.12 TABLET, FILM COATED ORAL at 18:44

## 2023-01-01 RX ADMIN — QUETIAPINE FUMARATE 25 MG: 25 TABLET ORAL at 09:48

## 2023-01-01 RX ADMIN — NYSTATIN 500000 UNITS: 100000 SUSPENSION ORAL at 17:05

## 2023-01-01 RX ADMIN — PREDNISONE 20 MG: 20 TABLET ORAL at 10:31

## 2023-01-01 RX ADMIN — ACETAMINOPHEN 650 MG: 325 TABLET, FILM COATED ORAL at 17:05

## 2023-01-01 RX ADMIN — CARVEDILOL 3.12 MG: 3.12 TABLET, FILM COATED ORAL at 06:24

## 2023-01-01 RX ADMIN — OLANZAPINE 5 MG: 5 TABLET, ORALLY DISINTEGRATING ORAL at 01:58

## 2023-01-01 RX ADMIN — CARVEDILOL 3.12 MG: 3.12 TABLET, FILM COATED ORAL at 08:49

## 2023-01-01 RX ADMIN — CARVEDILOL 3.12 MG: 3.12 TABLET, FILM COATED ORAL at 08:41

## 2023-01-01 RX ADMIN — CARVEDILOL 3.12 MG: 3.12 TABLET, FILM COATED ORAL at 08:29

## 2023-01-01 RX ADMIN — ACETAMINOPHEN 650 MG: 325 TABLET, FILM COATED ORAL at 17:00

## 2023-01-01 RX ADMIN — ROSUVASTATIN CALCIUM 10 MG: 10 TABLET, FILM COATED ORAL at 09:10

## 2023-01-01 RX ADMIN — HUMAN ALBUMIN MICROSPHERES AND PERFLUTREN 9 ML: 10; .22 INJECTION, SOLUTION INTRAVENOUS at 10:41

## 2023-01-01 RX ADMIN — ROSUVASTATIN CALCIUM 10 MG: 10 TABLET, FILM COATED ORAL at 08:49

## 2023-01-01 RX ADMIN — QUETIAPINE FUMARATE 25 MG: 25 TABLET ORAL at 19:39

## 2023-01-01 RX ADMIN — LIDOCAINE 1 PATCH: 560 PATCH PERCUTANEOUS; TOPICAL; TRANSDERMAL at 19:06

## 2023-01-01 RX ADMIN — ROSUVASTATIN CALCIUM 10 MG: 10 TABLET, FILM COATED ORAL at 08:29

## 2023-01-01 RX ADMIN — ACETAMINOPHEN 650 MG: 325 TABLET, FILM COATED ORAL at 10:47

## 2023-01-01 RX ADMIN — ROSUVASTATIN CALCIUM 10 MG: 10 TABLET, FILM COATED ORAL at 09:48

## 2023-01-01 RX ADMIN — ROSUVASTATIN CALCIUM 10 MG: 10 TABLET, FILM COATED ORAL at 08:41

## 2023-01-01 RX ADMIN — NYSTATIN 500000 UNITS: 100000 SUSPENSION ORAL at 15:46

## 2023-01-01 RX ADMIN — SODIUM CHLORIDE, POTASSIUM CHLORIDE, SODIUM LACTATE AND CALCIUM CHLORIDE: 600; 310; 30; 20 INJECTION, SOLUTION INTRAVENOUS at 20:19

## 2023-01-01 RX ADMIN — ROSUVASTATIN CALCIUM 10 MG: 10 TABLET, FILM COATED ORAL at 10:47

## 2023-01-01 RX ADMIN — QUETIAPINE FUMARATE 25 MG: 25 TABLET ORAL at 19:57

## 2023-01-01 RX ADMIN — NYSTATIN 500000 UNITS: 100000 SUSPENSION ORAL at 20:35

## 2023-01-01 RX ADMIN — SODIUM CHLORIDE, POTASSIUM CHLORIDE, SODIUM LACTATE AND CALCIUM CHLORIDE: 600; 310; 30; 20 INJECTION, SOLUTION INTRAVENOUS at 13:22

## 2023-01-01 RX ADMIN — QUETIAPINE 12.5 MG: 25 TABLET, FILM COATED ORAL at 12:51

## 2023-01-01 RX ADMIN — CARVEDILOL 3.12 MG: 3.12 TABLET, FILM COATED ORAL at 17:05

## 2023-01-01 RX ADMIN — TAMSULOSIN HYDROCHLORIDE 0.4 MG: 0.4 CAPSULE ORAL at 20:35

## 2023-01-01 RX ADMIN — CARVEDILOL 3.12 MG: 3.12 TABLET, FILM COATED ORAL at 09:48

## 2023-01-01 RX ADMIN — DICLOFENAC SODIUM 2 G: 10 GEL TOPICAL at 15:46

## 2023-01-01 RX ADMIN — DICLOFENAC SODIUM 2 G: 10 GEL TOPICAL at 20:35

## 2023-01-01 RX ADMIN — ROSUVASTATIN CALCIUM 10 MG: 10 TABLET, FILM COATED ORAL at 10:35

## 2023-01-01 RX ADMIN — CARVEDILOL 3.12 MG: 3.12 TABLET, FILM COATED ORAL at 17:47

## 2023-01-01 ASSESSMENT — COLUMBIA-SUICIDE SEVERITY RATING SCALE - C-SSRS
5. HAVE YOU STARTED TO WORK OUT OR WORKED OUT THE DETAILS OF HOW TO KILL YOURSELF? DO YOU INTEND TO CARRY OUT THIS PLAN?: NO
1. IN THE PAST MONTH, HAVE YOU WISHED YOU WERE DEAD OR WISHED YOU COULD GO TO SLEEP AND NOT WAKE UP?: NO
4. HAVE YOU HAD THESE THOUGHTS AND HAD SOME INTENTION OF ACTING ON THEM?: NO
2. HAVE YOU ACTUALLY HAD ANY THOUGHTS OF KILLING YOURSELF IN THE PAST MONTH?: NO
6. HAVE YOU EVER DONE ANYTHING, STARTED TO DO ANYTHING, OR PREPARED TO DO ANYTHING TO END YOUR LIFE?: NO
3. HAVE YOU BEEN THINKING ABOUT HOW YOU MIGHT KILL YOURSELF?: NO

## 2023-01-01 ASSESSMENT — ASTHMA QUESTIONNAIRES
QUESTION_3 LAST FOUR WEEKS HOW OFTEN DID YOUR ASTHMA SYMPTOMS (WHEEZING, COUGHING, SHORTNESS OF BREATH, CHEST TIGHTNESS OR PAIN) WAKE YOU UP AT NIGHT OR EARLIER THAN USUAL IN THE MORNING: NOT AT ALL
ACT_TOTALSCORE: 24
QUESTION_2 LAST FOUR WEEKS HOW OFTEN HAVE YOU HAD SHORTNESS OF BREATH: NOT AT ALL
QUESTION_1 LAST FOUR WEEKS HOW MUCH OF THE TIME DID YOUR ASTHMA KEEP YOU FROM GETTING AS MUCH DONE AT WORK, SCHOOL OR AT HOME: NONE OF THE TIME
QUESTION_5 LAST FOUR WEEKS HOW WOULD YOU RATE YOUR ASTHMA CONTROL: WELL CONTROLLED
ACT_TOTALSCORE: 24
QUESTION_4 LAST FOUR WEEKS HOW OFTEN HAVE YOU USED YOUR RESCUE INHALER OR NEBULIZER MEDICATION (SUCH AS ALBUTEROL): NOT AT ALL

## 2023-01-01 ASSESSMENT — ACTIVITIES OF DAILY LIVING (ADL)
ADLS_ACUITY_SCORE: 51
ADLS_ACUITY_SCORE: 58
ADLS_ACUITY_SCORE: 49
ADLS_ACUITY_SCORE: 53
ADLS_ACUITY_SCORE: 55
ADLS_ACUITY_SCORE: 53
ADLS_ACUITY_SCORE: 55
ADLS_ACUITY_SCORE: 57
ADLS_ACUITY_SCORE: 57
ADLS_ACUITY_SCORE: 55
ADLS_ACUITY_SCORE: 37
ADLS_ACUITY_SCORE: 53
ADLS_ACUITY_SCORE: 55
ADLS_ACUITY_SCORE: 55
ADLS_ACUITY_SCORE: 57
ADLS_ACUITY_SCORE: 57
ADLS_ACUITY_SCORE: 47
ADLS_ACUITY_SCORE: 55
DEPENDENT_IADLS:: INDEPENDENT
ADLS_ACUITY_SCORE: 57
ADLS_ACUITY_SCORE: 49
ADLS_ACUITY_SCORE: 57
ADLS_ACUITY_SCORE: 55
ADLS_ACUITY_SCORE: 58
ADLS_ACUITY_SCORE: 58
ADLS_ACUITY_SCORE: 55
ADLS_ACUITY_SCORE: 58
ADLS_ACUITY_SCORE: 49
ADLS_ACUITY_SCORE: 58
ADLS_ACUITY_SCORE: 55
ADLS_ACUITY_SCORE: 49
ADLS_ACUITY_SCORE: 58
ADLS_ACUITY_SCORE: 57
ADLS_ACUITY_SCORE: 58
ADLS_ACUITY_SCORE: 59
ADLS_ACUITY_SCORE: 57
ADLS_ACUITY_SCORE: 37
ADLS_ACUITY_SCORE: 51
ADLS_ACUITY_SCORE: 57
ADLS_ACUITY_SCORE: 47
ADLS_ACUITY_SCORE: 55
ADLS_ACUITY_SCORE: 37
DEPENDENT_IADLS:: INDEPENDENT
ADLS_ACUITY_SCORE: 55
ADLS_ACUITY_SCORE: 55
ADLS_ACUITY_SCORE: 57
ADLS_ACUITY_SCORE: 51
ADLS_ACUITY_SCORE: 59
ADLS_ACUITY_SCORE: 47
ADLS_ACUITY_SCORE: 43
ADLS_ACUITY_SCORE: 57
ADLS_ACUITY_SCORE: 59
DEPENDENT_IADLS:: INDEPENDENT
ADLS_ACUITY_SCORE: 37
ADLS_ACUITY_SCORE: 58
ADLS_ACUITY_SCORE: 55
ADLS_ACUITY_SCORE: 37
ADLS_ACUITY_SCORE: 37
ADLS_ACUITY_SCORE: 55
ADLS_ACUITY_SCORE: 37
ADLS_ACUITY_SCORE: 55
ADLS_ACUITY_SCORE: 55
ADLS_ACUITY_SCORE: 57
ADLS_ACUITY_SCORE: 55
ADLS_ACUITY_SCORE: 49
ADLS_ACUITY_SCORE: 55
ADLS_ACUITY_SCORE: 49
ADLS_ACUITY_SCORE: 51
ADLS_ACUITY_SCORE: 57
ADLS_ACUITY_SCORE: 55
ADLS_ACUITY_SCORE: 57
ADLS_ACUITY_SCORE: 49
ADLS_ACUITY_SCORE: 55
ADLS_ACUITY_SCORE: 49
ADLS_ACUITY_SCORE: 53
ADLS_ACUITY_SCORE: 51
ADLS_ACUITY_SCORE: 55
ADLS_ACUITY_SCORE: 55
ADLS_ACUITY_SCORE: 53
ADLS_ACUITY_SCORE: 55
ADLS_ACUITY_SCORE: 57
ADLS_ACUITY_SCORE: 55
ADLS_ACUITY_SCORE: 55
ADLS_ACUITY_SCORE: 57
ADLS_ACUITY_SCORE: 43

## 2023-01-01 ASSESSMENT — ENCOUNTER SYMPTOMS
WEAKNESS: 0
DIZZINESS: 0
LIGHT-HEADEDNESS: 0
NUMBNESS: 0

## 2023-04-05 PROBLEM — R54 FRAIL ELDERLY: Status: ACTIVE | Noted: 2023-01-01

## 2023-04-05 NOTE — PROGRESS NOTES
HPI and Plan:   HISTORY OF PRESENT ILLNESS:  Mr. Lambert is a very nice, 89-year-old gentleman with a past medical history significant for a myocardial infarction and cardiac arrest at [facility] in 1998.  This led to coronary artery bypass grafting x3 with a free LISA pedicle graft to his right coronary artery and a LIMA graft to his left anterior descending artery and a saphenous vein graft to a small, diffusely diseased ramus intermedius branch.  Angiography in 2009 demonstrated the vein graft to be closed, but arterial conduits were wide open and diagonal filled by collaterals.  Angiogram in 2014 because of a drop in his ejection fraction to 25% again demonstrated both arterial conduits to be widely patent.  A small diagonal, which had previously been intervened on, had restenosis in the 30%-50% range with an FFR of 0.90.      Mr. Lambert also has paroxysmal atrial fibrillation, for which he has not been anticoagulated as he had a left atrial appendage ligation and a maze procedure at the time of his surgery.  He also has a mild abdominal aortic aneurysm.      He has noise in his pacemaker lead, for which he has seen Dr. Guerin in the past.      Sánchez returns to clinic unfortunately his dementia is getting worse.  History is supported by his wife.  From a cardiac standpoint Sánchez appears to be doing well without any chest arm neck jaw or shoulder discomfort.  He denies dyspnea on exertion orthopnea or PND.  No lightheadedness dizziness syncope or near syncope.  He does have some peripheral edema.  Unfortunately he and his wife oftentimes forgets to take his evening rosuvastatin.     ASSESSMENT AND PLAN:  Sánchez appears to be doing well from a cardiac standpoint without clinical evidence of ischemia.      Heart failure is very well compensated.    Most recent echocardiogram from April 2021 reports his ejection fraction has improved all the way back to 50%-55%, much improved from his 2018 echocardiogram of  35%-40%.      Interrogation of his pacemaker shows predominantly atrial pacing 93% of the time and a percent in the ventricle.  He had 1 mode switch lasting 1 minute and 38 seconds.  He does not need anticoagulation as he had his left atrial appendage closed at the time of his surgery.     Blood pressure is very well controlled at 123/76 with a pulse of 58.      Weight is 181 pounds giving a body mass index of 26.7     Fasting lipid profile shows deterioration most likely because he is forgetting his evening rosuvastatin.  LDL typically runs in the 30s now at 80.  I will move his rosuvastatin to the morning and will recheck a fasting lipid profile in 3 months     Basic metabolic profile shows creatinine to be 1.71 giving him a GFR 38 this is improved from previous but probably closer to his baseline.  Electrolytes are normal     He clearly has some venous stasis changes and peripheral edema.  We spent most of time talk about the importance of elevating his legs and watching a low-salt diet.  I am not going to initiate a diuretic but will have him follow-up with my JUAN A and if this is getting worse we can start low-dose loop diuretic.    Follow-up CT scan in December showed his abdominal aortic aneurysm is 4.1 and stable.  At this time I will not continue with surveillance.    We talked about his worsening dementia and the possible need for more social support.    We will continue his carvedilol at 3.125 twice daily.     Thank you for allowing me to participate in his care.      Over 30 minutes was spent with Abbekristal perez.         EFRA PRINGLE MD, [facility]           Today's clinic visit entailed:  Review of the result(s) of each unique test - Abdominal CT scan, lab work, echocardiogram  Ordering of each unique test  Prescription drug management  44 minutes spent by me on the date of the encounter doing chart review, history and exam, documentation and further activities per the note  Provider  Link to [facility] Help  Grid     The level of medical decision making during this visit was of moderate complexity.      Orders Placed This Encounter   Procedures     Comprehensive metabolic panel     Basic metabolic panel     Lipid Profile     ALT     Follow-Up with Cardiology JUAN A     Follow-Up with Cardiology       Orders Placed This Encounter   Medications     acetaminophen (TYLENOL) 325 MG tablet     Sig: Take 325-650 mg by mouth every 6 hours as needed for mild pain     carvedilol (COREG) 3.125 MG tablet     Sig: Take 1 tablet (3.125 mg) by mouth 2 times daily (with meals)     Dispense:  180 tablet     Refill:  3     rosuvastatin (CRESTOR) 10 MG tablet     Sig: Take 1 tablet (10 mg) by mouth every morning     Dispense:  90 tablet     Refill:  3       Medications Discontinued During This Encounter   Medication Reason     rosuvastatin (CRESTOR) 10 MG tablet Not filled/taken by Patient (No AVS)     carvedilol (COREG) 3.125 MG tablet Reorder (No AVS / No eCancel)         Encounter Diagnoses   Name Primary?     Coronary artery disease involving native coronary artery of native heart without angina pectoris Yes     Ischemic cardiomyopathy      Chronic combined systolic and diastolic congestive heart failure (H)      Paroxysmal atrial fibrillation (H)      Essential hypertension, benign      Cardiac pacemaker in situ      Infrarenal abdominal aortic aneurysm (AAA) without rupture (H)      Mixed hyperlipidemia      Type 2 diabetes mellitus with diabetic neuropathy, without long-term current use of insulin (H)      Peripheral edema      Stage 3b chronic kidney disease (H)      Frail elderly        CURRENT MEDICATIONS:  Current Outpatient Medications   Medication Sig Dispense Refill     acetaminophen (TYLENOL) 325 MG tablet Take 325-650 mg by mouth every 6 hours as needed for mild pain       albuterol (PROAIR HFA/PROVENTIL HFA/VENTOLIN HFA) 108 (90 Base) MCG/ACT inhaler Inhale 1-2 puffs into the lungs every 6 hours 18 g 0     carvedilol (COREG)  3.125 MG tablet Take 1 tablet (3.125 mg) by mouth 2 times daily (with meals) 180 tablet 3     nitroglycerin (NITROSTAT) 0.4 MG SL tablet Place 1 tablet (0.4 mg) under the tongue every 5 minutes as needed for chest pain 25 tablet 3     rosuvastatin (CRESTOR) 10 MG tablet Take 1 tablet (10 mg) by mouth every morning 90 tablet 3       ALLERGIES     Allergies   Allergen Reactions     Advair Diskus      DENIED     Morphine Hcl      Decrease  Blood Pressure Lower Than Normal, Per Pt.     Vicodin [Hydrocodone-Acetaminophen] Visual Disturbance       PAST MEDICAL HISTORY:  Past Medical History:   Diagnosis Date     AAA (abdominal aortic aneurysm) (H)      Anemia due to blood loss, acute 10/10/2016     Asthma      Atrial fibrillation (H)     s/p left atrial appendage ligation     Cardiac arrest (H)      Cardiomyopathy (H)      Chronic kidney disease      Chronic sinusitis      Coronary artery disease     cardiac cath 2014: medical management; cath 2013: PTCA to diagonal; cath 2009: medical management; CABG 1998: LIMA to LAD, LISA to RCA, SVG to circumflex     GERD (gastroesophageal reflux disease)      History of angina      Hyperlipidaemia      Hypertension, goal below 140/90      Myocardial infarction (H)     MI with Vfib arrest 1998     Polymyalgia rheumatica (H)      Shingles 10/28/2016     Shortness of breath      Tachy-alix syndrome (H)     generator replacement 9/2020; implanted dual chamber pacemaker 2012       PAST SURGICAL HISTORY:  Past Surgical History:   Procedure Laterality Date     ARTHROPLASTY KNEE Left 10/5/2016    Procedure: ARTHROPLASTY KNEE;  Surgeon: Keshav Zamudio MD;  Location:  OR     CARDIAC SURGERY  12/03/2012    pacemaker ; CABG 1998     CHOLECYSTECTOMY       COLONOSCOPY       ENT SURGERY  5-    sinus     EP PACEMAKER N/A 9/11/2020    Procedure: EP Pacemaker;  Surgeon: Caron Guerin MD;  Location:  HEART CARDIAC CATH LAB     ESOPHAGOSCOPY, GASTROSCOPY, DUODENOSCOPY  (EGD), COMBINED N/A 12/3/2019    Procedure: ESOPHAGOGASTRODUODENOSCOPY (EGD) (MAC);  Surgeon: Calderon Herman MD;  Location:  GI     ESOPHAGOSCOPY, GASTROSCOPY, DUODENOSCOPY (EGD), DILATATION, COMBINED N/A 12/3/2019    Procedure: Esophagoscopy, gastroscopy, duodenoscopy (EGD), dilatation, combined;  Surgeon: Calderon Herman MD;  Location:  GI     EXTRACAPSULAR CATARACT EXTRATION WITH INTRAOCULAR LENS IMPLANT       GENITOURINARY SURGERY      prostatectomy     IMPLANT PACEMAKER  12-3-12    [facility]     PROSTATE SURGERY         FAMILY HISTORY:  Family History   Problem Relation Age of Onset     Cerebrovascular Disease Father      Heart Disease Father      Heart Disease Brother      Coronary Artery Disease Brother         MI age 50     Depression Daughter         raped in high school     Unknown/Adopted Maternal Grandmother      Unknown/Adopted Maternal Grandfather      Unknown/Adopted Paternal Grandmother      Unknown/Adopted Paternal Grandfather        SOCIAL HISTORY:  Social History     Socioeconomic History     Marital status:      Spouse name: None     Number of children: 3     Years of education: None     Highest education level: None   Tobacco Use     Smoking status: Never     Passive exposure: Never     Smokeless tobacco: Never   Vaping Use     Vaping status: Never Used     Passive vaping exposure: Yes   Substance and Sexual Activity     Alcohol use: Never     Alcohol/week: 0.0 standard drinks of alcohol     Drug use: No     Sexual activity: Not Currently     Partners: Female   Other Topics Concern     Parent/sibling w/ CABG, MI or angioplasty before 65F 55M? Yes     Comment: brother and father had MI @ 40's     Caffeine Concern Yes     Comment: one diet mountain dew daily     Sleep Concern No     Stress Concern No     Special Diet No     Exercise Yes     Comment: golfing, does the stair at the office a lot     Seat Belt Yes   Social History Narrative    6 GC;         Review of  "Systems:  Skin:  Negative       Eyes:         ENT:  Positive for hearing loss    Respiratory:  Positive for   tires easilyt   Cardiovascular:  Negative;palpitations;chest pain;lightheadedness;dizziness Positive for;edema;exercise intolerance    Gastroenterology: Negative      Genitourinary:  Positive for incontinence    Musculoskeletal:  Positive for joint pain 3-4 falls in last 2 months  Neurologic:  Positive for memory problems    Psychiatric:  Negative      Heme/Lymph/Imm:  Positive for weight loss    Endocrine:  Negative        Physical Exam:  Vitals: /76   Pulse 58   Ht 1.753 m (5' 9\")   Wt 82.1 kg (181 lb)   SpO2 98%   BMI 26.73 kg/m      Constitutional:  cooperative, alert and oriented, well developed, well nourished, in no acute distress frail Dementia    Skin:  warm and dry to the touch, no apparent skin lesions or masses noted   pacemaker incision in the left infraclavicular area was well-healed      Head:  normocephalic, no masses or lesions        Eyes:  pupils equal and round, conjunctivae and lids unremarkable, sclera white, no xanthalasma, EOMS intact, no nystagmus        Lymph:      ENT:  no pallor or cyanosis, dentition good        Neck:  carotid pulses are full and equal bilaterally;no carotid bruit        Respiratory:  normal breath sounds, clear to auscultation, normal A-P diameter, normal symmetry, normal respiratory excursion, no use of accessory muscles         Cardiac: regular rhythm;normal S1 and S2;no murmurs, gallops or rubs detected                pulses full and equal                                        GI:           Extremities and Muscular Skeletal:  no spinal abnormalities noted;normal muscle strength and tone stasis pigmentation bilateral LE edema;trace          Neurological:  no gross motor deficits   Appears to have Parkinson type features.    Psych:  affect appropriate, oriented to time, person and place        CC  Cherie Queen PA-C  [id number]  [place] [id number]

## 2023-04-05 NOTE — LETTER
4/5/2023    Ajit Butterfield MD  [address] [place] [id number]    RE: Abbe Lambert       Dear Colleague,     I had the pleasure of seeing Abbe Lambert in the [facility].  HPI and Plan:   HISTORY OF PRESENT ILLNESS:  Mr. Lambert is a very nice, 89-year-old gentleman with a past medical history significant for a myocardial infarction and cardiac arrest at [facility] in 1998.  This led to coronary artery bypass grafting x3 with a free LISA pedicle graft to his right coronary artery and a LIMA graft to his left anterior descending artery and a saphenous vein graft to a small, diffusely diseased ramus intermedius branch.  Angiography in 2009 demonstrated the vein graft to be closed, but arterial conduits were wide open and diagonal filled by collaterals.  Angiogram in 2014 because of a drop in his ejection fraction to 25% again demonstrated both arterial conduits to be widely patent.  A small diagonal, which had previously been intervened on, had restenosis in the 30%-50% range with an FFR of 0.90.      Mr. Lambert also has paroxysmal atrial fibrillation, for which he has not been anticoagulated as he had a left atrial appendage ligation and a maze procedure at the time of his surgery.  He also has a mild abdominal aortic aneurysm.      He has noise in his pacemaker lead, for which he has seen Dr. Guerin in the past.      Sánchez returns to clinic unfortunately his dementia is getting worse.  History is supported by his wife.  From a cardiac standpoint Sánchez appears to be doing well without any chest arm neck jaw or shoulder discomfort.  He denies dyspnea on exertion orthopnea or PND.  No lightheadedness dizziness syncope or near syncope.  He does have some peripheral edema.  Unfortunately he and his wife oftentimes forgets to take his evening rosuvastatin.     ASSESSMENT AND PLAN:  Sánchez appears to be doing well from a cardiac standpoint without clinical evidence of ischemia.       Heart failure is very well compensated.    Most recent echocardiogram from April 2021 reports his ejection fraction has improved all the way back to 50%-55%, much improved from his 2018 echocardiogram of 35%-40%.      Interrogation of his pacemaker shows predominantly atrial pacing 93% of the time and a percent in the ventricle.  He had 1 mode switch lasting 1 minute and 38 seconds.  He does not need anticoagulation as he had his left atrial appendage closed at the time of his surgery.     Blood pressure is very well controlled at 123/76 with a pulse of 58.      Weight is 181 pounds giving a body mass index of 26.7     Fasting lipid profile shows deterioration most likely because he is forgetting his evening rosuvastatin.  LDL typically runs in the 30s now at 80.  I will move his rosuvastatin to the morning and will recheck a fasting lipid profile in 3 months     Basic metabolic profile shows creatinine to be 1.71 giving him a GFR 38 this is improved from previous but probably closer to his baseline.  Electrolytes are normal     He clearly has some venous stasis changes and peripheral edema.  We spent most of time talk about the importance of elevating his legs and watching a low-salt diet.  I am not going to initiate a diuretic but will have him follow-up with my JUAN A and if this is getting worse we can start low-dose loop diuretic.    Follow-up CT scan in December showed his abdominal aortic aneurysm is 4.1 and stable.  At this time I will not continue with surveillance.    We talked about his worsening dementia and the possible need for more social support.    We will continue his carvedilol at 3.125 twice daily.     Thank you for allowing me to participate in his care.      Over 30 minutes was spent with Abbe perez.         EFRA PRINGLE MD, [facility]           Today's clinic visit entailed:  Review of the result(s) of each unique test - Abdominal CT scan, lab work, echocardiogram  Ordering of each unique  test  Prescription drug management  44 minutes spent by me on the date of the encounter doing chart review, history and exam, documentation and further activities per the note  Provider  Link to MDM Help Grid     The level of medical decision making during this visit was of moderate complexity.      Orders Placed This Encounter   Procedures    Comprehensive metabolic panel    Basic metabolic panel    Lipid Profile    ALT    Follow-Up with Cardiology JUAN A    Follow-Up with Cardiology       Orders Placed This Encounter   Medications    acetaminophen (TYLENOL) 325 MG tablet     Sig: Take 325-650 mg by mouth every 6 hours as needed for mild pain    carvedilol (COREG) 3.125 MG tablet     Sig: Take 1 tablet (3.125 mg) by mouth 2 times daily (with meals)     Dispense:  180 tablet     Refill:  3    rosuvastatin (CRESTOR) 10 MG tablet     Sig: Take 1 tablet (10 mg) by mouth every morning     Dispense:  90 tablet     Refill:  3       Medications Discontinued During This Encounter   Medication Reason    rosuvastatin (CRESTOR) 10 MG tablet Not filled/taken by Patient (No AVS)    carvedilol (COREG) 3.125 MG tablet Reorder (No AVS / No eCancel)         Encounter Diagnoses   Name Primary?    Coronary artery disease involving native coronary artery of native heart without angina pectoris Yes    Ischemic cardiomyopathy     Chronic combined systolic and diastolic congestive heart failure (H)     Paroxysmal atrial fibrillation (H)     Essential hypertension, benign     Cardiac pacemaker in situ     Infrarenal abdominal aortic aneurysm (AAA) without rupture (H)     Mixed hyperlipidemia     Type 2 diabetes mellitus with diabetic neuropathy, without long-term current use of insulin (H)     Peripheral edema     Stage 3b chronic kidney disease (H)     Frail elderly        CURRENT MEDICATIONS:  Current Outpatient Medications   Medication Sig Dispense Refill    acetaminophen (TYLENOL) 325 MG tablet Take 325-650 mg by mouth every 6 hours as  needed for mild pain      albuterol (PROAIR HFA/PROVENTIL HFA/VENTOLIN HFA) 108 (90 Base) MCG/ACT inhaler Inhale 1-2 puffs into the lungs every 6 hours 18 g 0    carvedilol (COREG) 3.125 MG tablet Take 1 tablet (3.125 mg) by mouth 2 times daily (with meals) 180 tablet 3    nitroglycerin (NITROSTAT) 0.4 MG SL tablet Place 1 tablet (0.4 mg) under the tongue every 5 minutes as needed for chest pain 25 tablet 3    rosuvastatin (CRESTOR) 10 MG tablet Take 1 tablet (10 mg) by mouth every morning 90 tablet 3       ALLERGIES     Allergies   Allergen Reactions    Advair Diskus      DENIED    Morphine Hcl      Decrease  Blood Pressure Lower Than Normal, Per Pt.    Vicodin [Hydrocodone-Acetaminophen] Visual Disturbance       PAST MEDICAL HISTORY:  Past Medical History:   Diagnosis Date    AAA (abdominal aortic aneurysm) (H)     Anemia due to blood loss, acute 10/10/2016    Asthma     Atrial fibrillation (H)     s/p left atrial appendage ligation    Cardiac arrest (H)     Cardiomyopathy (H)     Chronic kidney disease     Chronic sinusitis     Coronary artery disease     cardiac cath 2014: medical management; cath 2013: PTCA to diagonal; cath 2009: medical management; CABG 1998: LIMA to LAD, LISA to RCA, SVG to circumflex    GERD (gastroesophageal reflux disease)     History of angina     Hyperlipidaemia     Hypertension, goal below 140/90     Myocardial infarction (H)     MI with Vfib arrest 1998    Polymyalgia rheumatica (H)     Shingles 10/28/2016    Shortness of breath     Tachy-alix syndrome (H)     generator replacement 9/2020; implanted dual chamber pacemaker 2012       PAST SURGICAL HISTORY:  Past Surgical History:   Procedure Laterality Date    ARTHROPLASTY KNEE Left 10/5/2016    Procedure: ARTHROPLASTY KNEE;  Surgeon: Keshav Zamudio MD;  Location:  OR    CARDIAC SURGERY  12/03/2012    pacemaker ; CABG 1998    CHOLECYSTECTOMY      COLONOSCOPY      ENT SURGERY  5-    sinus    EP PACEMAKER N/A 9/11/2020     Procedure: EP Pacemaker;  Surgeon: Caron Guerin MD;  Location:  HEART CARDIAC CATH LAB    ESOPHAGOSCOPY, GASTROSCOPY, DUODENOSCOPY (EGD), COMBINED N/A 12/3/2019    Procedure: ESOPHAGOGASTRODUODENOSCOPY (EGD) (MAC);  Surgeon: Calderon Herman MD;  Location:  GI    ESOPHAGOSCOPY, GASTROSCOPY, DUODENOSCOPY (EGD), DILATATION, COMBINED N/A 12/3/2019    Procedure: Esophagoscopy, gastroscopy, duodenoscopy (EGD), dilatation, combined;  Surgeon: Calderon Herman MD;  Location:  GI    EXTRACAPSULAR CATARACT EXTRATION WITH INTRAOCULAR LENS IMPLANT      GENITOURINARY SURGERY      prostatectomy    IMPLANT PACEMAKER  12-3-12    [facility]    PROSTATE SURGERY         FAMILY HISTORY:  Family History   Problem Relation Age of Onset    Cerebrovascular Disease Father     Heart Disease Father     Heart Disease Brother     Coronary Artery Disease Brother         MI age 50    Depression Daughter         raped in high school    Unknown/Adopted Maternal Grandmother     Unknown/Adopted Maternal Grandfather     Unknown/Adopted Paternal Grandmother     Unknown/Adopted Paternal Grandfather        SOCIAL HISTORY:  Social History     Socioeconomic History    Marital status:      Spouse name: None    Number of children: 3    Years of education: None    Highest education level: None   Tobacco Use    Smoking status: Never     Passive exposure: Never    Smokeless tobacco: Never   Vaping Use    Vaping status: Never Used     Passive vaping exposure: Yes   Substance and Sexual Activity    Alcohol use: Never     Alcohol/week: 0.0 standard drinks of alcohol    Drug use: No    Sexual activity: Not Currently     Partners: Female   Other Topics Concern    Parent/sibling w/ CABG, MI or angioplasty before 65F 55M? Yes     Comment: brother and father had MI @ 40's    Caffeine Concern Yes     Comment: one diet mountain dew daily    Sleep Concern No    Stress Concern No    Special Diet No    Exercise Yes     Comment: golfing,  "does the stair at the office a lot    Seat Belt Yes   Social History Narrative    6 GC;         Review of Systems:  Skin:  Negative       Eyes:         ENT:  Positive for hearing loss    Respiratory:  Positive for   tires easilyt   Cardiovascular:  Negative;palpitations;chest pain;lightheadedness;dizziness Positive for;edema;exercise intolerance    Gastroenterology: Negative      Genitourinary:  Positive for incontinence    Musculoskeletal:  Positive for joint pain 3-4 falls in last 2 months  Neurologic:  Positive for memory problems    Psychiatric:  Negative      Heme/Lymph/Imm:  Positive for weight loss    Endocrine:  Negative        Physical Exam:  Vitals: /76   Pulse 58   Ht 1.753 m (5' 9\")   Wt 82.1 kg (181 lb)   SpO2 98%   BMI 26.73 kg/m      Constitutional:  cooperative, alert and oriented, well developed, well nourished, in no acute distress frail Dementia    Skin:  warm and dry to the touch, no apparent skin lesions or masses noted   pacemaker incision in the left infraclavicular area was well-healed      Head:  normocephalic, no masses or lesions        Eyes:  pupils equal and round, conjunctivae and lids unremarkable, sclera white, no xanthalasma, EOMS intact, no nystagmus        Lymph:      ENT:  no pallor or cyanosis, dentition good        Neck:  carotid pulses are full and equal bilaterally;no carotid bruit        Respiratory:  normal breath sounds, clear to auscultation, normal A-P diameter, normal symmetry, normal respiratory excursion, no use of accessory muscles         Cardiac: regular rhythm;normal S1 and S2;no murmurs, gallops or rubs detected                pulses full and equal                                        GI:           Extremities and Muscular Skeletal:  no spinal abnormalities noted;normal muscle strength and tone stasis pigmentation bilateral LE edema;trace          Neurological:  no gross motor deficits   Appears to have Parkinson type features.    Psych:  " affect appropriate, oriented to time, person and place        CC  Cherie Queen PA-C  [address]  [place] [address]    Thank you for allowing me to participate in the care of your patient.      Sincerely,     Brandon Eli MD     [facility]

## 2023-04-17 NOTE — TELEPHONE ENCOUNTER
Received call from patient stating they were in the process of moving and wondering what to do with home monitor.  Explained they would just need to unplug it from the wall at the old house and plug it in by the bed at the new house and it would continue monitoring.  They verbalized understanding and appreciation for the call.     ALCON Moncada

## 2023-04-29 NOTE — ED PROVIDER NOTES
History     Chief Complaint:  Stroke Symptoms    The history is provided by the patient.      Abbe Lambert is a 89 year old male with a pacemaker, with a history of AAA, atrial fibrillation, cardiac arrest, cardiomyopathy, CKD, CAD, MI, hypertension, and hyperlipidemia who presents with concerns for stroke symptoms. The patient reports that at around 1000 today, when he got up from his chair, he saw stars in his vision. However, this quickly resolved. He denies any associated light-headedness, dizziness, numbness, tingling, or new/worsening weakness other than baseline. The patient just recently moved into assisted living.     Independent Historian:   Spouse/Partner - They report the above info    Review of External Notes: none     ROS:  Review of Systems   Eyes: Positive for visual disturbance.   Neurological: Negative for dizziness, weakness, light-headedness and numbness.     Allergies:  Fluticasone-Salmeterol  Morphine Hcl  Hydrocodone-Acetaminophen    Medications:    Crestor  Nitrostat  Coreg  Albuterol     Past Medical History:    Tachy-alix syndrome  Singles  Polymyalgia rheumatica  MI  Hypertension  Hyperlipidemia  Angina  GERD  CAD  Chronic sinusitis  CKD  cardiomyopathy  Cardiac arrest  Atrial fibrillation  Asthma  AAA    Past Surgical History:    Arthroplasty knee, left  Pacemaker  cholecystectomy  Colonoscopy  ENT surgery for sinus  EP pacemaker  EGD, combined  EGD, dilation, combined  Cataract IOL  Prostatectomy     Family History:    family history includes Cerebrovascular Disease in his father; Coronary Artery Disease in his brother; Depression in his daughter; Heart Disease in his brother and father; Unknown/Adopted in his maternal grandfather, maternal grandmother, paternal grandfather, and paternal grandmother.    Social History:   reports that he has never smoked. He has never been exposed to tobacco smoke. He has never used smokeless tobacco. He reports that he does not drink alcohol and  does not use drugs.  PCP: Ajit Butterfield   Presents to the ED with wife.     Physical Exam     Patient Vitals for the past 24 hrs:   BP Temp Temp src Pulse Resp SpO2   04/29/23 1330 (!) 150/72 -- -- 60 20 99 %   04/29/23 1300 139/74 -- -- 105 20 99 %   04/29/23 1230 (!) 146/66 -- -- 60 -- 97 %   04/29/23 1222 (!) 158/64 97.1  F (36.2  C) Oral 68 16 99 %        Physical Exam  General/Appearance: appears stated age, well-groomed, appears comfortable  Eyes: EOMI, no scleral injection, no icterus  ENT: MMM  Neck: supple, nl ROM, no stiffness  Cardiovascular: RRR, nl S1S2, no m/r/g, 2+ pulses in all 4 extremities, cap refill <2sec  Respiratory: CTAB, good air movement throughout, no wheezes/rhonchi/rales, no increased WOB, no retractions  GI: abd soft, non-distended, nttp,  no HSM, no rebound, no guarding, nl BS  MSK: FLOYD, good tone, no bony abnormality  Skin: warm and well-perfused, no rash, no edema, no ecchymosis, nl turgor  Neuro: GCS 15, alert and oriented, no gross focal neuro deficits  Psych: interacts appropriately  Heme: no petechia, no purpura, no active bleeding    Emergency Department Course   ECG  ECG taken at 1203, ECG read at 1233  Atrial-paced rhythm with prolonged AV conduction. Left axis deviation. Right bundle branch block. Possible lateral infarct, age undetermined. Inferior infarct, age undetermined.    Rate 60 bpm. AK interval 304 ms. QRS duration 150 ms. QT/QTc 492/492 ms. P-R-T axes 14 -67 -14.     Laboratory:  Labs Ordered and Resulted from Time of ED Arrival to Time of ED Departure   BASIC METABOLIC PANEL - Abnormal       Result Value    Sodium 139      Potassium 3.8      Chloride 106      Carbon Dioxide (CO2) 23      Anion Gap 10      Urea Nitrogen 19.6      Creatinine 1.48 (*)     Calcium 8.9      Glucose 109 (*)     GFR Estimate 45 (*)    ROUTINE UA WITH MICROSCOPIC REFLEX TO CULTURE - Abnormal    Color Urine Light Yellow      Appearance Urine Clear      Glucose Urine Negative       "Bilirubin Urine Negative      Ketones Urine Negative      Specific Gravity Urine 1.014      Blood Urine Negative      pH Urine 6.0      Protein Albumin Urine Negative      Urobilinogen Urine Normal      Nitrite Urine Negative      Leukocyte Esterase Urine Negative      Mucus Urine Present (*)     RBC Urine 2      WBC Urine <1     CBC WITH PLATELETS AND DIFFERENTIAL - Abnormal    WBC Count 7.3      RBC Count 3.59 (*)     Hemoglobin 11.2 (*)     Hematocrit 35.4 (*)     MCV 99      MCH 31.2      MCHC 31.6      RDW 15.5 (*)     Platelet Count 206      % Neutrophils 68      % Lymphocytes 19      % Monocytes 11      % Eosinophils 2      % Basophils 0      % Immature Granulocytes 0      NRBCs per 100 WBC 0      Absolute Neutrophils 4.9      Absolute Lymphocytes 1.4      Absolute Monocytes 0.8      Absolute Eosinophils 0.2      Absolute Basophils 0.0      Absolute Immature Granulocytes 0.0      Absolute NRBCs 0.0       Emergency Department Course & Assessments:       Interventions:  Medications - No data to display     Assessments:  1229 I obtained history and examined the patient as noted above.    1440 I rechecked the patient and explained findings. Prepared for discharge.     Independent Interpretation (X-rays, CTs, rhythm strip):  None    Consultations/Discussion of Management or Tests:  None        Social Determinants of Health affecting care:   None    Disposition:  The patient was discharged to home.     Impression & Plan      Medical Decision Making:  This patient is a pleasant 89-year-old male with significant medical problems including status postcardiac arrest, A-fib, hypertension who presents today with personal concerns for stroke.  He describes lightheadedness and \"stars in his vision\" temporarily earlier this morning.  The symptoms have resolved.  There is no blurred vision or double vision.  There were no other focal neurologic deficits.  To me this sounds more like orthostatic hypotension as it occurred " right after he stood up from sitting down.  Vitally he stable here.  Labs are unremarkable.  He is comfortable appearing and I do not think needs further stroke work-up or imaging.  I think he can continue proper home cares.  Both him and his wife feel comfortable with this plan.    Diagnosis:    ICD-10-CM    1. Lightheadedness  R42          Discharge Medications:  None.     Scribe Disclosure:  I, Po Davis, am serving as a scribe at 12:27 PM on 4/29/2023 to document services personally performed by Tiffani Mishra MD based on my observations and the provider's statements to me.   4/29/2023   Tiffani Mishra MD Mahoney, Katherine Collins, MD  04/29/23 [id number]

## 2023-04-29 NOTE — TELEPHONE ENCOUNTER
Wife Madison Bolton is calling and states that Sánchez is having dementia currently and Che states that they have just moved and feels Sánchez is upset about the move.  Madison notices that his dementia is getting worse.  Madison denies confusion or slurred speech and denies weakness and numbness of face or or leg on one side of the body.  Madison states that Abbe is having worsening confusion and new-onset.  Madison states that she will take Abbe to ER.      Reason for Disposition    [1] Worsening confusion AND [2] new-onset (hours to 3 days)    Additional Information    Negative: [1] Difficult to awaken or acting confused (e.g., disoriented, slurred speech) AND [2] present now AND [3] new-onset AND [4] has diabetes (diabetes mellitus)    Negative: [1] Difficult to awaken or acting confused (e.g., disoriented, slurred speech) AND [2] present now AND [3] new-onset    Negative: [1] Weakness of the face, arm, or leg on one side of the body AND [2] new-onset    Negative: [1] Numbness of the face, arm, or leg on one side of the body AND [2] new-onset    Negative: [1] Loss of speech or garbled speech AND [2] new-onset    Negative: Shock suspected (e.g., cold/pale/clammy skin, too weak to stand, low BP, rapid pulse)    Negative: Sounds like a life-threatening emergency to the triager    Negative: Severe agitation or behavior problem (e.g., patient endangering self or others)    Protocols used: DEMENTIA SYMPTOMS AND [id number]

## 2023-04-29 NOTE — ED TRIAGE NOTES
Pt sts around 10 am he got up from chairs and stars in his vision     Triage Assessment     Row Name 04/29/23 1223       Triage Assessment (Adult)    Airway WDL WDL       Respiratory WDL    Respiratory WDL WDL       Skin Circulation/Temperature WDL    Skin Circulation/Temperature WDL WDL       Cardiac WDL    Cardiac WDL WDL       Peripheral/Neurovascular WDL    Peripheral Neurovascular WDL WDL       Cognitive/Neuro/Behavioral WDL    Cognitive/Neuro/Behavioral WDL WDL

## 2023-05-01 NOTE — TELEPHONE ENCOUNTER
I do not believe the primary care provider is available this week thus patient will have to be scheduled for a team appointment based on availability.

## 2023-05-01 NOTE — TELEPHONE ENCOUNTER
Spoke with patient's wife Madison Bolton (not on C2C, patient gave verbal consent to share health information). RN assisted Madison Bolton to schedule patient with Same Day provider on 5/5/2023 for ED/Hosp Follow Up. RN advised patient to return to ED if symptoms present again or if current symptoms worsen. Patient's wife agreed with plan of care.

## 2023-05-01 NOTE — TELEPHONE ENCOUNTER
Patients daughter called requesting status of ED follow up appt. She is requesting paula Thursday or Friday. Future appts require providers approval. Ok to use same day appt?

## 2023-05-01 NOTE — TELEPHONE ENCOUNTER
Patient's wife states that patient was seen at the ED on Saturday due to stroke like symptoms. He did not have a stroke, but was advised to scheduled with PCP ASAP.    Wife states that he wandered out of the apartment last night. She states that they moved into an independant apartment last week.     Wife will notify daughter about him wandering off.     Wife states that she wants daughter at the appointment.  Please advise if ok to use same day with any provider.     Can we leave a detailed message on this number? YES  Phone number patient can be reached at: Other phone number:  [phone] for daughter KELLIE Heart on file.Thurday is the best for the daughter.     Viktoriya Villavicencio RN  [facility] Triage

## 2023-05-04 NOTE — TELEPHONE ENCOUNTER
"Called and spoke to pts daughter Una (c2c) who answered the questions from below.     Pt is not pleased that all of this is coming up and gets angry when they suggest he might have forgotten. Pt can get upset and be hard to redirect. Daughter is wondering if there is a graceful way to bring this up so pt doesn't get angry/upset?    Pts daughter stated pt went to the ED due to having instantances where hes stiff and cant get his legs to move and has happened about 3 times and last about an hour. Confusion has been going on slowly in the last 6m-1yr.   In the last 2-3m rapid decline with confusion.   In the last month he sleeps a lot more during the day.       ED for acute condition Discharge Protocol    \"Hi, my name is Judi Bean RN, a registered nurse, and I am calling from [facility].  I am calling to follow up and see how things are going for you after your recent emergency visit.\"    Tell me how you are doing now that you are home?\" \"He hasnt had any episodes since the ED. Pt is confused and thinks other people are living with him. Nothing has changed since ED visit\"       Discharge Instructions    \"Let's review your discharge instructions.  What is/are the follow-up recommendations?  Pt. Response: follow up dr self.     \"Has an appointment with your primary care provider been scheduled?\"  Yes- but with a different provider in the office.     Medications    \"Tell me what changed about your medicines when you discharged?\"    None     \"What questions do you have about your medications?\"   None        Call Summary    \"What questions or concerns do you have about your recent visit and your follow-up care?\"     none    \"If you have questions or things don't continue to improve, we encourage you contact us through the main clinic number (give number).  Even if the clinic is not open, triage nurses are available 24/7 to help you.     We would like you to know that our clinic has extended hours " "(provide information).  We also have urgent care (provide details on closest location and hours/contact info)\"    \"Thank you for your time and take care!\"              "

## 2023-05-04 NOTE — TELEPHONE ENCOUNTER
Pt requested hosp f/u: future ov 05/05/2023.    What type of discharge? Emergency Department  Risk of Hospital admission or ED visit: 43%  Is a TCM episode required? No  When should the patient follow up with PCP? Clinic is not offering f/u appointments at this time.     Carmelina Bryant RN  [facility]

## 2023-05-05 NOTE — PROGRESS NOTES
"  Assessment & Plan     Confusion    - Adult Neurology  Referral; Future    Memory changes    - Adult Neurology  Referral; Future      I spent a total of 30 minutes on the day of the visit.   Time spent by me doing chart review, history and exam, documentation and further activities per the note     MED REC REQUIRED  Post Medication Reconciliation Status: discharge medications reconciled, continue medications without change  Patient Instructions   All labs all stable.    Referral done    Could see Dr Butterfield sooner with virtual visit if needed.         Verónica Garcia PA-C  [facility]    Paresh Mcadams is a 89 year old, presenting for the following health issues:  ER F/U      History of Present Illness       Reason for visit:  Follow up from hospital family concerns about dementia    He eats 2-3 servings of fruits and vegetables daily.He consumes 1 sweetened beverage(s) daily.He exercises with enough effort to increase his heart rate 9 or less minutes per day.  He exercises with enough effort to increase his heart rate 3 or less days per week. He is missing 1 dose(s) of medications per week.       ED/UC Followup:    Facility:  [facility]  Date of visit: 04/29/2023  Reason for visit: lightheadedness  Current Status: improved    This patient is a pleasant 89-year-old male with significant medical problems including status postcardiac arrest, A-fib, hypertension who presents today with personal concerns for stroke.  He describes lightheadedness and \"stars in his vision\" temporarily earlier this morning.  The symptoms have resolved.  There is no blurred vision or double vision.  There were no other focal neurologic deficits.  To me this sounds more like orthostatic hypotension as it occurred right after he stood up from sitting down.  Vitally he stable here.  Labs are unremarkable.  He is comfortable appearing and I do not think needs further " "stroke work-up or imaging.  I think he can continue proper home cares.  Both him and his wife feel comfortable with this plan.     Diagnosis:      ICD-10-CM     1. Lightheadedness  R42         Labs all normal, no stroke or TIA  Suspect some dementia.   Family worried about memory. Also with some issues with movement. States can take him an hour to get ready for bed at times. And then the next day will be back to normal             Review of Systems   Constitutional, HEENT, cardiovascular, pulmonary, gi and gu systems are negative, except as otherwise noted.      Objective    /56   Pulse 68   Temp 97  F (36.1  C) (Temporal)   Resp 16   Ht 1.753 m (5' 9\")   Wt 82.1 kg (180 lb 14.4 oz)   SpO2 97%   BMI 26.71 kg/m    Body mass index is 26.71 kg/m .  Physical Exam   GENERAL: healthy, alert and no distress  RESP: lungs clear to auscultation - no rales, rhonchi or wheezes  CV: regular rates and rhythm and normal S1 S2, no S3 or S4  MS: no gross musculoskeletal defects noted, no edema  NEURO: Normal strength and tone, alert and speech normal    Admission on 04/29/2023, Discharged on 04/29/2023   Component Date Value Ref Range Status     Ventricular Rate 04/29/2023 60  BPM Final     Atrial Rate 04/29/2023 60  BPM Final     IL Interval 04/29/2023 304  ms Final     QRS Duration 04/29/2023 150  ms Final     QT 04/29/2023 492  ms Final     QTc 04/29/2023 492  ms Final     P Axis 04/29/2023 14  degrees Final     R AXIS 04/29/2023 -67  degrees Final     T Axis 04/29/2023 -14  degrees Final     Interpretation ECG 04/29/2023    Final                    Value:Atrial-paced rhythm with prolonged AV conduction  Left axis deviation  Right bundle branch block  Possible Lateral infarct , age undetermined  Inferior infarct , age undetermined  Abnormal ECG  When compared with ECG of 17-MAR-2021 09:07,  Right bundle branch block has replaced Left bundle branch block  Borderline criteria for Lateral infarct are now " Present  Inferior infarct is now Present  Confirmed by GENERATED REPORT, COMPUTER ([id number]),  Ashley Koo ([id number]) on 4/29/2023 10:55:46 PM       Sodium 04/29/2023 139  136 - 145 mmol/L Final     Potassium 04/29/2023 3.8  3.4 - 5.3 mmol/L Final     Chloride 04/29/2023 106  98 - 107 mmol/L Final     Carbon Dioxide (CO2) 04/29/2023 23  22 - 29 mmol/L Final     Anion Gap 04/29/2023 10  7 - 15 mmol/L Final     Urea Nitrogen 04/29/2023 19.6  8.0 - 23.0 mg/dL Final     Creatinine 04/29/2023 1.48 (H)  0.67 - 1.17 mg/dL Final     Calcium 04/29/2023 8.9  8.8 - 10.2 mg/dL Final     Glucose 04/29/2023 109 (H)  70 - 99 mg/dL Final     GFR Estimate 04/29/2023 45 (L)  >60 mL/min/1.73m2 Final    eGFR calculated using 2021 CKD-EPI equation.     Color Urine 04/29/2023 Light Yellow  Colorless, Straw, Light Yellow, Yellow Final     Appearance Urine 04/29/2023 Clear  Clear Final     Glucose Urine 04/29/2023 Negative  Negative mg/dL Final     Bilirubin Urine 04/29/2023 Negative  Negative Final     Ketones Urine 04/29/2023 Negative  Negative mg/dL Final     Specific Gravity Urine 04/29/2023 1.014  1.003 - 1.035 Final     Blood Urine 04/29/2023 Negative  Negative Final     pH Urine 04/29/2023 6.0  5.0 - 7.0 Final     Protein Albumin Urine 04/29/2023 Negative  Negative mg/dL Final     Urobilinogen Urine 04/29/2023 Normal  Normal, 2.0 mg/dL Final     Nitrite Urine 04/29/2023 Negative  Negative Final     Leukocyte Esterase Urine 04/29/2023 Negative  Negative Final     Mucus Urine 04/29/2023 Present (A)  None Seen /LPF Final     RBC Urine 04/29/2023 2  <=2 /HPF Final     WBC Urine 04/29/2023 <1  <=5 /HPF Final     WBC Count 04/29/2023 7.3  4.0 - 11.0 10e3/uL Final     RBC Count 04/29/2023 3.59 (L)  4.40 - 5.90 10e6/uL Final     Hemoglobin 04/29/2023 11.2 (L)  13.3 - 17.7 g/dL Final     Hematocrit 04/29/2023 35.4 (L)  40.0 - 53.0 % Final     MCV 04/29/2023 99  78 - 100 fL Final     MCH 04/29/2023 31.2  26.5 - 33.0 pg Final     MCHC  04/29/2023 31.6  31.5 - 36.5 g/dL Final     RDW 04/29/2023 15.5 (H)  10.0 - 15.0 % Final     Platelet Count 04/29/2023 206  150 - 450 10e3/uL Final     % Neutrophils 04/29/2023 68  % Final     % Lymphocytes 04/29/2023 19  % Final     % Monocytes 04/29/2023 11  % Final     % Eosinophils 04/29/2023 2  % Final     % Basophils 04/29/2023 0  % Final     % Immature Granulocytes 04/29/2023 0  % Final     NRBCs per 100 WBC 04/29/2023 0  <1 /100 Final     Absolute Neutrophils 04/29/2023 4.9  1.6 - 8.3 10e3/uL Final     Absolute Lymphocytes 04/29/2023 1.4  0.8 - 5.3 10e3/uL Final     Absolute Monocytes 04/29/2023 0.8  0.0 - 1.3 10e3/uL Final     Absolute Eosinophils 04/29/2023 0.2  0.0 - 0.7 10e3/uL Final     Absolute Basophils 04/29/2023 0.0  0.0 - 0.2 10e3/uL Final     Absolute Immature Granulocytes 04/29/2023 0.0  <=0.4 10e3/uL Final     Absolute NRBCs 04/29/2023 0.0  10e3/uL Final

## 2023-05-05 NOTE — PATIENT INSTRUCTIONS
All labs all stable.    Referral done    Could see Dr Butterfield sooner with virtual visit if needed.

## 2023-05-11 PROBLEM — M62.81 GENERALIZED MUSCLE WEAKNESS: Status: ACTIVE | Noted: 2023-01-01

## 2023-05-11 PROBLEM — J06.9 UPPER RESPIRATORY TRACT INFECTION, UNSPECIFIED TYPE: Status: ACTIVE | Noted: 2023-01-01

## 2023-05-11 PROBLEM — R41.0 DELIRIUM: Status: ACTIVE | Noted: 2023-01-01
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
## 2023-05-11 NOTE — LETTER
May 12, 2023      To Whom It May Concern:      Abbe Lambert was seen in our Emergency Department today, 05/12/23.  I expect his condition to improve over the next *** days.  He may return to work/school when improved.    Sincerely,        Shefali Dorantes RN

## 2023-05-12 NOTE — ED NOTES
Bed: ED09  Expected date: 5/11/23  Expected time: 8:35 PM  Means of arrival: Ambulance  Comments:  Javier [id number] confused

## 2023-05-12 NOTE — PROGRESS NOTES
RECEIVING UNIT ED HANDOFF REVIEW    ED Nurse Handoff Report was reviewed by: Ada Helton RN on May 12, 2023 at 5:49 AM

## 2023-05-12 NOTE — UTILIZATION REVIEW
89 year old male with a complex medical history including cardiac arrest, AAA, cardiomyopathy, atrial fibrillation, chronic kidney disease and MI as well as recent of some cognitive and physical decline over the last several months who presents the emergency department with extreme decline x 1 day.  The patient was found with leukocytosis of 13,000, CRP at 120.  Head CT with no significant pathology.,  UA negative for infection.  Chest x-ray with no infiltrate.  Lewy body dementia is suspected.      Concurrent stay review; Secondary Review Determination     [facility]    Under the authority of the Utilization Management Committee, the utilization review process indicated a secondary review on the above patient.  The review outcome is based on review of the medical records, discussions with staff, and applying clinical experience noted on the date of the review.          (x) Observation Status Appropriate - Concurrent stay review    RATIONALE FOR DETERMINATION       Patient is clinically improving and there is no clear indication to change patient's status to inpatient. The severity of illness, intensity of service provided, expected LOS and risk for adverse outcome make the care appropriate for observation.      This document was produced using voice recognition software       The information on this document is developed by the utilization review team in order for the business office to ensure compliance.  This only denotes the appropriateness of proper admission status and does not reflect the quality of care rendered.         The definitions of Inpatient Status and Observation Status used in making the determination above are those provided in the CMS Coverage Manual, Chapter 1 and Chapter 6, section 70.4.      Sincerely,     GLORIA WHITE MD   Utilization Review  Physician Advisor  [facility]

## 2023-05-12 NOTE — PHARMACY-ADMISSION MEDICATION HISTORY
Pharmacist Admission Medication History    Admission medication history is complete. The information provided in this note is only as accurate as the sources available at the time of the update.    Medication reconciliation/reorder completed by provider prior to medication history? No    Information Source(s): Family member and CareEverywhere/SureScripts via in-person    Pertinent Information: patient sleeping at time of interview, family had pill bottles brought in for Coreg and Crestor.     Changes made to PTA medication list:    Added: None    Deleted: albuterol 1-2 puffs q6h (family states patient does not use)     Changed: None    Medication Affordability:  Not including over the counter (OTC) medications, was there a time in the past 3 months when you did not take your medications as prescribed because of cost?: No    Allergies reviewed with patient and updates made in EHR: yes, to best of ability - family at bedside unable to recall full reaction/allergy details.     Medication History Completed By: [facility] 5/11/2023 10:24 PM    Medication Sig Last Dose Taking? Auth Provider   acetaminophen (TYLENOL) 325 MG tablet Take 325-650 mg by mouth every 6 hours as needed for mild pain Unknown at PRN Yes Reported, Patient   carvedilol (COREG) 3.125 MG tablet Take 1 tablet (3.125 mg) by mouth 2 times daily (with meals) 5/10/2023 at Unknown time Yes Brandon Eli MD   nitroglycerin (NITROSTAT) 0.4 MG SL tablet Place 1 tablet (0.4 mg) under the tongue every 5 minutes as needed for chest pain Unknown at has home supply, not needed Yes Tom Suarez MD   rosuvastatin (CRESTOR) 10 MG tablet Take 1 tablet (10 mg) by mouth every morning 5/10/2023 at Unknown time Yes Brandon Eli MD

## 2023-05-12 NOTE — ED NOTES
"Pt refused IVF ordered at this time. Pt stated, \"nope I don't want'em and if you give them to me I will pull it out\".     0510: IVF started  "

## 2023-05-12 NOTE — ED NOTES
Pt continues to be oriented to self. Pt has been on bed alarm this shift. Pt pulled out IV access and pulled off external male catheter. Pt declined putting in a new IV at this time. Pt brief changed, incontinet of B/B. Scheduled Coreg given early for SBP>180 per verbal order from Dr. Haile. Short Stay Charge RN updated on pt status before transfer.

## 2023-05-12 NOTE — PROVIDER NOTIFICATION
Hospitalist paged:  Pt , 192. Pt otherwise VSS and asymptomatic. No Cardiac PRNS ordered. Sched Coreg at 0800. IVF just started per pt. Please advise. Thanks!    Update: Give pt scheduled Coreg early

## 2023-05-12 NOTE — CONSULTS
DATE OF SERVICE : 5/12/2023    DATE OF ADMISSION: 5/11/2023    NEUROLOGICAL CONSULTATION    REQUESTED BY Dr. Conde, Chandler Walker MD    SOURCE OF INFORMATION:Family /EHR    REASON FOR CONSULTATION:   Dementia eval     HISTORY OF PRESENT ILLNESS:     He is 89 years old with past medical history significant for cardiac arrest cardiomyopathy atrial fibrillation chronic kidney disease presenting for evaluation regarding cognitive and physical decline worsened in the past few days prior to the arrival patient had history of gradual cognitive decline over the past 1 year which has been gradually progressive recently patient moved to independent living facility along with the wife in the past 3 weeks.  His medications were supervised by his wife.  There were times he is unable to recognize his family members.  Has trouble ambulating associated with recurrent falls no associated head injury.  Family has also noted shuffling gait.  Patient family noticed hallucinations for the past 6 months.  Initial CT head no acute stroke moderate generalized volume loss.  Mildly elevated white count  Mildly low sodium elevated procalcitonin normal lactate blood cultures no growth to date  Urine cultures pending  Elevated sed rate    Past Medical History:   Diagnosis Date     AAA (abdominal aortic aneurysm) (H)      Anemia due to blood loss, acute 10/10/2016     Asthma      Atrial fibrillation (H)     s/p left atrial appendage ligation     Cardiac arrest (H)      Cardiomyopathy (H)      Chronic kidney disease      Chronic sinusitis      Coronary artery disease     cardiac cath 2014: medical management; cath 2013: PTCA to diagonal; cath 2009: medical management; CABG 1998: LIMA to LAD, LISA to RCA, SVG to circumflex     GERD (gastroesophageal reflux disease)      History of angina      Hyperlipidaemia      Hypertension, goal below 140/90      Myocardial infarction (H)     MI with Vfib arrest 1998     Polymyalgia rheumatica (H)       Shingles 10/28/2016     Shortness of breath      Tachy-alix syndrome (H)     generator replacement 9/2020; implanted dual chamber pacemaker 2012         PSHx:   Past Surgical History:   Procedure Laterality Date     ARTHROPLASTY KNEE Left 10/5/2016    Procedure: ARTHROPLASTY KNEE;  Surgeon: Keshav Zamudio MD;  Location:  OR     CARDIAC SURGERY  12/03/2012    pacemaker ; CABG 1998     CHOLECYSTECTOMY       COLONOSCOPY       ENT SURGERY  5-    sinus     EP PACEMAKER N/A 9/11/2020    Procedure: EP Pacemaker;  Surgeon: Caron Guerin MD;  Location:  HEART CARDIAC CATH LAB     ESOPHAGOSCOPY, GASTROSCOPY, DUODENOSCOPY (EGD), COMBINED N/A 12/3/2019    Procedure: ESOPHAGOGASTRODUODENOSCOPY (EGD) (MAC);  Surgeon: Calderon Herman MD;  Location:  GI     ESOPHAGOSCOPY, GASTROSCOPY, DUODENOSCOPY (EGD), DILATATION, COMBINED N/A 12/3/2019    Procedure: Esophagoscopy, gastroscopy, duodenoscopy (EGD), dilatation, combined;  Surgeon: Calderon Herman MD;  Location:  GI     EXTRACAPSULAR CATARACT EXTRATION WITH INTRAOCULAR LENS IMPLANT       GENITOURINARY SURGERY      prostatectomy     IMPLANT PACEMAKER  12-3-12    [facility]     PROSTATE SURGERY         Medications Prior to Admission   Medication Sig Dispense Refill Last Dose     acetaminophen (TYLENOL) 325 MG tablet Take 325-650 mg by mouth every 6 hours as needed for mild pain   Unknown at PRN     carvedilol (COREG) 3.125 MG tablet Take 1 tablet (3.125 mg) by mouth 2 times daily (with meals) 180 tablet 3 5/10/2023 at Unknown time     nitroglycerin (NITROSTAT) 0.4 MG SL tablet Place 1 tablet (0.4 mg) under the tongue every 5 minutes as needed for chest pain 25 tablet 3 Unknown at has home supply, not needed     rosuvastatin (CRESTOR) 10 MG tablet Take 1 tablet (10 mg) by mouth every morning 90 tablet 3 5/10/2023 at Unknown time     Current Facility-Administered Medications   Medication Dose Route Frequency     carvedilol  3.125 mg Oral BID  "w/meals     OLANZapine zydis  5 mg Oral At Bedtime     rosuvastatin  10 mg Oral QAM     Current Facility-Administered Medications   Medication Dose Route Frequency     acetaminophen  325-650 mg Oral Q6H PRN     melatonin  1 mg Oral At Bedtime PRN     nitroGLYcerin  0.4 mg Sublingual Q5 Min PRN     ondansetron  4 mg Oral Q6H PRN    Or     ondansetron  4 mg Intravenous Q6H PRN          Allergies   Allergen Reactions     Fluticasone-Salmeterol      DENIED     Morphine Hcl      Decrease  Blood Pressure Lower Than Normal, Per Pt.     Vicodin [Hydrocodone-Acetaminophen] Visual Disturbance         SocHx:  reports that he has never smoked. He has never been exposed to tobacco smoke. He has never used smokeless tobacco. He reports that he does not drink alcohol and does not use drugs.    Family History   Problem Relation Age of Onset     Cerebrovascular Disease Father      Heart Disease Father      Heart Disease Brother      Coronary Artery Disease Brother         MI age 50     Depression Daughter         raped in high school     Unknown/Adopted Maternal Grandmother      Unknown/Adopted Maternal Grandfather      Unknown/Adopted Paternal Grandmother      Unknown/Adopted Paternal Grandfather          PHYSICAL EXAM  /63 (BP Location: Right arm, Patient Position: Sitting)   Pulse 59   Temp 98.3  F (36.8  C) (Axillary)   Resp 18   Ht 1.778 m (5' 10\")   Wt 78.7 kg (173 lb 9.6 oz)   SpO2 96%   BMI 24.91 kg/m      Exam is limited patient is somnolent  Resist eye opening  No tremors or involuntary movements  Reflexes normal and symmetrical    Lab and X-ray:   Recent Labs   Lab Test 05/12/23  0657 04/29/23  1249 04/04/23  1010   WBC 13.0*   < >  --    HGB 11.7*   < >  --       < >  --    POTASSIUM 3.2*   < >  --    LDL  --   --  80    < > = values in this interval not displayed.     Recent Labs   Lab Test 05/12/23  0657 05/11/23  2101   POTASSIUM 3.2* 3.9   CHLORIDE 104 102   BUN 17.6 18.4     Recent Labs   Lab " Test 05/12/23  0657 05/11/23  2101   WBC 13.0* 12.0*   HGB 11.7* 10.6*   MCV 97 99    190     Recent Labs   Lab Test 05/11/23  2101 12/10/22  1559   AST 37 20   ALT 21 27   ALKPHOS 96 87     Recent Labs   Lab Test 04/04/23  1010 04/06/22  0911   HDL 30* 44   LDL 80 34     No lab results found.    Laboratory results were personally interpreted and reviewed in detail.  Imaging studies reviewed and interpreted in detail      Summary: List Problems:   Patient Active Problem List   Diagnosis     Health Care Home     Allergic rhinitis     Peripheral edema     Polymyalgia rheumatica (H)     Advanced directives, counseling/discussion     Ischemic cardiomyopathy     Paroxysmal atrial fibrillation (H)     Coronary artery disease involving native coronary artery of native heart without angina pectoris     Stage 3b chronic kidney disease (H)     GERD (gastroesophageal reflux disease)     Tachy-alix syndrome (H)     Infrarenal abdominal aortic aneurysm (AAA) without rupture (H)     Old myocardial infarction     Chronic combined systolic and diastolic congestive heart failure (H)     Essential hypertension, benign     Type 2 diabetes mellitus with diabetic neuropathy, without long-term current use of insulin (H)     Mixed hyperlipidemia     Screening for diabetic peripheral neuropathy     Post herpetic neuralgia     Presbycusis of both ears     Chronic non-seasonal allergic rhinitis, unspecified trigger     Skin lesion     History of prostate cancer 2003 w/po resection prostate     PAD (peripheral artery disease) (H)     Microalbuminuria     Mild persistent asthma without complication     Pre-diabetes     History of CVA (cerebrovascular accident)     Dysphagia, unspecified type     Esophageal stricture     Memory change     Vascular dementia (H)     Dry skin     Stasis dermatitis of both legs     Pruritic disorder from dry skin of upper back     Diarrhea, unspecified type since on doxycycline for sinuses since early 2-20       Overweight (BMI 25.0-29.9)     Chronic sinusitis, unspecified location     Cardiac pacemaker in situ     Weight loss (60 lbs in last year)     Change in bowel habits     Anemia, unspecified type     Hypotension due to hypovolemia     Generalized weakness     LUANNE (acute kidney injury) (H)     Moderate Lumb Spinal Sten w leg numbness     Failure to thrive in adult     Frail elderly     Delirium     Generalized muscle weakness     Fall, initial encounter     Upper respiratory tract infection, unspecified type       ASSESSMENT:     89-year-old with a history of secondary vascular comorbid ties presenting with ongoing gradual cognitive decline over the past 1 year worsened physical and cognitive decline in the last couple days prior to the arrival.    Patient is somnolent  Exam is limited at the bedside  CT head reviewed no acute pathology noted marked cerebral volume loss    Eval for infectious pathology contributing to acute worsening physical/cognitive dysfunction-blood cultures negative growth urine cultures are pending    B-12/TSH pending   Occupational therapies   Neuro check per protocol   Call us with any questions    Thank you for the opportunity to provide consultation on Abbe Gold MD  N Neurology  Office Phone [phone]

## 2023-05-12 NOTE — H&P
[facility]    History and Physical - Hospitalist Service       Date of Admission:  5/11/2023    Assessment & Plan     Abbe Lambert is a 89 year old male with a complex medical history including cardiac arrest, AAA, cardiomyopathy, atrial fibrillation, chronic kidney disease and MI as well as recent of some cognitive and physical decline over the last several months who presents the emergency department with extreme decline since last night.   1. Dementia with significant cognitive decline and weakness  -- presented with no fever,  Wbc slightly elevated, temp 99. ? Viral   -- UA 1 wbc  -- CTH nothing acute, unable to do MRI d/t PPM  -- CXR shows no pneumonia or infiltrate, XR shoulder shows no fx or dislocation  -- Procal is 0.06  -- will check esr/crp/ ucx  -- repeat Trop (mildly elevated 31)  -- plan to eval by PT  -- he likely needs higher level care at discharge or memory care.    2. HTN  -- continue coreg  -- continue iv hydralazine for SBP > 160    3. HLP  -- check FLP  -- consider discontinuing crestor in setting of worsening cognitive decline.    4. ASCVD / CMO  -- continue coreg   -- mild troponin elevation  -- ECG shows no acute ischemic changes  -- follow serial trop  -- given advanced dementia, not a candidate for aggressive cardiac work up    5. Hx of tachy-alix syndrome  Hx of atrial fibrillation  Hx of PPM placement  -- hx of SHARI closure (Watchman procdure)  -- continue coreg    Diet: Regular Diet Adult    DVT Prophylaxis: Pneumatic Compression Devices  Cristina Catheter: Not present  Lines: None     Cardiac Monitoring: None  Code Status: Full Code      Clinically Significant Risk Factors Present on Admission              # Hypoalbuminemia: Lowest albumin = 3.4 g/dL at 5/11/2023  9:01 PM, will monitor as appropriate     # Hypertension: Noted on problem list   # Dementia: noted on problem list             Disposition Plan      Expected Discharge Date: 05/12/2023               "    Chandler Conde MD  Hospitalist Service  [facility]  Securely message with [employer] (more info)  Text page via [employer] Paging/Directory     ______________________________________________________________________    Chief Complaint   Weakness, confusion, unable to be cared by wife    History is limited due to dementia, obtained from the patient's wife and ER MD    History of Present Illness     \"Abbe Lambert is a 89 year old male with a complex medical history including cardiac arrest, AAA, cardiomyopathy, atrial fibrillation, chronic kidney disease and MI as well as recent of some cognitive and physical decline over the last several months who presents the emergency department with extreme decline since last night.  Patient's daughter is here at bedside and his wife who states that the patient had over the last 2 weeks of cognitive decline but since last night he has been unable to move and very stiff.  She has been unable to get him up.  Started last night when he slumped down to the ground but did not hit his head or lose consciousness.  Patient's had increasing confusion as well.  They have noticed a cough over the last 2 to 3 days.  Patient denies any chest pain, shortness of breath, abdominal pain.  They have not noticed fever at home although his temperature here is 99.8.  They have not noticed any nausea, vomiting or diarrhea.\"      Independent Historian:   \"Spouse/Partner - They report Slumped down fall last night with increased weakness and stiffness since that time and Daughter - They report Cognitive decline over the last several months and increasingly worse over the last 2 weeks and since last night as well as weakness and unable to get him to move\"      Past Medical History    Past Medical History:   Diagnosis Date     AAA (abdominal aortic aneurysm) (H)      Anemia due to blood loss, acute 10/10/2016     Asthma      Atrial fibrillation (H)     s/p left atrial appendage " ligation     Cardiac arrest (H)      Cardiomyopathy (H)      Chronic kidney disease      Chronic sinusitis      Coronary artery disease     cardiac cath 2014: medical management; cath 2013: PTCA to diagonal; cath 2009: medical management; CABG 1998: LIMA to LAD, LISA to RCA, SVG to circumflex     GERD (gastroesophageal reflux disease)      History of angina      Hyperlipidaemia      Hypertension, goal below 140/90      Myocardial infarction (H)     MI with Vfib arrest 1998     Polymyalgia rheumatica (H)      Shingles 10/28/2016     Shortness of breath      Tachy-alix syndrome (H)     generator replacement 9/2020; implanted dual chamber pacemaker 2012       Past Surgical History   Past Surgical History:   Procedure Laterality Date     ARTHROPLASTY KNEE Left 10/5/2016    Procedure: ARTHROPLASTY KNEE;  Surgeon: Keshav Zamudio MD;  Location:  OR     CARDIAC SURGERY  12/03/2012    pacemaker ; CABG 1998     CHOLECYSTECTOMY       COLONOSCOPY       ENT SURGERY  5-    sinus     EP PACEMAKER N/A 9/11/2020    Procedure: EP Pacemaker;  Surgeon: Caron Guerin MD;  Location:  HEART CARDIAC CATH LAB     ESOPHAGOSCOPY, GASTROSCOPY, DUODENOSCOPY (EGD), COMBINED N/A 12/3/2019    Procedure: ESOPHAGOGASTRODUODENOSCOPY (EGD) (MAC);  Surgeon: Calderon Herman MD;  Location:  GI     ESOPHAGOSCOPY, GASTROSCOPY, DUODENOSCOPY (EGD), DILATATION, COMBINED N/A 12/3/2019    Procedure: Esophagoscopy, gastroscopy, duodenoscopy (EGD), dilatation, combined;  Surgeon: Calderon Heramn MD;  Location:  GI     EXTRACAPSULAR CATARACT EXTRATION WITH INTRAOCULAR LENS IMPLANT       GENITOURINARY SURGERY      prostatectomy     IMPLANT PACEMAKER  12-3-12    [facility]     PROSTATE SURGERY         Prior to Admission Medications   Prior to Admission Medications   Prescriptions Last Dose Informant Patient Reported? Taking?   acetaminophen (TYLENOL) 325 MG tablet Unknown at PRN Spouse/Significant Other Yes Yes   Sig: Take  325-650 mg by mouth every 6 hours as needed for mild pain   carvedilol (COREG) 3.125 MG tablet 5/10/2023 at Unknown time Spouse/Significant Other No Yes   Sig: Take 1 tablet (3.125 mg) by mouth 2 times daily (with meals)   nitroglycerin (NITROSTAT) 0.4 MG SL tablet Unknown at has home supply, not needed Spouse/Significant Other No Yes   Sig: Place 1 tablet (0.4 mg) under the tongue every 5 minutes as needed for chest pain   rosuvastatin (CRESTOR) 10 MG tablet 5/10/2023 at Unknown time Spouse/Significant Other No Yes   Sig: Take 1 tablet (10 mg) by mouth every morning      Facility-Administered Medications: None        Review of Systems    Review of systems not obtained due to patient factors - confusion     Physical Exam   Vital Signs: Temp: 99  F (37.2  C) Temp src: Oral BP: (!) 156/75 Pulse: 65   Resp: 18 SpO2: 98 % O2 Device: None (Room air)    Weight: 173 lbs 9.6 oz    General Appearance: Oriented to self, hard of hearing, agitated and pulling on things and attempting to get out of bed  Respiratory: CTA  Cardiovascular: RRR  GI: soft +BS  Skin: warm and dry, multiple wounds on legs noted  Other: Moves all 4 ext, CN intact    Medical Decision Making       60 MINUTES SPENT BY ME on the date of service doing chart review, history, exam, documentation & further activities per the note.      Data     Results for orders placed or performed during the hospital encounter of 05/11/23 (from the past 24 hour(s))   [place] Draw    Narrative    The following orders were created for panel order [place] Draw.  Procedure                               Abnormality         Status                     ---------                               -----------         ------                     Extra Blue Top Tube[[id number]]                              Final result               Extra Green Top (Lithium...[[id number]]                      Final result               Extra Purple Top Tube[[id number]]                            Final result                  Please view results for these tests on the individual orders.   Symptomatic Influenza A/B, RSV, & SARS-CoV2 PCR (COVID-19) Nasopharyngeal    Specimen: Nasopharyngeal; Swab   Result Value Ref Range    Influenza A PCR Negative Negative    Influenza B PCR Negative Negative    RSV PCR Negative Negative    SARS CoV2 PCR Negative Negative    Narrative    Testing was performed using the Xpert Xpress CoV2/Flu/RSV Assay on the [employer] GeneXpert Instrument. This test should be ordered for the detection of SARS-CoV-2, influenza, and RSV viruses in individuals who meet clinical and/or epidemiological criteria. Test performance is unknown in asymptomatic patients. This test is for in vitro diagnostic use under the FDA EUA for laboratories certified under CLIA to perform high or moderate complexity testing. This test has not been FDA cleared or approved. A negative result does not rule out the presence of PCR inhibitors in the specimen or target RNA in concentration below the limit of detection for the assay. If only one viral target is positive but coinfection with multiple targets is suspected, the sample should be re-tested with another FDA cleared, approved, or authorized test, if coinfection would change clinical management. This test was validated by the [facility] [employer]. These laboratories are certified under the Clinical Laboratory Improvement Amendments of 1988 (CLIA-88) as qualified to perform high complexity laboratory testing.   Extra Blue Top Tube   Result Value Ref Range    Hold Specimen JIC    Extra Green Top (Lithium Heparin) Tube   Result Value Ref Range    Hold Specimen JIC    Extra Purple Top Tube   Result Value Ref Range    Hold Specimen JIC    CBC with platelets differential    Narrative    The following orders were created for panel order CBC with platelets differential.  Procedure                               Abnormality         Status                     ---------                                -----------         ------                     CBC with platelets and d...[[id number]]  Abnormal            Final result                 Please view results for these tests on the individual orders.   Comprehensive metabolic panel   Result Value Ref Range    Sodium 135 (L) 136 - 145 mmol/L    Potassium 3.9 3.4 - 5.3 mmol/L    Chloride 102 98 - 107 mmol/L    Carbon Dioxide (CO2) 23 22 - 29 mmol/L    Anion Gap 10 7 - 15 mmol/L    Urea Nitrogen 18.4 8.0 - 23.0 mg/dL    Creatinine 1.44 (H) 0.67 - 1.17 mg/dL    Calcium 8.9 8.8 - 10.2 mg/dL    Glucose 124 (H) 70 - 99 mg/dL    Alkaline Phosphatase 96 40 - 129 U/L    AST 37 10 - 50 U/L    ALT 21 10 - 50 U/L    Protein Total 8.2 6.4 - 8.3 g/dL    Albumin 3.4 (L) 3.5 - 5.2 g/dL    Bilirubin Total 0.4 <=1.2 mg/dL    GFR Estimate 46 (L) >60 mL/min/1.73m2   Troponin T, High Sensitivity   Result Value Ref Range    Troponin T, High Sensitivity 31 (H) <=22 ng/L   Ethyl Alcohol Level   Result Value Ref Range    Alcohol ethyl <0.01 <=0.01 g/dL   CBC with platelets and differential   Result Value Ref Range    WBC Count 12.0 (H) 4.0 - 11.0 10e3/uL    RBC Count 3.39 (L) 4.40 - 5.90 10e6/uL    Hemoglobin 10.6 (L) 13.3 - 17.7 g/dL    Hematocrit 33.4 (L) 40.0 - 53.0 %    MCV 99 78 - 100 fL    MCH 31.3 26.5 - 33.0 pg    MCHC 31.7 31.5 - 36.5 g/dL    RDW 15.6 (H) 10.0 - 15.0 %    Platelet Count 190 150 - 450 10e3/uL    % Neutrophils 83 %    % Lymphocytes 7 %    % Monocytes 10 %    % Eosinophils 0 %    % Basophils 0 %    % Immature Granulocytes 0 %    NRBCs per 100 WBC 0 <1 /100    Absolute Neutrophils 9.8 (H) 1.6 - 8.3 10e3/uL    Absolute Lymphocytes 0.8 0.8 - 5.3 10e3/uL    Absolute Monocytes 1.2 0.0 - 1.3 10e3/uL    Absolute Eosinophils 0.0 0.0 - 0.7 10e3/uL    Absolute Basophils 0.0 0.0 - 0.2 10e3/uL    Absolute Immature Granulocytes 0.0 <=0.4 10e3/uL    Absolute NRBCs 0.0 10e3/uL   Procalcitonin   Result Value Ref Range    Procalcitonin 0.06 (H) <0.05 ng/mL   Lactic acid  whole blood   Result Value Ref Range    Lactic Acid 1.3 0.7 - 2.0 mmol/L   EKG 12-lead, tracing only   Result Value Ref Range    Systolic Blood Pressure  mmHg    Diastolic Blood Pressure  mmHg    Ventricular Rate 64 BPM    Atrial Rate 64 BPM    CO Interval 282 ms    QRS Duration 130 ms     ms    QTc 458 ms    P Axis 35 degrees    R AXIS -63 degrees    T Axis 6 degrees    Interpretation ECG       Sinus rhythm with 1st degree A-V block  Left axis deviation  Non-specific intra-ventricular conduction block  Inferior infarct (cited on or before 29-APR-2023)  Anterolateral infarct (cited on or before 29-APR-2023)  Abnormal ECG  When compared with ECG of 29-APR-2023 13:03,  Sinus rhythm has replaced Electronic atrial pacemaker  Confirmed by GENERATED REPORT, COMPUTER ([id number]),  Mala Salmeron ([id number]) on 5/11/2023 9:42:23 PM     XR Chest 2 Views    Narrative    EXAM: XR CHEST 2 VIEWS  LOCATION: [facility]  DATE/TIME: 5/11/2023 9:59 PM CDT    INDICATION: cough  COMPARISON: 2 view chest radiograph 03/31/2021      Impression    IMPRESSION: Prior median sternotomy. Stable position of the dual-lead left chest pacemaker. Mild left basilar atelectasis. Otherwise no focal pulmonary consolidation or effusion. No pneumothorax. Cholecystectomy clips. Degenerative changes of the   thoracic spine. No acute osseous abnormality.   UA with Microscopic reflex to Culture    Specimen: Urine, Catheter   Result Value Ref Range    Color Urine Light Yellow Colorless, Straw, Light Yellow, Yellow    Appearance Urine Clear Clear    Glucose Urine Negative Negative mg/dL    Bilirubin Urine Negative Negative    Ketones Urine Negative Negative mg/dL    Specific Gravity Urine 1.018 1.003 - 1.035    Blood Urine Moderate (A) Negative    pH Urine 5.5 5.0 - 7.0    Protein Albumin Urine 50 (A) Negative mg/dL    Urobilinogen Urine Normal Normal, 2.0 mg/dL    Nitrite Urine Negative Negative    Leukocyte Esterase Urine  Negative Negative    Mucus Urine Present (A) None Seen /LPF    RBC Urine 9 (H) <=2 /HPF    WBC Urine <1 <=5 /HPF    Squamous Epithelials Urine <1 <=1 /HPF    Narrative    Urine Culture not indicated   CT Head w/o Contrast    Narrative    EXAM: CT HEAD W/O CONTRAST  LOCATION: [facility]  DATE/TIME: 5/11/2023 10:41 PM CDT    INDICATION: altered mental status  COMPARISON: None.  TECHNIQUE: Routine CT Head without IV contrast. Multiplanar reformats. Dose reduction techniques were used.    FINDINGS:  INTRACRANIAL CONTENTS: No intracranial hemorrhage, extraaxial collection, or mass effect.  No CT evidence of acute infarct. Mild presumed chronic small vessel ischemic changes. Moderate generalized volume loss. No hydrocephalus.     VISUALIZED ORBITS/SINUSES/MASTOIDS: No intraorbital abnormality. Prior sinus surgery. No paranasal sinus mucosal disease. No middle ear or mastoid effusion.    BONES/SOFT TISSUES: No acute abnormality.      Impression    IMPRESSION:  1.  No CT evidence for acute intracranial process.  2.  Brain atrophy and presumed chronic microvascular ischemic changes as above.   XR Shoulder Right G/E 3 Views    Narrative    EXAM: XR SHOULDER RIGHT G/E 3 VIEWS  LOCATION: [facility]  DATE/TIME: 5/12/2023 12:14 AM CDT    INDICATION: fall, pain  COMPARISON: None.      Impression    IMPRESSION: Normal joint spaces and alignment. No fracture.   CBC with platelets   Result Value Ref Range    WBC Count 13.0 (H) 4.0 - 11.0 10e3/uL    RBC Count 3.77 (L) 4.40 - 5.90 10e6/uL    Hemoglobin 11.7 (L) 13.3 - 17.7 g/dL    Hematocrit 36.6 (L) 40.0 - 53.0 %    MCV 97 78 - 100 fL    MCH 31.0 26.5 - 33.0 pg    MCHC 32.0 31.5 - 36.5 g/dL    RDW 15.2 (H) 10.0 - 15.0 %    Platelet Count 211 150 - 450 10e3/uL

## 2023-05-12 NOTE — PLAN OF CARE
Goal Outcome Evaluation:      Plan of Care Reviewed With: patient    Overall Patient Progress: no changeOverall Patient Progress: no change    Orientation/Cognitive: A/O to self/family  Observation Goals (Met/ Not Met): not met   Mobility Level/Assist Equipment: A2 lift  Fall Risk (Y/N): yes  Behavior Concerns: none  Pain Management: pain controlled with PRN tylenol  Tele/VS/O2: vitally stable on RA  ABNL Lab/BG: See results, WBC 13, .95, trop trending down at 47, creat 1.50  Diet: regular, able to feed self with cuing. Poor intake fluids running but reduced.   Bowel/Bladder: incontinent of B/B. LBM 5/12, loose, per spouse pt took Doculax day prior to coming in for constipation.   Skin Concerns: redness to buttocks/groin. 2+ edema to BLE.  Drains/Devices: PIV infusing NS 50 ml/hr, external cath  Tests/Procedures for next shift: Neurology following, CM/SW  Anticipated DC date & active delays: PT recommending TCU.   Patient Stated Goal for Today: none stated.

## 2023-05-12 NOTE — PROGRESS NOTES
05/12/23 0900   Appointment Info   Signing Clinician's Name / Credentials (PT) Barry Gama DPT   Quick Adds   Quick Adds Certification       Present no   Living Environment   People in Home spouse   Current Living Arrangements independent living facility   Home Accessibility no concerns   Transportation Anticipated family or friend will provide   Living Environment Comments Pt lives with his spouse in an ILF. No stairs. Pt unsure of how he will transport upon discharge. Pt's spouse unable to provide level of assist pt currently needs.   Self-Care   Usual Activity Tolerance moderate   Current Activity Tolerance fair   Regular Exercise No   Equipment Currently Used at Home walker, rolling   Fall history within last six months yes   Number of times patient has fallen within last six months 1   Activity/Exercise/Self-Care Comment Pt's spouse answering questions for pt due to disorientation. Pt needs assist with ADLs at baseline. Pt uses a FWW at baseline for ambulation.   General Information   Onset of Illness/Injury or Date of Surgery 05/12/23   Referring Physician Chandler Conde MD   Patient/Family Therapy Goals Statement (PT) Pt did not state   Pertinent History of Current Problem (include personal factors and/or comorbidities that impact the POC) Per Chart: Abbe Lambert is a 89 year old male with a complex medical history including cardiac arrest, AAA, cardiomyopathy, atrial fibrillation, chronic kidney disease and MI as well as recent of some cognitive and physical decline over the last several months who presents the emergency department with extreme decline since last night.   Existing Precautions/Restrictions fall   Weight-Bearing Status - LLE full weight-bearing   Weight-Bearing Status - RLE full weight-bearing   Cognition   Affect/Mental Status (Cognition) confused   Orientation Status (Cognition) oriented to;person   Cognitive Status Comments Pt confused, alert to self  only, unable to consistently follow commands.   Pain Assessment   Patient Currently in Pain No   Posture    Posture Forward head position   Range of Motion (ROM)   Range of Motion ROM is WFL   Strength (Manual Muscle Testing)   Strength (Manual Muscle Testing) Deficits observed during functional mobility   Strength Comments BLE Hip Flexion: 2+/5   Bed Mobility   Comment, (Bed Mobility) Supine>sit w/ mod A x 2   Transfers   Comment, (Transfers) Sit>stand w/ Natasha Stedy and mod A x 2   Gait/Stairs (Locomotion)   Comment, (Gait/Stairs) Unable to initiate   Balance   Balance Comments Mild posterior lean upon sitting at EOB. Pt unsteady in standing, needing A x 2 for balance   Sensory Examination   Sensory Perception patient reports no sensory changes   Clinical Impression   Criteria for Skilled Therapeutic Intervention Yes, treatment indicated   PT Diagnosis (PT) Impaired gait   Influenced by the following impairments Decreased activity tolerance; decreased balance; decreased strength   Functional limitations due to impairments Impaired functional mobility   Clinical Presentation (PT Evaluation Complexity) Stable/Uncomplicated   Clinical Presentation Rationale Clinical judgement   Clinical Decision Making (Complexity) low complexity   Planned Therapy Interventions (PT) balance training;bed mobility training;gait training;patient/family education;strengthening;transfer training;progressive activity/exercise   Risk & Benefits of therapy have been explained evaluation/treatment results reviewed;care plan/treatment goals reviewed;risks/benefits reviewed;current/potential barriers reviewed;participants voiced agreement with care plan;participants included;patient   PT Total Evaluation Time   PT Eval, Low Complexity Minutes (62475) 10   Therapy Certification   Start of care date 05/12/23   Certification date from 05/12/23   Certification date to 05/17/23   Medical Diagnosis Generalized Weakness   Physical Therapy Goals   PT  Frequency 3x/week   PT Predicted Duration/Target Date for Goal Attainment 05/17/23   PT Goals Bed Mobility;Transfers;Gait   PT: Bed Mobility Supervision/stand-by assist;Supine to/from sit   PT: Transfers Supervision/stand-by assist;Sit to/from stand;Assistive device   PT: Gait Supervision/stand-by assist;Assistive device;50 feet   Interventions   Interventions Quick Adds Therapeutic Activity   Therapeutic Activity   Therapeutic Activities: dynamic activities to improve functional performance Minutes (81002) 30   Symptoms Noted During/After Treatment Fatigue   Treatment Detail/Skilled Intervention Greeted pt supine in bed, agreed to PT. VSS on RA throughout session. Pt alert to self only, needing continuous cues throughout. Pt unable to consistently follow commands, responding best to tactile cues. Pt performed supine>sit w/ mod A x 2, needing assist to safely lift BLEs off of bed and trunk control. Once in sitting, pt needing assist to scoot to EOB and sit unsupported without LOB. Pt having intermittent posterior lean in sitting, needing assist to maintain midline. Natasha Stedy brought in, pt needing assist to place BLEs on foot plates. Pt performed sit>stand x 3 w/ Natasha Stedy and mod A x 2, needing assist to stand fully upright. Posterior lean throughout, needing assist for anterior weightshift. Pt able to stand for ~10 seconds before needing to sit. Pt transferred to chair via Natasha Stedy. Pt ended session sitting in chair, with all needs met and CNA present.   PT Discharge Planning   PT Plan bed mobility; repeat sit>stand w/ Natasha Stedy vs FWW; pre-gait exercises   PT Discharge Recommendation (DC Rec) Transitional Care Facility   PT Rationale for DC Rec Pt is significantly below baseline. Pt requiring heavy A x 2 with all functional mobility. Pt presenting with deficits in activity tolerance, balance, and strength. Due to these deficits, pt is a high falls risk and unsafe to return home. Pt would benefit from  continued skilled PT services via TCU to address deficits and improve IND with safety and functional mobility. Anticipate pt will require higher level of care.   PT Brief overview of current status Supine>sit w/ mod A x 2; sit>stand w/ Natasha Stedy and mod A x 2   Total Session Time   Timed Code Treatment Minutes 30   Total Session Time (sum of timed and untimed services) 40     [facility]  OUTPATIENT PHYSICAL THERAPY EVALUATION  PLAN OF TREATMENT FOR OUTPATIENT REHABILITATION  (COMPLETE FOR INITIAL CLAIMS ONLY)  Patient's Last Name, First Name, M.I.  YOB: 1934  Abbe Lambert                        Provider's Name  [facility] Medical Record No.  [id number]                             Onset Date:  05/12/23   Start of Care Date:  05/12/23   Type:     _X_PT   ___OT   ___SLP Medical Diagnosis:  Generalized Weakness              PT Diagnosis:  Impaired gait Visits from SOC:  1     See note for plan of treatment, functional goals and certification details    I CERTIFY THE NEED FOR THESE SERVICES FURNISHED UNDER        THIS PLAN OF TREATMENT AND WHILE UNDER MY CARE     (Physician co-signature of this document indicates review and certification of the therapy plan).

## 2023-05-12 NOTE — PROGRESS NOTES
Hospitalist update note:     Patient seen and examined with family at bedside.  Discussed increasing needs.  Family also notes shuffled gait, pill rolling tremor and labile mood, visual hallucinations and sleep disturbances in addition to the cognitive decline.  Strong suspicion this patient may actually have Lewy Body Dementia     Patient's CRP and ESR are also elevated at 119 and 61 respectively.      Troponin rising, 31-->58     Plan:   - Anticipate patient will be here for at least 3 more days and will send to UR to assess for inpatient  - Repeat troponin   - Echo ordered  - Neurology consulted   - Therapy evaluation   - Care management for disposition       Medical complexity needing 2nd midnight in the hospital  Based on patient's current status and history and chronic medical issues listed above, I anticipate this patient will be in the hospital more than 2 midnights.  Anticipated LOS 3-4 nights and therefore should be admitted to inpatient status.        Levi Davis DO   Hospitalist

## 2023-05-12 NOTE — PROGRESS NOTES
1.  IVF overnight - met  2. PT for home safety - met, will need placement. SW/CM assessment pending

## 2023-05-12 NOTE — ED NOTES
[facility]    ED Nurse Handoff Report    ED Chief complaint: Altered Mental Status      ED Diagnosis:   Final diagnoses:   None       Code Status: To be determined by hospitalist    Allergies:   Allergies   Allergen Reactions     Fluticasone-Salmeterol      DENIED     Morphine Hcl      Decrease  Blood Pressure Lower Than Normal, Per Pt.     Vicodin [Hydrocodone-Acetaminophen] Visual Disturbance       Patient Story:  Pt BIBA from an independent living facility where his family states that he has not acted like himself all day. They state that he sat in a chair all day, has been extra sleepy and does not move about as he normally does. They state his gait has been getting increasingly worse over the past several days. He is only oriented to self.     Focused Assessment:    Pt is orientated to self, he does not follow commands well, he tends to simply stare off into space if he cannot answer a question. He does not move independently in bed. He has been extremely drowsy. External catheter used for bathroom needs. VSS.    Labs Ordered and Resulted from Time of ED Arrival to Time of ED Departure   COMPREHENSIVE METABOLIC PANEL - Abnormal       Result Value    Sodium 135 (*)     Potassium 3.9      Chloride 102      Carbon Dioxide (CO2) 23      Anion Gap 10      Urea Nitrogen 18.4      Creatinine 1.44 (*)     Calcium 8.9      Glucose 124 (*)     Alkaline Phosphatase 96      AST 37      ALT 21      Protein Total 8.2      Albumin 3.4 (*)     Bilirubin Total 0.4      GFR Estimate 46 (*)    TROPONIN T, HIGH SENSITIVITY - Abnormal    Troponin T, High Sensitivity 31 (*)    ROUTINE UA WITH MICROSCOPIC REFLEX TO CULTURE - Abnormal    Color Urine Light Yellow      Appearance Urine Clear      Glucose Urine Negative      Bilirubin Urine Negative      Ketones Urine Negative      Specific Gravity Urine 1.018      Blood Urine Moderate (*)     pH Urine 5.5      Protein Albumin Urine 50 (*)     Urobilinogen Urine Normal       Nitrite Urine Negative      Leukocyte Esterase Urine Negative      Mucus Urine Present (*)     RBC Urine 9 (*)     WBC Urine <1      Squamous Epithelials Urine <1     CBC WITH PLATELETS AND DIFFERENTIAL - Abnormal    WBC Count 12.0 (*)     RBC Count 3.39 (*)     Hemoglobin 10.6 (*)     Hematocrit 33.4 (*)     MCV 99      MCH 31.3      MCHC 31.7      RDW 15.6 (*)     Platelet Count 190      % Neutrophils 83      % Lymphocytes 7      % Monocytes 10      % Eosinophils 0      % Basophils 0      % Immature Granulocytes 0      NRBCs per 100 WBC 0      Absolute Neutrophils 9.8 (*)     Absolute Lymphocytes 0.8      Absolute Monocytes 1.2      Absolute Eosinophils 0.0      Absolute Basophils 0.0      Absolute Immature Granulocytes 0.0      Absolute NRBCs 0.0     PROCALCITONIN - Abnormal    Procalcitonin 0.06 (*)    INFLUENZA A/B, RSV, & SARS-COV2 PCR - Normal    Influenza A PCR Negative      Influenza B PCR Negative      RSV PCR Negative      SARS CoV2 PCR Negative     LACTIC ACID WHOLE BLOOD - Normal    Lactic Acid 1.3     ETHYL ALCOHOL LEVEL - Normal    Alcohol ethyl <0.01     BLOOD CULTURE   BLOOD CULTURE       XR Chest 2 Views   Final Result   IMPRESSION: Prior median sternotomy. Stable position of the dual-lead left chest pacemaker. Mild left basilar atelectasis. Otherwise no focal pulmonary consolidation or effusion. No pneumothorax. Cholecystectomy clips. Degenerative changes of the    thoracic spine. No acute osseous abnormality.      CT Head w/o Contrast    (Results Pending)         Treatments and/or interventions provided:    none  Medications - No data to display    Patient's response to treatments and/or interventions:    Pt sleeping in bed    To be done/followed up on inpatient unit:   See any in-patient orders    Does this patient have any cognitive concerns?: Disoriented to time, Disoriented to place and Disoriented to situation    Activity level - Baseline/Home:    Walker    Activity Level - Current:     Total Care    Patient's Preferred language: English     Needed?: No    Isolation: None  Infection: Not Applicable  Patient tested for COVID 19 prior to admission: YES    Bariatric?: No    Vital Signs:   Vitals:    05/11/23 2111 05/11/23 2131 05/11/23 2200 05/11/23 2242   BP:  (!) 165/61 (!) 158/61    Pulse: 68 65 63    Resp: 18      Temp: 99.8  F (37.7  C)      TempSrc: Oral      SpO2:  97% 97% 98%       Cardiac Rhythm:     Was the PSS-3 completed:   No - pt is unable to answer questions appropriately  What interventions are required if any?               Family Comments: wife and daughter at bedside.   OBS brochure/video discussed/provided to patient/family: N/A              Name of person given brochure if not patient:               Relationship to patient:     For the majority of the shift this patient's behavior was Green.  Behavioral interventions performed were .    ED NURSE PHONE NUMBER: *[phone]

## 2023-05-12 NOTE — ED PROVIDER NOTES
History     Chief Complaint:  Altered Mental Status       HPI history is limited due to dementia  Abbe Lambert is a 89 year old male with a complex medical history including cardiac arrest, AAA, cardiomyopathy, atrial fibrillation, chronic kidney disease and MI as well as recent of some cognitive and physical decline over the last several months who presents the emergency department with extreme decline since last night.  Patient's daughter is here at bedside and his wife who states that the patient had over the last 2 weeks of cognitive decline but since last night he has been unable to move and very stiff.  She has been unable to get him up.  Started last night when he slumped down to the ground but did not hit his head or lose consciousness.  Patient's had increasing confusion as well.  They have noticed a cough over the last 2 to 3 days.  Patient denies any chest pain, shortness of breath, abdominal pain.  They have not noticed fever at home although his temperature here is 99.8.  They have not noticed any nausea, vomiting or diarrhea.      Independent Historian:   Spouse/Partner - They report Slumped down fall last night with increased weakness and stiffness since that time and Daughter - They report Cognitive decline over the last several months and increasingly worse over the last 2 weeks and since last night as well as weakness and unable to get him to move    Review of External Notes: ED visit from 4/29/2023 and progress note from 5/5/2020    ROS:  Review of Systems    Allergies:  Fluticasone-Salmeterol  Morphine Hcl  Vicodin [Hydrocodone-Acetaminophen]     Medications:    acetaminophen (TYLENOL) 325 MG tablet  albuterol (PROAIR HFA/PROVENTIL HFA/VENTOLIN HFA) 108 (90 Base) MCG/ACT inhaler  carvedilol (COREG) 3.125 MG tablet  nitroglycerin (NITROSTAT) 0.4 MG SL tablet  rosuvastatin (CRESTOR) 10 MG tablet        Past Medical History:    Past Medical History:   Diagnosis Date     AAA (abdominal aortic  aneurysm) (H)      Anemia due to blood loss, acute 10/10/2016     Asthma      Atrial fibrillation (H)      Cardiac arrest (H)      Cardiomyopathy (H)      Chronic kidney disease      Chronic sinusitis      Coronary artery disease      GERD (gastroesophageal reflux disease)      History of angina      Hyperlipidaemia      Hypertension, goal below 140/90      Myocardial infarction (H)      Polymyalgia rheumatica (H)      Shingles 10/28/2016     Shortness of breath      Tachy-alix syndrome (H)        Past Surgical History:    Past Surgical History:   Procedure Laterality Date     ARTHROPLASTY KNEE Left 10/5/2016    Procedure: ARTHROPLASTY KNEE;  Surgeon: Keshav Zamudio MD;  Location:  OR     CARDIAC SURGERY  12/03/2012    pacemaker ; CABG 1998     CHOLECYSTECTOMY       COLONOSCOPY       ENT SURGERY  5-    sinus     EP PACEMAKER N/A 9/11/2020    Procedure: EP Pacemaker;  Surgeon: Caron Guerin MD;  Location:  HEART CARDIAC CATH LAB     ESOPHAGOSCOPY, GASTROSCOPY, DUODENOSCOPY (EGD), COMBINED N/A 12/3/2019    Procedure: ESOPHAGOGASTRODUODENOSCOPY (EGD) (MAC);  Surgeon: Cadleron Herman MD;  Location:  GI     ESOPHAGOSCOPY, GASTROSCOPY, DUODENOSCOPY (EGD), DILATATION, COMBINED N/A 12/3/2019    Procedure: Esophagoscopy, gastroscopy, duodenoscopy (EGD), dilatation, combined;  Surgeon: Calderon Herman MD;  Location:  GI     EXTRACAPSULAR CATARACT EXTRATION WITH INTRAOCULAR LENS IMPLANT       GENITOURINARY SURGERY      prostatectomy     IMPLANT PACEMAKER  12-3-12    [facility]     PROSTATE SURGERY          Family History:    family history includes Cerebrovascular Disease in his father; Coronary Artery Disease in his brother; Depression in his daughter; Heart Disease in his brother and father; Unknown/Adopted in his maternal grandfather, maternal grandmother, paternal grandfather, and paternal grandmother.    Social History:   reports that he has never smoked. He has never been  exposed to tobacco smoke. He has never used smokeless tobacco. He reports that he does not drink alcohol and does not use drugs.  PCP: Ajit Butterfield     Physical Exam     Patient Vitals for the past 24 hrs:   BP Temp Temp src Pulse Resp SpO2   05/11/23 2111 -- 99.8  F (37.7  C) Oral 68 18 --   05/11/23 2101 -- -- -- -- -- 97 %   05/11/23 2051 (!) 173/77 -- -- -- -- --        Physical Exam  General: Patient is alert and oriented to person and place but not to time.  Slow to answer questions  HEENT: Head atraumatic    Eyes: pupils equal and reactive. Conjunctiva clear   Nares: patent   Oropharynx: no lesions, uvula midline, no palatal draping, normal voice, no trismus  Neck: Supple without lymphadenopathy, no meningismus  Chest: Heart regular rate and rhythm.   Lungs: Equal clear to auscultation with no wheeze or rales  Abdomen: Soft, non tender, nondistended, normal bowel sounds  Back: No costovertebral angle tenderness, no midline C, T or L spine tenderness  Neuro: Grossly nonfocal, normal speech, strength equal bilaterally, CN 2-12 intact  Extremities: No deformities, equal radial and DP pulses. No clubbing, cyanosis.  No edema  Skin: Warm and dry with no rash.       Emergency Department Course     ECG results from 05/11/23   EKG 12-lead, tracing only     Value    Systolic Blood Pressure     Diastolic Blood Pressure     Ventricular Rate 64    Atrial Rate 64    NV Interval 282    QRS Duration 130        QTc 458    P Axis 35    R AXIS -63    T Axis 6    Interpretation ECG      Sinus rhythm with 1st degree A-V block  Left axis deviation  Non-specific intra-ventricular conduction block  Inferior infarct (cited on or before 29-APR-2023)  Anterolateral infarct (cited on or before 29-APR-2023)  Abnormal ECG  When compared with ECG of 29-APR-2023 13:03,  Sinus rhythm has replaced Electronic atrial pacemaker  Confirmed by GENERATED REPORT, COMPUTER ([id number]),  Mala Salmeron ([id number]) on 5/11/2023 9:42:23 PM            Imaging:  CT Head w/o Contrast   Final Result   IMPRESSION:   1.  No CT evidence for acute intracranial process.   2.  Brain atrophy and presumed chronic microvascular ischemic changes as above.      XR Chest 2 Views   Final Result   IMPRESSION: Prior median sternotomy. Stable position of the dual-lead left chest pacemaker. Mild left basilar atelectasis. Otherwise no focal pulmonary consolidation or effusion. No pneumothorax. Cholecystectomy clips. Degenerative changes of the    thoracic spine. No acute osseous abnormality.      XR Shoulder Right G/E 3 Views    (Results Pending)      Report per radiology    Laboratory:  Labs Ordered and Resulted from Time of ED Arrival to Time of ED Departure   COMPREHENSIVE METABOLIC PANEL - Abnormal       Result Value    Sodium 135 (*)     Potassium 3.9      Chloride 102      Carbon Dioxide (CO2) 23      Anion Gap 10      Urea Nitrogen 18.4      Creatinine 1.44 (*)     Calcium 8.9      Glucose 124 (*)     Alkaline Phosphatase 96      AST 37      ALT 21      Protein Total 8.2      Albumin 3.4 (*)     Bilirubin Total 0.4      GFR Estimate 46 (*)    TROPONIN T, HIGH SENSITIVITY - Abnormal    Troponin T, High Sensitivity 31 (*)    ROUTINE UA WITH MICROSCOPIC REFLEX TO CULTURE - Abnormal    Color Urine Light Yellow      Appearance Urine Clear      Glucose Urine Negative      Bilirubin Urine Negative      Ketones Urine Negative      Specific Gravity Urine 1.018      Blood Urine Moderate (*)     pH Urine 5.5      Protein Albumin Urine 50 (*)     Urobilinogen Urine Normal      Nitrite Urine Negative      Leukocyte Esterase Urine Negative      Mucus Urine Present (*)     RBC Urine 9 (*)     WBC Urine <1      Squamous Epithelials Urine <1     CBC WITH PLATELETS AND DIFFERENTIAL - Abnormal    WBC Count 12.0 (*)     RBC Count 3.39 (*)     Hemoglobin 10.6 (*)     Hematocrit 33.4 (*)     MCV 99      MCH 31.3      MCHC 31.7      RDW 15.6 (*)     Platelet Count 190      % Neutrophils 83       % Lymphocytes 7      % Monocytes 10      % Eosinophils 0      % Basophils 0      % Immature Granulocytes 0      NRBCs per 100 WBC 0      Absolute Neutrophils 9.8 (*)     Absolute Lymphocytes 0.8      Absolute Monocytes 1.2      Absolute Eosinophils 0.0      Absolute Basophils 0.0      Absolute Immature Granulocytes 0.0      Absolute NRBCs 0.0     PROCALCITONIN - Abnormal    Procalcitonin 0.06 (*)    INFLUENZA A/B, RSV, & SARS-COV2 PCR - Normal    Influenza A PCR Negative      Influenza B PCR Negative      RSV PCR Negative      SARS CoV2 PCR Negative     LACTIC ACID WHOLE BLOOD - Normal    Lactic Acid 1.3     ETHYL ALCOHOL LEVEL - Normal    Alcohol ethyl <0.01     BLOOD CULTURE   BLOOD CULTURE        Procedures   none    Emergency Department Course & Assessments:             Interventions:  Medications - No data to display     Assessments:  2120 I evaluated the patient at bedside and started the  2300 I rechecked the patient and updated the family    Independent Interpretation (X-rays, CTs, rhythm strip):  Chest xray: No pneumothorax or infiltrate    Consultations/Discussion of Management or Tests:  2311 Dr. Conde, hospitalist       Social Determinants of Health affecting care:   None    Disposition:  The patient was admitted to the hospital under the care of Dr. Conde.     Impression & Plan    CMS Diagnoses: None      Medical Decision Making:  Patient is an 89-year-old male with history of recent cognitive decline over the last several months who presents the emergency department with weakness, stiffness, decreased mentation over the last 2 days.  He had a slip and fall to the ground from generalized weakness last night without hitting his head or loss of consciousness.  Head CT is negative for acute intracranial hemorrhage.  No large mass lesions or significant gray-white matter differentiation to suggest recent stroke noted.  Patient has a pacemaker so cannot have an MRI performed at this time.  EKG with no  new acute ST elevation.  Troponin is mildly elevated that is likely chronic fromInfluenza COVID and RSV are negative.  Chest x-ray without infiltrate.  Urinalysis without signs of UTI.  Patient does have a temperature of 99.8, white blood cell count of 12 and a cough so suspect likely URI.  He may have delirium and weakness from infection but no overt source seen yet at this time.  Plan at this time is for admission to the observation unit for hydration and to see if the infection further declares itself and to have a physical therapy assessment for possible need for rehab.  Should trend troponins while admitted as well.  Patient continues to be quite confused here in the emergency department.  He has no personal complaints of any chest pain, abdominal pain, nausea or vomiting.  He had a normal bowel movement here without diarrhea.  Patient's family is agreeable with the plan for admission and all questions and concerns addressed.        Diagnosis:    ICD-10-CM    1. Generalized muscle weakness  M62.81       2. Upper respiratory tract infection, unspecified type  J06.9       3. Delirium  R41.0       4. Fall, initial encounter  W19.XXXA              5/11/2023   Vivienne Ayon MD Neuner, Maria Bea, MD  05/12/23 0015       Vivienne Ayon MD  05/12/23 0016       Vivienne Ayon MD  05/12/23 0016

## 2023-05-13 NOTE — PLAN OF CARE
Date & Time: 5/13 7181-8790  Diagnosis: Fall, delirium  Procedures: NA  Orientation/Cognitive: AOx1, possible lewy body dementia dx?   VS/O2: VSS ex HTN, RA  Mobility: A2 + lift, very stiff joints  Diet: Regular  Pain Management: PRN tylenol  Bowel & Bladder: Incontinent, BMx2 5/13  Skin: Dao, scattered bruises  Abnormal Labs: Cr 1.50, .95, trops 31/58/47  Tele: PPM  IV Access/Drips/Fluids: R PIV NS @ 50ml/hr  Drains: NA  Tests: CT & xray - negative, ECHO - EF 60-65%, MRI - needed, has PPM, cannot do until Monday  Consults: Neuro, hospitalist  Discharge Plan: Pending - needs higher level of care (currently in [place])  Other: Can become aggressive with cares, agitates easily

## 2023-05-13 NOTE — PROGRESS NOTES
Orientation/Cognitive: Alert to self  Observation Goals (Met/ Not Met): Met- pending placement  Mobility Level/Assist Equipment: A-2 Lift  Fall Risk (Y/N): Y  Behavior Concerns: None  Pain Management: PRN tylenol  Tele/VS/O2: VSS on RA  ABNL Lab/BG: Trop- 47- trending down, CRP- 119.95, Cr 1.5, WBC- 13  Diet: Reg  Bowel/Bladder: Incont. B&B  Skin Concerns: Redness to sacrum and groin from incontinence  Drains/Devices: Male ext. cath  Tests/Procedures for next shift: Pending OT consult, ECHO  Anticipated DC date & active delays: TBD  Patient Stated Goal for Today: None

## 2023-05-13 NOTE — PROGRESS NOTES
"SPIRITUAL HEALTH SERVICES  SPIRITUAL ASSESSMENT Progress Note  [facility] Short Stay  On-Call Visit    REFERRAL SOURCE: ASAP consult for emotional support    On this visit, Abbe's wife (Madison) and daughter (Una) were present in the room.     Introduced self/role to Abbe and, at family request, prayed with him.     Offered emotional/spiritual support to Madison and Una through listening presence as they reflected on Abbe's illness and hospitalization.     Madison and Una named experiencing distress with Abbe's diagnosis and his dementia-related symptoms. Una notes, \"This isn't him.\" Affirmed their emotions/experience and validated their care and presence. Encouraged self-care. Una named receiving permission to \"step away\" from the bedside; validated this self-care practice.    The family is [employer] and attends [employer] in [place]. They will be in contact with clergy and are connected with the [employer] prayer chain. Madison and Una requested prayer, and we prayed together. They endorse feeling \"more peaceful\" with prayer. Encouraged prayer as part of their coping practice. Together, we explored the possibility of playing familiar hymns to Abbe - which might be soothing to him as well as to family who are present.    PLAN: Will communicate care provided to unit . Spiritual Health remains available as needed/requested.    Cherie Cooney  Associate   [facility] Spiritual Health Phone Line: [phone]   "

## 2023-05-13 NOTE — PLAN OF CARE
Goal Outcome Evaluation:    Observation goals  PRIOR TO DISCHARGE        Comments: 1.  IVF overnight - met  2. PT for home safety- pending placement, diagnostic testing

## 2023-05-13 NOTE — PLAN OF CARE
OT: Order received, chart reviewed and discussed with care team. Per discussion with PT, pt with baseline dementia and plans to discharge to TCU following admission. No acute care OT needs.  Defer discharge recommendations to PT and next level of care. Will complete OT orders.

## 2023-05-13 NOTE — PLAN OF CARE
Goal Outcome Evaluation:    Observation goals  PRIOR TO DISCHARGE        Comments: 1.  IVF overnight - met  2. PT for home safety- met, pending placement

## 2023-05-13 NOTE — PROGRESS NOTES
"  ASSESSMENT/PLAN      89-year-old with a history of secondary vascular comorbid ties presenting with ongoing gradual cognitive decline over the past 1 year worsened physical and cognitive decline in the last couple days prior to the arrival.     CT head reviewed no acute pathology noted marked cerebral volume loss  B-12 &TSH - normal   No infectious source/ neg blood and urine cx   Elevated sed rate/CRP   Delirium in a setting of baseline cognitive dysfunction secondary to above     PLAN  Seroquel and Zyprexa as tolerated   Consider Psychiatric eval   MRI brain pending   Therapies   Neuro checks per protocol    Thank you for the opportunity to provide consultation on Abbekristal Gold MD  [facility] Neurology  Office Phone [phone]    CLINICAL PROBLEMS:    Elevated inflammatory markers, no infectious source     24 HOUR EVENTS:      Agitated needed Zyprexa overnight     EXAMINATION:   Past medical history, family history, social history and review of systems are unchanged except as noted below.    VITAL SIGNS:   BP (!) 156/69 (BP Location: Left arm, Patient Position: Semi-Unger's, Cuff Size: Adult Regular)   Pulse 66   Temp 97.8  F (36.6  C) (Axillary)   Resp 18   Ht 1.778 m (5' 10\")   Wt 78.7 kg (173 lb 9.6 oz)   SpO2 98%   BMI 24.91 kg/m      Alert, restless &irritable trying to get out of bed. Unable to follow commands purposefully  Pupils equal and round , no gaze preference, Face symmetric hearing normal to conversation  Able to move all four limbs equally. No tremors or involuntary movementts    PERTINENT DATA:  Lab and X-ray: Recent Labs   Lab Test 05/13/23  0650 05/12/23  0657 04/29/23  1249 04/04/23  1010   WBC  --  13.0*   < >  --    HGB  --  11.7*   < >  --    PLT  --  211   < >  --    POTASSIUM 3.6 3.2*   < >  --    LDL  --   --   --  80    < > = values in this interval not displayed.     [unfilled]  Recent Labs   Lab Test 05/13/23  0650 05/12/23  0657 05/11/23  2101 "   POTASSIUM 3.6 3.2* 3.9   CHLORIDE  --  104 102   BUN  --  17.6 18.4     Recent Labs   Lab Test 05/12/23  0657 05/11/23  2101   WBC 13.0* 12.0*   HGB 11.7* 10.6*   MCV 97 99    190     Recent Labs   Lab Test 05/11/23  2101 12/10/22  1559   AST 37 20   ALT 21 27   ALKPHOS 96 87     Recent Labs   Lab Test 04/04/23  1010 04/06/22  0911   HDL 30* 44   LDL 80 34     No lab results found.    Laboratory results were personally interpreted and reviewed in detail.    Imaging studies reviewed and interpreted in detail    Summary: List Problems:   Patient Active Problem List   Diagnosis     Health Care Home     Allergic rhinitis     Peripheral edema     Polymyalgia rheumatica (H)     Advanced directives, counseling/discussion     Ischemic cardiomyopathy     Paroxysmal atrial fibrillation (H)     Coronary artery disease involving native coronary artery of native heart without angina pectoris     Stage 3b chronic kidney disease (H)     GERD (gastroesophageal reflux disease)     Tachy-alix syndrome (H)     Infrarenal abdominal aortic aneurysm (AAA) without rupture (H)     Old myocardial infarction     Chronic combined systolic and diastolic congestive heart failure (H)     Essential hypertension, benign     Type 2 diabetes mellitus with diabetic neuropathy, without long-term current use of insulin (H)     Mixed hyperlipidemia     Screening for diabetic peripheral neuropathy     Post herpetic neuralgia     Presbycusis of both ears     Chronic non-seasonal allergic rhinitis, unspecified trigger     Skin lesion     History of prostate cancer 2003 w/po resection prostate     PAD (peripheral artery disease) (H)     Microalbuminuria     Mild persistent asthma without complication     Pre-diabetes     History of CVA (cerebrovascular accident)     Dysphagia, unspecified type     Esophageal stricture     Memory change     Vascular dementia (H)     Dry skin     Stasis dermatitis of both legs     Pruritic disorder from dry skin of  upper back     Diarrhea, unspecified type since on doxycycline for sinuses since early 2-20      Overweight (BMI 25.0-29.9)     Chronic sinusitis, unspecified location     Cardiac pacemaker in situ     Weight loss (60 lbs in last year)     Change in bowel habits     Anemia, unspecified type     Hypotension due to hypovolemia     Generalized weakness     LUANNE (acute kidney injury) (H)     Moderate Lumb Spinal Sten w leg numbness     Failure to thrive in adult     Frail elderly     Delirium     Generalized muscle weakness     Fall, initial encounter     Upper respiratory tract infection, unspecified type

## 2023-05-13 NOTE — PROVIDER NOTIFICATION
MD Notification    Notified Person: MD    Notified Person Name: Dr Conde    Notification Date/Time: 05/13/23 04:36    Notification Interaction: text paged    Purpose of Notification:  Pt received HS zyprexa; still becoming increasingly agitated at this time. Is there something else he can get?     Orders Received: MD ordered zyprexa 2.5 mg po x 1    Comments:

## 2023-05-13 NOTE — PLAN OF CARE
Goal Outcome Evaluation:         Orientation/Cognitive: Confused; oriented to self only  Observation Goals (Met/ Not Met): Not met  Mobility Level/Assist Equipment: Assist of 2 with lift  Fall Risk (Y/N): Yes  Behavior Concerns: Impulsive; occasionally agitated with cares  Pain Management: Pt with no c/o pain; appeared comfortable  Tele/VS/O2: T-max 99.2; other VSS; no Tele  ABNL Lab/BG: WBC 13.0; Cr 1.50; K+ 3.2 - replaced - will order recheck for this am  Diet: Regular  Bowel/Bladder: Incontinent of urine; no BM overnight  Skin Concerns: None  Drains/Devices: External male catheter at times; pt occasionally does not tolerate it  Tests/Procedures for next shift: Echo, PT/OT; CM/SW; Lactic acid fired - pending; U/C and Blood cultures pending  Anticipated DC date & active delays: To be decided  Patient Stated Goal for Today: None given    NS at 50 ml/hr overnight.

## 2023-05-13 NOTE — PROGRESS NOTES
[facility]    Medicine Progress Note - Hospitalist Service    Date of Admission:  5/11/2023    Assessment & Plan   Abbe Lambert is a 89 year old male with a complex medical history including cardiac arrest, AAA, cardiomyopathy, atrial fibrillation, chronic kidney disease and MI as well as recent of some cognitive and physical decline over the last several months who presents the emergency department with extreme decline since night prior to admission     Suspected lewey body dementia   Family also notes shuffled gait, pill rolling tremor and labile mood, visual hallucinations and sleep disturbances in addition to the cognitive decline.  Strong suspicion this patient may actually have Lewy Body Dementia  * CTH nothing acute, unable to do MRI d/t PPM  - BID Seroquel ordered  - MRI brain ordered   - NS 50 mL   - Therapy evaluation   - Care management   - Neurology consulted and appreciate their recommendations     H/o ASCVD/ CMO  HTN  HLP   Mild troponin elevation, likely chronically elevated   - Continue coreg  - PRN Hydralazine  - Statin   - Echo ordered      Hx of tachy-alix syndrome  Hx of atrial fibrillation  Hx of PPM placement  Hx of SHARI closure (Watchman procdure)  -- continue coreg        Diet: Regular Diet Adult    DVT Prophylaxis: Pneumatic Compression Devices  Cristina Catheter: Not present  Lines: None     Cardiac Monitoring: None  Code Status: Full Code      Clinically Significant Risk Factors Present on Admission        # Hypokalemia: Lowest K = 3.2 mmol/L in last 2 days, will replace as needed       # Hypoalbuminemia: Lowest albumin = 3.4 g/dL at 5/11/2023  9:01 PM, will monitor as appropriate     # Hypertension: Noted on problem list   # Dementia: noted on problem list             Disposition Plan      Expected Discharge Date: 05/15/2023        Discharge Comments: OT/SW/CC  echo  dragan Davis DO  Hospitalist Service  [facility]  [facility]  Securely message with [employer] (more info)  Text page via [facility] Paging/Directory   ______________________________________________________________________    Interval History   Patient seen and examined.  No acute events overnight.  Patient's family states patient has been agitated today. No other new concerns.  No vomiting.  No fevers.  No hypoxia.     Physical Exam   Vital Signs: Temp: 98.3  F (36.8  C) Temp src: Axillary BP: 133/59 Pulse: 68   Resp: 18 SpO2: 97 % O2 Device: None (Room air)    Weight: 173 lbs 9.6 oz    General Appearance: Resting comfortably. NAD  Respiratory: Clear to auscultation.  No respiratory distress  Cardiovascular: RRR.  Appears well perfused  GI: Soft.  Non-distended  Skin: No obvious rashes or cyanosis  Other: Alert but confused      Medical Decision Making       45 MINUTES SPENT BY ME on the date of service doing chart review, history, exam, documentation & further activities per the note.      Data   ------------------------- PAST 24 HR DATA REVIEWED -----------------------------------------------    I have personally reviewed the following data over the past 24 hrs:    N/A  \   N/A   / N/A     N/A N/A N/A /  N/A   3.6 N/A N/A \       Trop: 47 (H) BNP: N/A       TSH: N/A T4: N/A A1C: N/A       Procal: N/A CRP: N/A Lactic Acid: 1.0         Imaging results reviewed over the past 24 hrs:   Recent Results (from the past 24 hour(s))   Echocardiogram Complete   Result Value    LVEF  60-65%    Narrative    [id number]  [id number]  [id number]  [id number]^[email or web address]^SHARYN^FAWAD     [facility]  Echocardiography Laboratory  [address] [place] [address]     Name: DARBY SHEETS  MRN: [id number]  : 1934  Study Date: 2023 09:59 AM  Age: 89 yrs  Gender: Male  Patient Location: [facility]  Reason For Study: MI  Ordering Physician: SHARYN BOOTHE  Referring Physician: Ajit Butterfield  Performed By: Daniel Marques     BSA: 2.0 m2  Height: 70 in  Weight: 173  lb  HR: 60  BP: 134/63 mmHg  ______________________________________________________________________________  Procedure  Complete Portable Echo Adult. Optison (NDC #[id number]) given intravenously.  ______________________________________________________________________________  Interpretation Summary     TCD. Pt agitated and confused.  LVH. Normal systolic LVF. EF 55-60. RV appears normal.  The left ventricle is normal in structure, function and size.  The visual ejection fraction is 60-65%.  There is mild biatrial enlargement.  There is mild (1+) tricuspid regurgitation.  ______________________________________________________________________________  Left Ventricle  The left ventricle is normal in structure, function and size. There is normal  left ventricular wall thickness. The visual ejection fraction is 60-65%. No  regional wall motion abnormalities noted.     Right Ventricle  There is a catheter/pacemaker lead seen in the right ventricle. Right  ventricular function cannot be assessed due to poor image quality.     Atria  There is mild biatrial enlargement.     Mitral Valve  The mitral valve leaflets appear thickened, but open well.     Tricuspid Valve  The tricuspid valve is not well visualized, but is grossly normal. There is  mild (1+) tricuspid regurgitation.     Aortic Valve  There is mild trileaflet aortic sclerosis. No aortic stenosis is present.     Pulmonic Valve  The pulmonic valve is not well visualized. There is trace pulmonic valvular  regurgitation.     Vessels  Borderline aortic root dilatation. The ascending aorta is Borderline dilated.     Pericardium  There is no pericardial effusion.     Rhythm  The rhythm was paced.  ______________________________________________________________________________  MMode/2D Measurements & Calculations  IVSd: 1.1 cm     LVIDd: 5.0 cm  LVIDs: 3.3 cm  LVPWd: 1.1 cm  FS: 33.0 %  LV mass(C)d: 216.4 grams  LV mass(C)dI: 110.2 grams/m2  Ao root diam: 3.6 cm  LA  dimension: 3.4 cm  asc Aorta Diam: 3.7 cm  LA/Ao: 0.92  LVOT diam: 2.1 cm  LVOT area: 3.6 cm2  LA Volume (BP): 60.4 ml  LA Volume Index (BP): 30.8 ml/m2  RWT: 0.45  TAPSE: 1.2 cm     Doppler Measurements & Calculations  MV E max hola: 60.8 cm/sec  MV A max hola: 87.0 cm/sec  MV E/A: 0.70  MV dec slope: 320.0 cm/sec2  MV dec time: 0.19 sec  LV V1 max PG: 3.9 mmHg  LV V1 max: 99.0 cm/sec  LV V1 VTI: 23.2 cm  SV(LVOT): 82.9 ml  SI(LVOT): 42.2 ml/m2  PA acc time: 0.11 sec  TR max hola: 236.2 cm/sec  TR max P.3 mmHg  E/E' av.0  Lateral E/e': 6.7  Medial E/e': 11.4  RV S Hola: 8.2 cm/sec     ______________________________________________________________________________  Report approved by: Victorino Beal 2023 12:33 PM

## 2023-05-14 NOTE — PROGRESS NOTES
"   05/14/23 6511   Appointment Info   Signing Clinician's Name / Credentials (SLP) Brenda Gama MS CCC-SLP   General Information   Onset of Illness/Injury or Date of Surgery 05/11/23   Referring Physician Levi Davis,    Patient/Family Therapy Goal Statement (SLP) None stated   Pertinent History of Current Problem The pt is an 89 year old male with a complex medical history including cardiac arrest, AAA, cardiomyopathy, atrial fibrillation, chronic kidney disease and MI as well as recent of some cognitive and physical decline over the last several months who presents the emergency department with extreme decline since night prior to admission. Per MD note, \"suspected lewey body dementia with concerns for delirium\". Family also notes shuffled gait, pill rolling tremor and labile mood, visual hallucinations and sleep disturbances in addition to the cognitive decline. Clinical swallow evaluation ordered per MD d/t coughing with intake.   General Observations The pt is agitated upon arrival, but pleasant when pt's daughter assisted in talking to pt. He is [place]. He had eyes closed for majority of eval and was fatigued.   Type of Evaluation   Type of Evaluation Swallow Evaluation   Oral Motor   Oral Musculature anomalies present   Structural Abnormalities none present   Mucosal Quality good   Dentition (Oral Motor)   Dentition (Oral Motor) adequate dentition   Facial Symmetry (Oral Motor)   Facial Symmetry (Oral Motor) WNL   Lip Function (Oral Motor)   Lip Range of Motion (Oral Motor) unable/difficult to assess   Tongue Function (Oral Motor)   Tongue ROM (Oral Motor) unable/difficult to assess   Jaw Function (Oral Motor)   Jaw Function (Oral Motor) WNL   Cough/Swallow/Gag Reflex (Oral Motor)   Volitional Throat Clear/Cough (Oral Motor) reduced strength;impaired   Volitional Swallow (Oral Motor) mildly delayed;effortful   Vocal Quality/Secretion Management (Oral Motor)   Vocal Quality (Oral Motor) breathy "   Secretion Management (Oral Motor) WNL   General Swallowing Observations   Past History of Dysphagia No hx of dysphagia per family report prior to admission. He has had coughing with intake since yesterday. RN notes inability to tolerate meds crushed with applesuace this AM.   Respiratory Support (General Swallowing Observations) none   Current Diet/Method of Nutritional Intake (General Swallowing Observations, NIS) thin liquids (level 0);regular diet   Swallowing Evaluation Clinical swallow evaluation   Clinical Swallow Evaluation   Feeding Assistance dependent   Clinical Swallow Evaluation Textures Trialed thin liquids;mildly thick liquids;pureed   Clinical Swallow Eval: Thin Liquid Texture Trial   Mode of Presentation, Thin Liquids spoon   Volume of Liquid or Food Presented x2   Oral Phase of Swallow premature pharyngeal entry;effortful AP movement   Pharyngeal Phase of Swallow impaired;coughing/choking;feeling of something stuck in throat;reduction in laryngeal movement;repeated swallows;throat clearing   Diagnostic Statement Overt s/s of aspiration including coughing, throat clearing, and 5 swallows   Clinical Swallow Eval: Mildly Thick Liquids   Mode of Presentation spoon;fed by clinician   Volume Presented x4   Oral Phase premature pharyngeal entry   Pharyngeal Phase coughing/choking;throat clearing;repeated swallows   Diagnostic Statement Delayed coughing and throat clearing with concern for pharyngeal residuals and/or aspiration   Clinical Swallow Evaluation: Puree Solid Texture Trial   Mode of Presentation, Puree spoon;fed by clinician   Volume of Puree Presented x3   Oral Phase, Puree WFL   Pharyngeal Phase, Puree throat clearing   Diagnostic Statement Delayed throat clearing/cough with pt endorsing globus sensation   Esophageal Phase of Swallow   Patient reports or presents with symptoms of esophageal dysphagia No   Swallowing Recommendations   Diet Consistency Recommendations NPO;ice chips only  (ok  for mildly thick H20 by spoon for comfort with RN if alert)   Supervision Level for Intake 1:1 supervision needed   Medication Administration Recommendations, Swallowing (SLP) Essential meds crushed in puree only   Instrumental Assessment Recommendations VFSS (videofluoroscopic swallowing study)  (in 1-2 days)   General Therapy Interventions   Planned Therapy Interventions Dysphagia Treatment   Dysphagia treatment Modified diet education;Instruction of safe swallow strategies;Compensatory strategies for swallowing   Clinical Impression   Criteria for Skilled Therapeutic Interventions Met (SLP Eval) Yes, treatment indicated   SLP Diagnosis Mod-severe oropharyngeal dysphagia   Risks & Benefits of therapy have been explained evaluation/treatment results reviewed;care plan/treatment goals reviewed;risks/benefits reviewed;current/potential barriers reviewed;participants voiced agreement with care plan;participants included;patient;spouse/significant other;daughter   Clinical Impression Comments Clinical swallow evaluation completed per MD order. The pt presents with mod-severe oropharyngeal dysphagia in setting of AMS. Oral mech limited d/t poor comand following, however suspect labial/lingual weakness and reduced cough strength. He trialed thin liquids, mildly thick liquids, and puree solids by spoon. Oral phase significant for prolonged propulsion and prep, however no oral deficits. Some concern for premature spillage with thin liquids. Pharyngeal phase significant for coughing, throat clearing, multiple swallows, and report of globus sensation across consistencies. Reduced s/s with mildly thick liquids vs thin, as coughing was immediate with thin. At end of session, the pt did expectorate mucous with cues to cough. High suspicion for aspiration and/or pharyngeal residuals. Recommend NPO with ice chips or mildly thick H20 by spoon for comfort ONLY when upright/alert and with RN staff. Recommend essential meds crushed  in puree. Suspect high aspiration risk given faitgue/confusion, and not appropriate for PO at this time. SLP will follow for dysphagia.   SLP Total Evaluation Time   Eval: oral/pharyngeal swallow function, clinical swallow Minutes (94689) 18   SLP Discharge Planning   SLP Plan PO readiness, VFSS when more alert?   SLP Discharge Recommendation home with home care speech therapy   SLP Rationale for DC Rec Swallow function is significantly below baseline   SLP Brief overview of current status  Recommend NPO with ice chips or mildly thick H20 by spoon for comfort ONLY when upright/alert and with RN staff. Recommend essential meds crushed in puree. Suspect high aspiration risk given faitgue/confusion, and not appropriate for PO at this time. SLP will follow for dysphagia.

## 2023-05-14 NOTE — UTILIZATION REVIEW
Admission Status; Secondary Review Determination       Under the authority of the Utilization Management Committee, the utilization review process indicated a secondary review on the above patient. The review outcome is based on review of the medical records, discussions with staff, and applying clinical experience noted on the date of the review.     (x) Inpatient Status Appropriate - This patient's medical care is consistent with medical management for inpatient care and reasonable inpatient medical practice.     RATIONALE FOR DETERMINATION      Patient requires inpatient admission versus short stay observation or outpatient treatment for the following reasons:     89 year old male with a complex medical history including cardiac arrest, AAA, cardiomyopathy, atrial fibrillation, chronic kidney disease and MI as well as recent of some cognitive and physical decline over the last several months who presents the emergency department with extreme decline x 1 day.  The patient was found with leukocytosis of 13,000, CRP at 120.  Head CT with no significant pathology.,  UA negative for infection.  Chest x-ray with no infiltrate.  Lewy body dementia is suspected as a baseline, with acute delirium started couple of days before this admission.  The patient continues to be agitated, requiring a lot of nursing care.  He was started on medications for agitation for which she needs monitoring for side effects and adjusting the doses accordingly to the agitation.  MRI was recommended by neurology, but it has to be scheduled for Monday, May 15, since the patient has pacemaker.    The expected length of stay at the time of admission was more than 2 nights because of the severity of illness, intensity of service provided, and risk for adverse outcome. Inpatient admission is appropriate.     Dr Levi Gutierres notified     This document was produced using voice recognition software       The information on this document is  developed by the utilization review team in order for the business office to ensure compliance. This only denotes the appropriateness of proper admission status and does not reflect the quality of care rendered.   The definitions of Inpatient Status and Observation Status used in making the determination above are those provided in the CMS Coverage Manual, Chapter 1 and Chapter 6, section 70.4.   Sincerely,   GLORIA WHITE MD   Utilization Review  Physician Advisor  [facility]

## 2023-05-14 NOTE — PLAN OF CARE
Goal Outcome Evaluation:     Orientation/Cognitive: Oriented to self only; baseline dementia  Observation Goals (Met/ Not Met): Not met  Mobility Level/Assist Equipment: Assist of 2 with lift  Fall Risk (Y/N): Yes  Behavior Concerns: Impulsive; aggressive at times with cares  Pain Management: Pt denied pain except with turns; pt has knee/shoulder discomfort; able to fall asleep quickly after repositioning  Tele/VS/O2: AVSS; RA; no Tele  ABNL Lab/BG: Cr 1.50  Diet: NPO except ice chips and moderately thick liquids  Bowel/Bladder: Incontinent of urine; Smear X2 of BM when change of briet  Skin Concerns: Blanchable redness on coccyx; mepilex in place  Drains/Devices: Brief in place, IV NS discontinued  Tests/Procedures for next shift: will need a Brain MRI on Monday; awaiting PPM check Possibly Mon 5/15  Anticipated DC date & active delays: To be decided  Patient Stated Goal for Today: None given

## 2023-05-14 NOTE — PROGRESS NOTES
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  Strong suspicion this patient may actually have Lewy Body Dementia  5/14:  Patient has been agitated since admission and concerns for delirium.  Continue to make medication adjustments  * CT Head: No acute findings   - Admit to inpatient  - Continue BID Seroquel.  Will adjust as needed   - MRI brain ordered but can not be done until Monday due to pacemaker   - Will stop IVF for now due to cardiomyopathy history    - Repeat CBC/CRP ordered   - PT/OT/Speech consulted.  Likely TCU   - Care management to help with disposition planning   - Neurology consulted and appreciate their recommendations     H/o Tachy-alix syndrome and atrial fibrillation s/p Watchmans   S/p PPM   H/o ASCVD/ CMO  Mild troponin elevation, likely chronically elevated   HTN/HLP   * Echo:  LVH. Normal systolic LVF. EF 55-60. RV appears normal.  The left ventricle is normal in structure, function and size.  The visual ejection fraction is 60-65%.  There is mild biatrial enlargement.  There is mild (1+) tricuspid regurgitation.  - Continue coreg  - PRN Hydralazine  - Crestor 10 mg         Diet: Regular Diet Adult    DVT Prophylaxis: Pneumatic Compression Devices  Cristina Catheter: Not present  Lines: None     Cardiac Monitoring: None  Code Status: Full Code       Clinically Significant Risk Factors Present on Admission              # Hypoalbuminemia: Lowest albumin = 3.4 g/dL at 5/11/2023  9:01 PM, will monitor as appropriate     # Hypertension: Noted on problem list   # Dementia: noted on problem list             Disposition Plan      Expected Discharge Date: 05/16/2023        Discharge Comments: [facility]  PT rec TCU          Levi Davis DO  Hospitalist Service  [facility]  Securely message with [employer] (more info)  Text page via [employer] Paging/Directory   ______________________________________________________________________    Interval History   Patient seen and examined.  Family present at bedside.  Still having issues with agitation but this morning sleeping comfortably on my evaluation.  No fevers noted.  No hypoxia.  Minimal PO intake     Physical Exam   Vital Signs: Temp: 98.8  F (37.1  C) Temp src: Axillary BP: (!) 160/55 Pulse: 63   Resp: 18 SpO2: 97 % O2 Device: None (Room air)    Weight: 173 lbs 9.6 oz    General Appearance: Resting comfortably and sleeping.  NAD  Respiratory: Clear to auscultation.  No respiratory distress  Cardiovascular: RRR.  No obvious murmurs  GI: Soft.  Non-distended  Skin: No obvious rashes or cyanosis  Other: Sleeping.  No edema      Medical Decision Making       55 MINUTES SPENT BY ME on the date of service doing chart review, history, exam, documentation & further activities per the note.      Data   ------------------------- PAST 24 HR DATA REVIEWED -----------------------------------------------        Imaging results reviewed over the past 24 hrs:   No results found for this or any previous visit (from the past 24 hour(s)).

## 2023-05-14 NOTE — PLAN OF CARE
Goal Outcome Evaluation:         Orientation/Cognitive: Oriented to self only; baseline dementia  Observation Goals (Met/ Not Met): Not met  Mobility Level/Assist Equipment: Assist of 2 with lift  Fall Risk (Y/N): Yes  Behavior Concerns: Impulsive; aggressive at times with cares  Pain Management: Pt denied pain except with turns; pt has knee/shoulder discomfort; able to fall asleep quickly after repositioning  Tele/VS/O2: AVSS; RA; no Tele  ABNL Lab/BG: Cr 1.50  Diet: Regular  Bowel/Bladder: Incontinent of urine; no BM overnight  Skin Concerns: Blanchable redness on coccyx; mepilex in place  Drains/Devices: External male purewick part of the night; pt pulled it off  Tests/Procedures for next shift: PT; neurology consulted; will need a Brain MRI on Monday; awaiting PPM check  Anticipated DC date & active delays: To be decided  Patient Stated Goal for Today: None given.

## 2023-05-14 NOTE — PROGRESS NOTES
Orientation/Cognitive: Alert to self  Observation Goals (Met/ Not Met): Not Met  Mobility Level/Assist Equipment: A-2 Lift  Fall Risk (Y/N): Y  Behavior Concerns: Agitation  Pain Management: PRN tylenol  Tele/VS/O2: VSS on RA, BP elevated to 156/69  ABNL Lab/BG: Trop- 47 from 5/12  Diet: Reg- Med in applesauce  Bowel/Bladder: Incont. B&B- Urine retention- Straight cath 1x  Skin Concerns: Redness to groin and bottom, mepelix in place  Drains/Devices: Male Ext. Cath  Tests/Procedures for next shift: Pending MRI on Monday due to pacer  Anticipated DC date & active delays: TBD- Pending placement  Patient Stated Goal for Today: None

## 2023-05-14 NOTE — PROVIDER NOTIFICATION
MD Notification    Notified Person: MD    Notified Person Name: Mohsin Salih MD    Notification Date/Time: 1911 5/13/23    Notification Interaction: [employer]    Purpose of Notification: RE: 502- D.B- Pt bladder scanned for volume of 701. Can we have straight cath orders placed?    Orders Received: Flomax ordered. Straight cath ordered one time.    Comments:

## 2023-05-14 NOTE — PROVIDER NOTIFICATION
MD Notification    Notified Person: MD    Notified Person Name: Levi Ryan WALL    Notification Date/Time: 5/14/23 1641    Notification Interaction: [employer] Messaging    Purpose of Notification: FYI BPA fired for pt for b/p of 147/77 and HR of 117. Ordered a lactate just in case. Re-check for bp was 153/65.    Orders Received: FYI notification.    Comments:

## 2023-05-14 NOTE — PROGRESS NOTES
Hospitalist update note:     Further neurologic evaluation with MRI brain.  Can not be done until Monday due to pacemaker.  Also increasingly agitated.  Medication adjustments for agitation.  Will send to [facility] again to see if warrants inpatient admission       Levi Davis DO   Hospitalist

## 2023-05-14 NOTE — PLAN OF CARE
Goal Outcome Evaluation:     Observation goals  PRIOR TO DISCHARGE        Comments: 1.  IVF overnight - met  2. PT for home safety- not met- pending placement, MRI on Monday

## 2023-05-14 NOTE — PROVIDER NOTIFICATION
MD Notification    Notified Person: MD    Notified Person Name: Levi Ryan WALL    Notification Date/Time: 5/14/23 1130    Notification Interaction: [employer] messaging    Purpose of Notification: Family concerned about pt nutrition d/t dysphagia and npo status.    Orders Received: Start LR @ 75ml/hr.    Comments:
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
## 2023-05-15 NOTE — PROGRESS NOTES
Orientation/Cognitive: Alert to self  Observation Goals (Met/ Not Met): Inpatient  Mobility Level/Assist Equipment: A-2 Lift  Fall Risk (Y/N): Y  Behavior Concerns: Agitation w/ cares  Pain Management: PRN Tylenol  Tele/VS/O2: VSS on RA, BP elevated to 160's systolic  ABNL Lab/BG: Lactate 0.8, checked due to BPA, CRP 97.32 trending down  Diet: NPO except for mildly thick liquids and ice chips. Provider notified regarding family concern for nutrition. LR @ 75ml/hr ordered per provider.  Bowel/Bladder: Incont. B&B. Has brief on.   Skin Concerns: Redness to groin and sacrum.  Drains/Devices: LR @ 75ml/hr  Tests/Procedures for next shift: MRI Brain on Monday- Received call from [employer] regarding pt PPM. No arrhthymias noted from interrogation on 5/11/23.  Anticipated DC date & active delays: TBD- Pending placement  Patient Stated Goal for Today: N/A

## 2023-05-15 NOTE — PROGRESS NOTES
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  Strong suspicion this patient may actually have Lewy Body Dementia  5/14:  Patient has been agitated since admission and concerns for delirium.  Continue to make medication adjustments  * CT Head: No acute findings   - Admit to inpatient  - Seroquel switched to BID PRN due to increased somnolence which is likely contributing to dysphagia noted by speech therapy  - Resumed IVF as patient is now NPO   - MRI brain ordered   - PT/OT/Speech consulted.  Likely TCU   - Care management to help with disposition planning   - Neurology consulted and appreciate their recommendations     H/o Tachy-alix syndrome and atrial fibrillation s/p Watchmans   S/p PPM   H/o ASCVD/ CMO  Mild troponin elevation, likely chronically elevated   HTN/HLP   * Echo:  LVH. Normal systolic LVF. EF 55-60. RV appears normal.  The left ventricle is normal in structure, function and size.  The visual ejection fraction is 60-65%.  There is mild biatrial enlargement.  There is mild (1+) tricuspid regurgitation.  - Continue coreg  - PRN Hydralazine  - Crestor 10 mg           Diet: NPO for Medical/Clinical Reasons Except for: Ice Chips, Meds    DVT Prophylaxis: Pneumatic Compression Devices  Cristina Catheter: Not present  Lines: None     Cardiac  Monitoring: None  Code Status: Full Code      Clinically Significant Risk Factors Present on Admission              # Hypoalbuminemia: Lowest albumin = 3.4 g/dL at 5/11/2023  9:01 PM, will monitor as appropriate     # Hypertension: Noted on problem list   # Dementia: noted on problem list             Disposition Plan      Expected Discharge Date: 05/16/2023        Discharge Comments: PT rec TCU  MRI ---has a pacemaker.   Speech consult.          Levi Davis DO  Hospitalist Service  [facility]  Securely message with [employer] (more info)  Text page via [employer] Paging/Directory   ______________________________________________________________________    Interval History   Patient seen and examined.  No acute events over night.  More arousable today.  Yesterday patient made NPO by speech, suspect his somnolence is contributing to the dysphagia.  No fevers noted.  No hypoxia.  Patient denied pain.  Currently NPO    Daughter and wife both in room and spoke at length about next steps     Physical Exam   Vital Signs: Temp: 97.7  F (36.5  C) Temp src: Axillary BP: (!) 163/72 Pulse: 68   Resp: 20 SpO2: 96 % O2 Device: None (Room air)    Weight: 173 lbs 9.6 oz    General Appearance: Resting comfortably.  Sleeping but arousable to verbal stimuli  Respiratory: Clear to auscultation.  No respiratory distress  Cardiovascular: RRR.  No obvious murmurs  GI: Soft.  Non-distended  Skin: No obvious rashes or cyanosis  Other: Arousable.  No edema      Medical Decision Making       55 MINUTES SPENT BY ME on the date of service doing chart review, history, exam, documentation & further activities per the note.      Data   ------------------------- PAST 24 HR DATA REVIEWED -----------------------------------------------    I have personally reviewed the following data over the past 24 hrs:    9.8  \   11.5 (L)   / 218     N/A N/A N/A /  N/A   N/A N/A N/A \       Procal: N/A CRP: 97.32 (H) Lactic Acid: 0.8          Imaging results reviewed over the past 24 hrs:   No results found for this or any previous visit (from the past 24 hour(s)).

## 2023-05-15 NOTE — PROGRESS NOTES
[facility]  Neurology Daily Note      Admission Date:5/11/2023   Date of service: 05/15/2023   Hospital Day: 5                                                   Assessment and Plan:   #.  Agree presentation is highly suggestive of Lewy body dementia.  Differential diagnosis might include other CNS disease or structural abnormality.    Seroquel/sedative as necessary  Consider psych eval if needed for behavioral management  MRI brain pending requires monitoring because of pacemaker-plan for later today  Management plan discussed with hospitalist-Dr. Davis.  Agree that he will require transitional care on discharge.  Diagnosis of probable Lewy body dementia discussed with patient wife and daughter at bedside  #. PT/OT/Speech  --continue evaluations  #.Nutrition     --Per speech therapy evaluation       Interval History:   Chart reviewed per Dr. Gold:  89-year-old with a history of secondary vascular comorbidities presenting with ongoing gradual cognitive decline over the past year worsened physical and cognitive decline in the last couple days prior to the arrival.     CT head reviewed no acute pathology noted marked cerebral volume loss  B-12 &TSH - normal   No infectious source/ neg blood and urine cx   Elevated sed rate/CRP   Delirium in a setting of baseline cognitive dysfunction secondary to above     Independent historian x2, wife and mother, who were able to provide more informed history of patient      Memory loss noted by family to start about 3 years ago-gradually progressive  Fallen 6 times in the last half a year  He started hearing/seeing people Fall 2022-escalated over the last few weeks.  Very aggressive and throwing urinal 2 days ago, now sedated after receiving Zyprexa and quetiapine over last 2 days.             Medications:   Scheduled Meds:    carvedilol  3.125 mg Oral BID w/meals     rosuvastatin  10 mg Oral QAM     tamsulosin  0.4 mg Oral QPM     PRN Meds: acetaminophen, hydrALAZINE,  melatonin, nitroGLYcerin, ondansetron **OR** ondansetron, QUEtiapine        Physical Exam:   Vitals: Temp: 97.7  F (36.5  C) Temp src: Axillary BP: (!) 163/72 Pulse: 68   Resp: 20 SpO2: 96 % O2 Device: None (Room air)    Vital Signs with Ranges: Temp:  [97.7  F (36.5  C)-99  F (37.2  C)] 97.7  F (36.5  C)  Pulse:  [] 68  Resp:  [16-22] 20  BP: (153-174)/(58-77) 163/72  SpO2:  [96 %-98 %] 96 %    General Appearance:  Lethargic, [place], arousable with stimulation  Neuro:                   Able to open eyes   Respond to simple questions- name and hospital (not name).     Yelled when touched hand -easily agitated.    Unable to fully cooperate with exam.    Was able to move toes and  hands to command.  Rigid tone.   Extremities: No clubbing, no cyanosis, no edema       Data:   ROUTINE IP LABS (Last 3results)  CBC RESULTS:     Recent Labs   Lab Test 05/14/23  1506 05/12/23  0657 05/11/23 2101   WBC 9.8 13.0* 12.0*   RBC 3.69* 3.77* 3.39*   HGB 11.5* 11.7* 10.6*   HCT 35.8* 36.6* 33.4*    211 190     Basic Metabolic Panel:  Recent Labs   Lab Test 05/13/23  0650 05/12/23  0657 05/11/23 2101 04/29/23  1249   NA  --  138 135* 139   POTASSIUM 3.6 3.2* 3.9 3.8   CHLORIDE  --  104 102 106   CO2  --  23 23 23   BUN  --  17.6 18.4 19.6   CR  --  1.50* 1.44* 1.48*   GLC  --  123* 124* 109*   JANAY  --  9.4 8.9 8.9     Liver panel:  Recent Labs   Lab Test 05/11/23  2101 12/10/22  1559 03/16/22  1617 04/14/21  0808 04/06/21  0917 03/30/21  1535   PROTTOTAL 8.2 8.5 8.2  --  8.1 8.6   ALBUMIN 3.4* 3.1* 3.4  --  3.3* 3.6   BILITOTAL 0.4 0.3 0.6  --  0.4 0.5   ALKPHOS 96 87 90  --  98 97   AST 37 20 30  --  44 28   ALT 21 27 27 82* 54 32     Thyroid Panel:  Recent Labs   Lab Test 05/12/23  1209 04/06/21  0917 03/30/21  1535   TSH 3.45 1.80 2.20      Vitamin B12:   Recent Labs   Lab Test 05/12/23  0657 03/22/21  1628   B12 274 426       Latest Reference Range & Units 05/11/23 22:10   Color Urine Colorless, Straw, Light  Yellow, Yellow  Light Yellow   Appearance Urine Clear  Clear   Glucose Urine Negative mg/dL Negative   Bilirubin Urine Negative  Negative   Ketones Urine Negative mg/dL Negative   Specific Gravity Urine 1.003 - 1.035  1.018   pH Urine 5.0 - 7.0  5.5   Protein Albumin Urine Negative mg/dL 50 !   Urobilinogen mg/dL Normal, 2.0 mg/dL Normal   Nitrite Urine Negative  Negative   Blood Urine Negative  Moderate !   Leukocyte Esterase Urine Negative  Negative   WBC Urine <=5 /HPF <1   RBC Urine <=2 /HPF 9 (H)   Squamous Epithelial /HPF Urine <=1 /HPF <1   Mucus Urine None Seen /LPF Present !   !: Data is abnormal  (H): Data is abnormally high     IMAGING:   Independent interpretation of the following studies by myself as part of today's encounter.      EXAM: CT HEAD W/O CONTRAST  LOCATION: [facility]  DATE/TIME: 5/11/2023 10:41 PM CD                                                                IMPRESSION:  1.  No CT evidence for acute intracranial process.  2.  Brain atrophy and presumed chronic microvascular ischemic changes as above.      TIME     55minutes Evaluation/management time     Toña Daniels M.D.  Neurologist  [facility] Neurology  Office [phone]

## 2023-05-15 NOTE — PROGRESS NOTES
Orientation/Cognitive: Alert to self only   Observation Goals (Met/ Not Met): Inpatient  Mobility Level/Assist Equipment: Ax2/lift   Fall Risk (Y/N): Y  Behavior Concerns: Agitation w/ cares, refuses cares at times   Pain Management: No pain   Tele/VS/O2: VSS on RA, ex HTN prn available   ABNL Lab/BG: CRP 97.32 trending down, Hgb 11.5  Diet: NPO except for mildly thick liquids, ice chips/meds  Bowel/Bladder: Incontinent B&B  Skin Concerns: Redness to groin  Drains/Devices: LR @ 75ml/hr  Tests/Procedures for next shift: MRI Brain today   Anticipated DC date & active delays: TBD  Patient Stated Goal for Today: n/a

## 2023-05-15 NOTE — CONSULTS
Care Management Initial Consult    General Information  Assessment completed with: iLla, Una  Type of CM/SW Visit: Initial Assessment    Primary Care Provider verified and updated as needed:     Readmission within the last 30 days:        Reason for Consult: discharge planning  Advance Care Planning: Advance Care Planning Reviewed: no concerns identified          Communication Assessment  Patient's communication style: spoken language (English or Bilingual)    Hearing Difficulty or Deaf: yes        Cognitive  Cognitive/Neuro/Behavioral: .WDL except  Level of Consciousness: confused  Arousal Level: opens eyes spontaneously  Orientation: disoriented to, place, time, situation  Mood/Behavior: agitated  Best Language: 0 - No aphasia  Speech: clear    Living Environment:   People in home: spouse  Madison Bolton  Current living Arrangements: independent living facility      Able to return to prior arrangements: other (see comments) (needs TCU)       Family/Social Support:  Care provided by: self, spouse/significant other  Provides care for: no one  Marital Status:   Wife, Children          Description of Support System: Supportive    Support Assessment: Adequate family and caregiver support    Current Resources:   Patient receiving home care services: No     Community Resources:    Equipment currently used at home: walker, rolling  Supplies currently used at home:      Employment/Financial:  Employment Status: retired        Financial Concerns:             Does the patient's insurance plan have a 3 day qualifying hospital stay waiver?  Yes   Will the waiver be used for post-acute placement? No    Lifestyle & Psychosocial Needs:  Social Determinants of Health     Tobacco Use: Low Risk  (5/5/2023)    Patient History      Smoking Tobacco Use: Never      Smokeless Tobacco Use: Never      Passive Exposure: Never   Alcohol Use: Not At Risk (4/20/2021)    AUDIT-C      Frequency of Alcohol Consumption: Never       Average Number of Drinks: Not on file      Frequency of Binge Drinking: Not on file   Financial Resource Strain: Not on file   Food Insecurity: Not on file   Transportation Needs: Not on file   Physical Activity: Not on file   Stress: Not on file   Social Connections: Not on file   Intimate Partner Violence: Not on file   Depression: Not at risk (5/5/2023)    PHQ-2      PHQ-2 Score: 0   Housing Stability: Not on file       Functional Status:  Prior to admission patient needed assistance:   Dependent ADLs:: Independent  Dependent IADLs:: Independent       Mental Health Status:          Chemical Dependency Status:                Values/Beliefs:  Spiritual, Cultural Beliefs, Congregation Practices, Values that affect care:                 Additional Information:  Spoke with daughter Una via phone for initial consult/discharge planning. Pt is alert to self only. Pt was admitted on 5/14/23 for demential with significant cognitive decline and weakness. Pt and spouse just moved to an [facility], hoping to go here for TCU. Explained that pt's insurance would require prior authorization, but that [facility] might not be able to accept [facility] insurance. Informed that [facility]. Discussed memory care TCU's. Una agreeable for  [facility]. Una will be at the hospital tomorrow but not on Wednesday, so can call if she is not here. Sent referrals and will continue to follow.     EDWIN Fulton  Social Work  [facility]

## 2023-05-15 NOTE — PROGRESS NOTES
Received MRI Cardiology Clearance form from [facility] MRI department.  Form completed, signed by MD, and faxed back to MRI at [phone].      ALCON Moncada

## 2023-05-16 NOTE — PROGRESS NOTES
[facility]    Hospitalist Progress Note    Assessment & Plan   Abbe Lambert is a 89 year old male with a complex medical history including cardiac arrest, AAA, cardiomyopathy, atrial fibrillation, chronic kidney disease and MI as well as recent of some cognitive and physical decline over the last several months who presents the emergency department with extreme decline since night prior to admission     Suspected lewey body dementia   Concerns for delirium   Concerns for dysphagia  Family also notes shuffled gait, pill rolling tremor and labile mood, visual hallucinations and sleep disturbances in addition to the cognitive decline.  Strong suspicion this patient may actually have Lewy Body Dementia  5/14:  Patient has been agitated since admission and concerns for delirium.  Continue to make medication adjustments  * CT Head: No acute findings   - Seroquel switched to BID PRN due to increased somnolence which is likely contributing to dysphagia noted by speech therapy  - MRI brain has been obtained  5/16, pending results  - PT/OT/Speech consulted.    Current recommendation is TCU  - Care management to help with disposition planning   - Neurology consulted and appreciate their recommendations  Elevated CRP,   right thumb Erythema-MCP joint  Worsening encephalopathy  --Patient has CRP that was trending down till yesterday, repeat CRP ordered and it had gone up to 195, gout is a differential, family denied any history of gout, uric acid level was ordered which is 6.6 this was from the sample they have from the day of admission.  --Will obtain x-ray of the thumb, discussed with orthopedic surgery, the differential also includes septic arthritis.  --Since admission patient have not received any antibiotics, pending input from orthopedic surgery about any need for aspiration or washout.     H/o Tachy-alix syndrome and atrial fibrillation s/p [place]   S/p PPM   H/o ASCVD/ CMO  Mild  troponin elevation, likely chronically elevated   HTN/HLP   * Echo:  LVH. Normal systolic LVF. EF 55-60. RV appears normal.  The left ventricle is normal in structure, function and size.  The visual ejection fraction is 60-65%.  There is mild biatrial enlargement.  There is mild (1+) tricuspid regurgitation.  - Continue coreg  - PRN Hydralazine  - Crestor 10 mg       DVT Prophylaxis: SCDs  Code Status: Full Code     52 MINUTES SPENT BY ME on the date of service doing chart review, history, exam, documentation & further activities per the note.  Disposition: Expected discharge possibly 5/16 to TCU depending on orthopedic surgery input.  Clinically Significant Risk Factors              # Hypoalbuminemia: Lowest albumin = 3.4 g/dL at 5/11/2023  9:01 PM, will monitor as appropriate     # Hypertension: Noted on problem list     # Dementia: noted on problem list             Lynn Cristobal MD  Text Page   (7am to 6pm)    Interval History   Patient is resting, family near bedside, due to elevated CRP I did inquire about any other ongoing issue, they mentioned right thumb base inflammation, he has pain there, he denied having any fever, he is occasionally confused, appears to be baseline as per family.    -Data reviewed today: I reviewed all new labs and imaging results over the last 24 hours.     Physical Exam     Vital Signs with Ranges  Temp:  [98  F (36.7  C)-98.4  F (36.9  C)] 98.3  F (36.8  C)  Pulse:  [61-85] 61  Resp:  [16-20] 16  BP: (148-184)/(60-88) 148/60  SpO2:  [96 %-98 %] 96 %  I/O last 3 completed shifts:  In: 3660 [P.O.:60; I.V.:3600]  Out: -     Constitutional: Awake, alert, cooperative, no apparent distress  Respiratory: Clear to auscultation bilaterally, no crackles or wheezing  Cardiovascular: Regular rate and rhythm, normal S1 and S2, and no murmur noted  GI: Normal bowel sounds, soft, non-distended, non-tender  Skin/Integumen: Right MCP joint erythema noted, tenderness noted  Neuro : moving all 4  extremities, ongoing occasional confusion noted, appears to be baseline as per family    Medications     lactated ringers 75 mL/hr at 05/16/23 0817       carvedilol  3.125 mg Oral BID w/meals     nystatin  500,000 Units Swish & Spit 4x Daily     rosuvastatin  10 mg Oral QAM     tamsulosin  0.4 mg Oral QPM       Data   Recent Labs   Lab 05/14/23  1506 05/13/23  0650 05/12/23  0657 05/11/23  2101   WBC 9.8  --  13.0* 12.0*   HGB 11.5*  --  11.7* 10.6*   MCV 97  --  97 99     --  211 190   NA  --   --  138 135*   POTASSIUM  --  3.6 3.2* 3.9   CHLORIDE  --   --  104 102   CO2  --   --  23 23   BUN  --   --  17.6 18.4   CR  --   --  1.50* 1.44*   ANIONGAP  --   --  11 10   JANAY  --   --  9.4 8.9   GLC  --   --  123* 124*   ALBUMIN  --   --   --  3.4*   PROTTOTAL  --   --   --  8.2   BILITOTAL  --   --   --  0.4   ALKPHOS  --   --   --  96   ALT  --   --   --  21   AST  --   --   --  37     Recent Labs   Lab 05/12/23  0657 05/11/23  2101   * 124*       Imaging:   No results found for this or any previous visit (from the past 24 hour(s)).

## 2023-05-16 NOTE — PROGRESS NOTES
Goal Outcomes Evaluation    Orientation/Cognitive: Alert to self only. Uncooperative   Observation Goals (Met/ Not Met): N/A   Mobility Level/Assist Equipment: A2 w/ lift. T&R  Fall Risk (Y/N): Yes  Behavior Concerns: Agitated, combative during cares.   Pain Management: Denies pain on this shift  Tele/VS/O2: Hypertensive, other VSS on RA  ABNL Lab/BG: none  Diet: NPO except for mildly thick liquids, ice chips/meds.   Bowel/Bladder: Incontinent of B&B  Skin Concerns: Redness to groin, barrier cream applied. Scant redness and swelling on right outer thumb.  Drains/Devices:  LR @ 75ml/hr  Tests/Procedures for next shift: Brain MRI planned for 1430 on 5/16, ACLS trained RN and pacemaker rep to be present.    Anticipated DC date & active delays: TBD  Patient Stated Goal for Today: Not stated

## 2023-05-16 NOTE — PROGRESS NOTES
Goal Outcomes Evaluation    Orientation/Cognitive: Alert to self only. Intermittent sleep. Cooperative.   Observation Goals (Met/ Not Met): N/A d/t IP status  Mobility Level/Assist Equipment: A2 w/ lift. T&R  Fall Risk (Y/N): Y  Behavior Concerns: Agitation w/ cares, refuses cares at times. Screams/yells out during cares.   Pain Management: Neck pain managed w/ PRN Tylenol.    Tele/VS/O2: VSS on RA, ex HTN, scheduled antihypertensive given.   ABNL Lab/BG: CRP 97.32 trending down. Hgb 11.5. No lab draw today.   Diet: NPO except for mildly thick liquids, ice chips/meds.   Bowel/Bladder: Incontinent of B&B  Skin Concerns: Redness to groin, barrier cream applied. Scant redness and swelling on right outer thumb, MD is aware.   Drains/Devices:  LR @ 75ml/hr  Tests/Procedures for next shift: Brain MRI planned for 1430 on 5/16, ACLS trained RN and pacemaker rep to be present.    Anticipated DC date & active delays: TBD, pending clinical improvement.   Patient Stated Goal for Today: Rest

## 2023-05-16 NOTE — PLAN OF CARE
Date & Time: 5/16/23 3759-1280  Orientation/Cognitive Concerns: A/O to self  Abnl VS/O2: VSS on RA. HTN  Tele: N/A  Pain Management: Neck pain resolves with repositioning  Abnl Labs/BG: .04  Behavior/Aggression Tool Color: yellow, green when family present  Mobility: Lift  Diet: puree with mildly thick  Bowel/Bladder: incontinent  Skin: blanchable redness to bottom   Drains/Devices: PIV SL  Test/Procedures: MRI at 1430. R thumb x-ray completed today  Anticipated DC date: pending

## 2023-05-16 NOTE — CONSULTS
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 Yanet Rockwell. Patient admitted on 5/11/23 for delirium and continues to undergo work-up. He was noted 3 days ago to have localized right thumb MCP joint swelling, erythema, and hypersensitivity. No known trauma. No history of gout. Most recent labs: lactate 0.8, CRP elevated at 195.04 (up from admission), WBC 9.8, and uric acid WNL at 6.6. VSS and afebrile. X-rays completed today demonstrate degenerative changes of the first MCP joint and no acute fracture. Low suspicion for septic joint given this is quite uncommon especially as there is no wound/reason for joint capsule violation. Suspect right thumb pseudogout vs exacerbation of osteoarthritis. No recommendations for joint aspiration and/or surgical intervention at this time.    -Right thumb spica brace ordered through orthotics. Patient may wear this for comfort/stabilization, but okay to remove if too sensitive initially.  -Empirically treat for pseudogout, likely with PO steroids, if otherwise not contraindicated. Defer to primary.  -Encourage use of cold compresses, elevation above heart level, ROM as tolerated.  -Pain control regimen per primary team. Strongly recommend topical pain relievers such as diclofenac cream and lidocaine patches if not contraindicated.  -Okay for activity involved the right upper  extremity as tolerated.   -No indications for antibiotics specifically for the right thumb at this time.   -Ortho will check in on the patient tomorrow and determine a response to anti-inflammatories if initiated. Please contact orthopedic trauma team if any questions or concerns arise.           Chief Complaint:   Right thumb pain, redness, and swelling         History of Present Illness:   Medical history obtained via chart review and discussion with the patient's family. Patient with delirium and impaired hearing causing poor cooperation during exam. Abbe Lambert is an 89 year old right-hand dominant female with past medical history of cognitive decline, atrial fibrillation, MI, cardiomyopathy, CAD s/p CABG and catheterization, CKD, HTN, PMR, and asthma who was admitted on 5/11/23 for delirium. On 5/13/23, the patient reportedly developed a sudden onset of right thumb pain, swelling, and redness localized to the MCP joint. There is pain with attempted ROM and hypersensitivity with light touch. No known trauma or preceding activity. No history of gout or pseudogout. Family denies history of joint or skin infections and MRSA. No prior injuries or surgeries to the right hand. Afebrile recently per chart review, but low grade temp of 99 degrees F on admission. No known recent illnesses.           Past Medical History:     Past Medical History:   Diagnosis Date     AAA (abdominal aortic aneurysm) (H)      Anemia due to blood loss, acute 10/10/2016     Asthma      Atrial fibrillation (H)     s/p left atrial appendage ligation     Cardiac arrest (H)      Cardiomyopathy (H)      Chronic kidney disease      Chronic sinusitis      Coronary artery disease     cardiac cath 2014: medical management; cath 2013: PTCA to diagonal; cath 2009: medical management; CABG 1998: LIMA to LAD, LISA to RCA, SVG to circumflex     GERD (gastroesophageal reflux disease)      History of angina      Hyperlipidaemia      Hypertension, goal  below 140/90      Myocardial infarction (H)     MI with Vfib arrest 1998     Polymyalgia rheumatica (H)      Shingles 10/28/2016     Shortness of breath      Tachy-alix syndrome (H)     generator replacement 9/2020; implanted dual chamber pacemaker 2012             Past Surgical History:     Past Surgical History:   Procedure Laterality Date     ARTHROPLASTY KNEE Left 10/5/2016    Procedure: ARTHROPLASTY KNEE;  Surgeon: Keshav Zamudio MD;  Location:  OR     CARDIAC SURGERY  12/03/2012    pacemaker ; CABG 1998     CHOLECYSTECTOMY       COLONOSCOPY       ENT SURGERY  5-    sinus     EP PACEMAKER N/A 9/11/2020    Procedure: EP Pacemaker;  Surgeon: Caron Guerin MD;  Location:  HEART CARDIAC CATH LAB     ESOPHAGOSCOPY, GASTROSCOPY, DUODENOSCOPY (EGD), COMBINED N/A 12/3/2019    Procedure: ESOPHAGOGASTRODUODENOSCOPY (EGD) (MAC);  Surgeon: Calderon Herman MD;  Location:  GI     ESOPHAGOSCOPY, GASTROSCOPY, DUODENOSCOPY (EGD), DILATATION, COMBINED N/A 12/3/2019    Procedure: Esophagoscopy, gastroscopy, duodenoscopy (EGD), dilatation, combined;  Surgeon: Calderon Herman MD;  Location:  GI     EXTRACAPSULAR CATARACT EXTRATION WITH INTRAOCULAR LENS IMPLANT       GENITOURINARY SURGERY      prostatectomy     IMPLANT PACEMAKER  12-3-12    [facility]     PROSTATE SURGERY               Social History:     Social History     Tobacco Use     Smoking status: Never     Passive exposure: Never     Smokeless tobacco: Never   Vaping Use     Vaping status: Never Used     Passive vaping exposure: Yes   Substance Use Topics     Alcohol use: Never     Alcohol/week: 0.0 standard drinks of alcohol             Family History:     Family History   Problem Relation Age of Onset     Cerebrovascular Disease Father      Heart Disease Father      Heart Disease Brother      Coronary Artery Disease Brother         MI age 50     Depression Daughter         raped in high school     Unknown/Adopted Maternal  Grandmother      Unknown/Adopted Maternal Grandfather      Unknown/Adopted Paternal Grandmother      Unknown/Adopted Paternal Grandfather              Immunizations:     VACCINE/DOSE   Diptheria   DPT   DTAP   HBIG   Hepatitis A   Hepatitis B   HIB   Influenza   Measles   Meningococcal   MMR   Mumps   Pneumococcal   Polio   Rubella   Small Pox   TDAP   Varicella   Zoster             Allergies:     Allergies   Allergen Reactions     Fluticasone-Salmeterol      DENIED     Morphine Hcl      Decrease  Blood Pressure Lower Than Normal, Per Pt.     Vicodin [Hydrocodone-Acetaminophen] Visual Disturbance             Medications:     Current Facility-Administered Medications   Medication     acetaminophen (TYLENOL) tablet 325-650 mg     carvedilol (COREG) tablet 3.125 mg     hydrALAZINE (APRESOLINE) injection 10 mg     lactated ringers infusion     melatonin tablet 1 mg     nitroGLYcerin (NITROSTAT) sublingual tablet 0.4 mg     nystatin (MYCOSTATIN) suspension 500,000 Units     ondansetron (ZOFRAN ODT) ODT tab 4 mg    Or     ondansetron (ZOFRAN) injection 4 mg     QUEtiapine (SEROquel) half-tab 12.5 mg     rosuvastatin (CRESTOR) tablet 10 mg     tamsulosin (FLOMAX) capsule 0.4 mg             Review of Systems:   CV: NEGATIVE for chest pain  C: NEGATIVE for fever, chills  E/M: Hard of hearing  R: NEGATIVE for significant cough or SOB          Physical Exam:   All vitals have been reviewed  Patient Vitals for the past 24 hrs:   BP Temp Temp src Pulse Resp SpO2   05/16/23 1142 (!) 148/60 -- -- 61 16 96 %   05/16/23 0822 (!) 184/88 98.3  F (36.8  C) Oral 63 16 98 %   05/16/23 0013 (!) 170/79 98.4  F (36.9  C) Oral 75 17 97 %   05/15/23 1806 (!) 168/75 -- -- 74 -- --   05/15/23 1525 -- 98  F (36.7  C) Axillary 85 20 98 %       Intake/Output Summary (Last 24 hours) at 5/16/2023 1423  Last data filed at 5/15/2023 2019  Gross per 24 hour   Intake 3600 ml   Output --   Net 3600 ml       Constitutional: Pleasant, intermittently sleepy  vs alert, cognitive impairment/confusion. Hard of hearing.  HEENT: Head atraumatic normocephalic. Pupils equal round and reactive.  Respiratory: Unlabored breathing no audible wheeze  Cardiovascular: Regular rate and rhythm per pulses.  GI: Abdomen is non-distended.  Lymph/Hematologic: No lymphadenopathy in areas examined.  Skin: No rashes, no cyanosis, no edema.  Musculoskeletal: Right upper extremity: Skin intact. Localized swelling, erythema, tenderness, and hypersensitivity with light touch to the right first MCP joint. No palpable fluctuance/fluid collections or induration. No expanding erythema or swelling to the remainder of the upper extremity. Point tender throughout the dorsal, radial, and ulnar aspects of the first MCP joint, minimal tenderness volarly. Nontender over the first CMC joint, remainder of the thumb and hand, diffusely about the wrist, forearm, and elbow. Spontaneous flexion and extension of all digits aside from limitation and avoiding motion of the right thumb. Able to actively flex and extend the first IP joint, hesitant to actively range the first MCP joint. Active and pain free wrist flexion and extension. Able to passively range the elbow without pain. Radial and ulnar pulses 2+. Capillary refill <2 seconds. Sensation intact to light touch throughout.  Neurologic: GCS 15          Data:   All laboratory data reviewed  Results for orders placed or performed during the hospital encounter of 05/11/23   CT Head w/o Contrast     Status: None    Narrative    EXAM: CT HEAD W/O CONTRAST  LOCATION: [facility]  DATE/TIME: 5/11/2023 10:41 PM CDT    INDICATION: altered mental status  COMPARISON: None.  TECHNIQUE: Routine CT Head without IV contrast. Multiplanar reformats. Dose reduction techniques were used.    FINDINGS:  INTRACRANIAL CONTENTS: No intracranial hemorrhage, extraaxial collection, or mass effect.  No CT evidence of acute infarct. Mild presumed chronic small  vessel ischemic changes. Moderate generalized volume loss. No hydrocephalus.     VISUALIZED ORBITS/SINUSES/MASTOIDS: No intraorbital abnormality. Prior sinus surgery. No paranasal sinus mucosal disease. No middle ear or mastoid effusion.    BONES/SOFT TISSUES: No acute abnormality.      Impression    IMPRESSION:  1.  No CT evidence for acute intracranial process.  2.  Brain atrophy and presumed chronic microvascular ischemic changes as above.   XR Chest 2 Views     Status: None    Narrative    EXAM: XR CHEST 2 VIEWS  LOCATION: [facility]  DATE/TIME: 5/11/2023 9:59 PM CDT    INDICATION: cough  COMPARISON: 2 view chest radiograph 03/31/2021      Impression    IMPRESSION: Prior median sternotomy. Stable position of the dual-lead left chest pacemaker. Mild left basilar atelectasis. Otherwise no focal pulmonary consolidation or effusion. No pneumothorax. Cholecystectomy clips. Degenerative changes of the   thoracic spine. No acute osseous abnormality.   XR Shoulder Right G/E 3 Views     Status: None    Narrative    EXAM: XR SHOULDER RIGHT G/E 3 VIEWS  LOCATION: [facility]  DATE/TIME: 5/12/2023 12:14 AM CDT    INDICATION: fall, pain  COMPARISON: None.      Impression    IMPRESSION: Normal joint spaces and alignment. No fracture.   [place] Draw     Status: None    Narrative    The following orders were created for panel order [place] Draw.  Procedure                               Abnormality         Status                     ---------                               -----------         ------                     Extra Blue Top Tube[[id number]]                              Final result               Extra Green Top (Lithium...[[id number]]                      Final result               Extra Purple Top Tube[[id number]]                            Final result                 Please view results for these tests on the individual orders.   Symptomatic Influenza A/B, RSV, &  SARS-CoV2 PCR (COVID-19) Nasopharyngeal     Status: Normal    Specimen: Nasopharyngeal; Swab   Result Value Ref Range    Influenza A PCR Negative Negative    Influenza B PCR Negative Negative    RSV PCR Negative Negative    SARS CoV2 PCR Negative Negative    Narrative    Testing was performed using the Xpert Xpress CoV2/Flu/RSV Assay on the [email or web address] GeneXpert Instrument. This test should be ordered for the detection of SARS-CoV-2, influenza, and RSV viruses in individuals who meet clinical and/or epidemiological criteria. Test performance is unknown in asymptomatic patients. This test is for in vitro diagnostic use under the FDA EUA for laboratories certified under CLIA to perform high or moderate complexity testing. This test has not been FDA cleared or approved. A negative result does not rule out the presence of PCR inhibitors in the specimen or target RNA in concentration below the limit of detection for the assay. If only one viral target is positive but coinfection with multiple targets is suspected, the sample should be re-tested with another FDA cleared, approved, or authorized test, if coinfection would change clinical management. This test was validated by the [facility]. These laboratories are certified under the Clinical Laboratory Improvement Amendments of 1988 (CLIA-88) as qualified to perform high complexity laboratory testing.   Extra Blue Top Tube     Status: None   Result Value Ref Range    Hold Specimen JIC    Extra Green Top (Lithium Heparin) Tube     Status: None   Result Value Ref Range    Hold Specimen JIC    Extra Purple Top Tube     Status: None   Result Value Ref Range    Hold Specimen JIC    Comprehensive metabolic panel     Status: Abnormal   Result Value Ref Range    Sodium 135 (L) 136 - 145 mmol/L    Potassium 3.9 3.4 - 5.3 mmol/L    Chloride 102 98 - 107 mmol/L    Carbon Dioxide (CO2) 23 22 - 29 mmol/L    Anion Gap 10 7 - 15 mmol/L    Urea Nitrogen 18.4 8.0 - 23.0  mg/dL    Creatinine 1.44 (H) 0.67 - 1.17 mg/dL    Calcium 8.9 8.8 - 10.2 mg/dL    Glucose 124 (H) 70 - 99 mg/dL    Alkaline Phosphatase 96 40 - 129 U/L    AST 37 10 - 50 U/L    ALT 21 10 - 50 U/L    Protein Total 8.2 6.4 - 8.3 g/dL    Albumin 3.4 (L) 3.5 - 5.2 g/dL    Bilirubin Total 0.4 <=1.2 mg/dL    GFR Estimate 46 (L) >60 mL/min/1.73m2   Lactic acid whole blood     Status: Normal   Result Value Ref Range    Lactic Acid 1.3 0.7 - 2.0 mmol/L   Troponin T, High Sensitivity     Status: Abnormal   Result Value Ref Range    Troponin T, High Sensitivity 31 (H) <=22 ng/L   UA with Microscopic reflex to Culture     Status: Abnormal    Specimen: Urine, Catheter   Result Value Ref Range    Color Urine Light Yellow Colorless, Straw, Light Yellow, Yellow    Appearance Urine Clear Clear    Glucose Urine Negative Negative mg/dL    Bilirubin Urine Negative Negative    Ketones Urine Negative Negative mg/dL    Specific Gravity Urine 1.018 1.003 - 1.035    Blood Urine Moderate (A) Negative    pH Urine 5.5 5.0 - 7.0    Protein Albumin Urine 50 (A) Negative mg/dL    Urobilinogen Urine Normal Normal, 2.0 mg/dL    Nitrite Urine Negative Negative    Leukocyte Esterase Urine Negative Negative    Mucus Urine Present (A) None Seen /LPF    RBC Urine 9 (H) <=2 /HPF    WBC Urine <1 <=5 /HPF    Squamous Epithelials Urine <1 <=1 /HPF    Narrative    Urine Culture not indicated   Ethyl Alcohol Level     Status: Normal   Result Value Ref Range    Alcohol ethyl <0.01 <=0.01 g/dL   CBC with platelets and differential     Status: Abnormal   Result Value Ref Range    WBC Count 12.0 (H) 4.0 - 11.0 10e3/uL    RBC Count 3.39 (L) 4.40 - 5.90 10e6/uL    Hemoglobin 10.6 (L) 13.3 - 17.7 g/dL    Hematocrit 33.4 (L) 40.0 - 53.0 %    MCV 99 78 - 100 fL    MCH 31.3 26.5 - 33.0 pg    MCHC 31.7 31.5 - 36.5 g/dL    RDW 15.6 (H) 10.0 - 15.0 %    Platelet Count 190 150 - 450 10e3/uL    % Neutrophils 83 %    % Lymphocytes 7 %    % Monocytes 10 %    % Eosinophils 0 %     % Basophils 0 %    % Immature Granulocytes 0 %    NRBCs per 100 WBC 0 <1 /100    Absolute Neutrophils 9.8 (H) 1.6 - 8.3 10e3/uL    Absolute Lymphocytes 0.8 0.8 - 5.3 10e3/uL    Absolute Monocytes 1.2 0.0 - 1.3 10e3/uL    Absolute Eosinophils 0.0 0.0 - 0.7 10e3/uL    Absolute Basophils 0.0 0.0 - 0.2 10e3/uL    Absolute Immature Granulocytes 0.0 <=0.4 10e3/uL    Absolute NRBCs 0.0 10e3/uL   Procalcitonin     Status: Abnormal   Result Value Ref Range    Procalcitonin 0.06 (H) <0.05 ng/mL   Basic metabolic panel     Status: Abnormal   Result Value Ref Range    Sodium 138 136 - 145 mmol/L    Potassium 3.2 (L) 3.4 - 5.3 mmol/L    Chloride 104 98 - 107 mmol/L    Carbon Dioxide (CO2) 23 22 - 29 mmol/L    Anion Gap 11 7 - 15 mmol/L    Urea Nitrogen 17.6 8.0 - 23.0 mg/dL    Creatinine 1.50 (H) 0.67 - 1.17 mg/dL    Calcium 9.4 8.8 - 10.2 mg/dL    Glucose 123 (H) 70 - 99 mg/dL    GFR Estimate 44 (L) >60 mL/min/1.73m2   CBC with platelets     Status: Abnormal   Result Value Ref Range    WBC Count 13.0 (H) 4.0 - 11.0 10e3/uL    RBC Count 3.77 (L) 4.40 - 5.90 10e6/uL    Hemoglobin 11.7 (L) 13.3 - 17.7 g/dL    Hematocrit 36.6 (L) 40.0 - 53.0 %    MCV 97 78 - 100 fL    MCH 31.0 26.5 - 33.0 pg    MCHC 32.0 31.5 - 36.5 g/dL    RDW 15.2 (H) 10.0 - 15.0 %    Platelet Count 211 150 - 450 10e3/uL   Erythrocyte sedimentation rate auto     Status: Abnormal   Result Value Ref Range    Erythrocyte Sedimentation Rate 61 (H) 0 - 20 mm/hr   CRP inflammation     Status: Abnormal   Result Value Ref Range    CRP Inflammation 119.95 (H) <5.00 mg/L   Troponin T, High Sensitivity     Status: Abnormal   Result Value Ref Range    Troponin T, High Sensitivity 58 (H) <=22 ng/L   Lactic Acid STAT     Status: Normal   Result Value Ref Range    Lactic Acid 1.1 0.7 - 2.0 mmol/L   Troponin T, High Sensitivity     Status: Abnormal   Result Value Ref Range    Troponin T, High Sensitivity 47 (H) <=22 ng/L   Vitamin B12     Status: Normal   Result Value Ref  Range    Vitamin B12 274 232 - 1,245 pg/mL   TSH with free T4 reflex     Status: Normal   Result Value Ref Range    TSH 3.45 0.30 - 4.20 uIU/mL   Lactic Acid STAT     Status: Normal   Result Value Ref Range    Lactic Acid 1.0 0.7 - 2.0 mmol/L   Potassium     Status: Normal   Result Value Ref Range    Potassium 3.6 3.4 - 5.3 mmol/L   CBC with platelets     Status: Abnormal   Result Value Ref Range    WBC Count 9.8 4.0 - 11.0 10e3/uL    RBC Count 3.69 (L) 4.40 - 5.90 10e6/uL    Hemoglobin 11.5 (L) 13.3 - 17.7 g/dL    Hematocrit 35.8 (L) 40.0 - 53.0 %    MCV 97 78 - 100 fL    MCH 31.2 26.5 - 33.0 pg    MCHC 32.1 31.5 - 36.5 g/dL    RDW 15.3 (H) 10.0 - 15.0 %    Platelet Count 218 150 - 450 10e3/uL   CRP inflammation     Status: Abnormal   Result Value Ref Range    CRP Inflammation 97.32 (H) <5.00 mg/L   Lactic Acid STAT     Status: Normal   Result Value Ref Range    Lactic Acid 0.8 0.7 - 2.0 mmol/L   CRP inflammation     Status: Abnormal   Result Value Ref Range    CRP Inflammation 195.04 (H) <5.00 mg/L   Uric acid     Status: Normal   Result Value Ref Range    Uric Acid 6.6 3.4 - 7.0 mg/dL   EKG 12-lead, tracing only     Status: None   Result Value Ref Range    Systolic Blood Pressure  mmHg    Diastolic Blood Pressure  mmHg    Ventricular Rate 64 BPM    Atrial Rate 64 BPM    OR Interval 282 ms    QRS Duration 130 ms     ms    QTc 458 ms    P Axis 35 degrees    R AXIS -63 degrees    T Axis 6 degrees    Interpretation ECG       Sinus rhythm with 1st degree A-V block  Left axis deviation  Non-specific intra-ventricular conduction block  Inferior infarct (cited on or before 29-APR-2023)  Anterolateral infarct (cited on or before 29-APR-2023)  Abnormal ECG  When compared with ECG of 29-APR-2023 13:03,  Sinus rhythm has replaced Electronic atrial pacemaker  Confirmed by GENERATED REPORT, COMPUTER ([id number]),  Mala Salmeron ([id number]) on 5/11/2023 9:42:23 PM     Echocardiogram Complete     Status: None   Result Value  Ref Range    LVEF  60-65%     [facility]    [id number]  [id number]  [id number]  [id number]^SHYANN^SHARYN^FAWAD     [facility]  Echocardiography Laboratory  [address] [place] [address]     Name: DARBY SHEETS  MRN: [id number]  : 1934  Study Date: 2023 09:59 AM  Age: 89 yrs  Gender: Male  Patient Location: [facility]  Reason For Study: MI  Ordering Physician: SHARYN BOOTHE  Referring Physician: Ajit Butterfield  Performed By: Daniel Marques     BSA: 2.0 m2  Height: 70 in  Weight: 173 lb  HR: 60  BP: 134/63 mmHg  ______________________________________________________________________________  Procedure  Complete Portable Echo Adult. Optison (NDC #3015-4104) given intravenously.  ______________________________________________________________________________  Interpretation Summary     TCD. Pt agitated and confused.  LVH. Normal systolic LVF. EF 55-60. RV appears normal.  The left ventricle is normal in structure, function and size.  The visual ejection fraction is 60-65%.  There is mild biatrial enlargement.  There is mild (1+) tricuspid regurgitation.  ______________________________________________________________________________  Left Ventricle  The left ventricle is normal in structure, function and size. There is normal  left ventricular wall thickness. The visual ejection fraction is 60-65%. No  regional wall motion abnormalities noted.     Right Ventricle  There is a catheter/pacemaker lead seen in the right ventricle. Right  ventricular function cannot be assessed due to poor image quality.     Atria  There is mild biatrial enlargement.     Mitral Valve  The mitral valve leaflets appear thickened, but open well.     Tricuspid Valve  The tricuspid valve is not well visualized, but is grossly normal. There is  mild (1+) tricuspid regurgitation.     Aortic Valve  There is mild trileaflet aortic sclerosis. No aortic stenosis is present.     Pulmonic Valve  The pulmonic valve  is not well visualized. There is trace pulmonic valvular  regurgitation.     Vessels  Borderline aortic root dilatation. The ascending aorta is Borderline dilated.     Pericardium  There is no pericardial effusion.     Rhythm  The rhythm was paced.  ______________________________________________________________________________  MMode/2D Measurements & Calculations  IVSd: 1.1 cm     LVIDd: 5.0 cm  LVIDs: 3.3 cm  LVPWd: 1.1 cm  FS: 33.0 %  LV mass(C)d: 216.4 grams  LV mass(C)dI: 110.2 grams/m2  Ao root diam: 3.6 cm  LA dimension: 3.4 cm  asc Aorta Diam: 3.7 cm  LA/Ao: 0.92  LVOT diam: 2.1 cm  LVOT area: 3.6 cm2  LA Volume (BP): 60.4 ml  LA Volume Index (BP): 30.8 ml/m2  RWT: 0.45  TAPSE: 1.2 cm     Doppler Measurements & Calculations  MV E max hola: 60.8 cm/sec  MV A max hola: 87.0 cm/sec  MV E/A: 0.70  MV dec slope: 320.0 cm/sec2  MV dec time: 0.19 sec  LV V1 max PG: 3.9 mmHg  LV V1 max: 99.0 cm/sec  LV V1 VTI: 23.2 cm  SV(LVOT): 82.9 ml  SI(LVOT): 42.2 ml/m2  PA acc time: 0.11 sec  TR max hola: 236.2 cm/sec  TR max P.3 mmHg  E/E' av.0  Lateral E/e': 6.7  Medial E/e': 11.4  RV S Hola: 8.2 cm/sec     ______________________________________________________________________________  Report approved by: Victorino Beal 2023 12:33 PM         Blood Culture Peripheral Blood     Status: Normal (Preliminary result)    Specimen: Peripheral Blood   Result Value Ref Range    Culture No growth after 4 days    Blood Culture Arm, Left     Status: Normal (Preliminary result)    Specimen: Arm, Left; Blood   Result Value Ref Range    Culture No growth after 3 days    Urine Culture     Status: None    Specimen: Urine, Clean Catch   Result Value Ref Range    Culture No Growth    CBC with platelets differential     Status: Abnormal    Narrative    The following orders were created for panel order CBC with platelets differential.  Procedure                               Abnormality         Status                      ---------                               -----------         ------                     CBC with platelets and d...[[id number]]  Abnormal            Final result                 Please view results for these tests on the individual orders.          Attestation:  I have reviewed today's vital signs, notes, medications, labs and imaging with Dr. Yanet Rockwell.  Amount of time performed on this consult: 50 minutes.    Ally Clark PA-C  [facility]

## 2023-05-17 NOTE — PROGRESS NOTES
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  Family notes he is more alert today   He has not been complaining of thumb pain today  Staff attempted to apply the thumb spica brace but he did not tolerate this.  They feel the volteren gel has been beneficial    Temp:  [97.7  F (36.5  C)-98.8  F (37.1  C)] 97.7  F (36.5  C)  Pulse:  [61-70] 70  Resp:  [16-18] 18  BP: (144-181)/(60-73) 165/70  SpO2:  [96 %-97 %] 97 %    Right upper extremity: Skin intact. Localized swelling, erythema, tenderness, and hypersensitivity with light touch to the right first MCP joint. No palpable fluctuance/fluid collections.  No expanding erythema or swelling to the remainder of the upper extremity. Point tender throughout the dorsal, radial, and ulnar aspects of the first MCP joint.    Full flexion and extension of all digits aside from limitation and avoiding motion of the right thumb. Able to actively flex and extend the first IP joint, hesitant to actively range the first MCP joint. Active and pain free wrist flexion and extension. Able to passively range the elbow without pain. Radial and ulnar pulses 2+. Capillary refill <2 seconds. Sensation intact to light touch throughout.    Labs:  Recent Labs   Lab Test 05/17/23  0635 05/14/23  1506 05/12/23  0657   WBC 9.8 9.8 13.0*   HGB 11.2* 11.5* 11.7*    218 211     No lab results found.  Recent Labs   Lab Test 05/16/23  1114 05/14/23  1506 05/12/23  1209   CRPI 195.04* 97.32* 119.95*         1. PLAN:   Continue volteran gel and prednisone   If pain increases, would recommend moving IV site from right to left UE and apply the thumb spica splint   Okay to use right UE for use of walker if needed, otherwise avoid excessive /grasp   Will recheck tomorrow to confirm continued improvement.       Jayna Mitchell PA-C

## 2023-05-17 NOTE — PROGRESS NOTES
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  Strong suspicion this patient may actually have Lewy Body Dementia  5/14:  Patient has been agitated since admission and concerns for delirium.  Continue to make medication adjustments  * CT Head: No acute findings   - Seroquel switched to BID PRN due to increased somnolence which is likely contributing to dysphagia noted by speech therapy  - MRI brain has been obtained  5/16, pending results  - PT/OT/Speech consulted.    Current recommendation is TCU  - Care management to help with disposition planning   - Neurology consulted and appreciate their recommendations  -If no other appropriate TCU identified by 5/18 will discharge to [facility]  Elevated CRP,   right thumb Erythema-MCP joint  Worsening encephalopathy  --Patient has CRP that was trending down till yesterday, repeat CRP ordered and it had gone up to 195, gout is a differential, family denied any history of gout, uric acid level was ordered which is 6.6 this was from the sample they have from the day of admission.  --Will obtain x-ray of the thumb, discussed with orthopedic surgery, the differential also includes septic arthritis.  -Orthopedic surgery notes reviewed, discussed with them, there is no concern of infection, possibly gout versus pseudogout, will start on short course of prednisone  20 mg daily.  We will add PPI.  -Is close to baseline as per family.     H/o Tachy-alix syndrome and atrial fibrillation s/p [place]   S/p PPM   H/o ASCVD/ CMO  Mild troponin elevation, likely chronically elevated   HTN/HLP   * Echo:  LVH. Normal systolic LVF. EF 55-60. RV appears normal.  The left ventricle is normal in structure, function and size.  The visual ejection fraction is 60-65%.  There is mild biatrial enlargement.  There is mild (1+) tricuspid regurgitation.  - Continue coreg  - PRN Hydralazine  - Crestor 10 mg       DVT Prophylaxis: SCDs  Code Status: Full Code     52 MINUTES SPENT BY ME on the date of service doing chart review, history, exam, documentation & further activities per the note.  Time spent on discussing discharge and ongoing issues.  Disposition: Expected discharge to TCU, if none available by 5/18 will discharge to [facility].  Clinically Significant Risk Factors              # Hypoalbuminemia: Lowest albumin = 3.4 g/dL at 5/11/2023  9:01 PM, will monitor as appropriate     # Hypertension: Noted on problem list     # Dementia: noted on problem list             Lynn Cristobal MD  Text Page   (7am to 6pm)    Interval History   Patient is close to his baseline as per family, discussed about the thumb, he has ongoing pain there, erythema present.    -Data reviewed today: I reviewed all new labs and imaging results over the last 24 hours.     Physical Exam     Vital Signs with Ranges  Temp:  [97.7  F (36.5  C)-98.8  F (37.1  C)] 97.7  F (36.5  C)  Pulse:  [66-70] 70  Resp:  [18] 18  BP: (144-181)/(67-73) 165/70  SpO2:  [96 %-97 %] 97 %  I/O last 3 completed shifts:  In: 50 [P.O.:50]  Out: -     Constitutional: Awake, alert, cooperative, no apparent distress  Respiratory: Clear to auscultation bilaterally, no crackles or wheezing  Cardiovascular: Regular rate and rhythm, normal S1 and S2, and no murmur noted  GI: Normal bowel sounds, soft, non-distended, non-tender  Skin/Integumen:  Right MCP joint erythema noted, tenderness noted  Neuro : moving all 4 extremities, ongoing occasional confusion noted, appears to be baseline as per family    Medications     lactated ringers 75 mL/hr at 05/17/23 1322       carvedilol  3.125 mg Oral BID w/meals     diclofenac  2 g Topical 4x Daily     lidocaine  1 patch Transdermal Q24h    And     lidocaine   Transdermal Q8H NADEEM     nystatin  500,000 Units Swish & Spit 4x Daily     predniSONE  20 mg Oral Daily     rosuvastatin  10 mg Oral QAM     tamsulosin  0.4 mg Oral QPM       Data   Recent Labs   Lab 05/17/23  0635 05/14/23  1506 05/13/23  0650 05/12/23  0657 05/11/23  2101   WBC 9.8 9.8  --  13.0* 12.0*   HGB 11.2* 11.5*  --  11.7* 10.6*   MCV 98 97  --  97 99    218  --  211 190     --   --  138 135*   POTASSIUM 3.4  --  3.6 3.2* 3.9   CHLORIDE 107  --   --  104 102   CO2 20*  --   --  23 23   BUN 21.7  --   --  17.6 18.4   CR 1.26*  --   --  1.50* 1.44*   ANIONGAP 14  --   --  11 10   JANAY 9.2  --   --  9.4 8.9   *  --   --  123* 124*   ALBUMIN  --   --   --   --  3.4*   PROTTOTAL  --   --   --   --  8.2   BILITOTAL  --   --   --   --  0.4   ALKPHOS  --   --   --   --  96   ALT  --   --   --   --  21   AST  --   --   --   --  37     Recent Labs   Lab 05/17/23  0635 05/12/23  0657 05/11/23  2101   * 123* 124*       Imaging:   Recent Results (from the past 24 hour(s))   MR Brain w/o Contrast    Narrative    MRI BRAIN WITHOUT CONTRAST  5/16/2023 3:02 PM    HISTORY:  Worsening confusion and decreased mobility    TECHNIQUE:  Multiplanar, multisequence MRI of the brain without  gadolinium IV contrast material.      COMPARISON:  MRI brain 16 January 2006.    FINDINGS: There is no evidence of acute infarct, hemorrhage, mass, or  herniation. Moderate diffuse parenchymal volume loss. Moderate patchy  deep and subcortical white matter T2 hyperintensities which are  nonspecific, but likely related to chronic microvascular ischemic  disease.  Ventricular size within normal limits without hydrocephalus.  Small, chronic right cerebellar lacunar infarct.    The facial structures appear normal. The arteries at the base of the  brain and the dural venous sinuses appear patent.      Impression    IMPRESSION:     1. No evidence of acute infarct, mass, hemorrhage, or herniation.  2. Moderate diffuse parenchymal volume loss and white matter changes  likely due to chronic microvascular ischemic disease. Very small,  chronic right cerebellar lacunar type infarct.       CARSON SCOTT MD         SYSTEM ID:  [id number]

## 2023-05-17 NOTE — PROGRESS NOTES
Care Management Follow Up    Length of Stay (days): 3    Expected Discharge Date: 05/18/2023     Concerns to be Addressed:       Patient plan of care discussed at interdisciplinary rounds: Yes    Anticipated Discharge Disposition: Transitional Care     Anticipated Discharge Services: Transportation Services  Anticipated Discharge DME:      Patient/family educated on Medicare website which has current facility and service quality ratings: yes  Education Provided on the Discharge Plan:    Patient/Family in Agreement with the Plan: yes    Referrals Placed by CM/SW:    Private pay costs discussed: private room/amenity fees    Additional Information:  Pt was accepted at [facility] in [place] (new name [facility]). Call to [facility] of [place] (pt's first choice), they are typically out of network with [facility] insurance but Cherie with [facility] was going to call back.     Call to daughter Una to update. She would anticipate pt and spouse not wanting to pay out of pocket costs that could be associated with a TCU out of network. Una requested any other facilities closest to [place]. SW stated they could try [facility] but they usually have a long wait list. Updated that [facility] is still pending. SW to keep daughter updated.     Messaged Preston with [facility] to check on referral status. [facility] and [facility] both declined. AWA updated [facility] to request they start insurance auth.     EDWIN Fulton  Social Work  [facility]

## 2023-05-17 NOTE — PROGRESS NOTES
Goal Outcomes Evaluation    Orientation/Cognitive: Alert to self only. Uncooperative   Observation Goals (Met/ Not Met): N/A   Mobility Level/Assist Equipment: A2 w/ lift. T&R  Fall Risk (Y/N): Yes  Behavior Concerns: very Agitated refusing all cares and assessments  Pain Management: PRN Tylenol and Seroquel given this shift  Tele/VS/O2: refused   ABNL Lab/BG: none  Diet: NPO except for mildly thick liquids, ice chips/meds.   Bowel/Bladder: Incontinent of B&B  Skin Concerns: Redness to groin, barrier cream applied. Scant redness and swelling on right outer thumb.  Drains/Devices:  LR @ 75ml/hr  Tests/Procedures for next shift: None    Anticipated DC date & active delays: Pending placement for TCU  Patient Stated Goal for Today: Not stated

## 2023-05-17 NOTE — PROGRESS NOTES
Orientation/Cognitive: Alert to self.  Observation Goals (Met/ Not Met): Inpatient  Mobility Level/Assist Equipment: A-2 LIft  Fall Risk (Y/N): Y  Behavior Concerns: Agitation  Pain Management: PRN Tylenol, Voltren gel, Lidocaine patch placed to right thumb  Tele/VS/O2: VSS on RA except hypertensive to 140's systolic  ABNL Lab/BG: CRP- 195.04  Diet: Pureed w/ Mildly Thick liquids  Bowel/Bladder: Incont. B&B  Skin Concerns: Redness to perineum  Drains/Devices: IV LR running @ 75ml/hr  Tests/Procedures for next shift: None  Anticipated DC date & active delays: Pending placement, SW following w/ referrals sent  Patient Stated Goal for Today: None

## 2023-05-17 NOTE — PROGRESS NOTES
Alert to self only. VSS on RA. Ax2/lift. Pt has been generally cooperative w/ cares; he does call out/yell w/ repositioning and josue-cares but has not been physically aggressive.     This AM pt was very interactive w/ staff family and was able to respond appropriately to questions asked about his past and tell stories about his career and vacations. Has been very tired this evening and resting quietly.     Bed bath and scalp shampoo given. Lotion applied to all extremities and head; foot massage given. R thumb red, swollen, and tender w/ movement -- pain controlled w/ voltaren gel; spica brace at bedside if pain increases. Thumb redness has decreased since this AM. Incontinent B/B. Voiding adequately, no BM this shift.    IVF infusing via R PIV. Erich pureed, mildly thick liquid diet -- requires feeding assistance. Fair appetite. Thrush medication applied w/ sponge/suction. PO meds w/ pudding. Occasional oral suctioning required for oral secretions.     Did not require any PRN seroquel this shift. Neuro signed off (see recs). SLP and ortho following. SW/CC following for TCU placement.    Type Of Destruction Used: Curettage

## 2023-05-17 NOTE — PROGRESS NOTES
[facility]  Neurology Daily Note      Admission Date:5/11/2023   Date of service: 05/17/2023   Hospital Day: 7                                                   Assessment and Plan:   #.  presentation is highly suggestive of Lewy body dementia.  Differential diagnosis might include other CNS disease or structural abnormality.    Seroquel/sedative as necessary  Consider psych eval if needed for behavioral management  MRI brain-no acute change/atrophy   Agree that he will require 24/7 supervision with LTC vs  transitional care on discharge.  Plan discussed with wife at bedside.  Follow up with neurology in 6-8 weeks at discharge.  Can follow up with previous neurologist or at [facility].    Can arrange with myself or Dr. Gold.     Will sign off.  Please contact General Neurology service if questions related to neurologic status or follow up needed during this admission.          Interval History:           Previously obtained hx.   Memory loss noted by family to start about 3 years ago-gradually progressive  Fallen 6 times in the last half a year  He started hearing/seeing people Fall 2022-escalated over the last few weeks.  Very aggressive behavior early in hospital stay, improved    Last 24hours.   Wife notes that he was more clear in conversation yesterday.  Required quetiapine last evening.        Medications:   Scheduled Meds:    carvedilol  3.125 mg Oral BID w/meals     diclofenac  2 g Topical 4x Daily     lidocaine  1 patch Transdermal Q24h    And     lidocaine   Transdermal Q8H NADEEM     nystatin  500,000 Units Swish & Spit 4x Daily     predniSONE  20 mg Oral Daily     rosuvastatin  10 mg Oral QAM     tamsulosin  0.4 mg Oral QPM     PRN Meds: acetaminophen, hydrALAZINE, melatonin, nitroGLYcerin, ondansetron **OR** ondansetron, QUEtiapine        Physical Exam:   Vitals: Temp: 97.7  F (36.5  C) Temp src: Axillary BP: (!) 165/70 Pulse: 70   Resp: 18 SpO2: 97 % O2 Device: None (Room air)    Vital Signs with  Ranges: Temp:  [97.7  F (36.5  C)-98.8  F (37.1  C)] 97.7  F (36.5  C)  Pulse:  [66-70] 70  Resp:  [18] 18  BP: (144-181)/(67-73) 165/70  SpO2:  [96 %-97 %] 97 %    General Appearance:  Lying in bed.  Receiving shampoo cap.   Neuro:                   Able to open eyes   Moaning with stim. easily agitated.    Unable to fully cooperate with exam.             Data:   ROUTINE IP LABS (Last 3results)  CBC RESULTS:     Recent Labs   Lab Test 05/17/23  0635 05/14/23  1506 05/12/23  0657   WBC 9.8 9.8 13.0*   RBC 3.62* 3.69* 3.77*   HGB 11.2* 11.5* 11.7*   HCT 35.6* 35.8* 36.6*    218 211     Basic Metabolic Panel:  Recent Labs   Lab Test 05/17/23  0635 05/13/23  0650 05/12/23  0657 05/11/23  2101     --  138 135*   POTASSIUM 3.4 3.6 3.2* 3.9   CHLORIDE 107  --  104 102   CO2 20*  --  23 23   BUN 21.7  --  17.6 18.4   CR 1.26*  --  1.50* 1.44*   *  --  123* 124*   JANAY 9.2  --  9.4 8.9     Liver panel:  Recent Labs   Lab Test 05/11/23  2101 12/10/22  1559 03/16/22  1617 04/14/21  0808 04/06/21  0917 03/30/21  1535   PROTTOTAL 8.2 8.5 8.2  --  8.1 8.6   ALBUMIN 3.4* 3.1* 3.4  --  3.3* 3.6   BILITOTAL 0.4 0.3 0.6  --  0.4 0.5   ALKPHOS 96 87 90  --  98 97   AST 37 20 30  --  44 28   ALT 21 27 27 82* 54 32     Thyroid Panel:  Recent Labs   Lab Test 05/12/23  1209 04/06/21  0917 03/30/21  1535   TSH 3.45 1.80 2.20      Vitamin B12:   Recent Labs   Lab Test 05/12/23  0657 03/22/21  1628   B12 274 426       Latest Reference Range & Units 05/11/23 22:10   Color Urine Colorless, Straw, Light Yellow, Yellow  Light Yellow   Appearance Urine Clear  Clear   Glucose Urine Negative mg/dL Negative   Bilirubin Urine Negative  Negative   Ketones Urine Negative mg/dL Negative   Specific Gravity Urine 1.003 - 1.035  1.018   pH Urine 5.0 - 7.0  5.5   Protein Albumin Urine Negative mg/dL 50 !   Urobilinogen mg/dL Normal, 2.0 mg/dL Normal   Nitrite Urine Negative  Negative   Blood Urine Negative  Moderate !   Leukocyte  Esterase Urine Negative  Negative   WBC Urine <=5 /HPF <1   RBC Urine <=2 /HPF 9 (H)   Squamous Epithelial /HPF Urine <=1 /HPF <1   Mucus Urine None Seen /LPF Present !   !: Data is abnormal  (H): Data is abnormally high     IMAGING:   Independent interpretation of the following studies by myself as part of today's encounter.     MRI BRAIN WITHOUT CONTRAST  5/16/2023 3:02 PM                                                                   IMPRESSION:     1. No evidence of acute infarct, mass, hemorrhage, or herniation.  2. Moderate diffuse parenchymal volume loss and white matter changes  likely due to chronic microvascular ischemic disease. Very small,  chronic right cerebellar lacunar type infarct.         EXAM: CT HEAD W/O CONTRAST  LOCATION: [facility]  DATE/TIME: 5/11/2023 10:41 PM CD                                                                IMPRESSION:  1.  No CT evidence for acute intracranial process.  2.  Brain atrophy and presumed chronic microvascular ischemic changes as above.        Toña Daniels M.D.  Neurologist  [facility]  Office [phone]

## 2023-05-18 NOTE — PROGRESS NOTES
Alert to self. VSS, except hypertensive. Pt becomes agitated/combative with cares. Tolerating pureed, mildly thick diet. Needs to be fed. Active bowel sounds. Incontinent of b/b. Pain was managed with voltaren gel and tylenol. Redness on thumb. Assit of two with a lift. Turn and repo q2hrs.     Pt was discharged to a TCU on 5/17/23.

## 2023-05-18 NOTE — PROGRESS NOTES
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 No family at bedside.  Patient reports his right thumb is doing \"good.\"   Spoke with day RN who has not heard of any recent complaints regarding the right thumb.  No acute events overnight.    Temp:  [97.6  F (36.4  C)-98.4  F (36.9  C)] 97.9  F (36.6  C)  Pulse:  [59-73] 60  Resp:  [18-20] 18  BP: (130-178)/(59-87) 156/63  SpO2:  [96 %-97 %] 96 %    Right hand: Skin intact. Hyperextension deformity of right thumb MCP joint. Minimal erythema and mild swelling of the right thumb MCP joint, much improved from previously. No fluctuance. Completely nontender throughout the right thumb MCP joint and remainder of the hand and wrist today. Patient actively able to circumduct thumb MCP joint without complaints of pain. Able to spontaneously flex and extend all digits. Radial and ulnar pulses 2+. Capillary refill <2 seconds. Sensation intact to light touch throughout.    Labs:  Recent Labs   Lab Test 05/17/23  0635 05/14/23  1506 05/12/23  0657   WBC 9.8 9.8 13.0*   HGB 11.2* 11.5* 11.7*    218 211     No lab results found.  Recent Labs   Lab Test 05/16/23  1114 05/14/23  1506 05/12/23  1209   CRPI 195.04* 97.32* 119.95*     1. 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 On 5/16/23, I was unable to palpate the right thumb MCP joint due to pain and hypersensitivity. Today, patient is able to demonstrate good ROM and is completely nontender. Discussed new findings with RN.   -Continue volteran gel and prednisone.  -Okay to use right UE for use of walker if needed. Activity as tolerated, using pain as guide.  -Thumb spica brace only for comfort. Patient previously did not tolerate this.  -Orthopedic team will sign " off. Please contact ortho trauma team if any questions or concerns arise. Follow up with outpatient ortho PRN.     Ally Clark PA-C  [facility]

## 2023-05-18 NOTE — PLAN OF CARE
Physical Therapy Discharge Summary    Reason for therapy discharge:    Discharged to transitional care facility.    Progress towards therapy goal(s). See goals on Care Plan in [facility] electronic health record for goal details.  Goals partially met.  Barriers to achieving goals:   discharge from facility.    Therapy recommendation(s):    Continued therapy is recommended.  Rationale/Recommendations: Pt is significantly below baseline. Pt requiring heavy A x 2 with all functional mobility. Pt presenting with deficits in activity tolerance, balance, and strength. Due to these deficits, pt is a high falls risk and unsafe to return home. Pt would benefit from continued skilled PT services via TCU to address deficits and improve IND with safety and functional mobility. Anticipate pt will require higher level of care.  PT Brief overview of current status: Supine>sit w/ mod A x 2; sit>stand w/ Natasha Stedy and mod A to Max A x 2    Recommendation above provided by last treating therapist.

## 2023-05-18 NOTE — PROGRESS NOTES
Care Management Discharge Note    Discharge Date: 05/18/2023       Discharge Disposition: Transitional Care    Discharge Services: Transportation Services    Discharge DME:      Discharge Transportation: agency, family or friend will provide    Private pay costs discussed: transportation costs    Does the patient's insurance plan have a 3 day qualifying hospital stay waiver?  Yes   Will the waiver be used for post-acute placement? No    PAS Confirmation Code: [id number]  Patient/family educated on Medicare website which has current facility and service quality ratings: yes    Education Provided on the Discharge Plan:    Persons Notified of Discharge Plans: MD, GREGORIO, Charge RN, bedside RN, family   Patient/Family in Agreement with the Plan: yes    Handoff Referral Completed: Yes    Additional Information:  Received message from Sheba at [facility] (previously named [facility]) she is starting insurance authorization. Call to [place] [facility] to check on status of referral, did not get through to anyone. Call to Daughter to update on acceptance at [facility] and the need to move forward since no other acceptances, daughter agreeable. Discussed transport, daughter agreeable to stretcher transport and potential cost associated if not covered by insurance. AWA received call from Sheba at [facility], auth was approved. Sent discharge orders to U. Completed PAS, faxed and on chart. Scheduled [facility] stretcher ride for [phone]. Call to daughter and Sheba to update on transport time. Completed PCS, faxed and on chart.     PAS-RR    D: Per DHS regulation, AWA completed and submitted PAS-RR to [place] [employer] Direct Connect via the [employer].  PAS-RR confirmation # is : [id number]    I: AWA spoke with family and they are aware a PAS-RR has been submitted.  AWA reviewed with family that they may be contacted for a follow up appointment within 10 days of hospital discharge if their SNF stay is  < 30 days.  Contact information for [employer] was also provided.    A: Family verbalized understanding.    P: Further questions may be directed to Senior [employer] Line at #[phone], option #4 for [facility]- staff.      Nitza Colby, EDWIN  Social Work  [facility]

## 2023-05-18 NOTE — PROGRESS NOTES
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  Strong suspicion this patient may actually have Lewy Body Dementia  5/14:  Patient has been agitated since admission and concerns for delirium.  Continue to make medication adjustments  * CT Head: No acute findings   - Seroquel switched to BID PRN due to increased somnolence which is likely contributing to dysphagia noted by speech therapy  - MRI brain has been obtained  5/16, pending results  - PT/OT/Speech consulted.    Current recommendation is TCU  - Care management to help with disposition planning   - Neurology consulted and appreciate their recommendations  -Plan is to discharge to TCU today if possible.  Elevated CRP,   right thumb Erythema-MCP joint  Worsening encephalopathy  --Patient has CRP that was trending down till yesterday, repeat CRP ordered and it had gone up to 195, gout is a differential, family denied any history of gout, uric acid level was ordered which is 6.6 this was from the sample they have from the day of admission.  --Will obtain x-ray of the thumb, discussed with orthopedic surgery, the differential also includes septic arthritis.  -Orthopedic surgery notes reviewed, discussed with them, there is no concern of infection, possibly gout versus pseudogout, will start on short course of prednisone 20 mg daily.  PPI added on  discharge, his symptoms are improved significantly.  -Is close to baseline as per family.     H/o Tachy-alix syndrome and atrial fibrillation s/p Watchmans   S/p PPM   H/o ASCVD/ CMO  Mild troponin elevation, likely chronically elevated   HTN/HLP   * Echo:  LVH. Normal systolic LVF. EF 55-60. RV appears normal.  The left ventricle is normal in structure, function and size.  The visual ejection fraction is 60-65%.  There is mild biatrial enlargement.  There is mild (1+) tricuspid regurgitation.  - Continue coreg  - PRN Hydralazine  - Crestor 10 mg       DVT Prophylaxis: SCDs  Code Status: Full Code     40 MINUTES SPENT BY ME on the date of service doing chart review, history, exam, documentation & further activities per the note.  Time spent on discussing discharge and ongoing issues.  Disposition: Expected discharge to TCU, if none available by 5/18 will discharge to [facility].  Clinically Significant Risk Factors              # Hypoalbuminemia: Lowest albumin = 3.4 g/dL at 5/11/2023  9:01 PM, will monitor as appropriate     # Hypertension: Noted on problem list     # Dementia: noted on problem list             Lynn Cristobal MD  Text Page   (7am to 6pm)    Interval History   And is resting, he is done better edema has improved significantly, family near bedside, we briefly discussed about discharge planning.    -Data reviewed today: I reviewed all new labs and imaging results over the last 24 hours.     Physical Exam     Vital Signs with Ranges  Temp:  [97.6  F (36.4  C)-98.4  F (36.9  C)] 97.9  F (36.6  C)  Pulse:  [59-73] 60  Resp:  [18-20] 18  BP: (130-178)/(59-87) 156/63  SpO2:  [96 %-97 %] 96 %  I/O last 3 completed shifts:  In: 120 [P.O.:120]  Out: -     Constitutional: Awake, alert, cooperative, no apparent distress  Respiratory: Clear to auscultation bilaterally, no crackles or wheezing  Cardiovascular: Regular rate and rhythm, normal S1 and S2, and no murmur noted  GI: Normal bowel sounds, soft,  non-distended, non-tender  Skin/Integumen: Right MCP joint erythema noted, tenderness noted  Neuro : moving all 4 extremities, ongoing occasional confusion noted, appears to be baseline as per family    Medications     lactated ringers 75 mL/hr at 05/18/23 0208       carvedilol  3.125 mg Oral BID w/meals     diclofenac  2 g Topical 4x Daily     lidocaine  1 patch Transdermal Q24h    And     lidocaine   Transdermal Q8H NADEEM     nystatin  500,000 Units Swish & Spit 4x Daily     predniSONE  20 mg Oral Daily     rosuvastatin  10 mg Oral QAM     tamsulosin  0.4 mg Oral QPM       Data   Recent Labs   Lab 05/18/23  0623 05/17/23  0635 05/14/23  1506 05/13/23  0650 05/12/23  0657 05/11/23  2101   WBC  --  9.8 9.8  --  13.0* 12.0*   HGB  --  11.2* 11.5*  --  11.7* 10.6*   MCV  --  98 97  --  97 99   PLT  --  227 218  --  211 190   NA  --  141  --   --  138 135*   POTASSIUM  --  3.4  --  3.6 3.2* 3.9   CHLORIDE  --  107  --   --  104 102   CO2  --  20*  --   --  23 23   BUN  --  21.7  --   --  17.6 18.4   CR  --  1.26*  --   --  1.50* 1.44*   ANIONGAP  --  14  --   --  11 10   JANAY  --  9.2  --   --  9.4 8.9   GLC 99 104*  --   --  123* 124*   ALBUMIN  --   --   --   --   --  3.4*   PROTTOTAL  --   --   --   --   --  8.2   BILITOTAL  --   --   --   --   --  0.4   ALKPHOS  --   --   --   --   --  96   ALT  --   --   --   --   --  21   AST  --   --   --   --   --  37     Recent Labs   Lab 05/18/23  0623 05/17/23  0635 05/12/23  0657 05/11/23  2101   GLC 99 104* 123* 124*       Imaging:   No results found for this or any previous visit (from the past 24 hour(s)).

## 2023-05-18 NOTE — PLAN OF CARE
Goal Outcome Evaluation:    Orientation/Cognitive: A&O to self  Observation Goals (Met/ Not Met): INP  Mobility Level/Assist Equipment: Lift T/R  Fall Risk (Y/N):Y  Behavior Concerns: Pt can become agitated w/ cares  Pain Management: Pain managed w/ Voltaren gel and lidocaine patch   Tele/VS/O2: VSS   ABNL Lab/BG:Creat:1.26 Hgb:11.2  Diet: Pureed, mildly thick, will need help w/ feeding   Bowel/Bladder: Incontinent  Skin Concerns:Redness to R thumb, Redness to buttock mepilex in place  Drains/Devices:PIV infusing LR@75ml/hr  Tests/Procedures for next shift:NA  Anticipated DC date & active delays: TBD Pending TCU placement   Patient Stated Goal for Today: Unable to state goal  Other: Pt takes pills crushed w/ applesauce

## 2023-05-19 NOTE — LETTER
[facility]   To:   [facility]           Please give to facility    From:   Mae Suero RN  Care Coordinator   [facility]   P: [phone]  [email or web address]   Patient Name:  Abbe Lambert YOB: 1934   Admit date: 5/18/2023      *Information Needed:  Please contact me when the patient will discharge (or if they will move to long term care)- include the discharge date, disposition, and main diagnosis   - If the patient is discharged with home care services, please provide the name of the agency    Also- Please inform me if a care conference is being held.   Phone, Fax or Email with information      Mae Suero RN, BSN, PHN  Primary Care / Care Coordinator   [facility]  E-mail [email or web address]   [phone]                Thank you

## 2023-05-19 NOTE — PROGRESS NOTES
Clinic Care Coordination Contact  Care Coordination Transition Communication    Referral Source: IP Handoff    Clinical Data: Patient was hospitalized at [facility] from 5/11/2023 to 5/18/2023 with diagnosis of  Fall, altered mental status, right thumb MCP joint pain, AAA, A. Fib, CKD, cognitive and physical decline.     Transition to Facility:              Facility Name: [facility]                phone number/fax: [phone]    Plan: RN/SW Care Coordinator will await notification from facility staff informing RN/SW Care Coordinator of patient's discharge plans/needs. RN/SW Care Coordinator will review chart and outreach to facility staff every 4 weeks and as needed.      Mae Suero RN, BSN, PHN  Primary Care / Care Coordinator   [facility]  E-mail [email or web address]   [phone]

## 2023-05-20 NOTE — DISCHARGE SUMMARY
[facility]    Discharge Summary  Hospitalist    Date of Admission:  5/11/2023  Date of Discharge:  5/18/2023  3:41 PM  Discharging Provider: Lynn Cristobal MD    Discharge Diagnoses   Fall initial encounter  Delirium    History of Present Illness   Please review admission history and physical.    Hospital Course   Abbe Lambert was admitted on 5/11/2023.  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  Strong suspicion this patient may actually have Lewy Body Dementia  5/14:  Patient has been agitated since admission and concerns for delirium.  Continue to make medication adjustments  * CT Head: No acute findings   - Seroquel switched to BID PRN due to increased somnolence which is likely contributing to dysphagia noted by speech therapy  - MRI brain has been obtained  5/16, results were reviewed by the neurologist.  - PT/OT/Speech consulted.    Current recommendation is TCU, patient was later discharged to TCU.  Patient was close to his baseline mentally at the time of discharge, he will follow-up with neurology outpatient.  Elevated CRP,   right thumb Erythema-MCP joint  Worsening encephalopathy  --Patient has CRP that was trending  down for a few days of his stay, repeat CRP ordered and it had gone up to 195, gout is a differential, family denied any history of gout, uric acid level was ordered which is 6.6 this was from the sample they have from the day of admission.  Obtained  x-ray of the thumb, discussed with orthopedic surgery, the differential also includes septic arthritis.Orthopedic surgery notes reviewed, discussed with them, there is no concern of infection, possibly gout versus pseudogout, started on  on short course of prednisone 20 mg daily x 5 days.  PPI added on discharge, his symptoms are improved significantly.       H/o Tachy-alix syndrome and atrial fibrillation s/p Watchmans   S/p PPM   H/o ASCVD/ CMO  Mild troponin elevation, likely chronically elevated   HTN/HLP   * Echo:  LVH. Normal systolic LVF. EF 55-60. RV appears normal.  The left ventricle is normal in structure, function and size.  The visual ejection fraction is 60-65%.  There is mild biatrial enlargement.  There is mild (1+) tricuspid regurgitation.  - Continue coreg  - PRN Hydralazine  - Crestor 10 mg         Lynn Cristobal MD    Significant Results and Procedures       Pending Results     Unresulted Labs Ordered in the Past 30 Days of this Admission     No orders found from 4/11/2023 to 5/12/2023.          Code Status   Full Code       Primary Care Physician   Ajit Butterfield    Physical Exam                      Vitals:    05/12/23 0650   Weight: 78.7 kg (173 lb 9.6 oz)     Vital Signs with Ranges     I/O last 3 completed shifts:  In: 120 [P.O.:120]  Out: -     The patient was examined on the day of discharge.    Discharge Disposition   Discharged to nursing home  Condition at discharge: Stable    Consultations This Hospital Stay   PHYSICAL THERAPY ADULT IP CONSULT  NEUROLOGY IP CONSULT  CARE MANAGEMENT / SOCIAL WORK IP CONSULT  OCCUPATIONAL THERAPY ADULT IP CONSULT  SPIRITUAL HEALTH SERVICES IP CONSULT  SPEECH LANGUAGE PATH ADULT IP CONSULT  ORTHOPEDIC  SURGERY IP CONSULT  ORTHOSIS EXTREMITY UPPER REFERRAL IP CONSULT  ORTHOSIS BRACE IP CONSULT  PHYSICAL THERAPY ADULT IP CONSULT  OCCUPATIONAL THERAPY ADULT IP CONSULT    Time Spent on this Encounter   I, Lynn Cristobal MD, personally saw the patient today and spent greater than 30 minutes discharging this patient.    Discharge Orders      Primary Care - Care Coordination Referral      General info for SNF    Length of Stay Estimate: Short Term Care: Estimated # of Days <30  Condition at Discharge: Improving  Level of care:skilled   Rehabilitation Potential: Excellent  Admission H&P remains valid and up-to-date: Yes  Recent Chemotherapy: N/A  Use Nursing Home Standing Orders: Yes     Mantoux instructions    Give two-step Mantoux (PPD) Per Facility Policy Yes     Follow Up and recommended labs and tests    Follow up with [facility] physician.  The following labs/tests are recommended: cbc and basic metabolic panel in 1 week.follow up with neurology in 6-8 weeks.     Reason for your hospital stay    Worsening encephalopathy     Activity - Up with nursing assistance     Physical Therapy Adult Consult    Evaluate and treat as clinically indicated.    Reason:  deconditioning     Occupational Therapy Adult Consult    Evaluate and treat as clinically indicated.    Reason: deconditioning     Fall precautions     Diet    Follow this diet upon discharge: Orders Placed This Encounter      Room Service      Snacks/Supplements Adult: Magic Cup; Between Meals      Combination Diet Pureed Diet (level 4); Mildly Thick (level 2) (by spoon)     Discharge Medications   Discharge Medication List as of 5/18/2023  1:34 PM      START taking these medications    Details   diclofenac (VOLTAREN) 1 % topical gel Apply 2 g topically 4 times daily for 7 days, Transitional      Lidocaine (LIDOCARE) 4 % Patch Place 1 patch onto the skin every 24 hours To prevent lidocaine toxicity, patient should be patch free for 12 hrs daily.Transitional       nystatin (MYCOSTATIN) 299686 UNIT/ML suspension Swish and spit 5 mLs (500,000 Units) in mouth 4 times daily for 5 daysTransitional      pantoprazole (PROTONIX) 40 MG EC tablet Take 1 tablet (40 mg) by mouth daily for 5 days, Transitional      predniSONE (DELTASONE) 20 MG tablet Take 1 tablet (20 mg) by mouth daily for 4 days, Transitional      QUEtiapine (SEROQUEL) 25 MG tablet Take 0.5 tablets (12.5 mg) by mouth 2 times daily as needed, Transitional      tamsulosin (FLOMAX) 0.4 MG capsule Take 1 capsule (0.4 mg) by mouth every evening, Transitional         CONTINUE these medications which have NOT CHANGED    Details   acetaminophen (TYLENOL) 325 MG tablet Take 325-650 mg by mouth every 6 hours as needed for mild pain, Historical      carvedilol (COREG) 3.125 MG tablet Take 1 tablet (3.125 mg) by mouth 2 times daily (with meals), Disp-180 tablet, R-3, E-Prescribe      nitroglycerin (NITROSTAT) 0.4 MG SL tablet Place 1 tablet (0.4 mg) under the tongue every 5 minutes as needed for chest pain, Disp-25 tablet, R-3, E-Prescribe      rosuvastatin (CRESTOR) 10 MG tablet Take 1 tablet (10 mg) by mouth every morning, Disp-90 tablet, R-3, E-Prescribe           Allergies   Allergies   Allergen Reactions     Fluticasone-Salmeterol      DENIED     Morphine Hcl      Decrease  Blood Pressure Lower Than Normal, Per Pt.     Vicodin [Hydrocodone-Acetaminophen] Visual Disturbance     Data   Most Recent 3 CBC's:Recent Labs   Lab Test 05/17/23  0635 05/14/23  1506 05/12/23  0657   WBC 9.8 9.8 13.0*   HGB 11.2* 11.5* 11.7*   MCV 98 97 97    218 211      Most Recent 3 BMP's:  Recent Labs   Lab Test 05/18/23  0623 05/17/23  0635 05/13/23  0650 05/12/23  0657 05/11/23  2101   NA  --  141  --  138 135*   POTASSIUM  --  3.4 3.6 3.2* 3.9   CHLORIDE  --  107  --  104 102   CO2  --  20*  --  23 23   BUN  --  21.7  --  17.6 18.4   CR  --  1.26*  --  1.50* 1.44*   ANIONGAP  --  14  --  11 10   JANAY  --  9.2  --  9.4 8.9   GLC 99 104*  --   123* 124*     Most Recent 2 LFT's:  Recent Labs   Lab Test 05/11/23  2101 12/10/22  1559   AST 37 20   ALT 21 27   ALKPHOS 96 87   BILITOTAL 0.4 0.3     Most Recent INR's and Anticoagulation Dosing History:  Anticoagulation Dose History         Latest Ref Rng & Units 4/21/2009 12/3/2012 12/31/2012 8/23/2013   Recent Dosing and Labs   INR 0.86 - 1.14 0.87   0.86   1.00   0.92         12/10/2013 4/24/2014 4/25/2014   Recent Dosing and Labs   INR 1.00   1.0   0.94                Most Recent 3 Troponin's:  Recent Labs   Lab Test 03/17/21  0917 12/19/16  1441 11/22/16  0440   TROPI <0.015 <0.015  The 99th percentile for upper reference range is 0.045 ug/L.  Troponin values in   the range of 0.045 - 0.120 ug/L may be associated with risks of adverse   clinical events.   <0.015  The 99th percentile for upper reference range is 0.045 ug/L.  Troponin values in   the range of 0.045 - 0.120 ug/L may be associated with risks of adverse   clinical events.       Most Recent Cholesterol Panel:  Recent Labs   Lab Test 04/04/23  1010   CHOL 130   LDL 80   HDL 30*   TRIG 100     Most Recent 6 Bacteria Isolates From Any Culture (See EPIC Reports for Culture Details):No lab results found.  Most Recent TSH, T4 and A1c Labs:  Recent Labs   Lab Test 05/12/23  1209 03/16/22  1617   TSH 3.45  --    A1C  --  5.9*     Results for orders placed or performed during the hospital encounter of 05/11/23   CT Head w/o Contrast    Narrative    EXAM: CT HEAD W/O CONTRAST  LOCATION: [facility]  DATE/TIME: 5/11/2023 10:41 PM CDT    INDICATION: altered mental status  COMPARISON: None.  TECHNIQUE: Routine CT Head without IV contrast. Multiplanar reformats. Dose reduction techniques were used.    FINDINGS:  INTRACRANIAL CONTENTS: No intracranial hemorrhage, extraaxial collection, or mass effect.  No CT evidence of acute infarct. Mild presumed chronic small vessel ischemic changes. Moderate generalized volume loss. No  hydrocephalus.     VISUALIZED ORBITS/SINUSES/MASTOIDS: No intraorbital abnormality. Prior sinus surgery. No paranasal sinus mucosal disease. No middle ear or mastoid effusion.    BONES/SOFT TISSUES: No acute abnormality.      Impression    IMPRESSION:  1.  No CT evidence for acute intracranial process.  2.  Brain atrophy and presumed chronic microvascular ischemic changes as above.   XR Chest 2 Views    Narrative    EXAM: XR CHEST 2 VIEWS  LOCATION: [facility]  DATE/TIME: 5/11/2023 9:59 PM CDT    INDICATION: cough  COMPARISON: 2 view chest radiograph 03/31/2021      Impression    IMPRESSION: Prior median sternotomy. Stable position of the dual-lead left chest pacemaker. Mild left basilar atelectasis. Otherwise no focal pulmonary consolidation or effusion. No pneumothorax. Cholecystectomy clips. Degenerative changes of the   thoracic spine. No acute osseous abnormality.   XR Shoulder Right G/E 3 Views    Narrative    EXAM: XR SHOULDER RIGHT G/E 3 VIEWS  LOCATION: [facility]  DATE/TIME: 5/12/2023 12:14 AM CDT    INDICATION: fall, pain  COMPARISON: None.      Impression    IMPRESSION: Normal joint spaces and alignment. No fracture.   MR Brain w/o Contrast    Narrative    MRI BRAIN WITHOUT CONTRAST  5/16/2023 3:02 PM    HISTORY:  Worsening confusion and decreased mobility    TECHNIQUE:  Multiplanar, multisequence MRI of the brain without  gadolinium IV contrast material.      COMPARISON:  MRI brain 16 January 2006.    FINDINGS: There is no evidence of acute infarct, hemorrhage, mass, or  herniation. Moderate diffuse parenchymal volume loss. Moderate patchy  deep and subcortical white matter T2 hyperintensities which are  nonspecific, but likely related to chronic microvascular ischemic  disease. Ventricular size within normal limits without hydrocephalus.  Small, chronic right cerebellar lacunar infarct.    The facial structures appear normal. The arteries at the base  of the  brain and the dural venous sinuses appear patent.      Impression    IMPRESSION:     1. No evidence of acute infarct, mass, hemorrhage, or herniation.  2. Moderate diffuse parenchymal volume loss and white matter changes  likely due to chronic microvascular ischemic disease. Very small,  chronic right cerebellar lacunar type infarct.       CARSON SCOTT MD         SYSTEM ID:  [id number]   XR Finger Right G/E 2 Views    Narrative    XR FINGER RIGHT G/E 2 VIEWS 2023 12:32 PM     HISTORY: swollen thump    COMPARISON: None.         Impression    IMPRESSION: The right hand is negative for fracture or dislocation.  There is degenerative change at the first MCP joint.    AMALIA MILLER MD         SYSTEM ID:  [id number]   Echocardiogram Complete     Value    LVEF  60-65%    Narrative    [id number]  [facility]  [id number]  [id number]     [facility]  Echocardiography Laboratory  [address] [place] [address]     Name: DARBY SHEETS  MRN: [id number]  : 1934  Study Date: 2023 09:59 AM  Age: 89 yrs  Gender: Male  Patient Location: [facility]  Reason For Study: MI  Ordering Physician: SHARYN BOOTHE  Referring Physician: Ajit Butterfield  Performed By: Daniel Marques     BSA: 2.0 m2  Height: 70 in  Weight: 173 lb  HR: 60  BP: 134/63 mmHg  ______________________________________________________________________________  Procedure  Complete Portable Echo Adult. Optison (NDC #2043-6531) given intravenously.  ______________________________________________________________________________  Interpretation Summary     TCD. Pt agitated and confused.  LVH. Normal systolic LVF. EF 55-60. RV appears normal.  The left ventricle is normal in structure, function and size.  The visual ejection fraction is 60-65%.  There is mild biatrial enlargement.  There is mild (1+) tricuspid  regurgitation.  ______________________________________________________________________________  Left Ventricle  The left ventricle is normal in structure, function and size. There is normal  left ventricular wall thickness. The visual ejection fraction is 60-65%. No  regional wall motion abnormalities noted.     Right Ventricle  There is a catheter/pacemaker lead seen in the right ventricle. Right  ventricular function cannot be assessed due to poor image quality.     Atria  There is mild biatrial enlargement.     Mitral Valve  The mitral valve leaflets appear thickened, but open well.     Tricuspid Valve  The tricuspid valve is not well visualized, but is grossly normal. There is  mild (1+) tricuspid regurgitation.     Aortic Valve  There is mild trileaflet aortic sclerosis. No aortic stenosis is present.     Pulmonic Valve  The pulmonic valve is not well visualized. There is trace pulmonic valvular  regurgitation.     Vessels  Borderline aortic root dilatation. The ascending aorta is Borderline dilated.     Pericardium  There is no pericardial effusion.     Rhythm  The rhythm was paced.  ______________________________________________________________________________  MMode/2D Measurements & Calculations  IVSd: 1.1 cm     LVIDd: 5.0 cm  LVIDs: 3.3 cm  LVPWd: 1.1 cm  FS: 33.0 %  LV mass(C)d: 216.4 grams  LV mass(C)dI: 110.2 grams/m2  Ao root diam: 3.6 cm  LA dimension: 3.4 cm  asc Aorta Diam: 3.7 cm  LA/Ao: 0.92  LVOT diam: 2.1 cm  LVOT area: 3.6 cm2  LA Volume (BP): 60.4 ml  LA Volume Index (BP): 30.8 ml/m2  RWT: 0.45  TAPSE: 1.2 cm     Doppler Measurements & Calculations  MV E max bryce: 60.8 cm/sec  MV A max bryce: 87.0 cm/sec  MV E/A: 0.70  MV dec slope: 320.0 cm/sec2  MV dec time: 0.19 sec  LV V1 max PG: 3.9 mmHg  LV V1 max: 99.0 cm/sec  LV V1 VTI: 23.2 cm  SV(LVOT): 82.9 ml  SI(LVOT): 42.2 ml/m2  PA acc time: 0.11 sec  TR max bryce: 236.2 cm/sec  TR max P.3 mmHg  E/E' av.0  Lateral E/e': 6.7  Medial E/e':  11.4  RV S Hola: 8.2 cm/sec     ______________________________________________________________________________  Report approved by: Victorino Beal 05/13/2023 12:33 PM

## 2023-05-23 NOTE — PROGRESS NOTES
Addendum:      [facility] DME Order/Prescription    Date: May 23, 2023  Patient: Abbe Lambert, 2/3/1934    DME ordered: Hospital bed and wheelchair     Needing above DME for LIFETIME of need for the following medical necessity:  -Dementia   -Acute on chronic alteration in mental status  -Alleviate back pain   -Falls     Hospital bed: Patient requires hospital bed for positioning of the body in ways not feasible with an ordinary bed. Positioning of the body in ways not feasible with an ordinary bed due to a medical condition is expected to last LIFETIME. Frequent changes in body position are needed to alleviate back pain. Also, hospital bed needed for safety purposes.     Wheelchair: This is a face-to-face visit note establish medical necessity for a manual WC.  This patient was seen on 5/23/2023 for mobility equipment recommendations. Patient has mobility limitations that significantly impair his ability to participate in mobility-related activities of daily living (MRADL's) due to dementia and frequent falls. Mobility limitations cannot be sufficiently and safely resolved by use of cane, walker, or crutches due to poor balance, weakness, history of falling. Patient and caregiver can safely use the manual wheelchair.8 Patient's functional mobility deficit can be sufficiently resolved by use of a manual wheelchair. Patient's home provides adequate access between rooms, maneuvering space, and surfaces for use of the manual wheelchair. Patient has not expressed an unwillingness to use the manual wheelchair that would be provided    ELECTRONICALLY SIGNED BY URI CERTIFIED PROVIDER:  Blas Ballesteros,SHARRI,APRN,AGNP-BC.    NPI:[id number]  [facility]  [address] [place] [address], [address]  [address] GERIATRICS    PRIMARY CARE PROVIDER AND CLINIC:  Ajit Butterfield MD, [address] [place] [id number]  Chief Complaint   Patient presents with     Hospital F/U       [place] Medical Record Number:  [id number]  Place of Service where encounter took place:  [facility] (TCU) [[id number]]    Abbe Lambert  is a 89 year old  (2/3/1934), admitted to the above facility from  [facility]. Hospital stay 5/11/23 through 5/18/23..     HPI:    PMHx includes cognitive impairment, AAA, cardiomyopathy, CAD, A-fib and CKD.  Presented to the hospital for evaluations of several day history of increasing confusion, gait instability, tremor, and visual hallucinations. Was found to have low-grade fever on admission resolved on discharge.     CT head unremarkable for acute changes.  Infectious work-up unremarkable.  Patient was started on Seroquel for agitation which was reduced secondary to increased somnolence and dysphagia.  He was discharged on PRN Seroquel.     Patient was noted to have right thumb redness and pain, elevated CRP, felt secondary to gout versus pseudogout. He was treated with a 5-day course of prednisone with improvement.     Other medical issues addressed included history of tachycardia-bradycardia syndrome and atrial fibrillation s/p Watchman procedure and pacemaker placement, coronary artery disease, and hypertension.  Echocardiogram revealed normal LV systolic function.    [facility].    He is being seen today for initial TCU visit and face to face DME orders.  History very limited.  States he is fine.  Course reviewed with nursing staff.  Ambulating short distances with therapy.  Vitals acceptable.  No reports of chest pain or shortness of breath. Tolerating pureed diet. No reports of occultly swallowing.  Breathing fine.    CODE STATUS/ADVANCE DIRECTIVES DISCUSSION:  Prior  CPR/Full code   ALLERGIES:   Allergies   Allergen Reactions     Fluticasone-Salmeterol      DENIED     Morphine Hcl      Decrease  Blood Pressure Lower Than Normal, Per Pt.     Vicodin  [Hydrocodone-Acetaminophen] Visual Disturbance      PAST MEDICAL HISTORY:   Past Medical History:   Diagnosis Date     AAA (abdominal aortic aneurysm) (H)      Anemia due to blood loss, acute 10/10/2016     Asthma      Atrial fibrillation (H)     s/p left atrial appendage ligation     Cardiac arrest (H)      Cardiomyopathy (H)      Chronic kidney disease      Chronic sinusitis      Coronary artery disease     cardiac cath 2014: medical management; cath 2013: PTCA to diagonal; cath 2009: medical management; CABG 1998: LIMA to LAD, LISA to RCA, SVG to circumflex     GERD (gastroesophageal reflux disease)      History of angina      Hyperlipidaemia      Hypertension, goal below 140/90      Myocardial infarction (H)     MI with Vfib arrest 1998     Polymyalgia rheumatica (H)      Shingles 10/28/2016     Shortness of breath      Tachy-alix syndrome (H)     generator replacement 9/2020; implanted dual chamber pacemaker 2012      PAST SURGICAL HISTORY:   has a past surgical history that includes Prostate surgery; Cholecystectomy; Extracapsular cataract extration with intraocular lens implant; Cardiac surgery (12/03/2012); Implant pacemaker (12-3-12); Abdomen surgery; ENT surgery (5-); colonoscopy; Arthroplasty knee (Left, 10/5/2016); Esophagoscopy, gastroscopy, duodenoscopy (EGD), combined (N/A, 12/3/2019); Esophagoscopy, gastroscopy, duodenoscopy (EGD), dilatation, combined (N/A, 12/3/2019); and Electrophysiology Pacemaker (N/A, 9/11/2020).  FAMILY HISTORY: family history includes Cerebrovascular Disease in his father; Coronary Artery Disease in his brother; Depression in his daughter; Heart Disease in his brother and father; Unknown/Adopted in his maternal grandfather, maternal grandmother, paternal grandfather, and paternal grandmother.  SOCIAL HISTORY:   reports that he has never smoked. He has never been exposed to tobacco smoke. He has never used smokeless tobacco. He reports that he does not drink alcohol  "and does not use drugs.     Post Discharge Medication Reconciliation Status:   MED REC REQUIRED  Post Medication Reconciliation Status:  Discharge medications reconciled, continue medications without change    Current Outpatient Medications   Medication Sig     acetaminophen (TYLENOL) 325 MG tablet Take 325-650 mg by mouth every 6 hours as needed for mild pain     carvedilol (COREG) 3.125 MG tablet Take 1 tablet (3.125 mg) by mouth 2 times daily (with meals)     diclofenac (VOLTAREN) 1 % topical gel Apply 2 g topically 4 times daily for 7 days     Lidocaine (LIDOCARE) 4 % Patch Place 1 patch onto the skin every 24 hours To prevent lidocaine toxicity, patient should be patch free for 12 hrs daily.     nitroglycerin (NITROSTAT) 0.4 MG SL tablet Place 1 tablet (0.4 mg) under the tongue every 5 minutes as needed for chest pain     nystatin (MYCOSTATIN) 250757 UNIT/ML suspension Swish and spit 5 mLs (500,000 Units) in mouth 4 times daily for 5 days     pantoprazole (PROTONIX) 40 MG EC tablet Take 1 tablet (40 mg) by mouth daily for 5 days     QUEtiapine (SEROQUEL) 25 MG tablet Take 0.5 tablets (12.5 mg) by mouth 2 times daily as needed     rosuvastatin (CRESTOR) 10 MG tablet Take 1 tablet (10 mg) by mouth every morning     tamsulosin (FLOMAX) 0.4 MG capsule Take 1 capsule (0.4 mg) by mouth every evening     No current facility-administered medications for this visit.       ROS:  Limited secondary to cognitive impairment but today pt reports fine.     Vitals:  /52   Pulse 63   Temp 97  F (36.1  C)   Resp 17   Ht 1.778 m (5' 10\")   SpO2 98%   BMI 24.91 kg/m      Exam:  GENERAL APPEARANCE: In no acute distress, up in wheelchair  HEENT-EARS: No discharge/drainage, [place]  HEENT-NECK: No lymphadenopathy  HEENT-EYES: PERRLA positive, no drainage/discharge  HEENT-NOSE/MOUTH/THROAT: No nasal drainage or erythema, mucous membranes moist   RESPIRATORY: Clear to auscultation, even and unlabored, symmetrical chest wall " expansion  CARDIOVASCULAR: RRR, no peripheral edema, S1/S2 normal   GASTROINTESTINAL: Soft, nontender, nondistended, bowel sounds active  MUSCULOSKELETAL: Gait not assessed   INTEGUMENTARY: Upper arms bruising, no signs of infection   NEUROLOGICAL: Alert to self, memory loss, confusion   PSYCHOLOGICAL: Calm     Lab/Diagnostic data:  Recent labs in [facility] reviewed by me today.       ASSESSMENT/PLAN:  Suspected Lewy body dementia  Acute on chronic AMS  Delirium  Agitation  -Family noted shuffled gait, pill-rolling tremor, visual hallucinations and sleep disturbances in addition to cognitive decline, strong suspicion for Lewy body dementia, discharged with as needed Seroquel for agitation, head CT was negative in the hospital  -Continue as needed Seroquel, continue therapies, outpatient neurology and PCP follow-up, fall precautions    Dysphagia  -On puréed diet, no reports of dysphagia since TCU admission  -SLP follow-up and modify diet as tolerated, monitor for difficulty swallowing    Elevated CRP  Right thumb erythema-MCP joint  -Uric acid 6.6, possibly gout/pseudogout, s/p prednisone 20 mg daily x5 days with subsequent improvement, no signs of pain  -Monitor for flare up, consider maintenance therapy    Hx of tachycardia bradycardia syndrome and A-fib s/p watchman's  CAD with cardiomyopathy  S/p PPM  Mild troponin elevation  Hypertension  Hyperlipidemia  -Echo WDL, VSS since TCU admission, on no aspirin  -Continue statin and carvedilol, outpatient cardiology follow-up, monitor vitals, monitor for changes in condition    CKD  -Follow BMP    BPH  -Continue PTA Flomax, monitor for retention           Electronically signed by:  Blas Ballesteros,SHARRI,APRN,AGNP-BC.               The above note was completed in part using Dragon voice recognition software. Although reviewed after completion, some word and grammatical errors may occur. Please contact the author of this note with any questions.

## 2023-05-23 NOTE — LETTER
5/23/2023        RE: Abbe Lambert  [address]  [facility][1] [id number]        [facility]    PRIMARY CARE PROVIDER AND CLINIC:  Ajit Butterfield MD, [address] [place] [address] / [facility] [address]  Chief Complaint   Patient presents with     Hospital F/U      [place] Medical Record Number:  [id number]  Place of Service where encounter took place:  [facility] (TCU) [[id number]]    Abbe Lambert  is a 89 year old  (2/3/1934), admitted to the above facility from  [facility]. Hospital stay 5/11/23 through 5/18/23..     HPI:    PMHx includes cognitive impairment, AAA, cardiomyopathy, CAD, A-fib and CKD.  Presented to the hospital for evaluations of several day history of increasing confusion, gait instability, tremor, and visual hallucinations. Was found to have low-grade fever on admission resolved on discharge.     CT head unremarkable for acute changes.  Infectious work-up unremarkable.  Patient was started on Seroquel for agitation which was reduced secondary to increased somnolence and dysphagia.  He was discharged on PRN Seroquel.     Patient was noted to have right thumb redness and pain, elevated CRP, felt secondary to gout versus pseudogout. He was treated with a 5-day course of prednisone with improvement.     Other medical issues addressed included history of tachycardia-bradycardia syndrome and atrial fibrillation s/p Watchman procedure and pacemaker placement, coronary artery disease, and hypertension.  Echocardiogram revealed normal LV systolic function.    [facility].    He is being seen today for initial TCU visit.  History very limited.  States he is fine.  Course reviewed with nursing staff.  Ambulating short distances with therapy.  Vitals acceptable.  No reports of chest pain or shortness of breath. Tolerating pureed diet. No reports of occultly swallowing.  Breathing  fine.    CODE STATUS/ADVANCE DIRECTIVES DISCUSSION:  Prior  CPR/Full code   ALLERGIES:   Allergies   Allergen Reactions     Fluticasone-Salmeterol      DENIED     Morphine Hcl      Decrease  Blood Pressure Lower Than Normal, Per Pt.     Vicodin [Hydrocodone-Acetaminophen] Visual Disturbance      PAST MEDICAL HISTORY:   Past Medical History:   Diagnosis Date     AAA (abdominal aortic aneurysm) (H)      Anemia due to blood loss, acute 10/10/2016     Asthma      Atrial fibrillation (H)     s/p left atrial appendage ligation     Cardiac arrest (H)      Cardiomyopathy (H)      Chronic kidney disease      Chronic sinusitis      Coronary artery disease     cardiac cath 2014: medical management; cath 2013: PTCA to diagonal; cath 2009: medical management; CABG 1998: LIMA to LAD, LISA to RCA, SVG to circumflex     GERD (gastroesophageal reflux disease)      History of angina      Hyperlipidaemia      Hypertension, goal below 140/90      Myocardial infarction (H)     MI with Vfib arrest 1998     Polymyalgia rheumatica (H)      Shingles 10/28/2016     Shortness of breath      Tachy-alix syndrome (H)     generator replacement 9/2020; implanted dual chamber pacemaker 2012      PAST SURGICAL HISTORY:   has a past surgical history that includes Prostate surgery; Cholecystectomy; Extracapsular cataract extration with intraocular lens implant; Cardiac surgery (12/03/2012); Implant pacemaker (12-3-12); Abdomen surgery; ENT surgery (5-); colonoscopy; Arthroplasty knee (Left, 10/5/2016); Esophagoscopy, gastroscopy, duodenoscopy (EGD), combined (N/A, 12/3/2019); Esophagoscopy, gastroscopy, duodenoscopy (EGD), dilatation, combined (N/A, 12/3/2019); and Electrophysiology Pacemaker (N/A, 9/11/2020).  FAMILY HISTORY: family history includes Cerebrovascular Disease in his father; Coronary Artery Disease in his brother; Depression in his daughter; Heart Disease in his brother and father; Unknown/Adopted in his maternal grandfather,  "maternal grandmother, paternal grandfather, and paternal grandmother.  SOCIAL HISTORY:   reports that he has never smoked. He has never been exposed to tobacco smoke. He has never used smokeless tobacco. He reports that he does not drink alcohol and does not use drugs.     Post Discharge Medication Reconciliation Status:   MED REC REQUIRED  Post Medication Reconciliation Status:  Discharge medications reconciled, continue medications without change    Current Outpatient Medications   Medication Sig     acetaminophen (TYLENOL) 325 MG tablet Take 325-650 mg by mouth every 6 hours as needed for mild pain     carvedilol (COREG) 3.125 MG tablet Take 1 tablet (3.125 mg) by mouth 2 times daily (with meals)     diclofenac (VOLTAREN) 1 % topical gel Apply 2 g topically 4 times daily for 7 days     Lidocaine (LIDOCARE) 4 % Patch Place 1 patch onto the skin every 24 hours To prevent lidocaine toxicity, patient should be patch free for 12 hrs daily.     nitroglycerin (NITROSTAT) 0.4 MG SL tablet Place 1 tablet (0.4 mg) under the tongue every 5 minutes as needed for chest pain     nystatin (MYCOSTATIN) 678037 UNIT/ML suspension Swish and spit 5 mLs (500,000 Units) in mouth 4 times daily for 5 days     pantoprazole (PROTONIX) 40 MG EC tablet Take 1 tablet (40 mg) by mouth daily for 5 days     QUEtiapine (SEROQUEL) 25 MG tablet Take 0.5 tablets (12.5 mg) by mouth 2 times daily as needed     rosuvastatin (CRESTOR) 10 MG tablet Take 1 tablet (10 mg) by mouth every morning     tamsulosin (FLOMAX) 0.4 MG capsule Take 1 capsule (0.4 mg) by mouth every evening     No current facility-administered medications for this visit.       ROS:  Limited secondary to cognitive impairment but today pt reports fine.     Vitals:  /52   Pulse 63   Temp 97  F (36.1  C)   Resp 17   Ht 1.778 m (5' 10\")   SpO2 98%   BMI 24.91 kg/m      Exam:  GENERAL APPEARANCE: In no acute distress, up in wheelchair  HEENT-EARS: No discharge/drainage, " [place]  HEENT-NECK: No lymphadenopathy  HEENT-EYES: PERRLA positive, no drainage/discharge  HEENT-NOSE/MOUTH/THROAT: No nasal drainage or erythema, mucous membranes moist   RESPIRATORY: Clear to auscultation, even and unlabored, symmetrical chest wall expansion  CARDIOVASCULAR: RRR, no peripheral edema, S1/S2 normal   GASTROINTESTINAL: Soft, nontender, nondistended, bowel sounds active  MUSCULOSKELETAL: Gait not assessed   INTEGUMENTARY: Upper arms bruising, no signs of infection   NEUROLOGICAL: Alert to self, memory loss, confusion   PSYCHOLOGICAL: Calm     Lab/Diagnostic data:  Recent labs in [facility] reviewed by me today.       ASSESSMENT/PLAN:  Suspected Lewy body dementia  Acute on chronic AMS  Delirium  Agitation  -Family noted shuffled gait, pill-rolling tremor, visual hallucinations and sleep disturbances in addition to cognitive decline, strong suspicion for Lewy body dementia, discharged with as needed Seroquel for agitation, head CT was negative in the hospital  -Continue as needed Seroquel, continue therapies, outpatient neurology and PCP follow-up, fall precautions    Dysphagia  -On puréed diet, no reports of dysphagia since TCU admission  -SLP follow-up and modify diet as tolerated, monitor for difficulty swallowing    Elevated CRP  Right thumb erythema-MCP joint  -Uric acid 6.6, possibly gout/pseudogout, s/p prednisone 20 mg daily x5 days with subsequent improvement, no signs of pain  -Monitor for flare up, consider maintenance therapy    Hx of tachycardia bradycardia syndrome and A-fib s/p watchman's  CAD with cardiomyopathy  S/p PPM  Mild troponin elevation  Hypertension  Hyperlipidemia  -Echo WDL, VSS since TCU admission, on no aspirin  -Continue statin and carvedilol, outpatient cardiology follow-up, monitor vitals, monitor for changes in condition    CKD  -Follow BMP    BPH  -Continue PTA Flomax, monitor for retention           Electronically signed by:  Blas Ballesteros,DNP,APRN,AGNP-BC.               The  above note was completed in part using Dragon voice recognition software. Although reviewed after completion, some word and grammatical errors may occur. Please contact the author of this note with any questions.                         Sincerely,        CAROLYN Hernandez CNP

## 2023-05-25 NOTE — LETTER
5/25/2023        RE: Abbe Lambert  [address]  [facility] [address]        [facility] GERIATRICS    PRIMARY CARE PROVIDER AND CLINIC:  Ajit Butterfield MD, [address] [place] [address] / [facility] [address]  Chief Complaint   Patient presents with     Hospital F/U      [place] Medical Record Number:  [id number]  Place of Service where encounter took place:  [facility] (TCU)     Abbe Lambert  is a 89 year old  (2/3/1934), admitted to the above facility from  [facility]. Hospital stay 5/11/23 through 5/18/23..     Hospital course was reviewed by me, is as per the hospital discharge summary and nurse practitioner note.    Patient has a past medical history of cognitive impairment, AAA, cardiomyopathy, coronary artery disease atrial fibrillation, chronic kidney disease.  Who was hospitalized for evaluation of several day history of increasing confusion, gait instability, tremor, visual hallucinations.  Patient was noted to have a low-grade fever on admission.  CT head revealed no acute process.  Infectious work-up unremarkable.  Patient was started on Seroquel for agitation which was reduced secondary to increased somnolence and dysphagia.  He was discharged on as needed Seroquel.    He was noted to have right thumb redness and pain, elevated CRP, felt secondary to gout versus pseudogout, was treated with a 5-day course of prednisone with improvement.    Other medical issues addressed included history of tachybradycardia syndrome and atrial fibrillation status post Watchman procedure and pacemaker placement, coronary artery disease, hypertension.  Echocardiogram revealed normal LV systolic function.    Patient is unable to contribute significantly to history secondary to cognitive impairment.  Staff notes he is walking with a walker.  Oral intake has been fair.  There have been no significant behavioral concerns per staff today      CODE  STATUS/ADVANCE DIRECTIVES DISCUSSION:  Prior  CPR/Full code   ALLERGIES:   Allergies   Allergen Reactions     Fluticasone-Salmeterol      DENIED     Morphine Hcl      Decrease  Blood Pressure Lower Than Normal, Per Pt.     Vicodin [Hydrocodone-Acetaminophen] Visual Disturbance      PAST MEDICAL HISTORY:   Past Medical History:   Diagnosis Date     AAA (abdominal aortic aneurysm) (H)      Anemia due to blood loss, acute 10/10/2016     Asthma      Atrial fibrillation (H)     s/p left atrial appendage ligation     Cardiac arrest (H)      Cardiomyopathy (H)      Chronic kidney disease      Chronic sinusitis      Coronary artery disease     cardiac cath 2014: medical management; cath 2013: PTCA to diagonal; cath 2009: medical management; CABG 1998: LIMA to LAD, LISA to RCA, SVG to circumflex     GERD (gastroesophageal reflux disease)      History of angina      Hyperlipidaemia      Hypertension, goal below 140/90      Myocardial infarction (H)     MI with Vfib arrest 1998     Polymyalgia rheumatica (H)      Shingles 10/28/2016     Shortness of breath      Tachy-alix syndrome (H)     generator replacement 9/2020; implanted dual chamber pacemaker 2012      PAST SURGICAL HISTORY:   has a past surgical history that includes Prostate surgery; Cholecystectomy; Extracapsular cataract extration with intraocular lens implant; Cardiac surgery (12/03/2012); Implant pacemaker (12-3-12); Abdomen surgery; ENT surgery (5-); colonoscopy; Arthroplasty knee (Left, 10/5/2016); Esophagoscopy, gastroscopy, duodenoscopy (EGD), combined (N/A, 12/3/2019); Esophagoscopy, gastroscopy, duodenoscopy (EGD), dilatation, combined (N/A, 12/3/2019); and Electrophysiology Pacemaker (N/A, 9/11/2020).  FAMILY HISTORY: family history includes Cerebrovascular Disease in his father; Coronary Artery Disease in his brother; Depression in his daughter; Heart Disease in his brother and father; Unknown/Adopted in his maternal grandfather, maternal  "grandmother, paternal grandfather, and paternal grandmother.  SOCIAL HISTORY:   reports that he has never smoked. He has never been exposed to tobacco smoke. He has never used smokeless tobacco. He reports that he does not drink alcohol and does not use drugs.  Patient's living condition: lives with spouse    Current medications were reviewed by me today    Current Outpatient Medications   Medication Sig     acetaminophen (TYLENOL) 325 MG tablet Take 325-650 mg by mouth every 6 hours as needed for mild pain     carvedilol (COREG) 3.125 MG tablet Take 1 tablet (3.125 mg) by mouth 2 times daily (with meals)     diclofenac (VOLTAREN) 1 % topical gel Apply 2 g topically 4 times daily for 7 days     Lidocaine (LIDOCARE) 4 % Patch Place 1 patch onto the skin every 24 hours To prevent lidocaine toxicity, patient should be patch free for 12 hrs daily.     nitroglycerin (NITROSTAT) 0.4 MG SL tablet Place 1 tablet (0.4 mg) under the tongue every 5 minutes as needed for chest pain     QUEtiapine (SEROQUEL) 25 MG tablet Take 0.5 tablets (12.5 mg) by mouth 2 times daily as needed     rosuvastatin (CRESTOR) 10 MG tablet Take 1 tablet (10 mg) by mouth every morning     tamsulosin (FLOMAX) 0.4 MG capsule Take 1 capsule (0.4 mg) by mouth every evening     No current facility-administered medications for this visit.       ROS:  Unobtainable secondary to cognitive impairment.     Vitals:  /51   Pulse 60   Temp 97.8  F (36.6  C)   Resp 17   Ht 1.778 m (5' 10\")   Wt 74.9 kg (165 lb 3.2 oz)   SpO2 98%   BMI 23.70 kg/m    Exam:  Thin appearing male, standing in the common area.  He appears comfortable.  HEENT: Pharynx benign.  Erythema moist  No eye redness or drainage.  Lungs clear  CV RRR, no murmur  Abdomen soft, nondistended, nontender  Extremities without edema.  Extensive bruising over upper arms  No pain, swelling, warmth right hand  Neuro: Alert, hard of hearing.  Oriented x1.  Face slightly masked.  No gross focal " weakness.  No tremor or rigidity.    Lab/Diagnostic data:    Most Recent 3 CBC's:Recent Labs   Lab Test 05/17/23  0635 05/14/23  1506 05/12/23  0657   WBC 9.8 9.8 13.0*   HGB 11.2* 11.5* 11.7*   MCV 98 97 97    218 211     Most Recent 3 BMP's:Recent Labs   Lab Test 05/18/23  0623 05/17/23  0635 05/13/23  0650 05/12/23  0657 05/11/23  2101   NA  --  141  --  138 135*   POTASSIUM  --  3.4 3.6 3.2* 3.9   CHLORIDE  --  107  --  104 102   CO2  --  20*  --  23 23   BUN  --  21.7  --  17.6 18.4   CR  --  1.26*  --  1.50* 1.44*   ANIONGAP  --  14  --  11 10   JANAY  --  9.2  --  9.4 8.9   GLC 99 104*  --  123* 124*     Most Recent 3 INR's:No lab results found.  Most Recent TSH and T4:Recent Labs   Lab Test 05/12/23  1209   TSH 3.45     Most Recent ESR & CRP:Recent Labs   Lab Test 05/16/23  1114 05/14/23  1506 05/12/23  1209 03/30/21  1535   SED  --   --  61* 37*   CRP  --   --   --  <2.9   CRPI 195.04*   < > 119.95*  --     < > = values in this interval not displayed.       ASSESSMENT/PLAN:      Dementia with encephalopathy on admission, possibly secondary to gout versus pseudogout involvement of right wrist.  Clinically improved following course of prednisone.  No signs of infection during hospitalization  MRI brain unremarkable for acute process  Episodic agitation requiring Seroquel though with side effects of increased sedation dysphagia  Plan: Monitor mental functional status.  Avoid CNS active medications.  Physical and occupational therapies      Right hand inflammation, likely gout or pseudogout, improved following course of prednisone  Plan: Monitor for recurrent pain, inflammation      History of tachybradycardia syndrome with atrial fibrillation, status post pacemaker and watchman's device  History of coronary artery disease with cardiomyopathy, though normal systolic LV function on echo during hospitalization  Hypertension  Plan: Continue statin, carvedilol.  Not on aspirin or anticoagulation    Chronic  kidney disease  Creatinine 1.26 on 5/17/2023  Plan: Monitor renal function      Disposition:  Hopefully back to independent living with spouse, as mental functional status      Curtis Gunter MD        Sincerely,        Curtis Gunter MD

## 2023-05-25 NOTE — PROGRESS NOTES
[facility] GERIATRICS    PRIMARY CARE PROVIDER AND CLINIC:  Ajit Butterfield MD, [address] [place] [address] / [facility] [address]  Chief Complaint   Patient presents with     Hospital F/U      [place] Medical Record Number:  [id number]  Place of Service where encounter took place:  [facility] (TCU)     Abbe Lambert  is a 89 year old  (2/3/1934), admitted to the above facility from  [facility]. Hospital stay 5/11/23 through 5/18/23..     Hospital course was reviewed by me, is as per the hospital discharge summary and nurse practitioner note.    Patient has a past medical history of cognitive impairment, AAA, cardiomyopathy, coronary artery disease atrial fibrillation, chronic kidney disease.  Who was hospitalized for evaluation of several day history of increasing confusion, gait instability, tremor, visual hallucinations.  Patient was noted to have a low-grade fever on admission.  CT head revealed no acute process.  Infectious work-up unremarkable.  Patient was started on Seroquel for agitation which was reduced secondary to increased somnolence and dysphagia.  He was discharged on as needed Seroquel.    He was noted to have right thumb redness and pain, elevated CRP, felt secondary to gout versus pseudogout, was treated with a 5-day course of prednisone with improvement.    Other medical issues addressed included history of tachybradycardia syndrome and atrial fibrillation status post Watchman procedure and pacemaker placement, coronary artery disease, hypertension.  Echocardiogram revealed normal LV systolic function.    Patient is unable to contribute significantly to history secondary to cognitive impairment.  Staff notes he is walking with a walker.  Oral intake has been fair.  There have been no significant behavioral concerns per staff today      CODE STATUS/ADVANCE DIRECTIVES DISCUSSION:  Prior  CPR/Full code   ALLERGIES:   Allergies   Allergen Reactions      Fluticasone-Salmeterol      DENIED     Morphine Hcl      Decrease  Blood Pressure Lower Than Normal, Per Pt.     Vicodin [Hydrocodone-Acetaminophen] Visual Disturbance      PAST MEDICAL HISTORY:   Past Medical History:   Diagnosis Date     AAA (abdominal aortic aneurysm) (H)      Anemia due to blood loss, acute 10/10/2016     Asthma      Atrial fibrillation (H)     s/p left atrial appendage ligation     Cardiac arrest (H)      Cardiomyopathy (H)      Chronic kidney disease      Chronic sinusitis      Coronary artery disease     cardiac cath 2014: medical management; cath 2013: PTCA to diagonal; cath 2009: medical management; CABG 1998: LIMA to LAD, LISA to RCA, SVG to circumflex     GERD (gastroesophageal reflux disease)      History of angina      Hyperlipidaemia      Hypertension, goal below 140/90      Myocardial infarction (H)     MI with Vfib arrest 1998     Polymyalgia rheumatica (H)      Shingles 10/28/2016     Shortness of breath      Tachy-alix syndrome (H)     generator replacement 9/2020; implanted dual chamber pacemaker 2012      PAST SURGICAL HISTORY:   has a past surgical history that includes Prostate surgery; Cholecystectomy; Extracapsular cataract extration with intraocular lens implant; Cardiac surgery (12/03/2012); Implant pacemaker (12-3-12); Abdomen surgery; ENT surgery (5-); colonoscopy; Arthroplasty knee (Left, 10/5/2016); Esophagoscopy, gastroscopy, duodenoscopy (EGD), combined (N/A, 12/3/2019); Esophagoscopy, gastroscopy, duodenoscopy (EGD), dilatation, combined (N/A, 12/3/2019); and Electrophysiology Pacemaker (N/A, 9/11/2020).  FAMILY HISTORY: family history includes Cerebrovascular Disease in his father; Coronary Artery Disease in his brother; Depression in his daughter; Heart Disease in his brother and father; Unknown/Adopted in his maternal grandfather, maternal grandmother, paternal grandfather, and paternal grandmother.  SOCIAL HISTORY:   reports that he has never smoked.  "He has never been exposed to tobacco smoke. He has never used smokeless tobacco. He reports that he does not drink alcohol and does not use drugs.  Patient's living condition: lives with spouse    Current medications were reviewed by me today    Current Outpatient Medications   Medication Sig     acetaminophen (TYLENOL) 325 MG tablet Take 325-650 mg by mouth every 6 hours as needed for mild pain     carvedilol (COREG) 3.125 MG tablet Take 1 tablet (3.125 mg) by mouth 2 times daily (with meals)     diclofenac (VOLTAREN) 1 % topical gel Apply 2 g topically 4 times daily for 7 days     Lidocaine (LIDOCARE) 4 % Patch Place 1 patch onto the skin every 24 hours To prevent lidocaine toxicity, patient should be patch free for 12 hrs daily.     nitroglycerin (NITROSTAT) 0.4 MG SL tablet Place 1 tablet (0.4 mg) under the tongue every 5 minutes as needed for chest pain     QUEtiapine (SEROQUEL) 25 MG tablet Take 0.5 tablets (12.5 mg) by mouth 2 times daily as needed     rosuvastatin (CRESTOR) 10 MG tablet Take 1 tablet (10 mg) by mouth every morning     tamsulosin (FLOMAX) 0.4 MG capsule Take 1 capsule (0.4 mg) by mouth every evening     No current facility-administered medications for this visit.       ROS:  Unobtainable secondary to cognitive impairment.     Vitals:  /51   Pulse 60   Temp 97.8  F (36.6  C)   Resp 17   Ht 1.778 m (5' 10\")   Wt 74.9 kg (165 lb 3.2 oz)   SpO2 98%   BMI 23.70 kg/m    Exam:  Thin appearing male, standing in the common area.  He appears comfortable.  HEENT: Pharynx benign.  Erythema moist  No eye redness or drainage.  Lungs clear  CV RRR, no murmur  Abdomen soft, nondistended, nontender  Extremities without edema.  Extensive bruising over upper arms  No pain, swelling, warmth right hand  Neuro: Alert, hard of hearing.  Oriented x1.  Face slightly masked.  No gross focal weakness.  No tremor or rigidity.    Lab/Diagnostic data:    Most Recent 3 CBC's:Recent Labs   Lab Test " 05/17/23  0635 05/14/23  1506 05/12/23  0657   WBC 9.8 9.8 13.0*   HGB 11.2* 11.5* 11.7*   MCV 98 97 97    218 211     Most Recent 3 BMP's:Recent Labs   Lab Test 05/18/23  0623 05/17/23  0635 05/13/23  0650 05/12/23  0657 05/11/23  2101   NA  --  141  --  138 135*   POTASSIUM  --  3.4 3.6 3.2* 3.9   CHLORIDE  --  107  --  104 102   CO2  --  20*  --  23 23   BUN  --  21.7  --  17.6 18.4   CR  --  1.26*  --  1.50* 1.44*   ANIONGAP  --  14  --  11 10   JANAY  --  9.2  --  9.4 8.9   GLC 99 104*  --  123* 124*     Most Recent 3 INR's:No lab results found.  Most Recent TSH and T4:Recent Labs   Lab Test 05/12/23  1209   TSH 3.45     Most Recent ESR & CRP:Recent Labs   Lab Test 05/16/23  1114 05/14/23  1506 05/12/23  1209 03/30/21  1535   SED  --   --  61* 37*   CRP  --   --   --  <2.9   CRPI 195.04*   < > 119.95*  --     < > = values in this interval not displayed.       ASSESSMENT/PLAN:      Dementia with encephalopathy on admission, possibly secondary to gout versus pseudogout involvement of right wrist.  Clinically improved following course of prednisone.  No signs of infection during hospitalization  MRI brain unremarkable for acute process  Episodic agitation requiring Seroquel though with side effects of increased sedation dysphagia  Plan: Monitor mental functional status.  Avoid CNS active medications.  Physical and occupational therapies      Right hand inflammation, likely gout or pseudogout, improved following course of prednisone  Plan: Monitor for recurrent pain, inflammation      History of tachybradycardia syndrome with atrial fibrillation, status post pacemaker and watchman's device  History of coronary artery disease with cardiomyopathy, though normal systolic LV function on echo during hospitalization  Hypertension  Plan: Continue statin, carvedilol.  Not on aspirin or anticoagulation    Chronic kidney disease  Creatinine 1.26 on 5/17/2023  Plan: Monitor renal function      Disposition:  Hopefully  back to independent living with spouse, as mental functional status      Curtis Gunter MD

## 2023-05-30 NOTE — LETTER
5/30/2023        RE: Abbe Lambert  [address]  [facility] [address]        [facility] GERIATRICS    Chief Complaint   Patient presents with     RECHECK     HPI:  Abbe Lambert is a 89 year old  (2/3/1934), who is being seen today for an episodic care visit at: [facility] (TCU) [[id number]]. Today's concern is: TCU recheck    PMHx includes cognitive impairment, AAA, cardiomyopathy, CAD, A-fib and CKD.  Presented to the hospital for evaluations of several day history of increasing confusion, gait instability, tremor, and visual hallucinations. Was found to have low-grade fever on admission resolved on discharge.      CT head unremarkable for acute changes.  Infectious work-up unremarkable.  Patient was started on Seroquel for agitation which was reduced secondary to increased somnolence and dysphagia.  He was discharged on PRN Seroquel.     Patient was noted to have right thumb redness and pain, elevated CRP, felt secondary to gout versus pseudogout. He was treated with a 5-day course of prednisone with improvement.     Other medical issues addressed included history of tachycardia-bradycardia syndrome and atrial fibrillation s/p Watchman procedure and pacemaker placement, coronary artery disease, and hypertension.  Echocardiogram revealed normal LV systolic function.     [facility]     He is being seen today for for routine TCU follow-up visit.  Offers no history.  Spouse at the bedside and patient thinks that is his mother. Course reviewed with nursing staff.  Ambulating short distances with therapy.  Vitals acceptable.  No reports of chest pain or shortness of breath. Diet advanced to softt. No reports of difficulty swallowing.  Breathing fine.    Allergies, and PMH/PSH reviewed in [facility] today.  REVIEW OF SYSTEMS:  Unobtainable secondary to cognitive impairment.     Objective:   /51   Pulse 66   Temp 97.7  " F (36.5  C)   Resp 17   Ht 1.778 m (5' 10\")   Wt 75.1 kg (165 lb 8 oz)   SpO2 99%   BMI 23.75 kg/m      Exam:  GENERAL APPEARANCE: In no acute distress, up in wheelchair, minimal eye contact   RESPIRATORY: Clear to auscultation, even and unlabored, symmetrical chest wall expansion  CARDIOVASCULAR: RRR, no peripheral edema, S1/S2 normal   GASTROINTESTINAL: Soft, nontender, nondistended, bowel sounds active  MUSCULOSKELETAL: Gait not assessed   INTEGUMENTARY: Upper arms bruising stable, no signs of infection   NEUROLOGICAL: Memory loss, confusion   PSYCHOLOGICAL: Calm     Recent labs in [facility] reviewed by me today.     Assessment/Plan:  Suspected Lewy body dementia  Acute on chronic AMS  Delirium  Agitation  Weakness  Deconditioning  -Family noted shuffled gait, pill-rolling tremor, visual hallucinations and sleep disturbances in addition to cognitive decline PTA, strong suspicion for Lewy body dementia, discharged with as needed Seroquel for agitation, head CT was negative in the hospital  -Continue as needed Seroquel, BMP and CBC, UA/UC, folic acid, continue therapies, outpatient neurology and PCP follow-up, fall precautions     Dysphagia  -Advanced to soft diet from puréed, no reports of dysphagia since TCU admission  -SLP follow-up and modify diet as tolerated, monitor for difficulty swallowing      MED REC REQUIRED  Post Medication Reconciliation Status:  Medication reconciliation previously completed during another office visit        Orders:  UA/UC  Folic acid  BMP  CBC      Electronically signed by:   Blas Ballesteros DNP,APRN,AGNP-BC.               The above note was completed in part using Dragon voice recognition software. Although reviewed after completion, some word and grammatical errors may occur. Please contact the author of this note with any questions.               Sincerely,        CAROLYN Hernandez CNP      "

## 2023-05-30 NOTE — PROGRESS NOTES
"[facility] GERIATRICS    Chief Complaint   Patient presents with     RECHECK     HPI:  Abbe Lambert is a 89 year old  (2/3/1934), who is being seen today for an episodic care visit at: [facility] (TCU) [[id number]]. Today's concern is: TCU recheck    PMHx includes cognitive impairment, AAA, cardiomyopathy, CAD, A-fib and CKD.  Presented to the hospital for evaluations of several day history of increasing confusion, gait instability, tremor, and visual hallucinations. Was found to have low-grade fever on admission resolved on discharge.      CT head unremarkable for acute changes.  Infectious work-up unremarkable.  Patient was started on Seroquel for agitation which was reduced secondary to increased somnolence and dysphagia.  He was discharged on PRN Seroquel.     Patient was noted to have right thumb redness and pain, elevated CRP, felt secondary to gout versus pseudogout. He was treated with a 5-day course of prednisone with improvement.     Other medical issues addressed included history of tachycardia-bradycardia syndrome and atrial fibrillation s/p Watchman procedure and pacemaker placement, coronary artery disease, and hypertension.  Echocardiogram revealed normal LV systolic function.     [facility].     He is being seen today for for routine TCU follow-up visit.  Offers no history.  Spouse at the bedside and patient thinks that is his mother. Course reviewed with nursing staff.  Ambulating short distances with therapy.  Vitals acceptable.  No reports of chest pain or shortness of breath. Diet advanced to softt. No reports of difficulty swallowing.  Breathing fine.    Allergies, and PMH/PSH reviewed in [facility] today.  REVIEW OF SYSTEMS:  Unobtainable secondary to cognitive impairment.     Objective:   /51   Pulse 66   Temp 97.7  F (36.5  C)   Resp 17   Ht 1.778 m (5' 10\")   Wt 75.1 kg (165 lb 8 oz)   SpO2 99%   BMI " 23.75 kg/m      Exam:  GENERAL APPEARANCE: In no acute distress, up in wheelchair, minimal eye contact   RESPIRATORY: Clear to auscultation, even and unlabored, symmetrical chest wall expansion  CARDIOVASCULAR: RRR, no peripheral edema, S1/S2 normal   GASTROINTESTINAL: Soft, nontender, nondistended, bowel sounds active  MUSCULOSKELETAL: Gait not assessed   INTEGUMENTARY: Upper arms bruising stable, no signs of infection   NEUROLOGICAL: Memory loss, confusion   PSYCHOLOGICAL: Calm     Recent labs in [facility] reviewed by me today.     Assessment/Plan:  Suspected Lewy body dementia  Acute on chronic AMS  Delirium  Agitation  Weakness  Deconditioning  -Family noted shuffled gait, pill-rolling tremor, visual hallucinations and sleep disturbances in addition to cognitive decline PTA, strong suspicion for Lewy body dementia, discharged with as needed Seroquel for agitation, head CT was negative in the hospital  -Continue as needed Seroquel, BMP and CBC, UA/UC, folic acid, continue therapies, outpatient neurology and PCP follow-up, fall precautions     Dysphagia  -Advanced to soft diet from puréed, no reports of dysphagia since TCU admission  -SLP follow-up and modify diet as tolerated, monitor for difficulty swallowing      MED REC REQUIRED  Post Medication Reconciliation Status:  Medication reconciliation previously completed during another office visit        Orders:  UA/UC  Folic acid  BMP  CBC      Electronically signed by:   Blas Ballesteros,DNP,APRN,AGNP-BC.               The above note was completed in part using Dragon voice recognition software. Although reviewed after completion, some word and grammatical errors may occur. Please contact the author of this note with any questions.

## 2023-06-02 NOTE — LETTER
2023    To whom it may concern:    I am writing this letter on behalf of my patient, Abbe Lambert (: 2/3/1934).  This is to verify that this patient is an appropriate candidate for memory care, and that I endorse his move into memory care.     Please contact my office if you have questions or concerns.    Sincerely,        CHELSEY Butterfield  Department of Internal Medicine  [facility]

## 2023-06-02 NOTE — TELEPHONE ENCOUNTER
Forms/Letter Request    Type of form/letter: memory care    Have you been seen for this request: Yes see ED 5/11 and TCU notes     Do we have the form/letter: No    When is form/letter needed by:      How would you like the form/letter returned: Mail    Patient Notified form requests are processed in 3-5 business days:    dillan Heart's daughter is requesting a letter from PCP authorizing pt's move into memory care. Pt's insurance [employer] has initiated this request and instructed pt's family to contact clinic.     If provider agrees to writing the letter based on recent notes in pt chart, please send by mail to the following location:    Please send letter to:  Pt's daughter  Una Estrada  [address] [place] [address]    Pt plans to change care to [facility] long term care.

## 2023-06-05 NOTE — LETTER
6/5/2023        RE: Abbe Lambert  [address]  [facility] [address]        [facility] GERIATRICS DISCHARGE SUMMARY  PATIENT'S NAME: Abbe Lambert  YOB: 1934  MEDICAL RECORD NUMBER:  [id number]  Place of Service where encounter took place:  [facility] (TCU) [[id number]]    PRIMARY CARE PROVIDER AND CLINIC RESPONSIBLE AFTER TRANSFER:   Ajit Butterfield MD, [address] [place] [address] / [facility] [address]    [facility] Provider     Transferring providers: Blas Ballesteros DNP,APRN,AGNP-BC;   Curtis Gunter MD  Recent Hospitalization/ED:  [facility] stay 5/11/2023 to 5/18/2023.  Date of SNF Admission: 5/18/23  Date of SNF (anticipated) Discharge: 6/5/23  Dis[facility] to:  [facility]    Cognitive Scores: Oriented to self only  Physical Function: Ambulated short distances with therapy   DME: Previously ordered     CODE STATUS/ADVANCE DIRECTIVES DISCUSSION:  DNR/DNI  ALLERGIES: Fluticasone-salmeterol, Morphine hcl, and Vicodin [hydrocodone-acetaminophen]     Summary of nursing facility stay:   PMHx includes cognitive impairment, AAA, cardiomyopathy, CAD, A-fib and CKD.  Presented to the hospital on 5/11/2023 for evaluations of several day history of increasing confusion, gait instability, tremor, and visual hallucinations. Was found to have low-grade fever on admission resolved on discharge.      CT head unremarkable for acute changes.  Infectious work-up unremarkable.  Patient was started on Seroquel for agitation which was reduced secondary to increased somnolence and dysphagia.  He was discharged on PRN Seroquel.     Patient was noted to have right thumb redness and pain, elevated CRP, felt secondary to gout versus pseudogout. He was treated with a 5-day course of prednisone with improvement.     Other medical issues addressed included history of tachycardia-bradycardia syndrome and atrial fibrillation s/p Watchman procedure and  pacemaker placement, coronary artery disease, and hypertension.  Echocardiogram revealed normal LV systolic function.     Patient [facility] to [facility] on 5[facility]18/23 in stable condition for rehab.     He is being seen today for discharge orders from the TCU.  Offers no history. Course reviewed with nursing staff.  Able to ambulate short distances with therapy.  Vitals acceptable.  No reports of chest pain or shortness of breath. Diet advanced to soft. No reports of difficulty swallowing.  Breathing fine. Spouse at the bedside without any concerns.     Assessment and Plan:  Suspected Lewy body dementia  Acute on chronic AMS   Delirium  Agitation  Weakness  Deconditioning   -Family recently noted shuffled gait, pill-rolling tremor, visual hallucinations and sleep disturbances in addition to cognitive decline, strong suspicion for Lewy body dementia, on PRN Seroquel for agitation, head CT was negative in the hospital  -Continue as needed Seroquel, continue therapies, outpatient neurology and PCP follow-up, fall precautions     Dysphagia  -Initially on puréed diet later advanced to soft , no reports of dysphagia in rehab   -SLP follow-up and modify diet as tolerated, monitor for difficulty swallowing     Elevated CRP  Right thumb erythema-MCP joint  -Uric acid 6.6, possibly gout/pseudogout, s/p prednisone 20 mg daily x5 days with subsequent improvement, no signs of pain  -Monitor for flare up, consider maintenance therapy     Hx of tachycardia bradycardia syndrome and A-fib s/p watchman's  CAD with cardiomyopathy  S/p PPM  Mild troponin elevation  Hypertension  Hyperlipidemia  -Echo WDL, VSS since TCU admission, on no aspirin  -Continue statin and carvedilol, outpatient cardiology follow-up, monitor vitals, monitor for changes in condition     CKD  -Follow BMP     BPH  -Continue PTA Flomax, monitor for retention    Discharge Medications:  MED REC REQUIRED  Post Medication Reconciliation Status:  Medication  reconciliation previously completed during another office visit      Current Outpatient Medications   Medication Sig Dispense Refill     acetaminophen (TYLENOL) 325 MG tablet Take 325-650 mg by mouth every 6 hours as needed for mild pain       carvedilol (COREG) 3.125 MG tablet Take 1 tablet (3.125 mg) by mouth 2 times daily (with meals) 180 tablet 3     Lidocaine (LIDOCARE) 4 % Patch Place 1 patch onto the skin every 24 hours To prevent lidocaine toxicity, patient should be patch free for 12 hrs daily.       nitroglycerin (NITROSTAT) 0.4 MG SL tablet Place 1 tablet (0.4 mg) under the tongue every 5 minutes as needed for chest pain 25 tablet 3     QUEtiapine (SEROQUEL) 25 MG tablet Take 0.5 tablets (12.5 mg) by mouth 2 times daily as needed       rosuvastatin (CRESTOR) 10 MG tablet Take 1 tablet (10 mg) by mouth every morning 90 tablet 3     tamsulosin (FLOMAX) 0.4 MG capsule Take 1 capsule (0.4 mg) by mouth every evening          Controlled medications:   not applicable/none     Past Medical History:   Past Medical History:   Diagnosis Date     AAA (abdominal aortic aneurysm) (H)      Anemia due to blood loss, acute 10/10/2016     Asthma      Atrial fibrillation (H)     s/p left atrial appendage ligation     Cardiac arrest (H)      Cardiomyopathy (H)      Chronic kidney disease      Chronic sinusitis      Coronary artery disease     cardiac cath 2014: medical management; cath 2013: PTCA to diagonal; cath 2009: medical management; CABG 1998: LIMA to LAD, LISA to RCA, SVG to circumflex     GERD (gastroesophageal reflux disease)      History of angina      Hyperlipidaemia      Hypertension, goal below 140/90      Myocardial infarction (H)     MI with Vfib arrest 1998     Polymyalgia rheumatica (H)      Shingles 10/28/2016     Shortness of breath      Tachy-alix syndrome (H)     generator replacement 9/2020; implanted dual chamber pacemaker 2012     Physical Exam:   Vitals: BP 92/40   Pulse 60   Temp 97.5  F (36.4   C)   Resp 20   Wt 79 kg (174 lb 1.6 oz)   SpO2 98%   BMI 24.98 kg/m    BMI: Body mass index is 24.98 kg/m .    Exam:  GENERAL APPEARANCE: NAD, up in wheelchair, minimal eye contact   RESPIRATORY: Clear to auscultation, even and unlabored, symmetrical chest wall expansion  CARDIOVASCULAR: RRR, no peripheral edema, S1/S2 normal   GASTROINTESTINAL: Soft, nontender, nondistended, bowel sounds active all quadrants   MUSCULOSKELETAL: In w/c   INTEGUMENTARY: Upper arms bruising stable, no signs of infection   NEUROLOGICAL: Memory loss, confusion   PSYCHOLOGICAL: Calm      SNF labs: Labs done in SNF are in [place] EPIC. Please refer to them using [facility]Care Everywhere.    DISCHARGE PLAN:    Follow up labs: Defer to PCP    Medical Follow Up:      Follow up with primary care provider in 1 week       Discharge Services: No therapy or home care recommended.     Discharge Instructions Verbalized to Patient at Discharge:     Continue to follow your diet:  Regular.     TOTAL DISCHARGE TIME:   Greater than 30 minutes  Electronically signed by:         Electronically signed by:  Blas Ballesteros DNP,CAROLYN,SHERONP-BC.             The above note was completed in part using Dragon voice recognition software. Although reviewed after completion, some word and grammatical errors may occur. Please contact the author of this note with any questions.                          Sincerely,        CAROLYN Hernandez CNP

## 2023-06-05 NOTE — PROGRESS NOTES
[facility] GERIATRICS DISCHARGE SUMMARY  PATIENT'S NAME: Abbe Lambert  YOB: 1934  MEDICAL RECORD NUMBER:  [id number]  Place of Service where encounter took place:  [facility] (TCU) [[id number]]    PRIMARY CARE PROVIDER AND CLINIC RESPONSIBLE AFTER TRANSFER:   Ajit Butterfield MD, [address] [place] [address] / [facility] [address]    [facility] Provider     Transferring providers: Blas Ballesteros DNP,APRN,AGNP-BC;   Curtis Gunter MD  Recent Hospitalization/ED:  [facility] stay 5/11/2023 to 5/18/2023.  Date of SNF Admission: 5/18/23  Date of SNF (anticipated) Discharge: 6/5/23  Dis[facility]:  [facility]   Cognitive Scores: Oriented to self only  Physical Function: Ambulated short distances with therapy   DME: Previously ordered     CODE STATUS/ADVANCE DIRECTIVES DISCUSSION:  DNR/DNI  ALLERGIES: Fluticasone-salmeterol, Morphine hcl, and Vicodin [hydrocodone-acetaminophen]     Summary of nursing facility stay:   PMHx includes cognitive impairment, AAA, cardiomyopathy, CAD, A-fib and CKD.  Presented to the hospital on 5/11/2023 for evaluations of several day history of increasing confusion, gait instability, tremor, and visual hallucinations. Was found to have low-grade fever on admission resolved on discharge.      CT head unremarkable for acute changes.  Infectious work-up unremarkable.  Patient was started on Seroquel for agitation which was reduced secondary to increased somnolence and dysphagia.  He was discharged on PRN Seroquel.     Patient was noted to have right thumb redness and pain, elevated CRP, felt secondary to gout versus pseudogout. He was treated with a 5-day course of prednisone with improvement.     Other medical issues addressed included history of tachycardia-bradycardia syndrome and atrial fibrillation s/p Watchman procedure and pacemaker placement, coronary artery disease, and hypertension.  Echocardiogram revealed normal LV  systolic function.     Patient discharged to [facility] TCU on 5/18/23 in stable condition for rehab.     He is being seen today for discharge orders from the TCU.  Offers no history. Course reviewed with nursing staff.  Able to ambulate short distances with therapy.  Vitals acceptable.  No reports of chest pain or shortness of breath. Diet advanced to soft. No reports of difficulty swallowing.  Breathing fine. Spouse at the bedside without any concerns.     Assessment and Plan:  Suspected Lewy body dementia  Acute on chronic AMS   Delirium  Agitation  Weakness  Deconditioning   -Family recently noted shuffled gait, pill-rolling tremor, visual hallucinations and sleep disturbances in addition to cognitive decline, strong suspicion for Lewy body dementia, on PRN Seroquel for agitation, head CT was negative in the hospital  -Continue as needed Seroquel, continue therapies, outpatient neurology and PCP follow-up, fall precautions     Dysphagia  -Initially on puréed diet later advanced to soft , no reports of dysphagia in rehab   -SLP follow-up and modify diet as tolerated, monitor for difficulty swallowing     Elevated CRP  Right thumb erythema-MCP joint  -Uric acid 6.6, possibly gout/pseudogout, s/p prednisone 20 mg daily x5 days with subsequent improvement, no signs of pain  -Monitor for flare up, consider maintenance therapy     Hx of tachycardia bradycardia syndrome and A-fib s/p watchman's  CAD with cardiomyopathy  S/p PPM  Mild troponin elevation  Hypertension  Hyperlipidemia  -Echo WDL, VSS since TCU admission, on no aspirin  -Continue statin and carvedilol, outpatient cardiology follow-up, monitor vitals, monitor for changes in condition     CKD  -Follow BMP     BPH  -Continue PTA Flomax, monitor for retention    Discharge Medications:  MED REC REQUIRED  Post Medication Reconciliation Status:  Medication reconciliation previously completed during another office visit      Current Outpatient Medications    Medication Sig Dispense Refill     acetaminophen (TYLENOL) 325 MG tablet Take 325-650 mg by mouth every 6 hours as needed for mild pain       carvedilol (COREG) 3.125 MG tablet Take 1 tablet (3.125 mg) by mouth 2 times daily (with meals) 180 tablet 3     Lidocaine (LIDOCARE) 4 % Patch Place 1 patch onto the skin every 24 hours To prevent lidocaine toxicity, patient should be patch free for 12 hrs daily.       nitroglycerin (NITROSTAT) 0.4 MG SL tablet Place 1 tablet (0.4 mg) under the tongue every 5 minutes as needed for chest pain 25 tablet 3     QUEtiapine (SEROQUEL) 25 MG tablet Take 0.5 tablets (12.5 mg) by mouth 2 times daily as needed       rosuvastatin (CRESTOR) 10 MG tablet Take 1 tablet (10 mg) by mouth every morning 90 tablet 3     tamsulosin (FLOMAX) 0.4 MG capsule Take 1 capsule (0.4 mg) by mouth every evening          Controlled medications:   not applicable/none     Past Medical History:   Past Medical History:   Diagnosis Date     AAA (abdominal aortic aneurysm) (H)      Anemia due to blood loss, acute 10/10/2016     Asthma      Atrial fibrillation (H)     s/p left atrial appendage ligation     Cardiac arrest (H)      Cardiomyopathy (H)      Chronic kidney disease      Chronic sinusitis      Coronary artery disease     cardiac cath 2014: medical management; cath 2013: PTCA to diagonal; cath 2009: medical management; CABG 1998: LIMA to LAD, LISA to RCA, SVG to circumflex     GERD (gastroesophageal reflux disease)      History of angina      Hyperlipidaemia      Hypertension, goal below 140/90      Myocardial infarction (H)     MI with Vfib arrest 1998     Polymyalgia rheumatica (H)      Shingles 10/28/2016     Shortness of breath      Tachy-alix syndrome (H)     generator replacement 9/2020; implanted dual chamber pacemaker 2012     Physical Exam:   Vitals: BP 92/40   Pulse 60   Temp 97.5  F (36.4  C)   Resp 20   Wt 79 kg (174 lb 1.6 oz)   SpO2 98%   BMI 24.98 kg/m    BMI: Body mass index is  24.98 kg/m .    Exam:  GENERAL APPEARANCE: NAD, up in wheelchair, minimal eye contact   RESPIRATORY: Clear to auscultation, even and unlabored, symmetrical chest wall expansion  CARDIOVASCULAR: RRR, no peripheral edema, S1/S2 normal   GASTROINTESTINAL: Soft, nontender, nondistended, bowel sounds active all quadrants   MUSCULOSKELETAL: In w/c   INTEGUMENTARY: Upper arms bruising stable, no signs of infection   NEUROLOGICAL: Memory loss, confusion   PSYCHOLOGICAL: Calm      SNF labs: Labs done in SNF are in [place] EPIC. Please refer to them using [facility] Everywhere.    DISCHARGE PLAN:    Follow up labs: Defer to PCP    Medical Follow Up:      Follow up with primary care provider in 1 week       Discharge Services: No therapy or home care recommended.     Discharge Instructions Verbalized to Patient at Discharge:     Continue to follow your diet:  Regular.     TOTAL DISCHARGE TIME:   Greater than 30 minutes  Electronically signed by:         Electronically signed by:  Blas Ballesteros DNP,APRN,SHERONP-BC.             The above note was completed in part using Dragon voice recognition software. Although reviewed after completion, some word and grammatical errors may occur. Please contact the author of this note with any questions.

## 2023-06-06 NOTE — PROGRESS NOTES
Clinic Care Coordination Contact    Situation: Patient chart reviewed by care coordinator.    Background:   Patient was admitted to [facility] from 5/11/2023 to 5/18/2023 with a diagnoses of  Fall, altered mental status, right thumb MCP joint pain, AAA, A. Fib, CKD, cognitive and physical decline and discharged to [facility].    Assessment:   Patient was discharged from [facility] yesterday, 6/5/2023, to [facility].    Plan/Recommendations:   No further care coordination outreaches at this time.     Mae Suero RN, BSN, PHN  Primary Care / Care Coordinator   [facility]  E-mail [email or web address]   [phone]

## 2023-06-06 NOTE — PROGRESS NOTES
[facility]  PRIMARY CARE PROVIDER AND CLINIC:  Brad Porter, CAROLYN CNP, [address] [facility] / [facility] [address]  No chief complaint on file.    [facility] Medical Record Number:  [id number]  Place of Service where encounter took place:  [facility] ([facility]) [[id number]]    Abbe Lambert  is a 89 year old  (2/3/1934), admitted to the above facility from [facility]...  Admitted to this facility for  rehab, medical management and nursing care.    HPI:    HPI information obtained from: facility chart records, facility staff, patient report, [facility] Epic chart review and Care Everywhere Epic chart review.   Brief Summary of Hospital Course: Hospitalized 5/11/-5/18 treated  for delirium, generalized muscle weakness, URI and falls.   Updates on Status Since Skilled nursing Admission:  TCU stay 5/18-6/5/23 then to memory care at [facility] needing more care than his wife could provide.     PMHx includes cognitive impairment, AAA, cardiomyopathy, CAD, A-fib and CKD.  Presented to the hospital for evaluations of several day history of increasing confusion, gait instability, tremor, and visual hallucinations. Was found to have low-grade fever on admission resolved on discharge.      CT head unremarkable for acute changes.  Infectious work-up unremarkable.  Patient was started on Seroquel for agitation which was reduced secondary to increased somnolence and dysphagia.  He was discharged on PRN Seroquel.     Patient was noted to have right thumb redness and pain, elevated CRP, felt secondary to gout versus pseudogout. He was treated with a 5-day course of prednisone with improvement.     History of tachycardia-bradycardia syndrome and atrial fibrillation s/p Watchman procedure and pacemaker placement, coronary artery disease, and hypertension.  Echocardiogram revealed normal LV systolic function.      Seen today with his wife present. Notes say WC bound. Wife  "says \"he is only taking a few steps\". He is calm, pleasant, denies any acute concerns. She is answering most of my questions for him. Ok for range of motion to both UE and LE.  Continues on mechanical soft diet and nectar thick liquids.       CODE STATUS/ADVANCE DIRECTIVES DISCUSSION:   DNR  Patient's living condition: lives with spouse-moved into memory care  ALLERGIES: Fluticasone-salmeterol, Morphine hcl, and Vicodin [hydrocodone-acetaminophen]  PAST MEDICAL HISTORY:  has a past medical history of AAA (abdominal aortic aneurysm) (H), Anemia due to blood loss, acute (10/10/2016), Asthma, Atrial fibrillation (H), Cardiac arrest (H), Cardiomyopathy (H), Chronic kidney disease, Chronic sinusitis, Coronary artery disease, GERD (gastroesophageal reflux disease), History of angina, Hyperlipidaemia, Hypertension, goal below 140/90, Myocardial infarction (H), Polymyalgia rheumatica (H), Shingles (10/28/2016), Shortness of breath, and Tachy-alix syndrome (H).    He has no past medical history of Basal cell carcinoma, Malignant melanoma (H), or Squamous cell carcinoma.  PAST SURGICAL HISTORY:   has a past surgical history that includes Prostate surgery; Cholecystectomy; Extracapsular cataract extration with intraocular lens implant; Cardiac surgery (12/03/2012); Implant pacemaker (12-3-12); Abdomen surgery; ENT surgery (5-); colonoscopy; Arthroplasty knee (Left, 10/5/2016); Esophagoscopy, gastroscopy, duodenoscopy (EGD), combined (N/A, 12/3/2019); Esophagoscopy, gastroscopy, duodenoscopy (EGD), dilatation, combined (N/A, 12/3/2019); and Electrophysiology Pacemaker (N/A, 9/11/2020).  FAMILY HISTORY: family history includes Cerebrovascular Disease in his father; Coronary Artery Disease in his brother; Depression in his daughter; Heart Disease in his brother and father; Unknown/Adopted in his maternal grandfather, maternal grandmother, paternal grandfather, and paternal grandmother.  SOCIAL HISTORY:   reports that he " "has never smoked. He has never been exposed to tobacco smoke. He has never used smokeless tobacco. He reports that he does not drink alcohol and does not use drugs.    Post Discharge Medication Reconciliation Status: discharge medications reconciled and changed, per note/orders    Current Outpatient Medications   Medication Sig Dispense Refill     acetaminophen (TYLENOL) 325 MG tablet Take 2 tablets (650 mg) by mouth 2 times daily And twice daily as needed 30 tablet 11     carvedilol (COREG) 3.125 MG tablet Take 1 tablet (3.125 mg) by mouth 2 times daily (with meals) 180 tablet 3     Lidocaine (LIDOCARE) 4 % Patch Place 1 patch onto the skin every 24 hours To prevent lidocaine toxicity, patient should be patch free for 12 hrs daily.       nitroglycerin (NITROSTAT) 0.4 MG SL tablet Place 1 tablet (0.4 mg) under the tongue every 5 minutes as needed for chest pain 25 tablet 3     QUEtiapine (SEROQUEL) 25 MG tablet Take 0.5 tablets (12.5 mg) by mouth 2 times daily as needed       rosuvastatin (CRESTOR) 10 MG tablet Take 1 tablet (10 mg) by mouth every morning 90 tablet 3     tamsulosin (FLOMAX) 0.4 MG capsule Take 1 capsule (0.4 mg) by mouth every evening       ROS:  10 point ROS of systems including Constitutional, Eyes, Respiratory, Cardiovascular, Gastroenterology, Genitourinary, Integumentary, Musculoskeletal, Psychiatric were all negative except for pertinent positives noted in my HPI.    Vitals:  BP (!) 147/62   Pulse 86   Temp 97.2  F (36.2  C)   Resp 18   Ht 1.778 m (5' 10\")   Wt 78.9 kg (174 lb)   SpO2 98%   BMI 24.97 kg/m    Exam:  GENERAL APPEARANCE:  Alert, in no distress  ENT:  Mouth and posterior oropharynx normal, moist mucous membranes, [place], bilateral cerumen impaction  EYES:  EOM, conjunctivae, lids, pupils and irises normal  RESP:  respiratory effort and palpation of chest normal  CV:  regular rate and rhythm, no murmur, rub, or gallop, trace edema  ABDOMEN:  no guarding or rebound  M/S:   " mostly in WC  SKIN:  limited exam but intact to visualized areas, scab on left medical ankle. Bruising bilateral LE    NEURO:   Cranial nerves 2-12 are normal tested and grossly at patient's baseline  PSYCH:  insight and judgement impaired, memory impaired     Lab/Diagnostic data:  CBC RESULTS: Recent Labs   Lab Test 05/31/23  1009 05/17/23  0635   WBC 9.3 9.8   RBC 3.71* 3.62*   HGB 11.1* 11.2*   HCT 37.5* 35.6*   * 98   MCH 29.9 30.9   MCHC 29.6* 31.5   RDW 15.7* 15.1*    227       Last Basic Metabolic Panel:  Recent Labs   Lab Test 05/31/23  1009 05/18/23  0623 05/17/23  0635     --  141   POTASSIUM 3.8  --  3.4   CHLORIDE 107  --  107   JANAY 9.4  --  9.2   CO2 22  --  20*   BUN 30.3*  --  21.7   CR 1.57*  --  1.26*   * 99 104*       Liver Function Studies -   Recent Labs   Lab Test 05/11/23  2101 12/10/22  1559   PROTTOTAL 8.2 8.5   ALBUMIN 3.4* 3.1*   BILITOTAL 0.4 0.3   ALKPHOS 96 87   AST 37 20   ALT 21 27       TSH   Date Value Ref Range Status   05/12/2023 3.45 0.30 - 4.20 uIU/mL Final   04/06/2021 1.80 0.40 - 4.00 mU/L Final   03/30/2021 2.20 0.40 - 4.00 mU/L Final       Lab Results   Component Value Date    A1C 5.9 03/16/2022    A1C 5.9 04/06/2021    A1C 5.6 12/02/2019     ASSESSMENT/PLAN:  (G31.83,  F02.84) Lewy body dementia with anxiety, unspecified dementia severity (H)  (primary encounter diagnosis)  Comment: Per Hospital notes: Family also notes shuffled gait, pill rolling tremor and labile mood, visual hallucinations and sleep disturbances in addition to the cognitive decline.  Strong suspicion this patient may actually have Lewy Body Dementia  Plan:   -Seroquel prn (caused some somnolence contributing to dysphagia)     (I83.008,  L97.801) Venous stasis ulcer of other part of lower leg limited to breakdown of skin with varicose veins, unspecified laterality (H)  Comment: color changes to LE  Plan:   -monitor for open areas-currently scab over wound of left leg    (E11.40)  Type 2 diabetes mellitus with diabetic neuropathy, without long-term current use of insulin (H)  Comment: diet controlled   Plan:   -follow A1C    (N18.32) Stage 3b chronic kidney disease (H)  Comment: stable   Plan:   -renal dose and avoid nephrotoxins     (M35.3) Polymyalgia rheumatica (H)  (R52) Generalized pain  Comment: stable  Plan:   -BID acetaminophen (TYLENOL) 650 mg added today  -lidocaine patch daily    (R53.81) Physical deconditioning  Comment: was ambulating small steps in TCU  Plan:   -PT/OT to eval and treat     (I48.20) Chronic atrial fibrillation (H)  (E78.5) Hyperlipidemia LDL goal <100  (I10) Benign essential hypertension  Comment: Echo:  LVH. Normal systolic LVF. EF 55-60. RV appears normal.  The left ventricle is normal in structure, function and size.  The visual ejection fraction is 60-65%.  There is mild biatrial enlargement.  There is mild (1+) tricuspid regurgitation.  Plan:   -Coreg and Crestor  -BMP periodically     (N40.0) Benign prostatic hyperplasia, unspecified whether lower urinary tract symptoms present  Comment: ongoing   Plan:   -flomax 0.4 mg po daily at HS    (H61.23) Bilateral impacted cerumen  Comment: noted with today's exam   Plan:   -cerumen removal with lighted curette to both ear canals         Total time spent with patient visit at the skilled nursing facility was 64 minutes including patient visit, review of past records, phone call to patient contact and meeting with his wife.         Electronically signed by:  CAROLYN Smyth CNP

## 2023-06-07 NOTE — LETTER
Abbe Lambert orders:     - acetaminophen (TYLENOL) 325 MG tablet Take 2 tablets (650 mg) by mouth 2 times daily And twice daily as needed               CAROLYN Smyth CNP on 6/7/2023 at 3:25 PM

## 2023-06-07 NOTE — LETTER
6/7/2023        RE: Abbe Lambert  [address]  [facility] [address]        [facility]  PRIMARY CARE PROVIDER AND CLINIC:  CAROLYN Smyth CNP, [address] [facility] [address]  No chief complaint on file.    [facility] Record Number:  [id number]  Place of Service where encounter took place:  [facility] [[id number]]    Abbe Lambert  is a 89 year old  (2/3/1934), admitted to the above facility from [facility]...  Admitted to this facility for  rehab, medical management and nursing care.    HPI:    HPI information obtained from: facility chart records, facility staff, patient report, [facility] Epic chart review and Care Everywhere Epic chart review.   Brief Summary of Hospital Course: Hospitalized 5/11/-5/18 treated  for delirium, generalized muscle weakness, URI and falls.   Updates on Status Since Skilled nursing Admission:  TCU stay 5/18-6/5/23 then to memory care at [facility] needing more care than his wife could provide.     PMHx includes cognitive impairment, AAA, cardiomyopathy, CAD, A-fib and CKD.  Presented to the hospital for evaluations of several day history of increasing confusion, gait instability, tremor, and visual hallucinations. Was found to have low-grade fever on admission resolved on discharge.      CT head unremarkable for acute changes.  Infectious work-up unremarkable.  Patient was started on Seroquel for agitation which was reduced secondary to increased somnolence and dysphagia.  He was discharged on PRN Seroquel.     Patient was noted to have right thumb redness and pain, elevated CRP, felt secondary to gout versus pseudogout. He was treated with a 5-day course of prednisone with improvement.     History of tachycardia-bradycardia syndrome and atrial fibrillation s/p Watchman procedure and pacemaker placement, coronary artery disease, and hypertension.  Echocardiogram  "revealed normal LV systolic function.      Seen today with his wife present. Notes say WC bound. Wife says \"he is only taking a few steps\". He is calm, pleasant, denies any acute concerns. She is answering most of my questions for him. Ok for range of motion to both UE and LE.  Continues on mechanical soft diet and nectar thick liquids.       CODE STATUS/ADVANCE DIRECTIVES DISCUSSION:   DNR  Patient's living condition: lives with spouse-moved into memory care  ALLERGIES: Fluticasone-salmeterol, Morphine hcl, and Vicodin [hydrocodone-acetaminophen]  PAST MEDICAL HISTORY:  has a past medical history of AAA (abdominal aortic aneurysm) (H), Anemia due to blood loss, acute (10/10/2016), Asthma, Atrial fibrillation (H), Cardiac arrest (H), Cardiomyopathy (H), Chronic kidney disease, Chronic sinusitis, Coronary artery disease, GERD (gastroesophageal reflux disease), History of angina, Hyperlipidaemia, Hypertension, goal below 140/90, Myocardial infarction (H), Polymyalgia rheumatica (H), Shingles (10/28/2016), Shortness of breath, and Tachy-alix syndrome (H).    He has no past medical history of Basal cell carcinoma, Malignant melanoma (H), or Squamous cell carcinoma.  PAST SURGICAL HISTORY:   has a past surgical history that includes Prostate surgery; Cholecystectomy; Extracapsular cataract extration with intraocular lens implant; Cardiac surgery (12/03/2012); Implant pacemaker (12-3-12); Abdomen surgery; ENT surgery (5-); colonoscopy; Arthroplasty knee (Left, 10/5/2016); Esophagoscopy, gastroscopy, duodenoscopy (EGD), combined (N/A, 12/3/2019); Esophagoscopy, gastroscopy, duodenoscopy (EGD), dilatation, combined (N/A, 12/3/2019); and Electrophysiology Pacemaker (N/A, 9/11/2020).  FAMILY HISTORY: family history includes Cerebrovascular Disease in his father; Coronary Artery Disease in his brother; Depression in his daughter; Heart Disease in his brother and father; Unknown/Adopted in his maternal grandfather, " "maternal grandmother, paternal grandfather, and paternal grandmother.  SOCIAL HISTORY:   reports that he has never smoked. He has never been exposed to tobacco smoke. He has never used smokeless tobacco. He reports that he does not drink alcohol and does not use drugs.    Post Discharge Medication Reconciliation Status: discharge medications reconciled and changed, per note/orders    Current Outpatient Medications   Medication Sig Dispense Refill     acetaminophen (TYLENOL) 325 MG tablet Take 2 tablets (650 mg) by mouth 2 times daily And twice daily as needed 30 tablet 11     carvedilol (COREG) 3.125 MG tablet Take 1 tablet (3.125 mg) by mouth 2 times daily (with meals) 180 tablet 3     Lidocaine (LIDOCARE) 4 % Patch Place 1 patch onto the skin every 24 hours To prevent lidocaine toxicity, patient should be patch free for 12 hrs daily.       nitroglycerin (NITROSTAT) 0.4 MG SL tablet Place 1 tablet (0.4 mg) under the tongue every 5 minutes as needed for chest pain 25 tablet 3     QUEtiapine (SEROQUEL) 25 MG tablet Take 0.5 tablets (12.5 mg) by mouth 2 times daily as needed       rosuvastatin (CRESTOR) 10 MG tablet Take 1 tablet (10 mg) by mouth every morning 90 tablet 3     tamsulosin (FLOMAX) 0.4 MG capsule Take 1 capsule (0.4 mg) by mouth every evening       ROS:  10 point ROS of systems including Constitutional, Eyes, Respiratory, Cardiovascular, Gastroenterology, Genitourinary, Integumentary, Musculoskeletal, Psychiatric were all negative except for pertinent positives noted in my HPI.    Vitals:  BP (!) 147/62   Pulse 86   Temp 97.2  F (36.2  C)   Resp 18   Ht 1.778 m (5' 10\")   Wt 78.9 kg (174 lb)   SpO2 98%   BMI 24.97 kg/m    Exam:  GENERAL APPEARANCE:  Alert, in no distress  ENT:  Mouth and posterior oropharynx normal, moist mucous membranes, [place], bilateral cerumen impaction  EYES:  EOM, conjunctivae, lids, pupils and irises normal  RESP:  respiratory effort and palpation of chest normal  CV:  " regular rate and rhythm, no murmur, rub, or gallop, trace edema  ABDOMEN:  no guarding or rebound  M/S:   mostly in WC  SKIN:  limited exam but intact to visualized areas, scab on left medical ankle. Bruising bilateral LE    NEURO:   Cranial nerves 2-12 are normal tested and grossly at patient's baseline  PSYCH:  insight and judgement impaired, memory impaired     Lab/Diagnostic data:  CBC RESULTS: Recent Labs   Lab Test 05/31/23  1009 05/17/23  0635   WBC 9.3 9.8   RBC 3.71* 3.62*   HGB 11.1* 11.2*   HCT 37.5* 35.6*   * 98   MCH 29.9 30.9   MCHC 29.6* 31.5   RDW 15.7* 15.1*    227       Last Basic Metabolic Panel:  Recent Labs   Lab Test 05/31/23  1009 05/18/23  0623 05/17/23  0635     --  141   POTASSIUM 3.8  --  3.4   CHLORIDE 107  --  107   JANAY 9.4  --  9.2   CO2 22  --  20*   BUN 30.3*  --  21.7   CR 1.57*  --  1.26*   * 99 104*       Liver Function Studies -   Recent Labs   Lab Test 05/11/23  2101 12/10/22  1559   PROTTOTAL 8.2 8.5   ALBUMIN 3.4* 3.1*   BILITOTAL 0.4 0.3   ALKPHOS 96 87   AST 37 20   ALT 21 27       TSH   Date Value Ref Range Status   05/12/2023 3.45 0.30 - 4.20 uIU/mL Final   04/06/2021 1.80 0.40 - 4.00 mU/L Final   03/30/2021 2.20 0.40 - 4.00 mU/L Final       Lab Results   Component Value Date    A1C 5.9 03/16/2022    A1C 5.9 04/06/2021    A1C 5.6 12/02/2019     ASSESSMENT/PLAN:  (G31.83,  F02.84) Lewy body dementia with anxiety, unspecified dementia severity (H)  (primary encounter diagnosis)  Comment: Per Hospital notes: Family also notes shuffled gait, pill rolling tremor and labile mood, visual hallucinations and sleep disturbances in addition to the cognitive decline.  Strong suspicion this patient may actually have Lewy Body Dementia  Plan:   -Seroquel prn (caused some somnolence contributing to dysphagia)     (I83.008,  W08.938) Venous stasis ulcer of other part of lower leg limited to breakdown of skin with varicose veins, unspecified laterality  (H)  Comment: color changes to LE  Plan:   -monitor for open areas-currently scab over wound of left leg    (E11.40) Type 2 diabetes mellitus with diabetic neuropathy, without long-term current use of insulin (H)  Comment: diet controlled   Plan:   -follow A1C    (N18.32) Stage 3b chronic kidney disease (H)  Comment: stable   Plan:   -renal dose and avoid nephrotoxins     (M35.3) Polymyalgia rheumatica (H)  (R52) Generalized pain  Comment: stable  Plan:   -BID acetaminophen (TYLENOL) 650 mg added today  -lidocaine patch daily    (R53.81) Physical deconditioning  Comment: was ambulating small steps in TCU  Plan:   -PT/OT to eval and treat     (I48.20) Chronic atrial fibrillation (H)  (E78.5) Hyperlipidemia LDL goal <100  (I10) Benign essential hypertension  Comment: Echo:  LVH. Normal systolic LVF. EF 55-60. RV appears normal.  The left ventricle is normal in structure, function and size.  The visual ejection fraction is 60-65%.  There is mild biatrial enlargement.  There is mild (1+) tricuspid regurgitation.  Plan:   -Coreg and Crestor  -BMP periodically     (N40.0) Benign prostatic hyperplasia, unspecified whether lower urinary tract symptoms present  Comment: ongoing   Plan:   -flomax 0.4 mg po daily at HS    (H61.23) Bilateral impacted cerumen  Comment: noted with today's exam   Plan:   -cerumen removal with lighted curette to both ear canals         Total time spent with patient visit at the [facility] nursing facility was 64 minutes including patient visit, review of past records, phone call to patient contact and meeting with his wife.         Electronically signed by:  CAROLYN Smyth CNP                   Sincerely,        CAROLYN Smyth CNP

## 2023-06-16 NOTE — TELEPHONE ENCOUNTER
Patient's daughter called (C2C on file) and is needing the provider to also call to verify that patient is a candidate for memory care. Provider needs to call [phone].    Tara VILLASEÑOR RN  [facility] Triage Team

## 2023-06-16 NOTE — TELEPHONE ENCOUNTER
I called and verified from my standpoint that the patient is a candidate for memory care.  Whether this will be effective remains to be seen.

## 2023-06-23 NOTE — PROGRESS NOTES
[employer] from 5/11/2023. Pt was hospitalized after a fall.     [employer] (D) PPM  Battery 7.2-7.9 yrs estimated longevity   Lead measurements are stable and WNL  Mode DDDR   AP 67%,  1%  No arrhythmias recorded since remote check on 5/10/2023  HR Histogram shows adequate variability   Presenting rhythm AS/VS  OV with Natasha PA on 7/24/2023  Remote PPM check 8/16/2023

## 2023-06-27 NOTE — PROGRESS NOTES
[facility]  [facility] Medical Record Number:  [id number]  Place of Service where encounter took place:  [facility] [[id number]]  Chief Complaint   Patient presents with     Nursing Home Acute     Ear wax       HPI:    Abbe Lambert  is a 89 year old (2/3/1934), who is being seen today for an episodic care visit.  HPI information obtained from: facility chart records, facility staff and family/first contact DAUGHTER report. Today's concern is:    Follow up to chronic disease management. Wife would like his ears cleared of wax. Thinks it's interfering with his hearing.    Facility staff say he ramps up at times yelling and making threats. Won't let staff assist with ADLs. Calm and pleasant today. Epic chart reviewed and this behavior was seen inpatient. Also noted with insomnia that could be contributing to daytime issues.     Speech increasing diet.    Past Medical and Surgical History reviewed in Epic today.    MEDICATIONS:    Current Outpatient Medications   Medication Sig Dispense Refill     OLANZapine (ZYPREXA) 2.5 MG tablet Take 1 tablet (2.5 mg) by mouth 2 times daily as needed (hallucinations and agitation) 30 tablet 3     traZODone (DESYREL) 50 MG tablet Take 1 tablet (50 mg) by mouth At Bedtime 30 tablet 3     acetaminophen (TYLENOL) 325 MG tablet Take 2 tablets (650 mg) by mouth 2 times daily And twice daily as needed 30 tablet 11     carvedilol (COREG) 3.125 MG tablet Take 1 tablet (3.125 mg) by mouth 2 times daily (with meals) 180 tablet 3     Lidocaine (LIDOCARE) 4 % Patch Place 1 patch onto the skin every 24 hours To prevent lidocaine toxicity, patient should be patch free for 12 hrs daily.       nitroglycerin (NITROSTAT) 0.4 MG SL tablet Place 1 tablet (0.4 mg) under the tongue every 5 minutes as needed for chest pain 25 tablet 3     rosuvastatin (CRESTOR) 10 MG tablet Take 1 tablet (10 mg) by mouth every morning 90 tablet 3     tamsulosin (FLOMAX) 0.4  "MG capsule Take 1 capsule (0.4 mg) by mouth every evening 90 capsule 3     REVIEW OF SYSTEMS:  Limited secondary to cognitive impairment but today pt reports ok    Objective:  BP (!) 145/64   Pulse 60   Temp 97.3  F (36.3  C)   Resp 18   Ht 1.778 m (5' 10\")   Wt 77 kg (169 lb 12.8 oz)   SpO2 99%   BMI 24.36 kg/m    Exam:  GENERAL APPEARANCE:  Alert, in no distress, calm and pleasant   ENT:  Mouth and posterior oropharynx normal, moist mucous membranes, [place], bilateral cerumen but not impacted. Able to view tympanic membrane   EYES:  EOM, conjunctivae, lids, pupils and irises normal  RESP:  respiratory effort and palpation of chest normal  CV:  regular rate and rhythm, no murmur, rub, or gallop, trace edema  ABDOMEN:  no guarding or rebound  M/S:   mostly in WC  SKIN:  limited exam but intact to visualized areas   NEURO:   Cranial nerves 2-12 are normal tested and grossly at patient's baseline  PSYCH:  insight and judgement impaired, memory impaired     Labs:   CBC RESULTS: Recent Labs   Lab Test 05/31/23  1009 05/17/23  0635   WBC 9.3 9.8   RBC 3.71* 3.62*   HGB 11.1* 11.2*   HCT 37.5* 35.6*   * 98   MCH 29.9 30.9   MCHC 29.6* 31.5   RDW 15.7* 15.1*    227       Last Basic Metabolic Panel:  Recent Labs   Lab Test 05/31/23  1009 05/18/23  0623 05/17/23  0635     --  141   POTASSIUM 3.8  --  3.4   CHLORIDE 107  --  107   JANAY 9.4  --  9.2   CO2 22  --  20*   BUN 30.3*  --  21.7   CR 1.57*  --  1.26*   * 99 104*       Liver Function Studies -   Recent Labs   Lab Test 05/11/23  2101 12/10/22  1559   PROTTOTAL 8.2 8.5   ALBUMIN 3.4* 3.1*   BILITOTAL 0.4 0.3   ALKPHOS 96 87   AST 37 20   ALT 21 27       TSH   Date Value Ref Range Status   05/12/2023 3.45 0.30 - 4.20 uIU/mL Final   04/06/2021 1.80 0.40 - 4.00 mU/L Final   03/30/2021 2.20 0.40 - 4.00 mU/L Final       Lab Results   Component Value Date    A1C 5.9 03/16/2022    A1C 5.9 04/06/2021    A1C 5.6 12/02/2019 "     ASSESSMENT/PLAN:  (G31.83,  F02.84) Lewy body dementia with anxiety, unspecified dementia severity (H)  (primary encounter diagnosis)  (R46.89) Aggression  (R44.3) Hallucinations  (G47.00) Insomnia, unspecified type  Comment: chronic   Plan:   -Seroquel (caused some somnolence contributing to dysphagia). Zyprexa was not effective but will retry in  environment versus hospital. Start at 2.5 mg po BID PRN  -Trazodone 50 mg po daily at HS    (H61.20) Wax in ear  Comment: Did not tolerate manual removal with lighted curette today.   Plan:   -some buildup, no impaction and did not allow removal. Would need micro suction vac in ENT      Electronically signed by:  CAROLYN Smyth CNP

## 2023-06-28 NOTE — LETTER
6/28/2023        RE: Abbe Lambert  [address]  [facility] [address]        [place] [facility]  [place] [facility] Record Number:  [id number]  Place of Service where encounter took place:  [facility] [[id number]]  Chief Complaint   Patient presents with     Nursing Home Acute     Ear wax       HPI:    Abbe Lambert  is a 89 year old (2/3/1934), who is being seen today for an episodic care visit.  HPI information obtained from: facility chart records, facility staff and family/first contact DAUGHTER report. Today's concern is:    Follow up to chronic disease management. Wife would like his ears cleared of wax. Thinks it's interfering with his hearing.    Facility staff say he ramps up at times yelling and making threats. Won't let staff assist with ADLs. Calm and pleasant today. Epic chart reviewed and this behavior was seen inpatient. Also noted with insomnia that could be contributing to daytime issues.     Speech increasing diet.    Past Medical and Surgical History reviewed in Epic today.    MEDICATIONS:    Current Outpatient Medications   Medication Sig Dispense Refill     OLANZapine (ZYPREXA) 2.5 MG tablet Take 1 tablet (2.5 mg) by mouth 2 times daily as needed (hallucinations and agitation) 30 tablet 3     traZODone (DESYREL) 50 MG tablet Take 1 tablet (50 mg) by mouth At Bedtime 30 tablet 3     acetaminophen (TYLENOL) 325 MG tablet Take 2 tablets (650 mg) by mouth 2 times daily And twice daily as needed 30 tablet 11     carvedilol (COREG) 3.125 MG tablet Take 1 tablet (3.125 mg) by mouth 2 times daily (with meals) 180 tablet 3     Lidocaine (LIDOCARE) 4 % Patch Place 1 patch onto the skin every 24 hours To prevent lidocaine toxicity, patient should be patch free for 12 hrs daily.       nitroglycerin (NITROSTAT) 0.4 MG SL tablet Place 1 tablet (0.4 mg) under the tongue every 5 minutes as needed for chest pain 25 tablet 3     rosuvastatin (CRESTOR)  "10 MG tablet Take 1 tablet (10 mg) by mouth every morning 90 tablet 3     tamsulosin (FLOMAX) 0.4 MG capsule Take 1 capsule (0.4 mg) by mouth every evening 90 capsule 3     REVIEW OF SYSTEMS:  Limited secondary to cognitive impairment but today pt reports ok    Objective:  BP (!) 145/64   Pulse 60   Temp 97.3  F (36.3  C)   Resp 18   Ht 1.778 m (5' 10\")   Wt 77 kg (169 lb 12.8 oz)   SpO2 99%   BMI 24.36 kg/m    Exam:  GENERAL APPEARANCE:  Alert, in no distress, calm and pleasant   ENT:  Mouth and posterior oropharynx normal, moist mucous membranes, [place], bilateral cerumen but not impacted. Able to view tympanic membrane   EYES:  EOM, conjunctivae, lids, pupils and irises normal  RESP:  respiratory effort and palpation of chest normal  CV:  regular rate and rhythm, no murmur, rub, or gallop, trace edema  ABDOMEN:  no guarding or rebound  M/S:   mostly in WC  SKIN:  limited exam but intact to visualized areas   NEURO:   Cranial nerves 2-12 are normal tested and grossly at patient's baseline  PSYCH:  insight and judgement impaired, memory impaired     Labs:   CBC RESULTS: Recent Labs   Lab Test 05/31/23  1009 05/17/23  0635   WBC 9.3 9.8   RBC 3.71* 3.62*   HGB 11.1* 11.2*   HCT 37.5* 35.6*   * 98   MCH 29.9 30.9   MCHC 29.6* 31.5   RDW 15.7* 15.1*    227       Last Basic Metabolic Panel:  Recent Labs   Lab Test 05/31/23  1009 05/18/23  0623 05/17/23  0635     --  141   POTASSIUM 3.8  --  3.4   CHLORIDE 107  --  107   JANAY 9.4  --  9.2   CO2 22  --  20*   BUN 30.3*  --  21.7   CR 1.57*  --  1.26*   * 99 104*       Liver Function Studies -   Recent Labs   Lab Test 05/11/23  2101 12/10/22  1559   PROTTOTAL 8.2 8.5   ALBUMIN 3.4* 3.1*   BILITOTAL 0.4 0.3   ALKPHOS 96 87   AST 37 20   ALT 21 27       TSH   Date Value Ref Range Status   05/12/2023 3.45 0.30 - 4.20 uIU/mL Final   04/06/2021 1.80 0.40 - 4.00 mU/L Final   03/30/2021 2.20 0.40 - 4.00 mU/L Final       Lab Results   Component Value " Date    A1C 5.9 03/16/2022    A1C 5.9 04/06/2021    A1C 5.6 12/02/2019     ASSESSMENT/PLAN:  (G31.83,  F02.84) Lewy body dementia with anxiety, unspecified dementia severity (H)  (primary encounter diagnosis)  (R46.89) Aggression  (R44.3) Hallucinations  (G47.00) Insomnia, unspecified type  Comment: chronic   Plan:   -Seroquel (caused some somnolence contributing to dysphagia). Zyprexa was not effective but will retry in  environment versus hospital. Start at 2.5 mg po BID PRN  -Trazodone 50 mg po daily at HS    (H61.20) Wax in ear  Comment: Did not tolerate manual removal with lighted curette today.   Plan:   -some buildup, no impaction and did not allow removal. Would need micro suction vac in ENT      Electronically signed by:  CAROLYN Smyth CNP               Sincerely,        CAROLYN Smyth CNP

## 2023-06-28 NOTE — LETTER
Abbe Lambert orders     Begin OLANZapine (ZYPREXA) 2.5 MG tablet Take 1 tablet (2.5 mg) by mouth 2 times daily as needed (hallucinations and agitation)    Begin traZODone (DESYREL) 50 MG tablet Take 1 tablet (50 mg) by mouth At Bedtime         - Discontinue Seroquel     -begin bowel monitoring and upload to Epic     CAROLYN Smyth CNP on 6/28/2023 at 6:33 PM

## 2023-07-12 NOTE — LETTER
Abbe Lambert orders:    Begin   ciprofloxacin (CIPRO) 500 MG tablet Take 1 tablet (500 mg) by mouth daily for 5 days       CAROLYN Smyth CNP on 7/16/2023 at 5:37 PM

## 2023-07-12 NOTE — PROGRESS NOTES
[facility]  [place] Medical Record Number:  [id number]  Place of Service where encounter took place:  [facility] [[id number]]  Chief Complaint   Patient presents with     Nursing Home Acute       HPI:    Abbe Lambert  is a 89 year old (2/3/1934), who is being seen today for an episodic care visit.  HPI information obtained from: facility chart records, facility staff and family/first contact daughter report. Today's concern is:    Ongoing aggression and agitation. Also with a fall this week and not sleeping. Trazodone was started on . UA done and UC resulted with >100K Klebsiella however [place] number used is different so not in this chart? See  MRN: [id number] which has same  and name. Noted this happens often with onsite labs.    Past Medical and Surgical History reviewed in Epic today.    MEDICATIONS:    Current Outpatient Medications   Medication Sig Dispense Refill     OLANZapine (ZYPREXA) 2.5 MG tablet Take 1 tablet (2.5 mg) by mouth 2 times daily And once daily as needed 90 tablet 3     acetaminophen (TYLENOL) 325 MG tablet Take 2 tablets (650 mg) by mouth 2 times daily And twice daily as needed 30 tablet 11     carvedilol (COREG) 3.125 MG tablet Take 1 tablet (3.125 mg) by mouth 2 times daily (with meals) 180 tablet 3     Lidocaine (LIDOCARE) 4 % Patch Place 1 patch onto the skin every 24 hours To prevent lidocaine toxicity, patient should be patch free for 12 hrs daily.       nitroglycerin (NITROSTAT) 0.4 MG SL tablet Place 1 tablet (0.4 mg) under the tongue every 5 minutes as needed for chest pain 25 tablet 3     rosuvastatin (CRESTOR) 10 MG tablet Take 1 tablet (10 mg) by mouth every morning 90 tablet 3     tamsulosin (FLOMAX) 0.4 MG capsule Take 1 capsule (0.4 mg) by mouth every evening 90 capsule 3     traZODone (DESYREL) 50 MG tablet Take 1 tablet (50 mg) by mouth At Bedtime 30 tablet 3     REVIEW OF SYSTEMS:  Unobtainable secondary to cognitive  "impairment.     Objective:  BP (!) 145/64   Pulse 60   Temp 97.3  F (36.3  C)   Resp 18   Ht 1.778 m (5' 10\")   Wt 77 kg (169 lb 12.8 oz)   SpO2 99%   BMI 24.36 kg/m    Exam:  GENERAL APPEARANCE:  Alert, in no distress, calm and pleasant   ENT:  Mouth and posterior oropharynx normal, moist mucous membranes, [place], bilateral cerumen but not impacted. Able to view tympanic membrane   EYES:  EOM, conjunctivae, lids, pupils and irises normal  RESP:  respiratory effort and palpation of chest normal  CV:  regular rate and rhythm, no murmur, rub, or gallop, trace edema  ABDOMEN:  no guarding or rebound  M/S:   mostly in WC  SKIN:  limited exam but intact to visualized areas   NEURO:   Cranial nerves 2-12 are normal tested and grossly at patient's baseline  PSYCH:  insight and judgement impaired, memory impaired    Labs:    Recent labs in [facility] reviewed by me today.      CrcL: 29.57 with most recent labs used    ASSESSMENT/PLAN:  (G31.83,  F02.84) Lewy body dementia with anxiety, unspecified dementia severity (H)  (primary encounter diagnosis)  (R46.89) Aggression  Comment: med adjustment today  Plan:   -OLANZapine (ZYPREXA) 2.5 MG tablet BID and daily prn      (N30.00) Acute cystitis without hematuria  Comment: renal dosed  Plan:   -Cipro 500 mg po daily x 5 days        Electronically signed by:  Brad Porter, CAROLYN CNP         "

## 2023-07-12 NOTE — LETTER
2023        RE: Abbe Lambert  [address]  [facility] [address]        [place] [facility]  [facility] Record Number:  [id number]  Place of Service where encounter took place:  [facility] [[id number]]  Chief Complaint   Patient presents with     Nursing Home Acute       HPI:    Abbe Lambert  is a 89 year old (2/3/1934), who is being seen today for an episodic care visit.  HPI information obtained from: facility chart records, facility staff and family/first contact daughter report. Today's concern is:    Ongoing aggression and agitation. Also with a fall this week and not sleeping. Trazodone was started on . UA done and UC resulted with >100K Klebsiella however [place] number used is different so not in this chart? See  MRN: [id number] which has same  and name. Noted this happens often with onsite labs.    Past Medical and Surgical History reviewed in Epic today.    MEDICATIONS:    Current Outpatient Medications   Medication Sig Dispense Refill     OLANZapine (ZYPREXA) 2.5 MG tablet Take 1 tablet (2.5 mg) by mouth 2 times daily And once daily as needed 90 tablet 3     acetaminophen (TYLENOL) 325 MG tablet Take 2 tablets (650 mg) by mouth 2 times daily And twice daily as needed 30 tablet 11     carvedilol (COREG) 3.125 MG tablet Take 1 tablet (3.125 mg) by mouth 2 times daily (with meals) 180 tablet 3     Lidocaine (LIDOCARE) 4 % Patch Place 1 patch onto the skin every 24 hours To prevent lidocaine toxicity, patient should be patch free for 12 hrs daily.       nitroglycerin (NITROSTAT) 0.4 MG SL tablet Place 1 tablet (0.4 mg) under the tongue every 5 minutes as needed for chest pain 25 tablet 3     rosuvastatin (CRESTOR) 10 MG tablet Take 1 tablet (10 mg) by mouth every morning 90 tablet 3     tamsulosin (FLOMAX) 0.4 MG capsule Take 1 capsule (0.4 mg) by mouth every evening 90 capsule 3     traZODone (DESYREL) 50 MG tablet Take 1 tablet (50  "mg) by mouth At Bedtime 30 tablet 3     REVIEW OF SYSTEMS:  Unobtainable secondary to cognitive impairment.     Objective:  BP (!) 145/64   Pulse 60   Temp 97.3  F (36.3  C)   Resp 18   Ht 1.778 m (5' 10\")   Wt 77 kg (169 lb 12.8 oz)   SpO2 99%   BMI 24.36 kg/m    Exam:  GENERAL APPEARANCE:  Alert, in no distress, calm and pleasant   ENT:  Mouth and posterior oropharynx normal, moist mucous membranes, [place], bilateral cerumen but not impacted. Able to view tympanic membrane   EYES:  EOM, conjunctivae, lids, pupils and irises normal  RESP:  respiratory effort and palpation of chest normal  CV:  regular rate and rhythm, no murmur, rub, or gallop, trace edema  ABDOMEN:  no guarding or rebound  M/S:   mostly in WC  SKIN:  limited exam but intact to visualized areas   NEURO:   Cranial nerves 2-12 are normal tested and grossly at patient's baseline  PSYCH:  insight and judgement impaired, memory impaired    Labs:    Recent labs in [facility] reviewed by me today.      CrcL: 29.57 with most recent labs used    ASSESSMENT/PLAN:  (G31.83,  F02.84) Lewy body dementia with anxiety, unspecified dementia severity (H)  (primary encounter diagnosis)  (R46.89) Aggression  Comment: med adjustment today  Plan:   -OLANZapine (ZYPREXA) 2.5 MG tablet BID and daily prn      (N30.00) Acute cystitis without hematuria  Comment: renal dosed  Plan:   -Cipro 500 mg po daily x 5 days        Electronically signed by:  CAROLYN Smyth CNP               Sincerely,        CAROLYN Smyth CNP      "

## 2023-07-19 NOTE — TELEPHONE ENCOUNTER
[facility] Call Center    Phone Message    May a detailed message be left on voicemail: yes     Reason for Call: Other: Pt's daughter states the pt was exposed to COVID and she wants to know if his appt on the 24th should be rescheduled or not, pt is not currently having any symptoms. Please reach out to further discuss.     Action Taken: Other: Cardiology    Travel Screening: Not Applicable     Thank you!  [facility]

## 2023-07-20 NOTE — TELEPHONE ENCOUNTER
Future Appointments   Date Time Provider Department Center   7/24/2023  9:45 AM [facility]   7/24/2023 10:30 AM [facility]   8/16/2023 12:00 AM [facility] TECH1 [facility]   8/29/2023  1:00 PM Brandon Barahona MD [facility]      7/20/23 Call back to daughter. Una reports patient was exposed at Facility but has no symptoms and has not tested positive for COVID. Reviewed with daughter that is seeing Natasha Alexis PA-C and usually sees Cherie Queen PA-C but does not have openings. Daughter will keep visit and let us know if symptoms or changes.  Madison Posey RN 07/20/23 12:56 PM

## 2023-07-24 NOTE — LETTER
2023    Brad Porter, CAROLYN CNP  [address] [facility] [address]    RE: Abbe Machucane       Dear Colleague,     I had the pleasure of seeing Abbe Lambert in the [facility].    Cardiology Clinic Progress Note    Service Date: 2023    Primary Cardiologist: Dr. Eli      Reason for Visit: CAD, hx of ICM, PPM     HPI:   Abbe Lambert is a pleasant 89-year-old gentleman with past medical history seen for the followin.  Cardiac arrest in  with CABG at that time with a LIMA to the LAD, LISA to the RCA and saphenous vein graft to the circumflex.  He has a history of stenting to the diagonal in .  Last angiogram was  that showed a 50% distal RCA with competitive flow from the bypass, occluded LAD with a patent LIMA, and a patent stent to the diagonal.  Circumflex had just 50% lesion.  2.  Dyslipidemia  3.  Paroxysmal atrial fibrillation with history of left atrial appendage ligation and maze procedure at the time of surgery  4.  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  Abdominal aortic aneurysm at 4.1 cm  6.  Dementia, potentially Lewy body dementia.    I am meeting done today for the first time.  Unfortunately with his dementia he is not able to provide much history.  When he was last seen by Dr. Crowell he had significant peripheral edema and they discussed elevation and compression stockings.  Since then he was admitted in May for muscle stiffness when they were unable to bend his legs.  At that point there was concern that this is potentially Lewy body dementia.  He was discharged.  He is now living in memory care at [place].  He denies chest pain or shortness of breath.  His daughter and wife who are present notes that he never complains of either.  He has not had any falls.  He tends to slouch forward now for unclear reasons and they are using a lift often.      Social History:  He comes in today with his wife Madison and  "his daughter Pauline.  Patient lives at [facility] in [place] and his wife lives in a separate independent living in [place].    Physical Exam:  /65   Pulse 60   Ht 1.778 m (5' 10\")   Wt 72.6 kg (160 lb)   SpO2 98%   BMI 22.96 kg/m     Wt Readings from Last 5 Encounters:   07/24/23 72.6 kg (160 lb)   07/12/23 77 kg (169 lb 12.8 oz)   06/28/23 77 kg (169 lb 12.8 oz)   06/06/23 78.9 kg (174 lb)   06/05/23 79 kg (174 lb 1.6 oz)      Elderly, frail-appearing gentleman who is bent over in his chair.  Appears comfortable.  Answers yes and no questions.  His heart is regular in rate rhythm I do not appreciate murmur rub or gallop.  Lungs are clear without wheezes rales or rhonchi.  Extremities without peripheral edema.  Patient is thin and cachectic.  Skin is warm and dry.    Labs and Imagine reviewed:  Echocardiogram reviewed done in May 2022 shows an EF of 60 to 65% with mild tricuspid regurg and no other significant valvular disease.  Last device check done while hospitalized 5/11 does not show any A-fib.  7 years battery life.    Assessment and Plan:  1.  Coronary artery disease with history of CABG in 2/19/1998 and patent grafts and 2014.  Recent echocardiogram shows completely normal ejection fraction suggesting that things would likely still be patent.  Patient does not verbalize any complaints of shortness of breath or chest pain nor does the family indicate he ever does.  At this point given his severe dementia it would be medical management regardless.  At this point we will keep him just on carvedilol as he has been on and rosuvastatin 10.  If he ever decides to do palliation can certainly consider discontinuing both of those.      2.  Dyslipidemia.  Reasonable control last LDL 80 HDL 30 total cholesterol 130 I suspect this will be improved as he was forgetting medications previously he is now in memory care and his meds are being administered can repeat lipid panel next morning.      3.  Abdominal " aortic aneurysm at 4.1 cm no further surveillance    4.  Hypertension well-controlled    5.  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  Patient also has paroxysmal atrial fibrillation and given that were not seen a lot of A-fib, he has dementia, is a fall risk and has a left atrial appendage ligation will not start anticoagulation.  He does need a device check and I will reach out to our pacemaker team to get that set up for remote monitoring now that he has had a work-up.    6.  Lewy body dementia, fairly significant management per geriatric provider.    Thank you for allowing me to precipitate in this delightful patient's care.  He can follow-up in April with Dr. Crowell, he will call sooner with concerns.    Natasha Alexis PA-C  [facility]     40 total minutes was spent today including chart review, precharting, history and exam, post visit documentation, and reviewing studies as outlined above.   Orders this visit:  Orders Placed This Encounter   Procedures    Follow-Up with Cardiology     No orders of the defined types were placed in this encounter.    There are no discontinued medications.  Encounter Diagnoses   Name Primary?    Ischemic cardiomyopathy     Peripheral edema     Coronary artery disease involving native coronary artery of native heart without angina pectoris Yes       Current Medications:  Current Outpatient Medications   Medication Sig Dispense Refill    acetaminophen (TYLENOL) 325 MG tablet Take 2 tablets (650 mg) by mouth 2 times daily And twice daily as needed 360 tablet 3    carvedilol (COREG) 3.125 MG tablet Take 1 tablet (3.125 mg) by mouth 2 times daily (with meals) 180 tablet 3    LIDOCAINE PAIN RELIEF 4 % Patch APPLY 1 PATCH TO SKIN EVERY 24 HOURS (ON FOR 12 HOURS, OFF FOR 12 HOURS) 30 patch 11    nitroglycerin (NITROSTAT) 0.4 MG SL tablet Place 1 tablet (0.4 mg) under the tongue every 5 minutes as needed for chest pain 25 tablet 3    OLANZapine (ZYPREXA)  2.5 MG tablet Take 1 tablet (2.5 mg) by mouth 2 times daily And once daily as needed 90 tablet 3    rosuvastatin (CRESTOR) 10 MG tablet Take 1 tablet (10 mg) by mouth every morning 90 tablet 3    tamsulosin (FLOMAX) 0.4 MG capsule Take 1 capsule (0.4 mg) by mouth every evening 90 capsule 3    traZODone (DESYREL) 50 MG tablet Take 1 tablet (50 mg) by mouth At Bedtime 30 tablet 3       Allergies:  Allergies   Allergen Reactions    Fluticasone-Salmeterol      DENIED    Morphine Hcl      Decrease  Blood Pressure Lower Than Normal, Per Pt.    Vicodin [Hydrocodone-Acetaminophen] Visual Disturbance       Past Medical, Surgical and Family History:  Past Medical History:   Diagnosis Date    AAA (abdominal aortic aneurysm) (H)     Anemia due to blood loss, acute 10/10/2016    Asthma     Atrial fibrillation (H)     s/p left atrial appendage ligation    Cardiac arrest (H)     Cardiomyopathy (H)     Chronic kidney disease     Chronic sinusitis     Coronary artery disease     cardiac cath 2014: medical management; cath 2013: PTCA to diagonal; cath 2009: medical management; CABG 1998: LIMA to LAD, LISA to RCA, SVG to circumflex    GERD (gastroesophageal reflux disease)     History of angina     Hyperlipidaemia     Hypertension, goal below 140/90     Myocardial infarction (H)     MI with Vfib arrest 1998    Polymyalgia rheumatica (H)     Shingles 10/28/2016    Shortness of breath     Tachy-alix syndrome (H)     generator replacement 9/2020; implanted dual chamber pacemaker 2012     Past Surgical History:   Procedure Laterality Date    ARTHROPLASTY KNEE Left 10/5/2016    Procedure: ARTHROPLASTY KNEE;  Surgeon: Keshav Zamudio MD;  Location:  OR    CARDIAC SURGERY  12/03/2012    pacemaker ; CABG 1998    CHOLECYSTECTOMY      COLONOSCOPY      ENT SURGERY  5-    sinus    EP PACEMAKER N/A 9/11/2020    Procedure: EP Pacemaker;  Surgeon: Caron Guerin MD;  Location:  HEART CARDIAC CATH LAB    ESOPHAGOSCOPY,  GASTROSCOPY, DUODENOSCOPY (EGD), COMBINED N/A 12/3/2019    Procedure: ESOPHAGOGASTRODUODENOSCOPY (EGD) (MAC);  Surgeon: Calderon Herman MD;  Location:  GI    ESOPHAGOSCOPY, GASTROSCOPY, DUODENOSCOPY (EGD), DILATATION, COMBINED N/A 12/3/2019    Procedure: Esophagoscopy, gastroscopy, duodenoscopy (EGD), dilatation, combined;  Surgeon: Calderon Herman MD;  Location:  GI    EXTRACAPSULAR CATARACT EXTRATION WITH INTRAOCULAR LENS IMPLANT      GENITOURINARY SURGERY      prostatectomy    IMPLANT PACEMAKER  12-3-12    [facility]    PROSTATE SURGERY       Family History   Problem Relation Age of Onset    Cerebrovascular Disease Father     Heart Disease Father     Heart Disease Brother     Coronary Artery Disease Brother         MI age 50    Depression Daughter         raped in high school    Unknown/Adopted Maternal Grandmother     Unknown/Adopted Maternal Grandfather     Unknown/Adopted Paternal Grandmother     Unknown/Adopted Paternal Grandfather         Review of Systems:  Skin:        Eyes:       ENT:       Respiratory:  Negative    Cardiovascular:  Negative;palpitations;chest pain;dizziness;lightheadedness Positive for;edema;exercise intolerance;fatigue  Gastroenterology:      Genitourinary:       Musculoskeletal:       Neurologic:       Psychiatric:       Heme/Lymph/Imm:       Endocrine:  Negative       Recent Lab Results:  LIPID RESULTS:  Lab Results   Component Value Date    CHOL 130 04/04/2023    CHOL 92 04/14/2021    HDL 30 (L) 04/04/2023    HDL 46 04/14/2021    LDL 80 04/04/2023    LDL 27 04/14/2021    TRIG 100 04/04/2023    TRIG 94 04/14/2021    CHOLHDLRATIO 3.3 10/08/2015     LIVER ENZYME RESULTS:  Lab Results   Component Value Date    AST 37 05/11/2023    AST 44 04/06/2021    ALT 21 05/11/2023    ALT 82 (H) 04/14/2021     CBC RESULTS:  Lab Results   Component Value Date    WBC 9.3 05/31/2023    WBC 7.9 03/31/2021    RBC 3.71 (L) 05/31/2023    RBC 3.45 (L) 03/31/2021    HGB 11.1 (L) 05/31/2023    HGB  10.7 (L) 03/31/2021    HCT 37.5 (L) 05/31/2023    HCT 33.7 (L) 03/31/2021     (H) 05/31/2023    MCV 98 03/31/2021    MCH 29.9 05/31/2023    MCH 31.0 03/31/2021    MCHC 29.6 (L) 05/31/2023    MCHC 31.8 03/31/2021    RDW 15.7 (H) 05/31/2023    RDW 14.4 03/31/2021     05/31/2023     03/31/2021     BMP RESULTS:  Lab Results   Component Value Date     05/31/2023     04/14/2021    POTASSIUM 3.8 05/31/2023    POTASSIUM 4.5 12/10/2022    POTASSIUM 4.0 04/14/2021    CHLORIDE 107 05/31/2023    CHLORIDE 108 12/10/2022    CHLORIDE 107 04/14/2021    CO2 22 05/31/2023    CO2 27 12/10/2022    CO2 29 04/14/2021    ANIONGAP 14 05/31/2023    ANIONGAP 1 (L) 12/10/2022    ANIONGAP 3 04/14/2021     (H) 05/31/2023    GLC 99 05/18/2023    GLC 92 12/10/2022    GLC 89 04/14/2021    BUN 30.3 (H) 05/31/2023    BUN 31 (H) 12/10/2022    BUN 21 04/14/2021    CR 1.57 (H) 05/31/2023    CR 1.51 (H) 04/14/2021    GFRESTIMATED 42 (L) 05/31/2023    GFRESTIMATED 41 (L) 04/14/2021    GFRESTBLACK 47 (L) 04/14/2021    JANAY 9.4 05/31/2023    JANAY 9.2 04/14/2021      A1C RESULTS:  Lab Results   Component Value Date    A1C 5.9 (H) 03/16/2022    A1C 5.9 (H) 04/06/2021     INR RESULTS:  Lab Results   Component Value Date    INR 0.94 04/25/2014    INR 1.0 04/24/2014       CC  Brandon Eli MD  [id number]  [place] [address]        Thank you for allowing me to participate in the care of your patient.      Sincerely,     HELEN Fernandez [facility]

## 2023-07-24 NOTE — PROGRESS NOTES
"  Cardiology Clinic Progress Note    Service Date: 2023    Primary Cardiologist: Dr. Eli      Reason for Visit: CAD, hx of ICM, PPM     HPI:   Abbe Lambert is a pleasant 89-year-old gentleman with past medical history seen for the followin.  Cardiac arrest in  with CABG at that time with a LIMA to the LAD, LISA to the RCA and saphenous vein graft to the circumflex.  He has a history of stenting to the diagonal in .  Last angiogram was  that showed a 50% distal RCA with competitive flow from the bypass, occluded LAD with a patent LIMA, and a patent stent to the diagonal.  Circumflex had just 50% lesion.  2.  Dyslipidemia  3.  Paroxysmal atrial fibrillation with history of left atrial appendage ligation and maze procedure at the time of surgery  4.  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  Abdominal aortic aneurysm at 4.1 cm  6.  Dementia, potentially Lewy body dementia.    I am meeting done today for the first time.  Unfortunately with his dementia he is not able to provide much history.  When he was last seen by Dr. Crowell he had significant peripheral edema and they discussed elevation and compression stockings.  Since then he was admitted in May for muscle stiffness when they were unable to bend his legs.  At that point there was concern that this is potentially Lewy body dementia.  He was discharged.  He is now living in memory care at [place].  He denies chest pain or shortness of breath.  His daughter and wife who are present notes that he never complains of either.  He has not had any falls.  He tends to slouch forward now for unclear reasons and they are using a lift often.      Social History:  He comes in today with his wife Madison and his daughter Pauline.  Patient lives at memory [facility] in [place] and his wife lives in a separate independent living in [place].    Physical Exam:  /65   Pulse 60   Ht 1.778 m (5' 10\")   Wt 72.6 kg (160 lb)   " SpO2 98%   BMI 22.96 kg/m     Wt Readings from Last 5 Encounters:   07/24/23 72.6 kg (160 lb)   07/12/23 77 kg (169 lb 12.8 oz)   06/28/23 77 kg (169 lb 12.8 oz)   06/06/23 78.9 kg (174 lb)   06/05/23 79 kg (174 lb 1.6 oz)      Elderly, frail-appearing gentleman who is bent over in his chair.  Appears comfortable.  Answers yes and no questions.  His heart is regular in rate rhythm I do not appreciate murmur rub or gallop.  Lungs are clear without wheezes rales or rhonchi.  Extremities without peripheral edema.  Patient is thin and cachectic.  Skin is warm and dry.    Labs and Imagine reviewed:  Echocardiogram reviewed done in May 2022 shows an EF of 60 to 65% with mild tricuspid regurg and no other significant valvular disease.  Last device check done while hospitalized 5/11 does not show any A-fib.  7 years battery life.    Assessment and Plan:  1.  Coronary artery disease with history of CABG in 2/19/1998 and patent grafts and 2014.  Recent echocardiogram shows completely normal ejection fraction suggesting that things would likely still be patent.  Patient does not verbalize any complaints of shortness of breath or chest pain nor does the family indicate he ever does.  At this point given his severe dementia it would be medical management regardless.  At this point we will keep him just on carvedilol as he has been on and rosuvastatin 10.  If he ever decides to do palliation can certainly consider discontinuing both of those.      2.  Dyslipidemia.  Reasonable control last LDL 80 HDL 30 total cholesterol 130 I suspect this will be improved as he was forgetting medications previously he is now in memory care and his meds are being administered can repeat lipid panel next morning.      3.  Abdominal aortic aneurysm at 4.1 cm no further surveillance    4.  Hypertension well-controlled    5.  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  Patient also has paroxysmal atrial fibrillation and given that  were not seen a lot of A-fib, he has dementia, is a fall risk and has a left atrial appendage ligation will not start anticoagulation.  He does need a device check and I will reach out to our pacemaker team to get that set up for remote monitoring now that he has had a work-up.    6.  Lewy body dementia, fairly significant management per geriatric provider.    Thank you for allowing me to precipitate in this delightful patient's care.  He can follow-up in April with Dr. Crowell, he will call sooner with concerns.    Natasha Alexis PA-C  [facility]     40 total minutes was spent today including chart review, precharting, history and exam, post visit documentation, and reviewing studies as outlined above.   Orders this visit:  Orders Placed This Encounter   Procedures     Follow-Up with Cardiology     No orders of the defined types were placed in this encounter.    There are no discontinued medications.  Encounter Diagnoses   Name Primary?     Ischemic cardiomyopathy      Peripheral edema      Coronary artery disease involving native coronary artery of native heart without angina pectoris Yes       Current Medications:  Current Outpatient Medications   Medication Sig Dispense Refill     acetaminophen (TYLENOL) 325 MG tablet Take 2 tablets (650 mg) by mouth 2 times daily And twice daily as needed 360 tablet 3     carvedilol (COREG) 3.125 MG tablet Take 1 tablet (3.125 mg) by mouth 2 times daily (with meals) 180 tablet 3     LIDOCAINE PAIN RELIEF 4 % Patch APPLY 1 PATCH TO SKIN EVERY 24 HOURS (ON FOR 12 HOURS, OFF FOR 12 HOURS) 30 patch 11     nitroglycerin (NITROSTAT) 0.4 MG SL tablet Place 1 tablet (0.4 mg) under the tongue every 5 minutes as needed for chest pain 25 tablet 3     OLANZapine (ZYPREXA) 2.5 MG tablet Take 1 tablet (2.5 mg) by mouth 2 times daily And once daily as needed 90 tablet 3     rosuvastatin (CRESTOR) 10 MG tablet Take 1 tablet (10 mg) by mouth every morning 90 tablet 3      tamsulosin (FLOMAX) 0.4 MG capsule Take 1 capsule (0.4 mg) by mouth every evening 90 capsule 3     traZODone (DESYREL) 50 MG tablet Take 1 tablet (50 mg) by mouth At Bedtime 30 tablet 3       Allergies:  Allergies   Allergen Reactions     Fluticasone-Salmeterol      DENIED     Morphine Hcl      Decrease  Blood Pressure Lower Than Normal, Per Pt.     Vicodin [Hydrocodone-Acetaminophen] Visual Disturbance       Past Medical, Surgical and Family History:  Past Medical History:   Diagnosis Date     AAA (abdominal aortic aneurysm) (H)      Anemia due to blood loss, acute 10/10/2016     Asthma      Atrial fibrillation (H)     s/p left atrial appendage ligation     Cardiac arrest (H)      Cardiomyopathy (H)      Chronic kidney disease      Chronic sinusitis      Coronary artery disease     cardiac cath 2014: medical management; cath 2013: PTCA to diagonal; cath 2009: medical management; CABG 1998: LIMA to LAD, LISA to RCA, SVG to circumflex     GERD (gastroesophageal reflux disease)      History of angina      Hyperlipidaemia      Hypertension, goal below 140/90      Myocardial infarction (H)     MI with Vfib arrest 1998     Polymyalgia rheumatica (H)      Shingles 10/28/2016     Shortness of breath      Tachy-alix syndrome (H)     generator replacement 9/2020; implanted dual chamber pacemaker 2012     Past Surgical History:   Procedure Laterality Date     ARTHROPLASTY KNEE Left 10/5/2016    Procedure: ARTHROPLASTY KNEE;  Surgeon: Keshav Zamudio MD;  Location:  OR     CARDIAC SURGERY  12/03/2012    pacemaker ; CABG 1998     CHOLECYSTECTOMY       COLONOSCOPY       ENT SURGERY  5-    sinus     EP PACEMAKER N/A 9/11/2020    Procedure: EP Pacemaker;  Surgeon: Caron Guerin MD;  Location:  HEART CARDIAC CATH LAB     ESOPHAGOSCOPY, GASTROSCOPY, DUODENOSCOPY (EGD), COMBINED N/A 12/3/2019    Procedure: ESOPHAGOGASTRODUODENOSCOPY (EGD) (MAC);  Surgeon: Calderon Herman MD;  Location:  GI      ESOPHAGOSCOPY, GASTROSCOPY, DUODENOSCOPY (EGD), DILATATION, COMBINED N/A 12/3/2019    Procedure: Esophagoscopy, gastroscopy, duodenoscopy (EGD), dilatation, combined;  Surgeon: Calderon Herman MD;  Location:  GI     EXTRACAPSULAR CATARACT EXTRATION WITH INTRAOCULAR LENS IMPLANT       GENITOURINARY SURGERY      prostatectomy     IMPLANT PACEMAKER  12-3-12    [facility]     PROSTATE SURGERY       Family History   Problem Relation Age of Onset     Cerebrovascular Disease Father      Heart Disease Father      Heart Disease Brother      Coronary Artery Disease Brother         MI age 50     Depression Daughter         raped in high school     Unknown/Adopted Maternal Grandmother      Unknown/Adopted Maternal Grandfather      Unknown/Adopted Paternal Grandmother      Unknown/Adopted Paternal Grandfather         Review of Systems:  Skin:        Eyes:       ENT:       Respiratory:  Negative    Cardiovascular:  Negative;palpitations;chest pain;dizziness;lightheadedness Positive for;edema;exercise intolerance;fatigue  Gastroenterology:      Genitourinary:       Musculoskeletal:       Neurologic:       Psychiatric:       Heme/Lymph/Imm:       Endocrine:  Negative       Recent Lab Results:  LIPID RESULTS:  Lab Results   Component Value Date    CHOL 130 04/04/2023    CHOL 92 04/14/2021    HDL 30 (L) 04/04/2023    HDL 46 04/14/2021    LDL 80 04/04/2023    LDL 27 04/14/2021    TRIG 100 04/04/2023    TRIG 94 04/14/2021    CHOLHDLRATIO 3.3 10/08/2015     LIVER ENZYME RESULTS:  Lab Results   Component Value Date    AST 37 05/11/2023    AST 44 04/06/2021    ALT 21 05/11/2023    ALT 82 (H) 04/14/2021     CBC RESULTS:  Lab Results   Component Value Date    WBC 9.3 05/31/2023    WBC 7.9 03/31/2021    RBC 3.71 (L) 05/31/2023    RBC 3.45 (L) 03/31/2021    HGB 11.1 (L) 05/31/2023    HGB 10.7 (L) 03/31/2021    HCT 37.5 (L) 05/31/2023    HCT 33.7 (L) 03/31/2021     (H) 05/31/2023    MCV 98 03/31/2021    MCH 29.9 05/31/2023    MCH  31.0 03/31/2021    MCHC 29.6 (L) 05/31/2023    MCHC 31.8 03/31/2021    RDW 15.7 (H) 05/31/2023    RDW 14.4 03/31/2021     05/31/2023     03/31/2021     BMP RESULTS:  Lab Results   Component Value Date     05/31/2023     04/14/2021    POTASSIUM 3.8 05/31/2023    POTASSIUM 4.5 12/10/2022    POTASSIUM 4.0 04/14/2021    CHLORIDE 107 05/31/2023    CHLORIDE 108 12/10/2022    CHLORIDE 107 04/14/2021    CO2 22 05/31/2023    CO2 27 12/10/2022    CO2 29 04/14/2021    ANIONGAP 14 05/31/2023    ANIONGAP 1 (L) 12/10/2022    ANIONGAP 3 04/14/2021     (H) 05/31/2023    GLC 99 05/18/2023    GLC 92 12/10/2022    GLC 89 04/14/2021    BUN 30.3 (H) 05/31/2023    BUN 31 (H) 12/10/2022    BUN 21 04/14/2021    CR 1.57 (H) 05/31/2023    CR 1.51 (H) 04/14/2021    GFRESTIMATED 42 (L) 05/31/2023    GFRESTIMATED 41 (L) 04/14/2021    GFRESTBLACK 47 (L) 04/14/2021    JANAY 9.4 05/31/2023    JANAY 9.2 04/14/2021      A1C RESULTS:  Lab Results   Component Value Date    A1C 5.9 (H) 03/16/2022    A1C 5.9 (H) 04/06/2021     INR RESULTS:  Lab Results   Component Value Date    INR 0.94 04/25/2014    INR 1.0 04/24/2014       CC  Brandon Eli MD  [address]  [place] [id number]

## 2023-07-24 NOTE — PATIENT INSTRUCTIONS
Thank you for visiting with me today.    We discussed: please bring the pacemaker box to his room and plug it in.  I'll have the pacemaker nurses do a check from his room.      Medication changes:  continue current medications.      We'll let you know the lab results when we get them.      He does not need compression stockings.      Follow up: with Dr. Eli in April.        Please call my nurse, Madison, at [phone] with any questions or concerns.    Scheduling phone number: [phone]  Reminder: Please bring in all current medications, over the counter supplements and vitamin bottles to your next appointment.

## 2023-07-25 NOTE — TELEPHONE ENCOUNTER
Spoke to patient's daughter, Una, reviewed labs and message from Natasha Alexis. She verbalized understanding, will discuss with primary.

## 2023-07-25 NOTE — TELEPHONE ENCOUNTER
Labs were not back at the time of clinic visit.  Cholesterol is good.  Continue low dose crestor.    Cr and BUN elevated potentially secondary to fasting.  Pt is not on any medications that would affect renal function, but could have been mildly dehydrated if he fasted.  Given his severe dementia, I do not think he needs nephrology consult.  Pls let daughter know results, renal function can be followed by facility provider.    Natasha Alexis PA-C 7/25/2023 12:10 PM

## 2023-07-25 NOTE — TELEPHONE ENCOUNTER
CMP and FLP/s were done yesterday.  7-24-23. Please review.     Last OV Natasha HARTLEY. JUAN A OV 7-24-23   Labs and Imagine reviewed:  Echocardiogram reviewed done in May 2022 shows an EF of 60 to 65% with mild tricuspid regurg and no other significant valvular disease.  Last device check done while hospitalized 5/11 does not show any A-fib.  7 years battery life.  -   1. At this point given his severe dementia it would be medical management regardless.  At this point we will keep him just on carvedilol as he has been on and rosuvastatin 10.  If he ever decides to do palliation can certainly consider discontinuing both of those.     2.  Dyslipidemia.  Reasonable control last LDL 80 HDL 30 total cholesterol 130 I suspect this will be improved as he was forgetting medications previously he is now in memory care and his meds are being administered can repeat lipid panel next morning     Assessment and Plan:  Assessment and Plan:  1.  Coronary artery disease with history of CABG in 2/19/1998 and patent grafts and 2014.  Recent echocardiogram shows completely normal ejection fraction suggesting that things would likely still be patent.  Patient does not verbalize any complaints of shortness of breath or chest pain nor does the family indicate he ever does.  At this point given his severe dementia it would be medical management regardless.  At this point we will keep him just on carvedilol as he has been on and rosuvastatin 10.  If he ever decides to do palliation can certainly consider discontinuing both of those.     2.  Dyslipidemia.  Reasonable control last LDL 80 HDL 30 total cholesterol 130 I suspect this will be improved as he was forgetting medications previously he is now in memory care and his meds are being administered can repeat lipid panel next morning.     3.  Abdominal aortic aneurysm at 4.1 cm no further surveillance     4.  Hypertension well-controlled

## 2023-07-26 NOTE — LETTER
7/26/2023        RE: Abbe Lambert  [address]  [place] [address]        No notes on file      Sincerely,        Latisha Robb MD

## 2023-08-12 NOTE — PROGRESS NOTES
"Abbe Lambert is a 89 year old male seen July 26, 2023 at [facility] unit where he has resided for one month (admit 6/2023) seen for initial visit.   Pt is seen on the unit up to .   States he is \"feeling fine\"  Attempts to stand at one point, but staff note he is an EZ stand transfer.  Pt states \"My brother used to be here\"   Denies pain, dyspnea or CV symptoms     AL nursing staff has reported occ yelling and threatening, resistance to cares   He was started on olanzapine in June.     In July treated with ciprofloxacin for Klebsiella UTI.     By chart review, pt follows in Cardiology clinic for CAD, s/p MI and cardiac arrest in 1998, s/p CABGx3.  He had an angiogram in 2014 because of decrease in EF to 25% with patent arterial conduits.   He also has PAF, s/p pacemaker placement, SHARI ligation and maze procedure.   ECHO in 2021 showed rebound of EF to 50-55%    Also has a mild AAA 4.1cm and venous stasis edema.  Pt had a May 2023 [facility] hospitalization following a fall in setting of cognitive and physical decline.   Noted to have shuffled gait, pill-rolling tremor, sleep disturbance and visual hallucinations with waxing /waning MS all consistent with Lewy body dementia.   Also noted to have right thumb redness and pain with elevated CRP, thought to be gout vs pseudogout and treated with prednisone, improved   His CNS medications were adjusted and he discharged to [facility] before transitioning to [facility] unit      Past Medical History:   Diagnosis Date    AAA (abdominal aortic aneurysm) (H)     Anemia due to blood loss, acute 10/10/2016    Asthma     Atrial fibrillation (H)     s/p left atrial appendage ligation    Cardiac arrest (H)     Cardiomyopathy (H)     Chronic kidney disease     Chronic sinusitis     Coronary artery disease     cardiac cath 2014: medical management; cath 2013: PTCA to diagonal; cath 2009: medical management; CABG 1998: LIMA to LAD, LISA to RCA, SVG to " circumflex    GERD (gastroesophageal reflux disease)     History of angina     Hyperlipidaemia     Hypertension, goal below 140/90     Myocardial infarction (H)     MI with Vfib arrest 1998    Polymyalgia rheumatica (H)     Shingles 10/28/2016    Shortness of breath     Tachy-alix syndrome (H)     generator replacement 9/2020; implanted dual chamber pacemaker 2012       Past Surgical History:   Procedure Laterality Date    ARTHROPLASTY KNEE Left 10/5/2016    Procedure: ARTHROPLASTY KNEE;  Surgeon: Keshav Zamudio MD;  Location:  OR    CARDIAC SURGERY  12/03/2012    pacemaker ; CABG 1998    CHOLECYSTECTOMY      COLONOSCOPY      ENT SURGERY  5-    sinus    EP PACEMAKER N/A 9/11/2020    Procedure: EP Pacemaker;  Surgeon: Caron Guerin MD;  Location:  HEART CARDIAC CATH LAB    ESOPHAGOSCOPY, GASTROSCOPY, DUODENOSCOPY (EGD), COMBINED N/A 12/3/2019    Procedure: ESOPHAGOGASTRODUODENOSCOPY (EGD) (MAC);  Surgeon: Calderon Herman MD;  Location:  GI    ESOPHAGOSCOPY, GASTROSCOPY, DUODENOSCOPY (EGD), DILATATION, COMBINED N/A 12/3/2019    Procedure: Esophagoscopy, gastroscopy, duodenoscopy (EGD), dilatation, combined;  Surgeon: Calderon Herman MD;  Location:  GI    EXTRACAPSULAR CATARACT EXTRATION WITH INTRAOCULAR LENS IMPLANT      GENITOURINARY SURGERY      prostatectomy    IMPLANT PACEMAKER  12-3-12    [facility]    PROSTATE SURGERY       SH:  Wife Madison Bolton lives in the community   Daughters Donna, Una and Elva  Pt worked as a corporate    Non smoker     ROS:  EZ stand lift for transfers, WC bound   Gout right MCP resolved   Weight in 2021 was 154 lbs>>>in Dec 2022 was 190 lbs   Wt Readings from Last 5 Encounters:   07/26/23 77 kg (169 lb 12.8 oz)   07/24/23 72.6 kg (160 lb)   07/12/23 77 kg (169 lb 12.8 oz)   06/28/23 77 kg (169 lb 12.8 oz)   06/06/23 78.9 kg (174 lb)      EXAM: NAD  /64   Pulse 62   Temp 97.5  F (36.4  C)   Resp 18   Ht 1.778 m (5'  "10\")   Wt 77 kg (169 lb 12.8 oz)   SpO2 99%   BMI 24.36 kg/m     Neck supple without adenopathy  Lungs with decreased BS, no rales or wheeze  Heart RRR s1s2 distant   Abd soft, NT, no distention or guarding, +BS  Ext with trace edema    Neuro: increased tone, WC bound, oriented x1  Psych: affect okay, pleasant     Lab Results   Component Value Date     07/24/2023    POTASSIUM 4.3 07/24/2023    CHLORIDE 102 07/24/2023    CO2 25 07/24/2023    ANIONGAP 11 07/24/2023    GLC 94 07/24/2023    BUN 36.9 (H) 07/24/2023    CR 1.93 (H) 07/24/2023    GFRESTIMATED 33 (L) 07/24/2023    JANAY 10.7 (H) 07/24/2023     Lab Results   Component Value Date    AST 33 07/24/2023      ALBUMIN 3.5 07/24/2023      ALKPHOS 96 07/24/2023     Lab Results   Component Value Date    CHOL 97 07/24/2023      HDL 30 07/24/2023      LDL 41 07/24/2023      TRIG 129 07/24/2023     Lab Results   Component Value Date    WBC 9.3 05/31/2023      HGB 11.1 05/31/2023       05/31/2023       05/31/2023     TSH   Date Value Ref Range Status   05/12/2023 3.45 0.30 - 4.20 uIU/mL Final   04/06/2021 1.80 0.40 - 4.00 mU/L Final      Lab Results   Component Value Date    A1C 5.9 03/16/2022     ECHO 5/12/2023    TCD. Pt agitated and confused.  LVH. Normal systolic LVF. EF 55-60%. RV appears normal.  The left ventricle is normal in structure, function and size.  The visual ejection fraction is 60-65%.  There is mild biatrial enlargement.    There is mild (1+) tricuspid regurgitation.    MRI BRAIN WITHOUT CONTRAST  5/16/2023   HISTORY:  Worsening confusion and decreased mobility  FINDINGS: There is no evidence of acute infarct, hemorrhage, mass, or  herniation. Moderate diffuse parenchymal volume loss. Moderate patchy  deep and subcortical white matter T2 hyperintensities which are  nonspecific, but likely related to chronic microvascular ischemic  disease. Ventricular size within normal limits without hydrocephalus.  Small, chronic right cerebellar " lacunar infarct.                                IMPRESSION:     1. No evidence of acute infarct, mass, hemorrhage, or herniation.  2. Moderate diffuse parenchymal volume loss and white matter changes  likely due to chronic microvascular ischemic disease. Very small,  chronic right cerebellar lacunar type infarct.       IMP/PLAN:  (G31.83,  F02.84) Lewy body dementia with anxiety, unspecified dementia severity (H)   Comment: waxing and waning MS, Parkinsonian features, sleep disturbance, occ aggressive behaviors   Plan: [facility] unit for assist with med admin, meals, activity, secure unit and ADLs     Remains on olanzapine 2.5 mg bid and PRN to allow for safe cares>>> GDR when able   Trazodone 50 mg/HS     (I25.10) Coronary artery disease involving native coronary artery of native heart without angina pectoris  (I50.42) Chronic combined systolic and diastolic congestive heart failure (H)  Comment: significant h/o cardiac events    Plan: rosuvastatin 10 mg/day for secondary prevention   Follow up with Cardiology clinic as scheduled     (I10) Benign essential hypertension  (N18.32) Stage 3b chronic kidney disease (H)  Comment:   BP Readings from Last 3 Encounters:   07/26/23 113/64   07/24/23 110/65   07/12/23 (!) 145/64      Plan: carvedilol 3.125 mg bid  Renal dosing of medications     (I48.0) Paroxysmal atrial fibrillation (H)  Comment: paced for tachy-alix syndrome  S/p SHARI ligation and maze procedure, not anticoagulated  Pulse Readings from Last 4 Encounters:   07/26/23 62   07/24/23 60   07/12/23 60   06/28/23 60      Plan: carvedilol 3.125 mg bid for VR control     (I71.43) Infrarenal abdominal aortic aneurysm (AAA) without rupture (H)  (I73.9) PAD (peripheral artery disease) (H)  Comment: AAA 4.1 cm at last check    Plan: surveillance imaging if family interested  Monitor LEs for open areas     (N40.0) Benign prostatic hyperplasia, unspecified whether lower urinary tract symptoms present  Comment: by  hx  Plan: tamsulosin 0.4 mg/HS      Advance directive: DNR/DNI per signed HERO Robb MD

## 2023-08-22 NOTE — PROGRESS NOTES
"[place] [facility]  [facility] Medical Record Number:  [id number]  Place of Service where encounter took place:  [facility] [[id number]]  Chief Complaint   Patient presents with    RECHECK       HPI:    Abbe Lambert  is a 89 year old (2/3/1934), who is being seen today for an episodic care visit.  HPI information obtained from: facility chart records, facility staff, patient report, [facility] chart review, Care Everywhere Epic chart review, and family/first contact daughter report. Today's concern is:    Ongoing aggression and agitation. Treated for UTI in July and again this month. Had a fall last week, seems to have fallen out of bed. No longer ambulating and EZ stand for transfers. He is nonsensical mostly with conversation and puts up a fist with attempts for exam. Staff would like a hospital bed to assist with bed to WC transfers but with ongoing decline advanced LBD family agreeable to hospice eval. Has lost 27 lbs in past eight months. On modified diet DD2 with thickened liquids. His table mate is feeding him today says \"he always needs help\".     Past Medical and Surgical History reviewed in Epic today.    MEDICATIONS:    Current Outpatient Medications   Medication Sig Dispense Refill    acetaminophen (TYLENOL) 325 MG tablet Take 2 tablets (650 mg) by mouth 2 times daily And twice daily as needed 360 tablet 3    carvedilol (COREG) 3.125 MG tablet Take 1 tablet (3.125 mg) by mouth 2 times daily (with meals) 180 tablet 3    LIDOCAINE PAIN RELIEF 4 % Patch APPLY 1 PATCH TO SKIN EVERY 24 HOURS (ON FOR 12 HOURS, OFF FOR 12 HOURS) 30 patch 11    nitroglycerin (NITROSTAT) 0.4 MG SL tablet Place 1 tablet (0.4 mg) under the tongue every 5 minutes as needed for chest pain 25 tablet 3    OLANZapine (ZYPREXA) 2.5 MG tablet Take 1 tablet (2.5 mg) by mouth 2 times daily And once daily as needed 90 tablet 3    rosuvastatin (CRESTOR) 10 MG tablet Take 1 tablet (10 mg) by mouth " "every morning 90 tablet 3    tamsulosin (FLOMAX) 0.4 MG capsule Take 1 capsule (0.4 mg) by mouth every evening 90 capsule 3    traZODone (DESYREL) 50 MG tablet Take 1 tablet (50 mg) by mouth At Bedtime 30 tablet 3     REVIEW OF SYSTEMS:  Limited secondary to cognitive impairment but today pt reports ok    Objective:  BP (!) 144/65   Pulse 93   Temp 98  F (36.7  C)   Resp 19   Ht 1.778 m (5' 10\")   Wt 74.2 kg (163 lb 9.6 oz)   SpO2 98%   BMI 23.47 kg/m    Exam:  GENERAL APPEARANCE:  Agitated  ENT:  Mouth and posterior oropharynx normal, dry mucous membranes, [place],   EYES:  EOM, conjunctivae, lids, pupils and irises normal  RESP:  respiratory effort and palpation of chest normal  CV:  regular rate and rhythm, no murmur, rub, or gallop, trace edema  ABDOMEN:  no guarding or rebound  M/S: WC with EZ stand transfers   SKIN:  limited exam but intact to visualized areas   NEURO:   Cranial nerves 2-12 are normal tested and grossly at patient's baseline  PSYCH:  insight and judgement impaired, memory impaired    Labs:   CBC RESULTS:   Recent Labs   Lab Test 05/31/23  1009 05/17/23  0635   WBC 9.3 9.8   RBC 3.71* 3.62*   HGB 11.1* 11.2*   HCT 37.5* 35.6*   * 98   MCH 29.9 30.9   MCHC 29.6* 31.5   RDW 15.7* 15.1*    227       Last Basic Metabolic Panel:  Recent Labs   Lab Test 07/24/23  0956 05/31/23  1009    143   POTASSIUM 4.3 3.8   CHLORIDE 102 107   JANAY 10.7* 9.4   CO2 25 22   BUN 36.9* 30.3*   CR 1.93* 1.57*   GLC 94 124*       Liver Function Studies -   Recent Labs   Lab Test 07/24/23  0956 05/11/23  2101   PROTTOTAL 8.9* 8.2   ALBUMIN 3.5 3.4*   BILITOTAL 0.3 0.4   ALKPHOS 96 96   AST 33 37   ALT 27 21       TSH   Date Value Ref Range Status   05/12/2023 3.45 0.30 - 4.20 uIU/mL Final   04/06/2021 1.80 0.40 - 4.00 mU/L Final   03/30/2021 2.20 0.40 - 4.00 mU/L Final       Lab Results   Component Value Date    A1C 5.9 03/16/2022    A1C 5.9 04/06/2021    A1C 5.6 12/02/2019 "     ASSESSMENT/PLAN:  (G31.83,  F02.84) Lewy body dementia with anxiety, unspecified dementia severity (H)  (primary encounter diagnosis)  Comment: advanced   Plan:   -continue current plan of care and eval for hospice     (I25.10) Coronary artery disease involving native coronary artery of native heart without angina pectoris  (I50.42) Chronic combined systolic and diastolic congestive heart failure (H)  (I10) Benign essential hypertension  (I48.0) Paroxysmal atrial fibrillation (H)  Comment: follows with cardiology  Plan:   -rosuvastatin 10 mg/day for secondary prevention   -carvedilol 3.125 mg po BID    (N40.0) Benign prostatic hyperplasia, unspecified whether lower urinary tract symptoms present  (Z87.440) Personal history of urinary tract infection  Comment: frequent UTIs  Plan:   -monitor for s/s and treat     -at time note completed resident was enrolled into hospice 8/25      Electronically signed by:  CAROLYN Smyth CNP

## 2023-08-23 NOTE — LETTER
"    8/23/2023        RE: Abbe Lambert  [address]  [place] [facility] [address]        [place] [facility]  [facility] Record Number:  [id number]  Place of Service where encounter took place:  [facility] [[id number]]  Chief Complaint   Patient presents with     RECHECK       HPI:    Abbe Lambert  is a 89 year old (2/3/1934), who is being seen today for an episodic care visit.  HPI information obtained from: facility chart records, facility staff, patient report, [facility] chart review, Care Everywhere [facility] chart review, and family/first contact daughter report. Today's concern is:    Ongoing aggression and agitation. Treated for UTI in July and again this month. Had a fall last week, seems to have fallen out of bed. No longer ambulating and EZ stand for transfers. He is nonsensical mostly with conversation and puts up a fist with attempts for exam. Staff would like a hospital bed to assist with bed to WC transfers but with ongoing decline advanced LBD family agreeable to hospice eval. Has lost 27 lbs in past eight months. On modified diet DD2 with thickened liquids. His table mate is feeding him today says \"he always needs help\".     Past Medical and Surgical History reviewed in Epic today.    MEDICATIONS:    Current Outpatient Medications   Medication Sig Dispense Refill     acetaminophen (TYLENOL) 325 MG tablet Take 2 tablets (650 mg) by mouth 2 times daily And twice daily as needed 360 tablet 3     carvedilol (COREG) 3.125 MG tablet Take 1 tablet (3.125 mg) by mouth 2 times daily (with meals) 180 tablet 3     LIDOCAINE PAIN RELIEF 4 % Patch APPLY 1 PATCH TO SKIN EVERY 24 HOURS (ON FOR 12 HOURS, OFF FOR 12 HOURS) 30 patch 11     nitroglycerin (NITROSTAT) 0.4 MG SL tablet Place 1 tablet (0.4 mg) under the tongue every 5 minutes as needed for chest pain 25 tablet 3     OLANZapine (ZYPREXA) 2.5 MG tablet Take 1 tablet (2.5 mg) by mouth 2 times daily And " "once daily as needed 90 tablet 3     rosuvastatin (CRESTOR) 10 MG tablet Take 1 tablet (10 mg) by mouth every morning 90 tablet 3     tamsulosin (FLOMAX) 0.4 MG capsule Take 1 capsule (0.4 mg) by mouth every evening 90 capsule 3     traZODone (DESYREL) 50 MG tablet Take 1 tablet (50 mg) by mouth At Bedtime 30 tablet 3     REVIEW OF SYSTEMS:  Limited secondary to cognitive impairment but today pt reports ok    Objective:  BP (!) 144/65   Pulse 93   Temp 98  F (36.7  C)   Resp 19   Ht 1.778 m (5' 10\")   Wt 74.2 kg (163 lb 9.6 oz)   SpO2 98%   BMI 23.47 kg/m    Exam:  GENERAL APPEARANCE:  Agitated  ENT:  Mouth and posterior oropharynx normal, dry mucous membranes, [place],   EYES:  EOM, conjunctivae, lids, pupils and irises normal  RESP:  respiratory effort and palpation of chest normal  CV:  regular rate and rhythm, no murmur, rub, or gallop, trace edema  ABDOMEN:  no guarding or rebound  M/S: WC with EZ stand transfers   SKIN:  limited exam but intact to visualized areas   NEURO:   Cranial nerves 2-12 are normal tested and grossly at patient's baseline  PSYCH:  insight and judgement impaired, memory impaired    Labs:   CBC RESULTS:   Recent Labs   Lab Test 05/31/23  1009 05/17/23  0635   WBC 9.3 9.8   RBC 3.71* 3.62*   HGB 11.1* 11.2*   HCT 37.5* 35.6*   * 98   MCH 29.9 30.9   MCHC 29.6* 31.5   RDW 15.7* 15.1*    227       Last Basic Metabolic Panel:  Recent Labs   Lab Test 07/24/23  0956 05/31/23  1009    143   POTASSIUM 4.3 3.8   CHLORIDE 102 107   JANAY 10.7* 9.4   CO2 25 22   BUN 36.9* 30.3*   CR 1.93* 1.57*   GLC 94 124*       Liver Function Studies -   Recent Labs   Lab Test 07/24/23  0956 05/11/23  2101   PROTTOTAL 8.9* 8.2   ALBUMIN 3.5 3.4*   BILITOTAL 0.3 0.4   ALKPHOS 96 96   AST 33 37   ALT 27 21       TSH   Date Value Ref Range Status   05/12/2023 3.45 0.30 - 4.20 uIU/mL Final   04/06/2021 1.80 0.40 - 4.00 mU/L Final   03/30/2021 2.20 0.40 - 4.00 mU/L Final       Lab Results "   Component Value Date    A1C 5.9 03/16/2022    A1C 5.9 04/06/2021    A1C 5.6 12/02/2019     ASSESSMENT/PLAN:  (G31.83,  F02.84) Lewy body dementia with anxiety, unspecified dementia severity (H)  (primary encounter diagnosis)  Comment: advanced   Plan:   -continue current plan of care and eval for hospice     (I25.10) Coronary artery disease involving native coronary artery of native heart without angina pectoris  (I50.42) Chronic combined systolic and diastolic congestive heart failure (H)  (I10) Benign essential hypertension  (I48.0) Paroxysmal atrial fibrillation (H)  Comment: follows with cardiology  Plan:   -rosuvastatin 10 mg/day for secondary prevention   -carvedilol 3.125 mg po BID    (N40.0) Benign prostatic hyperplasia, unspecified whether lower urinary tract symptoms present  (Z87.440) Personal history of urinary tract infection  Comment: frequent UTIs  Plan:   -monitor for s/s and treat     -at time note completed resident was enrolled into hospice 8/25      Electronically signed by:  CAROLYN Smyth CNP             Sincerely,        CAROLYN Smyth CNP
